# Patient Record
Sex: FEMALE | Race: BLACK OR AFRICAN AMERICAN | NOT HISPANIC OR LATINO
[De-identification: names, ages, dates, MRNs, and addresses within clinical notes are randomized per-mention and may not be internally consistent; named-entity substitution may affect disease eponyms.]

---

## 2017-06-27 ENCOUNTER — APPOINTMENT (OUTPATIENT)
Dept: PULMONOLOGY | Facility: CLINIC | Age: 67
End: 2017-06-27

## 2017-07-25 ENCOUNTER — APPOINTMENT (OUTPATIENT)
Dept: PULMONOLOGY | Facility: CLINIC | Age: 67
End: 2017-07-25

## 2017-07-25 VITALS
SYSTOLIC BLOOD PRESSURE: 99 MMHG | TEMPERATURE: 97 F | WEIGHT: 199 LBS | BODY MASS INDEX: 37.57 KG/M2 | DIASTOLIC BLOOD PRESSURE: 60 MMHG | HEART RATE: 79 BPM | RESPIRATION RATE: 16 BRPM | OXYGEN SATURATION: 97 % | HEIGHT: 61 IN

## 2017-07-25 DIAGNOSIS — M15.9 POLYOSTEOARTHRITIS, UNSPECIFIED: ICD-10-CM

## 2017-08-10 ENCOUNTER — APPOINTMENT (OUTPATIENT)
Dept: PULMONOLOGY | Facility: CLINIC | Age: 67
End: 2017-08-10
Payer: COMMERCIAL

## 2017-08-10 PROCEDURE — 94729 DIFFUSING CAPACITY: CPT

## 2017-08-10 PROCEDURE — 94726 PLETHYSMOGRAPHY LUNG VOLUMES: CPT

## 2017-08-10 PROCEDURE — 94060 EVALUATION OF WHEEZING: CPT

## 2019-02-26 ENCOUNTER — APPOINTMENT (OUTPATIENT)
Dept: PULMONOLOGY | Facility: CLINIC | Age: 69
End: 2019-02-26
Payer: COMMERCIAL

## 2019-02-26 VITALS
HEIGHT: 61 IN | OXYGEN SATURATION: 94 % | RESPIRATION RATE: 15 BRPM | WEIGHT: 200 LBS | DIASTOLIC BLOOD PRESSURE: 78 MMHG | TEMPERATURE: 97.5 F | SYSTOLIC BLOOD PRESSURE: 123 MMHG | HEART RATE: 84 BPM | BODY MASS INDEX: 37.76 KG/M2

## 2019-02-26 PROCEDURE — 99213 OFFICE O/P EST LOW 20 MIN: CPT

## 2019-02-26 NOTE — HISTORY OF PRESENT ILLNESS
[FreeTextEntry1] : 68-year-old woman here for followup. Last seen August 2017.\par \par Continues to work at a snf, continued to have environmental exposures to various chemicals, all of which cause her to be short of breath and feel choking. Same is true for perfumes, purell things like that.\par \par She still using Symbicort twice a day, 2 puffs. She thinks that a deep micrograms.\par She uses her pro air for 5 times a day when she is at work. Does not require it often at all and she is in environments that do not bother her.\par \par Patient works the night shift, 12 AM to 8 AM. She sleeps from around 10 AM to 1 PM, when she gets up to use the bathroom and sometimes eats. Often goes back to sleep and 2 PM to 6 or 7 PM. Wakes up feeling refreshed. Does not have symptoms of excessive daytime somnolence.\par \par She is also using a nasal spray b.i.d., not sure which one. Reports she has had allergy testing and was told she has a seasonal allergies.\par \par Chronic edema of the left lower extremity. Status post bilateral knee replacements.

## 2019-02-26 NOTE — DISCUSSION/SUMMARY
[FreeTextEntry1] : Previous pulmonary function testing has never shown evidence of asthma. Rather restrictive disease, consistent with her body habitus. Also noted was a decreased diffusion capacity. Prior CT PA 7 done which were negative for PE or ILD. She is close it did not show pulmonary hypertension. 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 Won't stop using the pro air, for 5 times daily at work, though pulmonary and allergy testing have been negative.\par \par Repeat pulmonary function testing ordered one hours the first step\par \par already received flu shot

## 2019-02-26 NOTE — PHYSICAL EXAM
[General Appearance - Well Developed] : well developed [Normal Appearance] : normal appearance [Well Groomed] : well groomed [General Appearance - Well Nourished] : well nourished [No Deformities] : no deformities [General Appearance - In No Acute Distress] : no acute distress [Normal Conjunctiva] : the conjunctiva exhibited no abnormalities [Eyelids - No Xanthelasma] : the eyelids demonstrated no xanthelasmas [Normal Oropharynx] : normal oropharynx [III] : III [Neck Appearance] : the appearance of the neck was normal [Neck Cervical Mass (___cm)] : no neck mass was observed [Jugular Venous Distention Increased] : there was no jugular-venous distention [Thyroid Diffuse Enlargement] : the thyroid was not enlarged [Thyroid Nodule] : there were no palpable thyroid nodules [Heart Rate And Rhythm] : heart rate and rhythm were normal [Heart Sounds] : normal S1 and S2 [Murmurs] : no murmurs present [Respiration, Rhythm And Depth] : normal respiratory rhythm and effort [Exaggerated Use Of Accessory Muscles For Inspiration] : no accessory muscle use [Auscultation Breath Sounds / Voice Sounds] : lungs were clear to auscultation bilaterally [Abdomen Soft] : soft [Abdomen Tenderness] : non-tender [Abdomen Mass (___ Cm)] : no abdominal mass palpated [FreeTextEntry1] : walks with a cane [Nail Clubbing] : no clubbing of the fingernails [Cyanosis, Localized] : no localized cyanosis [Petechial Hemorrhages (___cm)] : no petechial hemorrhages [Skin Color & Pigmentation] : normal skin color and pigmentation [] : no rash [No Venous Stasis] : no venous stasis [Skin Lesions] : no skin lesions [No Skin Ulcers] : no skin ulcer [No Xanthoma] : no  xanthoma was observed [Deep Tendon Reflexes (DTR)] : deep tendon reflexes were 2+ and symmetric [Sensation] : the sensory exam was normal to light touch and pinprick [No Focal Deficits] : no focal deficits [Oriented To Time, Place, And Person] : oriented to person, place, and time [Impaired Insight] : insight and judgment were intact [Affect] : the affect was normal

## 2019-03-18 ENCOUNTER — APPOINTMENT (OUTPATIENT)
Age: 69
End: 2019-03-18
Payer: COMMERCIAL

## 2019-03-18 PROCEDURE — 94729 DIFFUSING CAPACITY: CPT

## 2019-03-18 PROCEDURE — 94060 EVALUATION OF WHEEZING: CPT

## 2019-03-18 PROCEDURE — 94726 PLETHYSMOGRAPHY LUNG VOLUMES: CPT

## 2020-11-25 ENCOUNTER — APPOINTMENT (OUTPATIENT)
Dept: PULMONOLOGY | Facility: CLINIC | Age: 70
End: 2020-11-25
Payer: COMMERCIAL

## 2020-11-25 VITALS
HEART RATE: 75 BPM | TEMPERATURE: 97.9 F | DIASTOLIC BLOOD PRESSURE: 76 MMHG | RESPIRATION RATE: 15 BRPM | BODY MASS INDEX: 37.76 KG/M2 | WEIGHT: 200 LBS | OXYGEN SATURATION: 97 % | HEIGHT: 61 IN | SYSTOLIC BLOOD PRESSURE: 125 MMHG

## 2020-11-25 DIAGNOSIS — R06.02 SHORTNESS OF BREATH: ICD-10-CM

## 2020-11-25 PROCEDURE — 99213 OFFICE O/P EST LOW 20 MIN: CPT

## 2020-11-27 NOTE — ASSESSMENT
[FreeTextEntry1] : The patient has a pmh chronic cough who presents for a follow up. \par \par # SOB and chronic cough\par - work/exposure related, CTA 2016 did not showing ILD, Echo from the 2 years with rv dysfunction, only diastolic dysfunction. PFT showing restrictive lung disease with low ERV and decreased DLCO. History consistent with possible occupations RAD \par - C/W Symbicort and albuterol\par - Will repeat PFT's\par - Chec CT chest non contrast.. \par \par # Need for flu vaccine\par - Patient had one done with PMH in sept. \par \par \par \par

## 2020-11-27 NOTE — HISTORY OF PRESENT ILLNESS
[TextBox_4] : The patient has a pmh chronic cough who presents for a follow up. \par \par Patient for the last 6 years has been complaining of cough and sob exclusively during exposure to hand , cigarette smoking, amd exposure to chemical. During times she is not working she does not have any SOB. ECHO has some diastolic dysfunction and PFT is showing restrictive lung disease but with low DLCO. CTA of 2016 did not show PE and small basilar atelectasias.\par \par She states her symptoms has not changed. Also states she may have gotten COVID in march of this year. Has been having chronic LE edema L > R since knee surgery.

## 2020-11-27 NOTE — PHYSICAL EXAM
[No Acute Distress] : no acute distress [Normal Oropharynx] : normal oropharynx [Normal Appearance] : normal appearance [No Neck Mass] : no neck mass [Normal Rate/Rhythm] : normal rate/rhythm [Normal S1, S2] : normal s1, s2 [No Murmurs] : no murmurs [No Resp Distress] : no resp distress [Clear to Auscultation Bilaterally] : clear to auscultation bilaterally [No Abnormalities] : no abnormalities [Benign] : benign [No Clubbing] : no clubbing [No Cyanosis] : no cyanosis [No Edema] : no edema [Normal Color/ Pigmentation] : normal color/ pigmentation [No Focal Deficits] : no focal deficits [Oriented x3] : oriented x3 [Normal Affect] : normal affect [TextBox_54] : +2 LE edema  [TextBox_99] : Uses Walker

## 2021-01-22 ENCOUNTER — APPOINTMENT (OUTPATIENT)
Dept: ORTHOPEDIC SURGERY | Facility: CLINIC | Age: 71
End: 2021-01-22
Payer: COMMERCIAL

## 2021-01-22 VITALS
HEART RATE: 113 BPM | DIASTOLIC BLOOD PRESSURE: 69 MMHG | BODY MASS INDEX: 35.44 KG/M2 | HEIGHT: 63 IN | SYSTOLIC BLOOD PRESSURE: 130 MMHG | OXYGEN SATURATION: 96 % | WEIGHT: 200 LBS

## 2021-01-22 DIAGNOSIS — M25.561 PAIN IN RIGHT KNEE: ICD-10-CM

## 2021-01-22 PROCEDURE — 73562 X-RAY EXAM OF KNEE 3: CPT | Mod: RT

## 2021-01-22 PROCEDURE — 99072 ADDL SUPL MATRL&STAF TM PHE: CPT

## 2021-01-22 PROCEDURE — 99204 OFFICE O/P NEW MOD 45 MIN: CPT

## 2021-01-22 NOTE — HISTORY OF PRESENT ILLNESS
[Worsening] : worsening [___ mths] : [unfilled] month(s) ago [8] : a minimum pain level of 8/10 [Intermit.] : ~He/She~ states the symptoms seem to be intermittent [Walking] : worsened by walking [Knee Flexion] : worsened with knee flexion [Recumbency] : relieved by recumbency [Rest] : relieved by rest [de-identified] : Pt presents with pain in  her right knee, pt states her right knee is buckling and she is ambulating with a cane. Pt has taken Aleve Tylenol for pain prn.  Hx right TKR 3/2016, left TKR 12/2016, pt is not receiving  any physical therapy. Pt weight is 200 lbs, 5 ' 3" Pt was last knee x  rays 2017.  Patient also states she has chronic swelling to the left lower extremity and has seen a vascular surgeon, Dr. Baptiste, however no specific treatment intervention was performed apparently [de-identified] : certain movements

## 2021-01-22 NOTE — DISCUSSION/SUMMARY
[de-identified] : Patient was informed of her findings.  She was advised that her right knee is stable and in good form.  No apparent symptomatology related to her knee replacement.  However the patient does have considerable deconditioning and muscle weakness to the right lower extremity.  She does states she has had a back problem chronically although this was never worked up.  She was given the name of Dr. Vidal for further definitive treatment of her presumed lumbosacral radiculopathy.  She was also given the name of 2 vascular surgeons for other opinions with respect to her contralateral left lower extremity chronic edema.  As far as her knees are concerned follow-up in 1 year for her annual visit

## 2021-01-22 NOTE — PHYSICAL EXAM
[de-identified] : Physical examination of the patient's right lower extremity discloses significant quadriceps and dorsiflexion weakness of the right lower extremity.  Deep tendon reflexes are diminished as well.  Sensation intact right knee incision well-healed no acute effusions active and passive range of motion nontender and stable. [de-identified] : X-rays taken of the right knee and AP lateral and skyline projections disclose maintained integrity of the total knee implants.  No signs of loosening or component wear.

## 2021-01-27 ENCOUNTER — APPOINTMENT (OUTPATIENT)
Dept: PULMONOLOGY | Facility: CLINIC | Age: 71
End: 2021-01-27

## 2021-09-17 ENCOUNTER — APPOINTMENT (OUTPATIENT)
Dept: ORTHOPEDIC SURGERY | Facility: CLINIC | Age: 71
End: 2021-09-17
Payer: COMMERCIAL

## 2021-09-17 VITALS
HEART RATE: 75 BPM | SYSTOLIC BLOOD PRESSURE: 116 MMHG | WEIGHT: 197 LBS | HEIGHT: 63 IN | DIASTOLIC BLOOD PRESSURE: 69 MMHG | BODY MASS INDEX: 34.91 KG/M2 | OXYGEN SATURATION: 97 %

## 2021-09-17 DIAGNOSIS — Z96.653 PRESENCE OF ARTIFICIAL KNEE JOINT, BILATERAL: ICD-10-CM

## 2021-09-17 PROCEDURE — 99214 OFFICE O/P EST MOD 30 MIN: CPT

## 2021-09-17 PROCEDURE — 73562 X-RAY EXAM OF KNEE 3: CPT | Mod: 50

## 2021-09-17 NOTE — HISTORY OF PRESENT ILLNESS
[Worsening] : worsening [8] : a maximum pain level of 8/10 [Intermit.] : ~He/She~ states the symptoms seem to be intermittent [Walking] : worsened by walking [Heat] : relieved by heat [Rest] : relieved by rest [de-identified] : Pt returns for follow up for her bilateral TKR  Right TKR 3/2016  pt left TKR 12/2016 pt did not follow up for her left lower leg edema with a vascular physician as was recommended by Dr Jean 1/2021, Pt did not follow up with Dr Nina for her back. [de-identified] : certain movements [de-identified] : Tylenol

## 2021-09-17 NOTE — DISCUSSION/SUMMARY
[de-identified] : Patient advised of her findings. She return in 1 year for her annual total knee arthroplasty evaluation.

## 2021-09-17 NOTE — PHYSICAL EXAM
[de-identified] : Physical examination of the patient's knees disclose well-healed bilateral knee incisions. No acute functional deficits. Stable range of motion no effusions defects or deformities. [de-identified] : X-rays taken of both knees and AP lateral and skyline projections disclose maintained position of all components. No signs of acute wear or loosening.

## 2021-09-17 NOTE — REVIEW OF SYSTEMS
[Arthralgia] : arthralgia [Joint Stiffness] : joint stiffness [Negative] : Heme/Lymph [Joint Swelling] : no joint swelling

## 2022-03-30 ENCOUNTER — APPOINTMENT (OUTPATIENT)
Dept: PULMONOLOGY | Facility: CLINIC | Age: 72
End: 2022-03-30
Payer: COMMERCIAL

## 2022-03-30 VITALS
SYSTOLIC BLOOD PRESSURE: 122 MMHG | RESPIRATION RATE: 17 BRPM | OXYGEN SATURATION: 100 % | WEIGHT: 194 LBS | BODY MASS INDEX: 34.38 KG/M2 | DIASTOLIC BLOOD PRESSURE: 77 MMHG | HEIGHT: 63 IN | TEMPERATURE: 98.2 F | HEART RATE: 90 BPM

## 2022-03-30 PROCEDURE — 99213 OFFICE O/P EST LOW 20 MIN: CPT

## 2022-03-30 NOTE — DISCUSSION/SUMMARY
[FreeTextEntry1] : 71-year-old woman non-smoker, carries a diagnosis of asthma, though 3 PFTs have only showed restrictive lung disease.  More consistent with a very hypersensitive reaction to various environmental irritants such as alcohol some perfumes.\par \par She is on her baseline Symbicort.  But she is using albuterol much too frequently, and definitely not appropriately.  She uses it up to 8 times a day when she goes out, in response to the feeling of mucus stuck in her throat.  Even 4 times a day at home.\par \par Patient was advised to cut down albuterol use.  Instead, we will start nasal Astelin to try to help with the symptoms.

## 2022-03-30 NOTE — HISTORY OF PRESENT ILLNESS
[TextBox_4] : 71-year-old woman here for a follow-up visit, environmental sensitivities\par \par Last seen in November 2020.\par \par Patient had a mild case of Covid.  Recovered well at home.  She is vaccinated subsequently.\par \par She mostly tries to stay in her own home.  Outside of the home many environmental irritants bother her.  She is using Symbicort twice a day.  She is using her albuterol up to 8 times a day when she goes out, and even 4 times a day when she stays home.  She reports she uses albuterol when she feels some mucus caught in her throat.  She is not short of breath.  She is not using any nasal sprays at the moment.

## 2023-01-06 ENCOUNTER — APPOINTMENT (OUTPATIENT)
Dept: ORTHOPEDIC SURGERY | Facility: CLINIC | Age: 73
End: 2023-01-06

## 2023-02-01 ENCOUNTER — APPOINTMENT (OUTPATIENT)
Dept: PULMONOLOGY | Facility: CLINIC | Age: 73
End: 2023-02-01

## 2023-02-01 ENCOUNTER — APPOINTMENT (OUTPATIENT)
Dept: PULMONOLOGY | Facility: CLINIC | Age: 73
End: 2023-02-01
Payer: MEDICARE

## 2023-02-01 VITALS
DIASTOLIC BLOOD PRESSURE: 76 MMHG | HEIGHT: 63 IN | SYSTOLIC BLOOD PRESSURE: 111 MMHG | TEMPERATURE: 97.6 F | OXYGEN SATURATION: 99 % | RESPIRATION RATE: 17 BRPM | HEART RATE: 91 BPM | WEIGHT: 183 LBS | BODY MASS INDEX: 32.43 KG/M2

## 2023-02-01 DIAGNOSIS — R05.3 CHRONIC COUGH: ICD-10-CM

## 2023-02-01 PROCEDURE — 99213 OFFICE O/P EST LOW 20 MIN: CPT

## 2023-02-01 RX ORDER — LEVOCETIRIZINE DIHYDROCHLORIDE 5 MG/1
5 TABLET ORAL DAILY
Qty: 30 | Refills: 0 | Status: ACTIVE | COMMUNITY
Start: 2023-02-01 | End: 1900-01-01

## 2023-02-01 RX ORDER — AZELASTINE HYDROCHLORIDE 137 UG/1
137 SPRAY, METERED NASAL TWICE DAILY
Qty: 3 | Refills: 3 | Status: ACTIVE | COMMUNITY
Start: 2022-03-30 | End: 1900-01-01

## 2023-02-02 ENCOUNTER — APPOINTMENT (OUTPATIENT)
Dept: ORTHOPEDIC SURGERY | Facility: CLINIC | Age: 73
End: 2023-02-02
Payer: MEDICARE

## 2023-02-02 VITALS — BODY MASS INDEX: 32.43 KG/M2 | WEIGHT: 183 LBS | HEIGHT: 63 IN

## 2023-02-02 PROCEDURE — 99204 OFFICE O/P NEW MOD 45 MIN: CPT

## 2023-02-02 RX ORDER — MELOXICAM 15 MG/1
15 TABLET ORAL
Qty: 30 | Refills: 1 | Status: ACTIVE | COMMUNITY
Start: 2023-02-02 | End: 1900-01-01

## 2023-02-02 NOTE — HISTORY OF PRESENT ILLNESS
[TextBox_4] : Ms. Snider is a 73 yo F who carries the diagnosis of asthma who presents with chronic cough and hypersensitive reactions to environmental irritans is here for her regular follow up. \par \par She has no new symptoms. She continues to have significant cough in response to known irritants including hand , pollen, perfume, marijuana, smoke etc. She feels at her baseline currently. No significant shortness of breath, chest pain otherwise. When she is at home she does not typically require inhalers, has minimal symptoms. When she goes outside and is exposed to irritants she uses albuterol inhaler 2-4 times a day, but not every day. She uses her symbicort 2 times a day usually. No recent fevers, chills or any other complaints. \par \par \par She states she is up to date with vaccines including covid, influenza, pneumonia.

## 2023-02-28 ENCOUNTER — OUTPATIENT (OUTPATIENT)
Dept: OUTPATIENT SERVICES | Facility: HOSPITAL | Age: 73
LOS: 1 days | End: 2023-02-28
Payer: MEDICARE

## 2023-02-28 ENCOUNTER — APPOINTMENT (OUTPATIENT)
Dept: MRI IMAGING | Facility: CLINIC | Age: 73
End: 2023-02-28
Payer: MEDICARE

## 2023-02-28 DIAGNOSIS — Z90.49 ACQUIRED ABSENCE OF OTHER SPECIFIED PARTS OF DIGESTIVE TRACT: Chronic | ICD-10-CM

## 2023-02-28 DIAGNOSIS — Z90.710 ACQUIRED ABSENCE OF BOTH CERVIX AND UTERUS: Chronic | ICD-10-CM

## 2023-02-28 DIAGNOSIS — Z00.8 ENCOUNTER FOR OTHER GENERAL EXAMINATION: ICD-10-CM

## 2023-02-28 DIAGNOSIS — Z96.651 PRESENCE OF RIGHT ARTIFICIAL KNEE JOINT: Chronic | ICD-10-CM

## 2023-02-28 DIAGNOSIS — Z98.89 OTHER SPECIFIED POSTPROCEDURAL STATES: Chronic | ICD-10-CM

## 2023-02-28 PROCEDURE — 72148 MRI LUMBAR SPINE W/O DYE: CPT | Mod: 26

## 2023-02-28 PROCEDURE — 72148 MRI LUMBAR SPINE W/O DYE: CPT

## 2023-03-02 ENCOUNTER — APPOINTMENT (OUTPATIENT)
Dept: ORTHOPEDIC SURGERY | Facility: CLINIC | Age: 73
End: 2023-03-02
Payer: MEDICARE

## 2023-03-02 DIAGNOSIS — M48.07 SPINAL STENOSIS, LUMBOSACRAL REGION: ICD-10-CM

## 2023-03-02 DIAGNOSIS — M51.36 OTHER INTERVERTEBRAL DISC DEGENERATION, LUMBAR REGION: ICD-10-CM

## 2023-03-02 PROCEDURE — 99214 OFFICE O/P EST MOD 30 MIN: CPT | Mod: 95

## 2023-11-02 ENCOUNTER — APPOINTMENT (OUTPATIENT)
Dept: SURGICAL ONCOLOGY | Facility: CLINIC | Age: 73
End: 2023-11-02
Payer: MEDICARE

## 2023-11-02 VITALS
HEIGHT: 61 IN | DIASTOLIC BLOOD PRESSURE: 75 MMHG | HEART RATE: 101 BPM | OXYGEN SATURATION: 99 % | WEIGHT: 267 LBS | SYSTOLIC BLOOD PRESSURE: 115 MMHG | RESPIRATION RATE: 17 BRPM | BODY MASS INDEX: 50.41 KG/M2

## 2023-11-02 DIAGNOSIS — Z86.39 PERSONAL HISTORY OF OTHER ENDOCRINE, NUTRITIONAL AND METABOLIC DISEASE: ICD-10-CM

## 2023-11-02 PROCEDURE — 99205 OFFICE O/P NEW HI 60 MIN: CPT

## 2023-11-06 PROBLEM — Z86.39 HISTORY OF DIABETES MELLITUS: Status: RESOLVED | Noted: 2023-11-06 | Resolved: 2023-11-06

## 2023-11-06 RX ORDER — METRONIDAZOLE 250 MG/1
250 TABLET ORAL
Qty: 3 | Refills: 0 | Status: ACTIVE | COMMUNITY
Start: 2023-11-06 | End: 1900-01-01

## 2023-11-06 RX ORDER — NEOMYCIN SULFATE 500 MG/1
500 TABLET ORAL
Qty: 6 | Refills: 0 | Status: ACTIVE | COMMUNITY
Start: 2023-11-06 | End: 1900-01-01

## 2023-11-11 ENCOUNTER — RX RENEWAL (OUTPATIENT)
Age: 73
End: 2023-11-11

## 2023-11-11 RX ORDER — POTASSIUM CHLORIDE 1500 MG/1
20 TABLET, FILM COATED, EXTENDED RELEASE ORAL
Qty: 4 | Refills: 0 | Status: ACTIVE | COMMUNITY
Start: 2023-11-06 | End: 1900-01-01

## 2023-11-13 ENCOUNTER — APPOINTMENT (OUTPATIENT)
Dept: PULMONOLOGY | Facility: CLINIC | Age: 73
End: 2023-11-13
Payer: MEDICARE

## 2023-11-13 VITALS
DIASTOLIC BLOOD PRESSURE: 76 MMHG | WEIGHT: 137 LBS | HEART RATE: 87 BPM | BODY MASS INDEX: 25.86 KG/M2 | TEMPERATURE: 98.4 F | HEIGHT: 61 IN | SYSTOLIC BLOOD PRESSURE: 138 MMHG | OXYGEN SATURATION: 97 %

## 2023-11-13 DIAGNOSIS — J45.909 UNSPECIFIED ASTHMA, UNCOMPLICATED: ICD-10-CM

## 2023-11-13 PROCEDURE — 99213 OFFICE O/P EST LOW 20 MIN: CPT

## 2023-11-13 RX ORDER — BUDESONIDE AND FORMOTEROL FUMARATE DIHYDRATE 160; 4.5 UG/1; UG/1
160-4.5 AEROSOL RESPIRATORY (INHALATION) TWICE DAILY
Qty: 1 | Refills: 5 | Status: ACTIVE | COMMUNITY
Start: 2020-11-25 | End: 1900-01-01

## 2023-11-14 ENCOUNTER — RESULT REVIEW (OUTPATIENT)
Age: 73
End: 2023-11-14

## 2023-11-14 ENCOUNTER — OUTPATIENT (OUTPATIENT)
Dept: OUTPATIENT SERVICES | Facility: HOSPITAL | Age: 73
LOS: 1 days | End: 2023-11-14
Payer: MEDICARE

## 2023-11-14 DIAGNOSIS — Z90.49 ACQUIRED ABSENCE OF OTHER SPECIFIED PARTS OF DIGESTIVE TRACT: Chronic | ICD-10-CM

## 2023-11-14 DIAGNOSIS — Z98.89 OTHER SPECIFIED POSTPROCEDURAL STATES: Chronic | ICD-10-CM

## 2023-11-14 DIAGNOSIS — Z96.651 PRESENCE OF RIGHT ARTIFICIAL KNEE JOINT: Chronic | ICD-10-CM

## 2023-11-14 DIAGNOSIS — C18.0 MALIGNANT NEOPLASM OF CECUM: ICD-10-CM

## 2023-11-14 DIAGNOSIS — Z90.710 ACQUIRED ABSENCE OF BOTH CERVIX AND UTERUS: Chronic | ICD-10-CM

## 2023-11-14 PROCEDURE — 88321 CONSLTJ&REPRT SLD PREP ELSWR: CPT

## 2023-11-16 ENCOUNTER — OUTPATIENT (OUTPATIENT)
Dept: OUTPATIENT SERVICES | Facility: HOSPITAL | Age: 73
LOS: 1 days | End: 2023-11-16
Payer: MEDICARE

## 2023-11-16 VITALS
HEIGHT: 60 IN | SYSTOLIC BLOOD PRESSURE: 101 MMHG | WEIGHT: 166.89 LBS | OXYGEN SATURATION: 98 % | DIASTOLIC BLOOD PRESSURE: 69 MMHG | HEART RATE: 94 BPM | TEMPERATURE: 98 F | RESPIRATION RATE: 16 BRPM

## 2023-11-16 DIAGNOSIS — J45.909 UNSPECIFIED ASTHMA, UNCOMPLICATED: ICD-10-CM

## 2023-11-16 DIAGNOSIS — C18.0 MALIGNANT NEOPLASM OF CECUM: ICD-10-CM

## 2023-11-16 DIAGNOSIS — Z98.890 OTHER SPECIFIED POSTPROCEDURAL STATES: Chronic | ICD-10-CM

## 2023-11-16 DIAGNOSIS — Z90.49 ACQUIRED ABSENCE OF OTHER SPECIFIED PARTS OF DIGESTIVE TRACT: Chronic | ICD-10-CM

## 2023-11-16 DIAGNOSIS — E11.9 TYPE 2 DIABETES MELLITUS WITHOUT COMPLICATIONS: ICD-10-CM

## 2023-11-16 DIAGNOSIS — Z90.710 ACQUIRED ABSENCE OF BOTH CERVIX AND UTERUS: Chronic | ICD-10-CM

## 2023-11-16 DIAGNOSIS — Z98.89 OTHER SPECIFIED POSTPROCEDURAL STATES: Chronic | ICD-10-CM

## 2023-11-16 DIAGNOSIS — I10 ESSENTIAL (PRIMARY) HYPERTENSION: ICD-10-CM

## 2023-11-16 DIAGNOSIS — Z96.652 PRESENCE OF LEFT ARTIFICIAL KNEE JOINT: Chronic | ICD-10-CM

## 2023-11-16 DIAGNOSIS — Z96.651 PRESENCE OF RIGHT ARTIFICIAL KNEE JOINT: Chronic | ICD-10-CM

## 2023-11-16 LAB
A1C WITH ESTIMATED AVERAGE GLUCOSE RESULT: 6.8 % — HIGH (ref 4–5.6)
A1C WITH ESTIMATED AVERAGE GLUCOSE RESULT: 6.8 % — HIGH (ref 4–5.6)
ALBUMIN SERPL ELPH-MCNC: 3.5 G/DL — SIGNIFICANT CHANGE UP (ref 3.3–5)
ALBUMIN SERPL ELPH-MCNC: 3.5 G/DL — SIGNIFICANT CHANGE UP (ref 3.3–5)
ALP SERPL-CCNC: 91 U/L — SIGNIFICANT CHANGE UP (ref 40–120)
ALP SERPL-CCNC: 91 U/L — SIGNIFICANT CHANGE UP (ref 40–120)
ALT FLD-CCNC: 19 U/L — SIGNIFICANT CHANGE UP (ref 4–33)
ALT FLD-CCNC: 19 U/L — SIGNIFICANT CHANGE UP (ref 4–33)
ANION GAP SERPL CALC-SCNC: 7 MMOL/L — SIGNIFICANT CHANGE UP (ref 7–14)
ANION GAP SERPL CALC-SCNC: 7 MMOL/L — SIGNIFICANT CHANGE UP (ref 7–14)
AST SERPL-CCNC: 22 U/L — SIGNIFICANT CHANGE UP (ref 4–32)
AST SERPL-CCNC: 22 U/L — SIGNIFICANT CHANGE UP (ref 4–32)
BILIRUB SERPL-MCNC: 0.2 MG/DL — SIGNIFICANT CHANGE UP (ref 0.2–1.2)
BILIRUB SERPL-MCNC: 0.2 MG/DL — SIGNIFICANT CHANGE UP (ref 0.2–1.2)
BLD GP AB SCN SERPL QL: NEGATIVE — SIGNIFICANT CHANGE UP
BLD GP AB SCN SERPL QL: NEGATIVE — SIGNIFICANT CHANGE UP
BUN SERPL-MCNC: 8 MG/DL — SIGNIFICANT CHANGE UP (ref 7–23)
BUN SERPL-MCNC: 8 MG/DL — SIGNIFICANT CHANGE UP (ref 7–23)
CALCIUM SERPL-MCNC: 9.5 MG/DL — SIGNIFICANT CHANGE UP (ref 8.4–10.5)
CALCIUM SERPL-MCNC: 9.5 MG/DL — SIGNIFICANT CHANGE UP (ref 8.4–10.5)
CHLORIDE SERPL-SCNC: 102 MMOL/L — SIGNIFICANT CHANGE UP (ref 98–107)
CHLORIDE SERPL-SCNC: 102 MMOL/L — SIGNIFICANT CHANGE UP (ref 98–107)
CO2 SERPL-SCNC: 32 MMOL/L — HIGH (ref 22–31)
CO2 SERPL-SCNC: 32 MMOL/L — HIGH (ref 22–31)
CREAT SERPL-MCNC: 0.61 MG/DL — SIGNIFICANT CHANGE UP (ref 0.5–1.3)
CREAT SERPL-MCNC: 0.61 MG/DL — SIGNIFICANT CHANGE UP (ref 0.5–1.3)
EGFR: 95 ML/MIN/1.73M2 — SIGNIFICANT CHANGE UP
EGFR: 95 ML/MIN/1.73M2 — SIGNIFICANT CHANGE UP
ESTIMATED AVERAGE GLUCOSE: 148 — SIGNIFICANT CHANGE UP
ESTIMATED AVERAGE GLUCOSE: 148 — SIGNIFICANT CHANGE UP
GLUCOSE SERPL-MCNC: 158 MG/DL — HIGH (ref 70–99)
GLUCOSE SERPL-MCNC: 158 MG/DL — HIGH (ref 70–99)
HCT VFR BLD CALC: 35.2 % — SIGNIFICANT CHANGE UP (ref 34.5–45)
HCT VFR BLD CALC: 35.2 % — SIGNIFICANT CHANGE UP (ref 34.5–45)
HGB BLD-MCNC: 10.9 G/DL — LOW (ref 11.5–15.5)
HGB BLD-MCNC: 10.9 G/DL — LOW (ref 11.5–15.5)
MCHC RBC-ENTMCNC: 24.3 PG — LOW (ref 27–34)
MCHC RBC-ENTMCNC: 24.3 PG — LOW (ref 27–34)
MCHC RBC-ENTMCNC: 31 GM/DL — LOW (ref 32–36)
MCHC RBC-ENTMCNC: 31 GM/DL — LOW (ref 32–36)
MCV RBC AUTO: 78.4 FL — LOW (ref 80–100)
MCV RBC AUTO: 78.4 FL — LOW (ref 80–100)
NRBC # BLD: 0 /100 WBCS — SIGNIFICANT CHANGE UP (ref 0–0)
NRBC # BLD: 0 /100 WBCS — SIGNIFICANT CHANGE UP (ref 0–0)
NRBC # FLD: 0 K/UL — SIGNIFICANT CHANGE UP (ref 0–0)
NRBC # FLD: 0 K/UL — SIGNIFICANT CHANGE UP (ref 0–0)
PLATELET # BLD AUTO: 289 K/UL — SIGNIFICANT CHANGE UP (ref 150–400)
PLATELET # BLD AUTO: 289 K/UL — SIGNIFICANT CHANGE UP (ref 150–400)
POTASSIUM SERPL-MCNC: 3.1 MMOL/L — LOW (ref 3.5–5.3)
POTASSIUM SERPL-MCNC: 3.1 MMOL/L — LOW (ref 3.5–5.3)
POTASSIUM SERPL-SCNC: 3.1 MMOL/L — LOW (ref 3.5–5.3)
POTASSIUM SERPL-SCNC: 3.1 MMOL/L — LOW (ref 3.5–5.3)
PROT SERPL-MCNC: 7.5 G/DL — SIGNIFICANT CHANGE UP (ref 6–8.3)
PROT SERPL-MCNC: 7.5 G/DL — SIGNIFICANT CHANGE UP (ref 6–8.3)
RBC # BLD: 4.49 M/UL — SIGNIFICANT CHANGE UP (ref 3.8–5.2)
RBC # BLD: 4.49 M/UL — SIGNIFICANT CHANGE UP (ref 3.8–5.2)
RBC # FLD: 16.7 % — HIGH (ref 10.3–14.5)
RBC # FLD: 16.7 % — HIGH (ref 10.3–14.5)
RH IG SCN BLD-IMP: POSITIVE — SIGNIFICANT CHANGE UP
SODIUM SERPL-SCNC: 141 MMOL/L — SIGNIFICANT CHANGE UP (ref 135–145)
SODIUM SERPL-SCNC: 141 MMOL/L — SIGNIFICANT CHANGE UP (ref 135–145)
SURGICAL PATHOLOGY STUDY: SIGNIFICANT CHANGE UP
SURGICAL PATHOLOGY STUDY: SIGNIFICANT CHANGE UP
WBC # BLD: 6.49 K/UL — SIGNIFICANT CHANGE UP (ref 3.8–10.5)
WBC # BLD: 6.49 K/UL — SIGNIFICANT CHANGE UP (ref 3.8–10.5)
WBC # FLD AUTO: 6.49 K/UL — SIGNIFICANT CHANGE UP (ref 3.8–10.5)
WBC # FLD AUTO: 6.49 K/UL — SIGNIFICANT CHANGE UP (ref 3.8–10.5)

## 2023-11-16 PROCEDURE — 93010 ELECTROCARDIOGRAM REPORT: CPT

## 2023-11-16 RX ORDER — GLUCAGON INJECTION, SOLUTION 0.5 MG/.1ML
1 INJECTION, SOLUTION SUBCUTANEOUS ONCE
Refills: 0 | Status: DISCONTINUED | OUTPATIENT
Start: 2023-11-22 | End: 2023-11-24

## 2023-11-16 RX ORDER — DEXTROSE 50 % IN WATER 50 %
15 SYRINGE (ML) INTRAVENOUS ONCE
Refills: 0 | Status: DISCONTINUED | OUTPATIENT
Start: 2023-11-22 | End: 2023-11-22

## 2023-11-16 RX ORDER — SODIUM CHLORIDE 9 MG/ML
3 INJECTION INTRAMUSCULAR; INTRAVENOUS; SUBCUTANEOUS EVERY 8 HOURS
Refills: 0 | Status: DISCONTINUED | OUTPATIENT
Start: 2023-11-22 | End: 2023-12-15

## 2023-11-16 RX ORDER — DEXTROSE 50 % IN WATER 50 %
25 SYRINGE (ML) INTRAVENOUS ONCE
Refills: 0 | Status: DISCONTINUED | OUTPATIENT
Start: 2023-11-22 | End: 2023-11-22

## 2023-11-16 RX ORDER — DEXTROSE 50 % IN WATER 50 %
12.5 SYRINGE (ML) INTRAVENOUS ONCE
Refills: 0 | Status: DISCONTINUED | OUTPATIENT
Start: 2023-11-22 | End: 2023-11-22

## 2023-11-16 RX ORDER — CHLORHEXIDINE GLUCONATE 213 G/1000ML
1 SOLUTION TOPICAL DAILY
Refills: 0 | Status: DISCONTINUED | OUTPATIENT
Start: 2023-11-22 | End: 2023-12-15

## 2023-11-16 RX ORDER — SODIUM CHLORIDE 9 MG/ML
1000 INJECTION, SOLUTION INTRAVENOUS
Refills: 0 | Status: DISCONTINUED | OUTPATIENT
Start: 2023-11-22 | End: 2023-11-23

## 2023-11-16 NOTE — H&P PST ADULT - NSICDXPASTMEDICALHX_GEN_ALL_CORE_FT
PAST MEDICAL HISTORY:  Asthma     Diabetes wears insulin pump    GERD (gastroesophageal reflux disease)     H/O insertion of insulin pump     History of hypertension     Hyperlipidemia     Lymphadenopathy left lower extremitiy ; 1966 - current    Malignant neoplasm of cecum     OA (osteoarthritis)     Obesity     Uterine leiomyoma

## 2023-11-16 NOTE — H&P PST ADULT - RESPIRATORY AND THORAX COMMENTS
Hx asthma- controlled on medications, denies intubations Hx asthma- denies intubations, reports she uses albuterol pump daily, states she stopped using Symbicort daily, pulmonologist informed and educated patient on using symbicort daily  and albuterol as needed, preop evaluation in chart

## 2023-11-16 NOTE — H&P PST ADULT - CARDIOVASCULAR
regular rate and rhythm/S1 S2 present/peripheral edema details… regular rate and rhythm/S1 S2 present/no murmur/peripheral edema/pedal edema

## 2023-11-16 NOTE — H&P PST ADULT - HISTORY OF PRESENT ILLNESS
73 yr old female underwent screening colonoscopy 10/24/2023 demonstrated a 3 cm non-obstructing, partially circumferential mass in the cecum and a 5 mm polyp in the ascending colon, Pathology: cecal mass showed moderately differentiated adenocarcinoma, with extensive high grade dysplasia. Scheduled for robotic partial colectomy, possible open  73 yr old female underwent screening colonoscopy 10/24/2023 demonstrated a 3 cm non-obstructing, partially circumferential mass in the cecum and a 5 mm polyp in the ascending colon, Pathology: cecal mass showed moderately differentiated adenocarcinoma, with extensive high grade dysplasia. Scheduled for robotic partial colectomy, possible open, denies bowels changes, abdominal pain or weight loss

## 2023-11-16 NOTE — H&P PST ADULT - PROBLEM SELECTOR PLAN 2
Patient instructed to take *** on day of procedure with small sips of water, verbalized understanding.     Cardiac evaluation requested by PST for further evaluation of low mets, high risk patient and surgery   Echo, holter report and Stress test requested Patient instructed to take holter on day of procedure with small sips of water, hold HCTZ on DOS, verbalized understanding.     Cardiac evaluation requested by PST for further evaluation of low mets, high risk patient and surgery   Echo, holter report and Stress test requested

## 2023-11-16 NOTE — H&P PST ADULT - RESPIRATORY
clear to auscultation bilaterally/no wheezes/no respiratory distress clear to auscultation bilaterally/no wheezes/no rales/no respiratory distress/no use of accessory muscles

## 2023-11-16 NOTE — H&P PST ADULT - NSICDXPASTSURGICALHX_GEN_ALL_CORE_FT
PAST SURGICAL HISTORY:  H/O bilateral breast reduction surgery     H/O total knee replacement, left     H/O: hysterectomy     History of appendectomy     History of cholecystectomy     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 - ANGELA    S/P bunionectomy

## 2023-11-16 NOTE — H&P PST ADULT - PROBLEM SELECTOR PLAN 4
Patient instructed on the following medications adjustment per endocrinologist   POCT glucose testing ordered STAT upon admission and  Hypoglycemia protocol for history of insulin use    Endocrinology evaluation requested by PST for further evaluation of insulin pump adjustment and history of low blood glucose readings

## 2023-11-16 NOTE — H&P PST ADULT - NS MD HP INPLANTS MED DEV
glucose sensor/Artificial joint/Insulin pump glucose sensor, bilateral knee replacement/Artificial joint/Insulin pump

## 2023-11-16 NOTE — H&P PST ADULT - PROBLEM SELECTOR PLAN 1
Patient scheduled for surgery on: 11/22/23  Provided with verbal and written presurgical instructions  Verbalized understanding  with teach back on the following: Famotidine for GI prophylaxis and chlorhexidine wash    Lab specimen drawn at PST today: cbc, cmp, hga1c, type, abo

## 2023-11-16 NOTE — H&P PST ADULT - PROBLEM SELECTOR PLAN 3
Pulmonologist evaluation requested by PST for further evaluation of frequent use of albuterol pump- in chart    Instructed to use inhalers as prescribed and bring rescue inhaler on DOS

## 2023-11-16 NOTE — H&P PST ADULT - ENDOCRINE COMMENTS
Diabetes- insulin dependent, insulin pump form provided to patient and endocrinology emailed with patient information

## 2023-11-16 NOTE — H&P PST ADULT - END GEN HX ROS MEA POS PC
Uses Glucosensor- "always less than 200, sometimes 40 , because I don't eat" Endocrinologist made insulin pump adjustments Uses Glucose sensor- "always less than 200, sometimes 40 , because I don't eat" Endocrinologist made insulin pump adjustments

## 2023-11-16 NOTE — H&P PST ADULT - NSICDXFAMILYHX_GEN_ALL_CORE_FT
FAMILY HISTORY:  Father  Still living? No  Diabetes mellitus, Age at diagnosis: Age Unknown    Aunt  Still living? No  FH: breast cancer, Age at diagnosis: Age Unknown

## 2023-11-16 NOTE — H&P PST ADULT - MUSCULOSKELETAL
details… back exam/abnormal gait no joint erythema/no joint warmth/no calf tenderness/decreased ROM/decreased ROM due to pain/back exam/abnormal gait

## 2023-11-21 ENCOUNTER — TRANSCRIPTION ENCOUNTER (OUTPATIENT)
Age: 73
End: 2023-11-21

## 2023-11-21 NOTE — ASU PATIENT PROFILE, ADULT - FALL HARM RISK - HARM RISK INTERVENTIONS
Communicate Risk of Fall with Harm to all staff/Reinforce activity limits and safety measures with patient and family/Tailored Fall Risk Interventions/Visual Cue: Yellow wristband and red socks/Bed in lowest position, wheels locked, appropriate side rails in place/Call bell, personal items and telephone in reach/Instruct patient to call for assistance before getting out of bed or chair/Non-slip footwear when patient is out of bed/[place] to call system/Physically safe environment - no spills, clutter or unnecessary equipment/Purposeful Proactive Rounding/Room/bathroom lighting operational, light cord in reach Assistance with ambulation/Assistance OOB with selected safe patient handling equipment/Communicate Risk of Fall with Harm to all staff/Discuss with provider need for PT consult/Monitor gait and stability/Provide patient with walking aids - walker, cane, crutches/Reinforce activity limits and safety measures with patient and family/Tailored Fall Risk Interventions/Visual Cue: Yellow wristband and red socks/Bed in lowest position, wheels locked, appropriate side rails in place/Call bell, personal items and telephone in reach/Instruct patient to call for assistance before getting out of bed or chair/Non-slip footwear when patient is out of bed/[place] to call system/Physically safe environment - no spills, clutter or unnecessary equipment/Purposeful Proactive Rounding/Room/bathroom lighting operational, light cord in reach

## 2023-11-21 NOTE — ASU PATIENT PROFILE, ADULT - AS SC BRADEN MOBILITY
Ht: 67inches  pre-pregnancy wt: 215pounds  IBW: 135pounds (+/-10%)  D/W MFM team, Lantus to be d/c'd and NPH 22 units qHS to be prescribed (4) no limitation

## 2023-11-22 ENCOUNTER — RESULT REVIEW (OUTPATIENT)
Age: 73
End: 2023-11-22

## 2023-11-22 ENCOUNTER — INPATIENT (INPATIENT)
Facility: HOSPITAL | Age: 73
LOS: 22 days | Discharge: HOME HEALTH | End: 2023-12-15
Attending: SURGERY | Admitting: SURGERY
Payer: MEDICARE

## 2023-11-22 ENCOUNTER — TRANSCRIPTION ENCOUNTER (OUTPATIENT)
Age: 73
End: 2023-11-22

## 2023-11-22 ENCOUNTER — APPOINTMENT (OUTPATIENT)
Dept: SURGICAL ONCOLOGY | Facility: HOSPITAL | Age: 73
End: 2023-11-22

## 2023-11-22 VITALS
DIASTOLIC BLOOD PRESSURE: 64 MMHG | HEART RATE: 111 BPM | HEIGHT: 60 IN | TEMPERATURE: 99 F | WEIGHT: 166.89 LBS | SYSTOLIC BLOOD PRESSURE: 126 MMHG | RESPIRATION RATE: 16 BRPM | OXYGEN SATURATION: 99 %

## 2023-11-22 DIAGNOSIS — Z90.710 ACQUIRED ABSENCE OF BOTH CERVIX AND UTERUS: Chronic | ICD-10-CM

## 2023-11-22 DIAGNOSIS — Z90.49 ACQUIRED ABSENCE OF OTHER SPECIFIED PARTS OF DIGESTIVE TRACT: Chronic | ICD-10-CM

## 2023-11-22 DIAGNOSIS — Z96.651 PRESENCE OF RIGHT ARTIFICIAL KNEE JOINT: Chronic | ICD-10-CM

## 2023-11-22 DIAGNOSIS — Z96.652 PRESENCE OF LEFT ARTIFICIAL KNEE JOINT: Chronic | ICD-10-CM

## 2023-11-22 DIAGNOSIS — C18.0 MALIGNANT NEOPLASM OF CECUM: ICD-10-CM

## 2023-11-22 DIAGNOSIS — E78.5 HYPERLIPIDEMIA, UNSPECIFIED: ICD-10-CM

## 2023-11-22 DIAGNOSIS — Z98.890 OTHER SPECIFIED POSTPROCEDURAL STATES: Chronic | ICD-10-CM

## 2023-11-22 DIAGNOSIS — Z98.89 OTHER SPECIFIED POSTPROCEDURAL STATES: Chronic | ICD-10-CM

## 2023-11-22 LAB
ALBUMIN SERPL ELPH-MCNC: 3.2 G/DL — LOW (ref 3.3–5)
ALBUMIN SERPL ELPH-MCNC: 3.2 G/DL — LOW (ref 3.3–5)
ALP SERPL-CCNC: 90 U/L — SIGNIFICANT CHANGE UP (ref 40–120)
ALP SERPL-CCNC: 90 U/L — SIGNIFICANT CHANGE UP (ref 40–120)
ALT FLD-CCNC: 20 U/L — SIGNIFICANT CHANGE UP (ref 4–33)
ALT FLD-CCNC: 20 U/L — SIGNIFICANT CHANGE UP (ref 4–33)
ANION GAP SERPL CALC-SCNC: 12 MMOL/L — SIGNIFICANT CHANGE UP (ref 7–14)
ANION GAP SERPL CALC-SCNC: 12 MMOL/L — SIGNIFICANT CHANGE UP (ref 7–14)
ANION GAP SERPL CALC-SCNC: 8 MMOL/L — SIGNIFICANT CHANGE UP (ref 7–14)
ANION GAP SERPL CALC-SCNC: 8 MMOL/L — SIGNIFICANT CHANGE UP (ref 7–14)
AST SERPL-CCNC: 24 U/L — SIGNIFICANT CHANGE UP (ref 4–32)
AST SERPL-CCNC: 24 U/L — SIGNIFICANT CHANGE UP (ref 4–32)
BASE EXCESS BLDV CALC-SCNC: 4.1 MMOL/L — HIGH (ref -2–3)
BASE EXCESS BLDV CALC-SCNC: 4.1 MMOL/L — HIGH (ref -2–3)
BILIRUB SERPL-MCNC: 0.5 MG/DL — SIGNIFICANT CHANGE UP (ref 0.2–1.2)
BILIRUB SERPL-MCNC: 0.5 MG/DL — SIGNIFICANT CHANGE UP (ref 0.2–1.2)
BUN SERPL-MCNC: 6 MG/DL — LOW (ref 7–23)
BUN SERPL-MCNC: 6 MG/DL — LOW (ref 7–23)
BUN SERPL-MCNC: 7 MG/DL — SIGNIFICANT CHANGE UP (ref 7–23)
BUN SERPL-MCNC: 7 MG/DL — SIGNIFICANT CHANGE UP (ref 7–23)
CA-I SERPL-SCNC: 1.25 MMOL/L — SIGNIFICANT CHANGE UP (ref 1.15–1.33)
CA-I SERPL-SCNC: 1.25 MMOL/L — SIGNIFICANT CHANGE UP (ref 1.15–1.33)
CALCIUM SERPL-MCNC: 8.6 MG/DL — SIGNIFICANT CHANGE UP (ref 8.4–10.5)
CALCIUM SERPL-MCNC: 8.6 MG/DL — SIGNIFICANT CHANGE UP (ref 8.4–10.5)
CALCIUM SERPL-MCNC: 8.8 MG/DL — SIGNIFICANT CHANGE UP (ref 8.4–10.5)
CALCIUM SERPL-MCNC: 8.8 MG/DL — SIGNIFICANT CHANGE UP (ref 8.4–10.5)
CHLORIDE BLDV-SCNC: 105 MMOL/L — SIGNIFICANT CHANGE UP (ref 96–108)
CHLORIDE BLDV-SCNC: 105 MMOL/L — SIGNIFICANT CHANGE UP (ref 96–108)
CHLORIDE SERPL-SCNC: 104 MMOL/L — SIGNIFICANT CHANGE UP (ref 98–107)
CHLORIDE SERPL-SCNC: 104 MMOL/L — SIGNIFICANT CHANGE UP (ref 98–107)
CHLORIDE SERPL-SCNC: 106 MMOL/L — SIGNIFICANT CHANGE UP (ref 98–107)
CHLORIDE SERPL-SCNC: 106 MMOL/L — SIGNIFICANT CHANGE UP (ref 98–107)
CO2 BLDV-SCNC: 31.2 MMOL/L — HIGH (ref 22–26)
CO2 BLDV-SCNC: 31.2 MMOL/L — HIGH (ref 22–26)
CO2 SERPL-SCNC: 26 MMOL/L — SIGNIFICANT CHANGE UP (ref 22–31)
CREAT SERPL-MCNC: 0.58 MG/DL — SIGNIFICANT CHANGE UP (ref 0.5–1.3)
CREAT SERPL-MCNC: 0.58 MG/DL — SIGNIFICANT CHANGE UP (ref 0.5–1.3)
CREAT SERPL-MCNC: 0.61 MG/DL — SIGNIFICANT CHANGE UP (ref 0.5–1.3)
CREAT SERPL-MCNC: 0.61 MG/DL — SIGNIFICANT CHANGE UP (ref 0.5–1.3)
EGFR: 95 ML/MIN/1.73M2 — SIGNIFICANT CHANGE UP
EGFR: 95 ML/MIN/1.73M2 — SIGNIFICANT CHANGE UP
EGFR: 96 ML/MIN/1.73M2 — SIGNIFICANT CHANGE UP
EGFR: 96 ML/MIN/1.73M2 — SIGNIFICANT CHANGE UP
GAS PNL BLDA: SIGNIFICANT CHANGE UP
GAS PNL BLDV: 140 MMOL/L — SIGNIFICANT CHANGE UP (ref 136–145)
GAS PNL BLDV: 140 MMOL/L — SIGNIFICANT CHANGE UP (ref 136–145)
GAS PNL BLDV: SIGNIFICANT CHANGE UP
GAS PNL BLDV: SIGNIFICANT CHANGE UP
GLUCOSE BLDC GLUCOMTR-MCNC: 173 MG/DL — HIGH (ref 70–99)
GLUCOSE BLDC GLUCOMTR-MCNC: 173 MG/DL — HIGH (ref 70–99)
GLUCOSE BLDC GLUCOMTR-MCNC: 181 MG/DL — HIGH (ref 70–99)
GLUCOSE BLDC GLUCOMTR-MCNC: 181 MG/DL — HIGH (ref 70–99)
GLUCOSE BLDC GLUCOMTR-MCNC: 194 MG/DL — HIGH (ref 70–99)
GLUCOSE BLDC GLUCOMTR-MCNC: 194 MG/DL — HIGH (ref 70–99)
GLUCOSE BLDC GLUCOMTR-MCNC: 208 MG/DL — HIGH (ref 70–99)
GLUCOSE BLDC GLUCOMTR-MCNC: 208 MG/DL — HIGH (ref 70–99)
GLUCOSE BLDV-MCNC: 131 MG/DL — HIGH (ref 70–99)
GLUCOSE BLDV-MCNC: 131 MG/DL — HIGH (ref 70–99)
GLUCOSE SERPL-MCNC: 184 MG/DL — HIGH (ref 70–99)
GLUCOSE SERPL-MCNC: 184 MG/DL — HIGH (ref 70–99)
GLUCOSE SERPL-MCNC: 185 MG/DL — HIGH (ref 70–99)
GLUCOSE SERPL-MCNC: 185 MG/DL — HIGH (ref 70–99)
HCO3 BLDV-SCNC: 30 MMOL/L — HIGH (ref 22–29)
HCO3 BLDV-SCNC: 30 MMOL/L — HIGH (ref 22–29)
HCT VFR BLD CALC: 30.3 % — LOW (ref 34.5–45)
HCT VFR BLD CALC: 30.3 % — LOW (ref 34.5–45)
HCT VFR BLDA CALC: 34 % — LOW (ref 34.5–46.5)
HCT VFR BLDA CALC: 34 % — LOW (ref 34.5–46.5)
HGB BLD CALC-MCNC: 11.2 G/DL — LOW (ref 11.7–16.1)
HGB BLD CALC-MCNC: 11.2 G/DL — LOW (ref 11.7–16.1)
HGB BLD-MCNC: 9.6 G/DL — LOW (ref 11.5–15.5)
HGB BLD-MCNC: 9.6 G/DL — LOW (ref 11.5–15.5)
LACTATE BLDV-MCNC: 1.4 MMOL/L — SIGNIFICANT CHANGE UP (ref 0.5–2)
LACTATE BLDV-MCNC: 1.4 MMOL/L — SIGNIFICANT CHANGE UP (ref 0.5–2)
MAGNESIUM SERPL-MCNC: 1.7 MG/DL — SIGNIFICANT CHANGE UP (ref 1.6–2.6)
MAGNESIUM SERPL-MCNC: 1.7 MG/DL — SIGNIFICANT CHANGE UP (ref 1.6–2.6)
MAGNESIUM SERPL-MCNC: 1.8 MG/DL — SIGNIFICANT CHANGE UP (ref 1.6–2.6)
MAGNESIUM SERPL-MCNC: 1.8 MG/DL — SIGNIFICANT CHANGE UP (ref 1.6–2.6)
MCHC RBC-ENTMCNC: 24.4 PG — LOW (ref 27–34)
MCHC RBC-ENTMCNC: 24.4 PG — LOW (ref 27–34)
MCHC RBC-ENTMCNC: 31.7 GM/DL — LOW (ref 32–36)
MCHC RBC-ENTMCNC: 31.7 GM/DL — LOW (ref 32–36)
MCV RBC AUTO: 77.1 FL — LOW (ref 80–100)
MCV RBC AUTO: 77.1 FL — LOW (ref 80–100)
NRBC # BLD: 0 /100 WBCS — SIGNIFICANT CHANGE UP (ref 0–0)
NRBC # BLD: 0 /100 WBCS — SIGNIFICANT CHANGE UP (ref 0–0)
NRBC # FLD: 0 K/UL — SIGNIFICANT CHANGE UP (ref 0–0)
NRBC # FLD: 0 K/UL — SIGNIFICANT CHANGE UP (ref 0–0)
PCO2 BLDV: 48 MMHG — SIGNIFICANT CHANGE UP (ref 39–52)
PCO2 BLDV: 48 MMHG — SIGNIFICANT CHANGE UP (ref 39–52)
PH BLDV: 7.4 — SIGNIFICANT CHANGE UP (ref 7.32–7.43)
PH BLDV: 7.4 — SIGNIFICANT CHANGE UP (ref 7.32–7.43)
PHOSPHATE SERPL-MCNC: 2.7 MG/DL — SIGNIFICANT CHANGE UP (ref 2.5–4.5)
PHOSPHATE SERPL-MCNC: 2.7 MG/DL — SIGNIFICANT CHANGE UP (ref 2.5–4.5)
PLATELET # BLD AUTO: 244 K/UL — SIGNIFICANT CHANGE UP (ref 150–400)
PLATELET # BLD AUTO: 244 K/UL — SIGNIFICANT CHANGE UP (ref 150–400)
PO2 BLDV: 44 MMHG — SIGNIFICANT CHANGE UP (ref 25–45)
PO2 BLDV: 44 MMHG — SIGNIFICANT CHANGE UP (ref 25–45)
POTASSIUM BLDV-SCNC: 4.8 MMOL/L — SIGNIFICANT CHANGE UP (ref 3.5–5.1)
POTASSIUM BLDV-SCNC: 4.8 MMOL/L — SIGNIFICANT CHANGE UP (ref 3.5–5.1)
POTASSIUM SERPL-MCNC: 4.1 MMOL/L — SIGNIFICANT CHANGE UP (ref 3.5–5.3)
POTASSIUM SERPL-MCNC: 4.1 MMOL/L — SIGNIFICANT CHANGE UP (ref 3.5–5.3)
POTASSIUM SERPL-MCNC: 5.3 MMOL/L — SIGNIFICANT CHANGE UP (ref 3.5–5.3)
POTASSIUM SERPL-MCNC: 5.3 MMOL/L — SIGNIFICANT CHANGE UP (ref 3.5–5.3)
POTASSIUM SERPL-SCNC: 4.1 MMOL/L — SIGNIFICANT CHANGE UP (ref 3.5–5.3)
POTASSIUM SERPL-SCNC: 4.1 MMOL/L — SIGNIFICANT CHANGE UP (ref 3.5–5.3)
POTASSIUM SERPL-SCNC: 5.3 MMOL/L — SIGNIFICANT CHANGE UP (ref 3.5–5.3)
POTASSIUM SERPL-SCNC: 5.3 MMOL/L — SIGNIFICANT CHANGE UP (ref 3.5–5.3)
PROT SERPL-MCNC: 6.5 G/DL — SIGNIFICANT CHANGE UP (ref 6–8.3)
PROT SERPL-MCNC: 6.5 G/DL — SIGNIFICANT CHANGE UP (ref 6–8.3)
RBC # BLD: 3.93 M/UL — SIGNIFICANT CHANGE UP (ref 3.8–5.2)
RBC # BLD: 3.93 M/UL — SIGNIFICANT CHANGE UP (ref 3.8–5.2)
RBC # FLD: 16.3 % — HIGH (ref 10.3–14.5)
RBC # FLD: 16.3 % — HIGH (ref 10.3–14.5)
SAO2 % BLDV: 68.6 % — SIGNIFICANT CHANGE UP (ref 67–88)
SAO2 % BLDV: 68.6 % — SIGNIFICANT CHANGE UP (ref 67–88)
SODIUM SERPL-SCNC: 140 MMOL/L — SIGNIFICANT CHANGE UP (ref 135–145)
SODIUM SERPL-SCNC: 140 MMOL/L — SIGNIFICANT CHANGE UP (ref 135–145)
SODIUM SERPL-SCNC: 142 MMOL/L — SIGNIFICANT CHANGE UP (ref 135–145)
SODIUM SERPL-SCNC: 142 MMOL/L — SIGNIFICANT CHANGE UP (ref 135–145)
WBC # BLD: 13.99 K/UL — HIGH (ref 3.8–10.5)
WBC # BLD: 13.99 K/UL — HIGH (ref 3.8–10.5)
WBC # FLD AUTO: 13.99 K/UL — HIGH (ref 3.8–10.5)
WBC # FLD AUTO: 13.99 K/UL — HIGH (ref 3.8–10.5)

## 2023-11-22 PROCEDURE — 44140 PARTIAL REMOVAL OF COLON: CPT | Mod: AS

## 2023-11-22 PROCEDURE — 44204 LAPARO PARTIAL COLECTOMY: CPT

## 2023-11-22 PROCEDURE — 99223 1ST HOSP IP/OBS HIGH 75: CPT

## 2023-11-22 PROCEDURE — 49905 OMENTAL FLAP INTRA-ABDOM: CPT | Mod: AS

## 2023-11-22 PROCEDURE — 44204 LAPARO PARTIAL COLECTOMY: CPT | Mod: AS

## 2023-11-22 PROCEDURE — 44050 REDUCE BOWEL OBSTRUCTION: CPT | Mod: AS

## 2023-11-22 PROCEDURE — 88342 IMHCHEM/IMCYTCHM 1ST ANTB: CPT | Mod: 26

## 2023-11-22 PROCEDURE — 88341 IMHCHEM/IMCYTCHM EA ADD ANTB: CPT | Mod: 26

## 2023-11-22 PROCEDURE — S2900 ROBOTIC SURGICAL SYSTEM: CPT | Mod: NC

## 2023-11-22 PROCEDURE — 49905 OMENTAL FLAP INTRA-ABDOM: CPT

## 2023-11-22 PROCEDURE — 44050 REDUCE BOWEL OBSTRUCTION: CPT

## 2023-11-22 DEVICE — STAPLER COVIDIEN TRI-STAPLE 60MM PURPLE RELOAD: Type: IMPLANTABLE DEVICE | Status: FUNCTIONAL

## 2023-11-22 DEVICE — LIGATING CLIPS WECK HEMOLOK POLYMER LARGE (PURPLE) 6: Type: IMPLANTABLE DEVICE | Status: FUNCTIONAL

## 2023-11-22 DEVICE — STAPLER COVIDIEN TRI-STAPLE 60MM PURPLE INTELLIGENT RELOAD: Type: IMPLANTABLE DEVICE | Status: FUNCTIONAL

## 2023-11-22 RX ORDER — OMEPRAZOLE 10 MG/1
1 CAPSULE, DELAYED RELEASE ORAL
Refills: 0 | DISCHARGE

## 2023-11-22 RX ORDER — OXYCODONE HYDROCHLORIDE 5 MG/1
5 TABLET ORAL EVERY 4 HOURS
Refills: 0 | Status: DISCONTINUED | OUTPATIENT
Start: 2023-11-22 | End: 2023-11-23

## 2023-11-22 RX ORDER — INSULIN GLARGINE 100 [IU]/ML
20 INJECTION, SOLUTION SUBCUTANEOUS AT BEDTIME
Refills: 0 | Status: DISCONTINUED | OUTPATIENT
Start: 2023-11-23 | End: 2023-11-23

## 2023-11-22 RX ORDER — INSULIN GLARGINE 100 [IU]/ML
20 INJECTION, SOLUTION SUBCUTANEOUS AT BEDTIME
Refills: 0 | Status: DISCONTINUED | OUTPATIENT
Start: 2023-11-22 | End: 2023-11-22

## 2023-11-22 RX ORDER — BUDESONIDE AND FORMOTEROL FUMARATE DIHYDRATE 160; 4.5 UG/1; UG/1
2 AEROSOL RESPIRATORY (INHALATION)
Refills: 0 | DISCHARGE

## 2023-11-22 RX ORDER — ACETAMINOPHEN 500 MG
2 TABLET ORAL
Refills: 0 | DISCHARGE

## 2023-11-22 RX ORDER — BUDESONIDE AND FORMOTEROL FUMARATE DIHYDRATE 160; 4.5 UG/1; UG/1
2 AEROSOL RESPIRATORY (INHALATION)
Refills: 0 | Status: DISCONTINUED | OUTPATIENT
Start: 2023-11-22 | End: 2023-12-15

## 2023-11-22 RX ORDER — HYDROMORPHONE HYDROCHLORIDE 2 MG/ML
0.5 INJECTION INTRAMUSCULAR; INTRAVENOUS; SUBCUTANEOUS
Refills: 0 | Status: DISCONTINUED | OUTPATIENT
Start: 2023-11-22 | End: 2023-11-22

## 2023-11-22 RX ORDER — ASPIRIN/CALCIUM CARB/MAGNESIUM 324 MG
1 TABLET ORAL
Refills: 0 | DISCHARGE

## 2023-11-22 RX ORDER — INSULIN LISPRO 100/ML
VIAL (ML) SUBCUTANEOUS AT BEDTIME
Refills: 0 | Status: DISCONTINUED | OUTPATIENT
Start: 2023-11-22 | End: 2023-11-23

## 2023-11-22 RX ORDER — ACETAMINOPHEN 500 MG
1000 TABLET ORAL EVERY 6 HOURS
Refills: 0 | Status: COMPLETED | OUTPATIENT
Start: 2023-11-22 | End: 2023-11-23

## 2023-11-22 RX ORDER — MAGNESIUM SULFATE 500 MG/ML
2 VIAL (ML) INJECTION ONCE
Refills: 0 | Status: COMPLETED | OUTPATIENT
Start: 2023-11-22 | End: 2023-11-22

## 2023-11-22 RX ORDER — ONDANSETRON 8 MG/1
4 TABLET, FILM COATED ORAL ONCE
Refills: 0 | Status: DISCONTINUED | OUTPATIENT
Start: 2023-11-22 | End: 2023-11-22

## 2023-11-22 RX ORDER — DEXTROSE 50 % IN WATER 50 %
25 SYRINGE (ML) INTRAVENOUS ONCE
Refills: 0 | Status: DISCONTINUED | OUTPATIENT
Start: 2023-11-22 | End: 2023-11-23

## 2023-11-22 RX ORDER — DEXTROSE 50 % IN WATER 50 %
15 SYRINGE (ML) INTRAVENOUS ONCE
Refills: 0 | Status: DISCONTINUED | OUTPATIENT
Start: 2023-11-22 | End: 2023-11-23

## 2023-11-22 RX ORDER — ENOXAPARIN SODIUM 100 MG/ML
40 INJECTION SUBCUTANEOUS EVERY 24 HOURS
Refills: 0 | Status: DISCONTINUED | OUTPATIENT
Start: 2023-11-22 | End: 2023-11-24

## 2023-11-22 RX ORDER — DEXTROSE 50 % IN WATER 50 %
12.5 SYRINGE (ML) INTRAVENOUS ONCE
Refills: 0 | Status: DISCONTINUED | OUTPATIENT
Start: 2023-11-22 | End: 2023-11-23

## 2023-11-22 RX ORDER — SODIUM CHLORIDE 9 MG/ML
1000 INJECTION, SOLUTION INTRAVENOUS
Refills: 0 | Status: DISCONTINUED | OUTPATIENT
Start: 2023-11-22 | End: 2023-11-22

## 2023-11-22 RX ORDER — INSULIN LISPRO 100/ML
VIAL (ML) SUBCUTANEOUS
Refills: 0 | Status: DISCONTINUED | OUTPATIENT
Start: 2023-11-22 | End: 2023-11-22

## 2023-11-22 RX ORDER — INSULIN LISPRO 100/ML
5 VIAL (ML) SUBCUTANEOUS
Refills: 0 | Status: DISCONTINUED | OUTPATIENT
Start: 2023-11-22 | End: 2023-11-22

## 2023-11-22 RX ORDER — PANTOPRAZOLE SODIUM 20 MG/1
40 TABLET, DELAYED RELEASE ORAL
Refills: 0 | Status: DISCONTINUED | OUTPATIENT
Start: 2023-11-22 | End: 2023-11-29

## 2023-11-22 RX ORDER — ALBUTEROL 90 UG/1
2 AEROSOL, METERED ORAL
Refills: 0 | Status: DISCONTINUED | OUTPATIENT
Start: 2023-11-22 | End: 2023-12-15

## 2023-11-22 RX ORDER — HYDROMORPHONE HYDROCHLORIDE 2 MG/ML
0.25 INJECTION INTRAMUSCULAR; INTRAVENOUS; SUBCUTANEOUS
Refills: 0 | Status: DISCONTINUED | OUTPATIENT
Start: 2023-11-22 | End: 2023-11-22

## 2023-11-22 RX ORDER — INSULIN LISPRO 100/ML
VIAL (ML) SUBCUTANEOUS
Refills: 0 | Status: DISCONTINUED | OUTPATIENT
Start: 2023-11-22 | End: 2023-11-23

## 2023-11-22 RX ORDER — INSULIN GLARGINE 100 [IU]/ML
42 INJECTION, SOLUTION SUBCUTANEOUS AT BEDTIME
Refills: 0 | Status: DISCONTINUED | OUTPATIENT
Start: 2023-11-22 | End: 2023-11-22

## 2023-11-22 RX ORDER — OXYCODONE HYDROCHLORIDE 5 MG/1
2.5 TABLET ORAL EVERY 4 HOURS
Refills: 0 | Status: DISCONTINUED | OUTPATIENT
Start: 2023-11-22 | End: 2023-11-23

## 2023-11-22 RX ORDER — ALBUTEROL 90 UG/1
2 AEROSOL, METERED ORAL
Refills: 0 | DISCHARGE

## 2023-11-22 RX ADMIN — Medication 2: at 15:53

## 2023-11-22 RX ADMIN — Medication 25 GRAM(S): at 15:52

## 2023-11-22 RX ADMIN — Medication 5 UNIT(S): at 15:52

## 2023-11-22 RX ADMIN — SODIUM CHLORIDE 3 MILLILITER(S): 9 INJECTION INTRAMUSCULAR; INTRAVENOUS; SUBCUTANEOUS at 22:13

## 2023-11-22 RX ADMIN — BUDESONIDE AND FORMOTEROL FUMARATE DIHYDRATE 2 PUFF(S): 160; 4.5 AEROSOL RESPIRATORY (INHALATION) at 22:17

## 2023-11-22 RX ADMIN — CHLORHEXIDINE GLUCONATE 1 APPLICATION(S): 213 SOLUTION TOPICAL at 07:17

## 2023-11-22 RX ADMIN — SODIUM CHLORIDE 30 MILLILITER(S): 9 INJECTION, SOLUTION INTRAVENOUS at 21:32

## 2023-11-22 RX ADMIN — OXYCODONE HYDROCHLORIDE 5 MILLIGRAM(S): 5 TABLET ORAL at 20:36

## 2023-11-22 RX ADMIN — ALBUTEROL 2 PUFF(S): 90 AEROSOL, METERED ORAL at 22:17

## 2023-11-22 RX ADMIN — Medication 400 MILLIGRAM(S): at 18:42

## 2023-11-22 RX ADMIN — HYDROMORPHONE HYDROCHLORIDE 0.5 MILLIGRAM(S): 2 INJECTION INTRAMUSCULAR; INTRAVENOUS; SUBCUTANEOUS at 17:00

## 2023-11-22 RX ADMIN — Medication 1000 MILLIGRAM(S): at 19:04

## 2023-11-22 RX ADMIN — HYDROMORPHONE HYDROCHLORIDE 0.5 MILLIGRAM(S): 2 INJECTION INTRAMUSCULAR; INTRAVENOUS; SUBCUTANEOUS at 16:40

## 2023-11-22 NOTE — BRIEF OPERATIVE NOTE - OPERATION/FINDINGS
Robotic assisted right hemicolectomy.     Extensive lysis of adhesions. Right colon mesentery dissected from RP, duodenum identified and protected. Ileocolic pedicle clipped with hem o manuel clips. TI and transverse colon divided with 60mm purple loads. Low midline incision for specimen extraction and stapled extracorporeal anastomosis with 60mm purple loads. Fascia closed with figure of eight #1 PDS.

## 2023-11-22 NOTE — CONSULT NOTE ADULT - TIME BILLING
71 minutes spent on total encounter; more than 50% of the visit was spent counseling and / or coordinating care by the attending physician.  The necessity of the time spent during the encounter on this date of service was due to:   reviewing prior medical records, labs and care coordination.

## 2023-11-22 NOTE — DISCHARGE NOTE PROVIDER - HOSPITAL COURSE
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 11/22 Endocrinology was consulted for management of insulin pump; recommended lantus 42 units bedtime with admelog 5 units TID with meals. Insulin pump to be resumed at time of hospital discharge  11/23 Denise was removed and patient passed/failed TOV ******    ** INCOMPLETE    Patient tolerated operation well and there were no post-operative complications identified. Patient remained hemodynamically stable in the PACU and transferred to the surgical floor. Diet was restarted and advanced as tolerated. Pain control was transitioned from IV to PO pain meds. At this time, patient is currently ambulating, voiding, tolerating a regular diet. Pain well controlled on PO pain meds. Patient has been deemed stable for discharge home with follow up as an outpatient. 73 year old female with PMH asthma, T2DM on insulin pump, GERD, HTN, HLD, OA, obesity who presented to [facility] on 11/22/23 with moderately differentiated adenocarcinoma of cecal mass, scheduled for surgery. Patient taken to the OR by Dr. Nichols and underwent robotic partial colectomy. Patient tolerated operation well and there were no post-operative complications identified. Patient remained hemodynamically stable in the PACU and transferred to the surgical floor.   Endocrinology was consulted for management of insulin pump; recommended lantus 42 units bedtime with admelog 5 units TID with meals. Insulin pump to be resumed at time of hospital discharge.   11/23 POD1 Patient tachycardic to low 100s. Allowed sips of clears. H/H downtrended from 9.6/30.3 day prior to 7.8/25.31. Repeat CBC in PM 7.3/23.4.  11/24 POD2 Denise was removed and patient passed TOV. H/H noted to downtrend to 6.3/19.6. Transfused 2uPRBC. Patient remained on sips of clears.  11/25 POD 3  11/26 POD4  ** INCOMPLETE  Patient seen by physical therapy throughout hospital stay who recommended patient be discharged to ****  Pain control was transitioned from IV to PO pain meds. At this time, patient is currently ambulating, voiding, tolerating a regular diet. Pain well controlled on PO pain meds. Patient has been deemed stable for discharge home with follow up as an outpatient. 73 year old female with PMH asthma, T2DM on insulin pump, GERD, HTN, HLD, OA, obesity who presented to [facility] on 11/22/23 with moderately differentiated adenocarcinoma of cecal mass, scheduled for surgery. Patient taken to the OR by Dr. Nichols and underwent robotic partial colectomy. Patient tolerated operation well and there were no post-operative complications identified. Patient remained hemodynamically stable in the PACU and transferred to the surgical floor.   Endocrinology was consulted for management of insulin pump; recommended lantus 42 units bedtime with admelog 5 units TID with meals. Insulin pump to be resumed at time of hospital discharge.   11/23 POD1 Patient tachycardic to low 100s. Allowed sips of clears. H/H downtrended from 9.6/30.3 day prior to 7.8/25.31. Repeat CBC in PM 7.3/23.4.  11/24 POD2 Denise was removed and patient passed TOV. H/H noted to downtrend to 6.3/19.6. Transfused 2uPRBC. Patient remained on sips of clears.  11/25 POD 3  11/26 POD4  ** INCOMPLETE  Patient seen by physical therapy throughout hospital stay who recommended patient be discharged to ****  Pain control was transitioned from IV to PO pain meds. At this time, patient is currently ambulating, voiding, tolerating a regular diet. Pain well controlled on PO pain meds. Patient has been deemed stable for discharge home with follow up as an outpatient. 73 year old female with PMH asthma, T2DM on insulin pump, GERD, HTN, HLD, OA, obesity who presented to [facility] on 11/22/23 with moderately differentiated adenocarcinoma of cecal mass, scheduled for surgery. Patient taken to the OR by Dr. Nichols and underwent robotic partial colectomy. Patient tolerated operation well and there were no post-operative complications identified. Patient remained hemodynamically stable in the PACU and transferred to the surgical floor.   Endocrinology was consulted for management of insulin pump; recommended lantus 42 units bedtime with admelog 5 units TID with meals. Insulin pump to be resumed at time of hospital discharge.   11/23 POD1 Patient tachycardic to low 100s. Allowed sips of clears. H/H downtrended from 9.6/30.3 day prior to 7.8/25.31. Repeat CBC in PM 7.3/23.4.  11/24 POD2 Denise was removed and patient passed TOV. H/H noted to downtrend to 6.3/19.6. Transfused 2uPRBC. Patient remained on sips of clears.  11/25 POD 3  11/26 POD4  ** INCOMPLETE  Patient seen by physical therapy throughout hospital stay who recommended patient be discharged to ****  Pain control was transitioned from IV to PO pain meds. At this time, patient is currently ambulating, voiding, tolerating a regular diet. Pain well controlled on PO pain meds. Patient has been deemed stable for discharge home with follow up as an outpatient. 73 year old female with PMH asthma, T2DM on insulin pump, GERD, HTN, HLD, OA, obesity who presented to [facility] on 11/22/23 with moderately differentiated adenocarcinoma of cecal mass, scheduled for surgery. Patient taken to the OR by Dr. Nichols and underwent robotic partial colectomy. Patient tolerated operation well and there were no post-operative complications identified. Patient remained hemodynamically stable in the PACU and transferred to the surgical floor.   Endocrinology was consulted for management of insulin pump; recommended lantus 42 units bedtime with admelog 5 units TID with meals. Insulin pump to be resumed at time of hospital discharge.   11/23 POD1 Patient tachycardic to low 100s. Allowed sips of clears. H/H downtrended from 9.6/30.3 day prior to 7.8/25.31. Repeat CBC in PM 7.3/23.4.  11/24 POD2 Denise was removed and patient passed TOV. H/H noted to downtrend to 6.3/19.6. Transfused 2uPRBC. Patient remained on sips of clears.  11/25 POD 3 H/H stable post-transfusion at 10.5/31.3. Diet advanced to clear liquids.   11/26 POD4 Diet advanced to full liquids. PO intake limited 2/2 nausea.  11/27 POD5 Diet backed down to sips 2/2 episode of emesis. XR without evidence of gastric bubble or ileus. Patient had small bowel movement.  11/28 POD6 Remains on sips.  ** INCOMPLETE  Patient seen by physical therapy throughout hospital stay who recommended patient be discharged to ****  Pain control was transitioned from IV to PO pain meds. At this time, patient is currently ambulating, voiding, tolerating a regular diet. Pain well controlled on PO pain meds. Patient has been deemed stable for discharge home with follow up as an outpatient. 73 year old female with PMH asthma, T2DM on insulin pump, GERD, HTN, HLD, OA, obesity who presented to [facility] on 11/22/23 with moderately differentiated adenocarcinoma of cecal mass, scheduled for surgery. Patient taken to the OR by Dr. Nichols and underwent robotic partial colectomy. Patient tolerated operation well and there were no post-operative complications identified. Patient remained hemodynamically stable in the PACU and transferred to the surgical floor.   Endocrinology was consulted for management of insulin pump; recommended lantus 42 units bedtime with admelog 5 units TID with meals. Insulin pump to be resumed at time of hospital discharge.   11/23 POD1 Patient tachycardic to low 100s. Allowed sips of clears. H/H downtrended from 9.6/30.3 day prior to 7.8/25.31. Repeat CBC in PM 7.3/23.4.  11/24 POD2 Ednise was removed and patient passed TOV. H/H noted to downtrend to 6.3/19.6. Transfused 2uPRBC. Patient remained on sips of clears.  11/25 POD 3 H/H stable post-transfusion at 10.5/31.3. Diet advanced to clear liquids.   11/26 POD4 Diet advanced to full liquids. PO intake limited 2/2 nausea.  11/27 POD5 Diet backed down to sips 2/2 episode of emesis. XR without evidence of gastric bubble or ileus. Patient had small bowel movement.  11/28 POD6 Remains on sips.  ** INCOMPLETE  Patient seen by physical therapy throughout hospital stay who recommended patient be discharged to ****  Pain control was transitioned from IV to PO pain meds. At this time, patient is currently ambulating, voiding, tolerating a regular diet. Pain well controlled on PO pain meds. Patient has been deemed stable for discharge home with follow up as an outpatient. 73 year old female with PMH asthma, T2DM on insulin pump, GERD, HTN, HLD, OA, obesity who presented to [facility] on 11/22/23 with moderately differentiated adenocarcinoma of cecal mass, scheduled for surgery. Patient taken to the OR by Dr. Nichols and underwent robotic partial colectomy. Patient tolerated operation well and there were no post-operative complications identified. Patient remained hemodynamically stable in the PACU and transferred to the surgical floor.   Endocrinology was consulted for management of insulin pump; recommended lantus 42 units bedtime with admelog 5 units TID with meals. Insulin pump to be resumed at time of hospital discharge.   11/23 POD1 Patient tachycardic to low 100s. Allowed sips of clears. H/H downtrended from 9.6/30.3 day prior to 7.8/25.31. Repeat CBC in PM 7.3/23.4.  11/24 POD2 Denise was removed and patient passed TOV. H/H noted to downtrend to 6.3/19.6. Transfused 2uPRBC. Patient remained on sips of clears.  11/25 POD 3 H/H stable post-transfusion at 10.5/31.3. Diet advanced to clear liquids.   11/26 POD4 Diet advanced to full liquids. PO intake limited 2/2 nausea.  11/27 POD5 Diet backed down to sips 2/2 episode of emesis. XR without evidence of gastric bubble or ileus. Patient had small bowel movement.  11/28 POD6 Remains on sips.  ** INCOMPLETE  Patient seen by physical therapy throughout hospital stay who recommended patient be discharged to ****  Pain control was transitioned from IV to PO pain meds. At this time, patient is currently ambulating, voiding, tolerating a regular diet. Pain well controlled on PO pain meds. Patient has been deemed stable for discharge home with follow up as an outpatient. 73 year old female with PMH asthma, T2DM on insulin pump, GERD, HTN, HLD, OA, obesity who presented to [facility] on 11/22/23 with moderately differentiated adenocarcinoma of cecal mass, scheduled for surgery. Patient taken to the OR by Dr. Nichols and underwent robotic partial colectomy. Patient tolerated operation well and there were no post-operative complications identified. Patient remained hemodynamically stable in the PACU and transferred to the surgical floor.   Endocrinology was consulted for management of insulin pump; recommended lantus 42 units bedtime with admelog 5 units TID with meals. Insulin pump to be resumed at time of hospital discharge.   11/23 POD1 Patient tachycardic to low 100s. Allowed sips of clears. H/H downtrended from 9.6/30.3 day prior to 7.8/25.31. Repeat CBC in PM 7.3/23.4.  11/24 POD2 Denise was removed and patient passed TOV. H/H noted to downtrend to 6.3/19.6. Transfused 2uPRBC. Patient remained on sips of clears.  11/25 POD 3 H/H stable post-transfusion at 10.5/31.3. Diet advanced to clear liquids.   11/26 POD4 Diet advanced to full liquids. PO intake limited 2/2 nausea.  11/27 POD5 Diet backed down to sips 2/2 episode of emesis. XR without evidence of gastric bubble or ileus. Patient had small bowel movement.  11/28 POD6 Remains on sips.  11/29 POD7 Patient unable to tolerate CLD, with episodes of emesis. CT abd/pelvis showed: Dilated loops of small bowel the level of the terminal ileum, likely ileus in the postoperative setting. Postoperative pneumoperitoneum and diffuse subcutaneous emphysema. NGT placed with immediate return of copious amounts of bilious fluid.   11/30 POD8 IR was consulted for PICC placement. TPN was initiated.     ** INCOMPLETE    Patient seen by physical therapy throughout hospital stay who recommended patient be discharged to Rehab.   Pain control was transitioned from IV to PO pain meds. At this time, patient is currently ambulating, voiding, tolerating a regular diet. Pain well controlled on PO pain meds. Patient has been deemed stable for discharge, per Dr. Nichols, to Rehab with follow up as an outpatient. 73 year old female with PMH asthma, T2DM on insulin pump, GERD, HTN, HLD, OA, obesity who presented to [facility] on 11/22/23 with moderately differentiated adenocarcinoma of cecal mass, scheduled for surgery. Patient taken to the OR by Dr. Nichols and underwent robotic partial colectomy. Patient tolerated operation well and there were no post-operative complications identified. Patient remained hemodynamically stable in the PACU and transferred to the surgical floor.   Endocrinology was consulted for management of insulin pump; recommended lantus 42 units bedtime with admelog 5 units TID with meals. Insulin pump to be resumed at time of hospital discharge.   11/23 POD1 Patient tachycardic to low 100s. Allowed sips of clears. H/H downtrended from 9.6/30.3 day prior to 7.8/25.31. Repeat CBC in PM 7.3/23.4.  11/24 POD2 Denise was removed and patient passed TOV. H/H noted to downtrend to 6.3/19.6. Transfused 2uPRBC. Patient remained on sips of clears.  11/25 POD 3 H/H stable post-transfusion at 10.5/31.3. Diet advanced to clear liquids.   11/26 POD4 Diet advanced to full liquids. PO intake limited 2/2 nausea.  11/27 POD5 Diet backed down to sips 2/2 episode of emesis. XR without evidence of gastric bubble or ileus. Patient had small bowel movement.  11/28 POD6 Remains on sips.  11/29 POD7 Patient unable to tolerate CLD, with episodes of emesis. CT abd/pelvis showed: Dilated loops of small bowel the level of the terminal ileum, likely ileus in the postoperative setting. Postoperative pneumoperitoneum and diffuse subcutaneous emphysema. NGT placed with immediate return of copious amounts of bilious fluid.   11/30 POD8 IR was consulted for PICC placement. TPN was initiated.     ** INCOMPLETE    Patient seen by physical therapy throughout hospital stay who recommended patient be discharged to Rehab.   Pain control was transitioned from IV to PO pain meds. At this time, patient is currently ambulating, voiding, tolerating a regular diet. Pain well controlled on PO pain meds. Patient has been deemed stable for discharge, per Dr. Nichols, to Rehab with follow up as an outpatient. 73 year old female with PMH asthma, T2DM on insulin pump, GERD, HTN, HLD, OA, obesity who presented to [facility] on 11/22/23 with moderately differentiated adenocarcinoma of cecal mass, scheduled for surgery. Patient taken to the OR by Dr. Nichols and underwent robotic partial colectomy. Patient tolerated operation well and there were no post-operative complications identified. Patient remained hemodynamically stable in the PACU and transferred to the surgical floor.   Endocrinology was consulted for management of insulin pump; recommended lantus 42 units bedtime with admelog 5 units TID with meals. Insulin pump to be resumed at time of hospital discharge.   11/23 POD1 Patient tachycardic to low 100s. Allowed sips of clears. H/H downtrended from 9.6/30.3 day prior to 7.8/25.31. Repeat CBC in PM 7.3/23.4.  11/24 POD2 Denise was removed and patient passed TOV. H/H noted to downtrend to 6.3/19.6. Transfused 2uPRBC. Patient remained on sips of clears.  11/25 POD 3 H/H stable post-transfusion at 10.5/31.3. Diet advanced to clear liquids.   11/26 POD4 Diet advanced to full liquids. PO intake limited 2/2 nausea.  11/27 POD5 Diet backed down to sips 2/2 episode of emesis. XR without evidence of gastric bubble or ileus. Patient had small bowel movement.  11/28 POD6 Remains on sips.  11/29 POD7 Patient unable to tolerate CLD, with episodes of emesis. CT abd/pelvis showed: Dilated loops of small bowel the level of the terminal ileum, likely ileus in the postoperative setting. Postoperative pneumoperitoneum and diffuse subcutaneous emphysema. NGT placed with immediate return of copious amounts of bilious fluid.   11/30 POD8 IR was consulted for PICC placement. TPN was initiated.     ** INCOMPLETE    Patient seen by physical therapy throughout hospital stay who recommended patient be discharged to Rehab.   Pain control was transitioned from IV to PO pain meds. At this time, patient is currently ambulating, voiding, tolerating a regular diet. Pain well controlled on PO pain meds. Patient has been deemed stable for discharge, per Dr. Nichols, to Rehab with follow up as an outpatient. 73 year old female with PMH asthma, T2DM on insulin pump, GERD, HTN, HLD, OA, obesity who presented to [facility] on 11/22/23 with moderately differentiated adenocarcinoma of cecal mass, scheduled for surgery. Patient taken to the OR by Dr. Nichols and underwent robotic partial colectomy. Patient tolerated operation well and there were no post-operative complications identified. Patient remained hemodynamically stable in the PACU and transferred to the surgical floor.   Endocrinology was consulted for management of insulin pump; recommended lantus 42 units bedtime with admelog 5 units TID with meals. Insulin pump to be resumed at time of hospital discharge.   11/23 POD1 Patient tachycardic to low 100s. Allowed sips of clears. H/H downtrended from 9.6/30.3 day prior to 7.8/25.31. Repeat CBC in PM 7.3/23.4.  11/24 POD2 Denise was removed and patient passed TOV. H/H noted to downtrend to 6.3/19.6. Transfused 2uPRBC. Patient remained on sips of clears.  11/25 POD 3 H/H stable post-transfusion at 10.5/31.3. Diet advanced to clear liquids.   11/26 POD4 Diet advanced to full liquids. PO intake limited 2/2 nausea.  11/27 POD5 Diet backed down to sips 2/2 episode of emesis. XR without evidence of gastric bubble or ileus. Patient had small bowel movement.  11/28 POD6 Remains on sips.  11/29 POD7 Patient unable to tolerate CLD, with episodes of emesis. CT abd/pelvis showed: Dilated loops of small bowel the level of the terminal ileum, likely ileus in the postoperative setting. Postoperative pneumoperitoneum and diffuse subcutaneous emphysema. NGT placed with immediate return of copious amounts of bilious fluid.   11/30 POD8 IR was consulted for PICC placement. TPN was initiated.   12/1-12/4 Patient remained without bowel function. CT abdomen/pelvis showed: ***************************    ** INCOMPLETE    Patient seen by physical therapy throughout hospital stay who recommended patient be discharged to Rehab.   Pain control was transitioned from IV to PO pain meds. At this time, patient is currently ambulating, voiding, tolerating a regular diet. Pain well controlled on PO pain meds. Patient has been deemed stable for discharge, per Dr. Nichols, to Rehab with follow up as an outpatient. 73 year old female with PMH asthma, T2DM on insulin pump, GERD, HTN, HLD, OA, obesity who presented to [facility] on 11/22/23 with moderately differentiated adenocarcinoma of cecal mass, scheduled for surgery. Patient taken to the OR by Dr. Nichols and underwent robotic partial colectomy. Patient tolerated operation well and there were no post-operative complications identified. Patient remained hemodynamically stable in the PACU and transferred to the surgical floor.   Endocrinology was consulted for management of insulin pump; recommended lantus 42 units bedtime with admelog 5 units TID with meals. Insulin pump to be resumed at time of hospital discharge.   11/23 POD1 Patient tachycardic to low 100s. Allowed sips of clears. H/H downtrended from 9.6/30.3 day prior to 7.8/25.31. Repeat CBC in PM 7.3/23.4.  11/24 POD2 Denise was removed and patient passed TOV. H/H noted to downtrend to 6.3/19.6. Transfused 2uPRBC. Patient remained on sips of clears.  11/25 POD 3 H/H stable post-transfusion at 10.5/31.3. Diet advanced to clear liquids.   11/26 POD4 Diet advanced to full liquids. PO intake limited 2/2 nausea.  11/27 POD5 Diet backed down to sips 2/2 episode of emesis. XR without evidence of gastric bubble or ileus. Patient had small bowel movement.  11/28 POD6 Remains on sips.  11/29 POD7 Patient unable to tolerate CLD, with episodes of emesis. CT abd/pelvis showed: Dilated loops of small bowel the level of the terminal ileum, likely ileus in the postoperative setting. Postoperative pneumoperitoneum and diffuse subcutaneous emphysema. NGT placed with immediate return of copious amounts of bilious fluid.   11/30 POD8 IR was consulted for PICC placement. TPN was initiated.   12/1-12/4 Patient remained without bowel function. CT abdomen/pelvis showed: ***************************    ** INCOMPLETE    Patient seen by physical therapy throughout hospital stay who recommended patient be discharged to Rehab.   Pain control was transitioned from IV to PO pain meds. At this time, patient is currently ambulating, voiding, tolerating a regular diet. Pain well controlled on PO pain meds. Patient has been deemed stable for discharge, per Dr. Nichols, to Rehab with follow up as an outpatient. 73 year old female with PMH asthma, T2DM on insulin pump, GERD, HTN, HLD, OA, obesity who presented to [facility] on 11/22/23 with moderately differentiated adenocarcinoma of cecal mass, scheduled for surgery. Patient taken to the OR by Dr. Nichols and underwent robotic partial colectomy. Patient tolerated operation well and there were no post-operative complications identified. Patient remained hemodynamically stable in the PACU and transferred to the surgical floor.   Endocrinology was consulted for management of insulin pump; recommended lantus 42 units bedtime with admelog 5 units TID with meals. Insulin pump to be resumed at time of hospital discharge.   11/23 POD1 Patient tachycardic to low 100s. Allowed sips of clears. H/H downtrended from 9.6/30.3 day prior to 7.8/25.31. Repeat CBC in PM 7.3/23.4.  11/24 POD2 Denise was removed and patient passed TOV. H/H noted to downtrend to 6.3/19.6. Transfused 2uPRBC. Patient remained on sips of clears.  11/25 POD 3 H/H stable post-transfusion at 10.5/31.3. Diet advanced to clear liquids.   11/26 POD4 Diet advanced to full liquids. PO intake limited 2/2 nausea.  11/27 POD5 Diet backed down to sips 2/2 episode of emesis. XR without evidence of gastric bubble or ileus. Patient had small bowel movement.  11/28 POD6 Remains on sips.  11/29 POD7 Patient unable to tolerate CLD, with episodes of emesis. CT abd/pelvis showed: Dilated loops of small bowel the level of the terminal ileum, likely ileus in the postoperative setting. Postoperative pneumoperitoneum and diffuse subcutaneous emphysema. NGT placed with immediate return of copious amounts of bilious fluid.   11/30 POD8 IR was consulted for PICC placement. TPN was initiated.   12/1-12/4 Patient remained without bowel function. CT abdomen/pelvis showed: ***************************    ** INCOMPLETE    Patient seen by physical therapy throughout hospital stay who recommended patient be discharged to Rehab.   Pain control was transitioned from IV to PO pain meds. At this time, patient is currently ambulating, voiding, tolerating a regular diet. Pain well controlled on PO pain meds. Patient has been deemed stable for discharge, per Dr. Nichols, to Rehab with follow up as an outpatient. 73 year old female with PMH asthma, T2DM on insulin pump, GERD, HTN, HLD, OA, obesity who presented to [facility] on 11/22/23 with moderately differentiated adenocarcinoma of cecal mass, scheduled for surgery. Patient taken to the OR by Dr. Nichols and underwent robotic partial colectomy. Patient tolerated operation well and there were no post-operative complications identified. Patient remained hemodynamically stable in the PACU and transferred to the surgical floor.   Endocrinology was consulted for management of insulin pump; recommended lantus 42 units bedtime with admelog 5 units TID with meals. Insulin pump to be resumed at time of hospital discharge.   11/23 POD1 Patient tachycardic to low 100s. Allowed sips of clears. H/H downtrended from 9.6/30.3 day prior to 7.8/25.31. Repeat CBC in PM 7.3/23.4.  11/24 POD2 Denise was removed and patient passed TOV. H/H noted to downtrend to 6.3/19.6. Transfused 2uPRBC. Patient remained on sips of clears.  11/25 POD 3 H/H stable post-transfusion at 10.5/31.3. Diet advanced to clear liquids.   11/26 POD4 Diet advanced to full liquids. PO intake limited 2/2 nausea.  11/27 POD5 Diet backed down to sips 2/2 episode of emesis. XR without evidence of gastric bubble or ileus. Patient had small bowel movement.  11/28 POD6 Remains on sips.  11/29 POD7 Patient unable to tolerate CLD, with episodes of emesis. CT abd/pelvis showed: Dilated loops of small bowel the level of the terminal ileum, likely ileus in the postoperative setting. Postoperative pneumoperitoneum and diffuse subcutaneous emphysema. NGT placed with immediate return of copious amounts of bilious fluid.   11/30 POD8 IR was consulted for PICC placement. TPN was initiated.   12/1-12/4 Patient remained without bowel function. CT abdomen/pelvis showed: Interval increase in size of fluid anterior to the ileocolic anastomosis with new peripheral enhancement, probably early abscess formation. Interval decrease in small bowel dilatation, probably ileus.  12/5 GG challenge was done that showed: ***************. IR was consulted for possible drainage of abscess seen on CT.   12/6 Patient went to IR and is s/p  *************      ** INCOMPLETE    Patient seen by physical therapy throughout hospital stay who recommended patient be discharged to Rehab.   Pain control was transitioned from IV to PO pain meds. At this time, patient is currently ambulating, voiding, tolerating a regular diet. Pain well controlled on PO pain meds. Patient has been deemed stable for discharge, per Dr. Nichols, to Rehab with follow up as an outpatient. 73 year old female with PMH asthma, T2DM on insulin pump, GERD, HTN, HLD, OA, obesity who presented to [facility] on 11/22/23 with moderately differentiated adenocarcinoma of cecal mass, scheduled for surgery. Patient taken to the OR by Dr. Nichols and underwent robotic partial colectomy. Patient tolerated operation well and there were no post-operative complications identified. Patient remained hemodynamically stable in the PACU and transferred to the surgical floor.   Endocrinology was consulted for management of insulin pump; recommended lantus 42 units bedtime with admelog 5 units TID with meals. Insulin pump to be resumed at time of hospital discharge.   11/23 POD1 Patient tachycardic to low 100s. Allowed sips of clears. H/H downtrended from 9.6/30.3 day prior to 7.8/25.31. Repeat CBC in PM 7.3/23.4.  11/24 POD2 Denise was removed and patient passed TOV. H/H noted to downtrend to 6.3/19.6. Transfused 2uPRBC. Patient remained on sips of clears.  11/25 POD 3 H/H stable post-transfusion at 10.5/31.3. Diet advanced to clear liquids.   11/26 POD4 Diet advanced to full liquids. PO intake limited 2/2 nausea.  11/27 POD5 Diet backed down to sips 2/2 episode of emesis. XR without evidence of gastric bubble or ileus. Patient had small bowel movement.  11/28 POD6 Remains on sips.  11/29 POD7 Patient unable to tolerate CLD, with episodes of emesis. CT abd/pelvis showed: Dilated loops of small bowel the level of the terminal ileum, likely ileus in the postoperative setting. Postoperative pneumoperitoneum and diffuse subcutaneous emphysema. NGT placed with immediate return of copious amounts of bilious fluid.   11/30 POD8 IR was consulted for PICC placement. TPN was initiated.   12/1-12/4 Patient remained without bowel function. CT abdomen/pelvis showed: Interval increase in size of fluid anterior to the ileocolic anastomosis with new peripheral enhancement, probably early abscess formation. Interval decrease in small bowel dilatation, probably ileus.  12/5 GG challenge was done that showed: ***************. IR was consulted for possible drainage of abscess seen on CT.   12/6 Patient went to IR and is s/p  *************      ** INCOMPLETE    Patient seen by physical therapy throughout hospital stay who recommended patient be discharged to Rehab.   Pain control was transitioned from IV to PO pain meds. At this time, patient is currently ambulating, voiding, tolerating a regular diet. Pain well controlled on PO pain meds. Patient has been deemed stable for discharge, per Dr. Nichols, to Rehab with follow up as an outpatient. 73 year old female with PMH asthma, T2DM on insulin pump, GERD, HTN, HLD, OA, obesity who presented to [facility] on 11/22/23 with moderately differentiated adenocarcinoma of cecal mass, scheduled for surgery. Patient taken to the OR by Dr. Nichols and underwent robotic partial colectomy. Patient tolerated operation well and there were no post-operative complications identified. Patient remained hemodynamically stable in the PACU and transferred to the surgical floor.   Endocrinology was consulted for management of insulin pump; recommended lantus 42 units bedtime with admelog 5 units TID with meals. Insulin pump to be resumed at time of hospital discharge.   11/23 POD1 Patient tachycardic to low 100s. Allowed sips of clears. H/H downtrended from 9.6/30.3 day prior to 7.8/25.31. Repeat CBC in PM 7.3/23.4.  11/24 POD2 Denise was removed and patient passed TOV. H/H noted to downtrend to 6.3/19.6. Transfused 2uPRBC. Patient remained on sips of clears.  11/25 POD 3 H/H stable post-transfusion at 10.5/31.3. Diet advanced to clear liquids.   11/26 POD4 Diet advanced to full liquids. PO intake limited 2/2 nausea.  11/27 POD5 Diet backed down to sips 2/2 episode of emesis. XR without evidence of gastric bubble or ileus. Patient had small bowel movement.  11/28 POD6 Remains on sips.  11/29 POD7 Patient unable to tolerate CLD, with episodes of emesis. CT abd/pelvis showed: Dilated loops of small bowel the level of the terminal ileum, likely ileus in the postoperative setting. Postoperative pneumoperitoneum and diffuse subcutaneous emphysema. NGT placed with immediate return of copious amounts of bilious fluid.   11/30 POD8 IR was consulted for PICC placement. TPN was initiated.   12/1-12/4 Patient remained without bowel function. CT abdomen/pelvis showed: Interval increase in size of fluid anterior to the ileocolic anastomosis with new peripheral enhancement, probably early abscess formation. Interval decrease in small bowel dilatation, probably ileus.  12/5 GG challenge was done that showed: ***************. IR was consulted for possible drainage of abscess seen on CT.   12/6 Patient went to IR and is s/p  *************      ** INCOMPLETE    Patient seen by physical therapy throughout hospital stay who recommended patient be discharged to Rehab.   Pain control was transitioned from IV to PO pain meds. At this time, patient is currently ambulating, voiding, tolerating a regular diet. Pain well controlled on PO pain meds. Patient has been deemed stable for discharge, per Dr. Nichols, to Rehab with follow up as an outpatient. 73 year old female with PMH asthma, T2DM on insulin pump, GERD, HTN, HLD, OA, obesity who presented to [facility] on 11/22/23 with moderately differentiated adenocarcinoma of cecal mass, scheduled for surgery. Patient taken to the OR by Dr. Nichols and underwent robotic partial colectomy. Patient tolerated operation well and there were no post-operative complications identified. Patient remained hemodynamically stable in the PACU and transferred to the surgical floor.   Endocrinology was consulted for management of insulin pump; recommended lantus 42 units bedtime with admelog 5 units TID with meals. Insulin pump to be resumed at time of hospital discharge.   11/23 POD1 Patient tachycardic to low 100s. Allowed sips of clears. H/H downtrended from 9.6/30.3 day prior to 7.8/25.31. Repeat CBC in PM 7.3/23.4.  11/24 POD2 Denise was removed and patient passed TOV. H/H noted to downtrend to 6.3/19.6. Transfused 2uPRBC. Patient remained on sips of clears.  11/25 POD 3 H/H stable post-transfusion at 10.5/31.3. Diet advanced to clear liquids.   11/26 POD4 Diet advanced to full liquids. PO intake limited 2/2 nausea.  11/27 POD5 Diet backed down to sips 2/2 episode of emesis. XR without evidence of gastric bubble or ileus. Patient had small bowel movement.  11/28 POD6 Remains on sips.  11/29 POD7 Patient unable to tolerate CLD, with episodes of emesis. CT abd/pelvis showed: Dilated loops of small bowel the level of the terminal ileum, likely ileus in the postoperative setting. Postoperative pneumoperitoneum and diffuse subcutaneous emphysema. NGT placed with immediate return of copious amounts of bilious fluid.   11/30 POD8 IR was consulted for PICC placement. TPN was initiated.   12/1-12/4 Patient remained without bowel function. CT abdomen/pelvis showed: Interval increase in size of fluid anterior to the ileocolic anastomosis with new peripheral enhancement, probably early abscess formation. Interval decrease in small bowel dilatation, probably ileus.  12/5 GG challenge was done that showed: Gastrografin challenge with contrast remaining in the stomach more consistent with ileus than obstruction. IR was consulted for possible drainage of abscess seen on CT.   12/6 Hypaque X-ray was done that showed: No anastomotic leak. Right hemicolectomy. The colon is filled from the rectum to ileocolic anastomosis in the left middle abdomen. No anastomotic leak. There is good contrast opacification of multiple loops of small bowel. Few scattered sigmoid diverticula. Following drainage of contrast from rectal tube, a post abdominal  radiograph demonstrates a moderate residual amount of contrast within the large and small bowel. Patient went to IR and is s/p  *************      ** INCOMPLETE    Patient seen by physical therapy throughout hospital stay who recommended patient be discharged to home with home PT and walker.   Pain control was transitioned from IV to PO pain meds. At this time, patient is currently ambulating, voiding, tolerating a regular diet. Pain well controlled on PO pain meds. Patient has been deemed stable for discharge, per Dr. Nichols, to home with follow up as an outpatient. 73 year old female with PMH asthma, T2DM on insulin pump, GERD, HTN, HLD, OA, obesity who presented to [facility] on 11/22/23 with moderately differentiated adenocarcinoma of cecal mass, scheduled for surgery. Patient taken to the OR by Dr. Nichols and underwent robotic partial colectomy. Patient tolerated operation well and there were no post-operative complications identified. Patient remained hemodynamically stable in the PACU and transferred to the surgical floor.   Endocrinology was consulted for management of insulin pump; recommended lantus 42 units bedtime with admelog 5 units TID with meals. Insulin pump to be resumed at time of hospital discharge.   11/23 POD1 Patient tachycardic to low 100s. Allowed sips of clears. H/H downtrended from 9.6/30.3 day prior to 7.8/25.31. Repeat CBC in PM 7.3/23.4.  11/24 POD2 Denise was removed and patient passed TOV. H/H noted to downtrend to 6.3/19.6. Transfused 2uPRBC. Patient remained on sips of clears.  11/25 POD 3 H/H stable post-transfusion at 10.5/31.3. Diet advanced to clear liquids.   11/26 POD4 Diet advanced to full liquids. PO intake limited 2/2 nausea.  11/27 POD5 Diet backed down to sips 2/2 episode of emesis. XR without evidence of gastric bubble or ileus. Patient had small bowel movement.  11/28 POD6 Remains on sips.  11/29 POD7 Patient unable to tolerate CLD, with episodes of emesis. CT abd/pelvis showed: Dilated loops of small bowel the level of the terminal ileum, likely ileus in the postoperative setting. Postoperative pneumoperitoneum and diffuse subcutaneous emphysema. NGT placed with immediate return of copious amounts of bilious fluid.   11/30 POD8 IR was consulted for PICC placement. TPN was initiated.   12/1-12/4 Patient remained without bowel function. CT abdomen/pelvis showed: Interval increase in size of fluid anterior to the ileocolic anastomosis with new peripheral enhancement, probably early abscess formation. Interval decrease in small bowel dilatation, probably ileus.  12/5 GG challenge was done that showed: Gastrografin challenge with contrast remaining in the stomach more consistent with ileus than obstruction. IR was consulted for possible drainage of abscess seen on CT.   12/6 Hypaque X-ray was done that showed: No anastomotic leak. Right hemicolectomy. The colon is filled from the rectum to ileocolic anastomosis in the left middle abdomen. No anastomotic leak. There is good contrast opacification of multiple loops of small bowel. Few scattered sigmoid diverticula. Following drainage of contrast from rectal tube, a post abdominal  radiograph demonstrates a moderate residual amount of contrast within the large and small bowel. Patient went to IR and is s/p  *************      ** INCOMPLETE    Patient seen by physical therapy throughout hospital stay who recommended patient be discharged to home with home PT and walker.   Pain control was transitioned from IV to PO pain meds. At this time, patient is currently ambulating, voiding, tolerating a regular diet. Pain well controlled on PO pain meds. Patient has been deemed stable for discharge, per Dr. Nichols, to home with follow up as an outpatient. 73 year old female with PMH asthma, T2DM on insulin pump, GERD, HTN, HLD, OA, obesity who presented to [facility] on 11/22/23 with moderately differentiated adenocarcinoma of cecal mass, scheduled for surgery. Patient taken to the OR by Dr. Nichols and underwent robotic partial colectomy. Patient tolerated operation well and there were no post-operative complications identified. Patient remained hemodynamically stable in the PACU and transferred to the surgical floor.   Endocrinology was consulted for management of insulin pump; recommended lantus 42 units bedtime with admelog 5 units TID with meals. Insulin pump to be resumed at time of hospital discharge.   11/23 POD1 Patient tachycardic to low 100s. Allowed sips of clears. H/H downtrended from 9.6/30.3 day prior to 7.8/25.31. Repeat CBC in PM 7.3/23.4.  11/24 POD2 Denise was removed and patient passed TOV. H/H noted to downtrend to 6.3/19.6. Transfused 2uPRBC. Patient remained on sips of clears.  11/25 POD 3 H/H stable post-transfusion at 10.5/31.3. Diet advanced to clear liquids.   11/26 POD4 Diet advanced to full liquids. PO intake limited 2/2 nausea.  11/27 POD5 Diet backed down to sips 2/2 episode of emesis. XR without evidence of gastric bubble or ileus. Patient had small bowel movement.  11/28 POD6 Remains on sips.  11/29 POD7 Patient unable to tolerate CLD, with episodes of emesis. CT abd/pelvis showed: Dilated loops of small bowel the level of the terminal ileum, likely ileus in the postoperative setting. Postoperative pneumoperitoneum and diffuse subcutaneous emphysema. NGT placed with immediate return of copious amounts of bilious fluid.   11/30 POD8 IR was consulted for PICC placement. TPN was initiated.   12/1-12/4 Patient remained without bowel function. CT abdomen/pelvis showed: Interval increase in size of fluid anterior to the ileocolic anastomosis with new peripheral enhancement, probably early abscess formation. Interval decrease in small bowel dilatation, probably ileus.  12/5 GG challenge was done that showed: Gastrografin challenge with contrast remaining in the stomach more consistent with ileus than obstruction. IR was consulted for possible drainage of abscess seen on CT.   12/6 Hypaque X-ray was done that showed: No anastomotic leak. Right hemicolectomy. The colon is filled from the rectum to ileocolic anastomosis in the left middle abdomen. No anastomotic leak. There is good contrast opacification of multiple loops of small bowel. Few scattered sigmoid diverticula. Following drainage of contrast from rectal tube, a post abdominal  radiograph demonstrates a moderate residual amount of contrast within the large and small bowel. Patient went to IR and is s/p  *************      ** INCOMPLETE    Patient seen by physical therapy throughout hospital stay who recommended patient be discharged to home with home PT and walker.   Pain control was transitioned from IV to PO pain meds. At this time, patient is currently ambulating, voiding, tolerating a regular diet. Pain well controlled on PO pain meds. Patient has been deemed stable for discharge, per Dr. Nichols, to home with follow up as an outpatient. 73 year old female with PMH asthma, T2DM on insulin pump, GERD, HTN, HLD, OA, obesity who presented to [facility] on 11/22/23 with moderately differentiated adenocarcinoma of cecal mass, scheduled for surgery. Patient taken to the OR by Dr. Nichols and underwent robotic partial colectomy. Patient tolerated operation well and there were no post-operative complications identified. Patient remained hemodynamically stable in the PACU and transferred to the surgical floor.   Endocrinology was consulted for management of insulin pump; recommended lantus 42 units bedtime with admelog 5 units TID with meals. Insulin pump to be resumed at time of hospital discharge.   11/23 POD1 Patient tachycardic to low 100s. Allowed sips of clears. H/H downtrended from 9.6/30.3 day prior to 7.8/25.31. Repeat CBC in PM 7.3/23.4.  11/24 POD2 Denise was removed and patient passed TOV. H/H noted to downtrend to 6.3/19.6. Transfused 2uPRBC. Patient remained on sips of clears.  11/25 POD 3 H/H stable post-transfusion at 10.5/31.3. Diet advanced to clear liquids.   11/26 POD4 Diet advanced to full liquids. PO intake limited 2/2 nausea.  11/27 POD5 Diet backed down to sips 2/2 episode of emesis. XR without evidence of gastric bubble or ileus. Patient had small bowel movement.  11/28 POD6 Remains on sips.  11/29 POD7 Patient unable to tolerate CLD, with episodes of emesis. CT abd/pelvis showed: Dilated loops of small bowel the level of the terminal ileum, likely ileus in the postoperative setting. Postoperative pneumoperitoneum and diffuse subcutaneous emphysema. NGT placed with immediate return of copious amounts of bilious fluid.   11/30 POD8 IR was consulted for PICC placement. TPN was initiated.   12/1-12/4 Patient remained without bowel function. CT abdomen/pelvis showed: Interval increase in size of fluid anterior to the ileocolic anastomosis with new peripheral enhancement, probably early abscess formation. Interval decrease in small bowel dilatation, probably ileus.  12/5 GG challenge was done that showed: Gastrografin challenge with contrast remaining in the stomach more consistent with ileus than obstruction. IR was consulted for possible drainage of abscess seen on CT.   12/6 Hypaque X-ray was done that showed: No anastomotic leak. Right hemicolectomy. The colon is filled from the rectum to ileocolic anastomosis in the left middle abdomen. No anastomotic leak. There is good contrast opacification of multiple loops of small bowel. Few scattered sigmoid diverticula. Following drainage of contrast from rectal tube, a post abdominal  radiograph demonstrates a moderate residual amount of contrast within the large and small bowel.  12/7  Patient on Reglan and erythromycin for motility. Patient went to IR and is s/p  *************      ** INCOMPLETE    Patient seen by physical therapy throughout hospital stay who recommended patient be discharged to home with home PT and walker.   Pain control was transitioned from IV to PO pain meds. At this time, patient is currently ambulating, voiding, tolerating a regular diet. Pain well controlled on PO pain meds. Patient has been deemed stable for discharge, per Dr. Nichols, to home with follow up as an outpatient. 73 year old female with PMH asthma, T2DM on insulin pump, GERD, HTN, HLD, OA, obesity who presented to [facility] on 11/22/23 with moderately differentiated adenocarcinoma of cecal mass, scheduled for surgery. Patient taken to the OR by Dr. Nichols and underwent robotic partial colectomy. Patient tolerated operation well and there were no post-operative complications identified. Patient remained hemodynamically stable in the PACU and transferred to the surgical floor.   Endocrinology was consulted for management of insulin pump; recommended lantus 42 units bedtime with admelog 5 units TID with meals. Insulin pump to be resumed at time of hospital discharge.   11/23 POD1 Patient tachycardic to low 100s. Allowed sips of clears. H/H downtrended from 9.6/30.3 day prior to 7.8/25.31. Repeat CBC in PM 7.3/23.4.  11/24 POD2 Denise was removed and patient passed TOV. H/H noted to downtrend to 6.3/19.6. Transfused 2uPRBC. Patient remained on sips of clears.  11/25 POD 3 H/H stable post-transfusion at 10.5/31.3. Diet advanced to clear liquids.   11/26 POD4 Diet advanced to full liquids. PO intake limited 2/2 nausea.  11/27 POD5 Diet backed down to sips 2/2 episode of emesis. XR without evidence of gastric bubble or ileus. Patient had small bowel movement.  11/28 POD6 Remains on sips.  11/29 POD7 Patient unable to tolerate CLD, with episodes of emesis. CT abd/pelvis showed: Dilated loops of small bowel the level of the terminal ileum, likely ileus in the postoperative setting. Postoperative pneumoperitoneum and diffuse subcutaneous emphysema. NGT placed with immediate return of copious amounts of bilious fluid.   11/30 POD8 IR was consulted for PICC placement. TPN was initiated.   12/1-12/4 Patient remained without bowel function. CT abdomen/pelvis showed: Interval increase in size of fluid anterior to the ileocolic anastomosis with new peripheral enhancement, probably early abscess formation. Interval decrease in small bowel dilatation, probably ileus.  12/5 GG challenge was done that showed: Gastrografin challenge with contrast remaining in the stomach more consistent with ileus than obstruction. IR was consulted for possible drainage of abscess seen on CT.   12/6 Hypaque X-ray was done that showed: No anastomotic leak. Right hemicolectomy. The colon is filled from the rectum to ileocolic anastomosis in the left middle abdomen. No anastomotic leak. There is good contrast opacification of multiple loops of small bowel. Few scattered sigmoid diverticula. Following drainage of contrast from rectal tube, a post abdominal  radiograph demonstrates a moderate residual amount of contrast within the large and small bowel.  12/7  Patient on Reglan and erythromycin for motility. Patient went to IR and is s/p  *************      ** INCOMPLETE    Patient seen by physical therapy throughout hospital stay who recommended patient be discharged to home with home PT and walker.   Pain control was transitioned from IV to PO pain meds. At this time, patient is currently ambulating, voiding, tolerating a regular diet. Pain well controlled on PO pain meds. Patient has been deemed stable for discharge, per Dr. Nichols, to home with follow up as an outpatient. 73 year old female with PMH asthma, T2DM on insulin pump, GERD, HTN, HLD, OA, obesity who presented to [facility] on 11/22/23 with moderately differentiated adenocarcinoma of cecal mass, scheduled for surgery. Patient taken to the OR by Dr. Nichols and underwent robotic partial colectomy. Patient tolerated operation well and there were no post-operative complications identified. Patient remained hemodynamically stable in the PACU and transferred to the surgical floor.   Endocrinology was consulted for management of insulin pump; recommended lantus 42 units bedtime with admelog 5 units TID with meals. Insulin pump to be resumed at time of hospital discharge.   11/23 POD1 Patient tachycardic to low 100s. Allowed sips of clears. H/H downtrended from 9.6/30.3 day prior to 7.8/25.31. Repeat CBC in PM 7.3/23.4.  11/24 POD2 Denise was removed and patient passed TOV. H/H noted to downtrend to 6.3/19.6. Transfused 2uPRBC. Patient remained on sips of clears.  11/25 POD 3 H/H stable post-transfusion at 10.5/31.3. Diet advanced to clear liquids.   11/26 POD4 Diet advanced to full liquids. PO intake limited 2/2 nausea.  11/27 POD5 Diet backed down to sips 2/2 episode of emesis. XR without evidence of gastric bubble or ileus. Patient had small bowel movement.  11/28 POD6 Remains on sips.  11/29 POD7 Patient unable to tolerate CLD, with episodes of emesis. CT abd/pelvis showed: Dilated loops of small bowel the level of the terminal ileum, likely ileus in the postoperative setting. Postoperative pneumoperitoneum and diffuse subcutaneous emphysema. NGT placed with immediate return of copious amounts of bilious fluid.   11/30 POD8 IR was consulted for PICC placement. TPN was initiated.   12/1-12/4 Patient remained without bowel function. CT abdomen/pelvis showed: Interval increase in size of fluid anterior to the ileocolic anastomosis with new peripheral enhancement, probably early abscess formation. Interval decrease in small bowel dilatation, probably ileus.  12/5 GG challenge was done that showed: Gastrografin challenge with contrast remaining in the stomach more consistent with ileus than obstruction. IR was consulted for possible drainage of abscess seen on CT.   12/6 Hypaque X-ray was done that showed: No anastomotic leak. Right hemicolectomy. The colon is filled from the rectum to ileocolic anastomosis in the left middle abdomen. No anastomotic leak. There is good contrast opacification of multiple loops of small bowel. Few scattered sigmoid diverticula. Following drainage of contrast from rectal tube, a post abdominal  radiograph demonstrates a moderate residual amount of contrast within the large and small bowel.  12/7  Patient on Reglan and erythromycin for motility. Patient went to IR and is s/p  *************      ** INCOMPLETE    Patient seen by physical therapy throughout hospital stay who recommended patient be discharged to home with home PT and walker.   Pain control was transitioned from IV to PO pain meds. At this time, patient is currently ambulating, voiding, tolerating a regular diet. Pain well controlled on PO pain meds. Patient has been deemed stable for discharge, per Dr. Nichols, to home with follow up as an outpatient. 73 year old female with PMH asthma, T2DM on insulin pump, GERD, HTN, HLD, OA, obesity who presented to [facility] on 11/22/23 with moderately differentiated adenocarcinoma of cecal mass, scheduled for surgery. Patient taken to the OR by Dr. Nichols and underwent robotic partial colectomy. Patient tolerated operation well and there were no post-operative complications identified. Patient remained hemodynamically stable in the PACU and transferred to the surgical floor.   Endocrinology was consulted for management of insulin pump; recommended lantus 42 units bedtime with admelog 5 units TID with meals. Insulin pump to be resumed at time of hospital discharge.   11/23 POD1 Patient tachycardic to low 100s. Allowed sips of clears. H/H downtrended from 9.6/30.3 day prior to 7.8/25.31. Repeat CBC in PM 7.3/23.4.  11/24 POD2 Denise was removed and patient passed TOV. H/H noted to downtrend to 6.3/19.6. Transfused 2uPRBC. Patient remained on sips of clears.  11/25 POD 3 H/H stable post-transfusion at 10.5/31.3. Diet advanced to clear liquids.   11/26 POD4 Diet advanced to full liquids. PO intake limited 2/2 nausea.  11/27 POD5 Diet backed down to sips 2/2 episode of emesis. XR without evidence of gastric bubble or ileus. Patient had small bowel movement.  11/28 POD6 Remains on sips.  11/29 POD7 Patient unable to tolerate CLD, with episodes of emesis. CT abd/pelvis showed: Dilated loops of small bowel the level of the terminal ileum, likely ileus in the postoperative setting. Postoperative pneumoperitoneum and diffuse subcutaneous emphysema. NGT placed with immediate return of copious amounts of bilious fluid.   11/30 POD8 IR was consulted for PICC placement. TPN was initiated.   12/1-12/4 Patient remained without bowel function. CT abdomen/pelvis showed: Interval increase in size of fluid anterior to the ileocolic anastomosis with new peripheral enhancement, probably early abscess formation. Interval decrease in small bowel dilatation, probably ileus.  12/5 GG challenge was done that showed: Gastrografin challenge with contrast remaining in the stomach more consistent with ileus than obstruction. IR was consulted for possible drainage of abscess seen on CT.   12/6 Hypaque X-ray was done that showed: No anastomotic leak. Right hemicolectomy. The colon is filled from the rectum to ileocolic anastomosis in the left middle abdomen. No anastomotic leak. There is good contrast opacification of multiple loops of small bowel. Few scattered sigmoid diverticula. Following drainage of contrast from rectal tube, a post abdominal  radiograph demonstrates a moderate residual amount of contrast within the large and small bowel.  12/7  Patient on Reglan and erythromycin for motility. Patient went to IR and is s/p  anterior abdominal drainage of collection with dark red fluid drained. Patient tolerated procedure well and transferred back to floor.           ** INCOMPLETE    Patient seen by physical therapy throughout hospital stay who recommended patient be discharged to home with home PT and walker.   Pain control was transitioned from IV to PO pain meds. At this time, patient is currently ambulating, voiding, tolerating a regular diet. Pain well controlled on PO pain meds. Patient has been deemed stable for discharge, per Dr. Nichols, to home with follow up as an outpatient. 73 year old female with PMH asthma, T2DM on insulin pump, GERD, HTN, HLD, OA, obesity who presented to [facility] on 11/22/23 with moderately differentiated adenocarcinoma of cecal mass, scheduled for surgery. Patient taken to the OR by Dr. Nichols and underwent robotic partial colectomy. Patient tolerated operation well and there were no post-operative complications identified. Patient remained hemodynamically stable in the PACU and transferred to the surgical floor.   Endocrinology was consulted for management of insulin pump; recommended lantus 42 units bedtime with admelog 5 units TID with meals. Insulin pump to be resumed at time of hospital discharge.   11/23 POD1 Patient tachycardic to low 100s. Allowed sips of clears. H/H downtrended from 9.6/30.3 day prior to 7.8/25.31. Repeat CBC in PM 7.3/23.4.  11/24 POD2 Denise was removed and patient passed TOV. H/H noted to downtrend to 6.3/19.6. Transfused 2uPRBC. Patient remained on sips of clears.  11/25 POD 3 H/H stable post-transfusion at 10.5/31.3. Diet advanced to clear liquids.   11/26 POD4 Diet advanced to full liquids. PO intake limited 2/2 nausea.  11/27 POD5 Diet backed down to sips 2/2 episode of emesis. XR without evidence of gastric bubble or ileus. Patient had small bowel movement.  11/28 POD6 Remains on sips.  11/29 POD7 Patient unable to tolerate CLD, with episodes of emesis. CT abd/pelvis showed: Dilated loops of small bowel the level of the terminal ileum, likely ileus in the postoperative setting. Postoperative pneumoperitoneum and diffuse subcutaneous emphysema. NGT placed with immediate return of copious amounts of bilious fluid.   11/30 POD8 IR was consulted for PICC placement. TPN was initiated.   12/1-12/4 Patient remained without bowel function. CT abdomen/pelvis showed: Interval increase in size of fluid anterior to the ileocolic anastomosis with new peripheral enhancement, probably early abscess formation. Interval decrease in small bowel dilatation, probably ileus.  12/5 GG challenge was done that showed: Gastrografin challenge with contrast remaining in the stomach more consistent with ileus than obstruction. IR was consulted for possible drainage of abscess seen on CT.   12/6 Hypaque X-ray was done that showed: No anastomotic leak. Right hemicolectomy. The colon is filled from the rectum to ileocolic anastomosis in the left middle abdomen. No anastomotic leak. There is good contrast opacification of multiple loops of small bowel. Few scattered sigmoid diverticula. Following drainage of contrast from rectal tube, a post abdominal  radiograph demonstrates a moderate residual amount of contrast within the large and small bowel.  12/7  Patient on Reglan and erythromycin for motility. Patient went to IR and is s/p  anterior abdominal drainage of collection with dark red fluid drained. Patient tolerated procedure well and transferred back to floor.           ** INCOMPLETE    Patient seen by physical therapy throughout hospital stay who recommended patient be discharged to home with home PT and walker.   Pain control was transitioned from IV to PO pain meds. At this time, patient is currently ambulating, voiding, tolerating a regular diet. Pain well controlled on PO pain meds. Patient has been deemed stable for discharge, per Dr. Nichols, to home with follow up as an outpatient. 73 year old female with PMH asthma, T2DM on insulin pump, GERD, HTN, HLD, OA, obesity who presented to [facility] on 11/22/23 with moderately differentiated adenocarcinoma of cecal mass, scheduled for surgery. Patient taken to the OR by Dr. Nichols and underwent robotic partial colectomy. Patient tolerated operation well and there were no post-operative complications identified. Patient remained hemodynamically stable in the PACU and transferred to the surgical floor.   Endocrinology was consulted for management of insulin pump; recommended lantus 42 units bedtime with admelog 5 units TID with meals. Insulin pump to be resumed at time of hospital discharge.   11/23 POD1 Patient tachycardic to low 100s. Allowed sips of clears. H/H downtrended from 9.6/30.3 day prior to 7.8/25.31. Repeat CBC in PM 7.3/23.4.  11/24 POD2 Denise was removed and patient passed TOV. H/H noted to downtrend to 6.3/19.6. Transfused 2uPRBC. Patient remained on sips of clears.  11/25 POD 3 H/H stable post-transfusion at 10.5/31.3. Diet advanced to clear liquids.   11/26 POD4 Diet advanced to full liquids. PO intake limited 2/2 nausea.  11/27 POD5 Diet backed down to sips 2/2 episode of emesis. XR without evidence of gastric bubble or ileus. Patient had small bowel movement.  11/28 POD6 Remains on sips.  11/29 POD7 Patient unable to tolerate CLD, with episodes of emesis. CT abd/pelvis showed: Dilated loops of small bowel the level of the terminal ileum, likely ileus in the postoperative setting. Postoperative pneumoperitoneum and diffuse subcutaneous emphysema. NGT placed with immediate return of copious amounts of bilious fluid.   11/30 POD8 IR was consulted for PICC placement. TPN was initiated.   12/1-12/4 Patient remained without bowel function. CT abdomen/pelvis showed: Interval increase in size of fluid anterior to the ileocolic anastomosis with new peripheral enhancement, probably early abscess formation. Interval decrease in small bowel dilatation, probably ileus.  12/5 GG challenge was done that showed: Gastrografin challenge with contrast remaining in the stomach more consistent with ileus than obstruction. IR was consulted for possible drainage of abscess seen on CT.   12/6 Hypaque X-ray was done that showed: No anastomotic leak. Right hemicolectomy. The colon is filled from the rectum to ileocolic anastomosis in the left middle abdomen. No anastomotic leak. There is good contrast opacification of multiple loops of small bowel. Few scattered sigmoid diverticula. Following drainage of contrast from rectal tube, a post abdominal  radiograph demonstrates a moderate residual amount of contrast within the large and small bowel.  12/7  Patient on Reglan and erythromycin for motility. Patient went to IR and is s/p  anterior abdominal drainage of collection with dark red fluid drained. Patient tolerated procedure well and transferred back to floor.           ** INCOMPLETE    Patient seen by physical therapy throughout hospital stay who recommended patient be discharged to home with home PT and walker.   Pain control was transitioned from IV to PO pain meds. At this time, patient is currently ambulating, voiding, tolerating a regular diet. Pain well controlled on PO pain meds. Patient has been deemed stable for discharge, per Dr. Nichols, to home with follow up as an outpatient. 73 year old female with PMH asthma, T2DM on insulin pump, GERD, HTN, HLD, OA, obesity who presented to [facility] on 11/22/23 with moderately differentiated adenocarcinoma of cecal mass, scheduled for surgery. Patient taken to the OR by Dr. Nichols and underwent robotic partial colectomy. Patient tolerated operation well and there were no post-operative complications identified. Patient remained hemodynamically stable in the PACU and transferred to the surgical floor.   Endocrinology was consulted for management of insulin pump; recommended lantus 42 units bedtime with admelog 5 units TID with meals. Insulin pump to be resumed at time of hospital discharge.   11/23 POD1 Patient tachycardic to low 100s. Allowed sips of clears. H/H downtrended from 9.6/30.3 day prior to 7.8/25.31. Repeat CBC in PM 7.3/23.4.  11/24 POD2 Denise was removed and patient passed TOV. H/H noted to downtrend to 6.3/19.6. Transfused 2uPRBC. Patient remained on sips of clears.  11/25 POD 3 H/H stable post-transfusion at 10.5/31.3. Diet advanced to clear liquids.   11/26 POD4 Diet advanced to full liquids. PO intake limited 2/2 nausea.  11/27 POD5 Diet backed down to sips 2/2 episode of emesis. XR without evidence of gastric bubble or ileus. Patient had small bowel movement.  11/28 POD6 Remains on sips.  11/29 POD7 Patient unable to tolerate CLD, with episodes of emesis. CT abd/pelvis showed: Dilated loops of small bowel the level of the terminal ileum, likely ileus in the postoperative setting. Postoperative pneumoperitoneum and diffuse subcutaneous emphysema. NGT placed with immediate return of copious amounts of bilious fluid.   11/30 POD8 IR was consulted for PICC placement. TPN was initiated.   12/1-12/4 Patient remained without bowel function. CT abdomen/pelvis showed: Interval increase in size of fluid anterior to the ileocolic anastomosis with new peripheral enhancement, probably early abscess formation. Interval decrease in small bowel dilatation, probably ileus.  12/5 GG challenge was done that showed: Gastrografin challenge with contrast remaining in the stomach more consistent with ileus than obstruction. IR was consulted for possible drainage of abscess seen on CT.   12/6 Hypaque X-ray was done that showed: No anastomotic leak. Right hemicolectomy. The colon is filled from the rectum to ileocolic anastomosis in the left middle abdomen. No anastomotic leak. There is good contrast opacification of multiple loops of small bowel. Few scattered sigmoid diverticula. Following drainage of contrast from rectal tube, a post abdominal  radiograph demonstrates a moderate residual amount of contrast within the large and small bowel.  12/7  Patient on Reglan and erythromycin for motility. Patient went to IR and is s/p  anterior abdominal drainage of collection with dark red fluid drained. Patient tolerated procedure well and transferred back to floor.   12/11 Patient with flatus and multiple BM; NGT removed.         ** INCOMPLETE    Patient seen by physical therapy throughout hospital stay who recommended patient be discharged to home with home PT and walker.   Pain control was transitioned from IV to PO pain meds. At this time, patient is currently ambulating, voiding, tolerating a regular diet. Pain well controlled on PO pain meds. Patient has been deemed stable for discharge, per Dr. Nichols, to home with follow up as an outpatient. 73 year old female with PMH asthma, T2DM on insulin pump, GERD, HTN, HLD, OA, obesity who presented to [facility] on 11/22/23 with moderately differentiated adenocarcinoma of cecal mass, scheduled for surgery. Patient taken to the OR by Dr. Nichols and underwent robotic partial colectomy. Patient tolerated operation well and there were no post-operative complications identified. Patient remained hemodynamically stable in the PACU and transferred to the surgical floor.   Endocrinology was consulted for management of insulin pump; recommended lantus 42 units bedtime with admelog 5 units TID with meals. Insulin pump to be resumed at time of hospital discharge.   11/23 POD1 Patient tachycardic to low 100s. Allowed sips of clears. H/H downtrended from 9.6/30.3 day prior to 7.8/25.31. Repeat CBC in PM 7.3/23.4.  11/24 POD2 Denise was removed and patient passed TOV. H/H noted to downtrend to 6.3/19.6. Transfused 2uPRBC. Patient remained on sips of clears.  11/25 POD 3 H/H stable post-transfusion at 10.5/31.3. Diet advanced to clear liquids.   11/26 POD4 Diet advanced to full liquids. PO intake limited 2/2 nausea.  11/27 POD5 Diet backed down to sips 2/2 episode of emesis. XR without evidence of gastric bubble or ileus. Patient had small bowel movement.  11/28 POD6 Remains on sips.  11/29 POD7 Patient unable to tolerate CLD, with episodes of emesis. CT abd/pelvis showed: Dilated loops of small bowel the level of the terminal ileum, likely ileus in the postoperative setting. Postoperative pneumoperitoneum and diffuse subcutaneous emphysema. NGT placed with immediate return of copious amounts of bilious fluid.   11/30 POD8 IR was consulted for PICC placement. TPN was initiated.   12/1-12/4 Patient remained without bowel function. CT abdomen/pelvis showed: Interval increase in size of fluid anterior to the ileocolic anastomosis with new peripheral enhancement, probably early abscess formation. Interval decrease in small bowel dilatation, probably ileus.  12/5 GG challenge was done that showed: Gastrografin challenge with contrast remaining in the stomach more consistent with ileus than obstruction. IR was consulted for possible drainage of abscess seen on CT.   12/6 Hypaque X-ray was done that showed: No anastomotic leak. Right hemicolectomy. The colon is filled from the rectum to ileocolic anastomosis in the left middle abdomen. No anastomotic leak. There is good contrast opacification of multiple loops of small bowel. Few scattered sigmoid diverticula. Following drainage of contrast from rectal tube, a post abdominal  radiograph demonstrates a moderate residual amount of contrast within the large and small bowel.  12/7  Patient on Reglan and erythromycin for motility. Patient went to IR and is s/p  anterior abdominal drainage of collection with dark red fluid drained. Patient tolerated procedure well and transferred back to floor.   12/11 Patient with flatus and multiple BM; NGT removed.         ** INCOMPLETE    Patient seen by physical therapy throughout hospital stay who recommended patient be discharged to home with home PT and walker.   Pain control was transitioned from IV to PO pain meds. At this time, patient is currently ambulating, voiding, tolerating a regular diet. Pain well controlled on PO pain meds. Patient has been deemed stable for discharge, per Dr. Nichols, to home with follow up as an outpatient. 73 year old female with PMH asthma, T2DM on insulin pump, GERD, HTN, HLD, OA, obesity who presented to [facility] on 11/22/23 with moderately differentiated adenocarcinoma of cecal mass, scheduled for surgery. Patient taken to the OR by Dr. Nichols and underwent robotic partial colectomy. Patient tolerated operation well and there were no post-operative complications identified. Patient remained hemodynamically stable in the PACU and transferred to the surgical floor.   Endocrinology was consulted for management of insulin pump; recommended lantus 42 units bedtime with admelog 5 units TID with meals. Insulin pump to be resumed at time of hospital discharge.   11/23 POD1 Patient tachycardic to low 100s. Allowed sips of clears. H/H downtrended from 9.6/30.3 day prior to 7.8/25.31. Repeat CBC in PM 7.3/23.4.  11/24 POD2 Denise was removed and patient passed TOV. H/H noted to downtrend to 6.3/19.6. Transfused 2uPRBC. Patient remained on sips of clears.  11/25 POD 3 H/H stable post-transfusion at 10.5/31.3. Diet advanced to clear liquids.   11/26 POD4 Diet advanced to full liquids. PO intake limited 2/2 nausea.  11/27 POD5 Diet backed down to sips 2/2 episode of emesis. XR without evidence of gastric bubble or ileus. Patient had small bowel movement.  11/28 POD6 Remains on sips.  11/29 POD7 Patient unable to tolerate CLD, with episodes of emesis. CT abd/pelvis showed: Dilated loops of small bowel the level of the terminal ileum, likely ileus in the postoperative setting. Postoperative pneumoperitoneum and diffuse subcutaneous emphysema. NGT placed with immediate return of copious amounts of bilious fluid.   11/30 POD8 IR was consulted for PICC placement. TPN was initiated.   12/1-12/4 Patient remained without bowel function. CT abdomen/pelvis showed: Interval increase in size of fluid anterior to the ileocolic anastomosis with new peripheral enhancement, probably early abscess formation. Interval decrease in small bowel dilatation, probably ileus.  12/5 GG challenge was done that showed: Gastrografin challenge with contrast remaining in the stomach more consistent with ileus than obstruction. IR was consulted for possible drainage of abscess seen on CT.   12/6 Hypaque X-ray was done that showed: No anastomotic leak. Right hemicolectomy. The colon is filled from the rectum to ileocolic anastomosis in the left middle abdomen. No anastomotic leak. There is good contrast opacification of multiple loops of small bowel. Few scattered sigmoid diverticula. Following drainage of contrast from rectal tube, a post abdominal  radiograph demonstrates a moderate residual amount of contrast within the large and small bowel.  12/7  Patient on Reglan and erythromycin for motility. Patient went to IR and is s/p  anterior abdominal drainage of collection with dark red fluid drained. Patient tolerated procedure well and transferred back to floor.   12/11 Patient with flatus and multiple BM; NGT removed.         ** INCOMPLETE    Patient seen by physical therapy throughout hospital stay who recommended patient be discharged to home with home PT and walker.   Pain control was transitioned from IV to PO pain meds. At this time, patient is currently ambulating, voiding, tolerating a regular diet. Pain well controlled on PO pain meds. Patient has been deemed stable for discharge, per Dr. Nichols, to home with follow up as an outpatient. 73 year old female with PMH asthma, T2DM on insulin pump, GERD, HTN, HLD, OA, obesity who presented to [facility] on 11/22/23 with moderately differentiated adenocarcinoma of cecal mass, scheduled for surgery. Patient taken to the OR by Dr. Nichols and underwent robotic partial colectomy. Patient tolerated operation well and there were no post-operative complications identified. Patient remained hemodynamically stable in the PACU and transferred to the surgical floor.   Endocrinology was consulted for management of insulin pump; recommended lantus 42 units bedtime with admelog 5 units TID with meals. Insulin pump to be resumed at time of hospital discharge.   11/23 POD1 Patient tachycardic to low 100s. Allowed sips of clears. H/H downtrended from 9.6/30.3 day prior to 7.8/25.31. Repeat CBC in PM 7.3/23.4.  11/24 POD2 Denise was removed and patient passed TOV. H/H noted to downtrend to 6.3/19.6. Transfused 2uPRBC. Patient remained on sips of clears.  11/25 POD 3 H/H stable post-transfusion at 10.5/31.3. Diet advanced to clear liquids.   11/26 POD4 Diet advanced to full liquids. PO intake limited 2/2 nausea.  11/27 POD5 Diet backed down to sips 2/2 episode of emesis. XR without evidence of gastric bubble or ileus. Patient had small bowel movement.  11/28 POD6 Remains on sips.  11/29 POD7 Patient unable to tolerate CLD, with episodes of emesis. CT abd/pelvis showed: Dilated loops of small bowel the level of the terminal ileum, likely ileus in the postoperative setting. Postoperative pneumoperitoneum and diffuse subcutaneous emphysema. NGT placed with immediate return of copious amounts of bilious fluid.   11/30 POD8 IR was consulted for PICC placement. TPN was initiated.   12/1-12/4 Patient remained without bowel function. CT abdomen/pelvis showed: Interval increase in size of fluid anterior to the ileocolic anastomosis with new peripheral enhancement, probably early abscess formation. Interval decrease in small bowel dilatation, probably ileus.  12/5 GG challenge was done that showed: Gastrografin challenge with contrast remaining in the stomach more consistent with ileus than obstruction. IR was consulted for possible drainage of abscess seen on CT.   12/6 Hypaque X-ray was done that showed: No anastomotic leak. Right hemicolectomy. The colon is filled from the rectum to ileocolic anastomosis in the left middle abdomen. No anastomotic leak. There is good contrast opacification of multiple loops of small bowel. Few scattered sigmoid diverticula. Following drainage of contrast from rectal tube, a post abdominal  radiograph demonstrates a moderate residual amount of contrast within the large and small bowel.  12/7  Patient on Reglan and erythromycin for motility. Patient went to IR and is s/p  anterior abdominal drainage of collection with dark red fluid drained. Patient tolerated procedure well and transferred back to floor.   12/11 Patient with flatus and multiple BM; NGT removed. Patient advanced to CLD which she tolerated.  12/12 Patient advanced to LRD. IR consulted for tube check.         ** INCOMPLETE    Patient seen by physical therapy throughout hospital stay who recommended patient be discharged to home with home PT and walker.   Pain control was transitioned from IV to PO pain meds. At this time, patient is currently ambulating, voiding, tolerating a regular diet. Pain well controlled on PO pain meds. Patient has been deemed stable for discharge, per Dr. Nichols, to home with follow up as an outpatient. 73 year old female with PMH asthma, T2DM on insulin pump, GERD, HTN, HLD, OA, obesity who presented to [facility] on 11/22/23 with moderately differentiated adenocarcinoma of cecal mass, scheduled for surgery. Patient taken to the OR by Dr. Nichols and underwent robotic partial colectomy. Patient tolerated operation well and there were no post-operative complications identified. Patient remained hemodynamically stable in the PACU and transferred to the surgical floor.   Endocrinology was consulted for management of insulin pump; recommended lantus 42 units bedtime with admelog 5 units TID with meals. Insulin pump to be resumed at time of hospital discharge.   11/23 POD1 Patient tachycardic to low 100s. Allowed sips of clears. H/H downtrended from 9.6/30.3 day prior to 7.8/25.31. Repeat CBC in PM 7.3/23.4.  11/24 POD2 Denise was removed and patient passed TOV. H/H noted to downtrend to 6.3/19.6. Transfused 2uPRBC. Patient remained on sips of clears.  11/25 POD 3 H/H stable post-transfusion at 10.5/31.3. Diet advanced to clear liquids.   11/26 POD4 Diet advanced to full liquids. PO intake limited 2/2 nausea.  11/27 POD5 Diet backed down to sips 2/2 episode of emesis. XR without evidence of gastric bubble or ileus. Patient had small bowel movement.  11/28 POD6 Remains on sips.  11/29 POD7 Patient unable to tolerate CLD, with episodes of emesis. CT abd/pelvis showed: Dilated loops of small bowel the level of the terminal ileum, likely ileus in the postoperative setting. Postoperative pneumoperitoneum and diffuse subcutaneous emphysema. NGT placed with immediate return of copious amounts of bilious fluid.   11/30 POD8 IR was consulted for PICC placement. TPN was initiated.   12/1-12/4 Patient remained without bowel function. CT abdomen/pelvis showed: Interval increase in size of fluid anterior to the ileocolic anastomosis with new peripheral enhancement, probably early abscess formation. Interval decrease in small bowel dilatation, probably ileus.  12/5 GG challenge was done that showed: Gastrografin challenge with contrast remaining in the stomach more consistent with ileus than obstruction. IR was consulted for possible drainage of abscess seen on CT.   12/6 Hypaque X-ray was done that showed: No anastomotic leak. Right hemicolectomy. The colon is filled from the rectum to ileocolic anastomosis in the left middle abdomen. No anastomotic leak. There is good contrast opacification of multiple loops of small bowel. Few scattered sigmoid diverticula. Following drainage of contrast from rectal tube, a post abdominal  radiograph demonstrates a moderate residual amount of contrast within the large and small bowel.  12/7  Patient on Reglan and erythromycin for motility. Patient went to IR and is s/p  anterior abdominal drainage of collection with dark red fluid drained. Patient tolerated procedure well and transferred back to floor.   12/11 Patient with flatus and multiple BM; NGT removed. Patient advanced to CLD which she tolerated.  12/12 Patient advanced to LRD. IR consulted for tube check.         ** INCOMPLETE    Patient seen by physical therapy throughout hospital stay who recommended patient be discharged to home with home PT and walker.   Pain control was transitioned from IV to PO pain meds. At this time, patient is currently ambulating, voiding, tolerating a regular diet. Pain well controlled on PO pain meds. Patient has been deemed stable for discharge, per Dr. Nichols, to home with follow up as an outpatient. 73 year old female with PMH asthma, T2DM on insulin pump, GERD, HTN, HLD, OA, obesity who presented to [facility] on 11/22/23 with moderately differentiated adenocarcinoma of cecal mass, scheduled for surgery. Patient taken to the OR by Dr. Nichols and underwent robotic partial colectomy. Patient tolerated operation well and there were no post-operative complications identified. Patient remained hemodynamically stable in the PACU and transferred to the surgical floor.   Endocrinology was consulted for management of insulin pump; recommended lantus 42 units bedtime with admelog 5 units TID with meals. Insulin pump to be resumed at time of hospital discharge.   11/23 POD1 Patient tachycardic to low 100s. Allowed sips of clears. H/H downtrended from 9.6/30.3 day prior to 7.8/25.31. Repeat CBC in PM 7.3/23.4.  11/24 POD2 Denise was removed and patient passed TOV. H/H noted to downtrend to 6.3/19.6. Transfused 2uPRBC. Patient remained on sips of clears.  11/25 POD 3 H/H stable post-transfusion at 10.5/31.3. Diet advanced to clear liquids.   11/26 POD4 Diet advanced to full liquids. PO intake limited 2/2 nausea.  11/27 POD5 Diet backed down to sips 2/2 episode of emesis. XR without evidence of gastric bubble or ileus. Patient had small bowel movement.  11/28 POD6 Remains on sips.  11/29 POD7 Patient unable to tolerate CLD, with episodes of emesis. CT abd/pelvis showed: Dilated loops of small bowel the level of the terminal ileum, likely ileus in the postoperative setting. Postoperative pneumoperitoneum and diffuse subcutaneous emphysema. NGT placed with immediate return of copious amounts of bilious fluid.   11/30 POD8 IR was consulted for PICC placement. TPN was initiated.   12/1-12/4 Patient remained without bowel function. CT abdomen/pelvis showed: Interval increase in size of fluid anterior to the ileocolic anastomosis with new peripheral enhancement, probably early abscess formation. Interval decrease in small bowel dilatation, probably ileus.  12/5 GG challenge was done that showed: Gastrografin challenge with contrast remaining in the stomach more consistent with ileus than obstruction. IR was consulted for possible drainage of abscess seen on CT.   12/6 Hypaque X-ray was done that showed: No anastomotic leak. Right hemicolectomy. The colon is filled from the rectum to ileocolic anastomosis in the left middle abdomen. No anastomotic leak. There is good contrast opacification of multiple loops of small bowel. Few scattered sigmoid diverticula. Following drainage of contrast from rectal tube, a post abdominal  radiograph demonstrates a moderate residual amount of contrast within the large and small bowel.  12/7  Patient on Reglan and erythromycin for motility. Patient went to IR and is s/p  anterior abdominal drainage of collection with dark red fluid drained. Patient tolerated procedure well and transferred back to floor.   12/11 Patient with flatus and multiple BM; NGT removed. Patient advanced to CLD which she tolerated.  12/12 Patient advanced to LRD. IR consulted for tube check.         ** INCOMPLETE    Patient seen by physical therapy throughout hospital stay who recommended patient be discharged to home with home PT and walker.   Pain control was transitioned from IV to PO pain meds. At this time, patient is currently ambulating, voiding, tolerating a regular diet. Pain well controlled on PO pain meds. Patient has been deemed stable for discharge, per Dr. Nichols, to home with follow up as an outpatient. 73 year old female with PMH asthma, T2DM on insulin pump, GERD, HTN, HLD, OA, obesity who presented to [facility] on 11/22/23 with moderately differentiated adenocarcinoma of cecal mass, scheduled for surgery. Patient taken to the OR by Dr. Nichols and underwent robotic partial colectomy. Patient tolerated operation well and there were no post-operative complications identified. Patient remained hemodynamically stable in the PACU and transferred to the surgical floor.   Endocrinology was consulted for management of insulin pump; recommended lantus 42 units bedtime with admelog 5 units TID with meals. Insulin pump to be resumed at time of hospital discharge.   11/23 POD1 Patient tachycardic to low 100s. Allowed sips of clears. H/H downtrended from 9.6/30.3 day prior to 7.8/25.31. Repeat CBC in PM 7.3/23.4.  11/24 POD2 Denise was removed and patient passed TOV. H/H noted to downtrend to 6.3/19.6. Transfused 2uPRBC. Patient remained on sips of clears.  11/25 POD 3 H/H stable post-transfusion at 10.5/31.3. Diet advanced to clear liquids.   11/26 POD4 Diet advanced to full liquids. PO intake limited 2/2 nausea.  11/27 POD5 Diet backed down to sips 2/2 episode of emesis. XR without evidence of gastric bubble or ileus. Patient had small bowel movement.  11/28 POD6 Remains on sips.  11/29 POD7 Patient unable to tolerate CLD, with episodes of emesis. CT abd/pelvis showed: Dilated loops of small bowel the level of the terminal ileum, likely ileus in the postoperative setting. Postoperative pneumoperitoneum and diffuse subcutaneous emphysema. NGT placed with immediate return of copious amounts of bilious fluid.   11/30 POD8 IR was consulted for PICC placement. TPN was initiated.   12/1-12/4 Patient remained without bowel function. CT abdomen/pelvis showed: Interval increase in size of fluid anterior to the ileocolic anastomosis with new peripheral enhancement, probably early abscess formation. Interval decrease in small bowel dilatation, probably ileus.  12/5 GG challenge was done that showed: Gastrografin challenge with contrast remaining in the stomach more consistent with ileus than obstruction. IR was consulted for possible drainage of abscess seen on CT.   12/6 Hypaque X-ray was done that showed: No anastomotic leak. Right hemicolectomy. The colon is filled from the rectum to ileocolic anastomosis in the left middle abdomen. No anastomotic leak. There is good contrast opacification of multiple loops of small bowel. Few scattered sigmoid diverticula. Following drainage of contrast from rectal tube, a post abdominal  radiograph demonstrates a moderate residual amount of contrast within the large and small bowel.  12/7  Patient on Reglan and erythromycin for motility. Patient went to IR and is s/p  anterior abdominal drainage of collection with dark red fluid drained. Patient tolerated procedure well and transferred back to floor.   12/11 Patient with flatus and multiple BM; NGT removed. Patient advanced to CLD which she tolerated.  12/12 Patient advanced to LRD. IR consulted for tube check. TPN was halved.   12/13 Patient tolerating LRD. TPN discontinued.         ** INCOMPLETE    Patient seen by physical therapy throughout hospital stay who recommended patient be discharged to home with home PT and walker.   Pain control was transitioned from IV to PO pain meds. At this time, patient is currently ambulating, voiding, tolerating a regular diet. Pain well controlled on PO pain meds. Patient has been deemed stable for discharge, per Dr. Nichols, to home with follow up as an outpatient. 73 year old female with PMH asthma, T2DM on insulin pump, GERD, HTN, HLD, OA, obesity who presented to [facility] on 11/22/23 with moderately differentiated adenocarcinoma of cecal mass, scheduled for surgery. Patient taken to the OR by Dr. Nichols and underwent robotic partial colectomy. Patient tolerated operation well and there were no post-operative complications identified. Patient remained hemodynamically stable in the PACU and transferred to the surgical floor.   Endocrinology was consulted for management of insulin pump; recommended lantus 42 units bedtime with admelog 5 units TID with meals. Insulin pump to be resumed at time of hospital discharge.   11/23 POD1 Patient tachycardic to low 100s. Allowed sips of clears. H/H downtrended from 9.6/30.3 day prior to 7.8/25.31. Repeat CBC in PM 7.3/23.4.  11/24 POD2 Denise was removed and patient passed TOV. H/H noted to downtrend to 6.3/19.6. Transfused 2uPRBC. Patient remained on sips of clears.  11/25 POD 3 H/H stable post-transfusion at 10.5/31.3. Diet advanced to clear liquids.   11/26 POD4 Diet advanced to full liquids. PO intake limited 2/2 nausea.  11/27 POD5 Diet backed down to sips 2/2 episode of emesis. XR without evidence of gastric bubble or ileus. Patient had small bowel movement.  11/28 POD6 Remains on sips.  11/29 POD7 Patient unable to tolerate CLD, with episodes of emesis. CT abd/pelvis showed: Dilated loops of small bowel the level of the terminal ileum, likely ileus in the postoperative setting. Postoperative pneumoperitoneum and diffuse subcutaneous emphysema. NGT placed with immediate return of copious amounts of bilious fluid.   11/30 POD8 IR was consulted for PICC placement. TPN was initiated.   12/1-12/4 Patient remained without bowel function. CT abdomen/pelvis showed: Interval increase in size of fluid anterior to the ileocolic anastomosis with new peripheral enhancement, probably early abscess formation. Interval decrease in small bowel dilatation, probably ileus.  12/5 GG challenge was done that showed: Gastrografin challenge with contrast remaining in the stomach more consistent with ileus than obstruction. IR was consulted for possible drainage of abscess seen on CT.   12/6 Hypaque X-ray was done that showed: No anastomotic leak. Right hemicolectomy. The colon is filled from the rectum to ileocolic anastomosis in the left middle abdomen. No anastomotic leak. There is good contrast opacification of multiple loops of small bowel. Few scattered sigmoid diverticula. Following drainage of contrast from rectal tube, a post abdominal  radiograph demonstrates a moderate residual amount of contrast within the large and small bowel.  12/7  Patient on Reglan and erythromycin for motility. Patient went to IR and is s/p  anterior abdominal drainage of collection with dark red fluid drained. Patient tolerated procedure well and transferred back to floor.   12/11 Patient with flatus and multiple BM; NGT removed. Patient advanced to CLD which she tolerated.  12/12 Patient advanced to LRD. IR consulted for tube check. TPN was halved.   12/13 Patient tolerating LRD. TPN discontinued.         ** INCOMPLETE    Patient seen by physical therapy throughout hospital stay who recommended patient be discharged to home with home PT and walker.   Pain control was transitioned from IV to PO pain meds. At this time, patient is currently ambulating, voiding, tolerating a regular diet. Pain well controlled on PO pain meds. Patient has been deemed stable for discharge, per Dr. Nichols, to home with follow up as an outpatient. 73 year old female with PMH asthma, T2DM on insulin pump, GERD, HTN, HLD, OA, obesity who presented to [facility] on 11/22/23 with moderately differentiated adenocarcinoma of cecal mass, scheduled for surgery. Patient taken to the OR by Dr. Nichols and underwent robotic partial colectomy. Patient tolerated operation well and there were no post-operative complications identified. Patient remained hemodynamically stable in the PACU and transferred to the surgical floor.   Endocrinology was consulted for management of insulin pump; recommended lantus 42 units bedtime with admelog 5 units TID with meals. Insulin pump to be resumed at time of hospital discharge.   11/23 POD1 Patient tachycardic to low 100s. Allowed sips of clears. H/H downtrended from 9.6/30.3 day prior to 7.8/25.31. Repeat CBC in PM 7.3/23.4.  11/24 POD2 Denise was removed and patient passed TOV. H/H noted to downtrend to 6.3/19.6. Transfused 2uPRBC. Patient remained on sips of clears.  11/25 POD 3 H/H stable post-transfusion at 10.5/31.3. Diet advanced to clear liquids.   11/26 POD4 Diet advanced to full liquids. PO intake limited 2/2 nausea.  11/27 POD5 Diet backed down to sips 2/2 episode of emesis. XR without evidence of gastric bubble or ileus. Patient had small bowel movement.  11/28 POD6 Remains on sips.  11/29 POD7 Patient unable to tolerate CLD, with episodes of emesis. CT abd/pelvis showed: Dilated loops of small bowel the level of the terminal ileum, likely ileus in the postoperative setting. Postoperative pneumoperitoneum and diffuse subcutaneous emphysema. NGT placed with immediate return of copious amounts of bilious fluid.   11/30 POD8 IR was consulted for PICC placement. TPN was initiated.   12/1-12/4 Patient remained without bowel function. CT abdomen/pelvis showed: Interval increase in size of fluid anterior to the ileocolic anastomosis with new peripheral enhancement, probably early abscess formation. Interval decrease in small bowel dilatation, probably ileus.  12/5 GG challenge was done that showed: Gastrografin challenge with contrast remaining in the stomach more consistent with ileus than obstruction. IR was consulted for possible drainage of abscess seen on CT.   12/6 Hypaque X-ray was done that showed: No anastomotic leak. Right hemicolectomy. The colon is filled from the rectum to ileocolic anastomosis in the left middle abdomen. No anastomotic leak. There is good contrast opacification of multiple loops of small bowel. Few scattered sigmoid diverticula. Following drainage of contrast from rectal tube, a post abdominal  radiograph demonstrates a moderate residual amount of contrast within the large and small bowel.  12/7  Patient on Reglan and erythromycin for motility. Patient went to IR and is s/p  anterior abdominal drainage of collection with dark red fluid drained. Patient tolerated procedure well and transferred back to floor.   12/11 Patient with flatus and multiple BM; NGT removed. Patient advanced to CLD which she tolerated.  12/12 Patient advanced to LRD. IR consulted for tube check. TPN was halved.   12/13 Patient tolerating LRD. TPN discontinued.         ** INCOMPLETE    Patient seen by physical therapy throughout hospital stay who recommended patient be discharged to home with home PT and walker.   Pain control was transitioned from IV to PO pain meds. At this time, patient is currently ambulating, voiding, tolerating a regular diet. Pain well controlled on PO pain meds. Patient has been deemed stable for discharge, per Dr. Nichols, to home with follow up as an outpatient. 73 year old female with PMH asthma, T2DM on insulin pump, GERD, HTN, HLD, OA, obesity who presented to [facility] on 11/22/23 with moderately differentiated adenocarcinoma of cecal mass, scheduled for surgery. Patient taken to the OR by Dr. Nichols and underwent robotic partial colectomy. Patient tolerated operation well and there were no post-operative complications identified. Patient remained hemodynamically stable in the PACU and transferred to the surgical floor.   Endocrinology was consulted for management of insulin pump; recommended lantus 42 units bedtime with admelog 5 units TID with meals. Insulin pump to be resumed at time of hospital discharge.   11/23 POD1 Patient tachycardic to low 100s. Allowed sips of clears. H/H downtrended from 9.6/30.3 day prior to 7.8/25.31. Repeat CBC in PM 7.3/23.4.  11/24 POD2 Denise was removed and patient passed TOV. H/H noted to downtrend to 6.3/19.6. Transfused 2uPRBC. Patient remained on sips of clears.  11/25 POD 3 H/H stable post-transfusion at 10.5/31.3. Diet advanced to clear liquids.   11/26 POD4 Diet advanced to full liquids. PO intake limited 2/2 nausea.  11/27 POD5 Diet backed down to sips 2/2 episode of emesis. XR without evidence of gastric bubble or ileus. Patient had small bowel movement.  11/28 POD6 Remains on sips.  11/29 POD7 Patient unable to tolerate CLD, with episodes of emesis. CT abd/pelvis showed: Dilated loops of small bowel the level of the terminal ileum, likely ileus in the postoperative setting. Postoperative pneumoperitoneum and diffuse subcutaneous emphysema. NGT placed with immediate return of copious amounts of bilious fluid.   11/30 POD8 IR was consulted for PICC placement. TPN was initiated.   12/1-12/4 Patient remained without bowel function. CT abdomen/pelvis showed: Interval increase in size of fluid anterior to the ileocolic anastomosis with new peripheral enhancement, probably early abscess formation. Interval decrease in small bowel dilatation, probably ileus.  12/5 GG challenge was done that showed: Gastrografin challenge with contrast remaining in the stomach more consistent with ileus than obstruction. IR was consulted for possible drainage of abscess seen on CT.   12/6 Hypaque X-ray was done that showed: No anastomotic leak. Right hemicolectomy. The colon is filled from the rectum to ileocolic anastomosis in the left middle abdomen. No anastomotic leak. There is good contrast opacification of multiple loops of small bowel. Few scattered sigmoid diverticula. Following drainage of contrast from rectal tube, a post abdominal  radiograph demonstrates a moderate residual amount of contrast within the large and small bowel.  12/7  Patient on Reglan and erythromycin for motility. Patient went to IR and is s/p  anterior abdominal drainage of collection with dark red fluid drained. Patient tolerated procedure well and transferred back to floor.   12/11 Patient with flatus and multiple BM; NGT removed. Patient advanced to CLD which she tolerated.  12/12 Patient advanced to LRD. IR consulted for tube check. TPN was halved.   12/13 Patient tolerating LRD. TPN discontinued.   12/14 Patient went to IR and is s/p ****************        ** INCOMPLETE    Patient seen by physical therapy throughout hospital stay who recommended patient be discharged to home with home PT and walker.   Pain control was transitioned from IV to PO pain meds. At this time, patient is currently ambulating, voiding, tolerating a regular diet. Pain well controlled on PO pain meds. Patient has been deemed stable for discharge, per Dr. Nichols, to home with follow up as an outpatient. 73 year old female with PMH asthma, T2DM on insulin pump, GERD, HTN, HLD, OA, obesity who presented to [facility] on 11/22/23 with moderately differentiated adenocarcinoma of cecal mass, scheduled for surgery. Patient taken to the OR by Dr. Nichols and underwent robotic partial colectomy. Patient tolerated operation well and there were no post-operative complications identified. Patient remained hemodynamically stable in the PACU and transferred to the surgical floor.   Endocrinology was consulted for management of insulin pump; recommended lantus 42 units bedtime with admelog 5 units TID with meals. Insulin pump to be resumed at time of hospital discharge.   11/23 POD1 Patient tachycardic to low 100s. Allowed sips of clears. H/H downtrended from 9.6/30.3 day prior to 7.8/25.31. Repeat CBC in PM 7.3/23.4.  11/24 POD2 Denise was removed and patient passed TOV. H/H noted to downtrend to 6.3/19.6. Transfused 2uPRBC. Patient remained on sips of clears.  11/25 POD 3 H/H stable post-transfusion at 10.5/31.3. Diet advanced to clear liquids.   11/26 POD4 Diet advanced to full liquids. PO intake limited 2/2 nausea.  11/27 POD5 Diet backed down to sips 2/2 episode of emesis. XR without evidence of gastric bubble or ileus. Patient had small bowel movement.  11/28 POD6 Remains on sips.  11/29 POD7 Patient unable to tolerate CLD, with episodes of emesis. CT abd/pelvis showed: Dilated loops of small bowel the level of the terminal ileum, likely ileus in the postoperative setting. Postoperative pneumoperitoneum and diffuse subcutaneous emphysema. NGT placed with immediate return of copious amounts of bilious fluid.   11/30 POD8 IR was consulted for PICC placement. TPN was initiated.   12/1-12/4 Patient remained without bowel function. CT abdomen/pelvis showed: Interval increase in size of fluid anterior to the ileocolic anastomosis with new peripheral enhancement, probably early abscess formation. Interval decrease in small bowel dilatation, probably ileus.  12/5 GG challenge was done that showed: Gastrografin challenge with contrast remaining in the stomach more consistent with ileus than obstruction. IR was consulted for possible drainage of abscess seen on CT.   12/6 Hypaque X-ray was done that showed: No anastomotic leak. Right hemicolectomy. The colon is filled from the rectum to ileocolic anastomosis in the left middle abdomen. No anastomotic leak. There is good contrast opacification of multiple loops of small bowel. Few scattered sigmoid diverticula. Following drainage of contrast from rectal tube, a post abdominal  radiograph demonstrates a moderate residual amount of contrast within the large and small bowel.  12/7  Patient on Reglan and erythromycin for motility. Patient went to IR and is s/p  anterior abdominal drainage of collection with dark red fluid drained. Patient tolerated procedure well and transferred back to floor.   12/11 Patient with flatus and multiple BM; NGT removed. Patient advanced to CLD which she tolerated.  12/12 Patient advanced to LRD. IR consulted for tube check. TPN was halved.   12/13 Patient tolerating LRD. TPN discontinued.   12/14 Patient went to IR and is s/p ****************        ** INCOMPLETE    Patient seen by physical therapy throughout hospital stay who recommended patient be discharged to home with home PT and walker.   Pain control was transitioned from IV to PO pain meds. At this time, patient is currently ambulating, voiding, tolerating a regular diet. Pain well controlled on PO pain meds. Patient has been deemed stable for discharge, per Dr. Nichols, to home with follow up as an outpatient. 73 year old female with PMH asthma, T2DM on insulin pump, GERD, HTN, HLD, OA, obesity who presented to [facility] on 11/22/23 with moderately differentiated adenocarcinoma of cecal mass, scheduled for surgery. Patient taken to the OR by Dr. Nichols and underwent robotic partial colectomy. Patient tolerated operation well and there were no post-operative complications identified. Patient remained hemodynamically stable in the PACU and transferred to the surgical floor.   Endocrinology was consulted for management of insulin pump; recommended lantus 42 units bedtime with admelog 5 units TID with meals. Insulin pump to be resumed at time of hospital discharge.   11/23 POD1 Patient tachycardic to low 100s. Allowed sips of clears. H/H downtrended from 9.6/30.3 day prior to 7.8/25.31. Repeat CBC in PM 7.3/23.4.  11/24 POD2 Denise was removed and patient passed TOV. H/H noted to downtrend to 6.3/19.6. Transfused 2uPRBC. Patient remained on sips of clears.  11/25 POD 3 H/H stable post-transfusion at 10.5/31.3. Diet advanced to clear liquids.   11/26 POD4 Diet advanced to full liquids. PO intake limited 2/2 nausea.  11/27 POD5 Diet backed down to sips 2/2 episode of emesis. XR without evidence of gastric bubble or ileus. Patient had small bowel movement.  11/28 POD6 Remains on sips.  11/29 POD7 Patient unable to tolerate CLD, with episodes of emesis. CT abd/pelvis showed: Dilated loops of small bowel the level of the terminal ileum, likely ileus in the postoperative setting. Postoperative pneumoperitoneum and diffuse subcutaneous emphysema. NGT placed with immediate return of copious amounts of bilious fluid.   11/30 POD8 IR was consulted for PICC placement. TPN was initiated.   12/1-12/4 Patient remained without bowel function. CT abdomen/pelvis showed: Interval increase in size of fluid anterior to the ileocolic anastomosis with new peripheral enhancement, probably early abscess formation. Interval decrease in small bowel dilatation, probably ileus.  12/5 GG challenge was done that showed: Gastrografin challenge with contrast remaining in the stomach more consistent with ileus than obstruction. IR was consulted for possible drainage of abscess seen on CT.   12/6 Hypaque X-ray was done that showed: No anastomotic leak. Right hemicolectomy. The colon is filled from the rectum to ileocolic anastomosis in the left middle abdomen. No anastomotic leak. There is good contrast opacification of multiple loops of small bowel. Few scattered sigmoid diverticula. Following drainage of contrast from rectal tube, a post abdominal  radiograph demonstrates a moderate residual amount of contrast within the large and small bowel.  12/7  Patient on Reglan and erythromycin for motility. Patient went to IR and is s/p  anterior abdominal drainage of collection with dark red fluid drained. Patient tolerated procedure well and transferred back to floor.   12/11 Patient with flatus and multiple BM; NGT removed. Patient advanced to CLD which she tolerated.  12/12 Patient advanced to LRD. IR consulted for tube check. TPN was halved.   12/13 Patient tolerating LRD. TPN discontinued.   12/14 Patient went to IR and is s/p ****************        ** INCOMPLETE    Patient seen by physical therapy throughout hospital stay who recommended patient be discharged to home with home PT and walker.   Pain control was transitioned from IV to PO pain meds. At this time, patient is currently ambulating, voiding, tolerating a regular diet. Pain well controlled on PO pain meds. Patient has been deemed stable for discharge, per Dr. Nichols, to home with follow up as an outpatient. 73 year old female with PMH asthma, T2DM on insulin pump, GERD, HTN, HLD, OA, obesity who presented to [facility] on 23 with moderately differentiated adenocarcinoma of cecal mass, scheduled for surgery. Patient taken to the OR by Dr. Nichols and underwent robotic partial colectomy. Patient tolerated operation well and there were no post-operative complications identified. Patient remained hemodynamically stable in the PACU and transferred to the surgical floor.   Endocrinology was consulted for management of insulin pump; recommended lantus 42 units bedtime with admelog 5 units TID with meals. Insulin pump to be resumed at time of hospital discharge.    POD1 Patient tachycardic to low 100s. Allowed sips of clears. H/H downtrended from 9.6/30.3 day prior to 7.8/.31. Repeat CBC in PM 7.3/23.4.   POD2 Denise was removed and patient passed TOV. H/H noted to downtrend to 6.3/19.6. Transfused 2uPRBC. Patient remained on sips of clears.   POD 3 H/H stable post-transfusion at 10.5/31.3. Diet advanced to clear liquids.    POD4 Diet advanced to full liquids. PO intake limited 2/2 nausea.   POD5 Diet backed down to sips 2/2 episode of emesis. XR without evidence of gastric bubble or ileus. Patient had small bowel movement.   POD6 Remains on sips.   POD7 Patient unable to tolerate CLD, with episodes of emesis. CT abd/pelvis showed: Dilated loops of small bowel the level of the terminal ileum, likely ileus in the postoperative setting. Postoperative pneumoperitoneum and diffuse subcutaneous emphysema. NGT placed with immediate return of copious amounts of bilious fluid.    POD8 IR was consulted for PICC placement. TPN was initiated.   - Patient remained without bowel function. CT abdomen/pelvis showed: Interval increase in size of fluid anterior to the ileocolic anastomosis with new peripheral enhancement, probably early abscess formation. Interval decrease in small bowel dilatation, probably ileus.   GG challenge was done that showed: Gastrografin challenge with contrast remaining in the stomach more consistent with ileus than obstruction. IR was consulted for possible drainage of abscess seen on CT.    Hypaque X-ray was done that showed: No anastomotic leak. Right hemicolectomy. The colon is filled from the rectum to ileocolic anastomosis in the left middle abdomen. No anastomotic leak. There is good contrast opacification of multiple loops of small bowel. Few scattered sigmoid diverticula. Following drainage of contrast from rectal tube, a post abdominal  radiograph demonstrates a moderate residual amount of contrast within the large and small bowel.    Patient on Reglan and erythromycin for motility. Patient went to IR and is s/p  anterior abdominal drainage of collection with dark red fluid drained. Patient tolerated procedure well and transferred back to floor.    Patient with flatus and multiple BM; NGT removed. Patient advanced to CLD which she tolerated.   Patient advanced to LRD. IR consulted for tube check. TPN was halved. CT abdomen repeated for tube check which showed the followin.  Interval placement of drainage catheter in the perianastomotic collection with marked decrease in size. No extravasation of contrast at the anastomosis.  2.  Likely ileus with enteric contrast noted down to the rectum.   Patient tolerating LRD. TPN discontinued.    IR drain removed. PICC removed.   Patient seen by physical therapy throughout hospital stay who recommended patient be discharged to home with home PT and walker.   Pain control was transitioned from IV to PO pain meds. At this time, patient is currently ambulating, voiding, tolerating a regular diet. Pain well controlled on PO pain meds. Patient has been deemed stable for discharge, per Dr. Nichols, to home with follow up as an outpatient. 73 year old female with PMH asthma, T2DM on insulin pump, GERD, HTN, HLD, OA, obesity who presented to [facility] on 23 with moderately differentiated adenocarcinoma of cecal mass, scheduled for surgery. Patient taken to the OR by Dr. Nichols and underwent robotic partial colectomy. Patient tolerated operation well and there were no post-operative complications identified. Patient remained hemodynamically stable in the PACU and transferred to the surgical floor.   Endocrinology was consulted for management of insulin pump; recommended lantus 42 units bedtime with admelog 5 units TID with meals. Insulin pump to be resumed at time of hospital discharge.    POD1 Patient tachycardic to low 100s. Allowed sips of clears. H/H downtrended from 9.6/30.3 day prior to 7.8/.31. Repeat CBC in PM 7.3/23.4.   POD2 Denise was removed and patient passed TOV. H/H noted to downtrend to 6.3/19.6. Transfused 2uPRBC. Patient remained on sips of clears.   POD 3 H/H stable post-transfusion at 10.5/31.3. Diet advanced to clear liquids.    POD4 Diet advanced to full liquids. PO intake limited 2/2 nausea.   POD5 Diet backed down to sips 2/2 episode of emesis. XR without evidence of gastric bubble or ileus. Patient had small bowel movement.   POD6 Remains on sips.   POD7 Patient unable to tolerate CLD, with episodes of emesis. CT abd/pelvis showed: Dilated loops of small bowel the level of the terminal ileum, likely ileus in the postoperative setting. Postoperative pneumoperitoneum and diffuse subcutaneous emphysema. NGT placed with immediate return of copious amounts of bilious fluid.    POD8 IR was consulted for PICC placement. TPN was initiated.   - Patient remained without bowel function. CT abdomen/pelvis showed: Interval increase in size of fluid anterior to the ileocolic anastomosis with new peripheral enhancement, probably early abscess formation. Interval decrease in small bowel dilatation, probably ileus.   GG challenge was done that showed: Gastrografin challenge with contrast remaining in the stomach more consistent with ileus than obstruction. IR was consulted for possible drainage of abscess seen on CT.    Hypaque X-ray was done that showed: No anastomotic leak. Right hemicolectomy. The colon is filled from the rectum to ileocolic anastomosis in the left middle abdomen. No anastomotic leak. There is good contrast opacification of multiple loops of small bowel. Few scattered sigmoid diverticula. Following drainage of contrast from rectal tube, a post abdominal  radiograph demonstrates a moderate residual amount of contrast within the large and small bowel.    Patient on Reglan and erythromycin for motility. Patient went to IR and is s/p  anterior abdominal drainage of collection with dark red fluid drained. Patient tolerated procedure well and transferred back to floor.    Patient with flatus and multiple BM; NGT removed. Patient advanced to CLD which she tolerated.   Patient advanced to LRD. IR consulted for tube check. TPN was halved. CT abdomen repeated for tube check which showed the followin.  Interval placement of drainage catheter in the perianastomotic collection with marked decrease in size. No extravasation of contrast at the anastomosis.  2.  Likely ileus with enteric contrast noted down to the rectum.   Patient tolerating LRD. TPN discontinued.    IR drain removed. PICC removed.   Patient seen by physical therapy throughout hospital stay who recommended patient be discharged to home with home PT and walker.   Pain control was transitioned from IV to PO pain meds. At this time, patient is currently ambulating, voiding, tolerating a regular diet. Pain well controlled on PO pain meds. Patient has been deemed stable for discharge, per Dr. Nichols, to home with follow up as an outpatient. 73 year old female with PMH asthma, T2DM on insulin pump, GERD, HTN, HLD, OA, obesity who presented to [facility] on 23 with moderately differentiated adenocarcinoma of cecal mass, scheduled for surgery. Patient taken to the OR by Dr. Nichols and underwent robotic partial colectomy. Patient tolerated operation well and there were no post-operative complications identified. Patient remained hemodynamically stable in the PACU and transferred to the surgical floor.   Endocrinology was consulted for management of insulin pump; recommended lantus 42 units bedtime with admelog 5 units TID with meals. Insulin pump to be resumed at time of hospital discharge.    POD1 Patient tachycardic to low 100s. Allowed sips of clears. H/H downtrended from 9.6/30.3 day prior to 7.8/.31. Repeat CBC in PM 7.3/23.4.   POD2 Denise was removed and patient passed TOV. H/H noted to downtrend to 6.3/19.6. Transfused 2uPRBC. Patient remained on sips of clears.   POD 3 H/H stable post-transfusion at 10.5/31.3. Diet advanced to clear liquids.    POD4 Diet advanced to full liquids. PO intake limited 2/2 nausea.   POD5 Diet backed down to sips 2/2 episode of emesis. XR without evidence of gastric bubble or ileus. Patient had small bowel movement.   POD6 Remains on sips.   POD7 Patient unable to tolerate CLD, with episodes of emesis. CT abd/pelvis showed: Dilated loops of small bowel the level of the terminal ileum, likely ileus in the postoperative setting. Postoperative pneumoperitoneum and diffuse subcutaneous emphysema. NGT placed with immediate return of copious amounts of bilious fluid.    POD8 IR was consulted for PICC placement. TPN was initiated.   - Patient remained without bowel function. CT abdomen/pelvis showed: Interval increase in size of fluid anterior to the ileocolic anastomosis with new peripheral enhancement, probably early abscess formation. Interval decrease in small bowel dilatation, probably ileus.   GG challenge was done that showed: Gastrografin challenge with contrast remaining in the stomach more consistent with ileus than obstruction. IR was consulted for possible drainage of abscess seen on CT.    Hypaque X-ray was done that showed: No anastomotic leak. Right hemicolectomy. The colon is filled from the rectum to ileocolic anastomosis in the left middle abdomen. No anastomotic leak. There is good contrast opacification of multiple loops of small bowel. Few scattered sigmoid diverticula. Following drainage of contrast from rectal tube, a post abdominal  radiograph demonstrates a moderate residual amount of contrast within the large and small bowel.    Patient on Reglan and erythromycin for motility. Patient went to IR and is s/p  anterior abdominal drainage of collection with dark red fluid drained. Patient tolerated procedure well and transferred back to floor.    Patient with flatus and multiple BM; NGT removed. Patient advanced to CLD which she tolerated.   Patient advanced to LRD. IR consulted for tube check. TPN was halved. CT abdomen repeated for tube check which showed the followin.  Interval placement of drainage catheter in the perianastomotic collection with marked decrease in size. No extravasation of contrast at the anastomosis.  2.  Likely ileus with enteric contrast noted down to the rectum.   Patient tolerating LRD. TPN discontinued.    IR drain removed. PICC removed.   Patient seen by physical therapy throughout hospital stay who recommended patient be discharged to home with home PT and walker.   Pain control was transitioned from IV to PO pain meds. At this time, patient is currently ambulating, voiding, tolerating a regular diet. Pain well controlled on PO pain meds. Patient has been deemed stable for discharge, per Dr. Nichols, to home with follow up as an outpatient. 73 year old female with PMH asthma, T2DM on insulin pump, GERD, HTN, HLD, OA, obesity who presented to [facility] on 23 with moderately differentiated adenocarcinoma of cecal mass, scheduled for surgery. Patient taken to the OR by Dr. Nichols and underwent robotic partial colectomy. Patient tolerated operation well and there were no post-operative complications identified. Patient remained hemodynamically stable in the PACU and transferred to the surgical floor.   Endocrinology was consulted for management of insulin pump; recommended lantus 42 units bedtime with admelog 5 units TID with meals. Insulin pump to be resumed at time of hospital discharge.    POD1 Patient tachycardic to low 100s. Allowed sips of clears. H/H downtrended from 9.6/30.3 day prior to 7.8/.31. Repeat CBC in PM 7.3/23.4.   POD2 Denise was removed and patient passed TOV. H/H noted to downtrend to 6.3/19.6. Transfused 2uPRBC. Patient remained on sips of clears.   POD 3 H/H stable post-transfusion at 10.5/31.3. Diet advanced to clear liquids.    POD4 Diet advanced to full liquids. PO intake limited 2/2 nausea.   POD5 Diet backed down to sips 2/2 episode of emesis. XR without evidence of gastric bubble or ileus. Patient had small bowel movement.   POD6 Remains on sips.   POD7 Patient unable to tolerate CLD, with episodes of emesis. CT abd/pelvis showed: Dilated loops of small bowel the level of the terminal ileum, likely ileus in the postoperative setting. Postoperative pneumoperitoneum and diffuse subcutaneous emphysema. NGT placed with immediate return of copious amounts of bilious fluid.    POD8 IR was consulted for PICC placement. TPN was initiated.   - Patient remained without bowel function. CT abdomen/pelvis showed: Interval increase in size of fluid anterior to the ileocolic anastomosis with new peripheral enhancement, probably early abscess formation. Interval decrease in small bowel dilatation, probably ileus.   GG challenge was done that showed: Gastrografin challenge with contrast remaining in the stomach more consistent with ileus than obstruction. IR was consulted for possible drainage of abscess seen on CT.    Hypaque X-ray was done that showed: No anastomotic leak. Right hemicolectomy. The colon is filled from the rectum to ileocolic anastomosis in the left middle abdomen. No anastomotic leak. There is good contrast opacification of multiple loops of small bowel. Few scattered sigmoid diverticula. Following drainage of contrast from rectal tube, a post abdominal  radiograph demonstrates a moderate residual amount of contrast within the large and small bowel.    Patient on Reglan and erythromycin for motility. Patient went to IR and is s/p  anterior abdominal drainage of collection with dark red fluid drained. Patient tolerated procedure well and transferred back to floor.      Patient with flatus and multiple BM; NGT removed. Patient advanced to CLD which she tolerated.   Patient advanced to LRD. IR consulted for tube check. TPN was halved. CT abdomen repeated for tube check which showed the followin.  Interval placement of drainage catheter in the perianastomotic collection with marked decrease in size. No extravasation of contrast at the anastomosis.  2.  Likely ileus with enteric contrast noted down to the rectum.   Patient tolerating LRD. TPN discontinued.    IR drain removed. PICC removed.   Patient seen by physical therapy throughout hospital stay who recommended patient be discharged to home with home PT and walker.   Pain control was transitioned from IV to PO pain meds. At this time, patient is currently ambulating, voiding, tolerating a regular diet. Pain well controlled on PO pain meds. Patient has been deemed stable for discharge, per Dr. Nichols, to home with follow up as an outpatient. 73 year old female with PMH asthma, T2DM on insulin pump, GERD, HTN, HLD, OA, obesity who presented to [facility] on 23 with moderately differentiated adenocarcinoma of cecal mass, scheduled for surgery. Patient taken to the OR by Dr. Nichols and underwent robotic partial colectomy. Patient tolerated operation well and there were no post-operative complications identified. Patient remained hemodynamically stable in the PACU and transferred to the surgical floor.   Endocrinology was consulted for management of insulin pump; recommended lantus 42 units bedtime with admelog 5 units TID with meals. Insulin pump to be resumed at time of hospital discharge.    POD1 Patient tachycardic to low 100s. Allowed sips of clears. H/H downtrended from 9.6/30.3 day prior to 7.8/.31. Repeat CBC in PM 7.3/23.4.   POD2 Denise was removed and patient passed TOV. H/H noted to downtrend to 6.3/19.6. Transfused 2uPRBC. Patient remained on sips of clears.   POD 3 H/H stable post-transfusion at 10.5/31.3. Diet advanced to clear liquids.    POD4 Diet advanced to full liquids. PO intake limited 2/2 nausea.   POD5 Diet backed down to sips 2/2 episode of emesis. XR without evidence of gastric bubble or ileus. Patient had small bowel movement.   POD6 Remains on sips.   POD7 Patient unable to tolerate CLD, with episodes of emesis. CT abd/pelvis showed: Dilated loops of small bowel the level of the terminal ileum, likely ileus in the postoperative setting. Postoperative pneumoperitoneum and diffuse subcutaneous emphysema. NGT placed with immediate return of copious amounts of bilious fluid.    POD8 IR was consulted for PICC placement. TPN was initiated.   - Patient remained without bowel function. CT abdomen/pelvis showed: Interval increase in size of fluid anterior to the ileocolic anastomosis with new peripheral enhancement, probably early abscess formation. Interval decrease in small bowel dilatation, probably ileus.   GG challenge was done that showed: Gastrografin challenge with contrast remaining in the stomach more consistent with ileus than obstruction. IR was consulted for possible drainage of abscess seen on CT.    Hypaque X-ray was done that showed: No anastomotic leak. Right hemicolectomy. The colon is filled from the rectum to ileocolic anastomosis in the left middle abdomen. No anastomotic leak. There is good contrast opacification of multiple loops of small bowel. Few scattered sigmoid diverticula. Following drainage of contrast from rectal tube, a post abdominal  radiograph demonstrates a moderate residual amount of contrast within the large and small bowel.    Patient on Reglan and erythromycin for motility. Patient went to IR and is s/p  anterior abdominal drainage of collection with dark red fluid drained. Patient tolerated procedure well and transferred back to floor.      Patient with flatus and multiple BM; NGT removed. Patient advanced to CLD which she tolerated.   Patient advanced to LRD. IR consulted for tube check. TPN was halved. CT abdomen repeated for tube check which showed the followin.  Interval placement of drainage catheter in the perianastomotic collection with marked decrease in size. No extravasation of contrast at the anastomosis.  2.  Likely ileus with enteric contrast noted down to the rectum.   Patient tolerating LRD. TPN discontinued.    IR drain removed. PICC removed.   Patient seen by physical therapy throughout hospital stay who recommended patient be discharged to home with home PT and walker.   Pain control was transitioned from IV to PO pain meds. At this time, patient is currently ambulating, voiding, tolerating a regular diet. Pain well controlled on PO pain meds. Patient has been deemed stable for discharge, per Dr. Nichols, to home with follow up as an outpatient. 73 year old female with PMH asthma, T2DM on insulin pump, GERD, HTN, HLD, OA, obesity who presented to [facility] on 23 with moderately differentiated adenocarcinoma of cecal mass, scheduled for surgery. Patient taken to the OR by Dr. Nichols and underwent robotic partial colectomy. Patient tolerated operation well and there were no post-operative complications identified. Patient remained hemodynamically stable in the PACU and transferred to the surgical floor.   Endocrinology was consulted for management of insulin pump; recommended lantus 42 units bedtime with admelog 5 units TID with meals. Insulin pump to be resumed at time of hospital discharge.    POD1 Patient tachycardic to low 100s. Allowed sips of clears. H/H downtrended from 9.6/30.3 day prior to 7.8/.31. Repeat CBC in PM 7.3/23.4.   POD2 Denise was removed and patient passed TOV. H/H noted to downtrend to 6.3/19.6. Transfused 2uPRBC. Patient remained on sips of clears.   POD 3 H/H stable post-transfusion at 10.5/31.3. Diet advanced to clear liquids.    POD4 Diet advanced to full liquids. PO intake limited 2/2 nausea.   POD5 Diet backed down to sips 2/2 episode of emesis. XR without evidence of gastric bubble or ileus. Patient had small bowel movement.   POD6 Remains on sips.   POD7 Patient unable to tolerate CLD, with episodes of emesis. CT abd/pelvis showed: Dilated loops of small bowel the level of the terminal ileum, likely ileus in the postoperative setting. Postoperative pneumoperitoneum and diffuse subcutaneous emphysema. NGT placed with immediate return of copious amounts of bilious fluid.    POD8 IR was consulted for PICC placement. TPN was initiated.   - Patient remained without bowel function. CT abdomen/pelvis showed: Interval increase in size of fluid anterior to the ileocolic anastomosis with new peripheral enhancement, probably early abscess formation. Interval decrease in small bowel dilatation, probably ileus.   GG challenge was done that showed: Gastrografin challenge with contrast remaining in the stomach more consistent with ileus than obstruction. IR was consulted for possible drainage of abscess seen on CT.    Hypaque X-ray was done that showed: No anastomotic leak. Right hemicolectomy. The colon is filled from the rectum to ileocolic anastomosis in the left middle abdomen. No anastomotic leak. There is good contrast opacification of multiple loops of small bowel. Few scattered sigmoid diverticula. Following drainage of contrast from rectal tube, a post abdominal  radiograph demonstrates a moderate residual amount of contrast within the large and small bowel.    Patient on Reglan and erythromycin for motility. Patient went to IR and is s/p  anterior abdominal drainage of collection with dark red fluid drained. Patient tolerated procedure well and transferred back to floor.      Patient with flatus and multiple BM; NGT removed. Patient advanced to CLD which she tolerated.   Patient advanced to LRD. IR consulted for tube check. TPN was halved. CT abdomen repeated for tube check which showed the followin.  Interval placement of drainage catheter in the perianastomotic collection with marked decrease in size. No extravasation of contrast at the anastomosis.  2.  Likely ileus with enteric contrast noted down to the rectum.   Patient tolerating LRD. TPN discontinued.    IR drain removed. PICC removed.   Patient seen by physical therapy throughout hospital stay who recommended patient be discharged to home with home PT and walker.   Pain control was transitioned from IV to PO pain meds. At this time, patient is currently ambulating, voiding, tolerating a regular diet. Pain well controlled on PO pain meds. Patient has been deemed stable for discharge, per Dr. Nichols, to home with follow up as an outpatient.

## 2023-11-22 NOTE — CONSULT NOTE ADULT - ASSESSMENT
This is a 72 yo F /w a PMH of DM2 on an insulin pump and colorectal cancer s/p right hemicolectomy today. Endocrinology consulted for diabetes management and DM2. Based on current insulin pump settings, suspect patient is being over-basalized given high basal rates compared to the insulin:carb ratio    #DM2 A1c 6.8%  #insulin pump - would hold of on using the insulin pump at this time given history of possibly frequent overnight hypoglycemia  -can start moderate admelog correction scale every 6 hours while NPO  -once eating can start lantus 42 units nightly tonight  -can start admelog 5 units with meals  -low admelog correction scales before meals and before bedtime  -FSG before meals and before bedtime  -Carb consistent diet once able to tolerate  -hypoglycemia protocol in place if needed    #Discharge  -follow up with Dr. Anand  -can resume insulin pump on discharge, likely decrease some basal settings overnight    #HTN  -BP target <130/80  -once stable post-op, consider resuming losartan 25mg daily and hctz 12.5mg daily    #HLD  -can resume pravastatin 40mg daily on discharge  -can repeat lipid profile as outpatient    Case discussed with Dr. Alea Hooks MD  Endocrine Fellow  Can be reached via teams. For follow up questions, discharge recommendations, or new consults, please call answering service at [phone] (weekdays); [phone] (nights/weekends) This is a 74 yo F /w a PMH of DM2 on an insulin pump and colorectal cancer s/p right hemicolectomy today. Endocrinology consulted for diabetes management and DM2. Based on current insulin pump settings, suspect patient is being over-basalized given high basal rates compared to the insulin:carb ratio    #DM2 A1c 6.8%  #insulin pump - would hold of on using the insulin pump at this time given history of possibly frequent overnight hypoglycemia  -can start moderate admelog correction scale every 6 hours while NPO  -once eating can start lantus 20 units nightly tonight  --moderate admelog correction scales before meals   -low admelog scales before bedtime  -FSG before meals and before bedtime  -Carb consistent diet once able to tolerate  -hypoglycemia protocol in place if needed    #Discharge  -follow up with Dr. Anand  -can resume insulin pump on discharge, likely decrease some basal settings overnight    #HTN  -BP target <130/80  -once stable post-op, consider resuming losartan 25mg daily and hctz 12.5mg daily    #HLD  -can resume pravastatin 40mg daily on discharge  -can repeat lipid profile as outpatient    Case discussed with Dr. Alea Hooks MD  Endocrine Fellow  Can be reached via teams. For follow up questions, discharge recommendations, or new consults, please call answering service at [phone] (weekdays); [phone] (nights/weekends) This is a 74 yo F /w a PMH of DM2 on an insulin pump and colorectal cancer s/p right hemicolectomy today. Endocrinology consulted for diabetes management and DM2. Based on current insulin pump settings, suspect patient is being over-basalized given high basal rates compared to the insulin:carb ratio    #DM2 A1c 6.8%  #insulin pump - would hold of on using the insulin pump at this time given history of possibly frequent overnight hypoglycemia  -can start low dose admelog correction scale every 6 hours while NPO  -once eating can start lantus 20 units nightly tonight  --moderate admelog correction scales before meals if on clears (diabetic clears)  -low admelog scales before bedtime  -FSG before meals and before bedtime  -Carb consistent diet once able to tolerate  -hypoglycemia protocol in place if needed    #Discharge  -follow up with Dr. Anand  -basal bolus vs resume insulin pump on discharge, likely decrease some basal settings overnight    #HTN  -BP target <130/80  -once stable post-op, consider resuming losartan 25mg daily and hctz 12.5mg daily- management as per primary team  outpt mc/cr ratio     #HLD  -can resume pravastatin 40mg daily on discharge if no contraindication  -can repeat lipid profile as outpatient    Case discussed with Dr. Alea Hooks MD  Endocrine Fellow  Can be reached via teams. For follow up questions, discharge recommendations, or new consults, please call answering service at [phone] (weekdays); [phone] (nights/weekends)

## 2023-11-22 NOTE — BRIEF OPERATIVE NOTE - COMMENTS
I, Mercedez Young PA-C, served as the first assistant in this operation. I assisted in placing ports, docking, and targeting the da Eriberto robot, first assisted at the surgical field while the surgeon was performing the operation at the robotic console by providing instrument exchanges, tissue retraction, suction and irrigation, specimen retrieval, passing and removing sutures and sponges, undocking the robotic platform, and closed surgical wounds.

## 2023-11-22 NOTE — CONSULT NOTE ADULT - SUBJECTIVE AND OBJECTIVE BOX
HPI:  73 yr old female underwent screening colonoscopy 10/24/2023 demonstrated a 3 cm non-obstructing, partially circumferential mass in the cecum and a 5 mm polyp in the ascending colon, Pathology: cecal mass showed moderately differentiated adenocarcinoma, with extensive high grade dysplasia. Scheduled for robotic partial colectomy, possible open, denies bowels changes, abdominal pain or weight loss  (16 Nov 2023 13:15)      Consulted for: DM2 and insulin pump  This is a 74 yo F /w a PMH of DM2 on an insulin pump and colorectal cancer s/p right hemicolectomy today. Endocrinology consulted for diabetes management and DM2.        Patient is very sleep and not able to provide additional history  She was seen preop by Emilia Benavidez, our excellent RN and diabetes educator who noted that patient has a lot of hypoglycemia at home on current basal insulin pump settings  Also noted that pump was removed last night at 10:30PM and sugars remained stable fasting at 131 this morning  A1c 6.8%  Follows with Dr. Santacruz  Patient uses the medtronic insulin pump and Libre2  Pump settings are as follows:  TDD 52.425  12AM 1.8 units per hour  6:30AM 2 units per hour  12:30PM 2.45 units per hour  6:30PM 2.55 units per hour  8PM 2.55 units per hour    ICR:  12AM 1:15  7AM 1:12  12:30PM 1:15    ISF 1:30    Of note patient received a total of 10 units IVP regular insulin during surgery, last dose at 1:30PM    PAST MEDICAL & SURGICAL HISTORY:  History of hypertension      Diabetes  wears insulin pump      GERD (gastroesophageal reflux disease)      Uterine leiomyoma      Hyperlipidemia      Asthma      Obesity      Lymphadenopathy  left lower extremitiy ; 1966 - current      H/O insertion of insulin pump      OA (osteoarthritis)      Malignant neoplasm of cecum      H/O bilateral breast reduction surgery      History of appendectomy      History of cholecystectomy      H/O: hysterectomy      History of total right knee replacement  3/2016 - [facility]      H/O total knee replacement, left      S/P bunionectomy          FAMILY HISTORY:  Diabetes mellitus (Father)    FH: breast cancer (Aunt)        Social History: unable to obtain    Home Medications:  Albuterol (Eqv-ProAir HFA) 90 mcg/inh inhalation aerosol: 2 puff(s) inhaled 2 times a day (22 Nov 2023 06:44)  aspirin 81 mg oral tablet: 1 tab(s) orally once a day (22 Nov 2023 06:44)  hydroCHLOROthiazide 12.5 mg oral tablet: 1 tab(s) orally once a day (22 Nov 2023 06:44)  Insulin pump novolog:  (22 Nov 2023 06:44)  losartan 25 mg oral tablet: 1 tab(s) orally once a day (22 Nov 2023 06:44)  omeprazole 20 mg oral delayed release capsule: 1 cap(s) orally once a day (22 Nov 2023 06:44)  pravastatin 40 mg oral tablet: 1 tab(s) orally once a day (22 Nov 2023 06:44)  Symbicort 160 mcg-4.5 mcg/inh inhalation aerosol: 2 puff(s) inhaled 2 times a day (22 Nov 2023 06:44)  Tylenol 500 mg oral tablet: 2 tab(s) orally 2 times a day (22 Nov 2023 06:44)      MEDICATIONS  (STANDING):  acetaminophen   IVPB .. 1000 milliGRAM(s) IV Intermittent every 6 hours  chlorhexidine 2% Cloths 1 Application(s) Topical daily  dextrose 50% Injectable 12.5 Gram(s) IV Push once  dextrose 50% Injectable 25 Gram(s) IV Push once  dextrose 50% Injectable 25 Gram(s) IV Push once  dextrose Oral Gel 15 Gram(s) Oral once  enoxaparin Injectable 40 milliGRAM(s) SubCutaneous every 24 hours  glucagon  Injectable 1 milliGRAM(s) IntraMuscular once  lactated ringers. 1000 milliLiter(s) (30 mL/Hr) IV Continuous <Continuous>    MEDICATIONS  (PRN):  oxyCODONE    IR 5 milliGRAM(s) Oral every 4 hours PRN Severe Pain (7 - 10)  oxyCODONE    IR 2.5 milliGRAM(s) Oral every 4 hours PRN Moderate Pain (4 - 6)      Allergies    Darvocet A500 (Other; Urticaria)  Percocet 10/325 (Other; Urticaria)  codeine (Other)  PURELL.  pt said, &quot;I felt my airway closing&quot; after she smelled the Purell. The incident occured at the pulmonologist office. (Other; Short breath)    Intolerances      Review of Systems:  Constitutional: No fever  Eyes: No blurry vision  Neuro: No tremors  HEENT: No pain  Cardiovascular: No chest pain, palpitations  Respiratory: No SOB, no cough  GI: No nausea, vomiting, abdominal pain  : No dysuria  Skin: no rash  Psych: no depression  Endocrine: no polyuria, polydipsia  Hem/lymph: no swelling  Osteoporosis: no fractures    PHYSICAL EXAM:  VITALS: T(C): 36.4 (11-22-23 @ 13:30)  T(F): 97.5 (11-22-23 @ 13:30), Max: 98.8 (11-22-23 @ 06:23)  HR: 84 (11-22-23 @ 14:15) (84 - 111)  BP: 126/75 (11-22-23 @ 14:15) (118/64 - 129/66)  RR:  (12 - 16)  SpO2:  (99% - 100%)  Wt(kg): --  GENERAL: NAD  EYES: No proptosis, no lid lag, anicteric  THYROID: Normal size, no palpable nodules  RESPIRATORY: Clear to auscultation bilaterally  CARDIOVASCULAR: Regular rate and rhythm  GI: Soft, nontender, non distended  EXT: b/l feet without wounds; 2+ pulses  PSYCH: Alert and oriented x 3, reactive mood    POCT Blood Glucose.: 173 mg/dL (11-22-23 @ 13:37)          11-22    140  |  106  |  6<L>  ----------------------------<  185<H>  5.3   |  26  |  0.58    eGFR: 96    Ca    8.6      11-22  Mg     1.80     11-22    TPro  6.5  /  Alb  3.2<L>  /  TBili  0.5  /  DBili  x   /  AST  24  /  ALT  20  /  AlkPhos  90  11-22      Thyroid Function Tests:      A1C with Estimated Average Glucose Result: 6.8 % (11-16-23 @ 13:15)          Radiology:

## 2023-11-22 NOTE — ASU PREOP CHECKLIST - PATIENT'S PERSONAL PROPERTY REMOVED
normal... Well appearing, awake, alert, oriented to person, place, time/situation and in no apparent distress. dentures asu 13/dentures pacu 13/dentures

## 2023-11-22 NOTE — ASU PREOP CHECKLIST - 4.
deactivated insulin pump at 10pm 11/21 deactivated insulin pump at 10pm 11/21/23 and took off insulin pump at 1am 11/22/23

## 2023-11-22 NOTE — DISCHARGE NOTE PROVIDER - NSDCCPCAREPLAN_GEN_ALL_CORE_FT
PRINCIPAL DISCHARGE DIAGNOSIS  Diagnosis: Malignant neoplasm of cecum  Assessment and Plan of Treatment:      PRINCIPAL DISCHARGE DIAGNOSIS  Diagnosis: Malignant neoplasm of cecum  Assessment and Plan of Treatment: WOUND CARE:  Please keep incisions clean and dry. Please do not Scrub or rub incisions. Do not use lotion or powder on incisions.   BATHING: You may shower and/or sponge bathe. You may use warm soapy water in the shower and rinse, pat dry.  ACTIVITY: No heavy lifting or straining. Otherwise, you may return to your usual level of physical activity. If you are taking narcotic pain medication DO NOT drive a car, operate machinery or make important decisions.  PAIN: You may take over-the-counter medications for pain. You make take Tylenol orally every 6 hours as needed. Do not exceed 4,000mg a day. You may take ibuprofen every 6 hours. You may alternate between the two for better pain control (every 3 hours).   DIET: LOW FIBER DIET. See additional instructions for examples of this diet.  NOTIFY YOUR SURGEON IF YOU HAVE: any bleeding that does not stop, any pus draining from your wound(s), any fever (over 100.4 F) persistent nausea/vomiting, or if your pain is not controlled on your discharge pain medications, unable to urinate.  FOLLOW UP:  1. Please follow up with your primary care physician in one week regarding your hospitalization, bring copies of your discharge paperwork.  2. Please follow up with your surgeon, Dr. Nichols. Call [phone] to make an appointment.     PRINCIPAL DISCHARGE DIAGNOSIS  Diagnosis: Malignant neoplasm of cecum  Assessment and Plan of Treatment: WOUND CARE:  Please keep incisions clean and dry. Please do not Scrub or rub incisions. Do not use lotion or powder on incisions.   BATHING: You may shower and/or sponge bathe. You may use warm soapy water in the shower and rinse, pat dry.  ACTIVITY: No heavy lifting or straining. Otherwise, you may return to your usual level of physical activity. If you are taking narcotic pain medication DO NOT drive a car, operate machinery or make important decisions.  PAIN: You may take over-the-counter medications for pain. You make take Tylenol orally every 6 hours as needed. Do not exceed 4,000mg a day. You may take ibuprofen every 6 hours. You may alternate between the two for better pain control (every 3 hours).   DIET: LOW FIBER DIET. See additional instructions for examples of this diet.  NOTIFY YOUR SURGEON IF YOU HAVE: any bleeding that does not stop, any pus draining from your wound(s), any fever (over 100.4 F) persistent nausea/vomiting, or if your pain is not controlled on your discharge pain medications, unable to urinate.  FOLLOW UP:  1. Please follow up with your primary care physician in one week regarding your hospitalization, bring copies of your discharge paperwork.  2. Please follow up with your surgeon, Dr. Nichols. Call [phone] to make an appointment.     PRINCIPAL DISCHARGE DIAGNOSIS  Diagnosis: Malignant neoplasm of cecum  Assessment and Plan of Treatment: WOUND CARE:  Please keep incisions clean and dry. Please do not Scrub or rub incisions. Do not use lotion or powder on incisions.   BATHING: You may shower and/or sponge bathe. You may use warm soapy water in the shower and rinse, pat dry.  ACTIVITY: No heavy lifting or straining. Otherwise, you may return to your usual level of physical activity. If you are taking narcotic pain medication DO NOT drive a car, operate machinery or make important decisions.  PAIN: You may take over-the-counter medications for pain. You make take Tylenol orally every 6 hours as needed. Do not exceed 4,000mg a day. You may take ibuprofen every 6 hours. You may alternate between the two for better pain control (every 3 hours).   DIET: LOW FIBER DIET. See additional instructions for examples of this diet.  NOTIFY YOUR SURGEON IF YOU HAVE: any bleeding that does not stop, any pus draining from your wound(s), any fever (over 100.4 F) persistent nausea/vomiting, or if your pain is not controlled on your discharge pain medications, unable to urinate.  FOLLOW UP:  1. Please follow up with your primary care physician in one week regarding your hospitalization, bring copies of your discharge paperwork.  2. Please follow up with your surgeon, Dr. Nichols. Call [phone] to make an appointment.     PRINCIPAL DISCHARGE DIAGNOSIS  Diagnosis: Malignant neoplasm of cecum  Assessment and Plan of Treatment: WOUND CARE:  Dress incision and previous drain site with dry, folded 4x4 gauze and secure with paper tape. Change dressing daily or when soiled. When dressing remains dry, you may leave wound open to air without dressing. Please keep incisions clean and dry. Please do not Scrub or rub incisions. Do not use lotion or powder on incisions.   BATHING: You may shower and/or sponge bathe. You may use warm soapy water in the shower and rinse, pat dry.  ACTIVITY: No heavy lifting or straining. Otherwise, you may return to your usual level of physical activity. If you are taking narcotic pain medication DO NOT drive a car, operate machinery or make important decisions.  PAIN: You may take over-the-counter medications for pain. You make take Tylenol orally every 6 hours as needed. Do not exceed 4,000mg a day. You may take ibuprofen every 6 hours. You may alternate between the two for better pain control (every 3 hours).   DIET: LOW FIBER DIET. See additional instructions for examples of this diet.  NOTIFY YOUR SURGEON IF YOU HAVE: any bleeding that does not stop, any pus draining from your wound(s), any fever (over 100.4 F) persistent nausea/vomiting, or if your pain is not controlled on your discharge pain medications, unable to urinate.  FOLLOW UP:  1. Please follow up with your primary care physician in one week regarding your hospitalization, bring copies of your discharge paperwork.  2. Please follow up with your surgeon, Dr. Nichols. Call [phone] to make an appointment.     PRINCIPAL DISCHARGE DIAGNOSIS  Diagnosis: Malignant neoplasm of cecum  Assessment and Plan of Treatment: WOUND CARE:  Dress incision and previous drain site with dry, folded 4x4 gauze and secure with paper tape. Change dressing daily or when soiled. When dressing remains dry, you may leave wound open to air without dressing. Please keep incisions clean and dry. Please do not Scrub or rub incisions. Do not use lotion or powder on incisions.   BATHING: You may shower and/or sponge bathe. You may use warm soapy water in the shower and rinse, pat dry.  ACTIVITY: No heavy lifting or straining. Otherwise, you may return to your usual level of physical activity. If you are taking narcotic pain medication DO NOT drive a car, operate machinery or make important decisions.  PAIN: You may take over-the-counter medications for pain. You make take Tylenol orally every 6 hours as needed. Do not exceed 4,000mg a day. You may take ibuprofen every 6 hours. You may alternate between the two for better pain control (every 3 hours).   DIET: LOW FIBER DIET. See additional instructions for examples of this diet.  NOTIFY YOUR SURGEON IF YOU HAVE: any bleeding that does not stop, any pus draining from your wound(s), any fever (over 100.4 F) persistent nausea/vomiting, or if your pain is not controlled on your discharge pain medications, unable to urinate.  FOLLOW UP:  1. Please follow up with your primary care physician in one week regarding your hospitalization, bring copies of your discharge paperwork.  2. Please follow up with your surgeon, Dr. Nichols. Call [phone] to make an appointment.     PRINCIPAL DISCHARGE DIAGNOSIS  Diagnosis: Malignant neoplasm of cecum  Assessment and Plan of Treatment: WOUND CARE:  Dress incision and previous drain site with dry, folded 4x4 gauze and secure with paper tape. Change dressing daily or when soiled. When dressing remains dry, you may leave wound open to air without dressing. Please keep incisions clean and dry. Please do not Scrub or rub incisions. Do not use lotion or powder on incisions.   BATHING: You may shower and/or sponge bathe. You may use warm soapy water in the shower and rinse, pat dry.  ACTIVITY: No heavy lifting or straining. Otherwise, you may return to your usual level of physical activity. If you are taking narcotic pain medication DO NOT drive a car, operate machinery or make important decisions.  PAIN: You may take over-the-counter medications for pain. You make take Tylenol orally every 6 hours as needed. Do not exceed 4,000mg a day. You may take ibuprofen every 6 hours. You may alternate between the two for better pain control (every 3 hours).   DIET: LOW FIBER DIET. See additional instructions for examples of this diet.  NOTIFY YOUR SURGEON IF YOU HAVE: any bleeding that does not stop, any pus draining from your wound(s), any fever (over 100.4 F) persistent nausea/vomiting, or if your pain is not controlled on your discharge pain medications, unable to urinate.  FOLLOW UP:  1. Please follow up with your primary care physician in one week regarding your hospitalization, bring copies of your discharge paperwork.  2. Please follow up with your surgeon, Dr. Nichols. Call [phone] to make an appointment.     PRINCIPAL DISCHARGE DIAGNOSIS  Diagnosis: Malignant neoplasm of cecum  Assessment and Plan of Treatment: WOUND CARE:  Dress incision and previous drain site with dry, folded 4x4 gauze and secure with paper tape. Change dressing daily or when soiled. When dressing remains dry, you may leave wound open to air without dressing. Please keep incisions clean and dry. Please do not Scrub or rub incisions. Do not use lotion or powder on incisions.   BATHING: You may shower and/or sponge bathe. You may use warm soapy water in the shower and rinse, pat dry.  ACTIVITY: No heavy lifting or straining. Otherwise, you may return to your usual level of physical activity. If you are taking narcotic pain medication DO NOT drive a car, operate machinery or make important decisions.  PAIN: You may take over-the-counter medications for pain. You make take Tylenol orally every 6 hours as needed. Do not exceed 4,000mg a day. You may take ibuprofen every 6 hours. You may alternate between the two for better pain control (every 3 hours).   DIET: LOW FIBER DIET. See additional instructions for examples of this diet.  NOTIFY YOUR SURGEON IF YOU HAVE: any bleeding that does not stop, any pus draining from your wound(s), any fever (over 100.4 F) persistent nausea/vomiting, or if your pain is not controlled on your discharge pain medications, unable to urinate.  FOLLOW UP:  1. Please follow up with your primary care physician in one week regarding your hospitalization, bring copies of your discharge paperwork.  2. Please follow up with your surgeon, Dr. Nichols. Call [phone] to make an appointment.  Your losartan and HCTZ were not continued in the hospital because your bood pressure was in range, please check daily and follow up with your primary next week to resume. Take your blood pressure daily if greater than 130 restart your blood pressure medications      SECONDARY DISCHARGE DIAGNOSES  Diagnosis: Diabetes  Assessment and Plan of Treatment: As per endocrine at [facility]   -[facility] medtronic insulin pump (Novolog) at 8pm  on day of discharge (which would be 22 hours from last basal insulin dose given in the hospital); adjustments made   Pump settings are as follows:  TDD 9.6   12AM -12AM 0.4  ICR:12AM-12AM 1:15  ISF 1:30  Glucose Target: 12a- 5a 140-140  5a-10p 110  10p-12am 140  IOB: 4 hours   -Continue glucose monitoring with Free style bonnie   -Pt seen with Advanced Practice Nurse Consult Diabetes Specialist   -Please call your doctor if your sugar is 70 or below and 250 and above   -Please follow up with your [facility] care doctor, podiatry, opthalmology, and endocrinology   -Please follow up with Dr. Anand: advised to make an appointment prior to discharge        PRINCIPAL DISCHARGE DIAGNOSIS  Diagnosis: Malignant neoplasm of cecum  Assessment and Plan of Treatment: WOUND CARE:  Dress incision and previous drain site with dry, folded 4x4 gauze and secure with paper tape. Change dressing daily or when soiled. When dressing remains dry, you may leave wound open to air without dressing. Please keep incisions clean and dry. Please do not Scrub or rub incisions. Do not use lotion or powder on incisions.   BATHING: You may shower and/or sponge bathe. You may use warm soapy water in the shower and rinse, pat dry.  ACTIVITY: No heavy lifting or straining. Otherwise, you may return to your usual level of physical activity. If you are taking narcotic pain medication DO NOT drive a car, operate machinery or make important decisions.  PAIN: You may take over-the-counter medications for pain. You make take Tylenol orally every 6 hours as needed. Do not exceed 4,000mg a day. You may take ibuprofen every 6 hours. You may alternate between the two for better pain control (every 3 hours).   DIET: LOW FIBER DIET. See additional instructions for examples of this diet.  NOTIFY YOUR SURGEON IF YOU HAVE: any bleeding that does not stop, any pus draining from your wound(s), any fever (over 100.4 F) persistent nausea/vomiting, or if your pain is not controlled on your discharge pain medications, unable to urinate.  FOLLOW UP:  1. Please follow up with your primary care physician in one week regarding your hospitalization, bring copies of your discharge paperwork.  2. Please follow up with your surgeon, Dr. Nichols. Call [phone] to make an appointment.  Your losartan and HCTZ were not continued in the hospital because your bood pressure was in range, please check daily and follow up with your primary next week to resume. Take your blood pressure daily if greater than 130 restart your blood pressure medications      SECONDARY DISCHARGE DIAGNOSES  Diagnosis: Diabetes  Assessment and Plan of Treatment: As per endocrine at [facility]   -[facility] medtronic insulin pump (Novolog) at 8pm  on day of discharge (which would be 22 hours from last basal insulin dose given in the hospital); adjustments made   Pump settings are as follows:  TDD 9.6   12AM -12AM 0.4  ICR:12AM-12AM 1:15  ISF 1:30  Glucose Target: 12a- 5a 140-140  5a-10p 110  10p-12am 140  IOB: 4 hours   -Continue glucose monitoring with Free style bonnie   -Pt seen with Advanced Practice Nurse Consult Diabetes Specialist   -Please call your doctor if your sugar is 70 or below and 250 and above   -Please follow up with your [facility] care doctor, podiatry, opthalmology, and endocrinology   -Please follow up with Dr. Anand: advised to make an appointment prior to discharge        PRINCIPAL DISCHARGE DIAGNOSIS  Diagnosis: Malignant neoplasm of cecum  Assessment and Plan of Treatment: WOUND CARE:  Dress incision and previous drain site with dry, folded 4x4 gauze and secure with paper tape. Change dressing daily or when soiled. When dressing remains dry, you may leave wound open to air without dressing. Please keep incisions clean and dry. Please do not Scrub or rub incisions. Do not use lotion or powder on incisions.   BATHING: You may shower and/or sponge bathe. You may use warm soapy water in the shower and rinse, pat dry.  ACTIVITY: No heavy lifting or straining. Otherwise, you may return to your usual level of physical activity. If you are taking narcotic pain medication DO NOT drive a car, operate machinery or make important decisions.  PAIN: You may take over-the-counter medications for pain. You make take Tylenol orally every 6 hours as needed. Do not exceed 4,000mg a day. You may take ibuprofen every 6 hours. You may alternate between the two for better pain control (every 3 hours).   DIET: LOW FIBER DIET. See additional instructions for examples of this diet.  NOTIFY YOUR SURGEON IF YOU HAVE: any bleeding that does not stop, any pus draining from your wound(s), any fever (over 100.4 F) persistent nausea/vomiting, or if your pain is not controlled on your discharge pain medications, unable to urinate.  FOLLOW UP:  1. Please follow up with your primary care physician in one week regarding your hospitalization, bring copies of your discharge paperwork.  2. Please follow up with your surgeon, Dr. Nichols. Call [phone] to make an appointment.  Your losartan and HCTZ were not continued in the hospital because your bood pressure was in range, please check daily and follow up with your primary next week to resume. Take your blood pressure daily if greater than 130 restart your blood pressure medications      SECONDARY DISCHARGE DIAGNOSES  Diagnosis: Diabetes  Assessment and Plan of Treatment: As per endocrine at [facility]   -[facility] medtronic insulin pump (Novolog) at 8pm  on day of discharge (which would be 22 hours from last basal insulin dose given in the hospital); adjustments made   Pump settings are as follows:  TDD 9.6   12AM -12AM 0.4  ICR:12AM-12AM 1:15  ISF 1:30  Glucose Target: 12a- 5a 140-140  5a-10p 110  10p-12am 140  IOB: 4 hours   -Continue glucose monitoring with Free style bonnie   -Pt seen with Advanced Practice Nurse Consult Diabetes Specialist   -Please call your doctor if your sugar is 70 or below and 250 and above   -Please follow up with your [facility] care doctor, podiatry, opthalmology, and endocrinology   -Please follow up with Dr. Anand: advised to make an appointment prior to discharge        PRINCIPAL DISCHARGE DIAGNOSIS  Diagnosis: Malignant neoplasm of cecum  Assessment and Plan of Treatment: WOUND CARE:  Dress incision and previous drain site with dry, folded 4x4 gauze and secure with paper tape. Change dressing daily or when soiled. When dressing remains dry, you may leave wound open to air without dressing. Please keep incisions clean and dry. Please do not Scrub or rub incisions. Do not use lotion or powder on incisions.   BATHING: You may shower and/or sponge bathe. You may use warm soapy water in the shower and rinse, pat dry.  ACTIVITY: No heavy lifting or straining. Otherwise, you may return to your usual level of physical activity. If you are taking narcotic pain medication DO NOT drive a car, operate machinery or make important decisions.  PAIN: You may take over-the-counter medications for pain. You make take Tylenol orally every 6 hours as needed. Do not exceed 4,000mg a day. You may take ibuprofen every 6 hours. You may alternate between the two for better pain control (every 3 hours).   DIET: LOW FIBER DIET. See additional instructions for examples of this diet.  NOTIFY YOUR SURGEON IF YOU HAVE: any bleeding that does not stop, any pus draining from your wound(s), any fever (over 100.4 F) persistent nausea/vomiting, or if your pain is not controlled on your discharge pain medications, unable to urinate.  FOLLOW UP:  1. Please follow up with your primary care physician in one week regarding your hospitalization, bring copies of your discharge paperwork.  2. Please follow up with your surgeon, Dr. Nichols. Call [phone] to make an appointment.  Your losartan and HCTZ were not continued in the hospital because your bood pressure was in range, please check daily and follow up with your primary next week to resume. Take your blood pressure daily if greater than 130 restart your blood pressure medications      SECONDARY DISCHARGE DIAGNOSES  Diagnosis: Diabetes  Assessment and Plan of Treatment: As per endocrine at [facility]   -[facility] medtronic insulin pump (Novolog) at 8pm  on day of discharge (which would be 22 hours from last basal insulin dose given in the hospital); adjustments made   Pump settings are as follows:  TDD 9.6   12AM -12AM 0.4  ICR:12AM-12AM 1:15  ISF 1:30  Glucose Target: 12a- 5a 140-140  5a-10p 110  10p-12am 140  IOB: 4 hours   -Continue glucose monitoring with Free style bonnie   -Pt seen with Advanced Practice Nurse Consult Diabetes Specialist   -Please call your doctor if your sugar is 70 or below and 250 and above   -Please follow up with your [facility] care doctor, podiatry, opthalmology, and endocrinology   -Please follow up with Dr. Anand: advised to make an appointment prior to discharge   If your insulin pump fails call your primary care doctor or endocrinologist, remove your pump and start levemir nightly and admelog pen for sliding scale       PRINCIPAL DISCHARGE DIAGNOSIS  Diagnosis: Malignant neoplasm of cecum  Assessment and Plan of Treatment: WOUND CARE:  Dress incision and previous drain site with dry, folded 4x4 gauze and secure with paper tape. Change dressing daily or when soiled. When dressing remains dry, you may leave wound open to air without dressing. Please keep incisions clean and dry. Please do not Scrub or rub incisions. Do not use lotion or powder on incisions.   BATHING: You may shower and/or sponge bathe. You may use warm soapy water in the shower and rinse, pat dry.  ACTIVITY: No heavy lifting or straining. Otherwise, you may return to your usual level of physical activity. If you are taking narcotic pain medication DO NOT drive a car, operate machinery or make important decisions.  PAIN: You may take over-the-counter medications for pain. You make take Tylenol orally every 6 hours as needed. Do not exceed 4,000mg a day. You may take ibuprofen every 6 hours. You may alternate between the two for better pain control (every 3 hours).   DIET: LOW FIBER DIET. See additional instructions for examples of this diet.  NOTIFY YOUR SURGEON IF YOU HAVE: any bleeding that does not stop, any pus draining from your wound(s), any fever (over 100.4 F) persistent nausea/vomiting, or if your pain is not controlled on your discharge pain medications, unable to urinate.  FOLLOW UP:  1. Please follow up with your primary care physician in one week regarding your hospitalization, bring copies of your discharge paperwork.  2. Please follow up with your surgeon, Dr. Nichols. Call [phone] to make an appointment.  Your losartan and HCTZ were not continued in the hospital because your bood pressure was in range, please check daily and follow up with your primary next week to resume. Take your blood pressure daily if greater than 130 restart your blood pressure medications      SECONDARY DISCHARGE DIAGNOSES  Diagnosis: Diabetes  Assessment and Plan of Treatment: As per endocrine at [facility]   -[facility] medtronic insulin pump (Novolog) at 8pm  on day of discharge (which would be 22 hours from last basal insulin dose given in the hospital); adjustments made   Pump settings are as follows:  TDD 9.6   12AM -12AM 0.4  ICR:12AM-12AM 1:15  ISF 1:30  Glucose Target: 12a- 5a 140-140  5a-10p 110  10p-12am 140  IOB: 4 hours   -Continue glucose monitoring with Free style bonnie   -Pt seen with Advanced Practice Nurse Consult Diabetes Specialist   -Please call your doctor if your sugar is 70 or below and 250 and above   -Please follow up with your [facility] care doctor, podiatry, opthalmology, and endocrinology   -Please follow up with Dr. Anand: advised to make an appointment prior to discharge   If your insulin pump fails call your primary care doctor or endocrinologist, remove your pump and start levemir nightly and admelog pen for sliding scale       PRINCIPAL DISCHARGE DIAGNOSIS  Diagnosis: Malignant neoplasm of cecum  Assessment and Plan of Treatment: WOUND CARE:  Dress incision and previous drain site with dry, folded 4x4 gauze and secure with paper tape. Change dressing daily or when soiled. When dressing remains dry, you may leave wound open to air without dressing. Please keep incisions clean and dry. Please do not Scrub or rub incisions. Do not use lotion or powder on incisions.   BATHING: You may shower and/or sponge bathe. You may use warm soapy water in the shower and rinse, pat dry.  ACTIVITY: No heavy lifting or straining. Otherwise, you may return to your usual level of physical activity. If you are taking narcotic pain medication DO NOT drive a car, operate machinery or make important decisions.  PAIN: You may take over-the-counter medications for pain. You make take Tylenol orally every 6 hours as needed. Do not exceed 4,000mg a day. You may take ibuprofen every 6 hours. You may alternate between the two for better pain control (every 3 hours).   DIET: LOW FIBER DIET. See additional instructions for examples of this diet.  NOTIFY YOUR SURGEON IF YOU HAVE: any bleeding that does not stop, any pus draining from your wound(s), any fever (over 100.4 F) persistent nausea/vomiting, or if your pain is not controlled on your discharge pain medications, unable to urinate.  FOLLOW UP:  1. Please follow up with your primary care physician in one week regarding your hospitalization, bring copies of your discharge paperwork.  2. Please follow up with your surgeon, Dr. Nichols. Call [phone] to make an appointment.  Your losartan and HCTZ were not continued in the hospital because your bood pressure was in range, please check daily and follow up with your primary next week to resume. Take your blood pressure daily if greater than 130 restart your blood pressure medications      SECONDARY DISCHARGE DIAGNOSES  Diagnosis: Diabetes  Assessment and Plan of Treatment: As per endocrine at [facility]   -[facility] medtronic insulin pump (Novolog) at 8pm  on day of discharge (which would be 22 hours from last basal insulin dose given in the hospital); adjustments made   Pump settings are as follows:  TDD 9.6   12AM -12AM 0.4  ICR:12AM-12AM 1:15  ISF 1:30  Glucose Target: 12a- 5a 140-140  5a-10p 110  10p-12am 140  IOB: 4 hours   -Continue glucose monitoring with Free style bonnie   -Pt seen with Advanced Practice Nurse Consult Diabetes Specialist   -Please call your doctor if your sugar is 70 or below and 250 and above   -Please follow up with your [facility] care doctor, podiatry, opthalmology, and endocrinology   -Please follow up with Dr. Anand: advised to make an appointment prior to discharge   If your insulin pump fails call your primary care doctor or endocrinologist, remove your pump and start levemir nightly and admelog pen for sliding scale

## 2023-11-22 NOTE — DISCHARGE NOTE PROVIDER - CARE PROVIDER_API CALL
Adam Nichols-Yeou  Surgery  [address] [place] [id number]  Phone: [phone]  Fax: [phone]  Follow Up Time: 1 week   Adam Nichols-Yeou  Surgery  [address] [place] [id number]  Phone: [phone]  Fax: [phone]  Follow Up Time: 1 week   Adam Nichols-Yeou  Surgery  [address] [place] [id number]  Phone: [phone]  Fax: [phone]  Follow Up Time: 1 week

## 2023-11-22 NOTE — DISCHARGE NOTE PROVIDER - NSDCFUADDINST_GEN_ALL_CORE_FT
Low fiber diet: Fiber is part of fruits, vegetables, and grains not digested by your body. A low-fiber diet restricts these foods. The goal of this diet is to have fewer, smaller bowel movements.   Avoid these foods:    Nuts, seeds, dried fruit and coconut  Whole grains, popcorn, wheat germ and bran  Brown rice, wild rice, oatmeal, granola, shredded wheat, quinoa, bulgur and barley  Dried beans, baked beans, lima beans, peas and lentils  [place] peanut butter  Fruits and vegetables except those noted below    Choose these foods:    Tender meat, fish and poultry, ham, sterling, shellfish, and lunch meat  Eggs, tofu and creamy peanut butter  Dairy products if tolerated  White rice and pasta  Baked goods made with refined wheat or rye flour, such as bread, biscuits, pancakes, waffles, bagels, saltines and rj crackers  Hot and cold cereals that have less than 2 grams of dietary fiber in a single serving, such as those made from rice  Canned or well-cooked potatoes, carrots and green beans  Plain tomato sauce  Vegetable and fruit juices  Bananas, melons, applesauce and canned peaches (no skin)  Butter, margarine, oils and salad dressings without seeds  Low fiber diet: Fiber is part of fruits, vegetables, and grains not digested by your body. A low-fiber diet restricts these foods. The goal of this diet is to have fewer, smaller bowel movements.   Avoid these foods:    Nuts, seeds, dried fruit and coconut  Whole grains, popcorn, wheat germ and bran  Brown rice, wild rice, oatmeal, granola, shredded wheat, quinoa, bulgur and barley  Dried beans, baked beans, lima beans, peas and lentils  [place] peanut butter  Fruits and vegetables except those noted below    Choose these foods:    Tender meat, fish and poultry, ham, sterling, shellfish, and lunch meat  Eggs, tofu and creamy peanut butter  Dairy products if tolerated  White rice and pasta  Baked goods made with refined wheat or rye flour, such as bread, biscuits, pancakes, waffles, bagels, saltines and rj crackers  Hot and cold cereals that have less than 2 grams of dietary fiber in a single serving, such as those made from rice  Canned or well-cooked potatoes, carrots and green beans  Plain tomato sauce  Vegetable and fruit juices  Bananas, melons, applesauce and canned peaches (no skin)  Butter, margarine, oils and salad dressings without seeds  Low fiber diet: Fiber is part of fruits, vegetables, and grains not digested by your body. A low-fiber diet restricts these foods. The goal of this diet is to have fewer, smaller bowel movements.   Avoid these foods:    Nuts, seeds, dried fruit and coconut  Whole grains, popcorn, wheat germ and bran  Brown rice, wild rice, oatmeal, granola, shredded wheat, quinoa, bulgur and barley  Dried beans, baked beans, lima beans, peas and lentils  [place] peanut butter  Fruits and vegetables except those noted below    Choose these foods:    Tender meat, fish and poultry, ham, sterling, shellfish, and lunch meat  Eggs, tofu and creamy peanut butter  Dairy products if tolerated  White rice and pasta  Baked goods made with refined wheat or rye flour, such as bread, biscuits, pancakes, waffles, bagels, saltines and rj crackers  Hot and cold cereals that have less than 2 grams of dietary fiber in a single serving, such as those made from rice  Canned or well-cooked potatoes, carrots and green beans  Plain tomato sauce  Vegetable and fruit juices  Bananas, melons, applesauce and canned peaches (no skin)  Butter, margarine, oils and salad dressings without seeds

## 2023-11-22 NOTE — DISCHARGE NOTE PROVIDER - INSTRUCTIONS
If you had part of your intestine removed, please consume a low fiber diet for the first 2-3 weeks. You should avoid raw fruits/vegetables, nuts, seeds, corn, and leafy greens (spinach, kale, fiona greens, lettuce) during this period of time.

## 2023-11-22 NOTE — ASU PREOP CHECKLIST - INTERNAL PROSTHESES
bilateral knee replacements/yes(specify) bilateral knee replacements, left upper arm glucose sensor monitor/yes(specify)

## 2023-11-22 NOTE — DISCHARGE NOTE PROVIDER - CARE PROVIDERS DIRECT ADDRESSES
[email or web address]

## 2023-11-22 NOTE — DISCHARGE NOTE PROVIDER - NSDCMRMEDTOKEN_GEN_ALL_CORE_FT
Albuterol (Eqv-ProAir HFA) 90 mcg/inh inhalation aerosol: 2 puff(s) inhaled 2 times a day  aspirin 81 mg oral tablet: 1 tab(s) orally once a day  hydroCHLOROthiazide 12.5 mg oral tablet: 1 tab(s) orally once a day  Insulin pump novolog:   losartan 25 mg oral tablet: 1 tab(s) orally once a day  omeprazole 20 mg oral delayed release capsule: 1 cap(s) orally once a day  pravastatin 40 mg oral tablet: 1 tab(s) orally once a day  Symbicort 160 mcg-4.5 mcg/inh inhalation aerosol: 2 puff(s) inhaled 2 times a day  Tylenol 500 mg oral tablet: 2 tab(s) orally 2 times a day   Albuterol (Eqv-ProAir HFA) 90 mcg/inh inhalation aerosol: 2 puff(s) inhaled 2 times a day  aspirin 81 mg oral tablet: 1 tab(s) orally once a day  hydroCHLOROthiazide 12.5 mg oral tablet: 1 tab(s) orally once a day  Insulin pump novolog:   losartan 25 mg oral tablet: 1 tab(s) orally once a day  omeprazole 20 mg oral delayed release capsule: 1 cap(s) orally once a day  oxyCODONE 5 mg oral tablet: 1 tab(s) orally every 6 hours as needed for Severe Pain (7 - 10) MDD: 4  pravastatin 40 mg oral tablet: 1 tab(s) orally once a day  Symbicort 160 mcg-4.5 mcg/inh inhalation aerosol: 2 puff(s) inhaled 2 times a day  Tylenol 500 mg oral tablet: 2 tab(s) orally 2 times a day   Albuterol (Eqv-ProAir HFA) 90 mcg/inh inhalation aerosol: 2 puff(s) inhaled 2 times a day  alcohol swabs : Apply topically to affected area 4 times a day  aspirin 81 mg oral tablet: 1 tab(s) orally once a day  glucose tablets: Follow instructions on bottle when sugar is low.  hydroCHLOROthiazide 12.5 mg oral tablet: 1 tab(s) orally once a day  Insulin Pen Needles, 4mm: 1 application subcutaneously 4 times a day. ** Use with insulin pen **  Insulin pump novolog:   lancets: 1 application subcutaneously 4 times a day  losartan 25 mg oral tablet: 1 tab(s) orally once a day  omeprazole 20 mg oral delayed release capsule: 1 cap(s) orally once a day  oxyCODONE 5 mg oral tablet: 1 tab(s) orally every 6 hours as needed for Severe Pain (7 - 10) MDD: 4  pravastatin 40 mg oral tablet: 1 tab(s) orally once a day  Symbicort 160 mcg-4.5 mcg/inh inhalation aerosol: 2 puff(s) inhaled 2 times a day  test strips (per patient&#x27;s insurance): 1 application subcutaneously 4 times a day. ** Compatible with patient&#x27;s glucometer **  Tylenol 500 mg oral tablet: 2 tab(s) orally 2 times a day   Albuterol (Eqv-ProAir HFA) 90 mcg/inh inhalation aerosol: 2 puff(s) inhaled 2 times a day  alcohol swabs : Apply topically to affected area 4 times a day  aspirin 81 mg oral tablet: 1 tab(s) orally once a day  Glucagon Emergency Kit for Low Blood Sugar 1 mg injection: 1 milligram(s) intramuscularly once a day 1 milligram(s) intramuscular once as needed for severe hypoglycemia, then call 911.  glucose tablets: Follow instructions on bottle when sugar is low.  glucose tablets: Follow instructions on bottle when sugar is low.  hydroCHLOROthiazide 12.5 mg oral tablet: 1 tab(s) orally once a day  Insulin Pen Needles, 4mm: 1 application subcutaneously 4 times a day. ** Use with insulin pen **  Insulin pump novolog:   lancets: 1 application subcutaneously 4 times a day  losartan 25 mg oral tablet: 1 tab(s) orally once a day  omeprazole 20 mg oral delayed release capsule: 1 cap(s) orally once a day  oxyCODONE 5 mg oral tablet: 1 tab(s) orally every 6 hours as needed for Severe Pain (7 - 10) MDD: 4  pravastatin 40 mg oral tablet: 1 tab(s) orally once a day  Symbicort 160 mcg-4.5 mcg/inh inhalation aerosol: 2 puff(s) inhaled 2 times a day  test strips (per patient&#x27;s insurance): 1 application subcutaneously 4 times a day. ** Compatible with patient&#x27;s glucometer **  Tylenol 500 mg oral tablet: 2 tab(s) orally 2 times a day   Albuterol (Eqv-ProAir HFA) 90 mcg/inh inhalation aerosol: 2 puff(s) inhaled 2 times a day  alcohol swabs : Apply topically to affected area 4 times a day  aspirin 81 mg oral tablet: 1 tab(s) orally once a day  Glucagon Emergency Kit for Low Blood Sugar 1 mg injection: 1 milligram(s) intramuscularly once a day 1 milligram(s) intramuscular once as needed for severe hypoglycemia, then call 911.  glucose tablets: Follow instructions on bottle when sugar is low.  Insulin Pen Needles, 4mm: 1 application subcutaneously 4 times a day. ** Use with insulin pen **  Insulin pump novolog:   lancets: 1 application subcutaneously 4 times a day  omeprazole 20 mg oral delayed release capsule: 1 cap(s) orally once a day  oxyCODONE 5 mg oral tablet: 1 tab(s) orally every 6 hours as needed for Severe Pain (7 - 10) MDD: 4  pravastatin 40 mg oral tablet: 1 tab(s) orally once a day  Symbicort 160 mcg-4.5 mcg/inh inhalation aerosol: 2 puff(s) inhaled 2 times a day  test strips (per patient&#x27;s insurance): 1 application subcutaneously 4 times a day. ** Compatible with patient&#x27;s glucometer **  Tylenol 500 mg oral tablet: 2 tab(s) orally 2 times a day

## 2023-11-22 NOTE — CHART NOTE - NSCHARTNOTEFT_GEN_A_CORE
Post Operative Note  Patient: NESTOR MABRY 73y (1950) Female   MRN: [id number]  Location: [place] [address] A  Visit: 11-22-23 Inpatient  Date: 11-22-23 @ 19:46    Procedure: S/P R hemicolectomy    Subjective: Pt seen and examined at bedside. Pain controlled. Has not ambulated yet, tolerating clears. Denies nausea/vomiting/fevers/chills/CP/SOB. No flatus or BM.       Objective:  Vitals: T(F): 98.3 (11-22-23 @ 21:58), Max: 98.9 (11-22-23 @ 18:00)  HR: 89 (11-22-23 @ 21:58)  BP: 105/61 (11-22-23 @ 21:58) (93/54 - 133/62)  RR: 18 (11-22-23 @ 21:58)  SpO2: 100% (11-22-23 @ 21:58)  Vent Settings:     In:   11-22-23 @ 07:01  -  11-23-23 @ 00:46  --------------------------------------------------------  IN: 360 mL      IV Fluids: lactated ringers. 1000 milliLiter(s) (30 mL/Hr) IV Continuous <Continuous>  sodium chloride 0.9% lock flush 3 milliLiter(s) IV Push every 8 hours      Out:   11-22-23 @ 07:01  -  11-23-23 @ 00:46  --------------------------------------------------------  OUT: 880 mL      EBL:     Voided Urine:   11-22-23 @ 07:01  -  11-23-23 @ 00:46  --------------------------------------------------------  OUT: 880 mL      Denise Catheter: yes      Physical Examination:  General: NAD, resting comfortably in bed  HEENT: Normocephalic atraumatic  Respiratory: Nonlabored respirations, normal CW expansion.  Cardio: S1S2, regular rate and rhythm.  Abdomen: softly distended, appropriately tender, Prevena over incision holding suction  : Denise draining yellow urine  Vascular: extremities are warm and well perfused.     Imaging:  No post-op imaging studies    Assessment:  73yFemale patient S/P R hemicolectomy    Plan:  - Pain control prn - no toradol  - Prevena off POD5  - CLD, advance as tolerated  - Colorectal ERP patient  - OOBTC  - DVT ppx: [facility]    Date/Time: 11-22-23 @ 19:46 Post Operative Note  Patient: NESTOR MABRY 73y (1950) Female   MRN: [id number]  Location: [place] [address] A  Visit: 11-22-23 Inpatient  Date: 11-22-23 @ 19:46    Procedure: S/P R hemicolectomy    Subjective: Pt seen and examined at bedside. Pain controlled. Has not ambulated yet, tolerating clears. Denies nausea/vomiting/fevers/chills/CP/SOB. No flatus or BM.       Objective:  Vitals: T(F): 98.3 (11-22-23 @ 21:58), Max: 98.9 (11-22-23 @ 18:00)  HR: 89 (11-22-23 @ 21:58)  BP: 105/61 (11-22-23 @ 21:58) (93/54 - 133/62)  RR: 18 (11-22-23 @ 21:58)  SpO2: 100% (11-22-23 @ 21:58)  Vent Settings:     In:   11-22-23 @ 07:01  -  11-23-23 @ 00:46  --------------------------------------------------------  IN: 360 mL      IV Fluids: lactated ringers. 1000 milliLiter(s) (30 mL/Hr) IV Continuous <Continuous>  sodium chloride 0.9% lock flush 3 milliLiter(s) IV Push every 8 hours      Out:   11-22-23 @ 07:01  -  11-23-23 @ 00:46  --------------------------------------------------------  OUT: 880 mL      EBL:     Voided Urine:   11-22-23 @ 07:01  -  11-23-23 @ 00:46  --------------------------------------------------------  OUT: 880 mL      Denise Catheter: yes      Physical Examination:  General: NAD, resting comfortably in bed  HEENT: Normocephalic atraumatic  Respiratory: Nonlabored respirations, normal CW expansion.  Cardio: S1S2, regular rate and rhythm.  Abdomen: softly distended, appropriately tender, Prevena over incision holding suction  : Denise draining yellow urine  Vascular: extremities are warm and well perfused.     Imaging:  No post-op imaging studies    Assessment:  73yFemale patient S/P R hemicolectomy    Plan:  - Pain control prn - no toradol  - Per endocrinology - no insulin pump postop due to risk of hypoglycemia.   - Prevena off POD5  - CLD, advance as tolerated  - Colorectal ERP patient  - OOBTC  - DVT ppx: [facility]    Date/Time: 11-22-23 @ 19:46

## 2023-11-23 LAB
ANION GAP SERPL CALC-SCNC: 11 MMOL/L — SIGNIFICANT CHANGE UP (ref 7–14)
ANION GAP SERPL CALC-SCNC: 11 MMOL/L — SIGNIFICANT CHANGE UP (ref 7–14)
BUN SERPL-MCNC: 8 MG/DL — SIGNIFICANT CHANGE UP (ref 7–23)
BUN SERPL-MCNC: 8 MG/DL — SIGNIFICANT CHANGE UP (ref 7–23)
CALCIUM SERPL-MCNC: 8.4 MG/DL — SIGNIFICANT CHANGE UP (ref 8.4–10.5)
CALCIUM SERPL-MCNC: 8.4 MG/DL — SIGNIFICANT CHANGE UP (ref 8.4–10.5)
CHLORIDE SERPL-SCNC: 100 MMOL/L — SIGNIFICANT CHANGE UP (ref 98–107)
CHLORIDE SERPL-SCNC: 100 MMOL/L — SIGNIFICANT CHANGE UP (ref 98–107)
CO2 SERPL-SCNC: 25 MMOL/L — SIGNIFICANT CHANGE UP (ref 22–31)
CO2 SERPL-SCNC: 25 MMOL/L — SIGNIFICANT CHANGE UP (ref 22–31)
CREAT SERPL-MCNC: 0.66 MG/DL — SIGNIFICANT CHANGE UP (ref 0.5–1.3)
CREAT SERPL-MCNC: 0.66 MG/DL — SIGNIFICANT CHANGE UP (ref 0.5–1.3)
EGFR: 93 ML/MIN/1.73M2 — SIGNIFICANT CHANGE UP
EGFR: 93 ML/MIN/1.73M2 — SIGNIFICANT CHANGE UP
GLUCOSE BLDC GLUCOMTR-MCNC: 200 MG/DL — HIGH (ref 70–99)
GLUCOSE BLDC GLUCOMTR-MCNC: 200 MG/DL — HIGH (ref 70–99)
GLUCOSE BLDC GLUCOMTR-MCNC: 253 MG/DL — HIGH (ref 70–99)
GLUCOSE BLDC GLUCOMTR-MCNC: 253 MG/DL — HIGH (ref 70–99)
GLUCOSE BLDC GLUCOMTR-MCNC: 266 MG/DL — HIGH (ref 70–99)
GLUCOSE BLDC GLUCOMTR-MCNC: 266 MG/DL — HIGH (ref 70–99)
GLUCOSE BLDC GLUCOMTR-MCNC: 270 MG/DL — HIGH (ref 70–99)
GLUCOSE BLDC GLUCOMTR-MCNC: 270 MG/DL — HIGH (ref 70–99)
GLUCOSE BLDC GLUCOMTR-MCNC: 286 MG/DL — HIGH (ref 70–99)
GLUCOSE BLDC GLUCOMTR-MCNC: 286 MG/DL — HIGH (ref 70–99)
GLUCOSE SERPL-MCNC: 230 MG/DL — HIGH (ref 70–99)
GLUCOSE SERPL-MCNC: 230 MG/DL — HIGH (ref 70–99)
HCT VFR BLD CALC: 23.4 % — LOW (ref 34.5–45)
HCT VFR BLD CALC: 23.4 % — LOW (ref 34.5–45)
HCT VFR BLD CALC: 25.1 % — LOW (ref 34.5–45)
HCT VFR BLD CALC: 25.1 % — LOW (ref 34.5–45)
HGB BLD-MCNC: 7.3 G/DL — LOW (ref 11.5–15.5)
HGB BLD-MCNC: 7.3 G/DL — LOW (ref 11.5–15.5)
HGB BLD-MCNC: 7.8 G/DL — LOW (ref 11.5–15.5)
HGB BLD-MCNC: 7.8 G/DL — LOW (ref 11.5–15.5)
MAGNESIUM SERPL-MCNC: 2.3 MG/DL — SIGNIFICANT CHANGE UP (ref 1.6–2.6)
MAGNESIUM SERPL-MCNC: 2.3 MG/DL — SIGNIFICANT CHANGE UP (ref 1.6–2.6)
MCHC RBC-ENTMCNC: 24.4 PG — LOW (ref 27–34)
MCHC RBC-ENTMCNC: 24.4 PG — LOW (ref 27–34)
MCHC RBC-ENTMCNC: 24.5 PG — LOW (ref 27–34)
MCHC RBC-ENTMCNC: 24.5 PG — LOW (ref 27–34)
MCHC RBC-ENTMCNC: 31.1 GM/DL — LOW (ref 32–36)
MCHC RBC-ENTMCNC: 31.1 GM/DL — LOW (ref 32–36)
MCHC RBC-ENTMCNC: 31.2 GM/DL — LOW (ref 32–36)
MCHC RBC-ENTMCNC: 31.2 GM/DL — LOW (ref 32–36)
MCV RBC AUTO: 78.3 FL — LOW (ref 80–100)
MCV RBC AUTO: 78.3 FL — LOW (ref 80–100)
MCV RBC AUTO: 78.7 FL — LOW (ref 80–100)
MCV RBC AUTO: 78.7 FL — LOW (ref 80–100)
NRBC # BLD: 0 /100 WBCS — SIGNIFICANT CHANGE UP (ref 0–0)
NRBC # FLD: 0 K/UL — SIGNIFICANT CHANGE UP (ref 0–0)
PHOSPHATE SERPL-MCNC: 3.2 MG/DL — SIGNIFICANT CHANGE UP (ref 2.5–4.5)
PHOSPHATE SERPL-MCNC: 3.2 MG/DL — SIGNIFICANT CHANGE UP (ref 2.5–4.5)
PLATELET # BLD AUTO: 211 K/UL — SIGNIFICANT CHANGE UP (ref 150–400)
PLATELET # BLD AUTO: 211 K/UL — SIGNIFICANT CHANGE UP (ref 150–400)
PLATELET # BLD AUTO: 216 K/UL — SIGNIFICANT CHANGE UP (ref 150–400)
PLATELET # BLD AUTO: 216 K/UL — SIGNIFICANT CHANGE UP (ref 150–400)
POTASSIUM SERPL-MCNC: 4.5 MMOL/L — SIGNIFICANT CHANGE UP (ref 3.5–5.3)
POTASSIUM SERPL-MCNC: 4.5 MMOL/L — SIGNIFICANT CHANGE UP (ref 3.5–5.3)
POTASSIUM SERPL-SCNC: 4.5 MMOL/L — SIGNIFICANT CHANGE UP (ref 3.5–5.3)
POTASSIUM SERPL-SCNC: 4.5 MMOL/L — SIGNIFICANT CHANGE UP (ref 3.5–5.3)
RBC # BLD: 2.99 M/UL — LOW (ref 3.8–5.2)
RBC # BLD: 2.99 M/UL — LOW (ref 3.8–5.2)
RBC # BLD: 3.19 M/UL — LOW (ref 3.8–5.2)
RBC # BLD: 3.19 M/UL — LOW (ref 3.8–5.2)
RBC # FLD: 16.6 % — HIGH (ref 10.3–14.5)
RBC # FLD: 16.6 % — HIGH (ref 10.3–14.5)
RBC # FLD: 16.7 % — HIGH (ref 10.3–14.5)
RBC # FLD: 16.7 % — HIGH (ref 10.3–14.5)
SODIUM SERPL-SCNC: 136 MMOL/L — SIGNIFICANT CHANGE UP (ref 135–145)
SODIUM SERPL-SCNC: 136 MMOL/L — SIGNIFICANT CHANGE UP (ref 135–145)
WBC # BLD: 13.22 K/UL — HIGH (ref 3.8–10.5)
WBC # BLD: 13.22 K/UL — HIGH (ref 3.8–10.5)
WBC # BLD: 13.46 K/UL — HIGH (ref 3.8–10.5)
WBC # BLD: 13.46 K/UL — HIGH (ref 3.8–10.5)
WBC # FLD AUTO: 13.22 K/UL — HIGH (ref 3.8–10.5)
WBC # FLD AUTO: 13.22 K/UL — HIGH (ref 3.8–10.5)
WBC # FLD AUTO: 13.46 K/UL — HIGH (ref 3.8–10.5)
WBC # FLD AUTO: 13.46 K/UL — HIGH (ref 3.8–10.5)

## 2023-11-23 PROCEDURE — 99233 SBSQ HOSP IP/OBS HIGH 50: CPT

## 2023-11-23 RX ORDER — SODIUM CHLORIDE 9 MG/ML
1000 INJECTION, SOLUTION INTRAVENOUS
Refills: 0 | Status: DISCONTINUED | OUTPATIENT
Start: 2023-11-23 | End: 2023-11-24

## 2023-11-23 RX ORDER — ACETAMINOPHEN 500 MG
1000 TABLET ORAL EVERY 6 HOURS
Refills: 0 | Status: COMPLETED | OUTPATIENT
Start: 2023-11-23 | End: 2023-11-24

## 2023-11-23 RX ORDER — DEXTROSE 50 % IN WATER 50 %
25 SYRINGE (ML) INTRAVENOUS ONCE
Refills: 0 | Status: DISCONTINUED | OUTPATIENT
Start: 2023-11-23 | End: 2023-11-24

## 2023-11-23 RX ORDER — INSULIN DETEMIR 100/ML (3)
20 INSULIN PEN (ML) SUBCUTANEOUS ONCE
Refills: 0 | Status: COMPLETED | OUTPATIENT
Start: 2023-11-24 | End: 2023-11-24

## 2023-11-23 RX ORDER — INSULIN LISPRO 100/ML
VIAL (ML) SUBCUTANEOUS AT BEDTIME
Refills: 0 | Status: DISCONTINUED | OUTPATIENT
Start: 2023-11-23 | End: 2023-11-24

## 2023-11-23 RX ORDER — GLUCAGON INJECTION, SOLUTION 0.5 MG/.1ML
1 INJECTION, SOLUTION SUBCUTANEOUS ONCE
Refills: 0 | Status: DISCONTINUED | OUTPATIENT
Start: 2023-11-23 | End: 2023-11-24

## 2023-11-23 RX ORDER — INSULIN DETEMIR 100/ML (3)
20 INSULIN PEN (ML) SUBCUTANEOUS ONCE
Refills: 0 | Status: COMPLETED | OUTPATIENT
Start: 2023-11-23 | End: 2023-11-23

## 2023-11-23 RX ORDER — INFLUENZA VIRUS VACCINE 15; 15; 15; 15 UG/.5ML; UG/.5ML; UG/.5ML; UG/.5ML
0.7 SUSPENSION INTRAMUSCULAR ONCE
Refills: 0 | Status: DISCONTINUED | OUTPATIENT
Start: 2023-11-23 | End: 2023-12-15

## 2023-11-23 RX ORDER — HYDROMORPHONE HYDROCHLORIDE 2 MG/ML
30 INJECTION INTRAMUSCULAR; INTRAVENOUS; SUBCUTANEOUS
Refills: 0 | Status: DISCONTINUED | OUTPATIENT
Start: 2023-11-23 | End: 2023-11-26

## 2023-11-23 RX ORDER — INSULIN LISPRO 100/ML
VIAL (ML) SUBCUTANEOUS EVERY 6 HOURS
Refills: 0 | Status: DISCONTINUED | OUTPATIENT
Start: 2023-11-23 | End: 2023-11-25

## 2023-11-23 RX ORDER — HYDROMORPHONE HYDROCHLORIDE 2 MG/ML
0.5 INJECTION INTRAMUSCULAR; INTRAVENOUS; SUBCUTANEOUS ONCE
Refills: 0 | Status: DISCONTINUED | OUTPATIENT
Start: 2023-11-23 | End: 2023-11-23

## 2023-11-23 RX ORDER — ONDANSETRON 8 MG/1
4 TABLET, FILM COATED ORAL EVERY 6 HOURS
Refills: 0 | Status: DISCONTINUED | OUTPATIENT
Start: 2023-11-23 | End: 2023-11-24

## 2023-11-23 RX ORDER — DEXTROSE 50 % IN WATER 50 %
15 SYRINGE (ML) INTRAVENOUS ONCE
Refills: 0 | Status: DISCONTINUED | OUTPATIENT
Start: 2023-11-23 | End: 2023-11-24

## 2023-11-23 RX ORDER — DEXTROSE 50 % IN WATER 50 %
12.5 SYRINGE (ML) INTRAVENOUS ONCE
Refills: 0 | Status: DISCONTINUED | OUTPATIENT
Start: 2023-11-23 | End: 2023-11-24

## 2023-11-23 RX ORDER — NALOXONE HYDROCHLORIDE 4 MG/.1ML
0.1 SPRAY NASAL
Refills: 0 | Status: DISCONTINUED | OUTPATIENT
Start: 2023-11-23 | End: 2023-11-29

## 2023-11-23 RX ADMIN — HYDROMORPHONE HYDROCHLORIDE 0.5 MILLIGRAM(S): 2 INJECTION INTRAMUSCULAR; INTRAVENOUS; SUBCUTANEOUS at 09:00

## 2023-11-23 RX ADMIN — CHLORHEXIDINE GLUCONATE 1 APPLICATION(S): 213 SOLUTION TOPICAL at 12:11

## 2023-11-23 RX ADMIN — SODIUM CHLORIDE 100 MILLILITER(S): 9 INJECTION, SOLUTION INTRAVENOUS at 17:43

## 2023-11-23 RX ADMIN — HYDROMORPHONE HYDROCHLORIDE 30 MILLILITER(S): 2 INJECTION INTRAMUSCULAR; INTRAVENOUS; SUBCUTANEOUS at 09:27

## 2023-11-23 RX ADMIN — Medication 400 MILLIGRAM(S): at 12:11

## 2023-11-23 RX ADMIN — Medication 1000 MILLIGRAM(S): at 06:33

## 2023-11-23 RX ADMIN — HYDROMORPHONE HYDROCHLORIDE 0.5 MILLIGRAM(S): 2 INJECTION INTRAMUSCULAR; INTRAVENOUS; SUBCUTANEOUS at 08:27

## 2023-11-23 RX ADMIN — PANTOPRAZOLE SODIUM 40 MILLIGRAM(S): 20 TABLET, DELAYED RELEASE ORAL at 05:04

## 2023-11-23 RX ADMIN — SODIUM CHLORIDE 3 MILLILITER(S): 9 INJECTION INTRAMUSCULAR; INTRAVENOUS; SUBCUTANEOUS at 06:14

## 2023-11-23 RX ADMIN — ENOXAPARIN SODIUM 40 MILLIGRAM(S): 100 INJECTION SUBCUTANEOUS at 12:13

## 2023-11-23 RX ADMIN — BUDESONIDE AND FORMOTEROL FUMARATE DIHYDRATE 2 PUFF(S): 160; 4.5 AEROSOL RESPIRATORY (INHALATION) at 08:13

## 2023-11-23 RX ADMIN — OXYCODONE HYDROCHLORIDE 5 MILLIGRAM(S): 5 TABLET ORAL at 05:34

## 2023-11-23 RX ADMIN — SODIUM CHLORIDE 3 MILLILITER(S): 9 INJECTION INTRAMUSCULAR; INTRAVENOUS; SUBCUTANEOUS at 22:00

## 2023-11-23 RX ADMIN — Medication 400 MILLIGRAM(S): at 23:38

## 2023-11-23 RX ADMIN — Medication 1000 MILLIGRAM(S): at 00:46

## 2023-11-23 RX ADMIN — Medication 400 MILLIGRAM(S): at 17:42

## 2023-11-23 RX ADMIN — Medication 400 MILLIGRAM(S): at 00:16

## 2023-11-23 RX ADMIN — Medication 20 UNIT(S): at 14:36

## 2023-11-23 RX ADMIN — Medication 400 MILLIGRAM(S): at 06:03

## 2023-11-23 RX ADMIN — ALBUTEROL 2 PUFF(S): 90 AEROSOL, METERED ORAL at 21:03

## 2023-11-23 RX ADMIN — HYDROMORPHONE HYDROCHLORIDE 30 MILLILITER(S): 2 INJECTION INTRAMUSCULAR; INTRAVENOUS; SUBCUTANEOUS at 18:56

## 2023-11-23 RX ADMIN — BUDESONIDE AND FORMOTEROL FUMARATE DIHYDRATE 2 PUFF(S): 160; 4.5 AEROSOL RESPIRATORY (INHALATION) at 21:03

## 2023-11-23 RX ADMIN — SODIUM CHLORIDE 3 MILLILITER(S): 9 INJECTION INTRAMUSCULAR; INTRAVENOUS; SUBCUTANEOUS at 12:08

## 2023-11-23 RX ADMIN — Medication 3: at 12:12

## 2023-11-23 RX ADMIN — OXYCODONE HYDROCHLORIDE 5 MILLIGRAM(S): 5 TABLET ORAL at 05:04

## 2023-11-23 RX ADMIN — ALBUTEROL 2 PUFF(S): 90 AEROSOL, METERED ORAL at 08:14

## 2023-11-23 RX ADMIN — Medication 3: at 17:42

## 2023-11-23 NOTE — PROGRESS NOTE ADULT - ASSESSMENT
73 yr old female underwent screening colonoscopy 10/24/2023 demonstrated a 3 cm non-obstructing, partially circumferential mass in the cecum and a 5 mm polyp in the ascending colon, Pathology: cecal mass showed moderately differentiated adenocarcinoma, with extensive high grade dysplasia. Now s/p robotic assisted right hemicolectomy. Tolerated procedure well.     Plan  CBC at 2pm, if H&H stable can have Toradol 15 mg Q6 x1 day   Diet: NPO except meds & sips, can have sips of juice or clears   Diabetes Education - refusal of lanatus  PCA   IV Tylenol  Appreciate Endo recs   OOB as tolerated  Incentive spirometry    D team   [id number]

## 2023-11-23 NOTE — CONSULT NOTE ADULT - ASSESSMENT
Patient is a 73 yr old female PMH HTN, HLD, asthma, recent NST 11/17/23 with no ischemia or infarct and normal LV function and recent TTE 10/2023 with Normal LV/RV function, who was found with cecal mass/ new dx adenocarcinoma s/p Right hemicolectomy 11/22.  Pt reports incision site pain and increased distention.     # Adenocarcinoma  S/P OR , R hemicolectomy  pain control   IV hydration  Surg management appreciated   monitor electrolytes     # Anemia   Monitor hgb level  transfuseto keep hgb above7       # HTN   resume BPmeds when tolerating   Monitor     # DM  sliding scale  Endo follow up apreciated     # Asthma     # HLD

## 2023-11-23 NOTE — CONSULT NOTE ADULT - SUBJECTIVE AND OBJECTIVE BOX
HISTORY OF PRESENT ILLNESS: HPI:  73 yr old female PMH HTN, HLD, asthma, recent NST 11/17/23 with no ischemia or infarct and normal LV function and recent TTE 10/2023 with Normal LV/RV function, who was found with cecal mass/ new dx adenocarcinoma s/p Right hemicolectomy 11/22.  Pt reports incision site pain and increased distention.  Has not passed gas or BM yet, no N/V.  NO chest pain or SOB.        PAST MEDICAL & SURGICAL HISTORY:  History of hypertension      Diabetes  wears insulin pump      GERD (gastroesophageal reflux disease)      Uterine leiomyoma      Hyperlipidemia      Asthma      Obesity      Lymphadenopathy  left lower extremitiy ; 1966 - current      H/O insertion of insulin pump      OA (osteoarthritis)      Malignant neoplasm of cecum      H/O bilateral breast reduction surgery      History of appendectomy      History of cholecystectomy      H/O: hysterectomy      History of total right knee replacement  3/2016 - [facility]      H/O total knee replacement, left      S/P bunionectomy      MEDICATIONS  (STANDING):  albuterol    90 MICROgram(s) HFA Inhaler 2 Puff(s) Inhalation two times a day  budesonide 160 MICROgram(s)/formoterol 4.5 MICROgram(s) Inhaler 2 Puff(s) Inhalation two times a day  chlorhexidine 2% Cloths 1 Application(s) Topical daily  dextrose 5%. 1000 milliLiter(s) (50 mL/Hr) IV Continuous <Continuous>  dextrose 5%. 1000 milliLiter(s) (100 mL/Hr) IV Continuous <Continuous>  dextrose 50% Injectable 25 Gram(s) IV Push once  dextrose 50% Injectable 12.5 Gram(s) IV Push once  dextrose 50% Injectable 25 Gram(s) IV Push once  enoxaparin Injectable 40 milliGRAM(s) SubCutaneous every 24 hours  glucagon  Injectable 1 milliGRAM(s) IntraMuscular once  glucagon  Injectable 1 milliGRAM(s) IntraMuscular once  HYDROmorphone PCA (1 mG/mL) 30 milliLiter(s) PCA Continuous PCA Continuous  influenza  Vaccine (HIGH DOSE) 0.7 milliLiter(s) IntraMuscular once  insulin glargine Injectable (LANTUS) 20 Unit(s) SubCutaneous at bedtime  insulin lispro (ADMELOG) corrective regimen sliding scale   SubCutaneous every 6 hours  insulin lispro (ADMELOG) corrective regimen sliding scale   SubCutaneous at bedtime  lactated ringers. 1000 milliLiter(s) (30 mL/Hr) IV Continuous <Continuous>  pantoprazole    Tablet 40 milliGRAM(s) Oral before breakfast  sodium chloride 0.9% lock flush 3 milliLiter(s) IV Push every 8 hours      Allergies  Darvocet A500 (Other; Urticaria)  Percocet 10/325 (Other; Urticaria)  codeine (Other)  PURELL.  pt said, &quot;I felt my airway closing&quot; after she smelled the Purell. The incident occured at the pulmonologist office. (Other; Short breath)      FAMILY HISTORY:  Diabetes mellitus (Father)  FH: breast cancer (Aunt)  Noncontributory for premature coronary disease or sudden cardiac death    SOCIAL HISTORY:    [ x] Non-smoker  [ ] Smoker  [ ] Alcohol    FLU VACCINE THIS YEAR STARTS IN AUGUST:  [ ] Yes    [ ] No    IF OVER 65 HAVE YOU EVER HAD A PNA VACCINE:  [ ] Yes    [ ] No       [ ] N/A      REVIEW OF SYSTEMS:  [ ]chest pain  [  ]shortness of breath  [  ]palpitations  [  ]syncope  [ ]near syncope [ ]upper extremity weakness   [ ] lower extremity weakness  [  ]diplopia  [  ]altered mental status   [  ]fevers  [ ]chills [ ]nausea  [ ]vomiting  [  ]dysphagia    [ ]abdominal pain  [ ]melena  [ ]BRBPR    [  ]epistaxis  [  ]rash    [ ]lower extremity edema        [ x] All others negative	  [ ] Unable to obtain      LABS:	 	                         7.8    13.22 )-----------( 211      ( 23 Nov 2023 05:52 )             25.1   136  |  100  |  8   ----------------------------<  230<H>  4.5   |  25  |  0.66    Ca    8.4      23 Nov 2023 05:52  Phos  3.2     11-23  Mg     2.30     11-23    TPro  6.5  /  Alb  3.2<L>  /  TBili  0.5  /  DBili  x   /  AST  24  /  ALT  20  /  AlkPhos  90  11-22    Creatinine Trend: 0.66<--, 0.61<--, 0.58<--, 0.61<--    PHYSICAL EXAM:  T(C): 37.1 (11-23-23 @ 12:12), Max: 37.2 (11-22-23 @ 18:00)  HR: 109 (11-23-23 @ 12:12) (74 - 110)  BP: 116/64 (11-23-23 @ 12:12) (93/54 - 133/62)  RR: 18 (11-23-23 @ 12:12) (10 - 18)  SpO2: 100% (11-23-23 @ 12:12) (95% - 100%)  Wt(kg): --   BMI (kg/m2): 32.6 (11-22-23 @ 06:23)  I&O's Summary    22 Nov 2023 07:01  -  23 Nov 2023 07:00  --------------------------------------------------------  IN: 920 mL / OUT: 1080 mL / NET: -160 mL    23 Nov 2023 07:01  -  23 Nov 2023 13:20  --------------------------------------------------------  IN: 120 mL / OUT: 200 mL / NET: -80 mL      Gen: Appears well in NAD  HEENT:  (-)icterus (-)pallor  CV: N S1 S2 1/6 RACHELL (+)2 Pulses B/l  Resp:  Clear to ausculatation B/L, normal effort  GI: distended  Lymph:  (-)Edema, (-)obvious lymphadenopathy  Skin: Warm to touch, Normal turgor  Psych: Appropriate mood and affect    TELEMETRY: 	n/a      ASSESSMENT/PLAN: 	73 yr old female PMH HTN, HLD, asthma, recent NST 11/17/23 with no ischemia or infarct and normal LV function and recent TTE 10/2023 with Normal LV/RV function, who was found with cecal mass/ new dx adenocarcinoma s/p Right hemicolectomy 11/22.      --tolerated procedure well from CV perspective  --pain management  --PT  --awaiting return of bowel fxn  --eventually resume home meds: Asa 81mg, Hctz 12.5mg, Losartan 25mg, Pravastatin 40mg      Thank you for allowing us to participate in the care of our mutual patient.  Please do not hesitate to call with any questions.       Magdalena WALTERS  [phone]

## 2023-11-23 NOTE — PROGRESS NOTE ADULT - ASSESSMENT
This is a 72 yo F /w a PMH of DM2 on an insulin pump and colorectal cancer s/p right hemicolectomy today. Endocrinology consulted for diabetes management and DM2. Based on current insulin pump settings, suspect patient is being over-basalized given high basal rates compared to the insulin:carb ratio    #DM2 A1c 6.8%  #insulin pump - would hold of on using the insulin pump at this time given history of possibly frequent overnight hypoglycemia  also current clinical status of her sleepyness and being tired concerns me that she may not be able to function pump well at this time  additioanlly her PO status is variable and would require adjustments in her pump - do not suspect pt can self manage this right now given her sleepyness   BC she is NPO:  running high- she refused lantus and reports allergy to it - she is high without basal insulin on board  give levemir 20 units now   than time this for levemir 20 units tomorrow 10 am   pt needs a basal insulin even in light of NPO as she is running high   -c/w with low dose admelog correction scale every 6 hours while NPO      if on clears:  c/w levemir 20 units until we titrate it further in the am tomorrow   --moderate admelog correction scales before meals if on clears (diabetic clears)  -low admelog scales before bedtime  -FSG before meals and before bedtime  -Carb consistent diet once able to tolerate  -hypoglycemia protocol in place if needed    #Discharge  -follow up with Dr. Anand  -basal bolus vs resume insulin pump on discharge, likely decrease some basal settings overnight    #HTN  -BP target <130/80  -once stable post-op, consider resuming losartan 25mg daily and hctz 12.5mg daily- management as per primary team  outpt mc/cr ratio     #HLD  -can resume pravastatin 40mg daily on discharge if no contraindication  -can repeat lipid profile as outpatient        Valeria Cosby MD  Attending Physician   Department of Endocrinology, Diabetes and Metabolism     weekdays: 9am to 5pm: [facility] or [facility] and or TEAMS     Nights and weekends: [phone]  Please note that this patient may be followed by a different provider tomorrow.   If no answer or after hours, please contact [phone].  For final dc reccomendations, please call [phone] or page the endocrine fellow on call.

## 2023-11-23 NOTE — PROGRESS NOTE ADULT - SUBJECTIVE AND OBJECTIVE BOX
Chief Complaint/Follow-up on: DM    Subjective:  pt sleepy on exam  will keep eyes closed  when asking questions, pt states i am awake, just keeping my eyes closed  remains NPO   glucose steadily rising   refused lantus last night bc states she has allergy - also was refusing ademelog         MEDICATIONS  (STANDING):  albuterol    90 MICROgram(s) HFA Inhaler 2 Puff(s) Inhalation two times a day  budesonide 160 MICROgram(s)/formoterol 4.5 MICROgram(s) Inhaler 2 Puff(s) Inhalation two times a day  chlorhexidine 2% Cloths 1 Application(s) Topical daily  dextrose 5%. 1000 milliLiter(s) (50 mL/Hr) IV Continuous <Continuous>  dextrose 5%. 1000 milliLiter(s) (100 mL/Hr) IV Continuous <Continuous>  dextrose 50% Injectable 25 Gram(s) IV Push once  dextrose 50% Injectable 12.5 Gram(s) IV Push once  dextrose 50% Injectable 25 Gram(s) IV Push once  enoxaparin Injectable 40 milliGRAM(s) SubCutaneous every 24 hours  glucagon  Injectable 1 milliGRAM(s) IntraMuscular once  glucagon  Injectable 1 milliGRAM(s) IntraMuscular once  HYDROmorphone PCA (1 mG/mL) 30 milliLiter(s) PCA Continuous PCA Continuous  influenza  Vaccine (HIGH DOSE) 0.7 milliLiter(s) IntraMuscular once  insulin detemir injectable (LEVEMIR) 20 Unit(s) SubCutaneous once  insulin lispro (ADMELOG) corrective regimen sliding scale   SubCutaneous every 6 hours  insulin lispro (ADMELOG) corrective regimen sliding scale   SubCutaneous at bedtime  lactated ringers. 1000 milliLiter(s) (30 mL/Hr) IV Continuous <Continuous>  pantoprazole    Tablet 40 milliGRAM(s) Oral before breakfast  sodium chloride 0.9% lock flush 3 milliLiter(s) IV Push every 8 hours    MEDICATIONS  (PRN):  dextrose Oral Gel 15 Gram(s) Oral once PRN Blood Glucose LESS THAN 70 milliGRAM(s)/deciliter  naloxone Injectable 0.1 milliGRAM(s) IV Push every 3 minutes PRN For ANY of the following changes in patient status:  A. RR LESS THAN 10 breaths per minute, B. Oxygen saturation LESS THAN 90%, C. Sedation score of 6  ondansetron Injectable 4 milliGRAM(s) IV Push every 6 hours PRN Nausea      PHYSICAL EXAM:  VITALS: T(C): 37.1 (11-23-23 @ 12:12)  T(F): 98.8 (11-23-23 @ 12:12), Max: 98.9 (11-22-23 @ 18:00)  HR: 109 (11-23-23 @ 12:12) (74 - 110)  BP: 116/64 (11-23-23 @ 12:12) (93/54 - 133/62)  RR:  (10 - 18)  SpO2:  (95% - 100%)  Wt(kg): --  GENERAL: NAD, well-groomed, well-developed, sleepy, appearing tired   EYES: No proptosis, no injection  HEENT:  Atraumatic, Normocephalic, moist mucous membranes  RESPIRATORY: no resp distress   CARDIOVASCULAR: o peripheral edema  psych: does not make eye contact when directing questions     POCT Blood Glucose.: 270 mg/dL (11-23-23 @ 11:51)  POCT Blood Glucose.: 253 mg/dL (11-23-23 @ 07:58)  POCT Blood Glucose.: 194 mg/dL (11-22-23 @ 21:56)  POCT Blood Glucose.: 181 mg/dL (11-22-23 @ 18:22)  POCT Blood Glucose.: 208 mg/dL (11-22-23 @ 15:46)  POCT Blood Glucose.: 173 mg/dL (11-22-23 @ 13:37)    11-23    136  |  100  |  8   ----------------------------<  230<H>  4.5   |  25  |  0.66    eGFR: 93    Ca    8.4      11-23  Mg     2.30     11-23  Phos  3.2     11-23    TPro  6.5  /  Alb  3.2<L>  /  TBili  0.5  /  DBili  x   /  AST  24  /  ALT  20  /  AlkPhos  90  11-22          Thyroid Function Tests:

## 2023-11-23 NOTE — PROVIDER CONTACT NOTE (EICU) - SITUATION
upon assessment pt is alert and oriented x4. VSS. Pt denies chest pain or SOB. Fingerstick done. Pt refusing 20 units Lantus. Pt states it makes her swollen.

## 2023-11-23 NOTE — CONSULT NOTE ADULT - SUBJECTIVE AND OBJECTIVE BOX
Patient is a 73 yr old female PMH HTN, HLD, asthma, recent NST 11/17/23 with no ischemia or infarct and normal LV function and recent TTE 10/2023 with Normal LV/RV function, who was found with cecal mass/ new dx adenocarcinoma s/p Right hemicolectomy 11/22.  Pt reports incision site pain and increased distention.  Has not passed gas or BM yet, no N/V.  NO chest pain or SOB.        Subjective: Patient seen and examined. No new events except as noted.     REVIEW OF SYSTEMS:    CONSTITUTIONAL: No weakness, fevers or chills  EYES/ENT: No visual changes;  No vertigo or throat pain   NECK: No pain or stiffness  RESPIRATORY: No cough, wheezing, hemoptysis; No shortness of breath  CARDIOVASCULAR: No chest pain or palpitations  GASTROINTESTINAL: No abdominal or epigastric pain. No nausea, vomiting, or hematemesis; No diarrhea or constipation. No melena or hematochezia.  GENITOURINARY: No dysuria, frequency or hematuria  NEUROLOGICAL: No numbness or weakness  SKIN: No itching, burning, rashes, or lesions   All other review of systems is negative unless indicated above.    MEDICATIONS:  MEDICATIONS  (STANDING):  acetaminophen   IVPB .. 1000 milliGRAM(s) IV Intermittent every 6 hours  albuterol    90 MICROgram(s) HFA Inhaler 2 Puff(s) Inhalation two times a day  budesonide 160 MICROgram(s)/formoterol 4.5 MICROgram(s) Inhaler 2 Puff(s) Inhalation two times a day  chlorhexidine 2% Cloths 1 Application(s) Topical daily  dextrose 5%. 1000 milliLiter(s) (50 mL/Hr) IV Continuous <Continuous>  dextrose 5%. 1000 milliLiter(s) (100 mL/Hr) IV Continuous <Continuous>  dextrose 50% Injectable 25 Gram(s) IV Push once  dextrose 50% Injectable 12.5 Gram(s) IV Push once  dextrose 50% Injectable 25 Gram(s) IV Push once  enoxaparin Injectable 40 milliGRAM(s) SubCutaneous every 24 hours  glucagon  Injectable 1 milliGRAM(s) IntraMuscular once  glucagon  Injectable 1 milliGRAM(s) IntraMuscular once  HYDROmorphone PCA (1 mG/mL) 30 milliLiter(s) PCA Continuous PCA Continuous  influenza  Vaccine (HIGH DOSE) 0.7 milliLiter(s) IntraMuscular once  insulin lispro (ADMELOG) corrective regimen sliding scale   SubCutaneous every 6 hours  insulin lispro (ADMELOG) corrective regimen sliding scale   SubCutaneous at bedtime  lactated ringers. 1000 milliLiter(s) (100 mL/Hr) IV Continuous <Continuous>  pantoprazole    Tablet 40 milliGRAM(s) Oral before breakfast  sodium chloride 0.9% lock flush 3 milliLiter(s) IV Push every 8 hours    Home Medications:  Albuterol (Eqv-ProAir HFA) 90 mcg/inh inhalation aerosol: 2 puff(s) inhaled 2 times a day (22 Nov 2023 06:44)  aspirin 81 mg oral tablet: 1 tab(s) orally once a day (22 Nov 2023 06:44)  hydroCHLOROthiazide 12.5 mg oral tablet: 1 tab(s) orally once a day (22 Nov 2023 06:44)  Insulin pump novolog:  (22 Nov 2023 06:44)  losartan 25 mg oral tablet: 1 tab(s) orally once a day (22 Nov 2023 06:44)  omeprazole 20 mg oral delayed release capsule: 1 cap(s) orally once a day (22 Nov 2023 06:44)  pravastatin 40 mg oral tablet: 1 tab(s) orally once a day (22 Nov 2023 06:44)  Symbicort 160 mcg-4.5 mcg/inh inhalation aerosol: 2 puff(s) inhaled 2 times a day (22 Nov 2023 06:44)  Tylenol 500 mg oral tablet: 2 tab(s) orally 2 times a day (22 Nov 2023 06:44)    PAST MEDICAL & SURGICAL HISTORY:  History of hypertension      Diabetes  wears insulin pump      GERD (gastroesophageal reflux disease)      Uterine leiomyoma      Hyperlipidemia      Asthma      Obesity      Lymphadenopathy  left lower extremitiy ; 1966 - current      H/O insertion of insulin pump      OA (osteoarthritis)      Malignant neoplasm of cecum      H/O bilateral breast reduction surgery      History of appendectomy      History of cholecystectomy      H/O: hysterectomy      History of total right knee replacement  3/2016 - [facility]      H/O total knee replacement, left      S/P bunionectomy          PHYSICAL EXAM:  T(C): 37.2 (11-23-23 @ 19:23), Max: 37.3 (11-23-23 @ 16:46)  HR: 113 (11-23-23 @ 19:23) (109 - 116)  BP: 123/59 (11-23-23 @ 19:23) (99/52 - 123/59)  RR: 18 (11-23-23 @ 19:23) (18 - 18)  SpO2: 100% (11-23-23 @ 19:23) (98% - 100%)  Wt(kg): --  I&O's Summary    22 Nov 2023 07:01  -  23 Nov 2023 07:00  --------------------------------------------------------  IN: 920 mL / OUT: 1080 mL / NET: -160 mL    23 Nov 2023 07:01  -  23 Nov 2023 23:25  --------------------------------------------------------  IN: 560 mL / OUT: 700 mL / NET: -140 mL          Appearance: Normal	  HEENT:  PERRLA   Lymphatic: No lymphadenopathy   Cardiovascular: Normal S1 S2, no JVD  Respiratory: normal effort , clear  Gastrointestinal:  Soft, Non-tender  Skin: No rashes,  warm to touch  Psychiatry:  Mood & affect appropriate  Musculuskeletal: No edema    recent labs, Imaging and EKGs personally reviewed     11-22-23 @ 07:01  -  11-23-23 @ 07:00  --------------------------------------------------------  IN: 920 mL / OUT: 1080 mL / NET: -160 mL    11-23-23 @ 07:01  -  11-23-23 @ 23:25  --------------------------------------------------------  IN: 560 mL / OUT: 700 mL / NET: -140 mL                            7.3    13.46 )-----------( 216      ( 23 Nov 2023 15:43 )             23.4               11-23    136  |  100  |  8   ----------------------------<  230<H>  4.5   |  25  |  0.66    Ca    8.4      23 Nov 2023 05:52  Phos  3.2     11-23  Mg     2.30     11-23    TPro  6.5  /  Alb  3.2<L>  /  TBili  0.5  /  DBili  x   /  AST  24  /  ALT  20  /  AlkPhos  90  11-22                     ABG - ( 22 Nov 2023 11:35 )  pH, Arterial: 7.45  pH, Blood: x     /  pCO2: 40    /  pO2: 254   / HCO3: 28    / Base Excess: 3.5   /  SaO2: 100.0             Urinalysis Basic - ( 23 Nov 2023 05:52 )    Color: x / Appearance: x / SG: x / pH: x  Gluc: 230 mg/dL / Ketone: x  / Bili: x / Urobili: x   Blood: x / Protein: x / Nitrite: x   Leuk Esterase: x / RBC: x / WBC x   Sq Epi: x / Non Sq Epi: x / Bacteria: x

## 2023-11-24 LAB
ANION GAP SERPL CALC-SCNC: 9 MMOL/L — SIGNIFICANT CHANGE UP (ref 7–14)
ANION GAP SERPL CALC-SCNC: 9 MMOL/L — SIGNIFICANT CHANGE UP (ref 7–14)
BLD GP AB SCN SERPL QL: NEGATIVE — SIGNIFICANT CHANGE UP
BLD GP AB SCN SERPL QL: NEGATIVE — SIGNIFICANT CHANGE UP
BUN SERPL-MCNC: 6 MG/DL — LOW (ref 7–23)
BUN SERPL-MCNC: 6 MG/DL — LOW (ref 7–23)
CALCIUM SERPL-MCNC: 7.9 MG/DL — LOW (ref 8.4–10.5)
CALCIUM SERPL-MCNC: 7.9 MG/DL — LOW (ref 8.4–10.5)
CHLORIDE SERPL-SCNC: 99 MMOL/L — SIGNIFICANT CHANGE UP (ref 98–107)
CHLORIDE SERPL-SCNC: 99 MMOL/L — SIGNIFICANT CHANGE UP (ref 98–107)
CO2 SERPL-SCNC: 27 MMOL/L — SIGNIFICANT CHANGE UP (ref 22–31)
CO2 SERPL-SCNC: 27 MMOL/L — SIGNIFICANT CHANGE UP (ref 22–31)
CREAT SERPL-MCNC: 0.55 MG/DL — SIGNIFICANT CHANGE UP (ref 0.5–1.3)
CREAT SERPL-MCNC: 0.55 MG/DL — SIGNIFICANT CHANGE UP (ref 0.5–1.3)
EGFR: 97 ML/MIN/1.73M2 — SIGNIFICANT CHANGE UP
EGFR: 97 ML/MIN/1.73M2 — SIGNIFICANT CHANGE UP
GLUCOSE BLDC GLUCOMTR-MCNC: 133 MG/DL — HIGH (ref 70–99)
GLUCOSE BLDC GLUCOMTR-MCNC: 133 MG/DL — HIGH (ref 70–99)
GLUCOSE BLDC GLUCOMTR-MCNC: 147 MG/DL — HIGH (ref 70–99)
GLUCOSE BLDC GLUCOMTR-MCNC: 147 MG/DL — HIGH (ref 70–99)
GLUCOSE BLDC GLUCOMTR-MCNC: 161 MG/DL — HIGH (ref 70–99)
GLUCOSE BLDC GLUCOMTR-MCNC: 161 MG/DL — HIGH (ref 70–99)
GLUCOSE BLDC GLUCOMTR-MCNC: 163 MG/DL — HIGH (ref 70–99)
GLUCOSE BLDC GLUCOMTR-MCNC: 163 MG/DL — HIGH (ref 70–99)
GLUCOSE BLDC GLUCOMTR-MCNC: 171 MG/DL — HIGH (ref 70–99)
GLUCOSE BLDC GLUCOMTR-MCNC: 171 MG/DL — HIGH (ref 70–99)
GLUCOSE BLDC GLUCOMTR-MCNC: 200 MG/DL — HIGH (ref 70–99)
GLUCOSE BLDC GLUCOMTR-MCNC: 200 MG/DL — HIGH (ref 70–99)
GLUCOSE SERPL-MCNC: 151 MG/DL — HIGH (ref 70–99)
GLUCOSE SERPL-MCNC: 151 MG/DL — HIGH (ref 70–99)
HCT VFR BLD CALC: 19.6 % — CRITICAL LOW (ref 34.5–45)
HCT VFR BLD CALC: 19.6 % — CRITICAL LOW (ref 34.5–45)
HCT VFR BLD CALC: 20.3 % — CRITICAL LOW (ref 34.5–45)
HCT VFR BLD CALC: 20.3 % — CRITICAL LOW (ref 34.5–45)
HGB BLD-MCNC: 6.3 G/DL — CRITICAL LOW (ref 11.5–15.5)
HGB BLD-MCNC: 6.3 G/DL — CRITICAL LOW (ref 11.5–15.5)
HGB BLD-MCNC: 6.4 G/DL — CRITICAL LOW (ref 11.5–15.5)
HGB BLD-MCNC: 6.4 G/DL — CRITICAL LOW (ref 11.5–15.5)
MAGNESIUM SERPL-MCNC: 2 MG/DL — SIGNIFICANT CHANGE UP (ref 1.6–2.6)
MAGNESIUM SERPL-MCNC: 2 MG/DL — SIGNIFICANT CHANGE UP (ref 1.6–2.6)
MCHC RBC-ENTMCNC: 24.2 PG — LOW (ref 27–34)
MCHC RBC-ENTMCNC: 24.2 PG — LOW (ref 27–34)
MCHC RBC-ENTMCNC: 24.8 PG — LOW (ref 27–34)
MCHC RBC-ENTMCNC: 24.8 PG — LOW (ref 27–34)
MCHC RBC-ENTMCNC: 31.5 GM/DL — LOW (ref 32–36)
MCHC RBC-ENTMCNC: 31.5 GM/DL — LOW (ref 32–36)
MCHC RBC-ENTMCNC: 32.1 GM/DL — SIGNIFICANT CHANGE UP (ref 32–36)
MCHC RBC-ENTMCNC: 32.1 GM/DL — SIGNIFICANT CHANGE UP (ref 32–36)
MCV RBC AUTO: 76.9 FL — LOW (ref 80–100)
MCV RBC AUTO: 76.9 FL — LOW (ref 80–100)
MCV RBC AUTO: 77.2 FL — LOW (ref 80–100)
MCV RBC AUTO: 77.2 FL — LOW (ref 80–100)
NRBC # BLD: 0 /100 WBCS — SIGNIFICANT CHANGE UP (ref 0–0)
NRBC # FLD: 0 K/UL — SIGNIFICANT CHANGE UP (ref 0–0)
PHOSPHATE SERPL-MCNC: 1.5 MG/DL — LOW (ref 2.5–4.5)
PHOSPHATE SERPL-MCNC: 1.5 MG/DL — LOW (ref 2.5–4.5)
PLATELET # BLD AUTO: 190 K/UL — SIGNIFICANT CHANGE UP (ref 150–400)
PLATELET # BLD AUTO: 190 K/UL — SIGNIFICANT CHANGE UP (ref 150–400)
PLATELET # BLD AUTO: 191 K/UL — SIGNIFICANT CHANGE UP (ref 150–400)
PLATELET # BLD AUTO: 191 K/UL — SIGNIFICANT CHANGE UP (ref 150–400)
POTASSIUM SERPL-MCNC: 3.8 MMOL/L — SIGNIFICANT CHANGE UP (ref 3.5–5.3)
POTASSIUM SERPL-MCNC: 3.8 MMOL/L — SIGNIFICANT CHANGE UP (ref 3.5–5.3)
POTASSIUM SERPL-SCNC: 3.8 MMOL/L — SIGNIFICANT CHANGE UP (ref 3.5–5.3)
POTASSIUM SERPL-SCNC: 3.8 MMOL/L — SIGNIFICANT CHANGE UP (ref 3.5–5.3)
RBC # BLD: 2.54 M/UL — LOW (ref 3.8–5.2)
RBC # BLD: 2.54 M/UL — LOW (ref 3.8–5.2)
RBC # BLD: 2.64 M/UL — LOW (ref 3.8–5.2)
RBC # BLD: 2.64 M/UL — LOW (ref 3.8–5.2)
RBC # FLD: 16.5 % — HIGH (ref 10.3–14.5)
RBC # FLD: 16.5 % — HIGH (ref 10.3–14.5)
RBC # FLD: 16.6 % — HIGH (ref 10.3–14.5)
RBC # FLD: 16.6 % — HIGH (ref 10.3–14.5)
RH IG SCN BLD-IMP: POSITIVE — SIGNIFICANT CHANGE UP
RH IG SCN BLD-IMP: POSITIVE — SIGNIFICANT CHANGE UP
SODIUM SERPL-SCNC: 135 MMOL/L — SIGNIFICANT CHANGE UP (ref 135–145)
SODIUM SERPL-SCNC: 135 MMOL/L — SIGNIFICANT CHANGE UP (ref 135–145)
WBC # BLD: 14.28 K/UL — HIGH (ref 3.8–10.5)
WBC # BLD: 14.28 K/UL — HIGH (ref 3.8–10.5)
WBC # BLD: 15.24 K/UL — HIGH (ref 3.8–10.5)
WBC # BLD: 15.24 K/UL — HIGH (ref 3.8–10.5)
WBC # FLD AUTO: 14.28 K/UL — HIGH (ref 3.8–10.5)
WBC # FLD AUTO: 14.28 K/UL — HIGH (ref 3.8–10.5)
WBC # FLD AUTO: 15.24 K/UL — HIGH (ref 3.8–10.5)
WBC # FLD AUTO: 15.24 K/UL — HIGH (ref 3.8–10.5)

## 2023-11-24 PROCEDURE — 93010 ELECTROCARDIOGRAM REPORT: CPT

## 2023-11-24 PROCEDURE — 99232 SBSQ HOSP IP/OBS MODERATE 35: CPT

## 2023-11-24 RX ORDER — DEXTROSE 50 % IN WATER 50 %
25 SYRINGE (ML) INTRAVENOUS ONCE
Refills: 0 | Status: DISCONTINUED | OUTPATIENT
Start: 2023-11-24 | End: 2023-11-27

## 2023-11-24 RX ORDER — INSULIN DETEMIR 100/ML (3)
20 INSULIN PEN (ML) SUBCUTANEOUS AT BEDTIME
Refills: 0 | Status: DISCONTINUED | OUTPATIENT
Start: 2023-11-24 | End: 2023-11-25

## 2023-11-24 RX ORDER — SODIUM CHLORIDE 9 MG/ML
1000 INJECTION, SOLUTION INTRAVENOUS
Refills: 0 | Status: DISCONTINUED | OUTPATIENT
Start: 2023-11-24 | End: 2023-11-27

## 2023-11-24 RX ORDER — DEXTROSE 50 % IN WATER 50 %
15 SYRINGE (ML) INTRAVENOUS ONCE
Refills: 0 | Status: DISCONTINUED | OUTPATIENT
Start: 2023-11-24 | End: 2023-11-27

## 2023-11-24 RX ORDER — ATORVASTATIN CALCIUM 80 MG/1
10 TABLET, FILM COATED ORAL AT BEDTIME
Refills: 0 | Status: DISCONTINUED | OUTPATIENT
Start: 2023-11-24 | End: 2023-11-29

## 2023-11-24 RX ORDER — ACETAMINOPHEN 500 MG
1000 TABLET ORAL EVERY 8 HOURS
Refills: 0 | Status: COMPLETED | OUTPATIENT
Start: 2023-11-24 | End: 2023-11-25

## 2023-11-24 RX ORDER — GLUCAGON INJECTION, SOLUTION 0.5 MG/.1ML
1 INJECTION, SOLUTION SUBCUTANEOUS ONCE
Refills: 0 | Status: DISCONTINUED | OUTPATIENT
Start: 2023-11-24 | End: 2023-11-27

## 2023-11-24 RX ORDER — DEXTROSE 50 % IN WATER 50 %
12.5 SYRINGE (ML) INTRAVENOUS ONCE
Refills: 0 | Status: DISCONTINUED | OUTPATIENT
Start: 2023-11-24 | End: 2023-11-27

## 2023-11-24 RX ORDER — PANTOPRAZOLE SODIUM 20 MG/1
40 TABLET, DELAYED RELEASE ORAL ONCE
Refills: 0 | Status: COMPLETED | OUTPATIENT
Start: 2023-11-24 | End: 2023-11-24

## 2023-11-24 RX ORDER — DEXTROSE MONOHYDRATE, SODIUM CHLORIDE, AND POTASSIUM CHLORIDE 50; .745; 4.5 G/1000ML; G/1000ML; G/1000ML
1000 INJECTION, SOLUTION INTRAVENOUS
Refills: 0 | Status: DISCONTINUED | OUTPATIENT
Start: 2023-11-24 | End: 2023-11-30

## 2023-11-24 RX ORDER — POTASSIUM PHOSPHATE, MONOBASIC POTASSIUM PHOSPHATE, DIBASIC 236; 224 MG/ML; MG/ML
30 INJECTION, SOLUTION INTRAVENOUS ONCE
Refills: 0 | Status: COMPLETED | OUTPATIENT
Start: 2023-11-24 | End: 2023-11-24

## 2023-11-24 RX ADMIN — Medication 1000 MILLIGRAM(S): at 00:08

## 2023-11-24 RX ADMIN — Medication 1000 MILLIGRAM(S): at 19:00

## 2023-11-24 RX ADMIN — DEXTROSE MONOHYDRATE, SODIUM CHLORIDE, AND POTASSIUM CHLORIDE 50 MILLILITER(S): 50; .745; 4.5 INJECTION, SOLUTION INTRAVENOUS at 08:07

## 2023-11-24 RX ADMIN — ALBUTEROL 2 PUFF(S): 90 AEROSOL, METERED ORAL at 20:53

## 2023-11-24 RX ADMIN — SODIUM CHLORIDE 3 MILLILITER(S): 9 INJECTION INTRAMUSCULAR; INTRAVENOUS; SUBCUTANEOUS at 05:03

## 2023-11-24 RX ADMIN — SODIUM CHLORIDE 3 MILLILITER(S): 9 INJECTION INTRAMUSCULAR; INTRAVENOUS; SUBCUTANEOUS at 21:30

## 2023-11-24 RX ADMIN — Medication 20 UNIT(S): at 10:06

## 2023-11-24 RX ADMIN — HYDROMORPHONE HYDROCHLORIDE 30 MILLILITER(S): 2 INJECTION INTRAMUSCULAR; INTRAVENOUS; SUBCUTANEOUS at 07:34

## 2023-11-24 RX ADMIN — SODIUM CHLORIDE 3 MILLILITER(S): 9 INJECTION INTRAMUSCULAR; INTRAVENOUS; SUBCUTANEOUS at 13:02

## 2023-11-24 RX ADMIN — Medication 1000 MILLIGRAM(S): at 05:44

## 2023-11-24 RX ADMIN — Medication 400 MILLIGRAM(S): at 18:10

## 2023-11-24 RX ADMIN — POTASSIUM PHOSPHATE, MONOBASIC POTASSIUM PHOSPHATE, DIBASIC 83.33 MILLIMOLE(S): 236; 224 INJECTION, SOLUTION INTRAVENOUS at 09:50

## 2023-11-24 RX ADMIN — Medication 1: at 05:48

## 2023-11-24 RX ADMIN — PANTOPRAZOLE SODIUM 40 MILLIGRAM(S): 20 TABLET, DELAYED RELEASE ORAL at 05:17

## 2023-11-24 RX ADMIN — PANTOPRAZOLE SODIUM 40 MILLIGRAM(S): 20 TABLET, DELAYED RELEASE ORAL at 18:31

## 2023-11-24 RX ADMIN — HYDROMORPHONE HYDROCHLORIDE 30 MILLILITER(S): 2 INJECTION INTRAMUSCULAR; INTRAVENOUS; SUBCUTANEOUS at 19:27

## 2023-11-24 RX ADMIN — Medication 400 MILLIGRAM(S): at 05:13

## 2023-11-24 RX ADMIN — Medication 20 UNIT(S): at 23:01

## 2023-11-24 RX ADMIN — Medication 1000 MILLIGRAM(S): at 23:32

## 2023-11-24 RX ADMIN — CHLORHEXIDINE GLUCONATE 1 APPLICATION(S): 213 SOLUTION TOPICAL at 12:51

## 2023-11-24 RX ADMIN — BUDESONIDE AND FORMOTEROL FUMARATE DIHYDRATE 2 PUFF(S): 160; 4.5 AEROSOL RESPIRATORY (INHALATION) at 08:41

## 2023-11-24 RX ADMIN — BUDESONIDE AND FORMOTEROL FUMARATE DIHYDRATE 2 PUFF(S): 160; 4.5 AEROSOL RESPIRATORY (INHALATION) at 20:53

## 2023-11-24 RX ADMIN — ALBUTEROL 2 PUFF(S): 90 AEROSOL, METERED ORAL at 08:41

## 2023-11-24 RX ADMIN — Medication 1: at 00:01

## 2023-11-24 RX ADMIN — Medication 400 MILLIGRAM(S): at 23:02

## 2023-11-24 RX ADMIN — ATORVASTATIN CALCIUM 10 MILLIGRAM(S): 80 TABLET, FILM COATED ORAL at 23:02

## 2023-11-24 NOTE — PHYSICAL THERAPY INITIAL EVALUATION ADULT - MANUAL MUSCLE TESTING RESULTS, REHAB EVAL
surgical precautions; bilateral upper extremities at least 3+/5, Left lower extremity 3-/5, Right lower extremity 2-/5

## 2023-11-24 NOTE — PHYSICAL THERAPY INITIAL EVALUATION ADULT - ADDITIONAL COMMENTS
Pt reports that she lives in an apartment with her son with no steps to enter; elevator inside. Prior to hospital admission, pt was completely independent and used a rollator with ambulation. Pt denies any recent falls.    Pt left comfortable in bed, NAD, all lines intact, all precautions maintained, with call bell in reach, daughter (RN) @ bedside, and RN aware of PT evaluation and pt's pain.

## 2023-11-24 NOTE — PROGRESS NOTE ADULT - ASSESSMENT
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    Plan  - Diet: NPO  - IVF  - Pain control with PCA and tylenol  - Levemir 20 units; patient previously refusing  - Home meds restarted  - Holding HTN medications  - DVT prophylaxis    D team surgery [id number]  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    Plan  - Diet: NPO  - IVF  - Pain control with PCA and tylenol  - Levemir 20 units; patient previously refusing  - Home meds restarted  - Holding HTN medications  - DVT prophylaxis  - Appreciate endocrinology recs; plan to restart insulin pump on discharge    D team surgery [id number]  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    Plan  - Dc NGUYEN perez  - Diet: NPO  - IVF  - Pain control with PCA and tylenol  - Levemir 20 units; patient previously refusing  - Home meds restarted  - Holding HTN medications  - DVT prophylaxis  - Appreciate endocrinology recs; plan to restart insulin pump on discharge    D team surgery [id number]

## 2023-11-24 NOTE — PHYSICAL THERAPY INITIAL EVALUATION ADULT - ORIENTATION, REHAB EVAL
Detail Level: Detailed
oriented to person, place, time and situation
Detail Level: Zone
Detail Level: Generalized

## 2023-11-24 NOTE — PROVIDER CONTACT NOTE (CRITICAL VALUE NOTIFICATION) - ASSESSMENT
Patient lying in bed, Awake, alert and oriented x4. Able to make needs known. VSS. C/o abdominal discomfort. Dilaudid PCA in progress.

## 2023-11-24 NOTE — PHYSICAL THERAPY INITIAL EVALUATION ADULT - GENERAL OBSERVATIONS, REHAB EVAL
Pt encountered in semisupine position, no distress, AxOx4, with +IV, getting a blood transfusion, +cardiac monitor, +wound vac, and daughter @bedside. Pt agreeable to participate in PT evaluation.

## 2023-11-24 NOTE — PROGRESS NOTE ADULT - SUBJECTIVE AND OBJECTIVE BOX
Anesthesia Pain Management Service    SUBJECTIVE: Patient is doing well with IV PCA and no significant problems reported.    Pain Scale Score	At rest: ___ 	With Activity: ___ 	[X ] Refer to charted pain scores    THERAPY:    [ ] IV PCA Morphine		[ ] 5 mg/mL	[ ] 1 mg/mL  [X ] IV PCA Hydromorphone	[ ] 5 mg/mL	[X ] 1 mg/mL  [ ] IV PCA Fentanyl		[ ] 50 micrograms/mL    Demand dose __0.2_ lockout __6_ (minutes) Continuous Rate _0__ Total: _1.4__  mg used (in past 24 hours)      MEDICATIONS  (STANDING):  acetaminophen   IVPB .. 1000 milliGRAM(s) IV Intermittent every 6 hours  albuterol    90 MICROgram(s) HFA Inhaler 2 Puff(s) Inhalation two times a day  budesonide 160 MICROgram(s)/formoterol 4.5 MICROgram(s) Inhaler 2 Puff(s) Inhalation two times a day  chlorhexidine 2% Cloths 1 Application(s) Topical daily  HYDROmorphone PCA (1 mG/mL) 30 milliLiter(s) PCA Continuous PCA Continuous  influenza  Vaccine (HIGH DOSE) 0.7 milliLiter(s) IntraMuscular once  insulin lispro (ADMELOG) corrective regimen sliding scale   SubCutaneous every 6 hours  pantoprazole    Tablet 40 milliGRAM(s) Oral before breakfast  sodium chloride 0.9% lock flush 3 milliLiter(s) IV Push every 8 hours  sodium chloride 0.9% with potassium chloride 20 mEq/L 1000 milliLiter(s) (50 mL/Hr) IV Continuous <Continuous>    MEDICATIONS  (PRN):  naloxone Injectable 0.1 milliGRAM(s) IV Push every 3 minutes PRN For ANY of the following changes in patient status:  A. RR LESS THAN 10 breaths per minute, B. Oxygen saturation LESS THAN 90%, C. Sedation score of 6      OBJECTIVE: Patient lying in bed.    Sedation Score:	[ X] Alert	[ ] Drowsy 	[ ] Arousable	[ ] Asleep	[ ] Unresponsive    Side Effects:	[X ] None	[ ] Nausea	[ ] Vomiting	[ ] Pruritus  		[ ] Other:    Vital Signs Last 24 Hrs  T(C): 36.6 (24 Nov 2023 08:57), Max: 37.7 (24 Nov 2023 04:34)  T(F): 97.8 (24 Nov 2023 08:57), Max: 99.8 (24 Nov 2023 04:34)  HR: 111 (24 Nov 2023 08:57) (69 - 116)  BP: 101/56 (24 Nov 2023 08:57) (95/43 - 123/59)  BP(mean): --  RR: 18 (24 Nov 2023 08:57) (18 - 18)  SpO2: 100% (24 Nov 2023 08:57) (97% - 100%)    Parameters below as of 24 Nov 2023 08:57  Patient On (Oxygen Delivery Method): room air        ASSESSMENT/ PLAN    Therapy to  be:	[ X] Continue   [ ] Discontinued   [ ] Change to prn Analgesics    Documentation and Verification of current medications:   [X] Done	[ ] Not done, not elligible    Comments: Continue IV PCA. Recommend non-opioid adjuvant analgesics to be used when possible and when allowed by primary surgical team.    Progress Note written now but Patient was seen earlier.

## 2023-11-24 NOTE — PROGRESS NOTE ADULT - SUBJECTIVE AND OBJECTIVE BOX
TEAM [ D ] Surgery Daily Progress Note  =====================================================    SUBJECTIVE: Patient seen and examined at bedside on AM rounds. Patient reports that they're feeling well. Denies fever, chills, N/V, chest pain, SOB    ALLERGIES:  Darvocet A500 (Other; Urticaria)  Percocet 10/325 (Other; Urticaria)  codeine (Other)  Lantus (Swelling)  PURELL.  pt said, &quot;I felt my airway closing&quot; after she smelled the Purell. The incident occured at the pulmonologist office. (Other; Short breath)      --------------------------------------------------------------------------------------    MEDICATIONS:    Neurologic Medications  acetaminophen   IVPB .. 1000 milliGRAM(s) IV Intermittent every 6 hours  HYDROmorphone PCA (1 mG/mL) 30 milliLiter(s) PCA Continuous PCA Continuous  ondansetron Injectable 4 milliGRAM(s) IV Push every 6 hours PRN Nausea    Respiratory Medications  albuterol    90 MICROgram(s) HFA Inhaler 2 Puff(s) Inhalation two times a day  budesonide 160 MICROgram(s)/formoterol 4.5 MICROgram(s) Inhaler 2 Puff(s) Inhalation two times a day    Cardiovascular Medications    Gastrointestinal Medications  dextrose 5%. 1000 milliLiter(s) IV Continuous <Continuous>  dextrose 5%. 1000 milliLiter(s) IV Continuous <Continuous>  lactated ringers. 1000 milliLiter(s) IV Continuous <Continuous>  pantoprazole    Tablet 40 milliGRAM(s) Oral before breakfast  sodium chloride 0.9% lock flush 3 milliLiter(s) IV Push every 8 hours    Genitourinary Medications    Hematologic/Oncologic Medications  enoxaparin Injectable 40 milliGRAM(s) SubCutaneous every 24 hours  influenza  Vaccine (HIGH DOSE) 0.7 milliLiter(s) IntraMuscular once    Antimicrobial/Immunologic Medications    Endocrine/Metabolic Medications  dextrose 50% Injectable 25 Gram(s) IV Push once  dextrose 50% Injectable 12.5 Gram(s) IV Push once  dextrose 50% Injectable 25 Gram(s) IV Push once  dextrose Oral Gel 15 Gram(s) Oral once PRN Blood Glucose LESS THAN 70 milliGRAM(s)/deciliter  glucagon  Injectable 1 milliGRAM(s) IntraMuscular once  glucagon  Injectable 1 milliGRAM(s) IntraMuscular once  insulin detemir injectable (LEVEMIR) 20 Unit(s) SubCutaneous once  insulin lispro (ADMELOG) corrective regimen sliding scale   SubCutaneous every 6 hours  insulin lispro (ADMELOG) corrective regimen sliding scale   SubCutaneous at bedtime    Topical/Other Medications  chlorhexidine 2% Cloths 1 Application(s) Topical daily  naloxone Injectable 0.1 milliGRAM(s) IV Push every 3 minutes PRN For ANY of the following changes in patient status:  A. RR LESS THAN 10 breaths per minute, B. Oxygen saturation LESS THAN 90%, C. Sedation score of 6    --------------------------------------------------------------------------------------    VITAL SIGNS:  T(C): 37.7 (11-24-23 @ 04:34), Max: 37.7 (11-24-23 @ 04:34)  HR: 108 (11-24-23 @ 04:34) (69 - 116)  BP: 96/54 (11-24-23 @ 04:34) (95/43 - 123/59)  RR: 18 (11-24-23 @ 04:34) (18 - 18)  SpO2: 99% (11-24-23 @ 04:34) (97% - 100%)  --------------------------------------------------------------------------------------    INS AND OUTS:    11-22-23 @ 07:01  -  11-23-23 @ 07:00  --------------------------------------------------------  IN: 920 mL / OUT: 1080 mL / NET: -160 mL    11-23-23 @ 07:01  -  11-24-23 @ 05:56  --------------------------------------------------------  IN: 1460 mL / OUT: 1200 mL / NET: 260 mL      --------------------------------------------------------------------------------------      EXAM    General: NAD, resting in bed comfortably.  Cardiac: regular rate, warm and well perfused  Respiratory: Nonlabored respirations, normal cw expansion.  Abdomen: soft, nontender, nondistended. ___ incision is c/d/i, ostomy, NGT, chris.   Extremities: normal strength, FROM, no deformities    --------------------------------------------------------------------------------------    LABS       TEAM [ D ] Surgery Daily Progress Note  =====================================================    SUBJECTIVE: Patient seen and examined at bedside on AM rounds. Patient reports that they're feeling well. She is not passing gas nor having bowel movements. She is tolerating sips of water and apple juice. Reports improved surgical incisional pain with PCA. Denies fever, chills, N/V, chest pain, SOB    ALLERGIES:  Darvocet A500 (Other; Urticaria)  Percocet 10/325 (Other; Urticaria)  codeine (Other)  Lantus (Swelling)  PURELL.  pt said, &quot;I felt my airway closing&quot; after she smelled the Purell. The incident occured at the pulmonologist office. (Other; Short breath)      --------------------------------------------------------------------------------------    MEDICATIONS:    Neurologic Medications  acetaminophen   IVPB .. 1000 milliGRAM(s) IV Intermittent every 6 hours  HYDROmorphone PCA (1 mG/mL) 30 milliLiter(s) PCA Continuous PCA Continuous  ondansetron Injectable 4 milliGRAM(s) IV Push every 6 hours PRN Nausea    Respiratory Medications  albuterol    90 MICROgram(s) HFA Inhaler 2 Puff(s) Inhalation two times a day  budesonide 160 MICROgram(s)/formoterol 4.5 MICROgram(s) Inhaler 2 Puff(s) Inhalation two times a day    Cardiovascular Medications    Gastrointestinal Medications  dextrose 5%. 1000 milliLiter(s) IV Continuous <Continuous>  dextrose 5%. 1000 milliLiter(s) IV Continuous <Continuous>  lactated ringers. 1000 milliLiter(s) IV Continuous <Continuous>  pantoprazole    Tablet 40 milliGRAM(s) Oral before breakfast  sodium chloride 0.9% lock flush 3 milliLiter(s) IV Push every 8 hours    Genitourinary Medications    Hematologic/Oncologic Medications  enoxaparin Injectable 40 milliGRAM(s) SubCutaneous every 24 hours  influenza  Vaccine (HIGH DOSE) 0.7 milliLiter(s) IntraMuscular once    Antimicrobial/Immunologic Medications    Endocrine/Metabolic Medications  dextrose 50% Injectable 25 Gram(s) IV Push once  dextrose 50% Injectable 12.5 Gram(s) IV Push once  dextrose 50% Injectable 25 Gram(s) IV Push once  dextrose Oral Gel 15 Gram(s) Oral once PRN Blood Glucose LESS THAN 70 milliGRAM(s)/deciliter  glucagon  Injectable 1 milliGRAM(s) IntraMuscular once  glucagon  Injectable 1 milliGRAM(s) IntraMuscular once  insulin detemir injectable (LEVEMIR) 20 Unit(s) SubCutaneous once  insulin lispro (ADMELOG) corrective regimen sliding scale   SubCutaneous every 6 hours  insulin lispro (ADMELOG) corrective regimen sliding scale   SubCutaneous at bedtime    Topical/Other Medications  chlorhexidine 2% Cloths 1 Application(s) Topical daily  naloxone Injectable 0.1 milliGRAM(s) IV Push every 3 minutes PRN For ANY of the following changes in patient status:  A. RR LESS THAN 10 breaths per minute, B. Oxygen saturation LESS THAN 90%, C. Sedation score of 6    --------------------------------------------------------------------------------------    VITAL SIGNS:  T(C): 37.7 (11-24-23 @ 04:34), Max: 37.7 (11-24-23 @ 04:34)  HR: 108 (11-24-23 @ 04:34) (69 - 116)  BP: 96/54 (11-24-23 @ 04:34) (95/43 - 123/59)  RR: 18 (11-24-23 @ 04:34) (18 - 18)  SpO2: 99% (11-24-23 @ 04:34) (97% - 100%)  --------------------------------------------------------------------------------------    INS AND OUTS:    11-22-23 @ 07:01  -  11-23-23 @ 07:00  --------------------------------------------------------  IN: 920 mL / OUT: 1080 mL / NET: -160 mL    11-23-23 @ 07:01  -  11-24-23 @ 05:56  --------------------------------------------------------  IN: 1460 mL / OUT: 1200 mL / NET: 260 mL      --------------------------------------------------------------------------------------      EXAM    General: NAD, resting in bed comfortably.  Cardiac: regular rate, warm and well perfused  Respiratory: Nonlabored respirations, normal cw expansion.  Abdomen: soft, nontender, nondistended, midline prevena  Extremities: normal strength, FROM, no deformities    --------------------------------------------------------------------------------------    LABS                        7.3    13.46 )-----------( 216      ( 23 Nov 2023 15:43 )             23.4   11-24    135  |  99  |  6<L>  ----------------------------<  151<H>  3.8   |  27  |  0.55    Ca    7.9<L>      24 Nov 2023 05:47  Phos  1.5     11-24  Mg     2.00     11-24    TPro  6.5  /  Alb  3.2<L>  /  TBili  0.5  /  DBili  x   /  AST  24  /  ALT  20  /  AlkPhos  90  11-22

## 2023-11-24 NOTE — PHYSICAL THERAPY INITIAL EVALUATION ADULT - DIAGNOSIS, PT EVAL
Pt s/p Right hemicolectomy on 11/22/2023; pt presents with decreased strength and decreased functional mobility.

## 2023-11-24 NOTE — PROGRESS NOTE ADULT - SUBJECTIVE AND OBJECTIVE BOX
Name of Patient : NESTOR MABRY  MRN: [id number]  Date of visit: 11-24-23       Subjective: Patient seen and examined. No new events except as noted.   Drop in hgb lvel   1 unit PRBC     REVIEW OF SYSTEMS:    CONSTITUTIONAL: No weakness, fevers or chills  EYES/ENT: No visual changes;  No vertigo or throat pain   NECK: No pain or stiffness  RESPIRATORY: No cough, wheezing, hemoptysis; No shortness of breath  CARDIOVASCULAR: No chest pain or palpitations  GASTROINTESTINAL: No abdominal or epigastric pain. No nausea, vomiting, or hematemesis; No diarrhea or constipation. No melena or hematochezia.  GENITOURINARY: No dysuria, frequency or hematuria  NEUROLOGICAL: No numbness or weakness  SKIN: No itching, burning, rashes, or lesions   All other review of systems is negative unless indicated above.    MEDICATIONS:  MEDICATIONS  (STANDING):  acetaminophen   IVPB .. 1000 milliGRAM(s) IV Intermittent every 8 hours  albuterol    90 MICROgram(s) HFA Inhaler 2 Puff(s) Inhalation two times a day  atorvastatin 10 milliGRAM(s) Oral at bedtime  budesonide 160 MICROgram(s)/formoterol 4.5 MICROgram(s) Inhaler 2 Puff(s) Inhalation two times a day  chlorhexidine 2% Cloths 1 Application(s) Topical daily  dextrose 5%. 1000 milliLiter(s) (50 mL/Hr) IV Continuous <Continuous>  dextrose 5%. 1000 milliLiter(s) (100 mL/Hr) IV Continuous <Continuous>  dextrose 50% Injectable 25 Gram(s) IV Push once  dextrose 50% Injectable 25 Gram(s) IV Push once  dextrose 50% Injectable 12.5 Gram(s) IV Push once  glucagon  Injectable 1 milliGRAM(s) IntraMuscular once  HYDROmorphone PCA (1 mG/mL) 30 milliLiter(s) PCA Continuous PCA Continuous  influenza  Vaccine (HIGH DOSE) 0.7 milliLiter(s) IntraMuscular once  insulin detemir injectable (LEVEMIR) 20 Unit(s) SubCutaneous at bedtime  insulin lispro (ADMELOG) corrective regimen sliding scale   SubCutaneous every 6 hours  pantoprazole    Tablet 40 milliGRAM(s) Oral before breakfast  sodium chloride 0.9% lock flush 3 milliLiter(s) IV Push every 8 hours  sodium chloride 0.9% with potassium chloride 20 mEq/L 1000 milliLiter(s) (50 mL/Hr) IV Continuous <Continuous>      PHYSICAL EXAM:  T(C): 37 (11-24-23 @ 16:22), Max: 37.7 (11-24-23 @ 04:34)  HR: 108 (11-24-23 @ 16:22) (69 - 113)  BP: 127/55 (11-24-23 @ 16:22) (95/43 - 127/55)  RR: 18 (11-24-23 @ 16:22) (18 - 18)  SpO2: 100% (11-24-23 @ 16:22) (97% - 100%)  Wt(kg): --  I&O's Summary    23 Nov 2023 07:01  -  24 Nov 2023 07:00  --------------------------------------------------------  IN: 1460 mL / OUT: 1200 mL / NET: 260 mL    24 Nov 2023 07:01  -  24 Nov 2023 17:17  --------------------------------------------------------  IN: 1250 mL / OUT: 895 mL / NET: 355 mL          Appearance: Normal	  HEENT:  PERRLA   Lymphatic: No lymphadenopathy   Cardiovascular: Normal S1 S2, no JVD  Respiratory: normal effort , clear  Gastrointestinal:  Soft, Non-tender  Skin: No rashes,  warm to touch  Psychiatry:  Mood & affect appropriate  Musculuskeletal: No edema    recent labs, Imaging and EKGs personally reviewed     11-23-23 @ 07:01  -  11-24-23 @ 07:00  --------------------------------------------------------  IN: 1460 mL / OUT: 1200 mL / NET: 260 mL    11-24-23 @ 07:01  -  11-24-23 @ 17:17  --------------------------------------------------------  IN: 1250 mL / OUT: 895 mL / NET: 355 mL                          6.4    15.24 )-----------( 191      ( 24 Nov 2023 09:39 )             20.3               11-24    135  |  99  |  6<L>  ----------------------------<  151<H>  3.8   |  27  |  0.55    Ca    7.9<L>      24 Nov 2023 05:47  Phos  1.5     11-24  Mg     2.00     11-24                         Urinalysis Basic - ( 24 Nov 2023 05:47 )    Color: x / Appearance: x / SG: x / pH: x  Gluc: 151 mg/dL / Ketone: x  / Bili: x / Urobili: x   Blood: x / Protein: x / Nitrite: x   Leuk Esterase: x / RBC: x / WBC x   Sq Epi: x / Non Sq Epi: x / Bacteria: x

## 2023-11-24 NOTE — CHART NOTE - NSCHARTNOTEFT_GEN_A_CORE
Patient s/p robotic assisted right hemicolectomy 11/22 with downtrending H/H x 4 CBCs now requiring blood transfusion. This anemia/blood loss is a consequence of patient's surgery 2 days ago. Patient signed consent for her operation as well as for blood transfusion as a result of blood loss from said operation. Will transfuse patient 2uPRBC today with her permission as signed on operative consent.     D Team Surgery  [id number]

## 2023-11-24 NOTE — PROGRESS NOTE ADULT - SUBJECTIVE AND OBJECTIVE BOX
Chief Complaint/Follow-up on:   DM    Subjective:    continues to be NPO  better glucose control now with basal insulin  POC blood glucose and insulin use reviewed.  pt feels well   no n/v  tolerating PO   we spoke about inpt DM management goals and monitoring and also the need to fu outpt for DM         MEDICATIONS  (STANDING):  acetaminophen   IVPB .. 1000 milliGRAM(s) IV Intermittent every 8 hours  albuterol    90 MICROgram(s) HFA Inhaler 2 Puff(s) Inhalation two times a day  atorvastatin 10 milliGRAM(s) Oral at bedtime  budesonide 160 MICROgram(s)/formoterol 4.5 MICROgram(s) Inhaler 2 Puff(s) Inhalation two times a day  chlorhexidine 2% Cloths 1 Application(s) Topical daily  dextrose 5%. 1000 milliLiter(s) (50 mL/Hr) IV Continuous <Continuous>  dextrose 5%. 1000 milliLiter(s) (100 mL/Hr) IV Continuous <Continuous>  dextrose 50% Injectable 25 Gram(s) IV Push once  dextrose 50% Injectable 25 Gram(s) IV Push once  dextrose 50% Injectable 12.5 Gram(s) IV Push once  glucagon  Injectable 1 milliGRAM(s) IntraMuscular once  HYDROmorphone PCA (1 mG/mL) 30 milliLiter(s) PCA Continuous PCA Continuous  influenza  Vaccine (HIGH DOSE) 0.7 milliLiter(s) IntraMuscular once  insulin detemir injectable (LEVEMIR) 20 Unit(s) SubCutaneous at bedtime  insulin lispro (ADMELOG) corrective regimen sliding scale   SubCutaneous every 6 hours  pantoprazole    Tablet 40 milliGRAM(s) Oral before breakfast  sodium chloride 0.9% lock flush 3 milliLiter(s) IV Push every 8 hours  sodium chloride 0.9% with potassium chloride 20 mEq/L 1000 milliLiter(s) (50 mL/Hr) IV Continuous <Continuous>    MEDICATIONS  (PRN):  dextrose Oral Gel 15 Gram(s) Oral once PRN Blood Glucose LESS THAN 70 milliGRAM(s)/deciliter  naloxone Injectable 0.1 milliGRAM(s) IV Push every 3 minutes PRN For ANY of the following changes in patient status:  A. RR LESS THAN 10 breaths per minute, B. Oxygen saturation LESS THAN 90%, C. Sedation score of 6      PHYSICAL EXAM:  VITALS: T(C): 37 (11-24-23 @ 16:22)  T(F): 98.6 (11-24-23 @ 16:22), Max: 99.8 (11-24-23 @ 04:34)  HR: 108 (11-24-23 @ 16:22) (69 - 113)  BP: 127/55 (11-24-23 @ 16:22) (95/43 - 127/55)  RR:  (18 - 18)  SpO2:  (97% - 100%)  Wt(kg): --  GENERAL: NAD, well-groomed, well-developed  EYES: No proptosis, no injection  HEENT:  Atraumatic, Normocephalic, moist mucous membranes  RESPIRATORY: no resp distress  no peripheral edema  psych: alert and oriented X3    POCT Blood Glucose.: 147 mg/dL (11-24-23 @ 17:18)  POCT Blood Glucose.: 161 mg/dL (11-24-23 @ 10:01)  POCT Blood Glucose.: 163 mg/dL (11-24-23 @ 07:59)  POCT Blood Glucose.: 171 mg/dL (11-24-23 @ 05:36)  POCT Blood Glucose.: 200 mg/dL (11-24-23 @ 02:32)  POCT Blood Glucose.: 200 mg/dL (11-23-23 @ 23:51)  POCT Blood Glucose.: 266 mg/dL (11-23-23 @ 17:19)  POCT Blood Glucose.: 286 mg/dL (11-23-23 @ 14:33)  POCT Blood Glucose.: 270 mg/dL (11-23-23 @ 11:51)  POCT Blood Glucose.: 253 mg/dL (11-23-23 @ 07:58)  POCT Blood Glucose.: 194 mg/dL (11-22-23 @ 21:56)  POCT Blood Glucose.: 181 mg/dL (11-22-23 @ 18:22)  POCT Blood Glucose.: 208 mg/dL (11-22-23 @ 15:46)  POCT Blood Glucose.: 173 mg/dL (11-22-23 @ 13:37)    11-24    135  |  99  |  6<L>  ----------------------------<  151<H>  3.8   |  27  |  0.55    eGFR: 97    Ca    7.9<L>      11-24  Mg     2.00     11-24  Phos  1.5     11-24    TPro  6.5  /  Alb  3.2<L>  /  TBili  0.5  /  DBili  x   /  AST  24  /  ALT  20  /  AlkPhos  90  11-22          Thyroid Function Tests:

## 2023-11-24 NOTE — PHYSICAL THERAPY INITIAL EVALUATION ADULT - PERTINENT HX OF CURRENT PROBLEM, REHAB EVAL
73 yr old female underwent screening colonoscopy 10/24/2023 demonstrated a 3 cm non-obstructing, partially circumferential mass in the cecum and a 5 mm polyp in the ascending colon, Pathology: cecal mass showed moderately differentiated adenocarcinoma, with extensive high grade dysplasia. Scheduled for robotic partial colectomy, possible open, denies bowels changes, abdominal pain or weight loss

## 2023-11-24 NOTE — PHYSICAL THERAPY INITIAL EVALUATION ADULT - PATIENT PROFILE REVIEW, REHAB EVAL
No formal Activity Order in the computer; spoke with MARLA Marley prior to PT evaluation--> Pt OK for PT consult; vitals taken; /58mmHg, heart rate 109bpm/yes

## 2023-11-24 NOTE — PROGRESS NOTE ADULT - ASSESSMENT
Patient is a 73 yr old female PMH HTN, HLD, asthma, recent NST 11/17/23 with no ischemia or infarct and normal LV function and recent TTE 10/2023 with Normal LV/RV function, who was found with cecal mass/ new dx adenocarcinoma s/p Right hemicolectomy 11/22.  Pt reports incision site pain and increased distention.     # Adenocarcinoma  S/P OR , R hemicolectomy  pain control   IV hydration  Surg management appreciated   monitor electrolytes     # Anemia   Monitor hgb level  transfuse 1 unit PRBC  defer to surg        # HTN   resume BPmeds when tolerating   Monitor     # DM  sliding scale  Endo follow up apreciated     # Asthma     # HLD

## 2023-11-24 NOTE — PROGRESS NOTE ADULT - ASSESSMENT
This is a 72 yo F /w a PMH of DM2 on an insulin pump and colorectal cancer s/p right hemicolectomy today. Endocrinology consulted for diabetes management and DM2. Based on current insulin pump settings, suspect patient is being over-basalized given high basal rates compared to the insulin:carb ratio    #DM2 A1c 6.8%  #insulin pump - would hold of on using the insulin pump at this time given history of possibly frequent overnight hypoglycemia  also current clinical status of her sleepyness and being tired concerns me that she may not be able to function pump well at this time  additioanlly her PO status is variable and would require adjustments in her pump - do not suspect pt can self manage this right now given her sleepyness   BC she is NPO:  FSBG q6hrs   lower to  levemir 15 units for tomorrow 10am - weight based    pt needs a basal insulin even in light of NPO as she was  running high   -c/w with low dose admelog correction scale every 6 hours while NPO      monitor for hypoglycemia while NPO  FOR HYPOGLYCEMIA  *BG <100  1 amp D50 IV push, followed by  Dextrose IV infusion titrated to maintain target - 180 mg/dL- monitor fluid status   (please place on the pump for IVF with dextrose so it will run at a set rate   NOTE: Avoid dextrose containing solutions except for treatment of hypoglycemia.      if needing dextrose, will need to lower insulin regimen  inform endocrine if this occurs     if on clears:  c/w levemir 15- 20 units until we titrate it further in the am tomorrow   -low admelog correction scales before meals if on clears (diabetic clears)  -low admelog scales before bedtime  -FSG before meals and before bedtime  -Carb consistent diet once able to tolerate  -hypoglycemia protocol in place if needed              #Discharge  -follow up with Dr. Anand  -basal bolus vs resume insulin pump on discharge, likely decrease some basal settings overnight    #HTN  -BP target <130/80  -once stable post-op, consider resuming losartan 25mg daily and hctz 12.5mg daily- management as per primary team  outpt mc/cr ratio     #HLD  -can resume pravastatin 40mg daily on discharge if no contraindication  -can repeat lipid profile as outpatient    primary [facility]  Attending Physician   Department of Endocrinology, Diabetes and Metabolism     weekdays: 9am to 5pm: email [facility] or [facility] and or TEAMS     Nights and weekends: [phone]  Please note that this patient may be followed by a different provider tomorrow.   If no answer or after hours, please contact [phone].  For final dc reccomendations, please call [phone] or page the endocrine fellow on call.

## 2023-11-25 LAB
ANION GAP SERPL CALC-SCNC: 8 MMOL/L — SIGNIFICANT CHANGE UP (ref 7–14)
ANION GAP SERPL CALC-SCNC: 8 MMOL/L — SIGNIFICANT CHANGE UP (ref 7–14)
BUN SERPL-MCNC: 6 MG/DL — LOW (ref 7–23)
BUN SERPL-MCNC: 6 MG/DL — LOW (ref 7–23)
CALCIUM SERPL-MCNC: 8.3 MG/DL — LOW (ref 8.4–10.5)
CALCIUM SERPL-MCNC: 8.3 MG/DL — LOW (ref 8.4–10.5)
CHLORIDE SERPL-SCNC: 104 MMOL/L — SIGNIFICANT CHANGE UP (ref 98–107)
CHLORIDE SERPL-SCNC: 104 MMOL/L — SIGNIFICANT CHANGE UP (ref 98–107)
CO2 SERPL-SCNC: 27 MMOL/L — SIGNIFICANT CHANGE UP (ref 22–31)
CO2 SERPL-SCNC: 27 MMOL/L — SIGNIFICANT CHANGE UP (ref 22–31)
CREAT SERPL-MCNC: 0.48 MG/DL — LOW (ref 0.5–1.3)
CREAT SERPL-MCNC: 0.48 MG/DL — LOW (ref 0.5–1.3)
EGFR: 101 ML/MIN/1.73M2 — SIGNIFICANT CHANGE UP
EGFR: 101 ML/MIN/1.73M2 — SIGNIFICANT CHANGE UP
GLUCOSE BLDC GLUCOMTR-MCNC: 113 MG/DL — HIGH (ref 70–99)
GLUCOSE BLDC GLUCOMTR-MCNC: 113 MG/DL — HIGH (ref 70–99)
GLUCOSE BLDC GLUCOMTR-MCNC: 146 MG/DL — HIGH (ref 70–99)
GLUCOSE BLDC GLUCOMTR-MCNC: 146 MG/DL — HIGH (ref 70–99)
GLUCOSE BLDC GLUCOMTR-MCNC: 85 MG/DL — SIGNIFICANT CHANGE UP (ref 70–99)
GLUCOSE BLDC GLUCOMTR-MCNC: 85 MG/DL — SIGNIFICANT CHANGE UP (ref 70–99)
GLUCOSE BLDC GLUCOMTR-MCNC: 88 MG/DL — SIGNIFICANT CHANGE UP (ref 70–99)
GLUCOSE BLDC GLUCOMTR-MCNC: 88 MG/DL — SIGNIFICANT CHANGE UP (ref 70–99)
GLUCOSE BLDC GLUCOMTR-MCNC: 94 MG/DL — SIGNIFICANT CHANGE UP (ref 70–99)
GLUCOSE BLDC GLUCOMTR-MCNC: 94 MG/DL — SIGNIFICANT CHANGE UP (ref 70–99)
GLUCOSE SERPL-MCNC: 79 MG/DL — SIGNIFICANT CHANGE UP (ref 70–99)
GLUCOSE SERPL-MCNC: 79 MG/DL — SIGNIFICANT CHANGE UP (ref 70–99)
HCT VFR BLD CALC: 30.5 % — LOW (ref 34.5–45)
HCT VFR BLD CALC: 30.5 % — LOW (ref 34.5–45)
HCT VFR BLD CALC: 31.3 % — LOW (ref 34.5–45)
HCT VFR BLD CALC: 31.3 % — LOW (ref 34.5–45)
HGB BLD-MCNC: 10.5 G/DL — LOW (ref 11.5–15.5)
HGB BLD-MCNC: 10.5 G/DL — LOW (ref 11.5–15.5)
HGB BLD-MCNC: 9.8 G/DL — LOW (ref 11.5–15.5)
HGB BLD-MCNC: 9.8 G/DL — LOW (ref 11.5–15.5)
MAGNESIUM SERPL-MCNC: 2.2 MG/DL — SIGNIFICANT CHANGE UP (ref 1.6–2.6)
MAGNESIUM SERPL-MCNC: 2.2 MG/DL — SIGNIFICANT CHANGE UP (ref 1.6–2.6)
MCHC RBC-ENTMCNC: 24.8 PG — LOW (ref 27–34)
MCHC RBC-ENTMCNC: 24.8 PG — LOW (ref 27–34)
MCHC RBC-ENTMCNC: 25.4 PG — LOW (ref 27–34)
MCHC RBC-ENTMCNC: 25.4 PG — LOW (ref 27–34)
MCHC RBC-ENTMCNC: 32.1 GM/DL — SIGNIFICANT CHANGE UP (ref 32–36)
MCHC RBC-ENTMCNC: 32.1 GM/DL — SIGNIFICANT CHANGE UP (ref 32–36)
MCHC RBC-ENTMCNC: 33.5 GM/DL — SIGNIFICANT CHANGE UP (ref 32–36)
MCHC RBC-ENTMCNC: 33.5 GM/DL — SIGNIFICANT CHANGE UP (ref 32–36)
MCV RBC AUTO: 75.8 FL — LOW (ref 80–100)
MCV RBC AUTO: 75.8 FL — LOW (ref 80–100)
MCV RBC AUTO: 77.2 FL — LOW (ref 80–100)
MCV RBC AUTO: 77.2 FL — LOW (ref 80–100)
NRBC # BLD: 0 /100 WBCS — SIGNIFICANT CHANGE UP (ref 0–0)
NRBC # FLD: 0.02 K/UL — HIGH (ref 0–0)
PHOSPHATE SERPL-MCNC: 2.2 MG/DL — LOW (ref 2.5–4.5)
PHOSPHATE SERPL-MCNC: 2.2 MG/DL — LOW (ref 2.5–4.5)
PLATELET # BLD AUTO: 191 K/UL — SIGNIFICANT CHANGE UP (ref 150–400)
PLATELET # BLD AUTO: 191 K/UL — SIGNIFICANT CHANGE UP (ref 150–400)
PLATELET # BLD AUTO: 207 K/UL — SIGNIFICANT CHANGE UP (ref 150–400)
PLATELET # BLD AUTO: 207 K/UL — SIGNIFICANT CHANGE UP (ref 150–400)
POTASSIUM SERPL-MCNC: 3.8 MMOL/L — SIGNIFICANT CHANGE UP (ref 3.5–5.3)
POTASSIUM SERPL-MCNC: 3.8 MMOL/L — SIGNIFICANT CHANGE UP (ref 3.5–5.3)
POTASSIUM SERPL-SCNC: 3.8 MMOL/L — SIGNIFICANT CHANGE UP (ref 3.5–5.3)
POTASSIUM SERPL-SCNC: 3.8 MMOL/L — SIGNIFICANT CHANGE UP (ref 3.5–5.3)
RBC # BLD: 3.95 M/UL — SIGNIFICANT CHANGE UP (ref 3.8–5.2)
RBC # BLD: 3.95 M/UL — SIGNIFICANT CHANGE UP (ref 3.8–5.2)
RBC # BLD: 4.13 M/UL — SIGNIFICANT CHANGE UP (ref 3.8–5.2)
RBC # BLD: 4.13 M/UL — SIGNIFICANT CHANGE UP (ref 3.8–5.2)
RBC # FLD: 15.9 % — HIGH (ref 10.3–14.5)
RBC # FLD: 15.9 % — HIGH (ref 10.3–14.5)
RBC # FLD: 16.4 % — HIGH (ref 10.3–14.5)
RBC # FLD: 16.4 % — HIGH (ref 10.3–14.5)
SODIUM SERPL-SCNC: 139 MMOL/L — SIGNIFICANT CHANGE UP (ref 135–145)
SODIUM SERPL-SCNC: 139 MMOL/L — SIGNIFICANT CHANGE UP (ref 135–145)
WBC # BLD: 16.26 K/UL — HIGH (ref 3.8–10.5)
WBC # BLD: 16.26 K/UL — HIGH (ref 3.8–10.5)
WBC # BLD: 18.37 K/UL — HIGH (ref 3.8–10.5)
WBC # BLD: 18.37 K/UL — HIGH (ref 3.8–10.5)
WBC # FLD AUTO: 16.26 K/UL — HIGH (ref 3.8–10.5)
WBC # FLD AUTO: 16.26 K/UL — HIGH (ref 3.8–10.5)
WBC # FLD AUTO: 18.37 K/UL — HIGH (ref 3.8–10.5)
WBC # FLD AUTO: 18.37 K/UL — HIGH (ref 3.8–10.5)

## 2023-11-25 PROCEDURE — 99233 SBSQ HOSP IP/OBS HIGH 50: CPT

## 2023-11-25 RX ORDER — DEXTROSE 50 % IN WATER 50 %
12.5 SYRINGE (ML) INTRAVENOUS ONCE
Refills: 0 | Status: DISCONTINUED | OUTPATIENT
Start: 2023-11-25 | End: 2023-11-27

## 2023-11-25 RX ORDER — ENOXAPARIN SODIUM 100 MG/ML
40 INJECTION SUBCUTANEOUS EVERY 24 HOURS
Refills: 0 | Status: DISCONTINUED | OUTPATIENT
Start: 2023-11-25 | End: 2023-12-05

## 2023-11-25 RX ORDER — INSULIN DETEMIR 100/ML (3)
15 INSULIN PEN (ML) SUBCUTANEOUS ONCE
Refills: 0 | Status: DISCONTINUED | OUTPATIENT
Start: 2023-11-25 | End: 2023-11-25

## 2023-11-25 RX ORDER — INSULIN LISPRO 100/ML
VIAL (ML) SUBCUTANEOUS
Refills: 0 | Status: DISCONTINUED | OUTPATIENT
Start: 2023-11-25 | End: 2023-11-27

## 2023-11-25 RX ORDER — INSULIN LISPRO 100/ML
VIAL (ML) SUBCUTANEOUS AT BEDTIME
Refills: 0 | Status: DISCONTINUED | OUTPATIENT
Start: 2023-11-25 | End: 2023-11-27

## 2023-11-25 RX ORDER — INSULIN DETEMIR 100/ML (3)
10 INSULIN PEN (ML) SUBCUTANEOUS ONCE
Refills: 0 | Status: DISCONTINUED | OUTPATIENT
Start: 2023-11-25 | End: 2023-11-25

## 2023-11-25 RX ORDER — DEXTROSE 50 % IN WATER 50 %
25 SYRINGE (ML) INTRAVENOUS ONCE
Refills: 0 | Status: COMPLETED | OUTPATIENT
Start: 2023-11-25 | End: 2023-11-25

## 2023-11-25 RX ORDER — SODIUM,POTASSIUM PHOSPHATES 278-250MG
1 POWDER IN PACKET (EA) ORAL ONCE
Refills: 0 | Status: COMPLETED | OUTPATIENT
Start: 2023-11-25 | End: 2023-11-25

## 2023-11-25 RX ADMIN — Medication 25 GRAM(S): at 11:25

## 2023-11-25 RX ADMIN — ALBUTEROL 2 PUFF(S): 90 AEROSOL, METERED ORAL at 08:59

## 2023-11-25 RX ADMIN — Medication 1000 MILLIGRAM(S): at 09:30

## 2023-11-25 RX ADMIN — ALBUTEROL 2 PUFF(S): 90 AEROSOL, METERED ORAL at 21:00

## 2023-11-25 RX ADMIN — Medication 400 MILLIGRAM(S): at 08:55

## 2023-11-25 RX ADMIN — BUDESONIDE AND FORMOTEROL FUMARATE DIHYDRATE 2 PUFF(S): 160; 4.5 AEROSOL RESPIRATORY (INHALATION) at 08:58

## 2023-11-25 RX ADMIN — Medication 1 PACKET(S): at 12:24

## 2023-11-25 RX ADMIN — SODIUM CHLORIDE 3 MILLILITER(S): 9 INJECTION INTRAMUSCULAR; INTRAVENOUS; SUBCUTANEOUS at 21:18

## 2023-11-25 RX ADMIN — SODIUM CHLORIDE 3 MILLILITER(S): 9 INJECTION INTRAMUSCULAR; INTRAVENOUS; SUBCUTANEOUS at 12:21

## 2023-11-25 RX ADMIN — Medication 400 MILLIGRAM(S): at 17:56

## 2023-11-25 RX ADMIN — ENOXAPARIN SODIUM 40 MILLIGRAM(S): 100 INJECTION SUBCUTANEOUS at 17:56

## 2023-11-25 RX ADMIN — BUDESONIDE AND FORMOTEROL FUMARATE DIHYDRATE 2 PUFF(S): 160; 4.5 AEROSOL RESPIRATORY (INHALATION) at 21:20

## 2023-11-25 RX ADMIN — HYDROMORPHONE HYDROCHLORIDE 30 MILLILITER(S): 2 INJECTION INTRAMUSCULAR; INTRAVENOUS; SUBCUTANEOUS at 07:17

## 2023-11-25 RX ADMIN — ATORVASTATIN CALCIUM 10 MILLIGRAM(S): 80 TABLET, FILM COATED ORAL at 21:18

## 2023-11-25 RX ADMIN — SODIUM CHLORIDE 3 MILLILITER(S): 9 INJECTION INTRAMUSCULAR; INTRAVENOUS; SUBCUTANEOUS at 05:31

## 2023-11-25 RX ADMIN — Medication 1000 MILLIGRAM(S): at 18:50

## 2023-11-25 NOTE — PROGRESS NOTE ADULT - SUBJECTIVE AND OBJECTIVE BOX
Name of Patient : NESTOR MABRY  MRN: [id number]  Date of visit: 11-25-23       Subjective: Patient seen and examined. No new events except as noted.     REVIEW OF SYSTEMS:    CONSTITUTIONAL: No weakness, fevers or chills  EYES/ENT: No visual changes;  No vertigo or throat pain   NECK: No pain or stiffness  RESPIRATORY: No cough, wheezing, hemoptysis; No shortness of breath  CARDIOVASCULAR: No chest pain or palpitations  GASTROINTESTINAL: No abdominal or epigastric pain. No nausea, vomiting, or hematemesis; No diarrhea or constipation. No melena or hematochezia.  GENITOURINARY: No dysuria, frequency or hematuria  NEUROLOGICAL: No numbness or weakness  SKIN: No itching, burning, rashes, or lesions   All other review of systems is negative unless indicated above.    MEDICATIONS:  MEDICATIONS  (STANDING):  albuterol    90 MICROgram(s) HFA Inhaler 2 Puff(s) Inhalation two times a day  atorvastatin 10 milliGRAM(s) Oral at bedtime  budesonide 160 MICROgram(s)/formoterol 4.5 MICROgram(s) Inhaler 2 Puff(s) Inhalation two times a day  chlorhexidine 2% Cloths 1 Application(s) Topical daily  dextrose 5%. 1000 milliLiter(s) (100 mL/Hr) IV Continuous <Continuous>  dextrose 5%. 1000 milliLiter(s) (50 mL/Hr) IV Continuous <Continuous>  dextrose 50% Injectable 25 Gram(s) IV Push once  dextrose 50% Injectable 12.5 Gram(s) IV Push once  dextrose 50% Injectable 25 Gram(s) IV Push once  dextrose 50% Injectable 12.5 Gram(s) IV Push once  enoxaparin Injectable 40 milliGRAM(s) SubCutaneous every 24 hours  glucagon  Injectable 1 milliGRAM(s) IntraMuscular once  HYDROmorphone PCA (1 mG/mL) 30 milliLiter(s) PCA Continuous PCA Continuous  influenza  Vaccine (HIGH DOSE) 0.7 milliLiter(s) IntraMuscular once  insulin lispro (ADMELOG) corrective regimen sliding scale   SubCutaneous three times a day before meals  insulin lispro (ADMELOG) corrective regimen sliding scale   SubCutaneous at bedtime  pantoprazole    Tablet 40 milliGRAM(s) Oral before breakfast  sodium chloride 0.9% lock flush 3 milliLiter(s) IV Push every 8 hours  sodium chloride 0.9% with potassium chloride 20 mEq/L 1000 milliLiter(s) (50 mL/Hr) IV Continuous <Continuous>      PHYSICAL EXAM:  T(C): 37.1 (11-25-23 @ 19:30), Max: 37.2 (11-25-23 @ 05:00)  HR: 97 (11-25-23 @ 19:30) (95 - 103)  BP: 104/62 (11-25-23 @ 19:30) (104/62 - 131/67)  RR: 18 (11-25-23 @ 19:30) (18 - 18)  SpO2: 100% (11-25-23 @ 19:30) (96% - 100%)  Wt(kg): --  I&O's Summary    24 Nov 2023 07:01  -  25 Nov 2023 07:00  --------------------------------------------------------  IN: 1900 mL / OUT: 2495 mL / NET: -595 mL    25 Nov 2023 07:01  -  25 Nov 2023 23:20  --------------------------------------------------------  IN: 1250 mL / OUT: 450 mL / NET: 800 mL          Appearance: Normal	  HEENT:  PERRLA   Lymphatic: No lymphadenopathy   Cardiovascular: Normal S1 S2, no JVD  Respiratory: normal effort , clear  Gastrointestinal:  Soft, Non-tender  Skin: No rashes,  warm to touch  Psychiatry:  Mood & affect appropriate  Musculuskeletal: No edema    recent labs, Imaging and EKGs personally reviewed     11-24-23 @ 07:01  -  11-25-23 @ 07:00  --------------------------------------------------------  IN: 1900 mL / OUT: 2495 mL / NET: -595 mL    11-25-23 @ 07:01  -  11-25-23 @ 23:20  --------------------------------------------------------  IN: 1250 mL / OUT: 450 mL / NET: 800 mL

## 2023-11-25 NOTE — PROGRESS NOTE ADULT - SUBJECTIVE AND OBJECTIVE BOX
Chief Complaint/Follow-up on:   DM    Subjective:    POC blood glucose and insulin use reviewed.  pt feels well   no n/v  tolerating PO - started on clears  she was low on her bonnie this am but not FSBG to document   per team it was checked and was low on FSBG as well    we spoke about inpt DM management goals and monitoring and also the need to fu outpt for DM       MEDICATIONS  (STANDING):  acetaminophen   IVPB .. 1000 milliGRAM(s) IV Intermittent every 8 hours  albuterol    90 MICROgram(s) HFA Inhaler 2 Puff(s) Inhalation two times a day  atorvastatin 10 milliGRAM(s) Oral at bedtime  budesonide 160 MICROgram(s)/formoterol 4.5 MICROgram(s) Inhaler 2 Puff(s) Inhalation two times a day  chlorhexidine 2% Cloths 1 Application(s) Topical daily  dextrose 5%. 1000 milliLiter(s) (50 mL/Hr) IV Continuous <Continuous>  dextrose 5%. 1000 milliLiter(s) (100 mL/Hr) IV Continuous <Continuous>  dextrose 50% Injectable 25 Gram(s) IV Push once  dextrose 50% Injectable 12.5 Gram(s) IV Push once  dextrose 50% Injectable 25 Gram(s) IV Push once  dextrose 50% Injectable 12.5 Gram(s) IV Push once  glucagon  Injectable 1 milliGRAM(s) IntraMuscular once  HYDROmorphone PCA (1 mG/mL) 30 milliLiter(s) PCA Continuous PCA Continuous  influenza  Vaccine (HIGH DOSE) 0.7 milliLiter(s) IntraMuscular once  insulin lispro (ADMELOG) corrective regimen sliding scale   SubCutaneous three times a day before meals  insulin lispro (ADMELOG) corrective regimen sliding scale   SubCutaneous at bedtime  pantoprazole    Tablet 40 milliGRAM(s) Oral before breakfast  sodium chloride 0.9% lock flush 3 milliLiter(s) IV Push every 8 hours  sodium chloride 0.9% with potassium chloride 20 mEq/L 1000 milliLiter(s) (50 mL/Hr) IV Continuous <Continuous>    MEDICATIONS  (PRN):  dextrose Oral Gel 15 Gram(s) Oral once PRN Blood Glucose LESS THAN 70 milliGRAM(s)/deciliter  naloxone Injectable 0.1 milliGRAM(s) IV Push every 3 minutes PRN For ANY of the following changes in patient status:  A. RR LESS THAN 10 breaths per minute, B. Oxygen saturation LESS THAN 90%, C. Sedation score of 6      PHYSICAL EXAM:  VITALS: T(C): 36.7 (11-25-23 @ 12:32)  T(F): 98.1 (11-25-23 @ 12:32), Max: 100.6 (11-24-23 @ 17:35)  HR: 98 (11-25-23 @ 12:32) (97 - 108)  BP: 108/58 (11-25-23 @ 12:32) (108/58 - 142/70)  RR:  (18 - 18)  SpO2:  (96% - 100%)  Wt(kg): --  GENERAL: NAD, well-groomed, well-developed  EYES: No proptosis, no injection  HEENT:  Atraumatic, Normocephalic, moist mucous membranes  RESPIRATORY: no resp distress  CARDIOVASCULAR:  no peripheral edema  psych: alert and oriented X3,   MSK: sitting in chair         POCT Blood Glucose.: 146 mg/dL (11-25-23 @ 12:00)  POCT Blood Glucose.: 85 mg/dL (11-25-23 @ 10:34)  POCT Blood Glucose.: 88 mg/dL (11-25-23 @ 06:29)  POCT Blood Glucose.: 133 mg/dL (11-24-23 @ 22:25)  POCT Blood Glucose.: 147 mg/dL (11-24-23 @ 17:18)  POCT Blood Glucose.: 161 mg/dL (11-24-23 @ 10:01)  POCT Blood Glucose.: 163 mg/dL (11-24-23 @ 07:59)  POCT Blood Glucose.: 171 mg/dL (11-24-23 @ 05:36)  POCT Blood Glucose.: 200 mg/dL (11-24-23 @ 02:32)  POCT Blood Glucose.: 200 mg/dL (11-23-23 @ 23:51)  POCT Blood Glucose.: 266 mg/dL (11-23-23 @ 17:19)  POCT Blood Glucose.: 286 mg/dL (11-23-23 @ 14:33)  POCT Blood Glucose.: 270 mg/dL (11-23-23 @ 11:51)  POCT Blood Glucose.: 253 mg/dL (11-23-23 @ 07:58)  POCT Blood Glucose.: 194 mg/dL (11-22-23 @ 21:56)  POCT Blood Glucose.: 181 mg/dL (11-22-23 @ 18:22)  POCT Blood Glucose.: 208 mg/dL (11-22-23 @ 15:46)    11-25    139  |  104  |  6<L>  ----------------------------<  79  3.8   |  27  |  0.48<L>    eGFR: 101    Ca    8.3<L>      11-25  Mg     2.20     11-25  Phos  2.2     11-25            Thyroid Function Tests:

## 2023-11-25 NOTE — PROGRESS NOTE ADULT - ASSESSMENT
This is a 74 yo F /w a PMH of DM2 on an insulin pump and colorectal cancer s/p right hemicolectomy today. Endocrinology consulted for diabetes management and DM2. Based on current insulin pump settings, suspect patient is being over-basalized given high basal rates compared to the insulin:carb ratio    #DM2 A1c 6.8%  #insulin pump - would hold of on using the insulin pump at this time given history of possibly frequent overnight hypoglycemia  also current clinical status of her intermittent sleepiness and being tired concerns me that she may not be able to function pump well at this time  additioanlly her PO status is variable and would require adjustments in her pump - do not suspect pt can self manage this right now given her sleepiness and also daily adjustments that would be needed given variations in po intake      NPO:  FSBG q6hrs   -c/w with low dose admelog correction scale every 6 hours while NPO      monitor for hypoglycemia while NPO  FOR HYPOGLYCEMIA  *BG <100  1 amp D50 IV push, followed by  Dextrose IV infusion titrated to maintain target - 180 mg/dL- monitor fluid status   (please place on the pump for IVF with dextrose so it will run at a set rate   NOTE: Avoid dextrose containing solutions except for treatment of hypoglycemia.      if needing dextrose, will need to lower insulin regimen  inform endocrine if this occurs     if on clears:  -low admelog correction scales before meals if on clears (diabetic clears)  -low admelog scales before bedtime  -hold levemir until we see her trend   if glucose >180 X2 - than start levemir 15 units and make it q24hrs   -FSG before meals and before bedtime  -Carb consistent diet once able to tolerate  -hypoglycemia protocol in place if needed    when ADAT to carb consistent diet   than will need basal bolus +correction scale   anticpate Levemir 15 units and ademelog 5 units premeals and low dose correction scale       pt has her on CGM on- also monitoring her own BG while inpt on bonnie       #Discharge  -follow up with Dr. Anand  -basal bolus vs resume insulin pump on discharge, likely decrease some basal settings overnight    #HTN  -BP target <130/80  -once stable post-op, consider resuming losartan 25mg daily and hctz 12.5mg daily- management as per primary team  outpt mc/cr ratio     #HLD  on liptior inpt at this time         Valeria Cosby MD  Attending Physician   Department of Endocrinology, Diabetes and Metabolism     weekdays: 9am to 5pm: email [facility] or [facility] and or TEAMS     Nights and weekends: [phone]  Please note that this patient may be followed by a different provider tomorrow.   If no answer or after hours, please contact [phone].  For final dc reccomendations, please call [phone] or page the endocrine fellow on call.

## 2023-11-25 NOTE — PROGRESS NOTE ADULT - SUBJECTIVE AND OBJECTIVE BOX
SURGERY DAILY PROGRESS NOTE    SUBJECTIVE: Patient seen and evaluated on AM rounds. Pt is resting comfortably in bed, no flatus. Denies nausea, vomiting, chest pain or SOB.     Overnight Events:  No acute events overnight  -----------------------------------------------------------------------------------------------------------------------------------------------------------------------------------------------------------  OBJECTIVE:  Vital Signs Last 24 Hrs  T(C): 37 (25 Nov 2023 08:20), Max: 38.1 (24 Nov 2023 17:35)  T(F): 98.6 (25 Nov 2023 08:20), Max: 100.6 (24 Nov 2023 17:35)  HR: 97 (25 Nov 2023 08:20) (97 - 108)  BP: 127/67 (25 Nov 2023 08:20) (104/59 - 142/70)  BP(mean): --  RR: 18 (25 Nov 2023 08:20) (18 - 18)  SpO2: 97% (25 Nov 2023 08:20) (97% - 100%)    Parameters below as of 25 Nov 2023 08:20  Patient On (Oxygen Delivery Method): room air      I&O's Detail    24 Nov 2023 07:01  -  25 Nov 2023 07:00  --------------------------------------------------------  IN:    IV PiggyBack: 500 mL    PRBCs (Packed Red Blood Cells): 300 mL    sodium chloride 0.9% w/ Additives: 1100 mL  Total IN: 1900 mL    OUT:    Indwelling Catheter - Urethral (mL): 500 mL    Oral Fluid: 0 mL    Voided (mL): 1995 mL  Total OUT: 2495 mL    Total NET: -595 mL      25 Nov 2023 07:01  -  25 Nov 2023 09:59  --------------------------------------------------------  IN:    Oral Fluid: 100 mL  Total IN: 100 mL    OUT:    Voided (mL): 150 mL  Total OUT: 150 mL    Total NET: -50 mL        Daily     Daily   MEDICATIONS  (STANDING):  acetaminophen   IVPB .. 1000 milliGRAM(s) IV Intermittent every 8 hours  albuterol    90 MICROgram(s) HFA Inhaler 2 Puff(s) Inhalation two times a day  atorvastatin 10 milliGRAM(s) Oral at bedtime  budesonide 160 MICROgram(s)/formoterol 4.5 MICROgram(s) Inhaler 2 Puff(s) Inhalation two times a day  chlorhexidine 2% Cloths 1 Application(s) Topical daily  dextrose 5%. 1000 milliLiter(s) (50 mL/Hr) IV Continuous <Continuous>  dextrose 5%. 1000 milliLiter(s) (100 mL/Hr) IV Continuous <Continuous>  dextrose 50% Injectable 25 Gram(s) IV Push once  dextrose 50% Injectable 25 Gram(s) IV Push once  dextrose 50% Injectable 12.5 Gram(s) IV Push once  glucagon  Injectable 1 milliGRAM(s) IntraMuscular once  HYDROmorphone PCA (1 mG/mL) 30 milliLiter(s) PCA Continuous PCA Continuous  influenza  Vaccine (HIGH DOSE) 0.7 milliLiter(s) IntraMuscular once  insulin detemir injectable (LEVEMIR) 10 Unit(s) SubCutaneous once  insulin lispro (ADMELOG) corrective regimen sliding scale   SubCutaneous every 6 hours  pantoprazole    Tablet 40 milliGRAM(s) Oral before breakfast  sodium chloride 0.9% lock flush 3 milliLiter(s) IV Push every 8 hours  sodium chloride 0.9% with potassium chloride 20 mEq/L 1000 milliLiter(s) (50 mL/Hr) IV Continuous <Continuous>    MEDICATIONS  (PRN):  dextrose Oral Gel 15 Gram(s) Oral once PRN Blood Glucose LESS THAN 70 milliGRAM(s)/deciliter  naloxone Injectable 0.1 milliGRAM(s) IV Push every 3 minutes PRN For ANY of the following changes in patient status:  A. RR LESS THAN 10 breaths per minute, B. Oxygen saturation LESS THAN 90%, C. Sedation score of 6      LABS:                        9.8    16.26 )-----------( 207      ( 25 Nov 2023 06:25 )             30.5     11-25    139  |  104  |  6<L>  ----------------------------<  79  3.8   |  27  |  0.48<L>    Ca    8.3<L>      25 Nov 2023 06:25  Phos  2.2     11-25  Mg     2.20     11-25        Urinalysis Basic - ( 25 Nov 2023 06:25 )    Color: x / Appearance: x / SG: x / pH: x  Gluc: 79 mg/dL / Ketone: x  / Bili: x / Urobili: x   Blood: x / Protein: x / Nitrite: x   Leuk Esterase: x / RBC: x / WBC x   Sq Epi: x / Non Sq Epi: x / Bacteria: x        EXAM    General: NAD, resting in bed comfortably.  Cardiac: regular rate, warm and well perfused  Respiratory: Nonlabored respirations, normal cw expansion.  Abdomen: soft, nontender, nondistended, midline prevena  Extremities: normal strength, FROM, no deformities

## 2023-11-25 NOTE — PROGRESS NOTE ADULT - SUBJECTIVE AND OBJECTIVE BOX
SURGERY DAILY PROGRESS NOTE    SUBJECTIVE: Patient seen and evaluated on AM rounds. Tolerating sips & apple juice. Hypoglycemic this AM. Denies fevers/chills, chest pain, dyspnea, abdominal pain, nausea, vomiting    Overnight Events:  No acute events overnight  -----------------------------------------------------------------------------------------------------------------------------------------------------------------------------------------------------------  OBJECTIVE:  Vital Signs Last 24 Hrs  T(C): 37.2 (25 Nov 2023 05:00), Max: 38.1 (24 Nov 2023 17:35)  T(F): 98.9 (25 Nov 2023 05:00), Max: 100.6 (24 Nov 2023 17:35)  HR: 100 (25 Nov 2023 05:00) (100 - 111)  BP: 131/67 (25 Nov 2023 05:00) (101/56 - 142/70)  BP(mean): --  RR: 18 (25 Nov 2023 05:00) (18 - 18)  SpO2: 100% (25 Nov 2023 05:00) (98% - 100%)    Parameters below as of 25 Nov 2023 05:00  Patient On (Oxygen Delivery Method): room air      I&O's Detail    24 Nov 2023 07:01  -  25 Nov 2023 07:00  --------------------------------------------------------  IN:    IV PiggyBack: 500 mL    PRBCs (Packed Red Blood Cells): 300 mL    sodium chloride 0.9% w/ Additives: 1100 mL  Total IN: 1900 mL    OUT:    Indwelling Catheter - Urethral (mL): 500 mL    Oral Fluid: 0 mL    Voided (mL): 1995 mL  Total OUT: 2495 mL    Total NET: -595 mL        Daily     Daily   MEDICATIONS  (STANDING):  acetaminophen   IVPB .. 1000 milliGRAM(s) IV Intermittent every 8 hours  albuterol    90 MICROgram(s) HFA Inhaler 2 Puff(s) Inhalation two times a day  atorvastatin 10 milliGRAM(s) Oral at bedtime  budesonide 160 MICROgram(s)/formoterol 4.5 MICROgram(s) Inhaler 2 Puff(s) Inhalation two times a day  chlorhexidine 2% Cloths 1 Application(s) Topical daily  dextrose 5%. 1000 milliLiter(s) (50 mL/Hr) IV Continuous <Continuous>  dextrose 5%. 1000 milliLiter(s) (100 mL/Hr) IV Continuous <Continuous>  dextrose 50% Injectable 25 Gram(s) IV Push once  dextrose 50% Injectable 25 Gram(s) IV Push once  dextrose 50% Injectable 12.5 Gram(s) IV Push once  glucagon  Injectable 1 milliGRAM(s) IntraMuscular once  HYDROmorphone PCA (1 mG/mL) 30 milliLiter(s) PCA Continuous PCA Continuous  influenza  Vaccine (HIGH DOSE) 0.7 milliLiter(s) IntraMuscular once  insulin detemir injectable (LEVEMIR) 15 Unit(s) SubCutaneous once  insulin lispro (ADMELOG) corrective regimen sliding scale   SubCutaneous every 6 hours  pantoprazole    Tablet 40 milliGRAM(s) Oral before breakfast  sodium chloride 0.9% lock flush 3 milliLiter(s) IV Push every 8 hours  sodium chloride 0.9% with potassium chloride 20 mEq/L 1000 milliLiter(s) (50 mL/Hr) IV Continuous <Continuous>    MEDICATIONS  (PRN):  dextrose Oral Gel 15 Gram(s) Oral once PRN Blood Glucose LESS THAN 70 milliGRAM(s)/deciliter  naloxone Injectable 0.1 milliGRAM(s) IV Push every 3 minutes PRN For ANY of the following changes in patient status:  A. RR LESS THAN 10 breaths per minute, B. Oxygen saturation LESS THAN 90%, C. Sedation score of 6      LABS:                        10.5   18.37 )-----------( 191      ( 25 Nov 2023 00:15 )             31.3     11-24    135  |  99  |  6<L>  ----------------------------<  151<H>  3.8   |  27  |  0.55    Ca    7.9<L>      24 Nov 2023 05:47  Phos  1.5     11-24  Mg     2.00     11-24        Urinalysis Basic - ( 24 Nov 2023 05:47 )    Color: x / Appearance: x / SG: x / pH: x  Gluc: 151 mg/dL / Ketone: x  / Bili: x / Urobili: x   Blood: x / Protein: x / Nitrite: x   Leuk Esterase: x / RBC: x / WBC x   Sq Epi: x / Non Sq Epi: x / Bacteria: x        EXAM    General: NAD, resting in bed comfortably.  Cardiac: regular rate, warm and well perfused  Respiratory: Nonlabored respirations, normal cw expansion.  Abdomen: soft, nontender, nondistended, midline prevena  Extremities: normal strength, FROM, no deformities

## 2023-11-25 NOTE — PROGRESS NOTE ADULT - ASSESSMENT
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    Plan  - Dc NGUYEN perez  - Diet: NPO  - IVF  - Pain control with PCA and tylenol  - Levemir 15 units at 10 AM today, pt hypoglycemic this AM  - Home meds restarted  - Holding HTN medications  - DVT prophylaxis  - Appreciate endocrinology recs; plan to restart insulin pump on discharge    D team surgery [id number]      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    Plan  - Diet: CLD  - IVF  - Pain control with PCA and tylenol  - Levemir 15 units at 10 AM today, pt hypoglycemic this AM  - Home meds restarted  - Holding HTN medications  - DVT prophylaxis  - Appreciate endocrinology recs; plan to restart insulin pump on discharge    D team surgery [id number]      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 Cosby if glucose > 180 x2 start Levemir 15 units. Once on LRD patient will need basal/bolus.   - Home meds restarted  - Holding HTN medications  - DVT prophylaxis  - Appreciate endocrinology recs; plan to restart insulin pump on discharge    D team surgery [id number]

## 2023-11-25 NOTE — PROGRESS NOTE ADULT - TIME BILLING
complex pt, high level decision making  Spent over 35  minutes providing face to face education which was more than 50% of encounter that also included assessing pt/labs/meds and discussing plan of care with pt/team
Spent over 35  minutes providing face to face education which was more than 50% of encounter that also included assessing pt/labs/meds and discussing plan of care with pt/team    Patient is high risk with high level decision making due to uncontrolled diabetes which places patient at high risk for cardiovascular and cerebrovascular events. Patient with lability of glucose requiring close monitoring and insulin adjustments given flucutations in PO status and glycemic monitoring

## 2023-11-25 NOTE — PROGRESS NOTE ADULT - ASSESSMENT
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

## 2023-11-25 NOTE — PROGRESS NOTE ADULT - SUBJECTIVE AND OBJECTIVE BOX
DATE OF SERVICE: 11-25-23      no events overnight       acetaminophen   IVPB .. 1000 milliGRAM(s) IV Intermittent every 8 hours  albuterol    90 MICROgram(s) HFA Inhaler 2 Puff(s) Inhalation two times a day  atorvastatin 10 milliGRAM(s) Oral at bedtime  budesonide 160 MICROgram(s)/formoterol 4.5 MICROgram(s) Inhaler 2 Puff(s) Inhalation two times a day  chlorhexidine 2% Cloths 1 Application(s) Topical daily  dextrose 5%. 1000 milliLiter(s) IV Continuous <Continuous>  dextrose 5%. 1000 milliLiter(s) IV Continuous <Continuous>  dextrose 50% Injectable 25 Gram(s) IV Push once  dextrose 50% Injectable 12.5 Gram(s) IV Push once  dextrose 50% Injectable 25 Gram(s) IV Push once  dextrose Oral Gel 15 Gram(s) Oral once PRN  glucagon  Injectable 1 milliGRAM(s) IntraMuscular once  HYDROmorphone PCA (1 mG/mL) 30 milliLiter(s) PCA Continuous PCA Continuous  influenza  Vaccine (HIGH DOSE) 0.7 milliLiter(s) IntraMuscular once  insulin detemir injectable (LEVEMIR) 10 Unit(s) SubCutaneous once  insulin lispro (ADMELOG) corrective regimen sliding scale   SubCutaneous every 6 hours  naloxone Injectable 0.1 milliGRAM(s) IV Push every 3 minutes PRN  pantoprazole    Tablet 40 milliGRAM(s) Oral before breakfast  sodium chloride 0.9% lock flush 3 milliLiter(s) IV Push every 8 hours  sodium chloride 0.9% with potassium chloride 20 mEq/L 1000 milliLiter(s) IV Continuous <Continuous>                            9.8    16.26 )-----------( 207      ( 25 Nov 2023 06:25 )             30.5       Hemoglobin: 9.8 g/dL (11-25 @ 06:25)  Hemoglobin: 10.5 g/dL (11-25 @ 00:15)  Hemoglobin: 6.4 g/dL (11-24 @ 09:39)  Hemoglobin: 6.3 g/dL (11-24 @ 05:47)  Hemoglobin: 7.3 g/dL (11-23 @ 15:43)      11-25    139  |  104  |  6<L>  ----------------------------<  79  3.8   |  27  |  0.48<L>    Ca    8.3<L>      25 Nov 2023 06:25  Phos  2.2     11-25  Mg     2.20     11-25      Creatinine Trend: 0.48<--, 0.55<--, 0.66<--, 0.61<--, 0.58<--, 0.61<--    COAGS:           T(C): 37 (11-25-23 @ 08:20), Max: 38.1 (11-24-23 @ 17:35)  HR: 97 (11-25-23 @ 08:20) (97 - 108)  BP: 127/67 (11-25-23 @ 08:20) (104/59 - 142/70)  RR: 18 (11-25-23 @ 08:20) (18 - 18)  SpO2: 97% (11-25-23 @ 08:20) (97% - 100%)  Wt(kg): --    I&O's Summary    24 Nov 2023 07:01  -  25 Nov 2023 07:00  --------------------------------------------------------  IN: 1900 mL / OUT: 2495 mL / NET: -595 mL      General:  Alert and Oriented * 3.   Head: Normocephalic and atraumatic.   Neck: No JVD. No bruits. Supple. Does not appear to be enlarged.   Cardiovascular: + S1,S2 ; RRR Soft systolic murmur at the left lower sternal border. No rubs noted.    Lungs: CTA b/l. No rhonchi, rales or wheezes.   Abdomen: + BS, soft. Non tender. Non distended. No rebound. No guarding.   Extremities: No clubbing/cyanosis/edema.   Neurologic: Moves all four extremities. Full range of motion.   Skin: Warm and moist. The patient's skin has normal elasticity and good skin turgor.   Psychiatric: Appropriate mood and affect.  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      --tolerated procedure well from CV perspective  --pain management  --PT  --awaiting return of bowel fxn  --eventually resume home meds: Asa 81mg, Hctz 12.5mg, Losartan 25mg, Pravastatin 40mg

## 2023-11-25 NOTE — PROGRESS NOTE ADULT - SUBJECTIVE AND OBJECTIVE BOX
Anesthesia Pain Management Service    SUBJECTIVE: Patient is doing well with IV PCA and no significant problems reported.    Pain Scale Score	At rest: _5/10__ 	With Activity: ___ 	[X ] Refer to charted pain scores    THERAPY:    [ ] IV PCA Morphine		[ ] 5 mg/mL	[ ] 1 mg/mL  [X ] IV PCA Hydromorphone	[ ] 5 mg/mL	[X ] 1 mg/mL  [ ] IV PCA Fentanyl		[ ] 50 micrograms/mL    Demand dose __0.2_ lockout __6_ (minutes) Continuous Rate _0__ Total: 2.4___  mg used (in past 24 hours)      MEDICATIONS  (STANDING):  acetaminophen   IVPB .. 1000 milliGRAM(s) IV Intermittent every 8 hours  albuterol    90 MICROgram(s) HFA Inhaler 2 Puff(s) Inhalation two times a day  atorvastatin 10 milliGRAM(s) Oral at bedtime  budesonide 160 MICROgram(s)/formoterol 4.5 MICROgram(s) Inhaler 2 Puff(s) Inhalation two times a day  chlorhexidine 2% Cloths 1 Application(s) Topical daily  dextrose 5%. 1000 milliLiter(s) (50 mL/Hr) IV Continuous <Continuous>  dextrose 5%. 1000 milliLiter(s) (100 mL/Hr) IV Continuous <Continuous>  dextrose 50% Injectable 25 Gram(s) IV Push once  dextrose 50% Injectable 25 Gram(s) IV Push once  dextrose 50% Injectable 12.5 Gram(s) IV Push once  glucagon  Injectable 1 milliGRAM(s) IntraMuscular once  HYDROmorphone PCA (1 mG/mL) 30 milliLiter(s) PCA Continuous PCA Continuous  influenza  Vaccine (HIGH DOSE) 0.7 milliLiter(s) IntraMuscular once  insulin detemir injectable (LEVEMIR) 10 Unit(s) SubCutaneous once  insulin lispro (ADMELOG) corrective regimen sliding scale   SubCutaneous every 6 hours  pantoprazole    Tablet 40 milliGRAM(s) Oral before breakfast  sodium chloride 0.9% lock flush 3 milliLiter(s) IV Push every 8 hours  sodium chloride 0.9% with potassium chloride 20 mEq/L 1000 milliLiter(s) (50 mL/Hr) IV Continuous <Continuous>    MEDICATIONS  (PRN):  dextrose Oral Gel 15 Gram(s) Oral once PRN Blood Glucose LESS THAN 70 milliGRAM(s)/deciliter  naloxone Injectable 0.1 milliGRAM(s) IV Push every 3 minutes PRN For ANY of the following changes in patient status:  A. RR LESS THAN 10 breaths per minute, B. Oxygen saturation LESS THAN 90%, C. Sedation score of 6      OBJECTIVE: Patient sitting up in chair.    Sedation Score:	[ X] Alert	[ ] Drowsy 	[ ] Arousable	[ ] Asleep	[ ] Unresponsive    Side Effects:	[X ] None	[ ] Nausea	[ ] Vomiting	[ ] Pruritus  		[ ] Other:    Vital Signs Last 24 Hrs  T(C): 37 (25 Nov 2023 08:20), Max: 38.1 (24 Nov 2023 17:35)  T(F): 98.6 (25 Nov 2023 08:20), Max: 100.6 (24 Nov 2023 17:35)  HR: 97 (25 Nov 2023 08:20) (97 - 108)  BP: 127/67 (25 Nov 2023 08:20) (104/59 - 142/70)  BP(mean): --  RR: 18 (25 Nov 2023 08:20) (18 - 18)  SpO2: 97% (25 Nov 2023 08:20) (97% - 100%)    Parameters below as of 25 Nov 2023 08:20  Patient On (Oxygen Delivery Method): room air        ASSESSMENT/ PLAN    Therapy to  be:	[ X] Continue   [ ] Discontinued   [ ] Change to prn Analgesics    Documentation and Verification of current medications:   [X] Done	[ ] Not done, not elligible    Comments: Continue IV PCA, Recommend non-opioid adjuvant analgesics to be used when possible and when allowed by primary surgical team.    Progress Note written now but Patient was seen earlier.

## 2023-11-26 LAB
ANION GAP SERPL CALC-SCNC: 16 MMOL/L — HIGH (ref 7–14)
ANION GAP SERPL CALC-SCNC: 16 MMOL/L — HIGH (ref 7–14)
BUN SERPL-MCNC: 9 MG/DL — SIGNIFICANT CHANGE UP (ref 7–23)
BUN SERPL-MCNC: 9 MG/DL — SIGNIFICANT CHANGE UP (ref 7–23)
CALCIUM SERPL-MCNC: 8.6 MG/DL — SIGNIFICANT CHANGE UP (ref 8.4–10.5)
CALCIUM SERPL-MCNC: 8.6 MG/DL — SIGNIFICANT CHANGE UP (ref 8.4–10.5)
CHLORIDE SERPL-SCNC: 100 MMOL/L — SIGNIFICANT CHANGE UP (ref 98–107)
CHLORIDE SERPL-SCNC: 100 MMOL/L — SIGNIFICANT CHANGE UP (ref 98–107)
CO2 SERPL-SCNC: 22 MMOL/L — SIGNIFICANT CHANGE UP (ref 22–31)
CO2 SERPL-SCNC: 22 MMOL/L — SIGNIFICANT CHANGE UP (ref 22–31)
CREAT SERPL-MCNC: 0.43 MG/DL — LOW (ref 0.5–1.3)
CREAT SERPL-MCNC: 0.43 MG/DL — LOW (ref 0.5–1.3)
EGFR: 103 ML/MIN/1.73M2 — SIGNIFICANT CHANGE UP
EGFR: 103 ML/MIN/1.73M2 — SIGNIFICANT CHANGE UP
GLUCOSE BLDC GLUCOMTR-MCNC: 119 MG/DL — HIGH (ref 70–99)
GLUCOSE BLDC GLUCOMTR-MCNC: 119 MG/DL — HIGH (ref 70–99)
GLUCOSE BLDC GLUCOMTR-MCNC: 123 MG/DL — HIGH (ref 70–99)
GLUCOSE BLDC GLUCOMTR-MCNC: 123 MG/DL — HIGH (ref 70–99)
GLUCOSE BLDC GLUCOMTR-MCNC: 138 MG/DL — HIGH (ref 70–99)
GLUCOSE BLDC GLUCOMTR-MCNC: 138 MG/DL — HIGH (ref 70–99)
GLUCOSE BLDC GLUCOMTR-MCNC: 164 MG/DL — HIGH (ref 70–99)
GLUCOSE BLDC GLUCOMTR-MCNC: 164 MG/DL — HIGH (ref 70–99)
GLUCOSE BLDC GLUCOMTR-MCNC: 165 MG/DL — HIGH (ref 70–99)
GLUCOSE BLDC GLUCOMTR-MCNC: 165 MG/DL — HIGH (ref 70–99)
GLUCOSE SERPL-MCNC: 126 MG/DL — HIGH (ref 70–99)
GLUCOSE SERPL-MCNC: 126 MG/DL — HIGH (ref 70–99)
HCT VFR BLD CALC: 34.9 % — SIGNIFICANT CHANGE UP (ref 34.5–45)
HCT VFR BLD CALC: 34.9 % — SIGNIFICANT CHANGE UP (ref 34.5–45)
HGB BLD-MCNC: 11.3 G/DL — LOW (ref 11.5–15.5)
HGB BLD-MCNC: 11.3 G/DL — LOW (ref 11.5–15.5)
MAGNESIUM SERPL-MCNC: 2.1 MG/DL — SIGNIFICANT CHANGE UP (ref 1.6–2.6)
MAGNESIUM SERPL-MCNC: 2.1 MG/DL — SIGNIFICANT CHANGE UP (ref 1.6–2.6)
MCHC RBC-ENTMCNC: 25.3 PG — LOW (ref 27–34)
MCHC RBC-ENTMCNC: 25.3 PG — LOW (ref 27–34)
MCHC RBC-ENTMCNC: 32.4 GM/DL — SIGNIFICANT CHANGE UP (ref 32–36)
MCHC RBC-ENTMCNC: 32.4 GM/DL — SIGNIFICANT CHANGE UP (ref 32–36)
MCV RBC AUTO: 78.3 FL — LOW (ref 80–100)
MCV RBC AUTO: 78.3 FL — LOW (ref 80–100)
NRBC # BLD: 0 /100 WBCS — SIGNIFICANT CHANGE UP (ref 0–0)
NRBC # BLD: 0 /100 WBCS — SIGNIFICANT CHANGE UP (ref 0–0)
NRBC # FLD: 0.02 K/UL — HIGH (ref 0–0)
NRBC # FLD: 0.02 K/UL — HIGH (ref 0–0)
PHOSPHATE SERPL-MCNC: 2.2 MG/DL — LOW (ref 2.5–4.5)
PHOSPHATE SERPL-MCNC: 2.2 MG/DL — LOW (ref 2.5–4.5)
PLATELET # BLD AUTO: 256 K/UL — SIGNIFICANT CHANGE UP (ref 150–400)
PLATELET # BLD AUTO: 256 K/UL — SIGNIFICANT CHANGE UP (ref 150–400)
POTASSIUM SERPL-MCNC: 4.4 MMOL/L — SIGNIFICANT CHANGE UP (ref 3.5–5.3)
POTASSIUM SERPL-MCNC: 4.4 MMOL/L — SIGNIFICANT CHANGE UP (ref 3.5–5.3)
POTASSIUM SERPL-SCNC: 4.4 MMOL/L — SIGNIFICANT CHANGE UP (ref 3.5–5.3)
POTASSIUM SERPL-SCNC: 4.4 MMOL/L — SIGNIFICANT CHANGE UP (ref 3.5–5.3)
RBC # BLD: 4.46 M/UL — SIGNIFICANT CHANGE UP (ref 3.8–5.2)
RBC # BLD: 4.46 M/UL — SIGNIFICANT CHANGE UP (ref 3.8–5.2)
RBC # FLD: 17 % — HIGH (ref 10.3–14.5)
RBC # FLD: 17 % — HIGH (ref 10.3–14.5)
SODIUM SERPL-SCNC: 138 MMOL/L — SIGNIFICANT CHANGE UP (ref 135–145)
SODIUM SERPL-SCNC: 138 MMOL/L — SIGNIFICANT CHANGE UP (ref 135–145)
WBC # BLD: 14.96 K/UL — HIGH (ref 3.8–10.5)
WBC # BLD: 14.96 K/UL — HIGH (ref 3.8–10.5)
WBC # FLD AUTO: 14.96 K/UL — HIGH (ref 3.8–10.5)
WBC # FLD AUTO: 14.96 K/UL — HIGH (ref 3.8–10.5)

## 2023-11-26 PROCEDURE — 74018 RADEX ABDOMEN 1 VIEW: CPT | Mod: 26

## 2023-11-26 RX ORDER — HYDROMORPHONE HYDROCHLORIDE 2 MG/ML
30 INJECTION INTRAMUSCULAR; INTRAVENOUS; SUBCUTANEOUS
Refills: 0 | Status: DISCONTINUED | OUTPATIENT
Start: 2023-11-26 | End: 2023-11-29

## 2023-11-26 RX ORDER — KETOROLAC TROMETHAMINE 30 MG/ML
15 SYRINGE (ML) INJECTION EVERY 6 HOURS
Refills: 0 | Status: DISCONTINUED | OUTPATIENT
Start: 2023-11-26 | End: 2023-11-26

## 2023-11-26 RX ORDER — ONDANSETRON 8 MG/1
4 TABLET, FILM COATED ORAL EVERY 6 HOURS
Refills: 0 | Status: DISCONTINUED | OUTPATIENT
Start: 2023-11-26 | End: 2023-12-07

## 2023-11-26 RX ORDER — ACETAMINOPHEN 500 MG
750 TABLET ORAL EVERY 8 HOURS
Refills: 0 | Status: DISCONTINUED | OUTPATIENT
Start: 2023-11-26 | End: 2023-11-26

## 2023-11-26 RX ORDER — SODIUM,POTASSIUM PHOSPHATES 278-250MG
1 POWDER IN PACKET (EA) ORAL ONCE
Refills: 0 | Status: COMPLETED | OUTPATIENT
Start: 2023-11-26 | End: 2023-11-26

## 2023-11-26 RX ORDER — ACETAMINOPHEN 500 MG
1000 TABLET ORAL EVERY 6 HOURS
Refills: 0 | Status: DISCONTINUED | OUTPATIENT
Start: 2023-11-26 | End: 2023-11-27

## 2023-11-26 RX ADMIN — Medication 1 PACKET(S): at 12:13

## 2023-11-26 RX ADMIN — HYDROMORPHONE HYDROCHLORIDE 30 MILLILITER(S): 2 INJECTION INTRAMUSCULAR; INTRAVENOUS; SUBCUTANEOUS at 10:14

## 2023-11-26 RX ADMIN — BUDESONIDE AND FORMOTEROL FUMARATE DIHYDRATE 2 PUFF(S): 160; 4.5 AEROSOL RESPIRATORY (INHALATION) at 22:49

## 2023-11-26 RX ADMIN — SODIUM CHLORIDE 3 MILLILITER(S): 9 INJECTION INTRAMUSCULAR; INTRAVENOUS; SUBCUTANEOUS at 11:55

## 2023-11-26 RX ADMIN — Medication 300 MILLIGRAM(S): at 12:09

## 2023-11-26 RX ADMIN — BUDESONIDE AND FORMOTEROL FUMARATE DIHYDRATE 2 PUFF(S): 160; 4.5 AEROSOL RESPIRATORY (INHALATION) at 09:42

## 2023-11-26 RX ADMIN — Medication 1: at 12:10

## 2023-11-26 RX ADMIN — Medication 750 MILLIGRAM(S): at 12:30

## 2023-11-26 RX ADMIN — SODIUM CHLORIDE 3 MILLILITER(S): 9 INJECTION INTRAMUSCULAR; INTRAVENOUS; SUBCUTANEOUS at 22:50

## 2023-11-26 RX ADMIN — ALBUTEROL 2 PUFF(S): 90 AEROSOL, METERED ORAL at 09:43

## 2023-11-26 RX ADMIN — HYDROMORPHONE HYDROCHLORIDE 30 MILLILITER(S): 2 INJECTION INTRAMUSCULAR; INTRAVENOUS; SUBCUTANEOUS at 19:22

## 2023-11-26 RX ADMIN — Medication 1000 MILLIGRAM(S): at 18:47

## 2023-11-26 RX ADMIN — ATORVASTATIN CALCIUM 10 MILLIGRAM(S): 80 TABLET, FILM COATED ORAL at 22:50

## 2023-11-26 RX ADMIN — SODIUM CHLORIDE 3 MILLILITER(S): 9 INJECTION INTRAMUSCULAR; INTRAVENOUS; SUBCUTANEOUS at 05:00

## 2023-11-26 RX ADMIN — HYDROMORPHONE HYDROCHLORIDE 30 MILLILITER(S): 2 INJECTION INTRAMUSCULAR; INTRAVENOUS; SUBCUTANEOUS at 07:01

## 2023-11-26 RX ADMIN — ONDANSETRON 4 MILLIGRAM(S): 8 TABLET, FILM COATED ORAL at 10:13

## 2023-11-26 RX ADMIN — ENOXAPARIN SODIUM 40 MILLIGRAM(S): 100 INJECTION SUBCUTANEOUS at 17:14

## 2023-11-26 RX ADMIN — Medication 400 MILLIGRAM(S): at 17:14

## 2023-11-26 RX ADMIN — PANTOPRAZOLE SODIUM 40 MILLIGRAM(S): 20 TABLET, DELAYED RELEASE ORAL at 06:20

## 2023-11-26 RX ADMIN — ALBUTEROL 2 PUFF(S): 90 AEROSOL, METERED ORAL at 22:50

## 2023-11-26 NOTE — PROGRESS NOTE ADULT - SUBJECTIVE AND OBJECTIVE BOX
Anesthesia Pain Management Service    SUBJECTIVE: Patient is reporting some generalized abdominal pain and nausea.     Pain Scale Score	At rest: ___ 	With Activity: ___ 	[X ] Refer to charted pain scores    THERAPY:    [ ] IV PCA Morphine		[ ] 5 mg/mL	[ ] 1 mg/mL  [X ] IV PCA Hydromorphone	[ ] 5 mg/mL	[X ] 1 mg/mL  [ ] IV PCA Fentanyl		[ ] 50 micrograms/mL    Demand dose __0.2_ lockout __6_ (minutes) Continuous Rate _0__ Total: __2.6_  mg used (in past 24 hours)      MEDICATIONS  (STANDING):  acetaminophen   IVPB .. 750 milliGRAM(s) IV Intermittent every 8 hours  albuterol    90 MICROgram(s) HFA Inhaler 2 Puff(s) Inhalation two times a day  atorvastatin 10 milliGRAM(s) Oral at bedtime  budesonide 160 MICROgram(s)/formoterol 4.5 MICROgram(s) Inhaler 2 Puff(s) Inhalation two times a day  chlorhexidine 2% Cloths 1 Application(s) Topical daily  dextrose 5%. 1000 milliLiter(s) (100 mL/Hr) IV Continuous <Continuous>  dextrose 5%. 1000 milliLiter(s) (50 mL/Hr) IV Continuous <Continuous>  dextrose 50% Injectable 25 Gram(s) IV Push once  dextrose 50% Injectable 12.5 Gram(s) IV Push once  dextrose 50% Injectable 25 Gram(s) IV Push once  dextrose 50% Injectable 12.5 Gram(s) IV Push once  enoxaparin Injectable 40 milliGRAM(s) SubCutaneous every 24 hours  glucagon  Injectable 1 milliGRAM(s) IntraMuscular once  HYDROmorphone PCA (1 mG/mL) 30 milliLiter(s) PCA Continuous PCA Continuous  influenza  Vaccine (HIGH DOSE) 0.7 milliLiter(s) IntraMuscular once  insulin lispro (ADMELOG) corrective regimen sliding scale   SubCutaneous three times a day before meals  insulin lispro (ADMELOG) corrective regimen sliding scale   SubCutaneous at bedtime  pantoprazole    Tablet 40 milliGRAM(s) Oral before breakfast  sodium chloride 0.9% lock flush 3 milliLiter(s) IV Push every 8 hours  sodium chloride 0.9% with potassium chloride 20 mEq/L 1000 milliLiter(s) (50 mL/Hr) IV Continuous <Continuous>    MEDICATIONS  (PRN):  dextrose Oral Gel 15 Gram(s) Oral once PRN Blood Glucose LESS THAN 70 milliGRAM(s)/deciliter  naloxone Injectable 0.1 milliGRAM(s) IV Push every 3 minutes PRN For ANY of the following changes in patient status:  A. RR LESS THAN 10 breaths per minute, B. Oxygen saturation LESS THAN 90%, C. Sedation score of 6  ondansetron Injectable 4 milliGRAM(s) IV Push every 6 hours PRN Nausea      OBJECTIVE:  Patient is sitting up in bed and looks uncomfortable.     Sedation Score:	[ X] Alert	 [ ] Drowsy 	[ ] Arousable	[ ] Asleep	[ ] Unresponsive    Side Effects:	[ ] None	[x ] Nausea	[ ] Vomiting	[ ] Pruritus  		[ ] Other:    Vital Signs Last 24 Hrs  T(C): 37.2 (26 Nov 2023 08:10), Max: 37.2 (25 Nov 2023 16:25)  T(F): 98.9 (26 Nov 2023 08:10), Max: 98.9 (25 Nov 2023 16:25)  HR: 98 (26 Nov 2023 08:10) (93 - 98)  BP: 122/65 (26 Nov 2023 08:10) (104/62 - 123/68)  BP(mean): --  RR: 18 (26 Nov 2023 08:10) (18 - 18)  SpO2: 97% (26 Nov 2023 08:10) (96% - 100%)    Parameters below as of 26 Nov 2023 08:10  Patient On (Oxygen Delivery Method): room air        ASSESSMENT/ PLAN    Therapy to  be:	[ X] Continue   [ ] Discontinued   [ ] Change to prn Analgesics    Documentation and Verification of current medications:   [X] Done	[ ] Not done, not elligible    Comments: Will continue with IV Dilaudid PCA.  Zofran ordered for nausea and decreased demand dose to 0.15mg.   Recommend non-opioid adjuvant analgesics to be used when possible and when allowed by primary surgical team.    Progress Note written now but Patient was seen earlier. Anesthesia Pain Management Service    SUBJECTIVE: Patient is reporting some post surgical abdominal pain and nausea.  Patient states,  "I have some pain."    Pain Scale Score	At rest: ___ 	With Activity: ___ 	[X ] Refer to charted pain scores    THERAPY:    [ ] IV PCA Morphine		[ ] 5 mg/mL	[ ] 1 mg/mL  [X ] IV PCA Hydromorphone	[ ] 5 mg/mL	[X ] 1 mg/mL  [ ] IV PCA Fentanyl		[ ] 50 micrograms/mL    Demand dose __0.2_ lockout __6_ (minutes) Continuous Rate _0__ Total: __2.6_  mg used (in past 24 hours)      MEDICATIONS  (STANDING):  acetaminophen   IVPB .. 750 milliGRAM(s) IV Intermittent every 8 hours  albuterol    90 MICROgram(s) HFA Inhaler 2 Puff(s) Inhalation two times a day  atorvastatin 10 milliGRAM(s) Oral at bedtime  budesonide 160 MICROgram(s)/formoterol 4.5 MICROgram(s) Inhaler 2 Puff(s) Inhalation two times a day  chlorhexidine 2% Cloths 1 Application(s) Topical daily  dextrose 5%. 1000 milliLiter(s) (100 mL/Hr) IV Continuous <Continuous>  dextrose 5%. 1000 milliLiter(s) (50 mL/Hr) IV Continuous <Continuous>  dextrose 50% Injectable 25 Gram(s) IV Push once  dextrose 50% Injectable 12.5 Gram(s) IV Push once  dextrose 50% Injectable 25 Gram(s) IV Push once  dextrose 50% Injectable 12.5 Gram(s) IV Push once  enoxaparin Injectable 40 milliGRAM(s) SubCutaneous every 24 hours  glucagon  Injectable 1 milliGRAM(s) IntraMuscular once  HYDROmorphone PCA (1 mG/mL) 30 milliLiter(s) PCA Continuous PCA Continuous  influenza  Vaccine (HIGH DOSE) 0.7 milliLiter(s) IntraMuscular once  insulin lispro (ADMELOG) corrective regimen sliding scale   SubCutaneous three times a day before meals  insulin lispro (ADMELOG) corrective regimen sliding scale   SubCutaneous at bedtime  pantoprazole    Tablet 40 milliGRAM(s) Oral before breakfast  sodium chloride 0.9% lock flush 3 milliLiter(s) IV Push every 8 hours  sodium chloride 0.9% with potassium chloride 20 mEq/L 1000 milliLiter(s) (50 mL/Hr) IV Continuous <Continuous>    MEDICATIONS  (PRN):  dextrose Oral Gel 15 Gram(s) Oral once PRN Blood Glucose LESS THAN 70 milliGRAM(s)/deciliter  naloxone Injectable 0.1 milliGRAM(s) IV Push every 3 minutes PRN For ANY of the following changes in patient status:  A. RR LESS THAN 10 breaths per minute, B. Oxygen saturation LESS THAN 90%, C. Sedation score of 6  ondansetron Injectable 4 milliGRAM(s) IV Push every 6 hours PRN Nausea      OBJECTIVE:  Patient is sitting up in bed and looks uncomfortable.     Sedation Score:	[ X] Alert	 [ ] Drowsy 	[ ] Arousable	[ ] Asleep	[ ] Unresponsive    Side Effects:	[ ] None	[x ] Nausea	[ ] Vomiting	[ ] Pruritus  		[ ] Other:    Vital Signs Last 24 Hrs  T(C): 37.2 (26 Nov 2023 08:10), Max: 37.2 (25 Nov 2023 16:25)  T(F): 98.9 (26 Nov 2023 08:10), Max: 98.9 (25 Nov 2023 16:25)  HR: 98 (26 Nov 2023 08:10) (93 - 98)  BP: 122/65 (26 Nov 2023 08:10) (104/62 - 123/68)  BP(mean): --  RR: 18 (26 Nov 2023 08:10) (18 - 18)  SpO2: 97% (26 Nov 2023 08:10) (96% - 100%)    Parameters below as of 26 Nov 2023 08:10  Patient On (Oxygen Delivery Method): room air        ASSESSMENT/ PLAN    Therapy to  be:	[ X] Continue   [ ] Discontinued   [ ] Change to prn Analgesics    Documentation and Verification of current medications:   [X] Done	[ ] Not done, not elligible    Comments: Will continue with IV Dilaudid PCA.  Zofran ordered for nausea and decreased demand dose to 0.15mg.   Recommend non-opioid adjuvant analgesics to be used when possible and when allowed by primary surgical team.    Progress Note written now but Patient was seen earlier.

## 2023-11-26 NOTE — PROGRESS NOTE ADULT - SUBJECTIVE AND OBJECTIVE BOX
DATE OF SERVICE: 11-26-23       no chest pain , or sob        albuterol    90 MICROgram(s) HFA Inhaler 2 Puff(s) Inhalation two times a day  atorvastatin 10 milliGRAM(s) Oral at bedtime  budesonide 160 MICROgram(s)/formoterol 4.5 MICROgram(s) Inhaler 2 Puff(s) Inhalation two times a day  chlorhexidine 2% Cloths 1 Application(s) Topical daily  dextrose 5%. 1000 milliLiter(s) IV Continuous <Continuous>  dextrose 5%. 1000 milliLiter(s) IV Continuous <Continuous>  dextrose 50% Injectable 25 Gram(s) IV Push once  dextrose 50% Injectable 12.5 Gram(s) IV Push once  dextrose 50% Injectable 25 Gram(s) IV Push once  dextrose 50% Injectable 12.5 Gram(s) IV Push once  dextrose Oral Gel 15 Gram(s) Oral once PRN  enoxaparin Injectable 40 milliGRAM(s) SubCutaneous every 24 hours  glucagon  Injectable 1 milliGRAM(s) IntraMuscular once  HYDROmorphone PCA (1 mG/mL) 30 milliLiter(s) PCA Continuous PCA Continuous  influenza  Vaccine (HIGH DOSE) 0.7 milliLiter(s) IntraMuscular once  insulin lispro (ADMELOG) corrective regimen sliding scale   SubCutaneous three times a day before meals  insulin lispro (ADMELOG) corrective regimen sliding scale   SubCutaneous at bedtime  naloxone Injectable 0.1 milliGRAM(s) IV Push every 3 minutes PRN  pantoprazole    Tablet 40 milliGRAM(s) Oral before breakfast  sodium chloride 0.9% lock flush 3 milliLiter(s) IV Push every 8 hours  sodium chloride 0.9% with potassium chloride 20 mEq/L 1000 milliLiter(s) IV Continuous <Continuous>                            11.3   14.96 )-----------( 256      ( 26 Nov 2023 05:49 )             34.9       Hemoglobin: 11.3 g/dL (11-26 @ 05:49)  Hemoglobin: 9.8 g/dL (11-25 @ 06:25)  Hemoglobin: 10.5 g/dL (11-25 @ 00:15)  Hemoglobin: 6.4 g/dL (11-24 @ 09:39)  Hemoglobin: 6.3 g/dL (11-24 @ 05:47)      11-26    138  |  100  |  9   ----------------------------<  126<H>  4.4   |  22  |  0.43<L>    Ca    8.6      26 Nov 2023 05:49  Phos  2.2     11-26  Mg     2.10     11-26      Creatinine Trend: 0.43<--, 0.48<--, 0.55<--, 0.66<--, 0.61<--, 0.58<--    COAGS:           T(C): 36.8 (11-26-23 @ 00:25), Max: 37.2 (11-25-23 @ 16:25)  HR: 93 (11-26-23 @ 00:25) (93 - 98)  BP: 123/68 (11-26-23 @ 00:25) (104/62 - 123/68)  RR: 18 (11-26-23 @ 00:25) (18 - 18)  SpO2: 100% (11-26-23 @ 00:25) (96% - 100%)  Wt(kg): --    I&O's Summary    25 Nov 2023 07:01  -  26 Nov 2023 07:00  --------------------------------------------------------  IN: 1575 mL / OUT: 550 mL / NET: 1025 mL      General:  Alert and Oriented * 3.   Head: Normocephalic and atraumatic.   Neck: No JVD. No bruits. Supple. Does not appear to be enlarged.   Cardiovascular: + S1,S2 ; RRR Soft systolic murmur at the left lower sternal border. No rubs noted.    Lungs: CTA b/l. No rhonchi, rales or wheezes.   Abdomen: + BS, soft. Non tender. Non distended. No rebound. No guarding.   Extremities: No clubbing/cyanosis/edema.   Neurologic: Moves all four extremities. Full range of motion.   Skin: Warm and moist. The patient's skin has normal elasticity and good skin turgor.   Psychiatric: Appropriate mood and affect.  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      --tolerated procedure well from CV perspective  --pain management  --PT  -- pain management   --eventually resume home meds: Asa 81mg, Hctz 12.5mg, Losartan 25mg, Pravastatin 40mg

## 2023-11-26 NOTE — PROGRESS NOTE ADULT - SUBJECTIVE AND OBJECTIVE BOX
GENERAL SURGERY PROGRESS NOTE    Patient: NESTOR MABRY , 73y ([birth year])Female   MRN: [id number]  Location: [place] [address] A  Visit: 11-22-23 Inpatient  Date: 11-26-23 @ 10:22    Post-Op Day #: 4 hemicolect     Subjective: No events overnight. Patient resting comfortably in bed, no complaints. Not passing gas or having bowel function yet.     PAST MEDICAL & SURGICAL HISTORY:  History of hypertension  Diabetes  wears insulin pump  GERD (gastroesophageal reflux disease)  Uterine leiomyoma  Hyperlipidemia  Asthma  Obesity  Lymphadenopathy  left lower extremitiy ; 1966 - current  H/O insertion of insulin pump  OA osteoarthritis)  Malignant neoplasm of cecum  H/O bilateral breast reduction surgery  History of appendectomy  History of cholecystectomy  H/O: hysterectomy  History of total right knee replacement  3/2016 - [facility]  H/O total knee replacement, left  S/P bunionectomy    Vitals: T(F): 98.9 (11-26-23 @ 08:10), Max: 98.9 (11-25-23 @ 16:25)  HR: 98 (11-26-23 @ 08:10)  BP: 122/65 (11-26-23 @ 08:10)  RR: 18 (11-26-23 @ 08:10)  SpO2: 97% (11-26-23 @ 08:10)      Diet, Consistent Carbohydrate Clear Liquid      11-25-23 @ 07:01  -  11-26-23 @ 07:00  --------------------------------------------------------  IN:    Oral Fluid: 1325 mL    sodium chloride 0.9% w/ Additives: 250 mL  Total IN: 1575 mL    OUT:    Voided (mL): 550 mL  Total OUT: 550 mL    Total NET: 1025 mL    PHYSICAL EXAM  GEN: No acute distress   CV: RRR, no JVD, no peripheral edeam  LUNGS: Respirations unlabored, no use of accessory muscles  ABD: Abdomen soft, NT, ND  EXT: No peripheral edema. Regular range of motion.   INCISION: clean and dry. Wound vac in place.     MEDICATIONS  (STANDING):  acetaminophen   IVPB .. 750 milliGRAM(s) IV Intermittent every 8 hours  albuterol    90 MICROgram(s) HFA Inhaler 2 Puff(s) Inhalation two times a day  atorvastatin 10 milliGRAM(s) Oral at bedtime  budesonide 160 MICROgram(s)/formoterol 4.5 MICROgram(s) Inhaler 2 Puff(s) Inhalation two times a day  chlorhexidine 2% Cloths 1 Application(s) Topical daily  dextrose 5%. 1000 milliLiter(s) (100 mL/Hr) IV Continuous <Continuous>  dextrose 5%. 1000 milliLiter(s) (50 mL/Hr) IV Continuous <Continuous>  dextrose 50% Injectable 25 Gram(s) IV Push once  dextrose 50% Injectable 12.5 Gram(s) IV Push once  dextrose 50% Injectable 25 Gram(s) IV Push once  dextrose 50% Injectable 12.5 Gram(s) IV Push once  enoxaparin Injectable 40 milliGRAM(s) SubCutaneous every 24 hours  glucagon  Injectable 1 milliGRAM(s) IntraMuscular once  HYDROmorphone PCA (1 mG/mL) 30 milliLiter(s) PCA Continuous PCA Continuous  influenza  Vaccine (HIGH DOSE) 0.7 milliLiter(s) IntraMuscular once  insulin lispro (ADMELOG) corrective regimen sliding scale   SubCutaneous three times a day before meals  insulin lispro (ADMELOG) corrective regimen sliding scale   SubCutaneous at bedtime  pantoprazole    Tablet 40 milliGRAM(s) Oral before breakfast  potassium phosphate / sodium phosphate Powder (PHOS-NaK) 1 Packet(s) Oral once  sodium chloride 0.9% lock flush 3 milliLiter(s) IV Push every 8 hours  sodium chloride 0.9% with potassium chloride 20 mEq/L 1000 milliLiter(s) (50 mL/Hr) IV Continuous <Continuous>    MEDICATIONS  (PRN):  dextrose Oral Gel 15 Gram(s) Oral once PRN Blood Glucose LESS THAN 70 milliGRAM(s)/deciliter  naloxone Injectable 0.1 milliGRAM(s) IV Push every 3 minutes PRN For ANY of the following changes in patient status:  A. RR LESS THAN 10 breaths per minute, B. Oxygen saturation LESS THAN 90%, C. Sedation score of 6  ondansetron Injectable 4 milliGRAM(s) IV Push every 6 hours PRN Nausea      DVT PROPHYLAXIS: SCDs, enoxaparin Injectable 40 milliGRAM(s) SubCutaneous every 24 hours    GI PROPHYLAXIS: pantoprazole    Tablet 40 milliGRAM(s) Oral before breakfast    LAB/STUDIES:  CAPILLARY BLOOD GLUCOSE      POCT Blood Glucose.: 138 mg/dL (26 Nov 2023 08:00)  POCT Blood Glucose.: 123 mg/dL (26 Nov 2023 04:55)  POCT Blood Glucose.: 94 mg/dL (25 Nov 2023 21:06)  POCT Blood Glucose.: 113 mg/dL (25 Nov 2023 17:07)  POCT Blood Glucose.: 146 mg/dL (25 Nov 2023 12:00)  POCT Blood Glucose.: 85 mg/dL (25 Nov 2023 10:34)                          11.3   14.96 )-----------( 256      ( 26 Nov 2023 05:49 )             34.9     11-26    138  |  100  |  9   ----------------------------<  126<H>  4.4   |  22  |  0.43<L>    Ca    8.6      26 Nov 2023 05:49  Phos  2.2     11-26  Mg     2.10     11-26      Urinalysis Basic - ( 26 Nov 2023 05:49 )    Color: x / Appearance: x / SG: x / pH: x  Gluc: 126 mg/dL / Ketone: x  / Bili: x / Urobili: x   Blood: x / Protein: x / Nitrite: x   Leuk Esterase: x / RBC: x / WBC x   Sq Epi: x / Non Sq Epi: x / Bacteria: x      IMAGING:    ASSESSMENT:  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 Cosby if glucose > 180 x2 start Levemir 15 units. Once on LRD patient will need basal/bolus.   - Home meds restarted  - Holding HTN medications  - DVT prophylaxis  - Appreciate endocrinology recs; plan to restart insulin pump on discharge    D team surgery [id number]

## 2023-11-26 NOTE — PROGRESS NOTE ADULT - ASSESSMENT
CARDIOLOGY ATTENDING    Agree with above. No further inpatient cardiac testing expected. F/U surgery

## 2023-11-26 NOTE — PROGRESS NOTE ADULT - SUBJECTIVE AND OBJECTIVE BOX
Name of Patient : NESTOR MABRY  MRN: [id number]  Date of visit: 11-26-23       Subjective: Patient seen and examined. No new events except as noted.     REVIEW OF SYSTEMS:    CONSTITUTIONAL: No weakness, fevers or chills  EYES/ENT: No visual changes;  No vertigo or throat pain   NECK: No pain or stiffness  RESPIRATORY: No cough, wheezing, hemoptysis; No shortness of breath  CARDIOVASCULAR: No chest pain or palpitations  GASTROINTESTINAL: No abdominal or epigastric pain. No nausea, vomiting, or hematemesis; No diarrhea or constipation. No melena or hematochezia.  GENITOURINARY: No dysuria, frequency or hematuria  NEUROLOGICAL: No numbness or weakness  SKIN: No itching, burning, rashes, or lesions   All other review of systems is negative unless indicated above.    MEDICATIONS:  MEDICATIONS  (STANDING):  acetaminophen   IVPB .. 1000 milliGRAM(s) IV Intermittent every 6 hours  albuterol    90 MICROgram(s) HFA Inhaler 2 Puff(s) Inhalation two times a day  atorvastatin 10 milliGRAM(s) Oral at bedtime  budesonide 160 MICROgram(s)/formoterol 4.5 MICROgram(s) Inhaler 2 Puff(s) Inhalation two times a day  chlorhexidine 2% Cloths 1 Application(s) Topical daily  dextrose 5%. 1000 milliLiter(s) (50 mL/Hr) IV Continuous <Continuous>  dextrose 5%. 1000 milliLiter(s) (100 mL/Hr) IV Continuous <Continuous>  dextrose 50% Injectable 25 Gram(s) IV Push once  dextrose 50% Injectable 25 Gram(s) IV Push once  dextrose 50% Injectable 12.5 Gram(s) IV Push once  dextrose 50% Injectable 12.5 Gram(s) IV Push once  enoxaparin Injectable 40 milliGRAM(s) SubCutaneous every 24 hours  glucagon  Injectable 1 milliGRAM(s) IntraMuscular once  HYDROmorphone PCA (1 mG/mL) 30 milliLiter(s) PCA Continuous PCA Continuous  influenza  Vaccine (HIGH DOSE) 0.7 milliLiter(s) IntraMuscular once  insulin lispro (ADMELOG) corrective regimen sliding scale   SubCutaneous three times a day before meals  insulin lispro (ADMELOG) corrective regimen sliding scale   SubCutaneous at bedtime  pantoprazole    Tablet 40 milliGRAM(s) Oral before breakfast  sodium chloride 0.9% lock flush 3 milliLiter(s) IV Push every 8 hours  sodium chloride 0.9% with potassium chloride 20 mEq/L 1000 milliLiter(s) (50 mL/Hr) IV Continuous <Continuous>      PHYSICAL EXAM:  T(C): 37.2 (11-26-23 @ 19:33), Max: 37.2 (11-26-23 @ 08:10)  HR: 89 (11-26-23 @ 19:33) (89 - 100)  BP: 118/55 (11-26-23 @ 19:33) (118/55 - 127/56)  RR: 18 (11-26-23 @ 19:33) (18 - 18)  SpO2: 96% (11-26-23 @ 19:33) (96% - 100%)  Wt(kg): --  I&O's Summary    25 Nov 2023 07:01  -  26 Nov 2023 07:00  --------------------------------------------------------  IN: 1575 mL / OUT: 550 mL / NET: 1025 mL    26 Nov 2023 07:01  -  26 Nov 2023 20:41  --------------------------------------------------------  IN: 1050 mL / OUT: 350 mL / NET: 700 mL    Appearance: Normal	  HEENT:  PERRLA   Lymphatic: No lymphadenopathy   Cardiovascular: Normal S1 S2, no JVD  Respiratory: normal effort , clear  Gastrointestinal:  Soft, Non-tender  Skin: No rashes,  warm to touch  Psychiatry:  Mood & affect appropriate  Musculuskeletal: No edema    recent labs, Imaging and EKGs personally reviewed     11-25-23 @ 07:01  -  11-26-23 @ 07:00  --------------------------------------------------------  IN: 1575 mL / OUT: 550 mL / NET: 1025 mL    11-26-23 @ 07:01  -  11-26-23 @ 20:41  --------------------------------------------------------  IN: 1050 mL / OUT: 350 mL / NET: 700 mL                            11.3   14.96 )-----------( 256      ( 26 Nov 2023 05:49 )             34.9               11-26    138  |  100  |  9   ----------------------------<  126<H>  4.4   |  22  |  0.43<L>    Ca    8.6      26 Nov 2023 05:49  Phos  2.2     11-26  Mg     2.10     11-26                         Urinalysis Basic - ( 26 Nov 2023 05:49 )    Color: x / Appearance: x / SG: x / pH: x  Gluc: 126 mg/dL / Ketone: x  / Bili: x / Urobili: x   Blood: x / Protein: x / Nitrite: x   Leuk Esterase: x / RBC: x / WBC x   Sq Epi: x / Non Sq Epi: x / Bacteria: x

## 2023-11-27 LAB
ANION GAP SERPL CALC-SCNC: 14 MMOL/L — SIGNIFICANT CHANGE UP (ref 7–14)
ANION GAP SERPL CALC-SCNC: 14 MMOL/L — SIGNIFICANT CHANGE UP (ref 7–14)
BUN SERPL-MCNC: 10 MG/DL — SIGNIFICANT CHANGE UP (ref 7–23)
BUN SERPL-MCNC: 10 MG/DL — SIGNIFICANT CHANGE UP (ref 7–23)
CALCIUM SERPL-MCNC: 8.5 MG/DL — SIGNIFICANT CHANGE UP (ref 8.4–10.5)
CALCIUM SERPL-MCNC: 8.5 MG/DL — SIGNIFICANT CHANGE UP (ref 8.4–10.5)
CHLORIDE SERPL-SCNC: 100 MMOL/L — SIGNIFICANT CHANGE UP (ref 98–107)
CHLORIDE SERPL-SCNC: 100 MMOL/L — SIGNIFICANT CHANGE UP (ref 98–107)
CO2 SERPL-SCNC: 20 MMOL/L — LOW (ref 22–31)
CO2 SERPL-SCNC: 20 MMOL/L — LOW (ref 22–31)
CREAT SERPL-MCNC: 0.44 MG/DL — LOW (ref 0.5–1.3)
CREAT SERPL-MCNC: 0.44 MG/DL — LOW (ref 0.5–1.3)
EGFR: 103 ML/MIN/1.73M2 — SIGNIFICANT CHANGE UP
EGFR: 103 ML/MIN/1.73M2 — SIGNIFICANT CHANGE UP
GLUCOSE BLDC GLUCOMTR-MCNC: 124 MG/DL — HIGH (ref 70–99)
GLUCOSE BLDC GLUCOMTR-MCNC: 124 MG/DL — HIGH (ref 70–99)
GLUCOSE BLDC GLUCOMTR-MCNC: 173 MG/DL — HIGH (ref 70–99)
GLUCOSE BLDC GLUCOMTR-MCNC: 183 MG/DL — HIGH (ref 70–99)
GLUCOSE BLDC GLUCOMTR-MCNC: 183 MG/DL — HIGH (ref 70–99)
GLUCOSE SERPL-MCNC: 174 MG/DL — HIGH (ref 70–99)
GLUCOSE SERPL-MCNC: 174 MG/DL — HIGH (ref 70–99)
HCT VFR BLD CALC: 33.3 % — LOW (ref 34.5–45)
HCT VFR BLD CALC: 33.3 % — LOW (ref 34.5–45)
HGB BLD-MCNC: 10.7 G/DL — LOW (ref 11.5–15.5)
HGB BLD-MCNC: 10.7 G/DL — LOW (ref 11.5–15.5)
MAGNESIUM SERPL-MCNC: 1.8 MG/DL — SIGNIFICANT CHANGE UP (ref 1.6–2.6)
MAGNESIUM SERPL-MCNC: 1.8 MG/DL — SIGNIFICANT CHANGE UP (ref 1.6–2.6)
MCHC RBC-ENTMCNC: 25.3 PG — LOW (ref 27–34)
MCHC RBC-ENTMCNC: 25.3 PG — LOW (ref 27–34)
MCHC RBC-ENTMCNC: 32.1 GM/DL — SIGNIFICANT CHANGE UP (ref 32–36)
MCHC RBC-ENTMCNC: 32.1 GM/DL — SIGNIFICANT CHANGE UP (ref 32–36)
MCV RBC AUTO: 78.7 FL — LOW (ref 80–100)
MCV RBC AUTO: 78.7 FL — LOW (ref 80–100)
NRBC # BLD: 0 /100 WBCS — SIGNIFICANT CHANGE UP (ref 0–0)
NRBC # BLD: 0 /100 WBCS — SIGNIFICANT CHANGE UP (ref 0–0)
NRBC # FLD: 0.02 K/UL — HIGH (ref 0–0)
NRBC # FLD: 0.02 K/UL — HIGH (ref 0–0)
PHOSPHATE SERPL-MCNC: 2.2 MG/DL — LOW (ref 2.5–4.5)
PHOSPHATE SERPL-MCNC: 2.2 MG/DL — LOW (ref 2.5–4.5)
PLATELET # BLD AUTO: 275 K/UL — SIGNIFICANT CHANGE UP (ref 150–400)
PLATELET # BLD AUTO: 275 K/UL — SIGNIFICANT CHANGE UP (ref 150–400)
POTASSIUM SERPL-MCNC: 4.1 MMOL/L — SIGNIFICANT CHANGE UP (ref 3.5–5.3)
POTASSIUM SERPL-MCNC: 4.1 MMOL/L — SIGNIFICANT CHANGE UP (ref 3.5–5.3)
POTASSIUM SERPL-SCNC: 4.1 MMOL/L — SIGNIFICANT CHANGE UP (ref 3.5–5.3)
POTASSIUM SERPL-SCNC: 4.1 MMOL/L — SIGNIFICANT CHANGE UP (ref 3.5–5.3)
RBC # BLD: 4.23 M/UL — SIGNIFICANT CHANGE UP (ref 3.8–5.2)
RBC # BLD: 4.23 M/UL — SIGNIFICANT CHANGE UP (ref 3.8–5.2)
RBC # FLD: 17.1 % — HIGH (ref 10.3–14.5)
RBC # FLD: 17.1 % — HIGH (ref 10.3–14.5)
SODIUM SERPL-SCNC: 134 MMOL/L — LOW (ref 135–145)
SODIUM SERPL-SCNC: 134 MMOL/L — LOW (ref 135–145)
WBC # BLD: 11.29 K/UL — HIGH (ref 3.8–10.5)
WBC # BLD: 11.29 K/UL — HIGH (ref 3.8–10.5)
WBC # FLD AUTO: 11.29 K/UL — HIGH (ref 3.8–10.5)
WBC # FLD AUTO: 11.29 K/UL — HIGH (ref 3.8–10.5)

## 2023-11-27 PROCEDURE — 99232 SBSQ HOSP IP/OBS MODERATE 35: CPT

## 2023-11-27 PROCEDURE — 74018 RADEX ABDOMEN 1 VIEW: CPT | Mod: 26

## 2023-11-27 RX ORDER — ACETAMINOPHEN 500 MG
1000 TABLET ORAL EVERY 6 HOURS
Refills: 0 | Status: COMPLETED | OUTPATIENT
Start: 2023-11-27 | End: 2023-11-28

## 2023-11-27 RX ORDER — INSULIN LISPRO 100/ML
VIAL (ML) SUBCUTANEOUS EVERY 6 HOURS
Refills: 0 | Status: DISCONTINUED | OUTPATIENT
Start: 2023-11-27 | End: 2023-11-28

## 2023-11-27 RX ADMIN — ATORVASTATIN CALCIUM 10 MILLIGRAM(S): 80 TABLET, FILM COATED ORAL at 20:52

## 2023-11-27 RX ADMIN — Medication 400 MILLIGRAM(S): at 12:43

## 2023-11-27 RX ADMIN — Medication 1000 MILLIGRAM(S): at 13:17

## 2023-11-27 RX ADMIN — ONDANSETRON 4 MILLIGRAM(S): 8 TABLET, FILM COATED ORAL at 10:06

## 2023-11-27 RX ADMIN — HYDROMORPHONE HYDROCHLORIDE 30 MILLILITER(S): 2 INJECTION INTRAMUSCULAR; INTRAVENOUS; SUBCUTANEOUS at 19:18

## 2023-11-27 RX ADMIN — HYDROMORPHONE HYDROCHLORIDE 30 MILLILITER(S): 2 INJECTION INTRAMUSCULAR; INTRAVENOUS; SUBCUTANEOUS at 07:33

## 2023-11-27 RX ADMIN — Medication 63.75 MILLIMOLE(S): at 10:06

## 2023-11-27 RX ADMIN — Medication 1000 MILLIGRAM(S): at 00:02

## 2023-11-27 RX ADMIN — Medication 1: at 18:48

## 2023-11-27 RX ADMIN — ALBUTEROL 2 PUFF(S): 90 AEROSOL, METERED ORAL at 09:13

## 2023-11-27 RX ADMIN — Medication 400 MILLIGRAM(S): at 17:21

## 2023-11-27 RX ADMIN — BUDESONIDE AND FORMOTEROL FUMARATE DIHYDRATE 2 PUFF(S): 160; 4.5 AEROSOL RESPIRATORY (INHALATION) at 08:12

## 2023-11-27 RX ADMIN — CHLORHEXIDINE GLUCONATE 1 APPLICATION(S): 213 SOLUTION TOPICAL at 13:13

## 2023-11-27 RX ADMIN — Medication 400 MILLIGRAM(S): at 06:18

## 2023-11-27 RX ADMIN — ONDANSETRON 4 MILLIGRAM(S): 8 TABLET, FILM COATED ORAL at 20:52

## 2023-11-27 RX ADMIN — Medication 400 MILLIGRAM(S): at 00:02

## 2023-11-27 RX ADMIN — SODIUM CHLORIDE 3 MILLILITER(S): 9 INJECTION INTRAMUSCULAR; INTRAVENOUS; SUBCUTANEOUS at 23:17

## 2023-11-27 RX ADMIN — ENOXAPARIN SODIUM 40 MILLIGRAM(S): 100 INJECTION SUBCUTANEOUS at 17:22

## 2023-11-27 RX ADMIN — BUDESONIDE AND FORMOTEROL FUMARATE DIHYDRATE 2 PUFF(S): 160; 4.5 AEROSOL RESPIRATORY (INHALATION) at 20:54

## 2023-11-27 RX ADMIN — SODIUM CHLORIDE 3 MILLILITER(S): 9 INJECTION INTRAMUSCULAR; INTRAVENOUS; SUBCUTANEOUS at 13:24

## 2023-11-27 RX ADMIN — Medication 1: at 12:43

## 2023-11-27 RX ADMIN — Medication 1000 MILLIGRAM(S): at 06:18

## 2023-11-27 RX ADMIN — DEXTROSE MONOHYDRATE, SODIUM CHLORIDE, AND POTASSIUM CHLORIDE 100 MILLILITER(S): 50; .745; 4.5 INJECTION, SOLUTION INTRAVENOUS at 13:13

## 2023-11-27 RX ADMIN — SODIUM CHLORIDE 3 MILLILITER(S): 9 INJECTION INTRAMUSCULAR; INTRAVENOUS; SUBCUTANEOUS at 06:18

## 2023-11-27 RX ADMIN — DEXTROSE MONOHYDRATE, SODIUM CHLORIDE, AND POTASSIUM CHLORIDE 100 MILLILITER(S): 50; .745; 4.5 INJECTION, SOLUTION INTRAVENOUS at 07:34

## 2023-11-27 RX ADMIN — ALBUTEROL 2 PUFF(S): 90 AEROSOL, METERED ORAL at 20:54

## 2023-11-27 RX ADMIN — PANTOPRAZOLE SODIUM 40 MILLIGRAM(S): 20 TABLET, DELAYED RELEASE ORAL at 06:18

## 2023-11-27 RX ADMIN — Medication 1000 MILLIGRAM(S): at 18:00

## 2023-11-27 NOTE — PROGRESS NOTE ADULT - ASSESSMENT
Patient is a 73 yr old female PMH HTN, HLD, asthma, recent NST 11/17/23 with no ischemia or infarct and normal LV function and recent TTE 10/2023 with Normal LV/RV function, who was found with cecal mass/ new dx adenocarcinoma s/p Right hemicolectomy 11/22.  Pt reports incision site pain and increased distention.     # Adenocarcinoma  S/P OR , R hemicolectomy  pain control   IV hydration  Surg management appreciated   monitor electrolytes     # Anemia   Monitor hgb level  transfuse 1 unit PRBC  defer to surg        # HTN   resume BPmeds  Monitor     # DM  sliding scale  Endo follow up appreciated     # Asthma     # HLD

## 2023-11-27 NOTE — PROGRESS NOTE ADULT - SUBJECTIVE AND OBJECTIVE BOX
Anesthesia Pain Management Service    SUBJECTIVE: Patient is doing well with IV PCA and no significant problems reported.    Pain Scale Score	At rest: _6/10__ 	With Activity: ___ 	[X ] Refer to charted pain scores    THERAPY:    [ ] IV PCA Morphine		[ ] 5 mg/mL	[ ] 1 mg/mL  [X ] IV PCA Hydromorphone	[ ] 5 mg/mL	[X ] 1 mg/mL  [ ] IV PCA Fentanyl		[ ] 50 micrograms/mL    Demand dose __0.2_ lockout __6_ (minutes) Continuous Rate _0__ Total: __1.05_  mg used (in past 24 hours)      MEDICATIONS  (STANDING):  acetaminophen   IVPB .. 1000 milliGRAM(s) IV Intermittent every 6 hours  albuterol    90 MICROgram(s) HFA Inhaler 2 Puff(s) Inhalation two times a day  atorvastatin 10 milliGRAM(s) Oral at bedtime  budesonide 160 MICROgram(s)/formoterol 4.5 MICROgram(s) Inhaler 2 Puff(s) Inhalation two times a day  chlorhexidine 2% Cloths 1 Application(s) Topical daily  dextrose 5%. 1000 milliLiter(s) (50 mL/Hr) IV Continuous <Continuous>  dextrose 5%. 1000 milliLiter(s) (100 mL/Hr) IV Continuous <Continuous>  dextrose 50% Injectable 25 Gram(s) IV Push once  dextrose 50% Injectable 12.5 Gram(s) IV Push once  dextrose 50% Injectable 25 Gram(s) IV Push once  dextrose 50% Injectable 12.5 Gram(s) IV Push once  enoxaparin Injectable 40 milliGRAM(s) SubCutaneous every 24 hours  glucagon  Injectable 1 milliGRAM(s) IntraMuscular once  HYDROmorphone PCA (1 mG/mL) 30 milliLiter(s) PCA Continuous PCA Continuous  influenza  Vaccine (HIGH DOSE) 0.7 milliLiter(s) IntraMuscular once  insulin lispro (ADMELOG) corrective regimen sliding scale   SubCutaneous every 6 hours  pantoprazole    Tablet 40 milliGRAM(s) Oral before breakfast  sodium chloride 0.9% lock flush 3 milliLiter(s) IV Push every 8 hours  sodium chloride 0.9% with potassium chloride 20 mEq/L 1000 milliLiter(s) (100 mL/Hr) IV Continuous <Continuous>    MEDICATIONS  (PRN):  dextrose Oral Gel 15 Gram(s) Oral once PRN Blood Glucose LESS THAN 70 milliGRAM(s)/deciliter  naloxone Injectable 0.1 milliGRAM(s) IV Push every 3 minutes PRN For ANY of the following changes in patient status:  A. RR LESS THAN 10 breaths per minute, B. Oxygen saturation LESS THAN 90%, C. Sedation score of 6  ondansetron Injectable 4 milliGRAM(s) IV Push every 6 hours PRN Nausea      OBJECTIVE: Patient laying in bed.    Sedation Score:	[ X] Alert	[ ] Drowsy 	[ ] Arousable	[ ] Asleep	[ ] Unresponsive    Side Effects:	[X ] None	[ ] Nausea	[ ] Vomiting	[ ] Pruritus  		[ ] Other:    Vital Signs Last 24 Hrs  T(C): 37.2 (27 Nov 2023 05:02), Max: 37.2 (26 Nov 2023 19:33)  T(F): 98.9 (27 Nov 2023 05:02), Max: 99 (27 Nov 2023 00:02)  HR: 96 (27 Nov 2023 05:02) (89 - 100)  BP: 117/60 (27 Nov 2023 05:02) (117/60 - 127/56)  BP(mean): --  RR: 18 (27 Nov 2023 05:02) (18 - 18)  SpO2: 100% (27 Nov 2023 05:02) (96% - 100%)    Parameters below as of 27 Nov 2023 05:02  Patient On (Oxygen Delivery Method): room air        ASSESSMENT/ PLAN    Therapy to  be:	[ X] Continue   [ ] Discontinued   [ ] Change to prn Analgesics    Documentation and Verification of current medications:   [X] Done	[ ] Not done, not elligible    Comments: Continue IV PCA. Recommend non-opioid adjuvant analgesics to be used when possible and when allowed by primary surgical team.    Progress Note written now but Patient was seen earlier.

## 2023-11-27 NOTE — PROGRESS NOTE ADULT - ASSESSMENT
This is a 72 yo F /w a PMH of DM2 on an insulin pump and colorectal cancer s/p right hemicolectomy today. Endocrinology consulted for diabetes management and DM2. Based on current insulin pump settings, suspect patient is being over-basalized given high basal rates compared to the insulin:carb ratio    Home Regimen:  Patient uses the medtronic insulin pump and Libre2  Pump settings are as follows:  TDD 52.425  12AM 1.8 units per hour  6:30AM 2 units per hour  12:30PM 2.45 units per hour  6:30PM 2.55 units per hour  8PM 2.55 units per hour    ICR:  12AM 1:15  7AM 1:12  12:30PM 1:15    ISF 1:30    #DM2 A1c 6.8%  #insulin pump - would hold of on using the insulin pump at this time given history of possibly frequent overnight hypoglycemia  also current clinical status of her intermittent sleepiness and being tired concerns me that she may not be able to function pump well at this time  additionally her PO status is variable and would require adjustments in her pump - do not suspect pt can self manage this right now given her sleepiness and also daily adjustments that would be needed given variations in po intake      NPO:  -c/w with low dose admelog correction scale every 6 hours while NPO  -Check FS q 6 hours   -Hypoglycemia Protocol     if on clears:  -low admelog correction scales before meals if on clears (diabetic clears)  -low admelog scales before bedtime  -hold levemir until we see her trend   if glucose >180 X2 - than start levemir 15 units and make it q24hrs   -FSG before meals and before bedtime  -Carb consistent diet once able to tolerate  -hypoglycemia protocol in place if needed    when ADAT to carb consistent diet   than will need basal bolus +correction scale   anticipate Levemir 15 units and ademelog 5 units premeals and low dose correction scale     pt has her on CGM on- also monitoring her own BG while inpt on bonnie     #Discharge  -follow up with Dr. Anand  -basal bolus vs resume insulin pump on discharge, likely decrease some basal settings    #HTN  -BP target <130/80  -once stable post-op, consider resuming losartan 25mg daily and hctz 12.5mg daily- management as per primary team  outpt mc/cr ratio     #HLD  -On Pravastatin 40mg qhs at home   - Resume once able to tolerate orals if no contraindications   -Please obtain lipid profile if not done recently       D/w Team pager [phone]   D/w He Thrasher [phone]    Adrienne Taveras  Nurse Practitioner  Division of Endocrinology & Diabetes  In house pager #[phone]    If before 9AM or after 6PM, or on weekends/holidays, please call endocrine answering service for assistance ([phone]).For nonurgent matters email [email or web address] for assistance.

## 2023-11-27 NOTE — DIETITIAN INITIAL EVALUATION ADULT - PERTINENT MEDS FT
MEDICATIONS  (STANDING):  acetaminophen   IVPB .. 1000 milliGRAM(s) IV Intermittent every 6 hours  albuterol    90 MICROgram(s) HFA Inhaler 2 Puff(s) Inhalation two times a day  atorvastatin 10 milliGRAM(s) Oral at bedtime  budesonide 160 MICROgram(s)/formoterol 4.5 MICROgram(s) Inhaler 2 Puff(s) Inhalation two times a day  chlorhexidine 2% Cloths 1 Application(s) Topical daily  enoxaparin Injectable 40 milliGRAM(s) SubCutaneous every 24 hours  HYDROmorphone PCA (1 mG/mL) 30 milliLiter(s) PCA Continuous PCA Continuous  influenza  Vaccine (HIGH DOSE) 0.7 milliLiter(s) IntraMuscular once  insulin lispro (ADMELOG) corrective regimen sliding scale   SubCutaneous every 6 hours  pantoprazole    Tablet 40 milliGRAM(s) Oral before breakfast  sodium chloride 0.9% lock flush 3 milliLiter(s) IV Push every 8 hours  sodium chloride 0.9% with potassium chloride 20 mEq/L 1000 milliLiter(s) (100 mL/Hr) IV Continuous <Continuous>    MEDICATIONS  (PRN):  naloxone Injectable 0.1 milliGRAM(s) IV Push every 3 minutes PRN For ANY of the following changes in patient status:  A. RR LESS THAN 10 breaths per minute, B. Oxygen saturation LESS THAN 90%, C. Sedation score of 6  ondansetron Injectable 4 milliGRAM(s) IV Push every 6 hours PRN Nausea

## 2023-11-27 NOTE — PROGRESS NOTE ADULT - SUBJECTIVE AND OBJECTIVE BOX
DATE OF SERVICE: 11-27-23    Patient denies chest pain or shortness of breath.  feels nauseas and has vomited multiple times. Review of symptoms otherwise negative.    MEDICATIONS:  acetaminophen   IVPB .. 1000 milliGRAM(s) IV Intermittent every 6 hours  albuterol    90 MICROgram(s) HFA Inhaler 2 Puff(s) Inhalation two times a day  atorvastatin 10 milliGRAM(s) Oral at bedtime  budesonide 160 MICROgram(s)/formoterol 4.5 MICROgram(s) Inhaler 2 Puff(s) Inhalation two times a day  chlorhexidine 2% Cloths 1 Application(s) Topical daily  enoxaparin Injectable 40 milliGRAM(s) SubCutaneous every 24 hours  HYDROmorphone PCA (1 mG/mL) 30 milliLiter(s) PCA Continuous PCA Continuous  influenza  Vaccine (HIGH DOSE) 0.7 milliLiter(s) IntraMuscular once  insulin lispro (ADMELOG) corrective regimen sliding scale   SubCutaneous every 6 hours  naloxone Injectable 0.1 milliGRAM(s) IV Push every 3 minutes PRN  ondansetron Injectable 4 milliGRAM(s) IV Push every 6 hours PRN  pantoprazole    Tablet 40 milliGRAM(s) Oral before breakfast  sodium chloride 0.9% lock flush 3 milliLiter(s) IV Push every 8 hours  sodium chloride 0.9% with potassium chloride 20 mEq/L 1000 milliLiter(s) IV Continuous <Continuous>      LABS:                        10.7   11.29 )-----------( 275      ( 27 Nov 2023 06:43 )             33.3       Hemoglobin: 10.7 g/dL (11-27 @ 06:43)  Hemoglobin: 11.3 g/dL (11-26 @ 05:49)  Hemoglobin: 9.8 g/dL (11-25 @ 06:25)  Hemoglobin: 10.5 g/dL (11-25 @ 00:15)  Hemoglobin: 6.4 g/dL (11-24 @ 09:39)      11-27    134<L>  |  100  |  10  ----------------------------<  174<H>  4.1   |  20<L>  |  0.44<L>    Ca    8.5      27 Nov 2023 06:43  Phos  2.2     11-27  Mg     1.80     11-27      Creatinine Trend: 0.44<--, 0.43<--, 0.48<--, 0.55<--, 0.66<--, 0.61<--      PHYSICAL EXAM:  T(C): 36.7 (11-27-23 @ 09:40), Max: 37.2 (11-26-23 @ 19:33)  HR: 101 (11-27-23 @ 09:40) (89 - 101)  BP: 150/87 (11-27-23 @ 09:40) (117/60 - 150/87)  RR: 18 (11-27-23 @ 09:40) (18 - 18)  SpO2: 100% (11-27-23 @ 09:40) (96% - 100%)  Wt(kg): --    I&O's Summary    26 Nov 2023 07:01  -  27 Nov 2023 07:00  --------------------------------------------------------  IN: 1650 mL / OUT: 650 mL / NET: 1000 mL    27 Nov 2023 07:01  -  27 Nov 2023 11:31  --------------------------------------------------------  IN: 400 mL / OUT: 0 mL / NET: 400 mL          General:  Alert and Oriented * 3.   Head: Normocephalic and atraumatic.   Neck: No JVD. No bruits. Supple. Does not appear to be enlarged.   Cardiovascular: + S1,S2 ; RRR Soft systolic murmur at the left lower sternal border. No rubs noted.    Lungs: CTA b/l. No rhonchi, rales or wheezes.   Abdomen: + BS, soft. Non tender. Non distended. No rebound. No guarding.   Extremities: No clubbing/cyanosis/edema.   Neurologic: Moves all four extremities. Full range of motion.   Skin: Warm and moist. The patient's skin has normal elasticity and good skin turgor.   Psychiatric: Appropriate mood and affect.  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      --tolerated procedure well from CV perspective  --pain management  --PT  --eventually resume home meds: Asa 81mg, Hctz 12.5mg, Losartan 25mg, Pravastatin 40mg when ok from a surgical perspective    Bella Aguilera MD  Pager: [phone]

## 2023-11-27 NOTE — PROGRESS NOTE ADULT - ASSESSMENT
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    Plan  - Diet: NPO  - IVF 100cc/hour  - Pain control with PCA and tylenol  - F/u endocrine levemir dosing  - Home meds restarted  - Holding HTN medications  - DVT prophylaxis  - Appreciate endocrinology recs; plan to restart insulin pump on discharge    D team surgery [id number]

## 2023-11-27 NOTE — DIETITIAN INITIAL EVALUATION ADULT - OTHER INFO
Medical course: Per chart 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.    Nutrition interview: Pt A&Ox4. S/p hemicolectomy 11/22. Since surgery has been intermittently NPO/inadequate diet. Was advanced to FLD on 11/26 with 2x  bilious emesis overnight per surgery note 11/27. -BM/- flatus, awaiting return of bowel function. Receiving zofran prn for nausea. Per surgery team plan to advance to low fiber diet as medically feasible.    Denies any chewing/swallowing difficulties. No known food allergies. Stated UBW: 166lb, same as current weight. Per HIE: 10/13: 174lb, 7/17: 171lb.     Pt being followed by endocrine to manage DM, Pt with frequent hypoglycemic episodes at home.  Pt not amenable to nutrition education at this time 2/2 NPO status and nausea. Fiber restricted Nutrition Therapy Handout left at bedside, please reconsult RD for education prior to discharge.

## 2023-11-27 NOTE — PROGRESS NOTE ADULT - SUBJECTIVE AND OBJECTIVE BOX
Chief Complaint: DM2 A1c 6.8%    History: Pt seen at bedside. Pt NPO.  As per RN pt has been having nausea. As per primary team will continue NPO for now. Family at bedside. May need NGT placement    MEDICATIONS  (STANDING):  acetaminophen   IVPB .. 1000 milliGRAM(s) IV Intermittent every 6 hours  albuterol    90 MICROgram(s) HFA Inhaler 2 Puff(s) Inhalation two times a day  atorvastatin 10 milliGRAM(s) Oral at bedtime  budesonide 160 MICROgram(s)/formoterol 4.5 MICROgram(s) Inhaler 2 Puff(s) Inhalation two times a day  chlorhexidine 2% Cloths 1 Application(s) Topical daily  enoxaparin Injectable 40 milliGRAM(s) SubCutaneous every 24 hours  HYDROmorphone PCA (1 mG/mL) 30 milliLiter(s) PCA Continuous PCA Continuous  influenza  Vaccine (HIGH DOSE) 0.7 milliLiter(s) IntraMuscular once  insulin lispro (ADMELOG) corrective regimen sliding scale   SubCutaneous every 6 hours  pantoprazole    Tablet 40 milliGRAM(s) Oral before breakfast  sodium chloride 0.9% lock flush 3 milliLiter(s) IV Push every 8 hours  sodium chloride 0.9% with potassium chloride 20 mEq/L 1000 milliLiter(s) (100 mL/Hr) IV Continuous <Continuous>    MEDICATIONS  (PRN):  naloxone Injectable 0.1 milliGRAM(s) IV Push every 3 minutes PRN For ANY of the following changes in patient status:  A. RR LESS THAN 10 breaths per minute, B. Oxygen saturation LESS THAN 90%, C. Sedation score of 6  ondansetron Injectable 4 milliGRAM(s) IV Push every 6 hours PRN Nausea      Allergies: Darvocet A500 (Other; Urticaria)  Percocet 10/325 (Other; Urticaria)  codeine (Other)  Lantus (Swelling)  PURELL.  pt said, &quot;I felt my airway closing&quot; after she smelled the Purell. The incident occured at the pulmonologist office. (Other; Short breath)      Review of Systems:  Respiratory: No SOB, no cough  GI: No nausea, vomiting, abdominal pain  Endocrine: no polyuria, polydipsia    PHYSICAL EXAM:  VITALS: T(C): 36.9 (11-27-23 @ 16:28)  T(F): 98.4 (11-27-23 @ 16:28), Max: 99 (11-27-23 @ 00:02)  HR: 103 (11-27-23 @ 16:28) (89 - 103)  BP: 109/60 (11-27-23 @ 16:28) (109/60 - 150/87)  RR:  (18 - 18)  SpO2:  (96% - 100%)  Wt(kg): --  GENERAL: NAD, well-groomed, well-developed  RESPIRATORY: No labored breathing   GI: Soft, nontender, non distended  PSYCH: Alert and oriented x 3, normal affect, normal mood      CAPILLARY BLOOD GLUCOSE  POCT Blood Glucose.: 173 mg/dL (27 Nov 2023 12:00)  POCT Blood Glucose.: 183 mg/dL (27 Nov 2023 07:59)  POCT Blood Glucose.: 165 mg/dL (26 Nov 2023 22:25)  POCT Blood Glucose.: 119 mg/dL (26 Nov 2023 16:58)    A1C with Estimated Average Glucose (11.16.23 @ 13:15)    A1C with Estimated Average Glucose Result: 6.8   Estimated Average Glucose: 148      11-27    134<L>  |  100  |  10  ----------------------------<  174<H>  4.1   |  20<L>  |  0.44<L>    eGFR: 103    Ca    8.5      11-27  Mg     1.80     11-27  Phos  2.2     11-27    Diet, NPO:   Except Medications (11-27-23 @ 06:34) [Active]

## 2023-11-27 NOTE — DIETITIAN INITIAL EVALUATION ADULT - ADD RECOMMEND
1) Diet advancement deferred to surgical team at this time. As medically feasible goal to advance to CSTCHO, low fiber diet  2) If prolonged inability to tolerate PO may consider alternate means of nutrition if within goals of care   3) Monitor weights, labs, BM's, skin integrity, tolerance to PO   4) Replete electrolytes prn   5) Please reconsult RD when diet advanced for diet education.

## 2023-11-27 NOTE — PROGRESS NOTE ADULT - SUBJECTIVE AND OBJECTIVE BOX
Name of Patient : NESTOR MABRY  MRN: [id number]  Date of visit: 11-27-23       Subjective: Patient seen and examined. No new events except as noted.     REVIEW OF SYSTEMS:    CONSTITUTIONAL: No weakness, fevers or chills  EYES/ENT: No visual changes;  No vertigo or throat pain   NECK: No pain or stiffness  RESPIRATORY: No cough, wheezing, hemoptysis; No shortness of breath  CARDIOVASCULAR: No chest pain or palpitations  GASTROINTESTINAL: No abdominal or epigastric pain. No nausea, vomiting, or hematemesis; No diarrhea or constipation. No melena or hematochezia.  GENITOURINARY: No dysuria, frequency or hematuria  NEUROLOGICAL: No numbness or weakness  SKIN: No itching, burning, rashes, or lesions   All other review of systems is negative unless indicated above.    MEDICATIONS:  MEDICATIONS  (STANDING):  acetaminophen   IVPB .. 1000 milliGRAM(s) IV Intermittent every 6 hours  albuterol    90 MICROgram(s) HFA Inhaler 2 Puff(s) Inhalation two times a day  atorvastatin 10 milliGRAM(s) Oral at bedtime  budesonide 160 MICROgram(s)/formoterol 4.5 MICROgram(s) Inhaler 2 Puff(s) Inhalation two times a day  chlorhexidine 2% Cloths 1 Application(s) Topical daily  enoxaparin Injectable 40 milliGRAM(s) SubCutaneous every 24 hours  HYDROmorphone PCA (1 mG/mL) 30 milliLiter(s) PCA Continuous PCA Continuous  influenza  Vaccine (HIGH DOSE) 0.7 milliLiter(s) IntraMuscular once  insulin lispro (ADMELOG) corrective regimen sliding scale   SubCutaneous every 6 hours  pantoprazole    Tablet 40 milliGRAM(s) Oral before breakfast  sodium chloride 0.9% lock flush 3 milliLiter(s) IV Push every 8 hours  sodium chloride 0.9% with potassium chloride 20 mEq/L 1000 milliLiter(s) (100 mL/Hr) IV Continuous <Continuous>      PHYSICAL EXAM:  T(C): 37.2 (11-27-23 @ 20:07), Max: 37.2 (11-27-23 @ 00:02)  HR: 104 (11-27-23 @ 20:07) (96 - 104)  BP: 150/81 (11-27-23 @ 20:07) (109/60 - 150/87)  RR: 18 (11-27-23 @ 20:07) (18 - 18)  SpO2: 100% (11-27-23 @ 20:07) (100% - 100%)  Wt(kg): --  I&O's Summary    26 Nov 2023 07:01  -  27 Nov 2023 07:00  --------------------------------------------------------  IN: 1650 mL / OUT: 650 mL / NET: 1000 mL    27 Nov 2023 07:01  -  27 Nov 2023 22:03  --------------------------------------------------------  IN: 1200 mL / OUT: 400 mL / NET: 800 mL          Appearance: Normal	  HEENT:  PERRLA   Lymphatic: No lymphadenopathy   Cardiovascular: Normal S1 S2, no JVD  Respiratory: normal effort , clear  Gastrointestinal:  Soft, Non-tender  Skin: No rashes,  warm to touch  Psychiatry:  Mood & affect appropriate  Musculuskeletal: No edema    recent labs, Imaging and EKGs personally reviewed     11-26-23 @ 07:01  -  11-27-23 @ 07:00  --------------------------------------------------------  IN: 1650 mL / OUT: 650 mL / NET: 1000 mL    11-27-23 @ 07:01  -  11-27-23 @ 22:03  --------------------------------------------------------  IN: 1200 mL / OUT: 400 mL / NET: 800 mL                          10.7   11.29 )-----------( 275      ( 27 Nov 2023 06:43 )             33.3               11-27    134<L>  |  100  |  10  ----------------------------<  174<H>  4.1   |  20<L>  |  0.44<L>    Ca    8.5      27 Nov 2023 06:43  Phos  2.2     11-27  Mg     1.80     11-27                         Urinalysis Basic - ( 27 Nov 2023 06:43 )    Color: x / Appearance: x / SG: x / pH: x  Gluc: 174 mg/dL / Ketone: x  / Bili: x / Urobili: x   Blood: x / Protein: x / Nitrite: x   Leuk Esterase: x / RBC: x / WBC x   Sq Epi: x / Non Sq Epi: x / Bacteria: x

## 2023-11-27 NOTE — DIETITIAN INITIAL EVALUATION ADULT - PERTINENT LABORATORY DATA
11-27    134<L>  |  100  |  10  ----------------------------<  174<H>  4.1   |  20<L>  |  0.44<L>    Ca    8.5      27 Nov 2023 06:43  Phos  2.2     11-27  Mg     1.80     11-27    POCT Blood Glucose.: 183 mg/dL (11-27-23 @ 07:59)  A1C with Estimated Average Glucose Result: 6.8 % (11-16-23 @ 13:15)

## 2023-11-27 NOTE — PROGRESS NOTE ADULT - SUBJECTIVE AND OBJECTIVE BOX
TEAM [ D ] Surgery Daily Progress Note  =====================================================    SUBJECTIVE: Patient seen and examined at bedside on AM rounds. Patient reports 2 episodes of bilious emesis overnight with continuous nausea. She is without gas nor bowel movements. Denies fever, chills, chest pain, SOB    ALLERGIES:  Darvocet A500 (Other; Urticaria)  Percocet 10/325 (Other; Urticaria)  codeine (Other)  Lantus (Swelling)  PURELL.  pt said, &quot;I felt my airway closing&quot; after she smelled the Purell. The incident occured at the pulmonologist office. (Other; Short breath)      --------------------------------------------------------------------------------------    MEDICATIONS:    Neurologic Medications  acetaminophen   IVPB .. 1000 milliGRAM(s) IV Intermittent every 6 hours  HYDROmorphone PCA (1 mG/mL) 30 milliLiter(s) PCA Continuous PCA Continuous  ondansetron Injectable 4 milliGRAM(s) IV Push every 6 hours PRN Nausea    Respiratory Medications  albuterol    90 MICROgram(s) HFA Inhaler 2 Puff(s) Inhalation two times a day  budesonide 160 MICROgram(s)/formoterol 4.5 MICROgram(s) Inhaler 2 Puff(s) Inhalation two times a day    Cardiovascular Medications    Gastrointestinal Medications  dextrose 5%. 1000 milliLiter(s) IV Continuous <Continuous>  dextrose 5%. 1000 milliLiter(s) IV Continuous <Continuous>  pantoprazole    Tablet 40 milliGRAM(s) Oral before breakfast  sodium chloride 0.9% lock flush 3 milliLiter(s) IV Push every 8 hours  sodium chloride 0.9% with potassium chloride 20 mEq/L 1000 milliLiter(s) IV Continuous <Continuous>    Genitourinary Medications    Hematologic/Oncologic Medications  enoxaparin Injectable 40 milliGRAM(s) SubCutaneous every 24 hours  influenza  Vaccine (HIGH DOSE) 0.7 milliLiter(s) IntraMuscular once    Antimicrobial/Immunologic Medications    Endocrine/Metabolic Medications  atorvastatin 10 milliGRAM(s) Oral at bedtime  dextrose 50% Injectable 25 Gram(s) IV Push once  dextrose 50% Injectable 12.5 Gram(s) IV Push once  dextrose 50% Injectable 12.5 Gram(s) IV Push once  dextrose 50% Injectable 25 Gram(s) IV Push once  dextrose Oral Gel 15 Gram(s) Oral once PRN Blood Glucose LESS THAN 70 milliGRAM(s)/deciliter  glucagon  Injectable 1 milliGRAM(s) IntraMuscular once  insulin lispro (ADMELOG) corrective regimen sliding scale   SubCutaneous every 6 hours    Topical/Other Medications  chlorhexidine 2% Cloths 1 Application(s) Topical daily  naloxone Injectable 0.1 milliGRAM(s) IV Push every 3 minutes PRN For ANY of the following changes in patient status:  A. RR LESS THAN 10 breaths per minute, B. Oxygen saturation LESS THAN 90%, C. Sedation score of 6    --------------------------------------------------------------------------------------    VITAL SIGNS:  T(C): 37.2 (11-27-23 @ 05:02), Max: 37.2 (11-26-23 @ 08:10)  HR: 96 (11-27-23 @ 05:02) (89 - 100)  BP: 117/60 (11-27-23 @ 05:02) (117/60 - 127/56)  RR: 18 (11-27-23 @ 05:02) (18 - 18)  SpO2: 100% (11-27-23 @ 05:02) (96% - 100%)  --------------------------------------------------------------------------------------    INS AND OUTS:    11-26-23 @ 07:01  -  11-27-23 @ 07:00  --------------------------------------------------------  IN: 1650 mL / OUT: 650 mL / NET: 1000 mL      --------------------------------------------------------------------------------------      EXAM    General: NAD, resting in bed comfortably.  Cardiac: regular rate, warm and well perfused  Respiratory: Nonlabored respirations, normal cw expansion.  Abdomen: soft, nontender, nondistended, port sites c/d/i  Extremities: normal strength, FROM, no deformities    --------------------------------------------------------------------------------------    LABS                        11.3   14.96 )-----------( 256      ( 26 Nov 2023 05:49 )             34.9   11-26    138  |  100  |  9   ----------------------------<  126<H>  4.4   |  22  |  0.43<L>    Ca    8.6      26 Nov 2023 05:49  Phos  2.2     11-26  Mg     2.10     11-26

## 2023-11-28 LAB
ANION GAP SERPL CALC-SCNC: 13 MMOL/L — SIGNIFICANT CHANGE UP (ref 7–14)
ANION GAP SERPL CALC-SCNC: 13 MMOL/L — SIGNIFICANT CHANGE UP (ref 7–14)
BUN SERPL-MCNC: 8 MG/DL — SIGNIFICANT CHANGE UP (ref 7–23)
BUN SERPL-MCNC: 8 MG/DL — SIGNIFICANT CHANGE UP (ref 7–23)
CALCIUM SERPL-MCNC: 8.6 MG/DL — SIGNIFICANT CHANGE UP (ref 8.4–10.5)
CALCIUM SERPL-MCNC: 8.6 MG/DL — SIGNIFICANT CHANGE UP (ref 8.4–10.5)
CHLORIDE SERPL-SCNC: 101 MMOL/L — SIGNIFICANT CHANGE UP (ref 98–107)
CHLORIDE SERPL-SCNC: 101 MMOL/L — SIGNIFICANT CHANGE UP (ref 98–107)
CO2 SERPL-SCNC: 22 MMOL/L — SIGNIFICANT CHANGE UP (ref 22–31)
CO2 SERPL-SCNC: 22 MMOL/L — SIGNIFICANT CHANGE UP (ref 22–31)
CREAT SERPL-MCNC: 0.38 MG/DL — LOW (ref 0.5–1.3)
CREAT SERPL-MCNC: 0.38 MG/DL — LOW (ref 0.5–1.3)
EGFR: 106 ML/MIN/1.73M2 — SIGNIFICANT CHANGE UP
EGFR: 106 ML/MIN/1.73M2 — SIGNIFICANT CHANGE UP
GLUCOSE BLDC GLUCOMTR-MCNC: 172 MG/DL — HIGH (ref 70–99)
GLUCOSE BLDC GLUCOMTR-MCNC: 172 MG/DL — HIGH (ref 70–99)
GLUCOSE BLDC GLUCOMTR-MCNC: 181 MG/DL — HIGH (ref 70–99)
GLUCOSE BLDC GLUCOMTR-MCNC: 181 MG/DL — HIGH (ref 70–99)
GLUCOSE BLDC GLUCOMTR-MCNC: 189 MG/DL — HIGH (ref 70–99)
GLUCOSE BLDC GLUCOMTR-MCNC: 189 MG/DL — HIGH (ref 70–99)
GLUCOSE BLDC GLUCOMTR-MCNC: 279 MG/DL — HIGH (ref 70–99)
GLUCOSE BLDC GLUCOMTR-MCNC: 279 MG/DL — HIGH (ref 70–99)
GLUCOSE SERPL-MCNC: 192 MG/DL — HIGH (ref 70–99)
GLUCOSE SERPL-MCNC: 192 MG/DL — HIGH (ref 70–99)
HCT VFR BLD CALC: 34.4 % — LOW (ref 34.5–45)
HCT VFR BLD CALC: 34.4 % — LOW (ref 34.5–45)
HGB BLD-MCNC: 11.4 G/DL — LOW (ref 11.5–15.5)
HGB BLD-MCNC: 11.4 G/DL — LOW (ref 11.5–15.5)
MAGNESIUM SERPL-MCNC: 1.7 MG/DL — SIGNIFICANT CHANGE UP (ref 1.6–2.6)
MAGNESIUM SERPL-MCNC: 1.7 MG/DL — SIGNIFICANT CHANGE UP (ref 1.6–2.6)
MCHC RBC-ENTMCNC: 25.9 PG — LOW (ref 27–34)
MCHC RBC-ENTMCNC: 25.9 PG — LOW (ref 27–34)
MCHC RBC-ENTMCNC: 33.1 GM/DL — SIGNIFICANT CHANGE UP (ref 32–36)
MCHC RBC-ENTMCNC: 33.1 GM/DL — SIGNIFICANT CHANGE UP (ref 32–36)
MCV RBC AUTO: 78.2 FL — LOW (ref 80–100)
MCV RBC AUTO: 78.2 FL — LOW (ref 80–100)
NRBC # BLD: 0 /100 WBCS — SIGNIFICANT CHANGE UP (ref 0–0)
NRBC # BLD: 0 /100 WBCS — SIGNIFICANT CHANGE UP (ref 0–0)
NRBC # FLD: 0.02 K/UL — HIGH (ref 0–0)
NRBC # FLD: 0.02 K/UL — HIGH (ref 0–0)
PHOSPHATE SERPL-MCNC: 2.1 MG/DL — LOW (ref 2.5–4.5)
PHOSPHATE SERPL-MCNC: 2.1 MG/DL — LOW (ref 2.5–4.5)
PLATELET # BLD AUTO: 319 K/UL — SIGNIFICANT CHANGE UP (ref 150–400)
PLATELET # BLD AUTO: 319 K/UL — SIGNIFICANT CHANGE UP (ref 150–400)
POTASSIUM SERPL-MCNC: 4.3 MMOL/L — SIGNIFICANT CHANGE UP (ref 3.5–5.3)
POTASSIUM SERPL-MCNC: 4.3 MMOL/L — SIGNIFICANT CHANGE UP (ref 3.5–5.3)
POTASSIUM SERPL-SCNC: 4.3 MMOL/L — SIGNIFICANT CHANGE UP (ref 3.5–5.3)
POTASSIUM SERPL-SCNC: 4.3 MMOL/L — SIGNIFICANT CHANGE UP (ref 3.5–5.3)
RBC # BLD: 4.4 M/UL — SIGNIFICANT CHANGE UP (ref 3.8–5.2)
RBC # BLD: 4.4 M/UL — SIGNIFICANT CHANGE UP (ref 3.8–5.2)
RBC # FLD: 17.3 % — HIGH (ref 10.3–14.5)
RBC # FLD: 17.3 % — HIGH (ref 10.3–14.5)
SODIUM SERPL-SCNC: 136 MMOL/L — SIGNIFICANT CHANGE UP (ref 135–145)
SODIUM SERPL-SCNC: 136 MMOL/L — SIGNIFICANT CHANGE UP (ref 135–145)
WBC # BLD: 10.46 K/UL — SIGNIFICANT CHANGE UP (ref 3.8–10.5)
WBC # BLD: 10.46 K/UL — SIGNIFICANT CHANGE UP (ref 3.8–10.5)
WBC # FLD AUTO: 10.46 K/UL — SIGNIFICANT CHANGE UP (ref 3.8–10.5)
WBC # FLD AUTO: 10.46 K/UL — SIGNIFICANT CHANGE UP (ref 3.8–10.5)

## 2023-11-28 PROCEDURE — 99232 SBSQ HOSP IP/OBS MODERATE 35: CPT

## 2023-11-28 RX ORDER — MAGNESIUM SULFATE 500 MG/ML
2 VIAL (ML) INJECTION ONCE
Refills: 0 | Status: COMPLETED | OUTPATIENT
Start: 2023-11-28 | End: 2023-11-28

## 2023-11-28 RX ORDER — POTASSIUM PHOSPHATE, MONOBASIC POTASSIUM PHOSPHATE, DIBASIC 236; 224 MG/ML; MG/ML
15 INJECTION, SOLUTION INTRAVENOUS ONCE
Refills: 0 | Status: DISCONTINUED | OUTPATIENT
Start: 2023-11-28 | End: 2023-11-28

## 2023-11-28 RX ORDER — MAGNESIUM SULFATE 500 MG/ML
2 VIAL (ML) INJECTION ONCE
Refills: 0 | Status: DISCONTINUED | OUTPATIENT
Start: 2023-11-28 | End: 2023-11-28

## 2023-11-28 RX ORDER — ACETAMINOPHEN 500 MG
1000 TABLET ORAL EVERY 6 HOURS
Refills: 0 | Status: COMPLETED | OUTPATIENT
Start: 2023-11-28 | End: 2023-11-29

## 2023-11-28 RX ORDER — INSULIN LISPRO 100/ML
VIAL (ML) SUBCUTANEOUS EVERY 6 HOURS
Refills: 0 | Status: DISCONTINUED | OUTPATIENT
Start: 2023-11-28 | End: 2023-12-03

## 2023-11-28 RX ORDER — SODIUM CHLORIDE 9 MG/ML
1000 INJECTION, SOLUTION INTRAVENOUS ONCE
Refills: 0 | Status: COMPLETED | OUTPATIENT
Start: 2023-11-28 | End: 2023-11-28

## 2023-11-28 RX ADMIN — ALBUTEROL 2 PUFF(S): 90 AEROSOL, METERED ORAL at 21:11

## 2023-11-28 RX ADMIN — Medication 400 MILLIGRAM(S): at 23:10

## 2023-11-28 RX ADMIN — Medication 85 MILLIMOLE(S): at 10:08

## 2023-11-28 RX ADMIN — Medication 1: at 06:03

## 2023-11-28 RX ADMIN — Medication 6: at 17:46

## 2023-11-28 RX ADMIN — PANTOPRAZOLE SODIUM 40 MILLIGRAM(S): 20 TABLET, DELAYED RELEASE ORAL at 06:04

## 2023-11-28 RX ADMIN — Medication 25 GRAM(S): at 09:43

## 2023-11-28 RX ADMIN — DEXTROSE MONOHYDRATE, SODIUM CHLORIDE, AND POTASSIUM CHLORIDE 100 MILLILITER(S): 50; .745; 4.5 INJECTION, SOLUTION INTRAVENOUS at 00:30

## 2023-11-28 RX ADMIN — SODIUM CHLORIDE 3 MILLILITER(S): 9 INJECTION INTRAMUSCULAR; INTRAVENOUS; SUBCUTANEOUS at 05:37

## 2023-11-28 RX ADMIN — Medication 1: at 12:32

## 2023-11-28 RX ADMIN — BUDESONIDE AND FORMOTEROL FUMARATE DIHYDRATE 2 PUFF(S): 160; 4.5 AEROSOL RESPIRATORY (INHALATION) at 21:11

## 2023-11-28 RX ADMIN — SODIUM CHLORIDE 3 MILLILITER(S): 9 INJECTION INTRAMUSCULAR; INTRAVENOUS; SUBCUTANEOUS at 23:10

## 2023-11-28 RX ADMIN — SODIUM CHLORIDE 3 MILLILITER(S): 9 INJECTION INTRAMUSCULAR; INTRAVENOUS; SUBCUTANEOUS at 13:47

## 2023-11-28 RX ADMIN — ATORVASTATIN CALCIUM 10 MILLIGRAM(S): 80 TABLET, FILM COATED ORAL at 21:11

## 2023-11-28 RX ADMIN — SODIUM CHLORIDE 1000 MILLILITER(S): 9 INJECTION, SOLUTION INTRAVENOUS at 01:04

## 2023-11-28 RX ADMIN — Medication 1000 MILLIGRAM(S): at 00:35

## 2023-11-28 RX ADMIN — Medication 1: at 00:35

## 2023-11-28 RX ADMIN — ENOXAPARIN SODIUM 40 MILLIGRAM(S): 100 INJECTION SUBCUTANEOUS at 17:47

## 2023-11-28 RX ADMIN — HYDROMORPHONE HYDROCHLORIDE 30 MILLILITER(S): 2 INJECTION INTRAMUSCULAR; INTRAVENOUS; SUBCUTANEOUS at 07:44

## 2023-11-28 RX ADMIN — HYDROMORPHONE HYDROCHLORIDE 30 MILLILITER(S): 2 INJECTION INTRAMUSCULAR; INTRAVENOUS; SUBCUTANEOUS at 19:00

## 2023-11-28 RX ADMIN — ONDANSETRON 4 MILLIGRAM(S): 8 TABLET, FILM COATED ORAL at 21:10

## 2023-11-28 RX ADMIN — Medication 400 MILLIGRAM(S): at 00:30

## 2023-11-28 RX ADMIN — Medication 1000 MILLIGRAM(S): at 06:32

## 2023-11-28 RX ADMIN — Medication 1000 MILLIGRAM(S): at 23:25

## 2023-11-28 RX ADMIN — Medication 400 MILLIGRAM(S): at 06:02

## 2023-11-28 RX ADMIN — CHLORHEXIDINE GLUCONATE 1 APPLICATION(S): 213 SOLUTION TOPICAL at 13:04

## 2023-11-28 RX ADMIN — ONDANSETRON 4 MILLIGRAM(S): 8 TABLET, FILM COATED ORAL at 10:12

## 2023-11-28 NOTE — PROGRESS NOTE ADULT - SUBJECTIVE AND OBJECTIVE BOX
Chief Complaint: Type 2 DM     History: Pt seen at bedside. Pt NPO today. Pt denies nausea and vomiting/any signs of hypoglycemia. Pt reports an adequate appetite. More awake today     MEDICATIONS  (STANDING):  acetaminophen   IVPB .. 1000 milliGRAM(s) IV Intermittent every 6 hours  albuterol    90 MICROgram(s) HFA Inhaler 2 Puff(s) Inhalation two times a day  atorvastatin 10 milliGRAM(s) Oral at bedtime  budesonide 160 MICROgram(s)/formoterol 4.5 MICROgram(s) Inhaler 2 Puff(s) Inhalation two times a day  chlorhexidine 2% Cloths 1 Application(s) Topical daily  enoxaparin Injectable 40 milliGRAM(s) SubCutaneous every 24 hours  HYDROmorphone PCA (1 mG/mL) 30 milliLiter(s) PCA Continuous PCA Continuous  influenza  Vaccine (HIGH DOSE) 0.7 milliLiter(s) IntraMuscular once  insulin lispro (ADMELOG) corrective regimen sliding scale   SubCutaneous every 6 hours  pantoprazole    Tablet 40 milliGRAM(s) Oral before breakfast  sodium chloride 0.9% lock flush 3 milliLiter(s) IV Push every 8 hours  sodium chloride 0.9% with potassium chloride 20 mEq/L 1000 milliLiter(s) (100 mL/Hr) IV Continuous <Continuous>    MEDICATIONS  (PRN):  naloxone Injectable 0.1 milliGRAM(s) IV Push every 3 minutes PRN For ANY of the following changes in patient status:  A. RR LESS THAN 10 breaths per minute, B. Oxygen saturation LESS THAN 90%, C. Sedation score of 6  ondansetron Injectable 4 milliGRAM(s) IV Push every 6 hours PRN Nausea      Allergies: Darvocet A500 (Other; Urticaria)  Percocet 10/325 (Other; Urticaria)  codeine (Other)  Lantus (Swelling)  PURELL.  pt said, &quot;I felt my airway closing&quot; after she smelled the Purell. The incident occured at the pulmonologist office. (Other; Short breath)        Review of Systems:  Respiratory: No SOB, no cough  GI: No nausea, vomiting, abdominal pain  Endocrine: no polyuria, polydipsia    PHYSICAL EXAM:  VITALS: T(C): 37.3 (11-28-23 @ 12:59)  T(F): 99.1 (11-28-23 @ 12:59), Max: 99.1 (11-28-23 @ 02:09)  HR: 112 (11-28-23 @ 12:59) (103 - 112)  BP: 124/59 (11-28-23 @ 12:59) (109/60 - 150/81)  RR:  (18 - 19)  SpO2:  (96% - 100%)  Wt(kg): --  GENERAL: NAD, well-groomed, well-developed  RESPIRATORY: No labored breathing   GI: Soft, nontender, non distended  PSYCH: Alert and oriented x 3, normal affect, normal mood      CAPILLARY BLOOD GLUCOSE  POCT Blood Glucose.: 189 mg/dL (28 Nov 2023 12:01)  POCT Blood Glucose.: 181 mg/dL (28 Nov 2023 05:45)  POCT Blood Glucose.: 172 mg/dL (27 Nov 2023 23:59)  POCT Blood Glucose.: 173 mg/dL (27 Nov 2023 18:44)    A1C with Estimated Average Glucose (11.16.23 @ 13:15)    A1C with Estimated Average Glucose Result: 6.8   Estimated Average Glucose: 148      11-28    136  |  101  |  8   ----------------------------<  192<H>  4.3   |  22  |  0.38<L>    eGFR: 106    Ca    8.6      11-28  Mg     1.70     11-28  Phos  2.1     11-28      Diet, NPO:   Except Medications  With Ice Chips/Sips of Water (11-28-23 @ 08:43) [Active]

## 2023-11-28 NOTE — PROGRESS NOTE ADULT - ASSESSMENT
Patient is a 73 yr old female PMH HTN, HLD, asthma, recent NST 11/17/23 with no ischemia or infarct and normal LV function and recent TTE 10/2023 with Normal LV/RV function, who was found with cecal mass/ new dx adenocarcinoma s/p Right hemicolectomy 11/22.  Pt reports incision site pain and increased distention.     # Adenocarcinoma  S/P OR , R hemicolectomy  pain control   IV hydration  Surg management appreciated   monitor electrolytes     # Anemia   Monitor hgb level  S/P  1 unit PRBC  defer to surg        # HTN   resume BPmeds  Monitor     # DM  sliding scale  Endo follow up appreciated     # Asthma   O2 supplement PRN    # HLD

## 2023-11-28 NOTE — PROGRESS NOTE ADULT - SUBJECTIVE AND OBJECTIVE BOX
DATE OF SERVICE: 11-28-23    Patient denies chest pain or shortness of breath.   Review of symptoms otherwise negative.    MEDICATIONS:  acetaminophen   IVPB .. 1000 milliGRAM(s) IV Intermittent every 6 hours  albuterol    90 MICROgram(s) HFA Inhaler 2 Puff(s) Inhalation two times a day  atorvastatin 10 milliGRAM(s) Oral at bedtime  budesonide 160 MICROgram(s)/formoterol 4.5 MICROgram(s) Inhaler 2 Puff(s) Inhalation two times a day  chlorhexidine 2% Cloths 1 Application(s) Topical daily  enoxaparin Injectable 40 milliGRAM(s) SubCutaneous every 24 hours  HYDROmorphone PCA (1 mG/mL) 30 milliLiter(s) PCA Continuous PCA Continuous  influenza  Vaccine (HIGH DOSE) 0.7 milliLiter(s) IntraMuscular once  insulin lispro (ADMELOG) corrective regimen sliding scale   SubCutaneous every 6 hours  naloxone Injectable 0.1 milliGRAM(s) IV Push every 3 minutes PRN  ondansetron Injectable 4 milliGRAM(s) IV Push every 6 hours PRN  pantoprazole    Tablet 40 milliGRAM(s) Oral before breakfast  sodium chloride 0.9% lock flush 3 milliLiter(s) IV Push every 8 hours  sodium chloride 0.9% with potassium chloride 20 mEq/L 1000 milliLiter(s) IV Continuous <Continuous>      LABS:                        11.4   10.46 )-----------( 319      ( 28 Nov 2023 05:58 )             34.4       Hemoglobin: 11.4 g/dL (11-28 @ 05:58)  Hemoglobin: 10.7 g/dL (11-27 @ 06:43)  Hemoglobin: 11.3 g/dL (11-26 @ 05:49)  Hemoglobin: 9.8 g/dL (11-25 @ 06:25)  Hemoglobin: 10.5 g/dL (11-25 @ 00:15)      11-28    136  |  101  |  8   ----------------------------<  192<H>  4.3   |  22  |  0.38<L>    Ca    8.6      28 Nov 2023 05:58  Phos  2.1     11-28  Mg     1.70     11-28      Creatinine Trend: 0.38<--, 0.44<--, 0.43<--, 0.48<--, 0.55<--, 0.66<--    COAGS:           PHYSICAL EXAM:  T(C): 37.3 (11-28-23 @ 12:59), Max: 37.3 (11-28-23 @ 02:09)  HR: 112 (11-28-23 @ 12:59) (103 - 112)  BP: 124/59 (11-28-23 @ 12:59) (109/60 - 150/81)  RR: 18 (11-28-23 @ 12:59) (18 - 19)  SpO2: 100% (11-28-23 @ 12:59) (96% - 100%)  Wt(kg): --    I&O's Summary    27 Nov 2023 07:01  -  28 Nov 2023 07:00  --------------------------------------------------------  IN: 3400 mL / OUT: 1175 mL / NET: 2225 mL    28 Nov 2023 07:01  -  28 Nov 2023 14:40  --------------------------------------------------------  IN: 950 mL / OUT: 202 mL / NET: 748 mL      General:  Alert and Oriented * 3.   Head: Normocephalic and atraumatic.   Neck: No JVD. No bruits. Supple. Does not appear to be enlarged.   Cardiovascular: + S1,S2 ; RRR Soft systolic murmur at the left lower sternal border. No rubs noted.    Lungs: CTA b/l. No rhonchi, rales or wheezes.   Abdomen: + BS, soft. Non tender. Non distended. No rebound. No guarding.   Extremities: No clubbing/cyanosis/edema.   Neurologic: Moves all four extremities. Full range of motion.   Skin: Warm and moist. The patient's skin has normal elasticity and good skin turgor.   Psychiatric: Appropriate mood and affect.  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      --tolerated procedure well from CV perspective  --pain management  --PT  --eventually resume home meds: Asa 81mg, Hctz 12.5mg, Losartan 25mg, Pravastatin 40mg when ok from a surgical perspective    Bella Aguilera MD  Pager: [phone]

## 2023-11-28 NOTE — PROGRESS NOTE ADULT - SUBJECTIVE AND OBJECTIVE BOX
ANESTHESIA POSTOP CHECK    73y Female POSTOP DAY 1 S/P     [ X] NO APPARENT ANESTHESIA COMPLICATIONS      Comments:

## 2023-11-28 NOTE — PROGRESS NOTE ADULT - SUBJECTIVE AND OBJECTIVE BOX
Anesthesia Pain Management Service    SUBJECTIVE: Patient is doing well with IV PCA and no significant problems reported.  Patient states her pain control is better than yesterday.    Pain Scale Score	At rest: ___ 	With Activity: ___ 	[X ] Refer to charted pain scores    THERAPY:    [ ] IV PCA Morphine		[ ] 5 mg/mL	[ ] 1 mg/mL  [X ] IV PCA Hydromorphone	[ ] 5 mg/mL	[X ] 1 mg/mL  [ ] IV PCA Fentanyl		[ ] 50 micrograms/mL    Demand dose __0.15mg  lockout __6_ (minutes) Continuous Rate _0__ Total: _1.5__  mg used (in past 24 hours)      MEDICATIONS  (STANDING):  acetaminophen   IVPB .. 1000 milliGRAM(s) IV Intermittent every 6 hours  albuterol    90 MICROgram(s) HFA Inhaler 2 Puff(s) Inhalation two times a day  atorvastatin 10 milliGRAM(s) Oral at bedtime  budesonide 160 MICROgram(s)/formoterol 4.5 MICROgram(s) Inhaler 2 Puff(s) Inhalation two times a day  chlorhexidine 2% Cloths 1 Application(s) Topical daily  enoxaparin Injectable 40 milliGRAM(s) SubCutaneous every 24 hours  HYDROmorphone PCA (1 mG/mL) 30 milliLiter(s) PCA Continuous PCA Continuous  influenza  Vaccine (HIGH DOSE) 0.7 milliLiter(s) IntraMuscular once  insulin lispro (ADMELOG) corrective regimen sliding scale   SubCutaneous every 6 hours  pantoprazole    Tablet 40 milliGRAM(s) Oral before breakfast  sodium chloride 0.9% lock flush 3 milliLiter(s) IV Push every 8 hours  sodium chloride 0.9% with potassium chloride 20 mEq/L 1000 milliLiter(s) (100 mL/Hr) IV Continuous <Continuous>    MEDICATIONS  (PRN):  naloxone Injectable 0.1 milliGRAM(s) IV Push every 3 minutes PRN For ANY of the following changes in patient status:  A. RR LESS THAN 10 breaths per minute, B. Oxygen saturation LESS THAN 90%, C. Sedation score of 6  ondansetron Injectable 4 milliGRAM(s) IV Push every 6 hours PRN Nausea      OBJECTIVE:  Patient is NPO,  lying on her side in bed, getting washed up.     Sedation Score:	[ X] Alert	 [ ] Drowsy 	[ ] Arousable	[ ] Asleep	[ ] Unresponsive    Side Effects:	[X ] None	[ ] Nausea	[ ] Vomiting	[ ] Pruritus  		[ ] Other:    Vital Signs Last 24 Hrs  T(C): 36.9 (28 Nov 2023 08:46), Max: 37.3 (28 Nov 2023 02:09)  T(F): 98.4 (28 Nov 2023 08:46), Max: 99.1 (28 Nov 2023 02:09)  HR: 106 (28 Nov 2023 08:46) (103 - 112)  BP: 123/79 (28 Nov 2023 08:46) (109/60 - 150/81)  BP(mean): --  RR: 19 (28 Nov 2023 08:46) (18 - 19)  SpO2: 100% (28 Nov 2023 08:46) (96% - 100%)    Parameters below as of 28 Nov 2023 08:46  Patient On (Oxygen Delivery Method): room air        ASSESSMENT/ PLAN    Therapy to  be:	[ X] Continue   [ ] Discontinued   [ ] Change to prn Analgesics    Documentation and Verification of current medications:   [X] Done	[ ] Not done, not elligible    Comments: Will continue with IV Dilaudid PCA.  Recommend non-opioid adjuvant analgesics to be used when possible and when allowed by primary surgical team.    Progress Note written now but Patient was seen earlier.

## 2023-11-28 NOTE — PROGRESS NOTE ADULT - ASSESSMENT
This is a 74 yo F /w a PMH of DM2 on an insulin pump and colorectal cancer s/p right hemicolectomy today. Endocrinology consulted for diabetes management and DM2. Based on current insulin pump settings, suspect patient is being over-basalized given high basal rates compared to the insulin:carb ratio    Home Regimen:  Patient uses the medtronic insulin pump and Libre2  Pump settings are as follows:  TDD 52.425  12AM 1.8 units per hour  6:30AM 2 units per hour  12:30PM 2.45 units per hour  6:30PM 2.55 units per hour  8PM 2.55 units per hour    ICR:  12AM 1:15  7AM 1:12  12:30PM 1:15    ISF 1:30    #DM2 A1c 6.8%  #insulin pump - would hold of on using the insulin pump at this time given history of possibly frequent hypglycemia due to variable p.o. intake at home and now on Narcotics   GLucose Target 100-180: slightly above goal   NPO:  -Change from low to moderate Admelog correction scale every 6 hours while NPO  -Check FS q 6 hours   -Hypoglycemia Protocol     if on clears:  -low admelog correction scales before meals if on clears (diabetic clears)  -low admelog scales before bedtime  -hold levemir until we see her trend   if glucose >180 X2 - than start levemir 15 units and make it q24hrs   -FSG before meals and before bedtime  -Carb consistent diet once able to tolerate  -hypoglycemia protocol in place if needed    when ADAT to carb consistent diet   than will need basal bolus +correction scale   anticipate Levemir 15 units and ademelog 5 units premeals and low dose correction scale     pt has her on CGM on- also monitoring her own BG while inpt on bonnie last changed 11/16 due to be 0n 11/30 pt aware of this     #Discharge  -follow up with Dr. Anand  -basal bolus vs resume insulin pump on discharge, likely adjust settings will decide based on glucose trends and oral intake    #HTN  -BP target <130/80  -once stable post-op, consider resuming losartan 25mg daily and hctz 12.5mg daily- management as per primary team  outpt mc/cr ratio     #HLD  -On Pravastatin 40mg qhs at home   - Resume once able to tolerate orals if no contraindications   - Please obtain lipid profile if not done recently       D/w Team pager [phone] Jeni Taveras  Nurse Practitioner  Division of Endocrinology & Diabetes  In house pager #[phone]    If before 9AM or after 6PM, or on weekends/holidays, please call endocrine answering service for assistance ([phone]).For nonurgent matters email [email or web address] for assistance.

## 2023-11-28 NOTE — PROGRESS NOTE ADULT - SUBJECTIVE AND OBJECTIVE BOX
TEAM [ D ] Surgery Daily Progress Note  =====================================================    SUBJECTIVE: Patient seen and examined at bedside on AM rounds. Patient reports 1 episode of bilious emesis overnight ~250-300 cc. She is passing gas and had 1 bowel movement. Denies fever, chills, chest pain, SOB    ALLERGIES:  Darvocet A500 (Other; Urticaria)  Percocet 10/325 (Other; Urticaria)  codeine (Other)  Lantus (Swelling)  PURELL.  pt said, &quot;I felt my airway closing&quot; after she smelled the Purell. The incident occured at the pulmonologist office. (Other; Short breath)      --------------------------------------------------------------------------------------    MEDICATIONS:    Neurologic Medications  HYDROmorphone PCA (1 mG/mL) 30 milliLiter(s) PCA Continuous PCA Continuous  ondansetron Injectable 4 milliGRAM(s) IV Push every 6 hours PRN Nausea    Respiratory Medications  albuterol    90 MICROgram(s) HFA Inhaler 2 Puff(s) Inhalation two times a day  budesonide 160 MICROgram(s)/formoterol 4.5 MICROgram(s) Inhaler 2 Puff(s) Inhalation two times a day    Cardiovascular Medications    Gastrointestinal Medications  pantoprazole    Tablet 40 milliGRAM(s) Oral before breakfast  sodium chloride 0.9% lock flush 3 milliLiter(s) IV Push every 8 hours  sodium chloride 0.9% with potassium chloride 20 mEq/L 1000 milliLiter(s) IV Continuous <Continuous>    Genitourinary Medications    Hematologic/Oncologic Medications  enoxaparin Injectable 40 milliGRAM(s) SubCutaneous every 24 hours  influenza  Vaccine (HIGH DOSE) 0.7 milliLiter(s) IntraMuscular once    Antimicrobial/Immunologic Medications    Endocrine/Metabolic Medications  atorvastatin 10 milliGRAM(s) Oral at bedtime  insulin lispro (ADMELOG) corrective regimen sliding scale   SubCutaneous every 6 hours    Topical/Other Medications  chlorhexidine 2% Cloths 1 Application(s) Topical daily  naloxone Injectable 0.1 milliGRAM(s) IV Push every 3 minutes PRN For ANY of the following changes in patient status:  A. RR LESS THAN 10 breaths per minute, B. Oxygen saturation LESS THAN 90%, C. Sedation score of 6    --------------------------------------------------------------------------------------    VITAL SIGNS:  T(C): 36.9 (11-28-23 @ 05:46), Max: 37.3 (11-28-23 @ 02:09)  HR: 103 (11-28-23 @ 05:46) (101 - 112)  BP: 141/73 (11-28-23 @ 05:46) (109/60 - 150/87)  RR: 19 (11-28-23 @ 05:46) (18 - 19)  SpO2: 99% (11-28-23 @ 05:46) (96% - 100%)  --------------------------------------------------------------------------------------    INS AND OUTS:    11-27-23 @ 07:01  -  11-28-23 @ 07:00  --------------------------------------------------------  IN: 3400 mL / OUT: 1175 mL / NET: 2225 mL      --------------------------------------------------------------------------------------      EXAM    General: NAD, resting in bed comfortably.  Cardiac: regular rate, warm and well perfused  Respiratory: Nonlabored respirations, normal cw expansion.  Abdomen: soft, nontender, nondistended, port sites c/d/i  Extremities: normal strength, FROM, no deformities    --------------------------------------------------------------------------------------    LABS                        11.4   10.46 )-----------( 319      ( 28 Nov 2023 05:58 )             34.4   11-28    136  |  101  |  8   ----------------------------<  192<H>  4.3   |  22  |  0.38<L>    Ca    8.6      28 Nov 2023 05:58  Phos  2.1     11-28  Mg     1.70     11-28

## 2023-11-28 NOTE — PROGRESS NOTE ADULT - ASSESSMENT
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    Plan  - Diet: NPO  - IVF 100cc/hour  - Pain control with PCA and tylenol  - F/u endocrine levemir dosing  - Home meds restarted  - Holding HTN medications  - DVT prophylaxis  - Appreciate endocrinology recs; plan to restart insulin pump on discharge    - NPO: low ISS    - Clears: if glucose > 180 x 2, start levemir 15 units, low ISS    - Regular: levemir 15 units and admelog 5 units TID, low ISS    D team surgery [id number]

## 2023-11-28 NOTE — PROGRESS NOTE ADULT - SUBJECTIVE AND OBJECTIVE BOX
Name of Patient : NESTOR MABRY  MRN: [id number]  Date of visit: 11-28-23      Subjective: Patient seen and examined. No new events except as noted.     REVIEW OF SYSTEMS:    CONSTITUTIONAL: No weakness, fevers or chills  EYES/ENT: No visual changes;  No vertigo or throat pain   NECK: No pain or stiffness  RESPIRATORY: No cough, wheezing, hemoptysis; No shortness of breath  CARDIOVASCULAR: No chest pain or palpitations  GASTROINTESTINAL: No abdominal or epigastric pain. No nausea, vomiting, or hematemesis; No diarrhea or constipation. No melena or hematochezia.  GENITOURINARY: No dysuria, frequency or hematuria  NEUROLOGICAL: No numbness or weakness  SKIN: No itching, burning, rashes, or lesions   All other review of systems is negative unless indicated above.    MEDICATIONS:  MEDICATIONS  (STANDING):  acetaminophen   IVPB .. 1000 milliGRAM(s) IV Intermittent every 6 hours  albuterol    90 MICROgram(s) HFA Inhaler 2 Puff(s) Inhalation two times a day  atorvastatin 10 milliGRAM(s) Oral at bedtime  budesonide 160 MICROgram(s)/formoterol 4.5 MICROgram(s) Inhaler 2 Puff(s) Inhalation two times a day  chlorhexidine 2% Cloths 1 Application(s) Topical daily  enoxaparin Injectable 40 milliGRAM(s) SubCutaneous every 24 hours  HYDROmorphone PCA (1 mG/mL) 30 milliLiter(s) PCA Continuous PCA Continuous  influenza  Vaccine (HIGH DOSE) 0.7 milliLiter(s) IntraMuscular once  insulin lispro (ADMELOG) corrective regimen sliding scale   SubCutaneous every 6 hours  pantoprazole    Tablet 40 milliGRAM(s) Oral before breakfast  sodium chloride 0.9% lock flush 3 milliLiter(s) IV Push every 8 hours  sodium chloride 0.9% with potassium chloride 20 mEq/L 1000 milliLiter(s) (100 mL/Hr) IV Continuous <Continuous>      PHYSICAL EXAM:  T(C): 37.3 (11-28-23 @ 20:15), Max: 37.3 (11-28-23 @ 02:09)  HR: 106 (11-28-23 @ 20:15) (103 - 112)  BP: 129/62 (11-28-23 @ 20:15) (120/54 - 141/73)  RR: 18 (11-28-23 @ 20:15) (18 - 19)  SpO2: 100% (11-28-23 @ 20:15) (96% - 100%)  Wt(kg): --  I&O's Summary    27 Nov 2023 07:01  -  28 Nov 2023 07:00  --------------------------------------------------------  IN: 3400 mL / OUT: 1175 mL / NET: 2225 mL    28 Nov 2023 07:01  -  28 Nov 2023 20:22  --------------------------------------------------------  IN: 1650 mL / OUT: 451 mL / NET: 1199 mL          Appearance: Normal	  HEENT:  PERRLA   Lymphatic: No lymphadenopathy   Cardiovascular: Normal S1 S2, no JVD  Respiratory: normal effort , clear  Gastrointestinal:  Soft, Non-tender  Skin: No rashes,  warm to touch  Psychiatry:  Mood & affect appropriate  Musculuskeletal: No edema    recent labs, Imaging and EKGs personally reviewed     11-27-23 @ 07:01  -  11-28-23 @ 07:00  --------------------------------------------------------  IN: 3400 mL / OUT: 1175 mL / NET: 2225 mL    11-28-23 @ 07:01  -  11-28-23 @ 20:22  --------------------------------------------------------  IN: 1650 mL / OUT: 451 mL / NET: 1199 mL                          11.4   10.46 )-----------( 319      ( 28 Nov 2023 05:58 )             34.4               11-28    136  |  101  |  8   ----------------------------<  192<H>  4.3   |  22  |  0.38<L>    Ca    8.6      28 Nov 2023 05:58  Phos  2.1     11-28  Mg     1.70     11-28                         Urinalysis Basic - ( 28 Nov 2023 05:58 )    Color: x / Appearance: x / SG: x / pH: x  Gluc: 192 mg/dL / Ketone: x  / Bili: x / Urobili: x   Blood: x / Protein: x / Nitrite: x   Leuk Esterase: x / RBC: x / WBC x   Sq Epi: x / Non Sq Epi: x / Bacteria: x

## 2023-11-28 NOTE — PROGRESS NOTE ADULT - SUBJECTIVE AND OBJECTIVE BOX
Anesthesia Pain Management Service    SUBJECTIVE: Pt doing well with IV PCA without problems reported.    Therapy:	  [ X] IV PCA	   [ ] Epidural           [ ] s/p Spinal Opoid              [ ] Postpartum infusion	  [ ] Patient controlled regional anesthesia (PCRA)    [ ] prn Analgesics    Allergies    Darvocet A500 (Other; Urticaria)  Percocet 10/325 (Other; Urticaria)  codeine (Other)  Lantus (Swelling)  PURELL.  pt said, &quot;I felt my airway closing&quot; after she smelled the Purell. The incident occured at the pulmonologist office. (Other; Short breath)    Intolerances      MEDICATIONS  (STANDING):  acetaminophen   IVPB .. 1000 milliGRAM(s) IV Intermittent every 6 hours  albuterol    90 MICROgram(s) HFA Inhaler 2 Puff(s) Inhalation two times a day  atorvastatin 10 milliGRAM(s) Oral at bedtime  budesonide 160 MICROgram(s)/formoterol 4.5 MICROgram(s) Inhaler 2 Puff(s) Inhalation two times a day  chlorhexidine 2% Cloths 1 Application(s) Topical daily  enoxaparin Injectable 40 milliGRAM(s) SubCutaneous every 24 hours  HYDROmorphone PCA (1 mG/mL) 30 milliLiter(s) PCA Continuous PCA Continuous  influenza  Vaccine (HIGH DOSE) 0.7 milliLiter(s) IntraMuscular once  insulin lispro (ADMELOG) corrective regimen sliding scale   SubCutaneous every 6 hours  pantoprazole    Tablet 40 milliGRAM(s) Oral before breakfast  sodium chloride 0.9% lock flush 3 milliLiter(s) IV Push every 8 hours  sodium chloride 0.9% with potassium chloride 20 mEq/L 1000 milliLiter(s) (100 mL/Hr) IV Continuous <Continuous>    MEDICATIONS  (PRN):  naloxone Injectable 0.1 milliGRAM(s) IV Push every 3 minutes PRN For ANY of the following changes in patient status:  A. RR LESS THAN 10 breaths per minute, B. Oxygen saturation LESS THAN 90%, C. Sedation score of 6  ondansetron Injectable 4 milliGRAM(s) IV Push every 6 hours PRN Nausea      OBJECTIVE:   [X] No new signs     [ ] Other:    Side Effects:  [X ] None			[ ] Other:    Assessment of Catheter Site:		[ ] Intact		[ ] Other:    ASSESSMENT/PLAN  [ X] Continue current therapy    [ ] Therapy changed to:    [ ] IV PCA       [ ] Epidural     [ ] prn Analgesics     Comments:

## 2023-11-29 LAB
ANION GAP SERPL CALC-SCNC: 10 MMOL/L — SIGNIFICANT CHANGE UP (ref 7–14)
ANION GAP SERPL CALC-SCNC: 10 MMOL/L — SIGNIFICANT CHANGE UP (ref 7–14)
BUN SERPL-MCNC: 7 MG/DL — SIGNIFICANT CHANGE UP (ref 7–23)
BUN SERPL-MCNC: 7 MG/DL — SIGNIFICANT CHANGE UP (ref 7–23)
CALCIUM SERPL-MCNC: 8.1 MG/DL — LOW (ref 8.4–10.5)
CALCIUM SERPL-MCNC: 8.1 MG/DL — LOW (ref 8.4–10.5)
CHLORIDE SERPL-SCNC: 103 MMOL/L — SIGNIFICANT CHANGE UP (ref 98–107)
CHLORIDE SERPL-SCNC: 103 MMOL/L — SIGNIFICANT CHANGE UP (ref 98–107)
CHOLEST SERPL-MCNC: 65 MG/DL — SIGNIFICANT CHANGE UP
CHOLEST SERPL-MCNC: 65 MG/DL — SIGNIFICANT CHANGE UP
CO2 SERPL-SCNC: 25 MMOL/L — SIGNIFICANT CHANGE UP (ref 22–31)
CO2 SERPL-SCNC: 25 MMOL/L — SIGNIFICANT CHANGE UP (ref 22–31)
CREAT SERPL-MCNC: 0.46 MG/DL — LOW (ref 0.5–1.3)
CREAT SERPL-MCNC: 0.46 MG/DL — LOW (ref 0.5–1.3)
EGFR: 102 ML/MIN/1.73M2 — SIGNIFICANT CHANGE UP
EGFR: 102 ML/MIN/1.73M2 — SIGNIFICANT CHANGE UP
GLUCOSE BLDC GLUCOMTR-MCNC: 145 MG/DL — HIGH (ref 70–99)
GLUCOSE BLDC GLUCOMTR-MCNC: 145 MG/DL — HIGH (ref 70–99)
GLUCOSE BLDC GLUCOMTR-MCNC: 148 MG/DL — HIGH (ref 70–99)
GLUCOSE BLDC GLUCOMTR-MCNC: 148 MG/DL — HIGH (ref 70–99)
GLUCOSE BLDC GLUCOMTR-MCNC: 195 MG/DL — HIGH (ref 70–99)
GLUCOSE BLDC GLUCOMTR-MCNC: 195 MG/DL — HIGH (ref 70–99)
GLUCOSE BLDC GLUCOMTR-MCNC: 219 MG/DL — HIGH (ref 70–99)
GLUCOSE BLDC GLUCOMTR-MCNC: 219 MG/DL — HIGH (ref 70–99)
GLUCOSE SERPL-MCNC: 197 MG/DL — HIGH (ref 70–99)
GLUCOSE SERPL-MCNC: 197 MG/DL — HIGH (ref 70–99)
HCT VFR BLD CALC: 33.9 % — LOW (ref 34.5–45)
HCT VFR BLD CALC: 33.9 % — LOW (ref 34.5–45)
HDLC SERPL-MCNC: 29 MG/DL — LOW
HDLC SERPL-MCNC: 29 MG/DL — LOW
HGB BLD-MCNC: 10.6 G/DL — LOW (ref 11.5–15.5)
HGB BLD-MCNC: 10.6 G/DL — LOW (ref 11.5–15.5)
LIPID PNL WITH DIRECT LDL SERPL: 20 MG/DL — SIGNIFICANT CHANGE UP
LIPID PNL WITH DIRECT LDL SERPL: 20 MG/DL — SIGNIFICANT CHANGE UP
MAGNESIUM SERPL-MCNC: 1.8 MG/DL — SIGNIFICANT CHANGE UP (ref 1.6–2.6)
MAGNESIUM SERPL-MCNC: 1.8 MG/DL — SIGNIFICANT CHANGE UP (ref 1.6–2.6)
MCHC RBC-ENTMCNC: 24.8 PG — LOW (ref 27–34)
MCHC RBC-ENTMCNC: 24.8 PG — LOW (ref 27–34)
MCHC RBC-ENTMCNC: 31.3 GM/DL — LOW (ref 32–36)
MCHC RBC-ENTMCNC: 31.3 GM/DL — LOW (ref 32–36)
MCV RBC AUTO: 79.4 FL — LOW (ref 80–100)
MCV RBC AUTO: 79.4 FL — LOW (ref 80–100)
NON HDL CHOLESTEROL: 36 MG/DL — SIGNIFICANT CHANGE UP
NON HDL CHOLESTEROL: 36 MG/DL — SIGNIFICANT CHANGE UP
NRBC # BLD: 0 /100 WBCS — SIGNIFICANT CHANGE UP (ref 0–0)
NRBC # BLD: 0 /100 WBCS — SIGNIFICANT CHANGE UP (ref 0–0)
NRBC # FLD: 0.02 K/UL — HIGH (ref 0–0)
NRBC # FLD: 0.02 K/UL — HIGH (ref 0–0)
PHOSPHATE SERPL-MCNC: 1.9 MG/DL — LOW (ref 2.5–4.5)
PHOSPHATE SERPL-MCNC: 1.9 MG/DL — LOW (ref 2.5–4.5)
PLATELET # BLD AUTO: 321 K/UL — SIGNIFICANT CHANGE UP (ref 150–400)
PLATELET # BLD AUTO: 321 K/UL — SIGNIFICANT CHANGE UP (ref 150–400)
POTASSIUM SERPL-MCNC: 3.9 MMOL/L — SIGNIFICANT CHANGE UP (ref 3.5–5.3)
POTASSIUM SERPL-MCNC: 3.9 MMOL/L — SIGNIFICANT CHANGE UP (ref 3.5–5.3)
POTASSIUM SERPL-SCNC: 3.9 MMOL/L — SIGNIFICANT CHANGE UP (ref 3.5–5.3)
POTASSIUM SERPL-SCNC: 3.9 MMOL/L — SIGNIFICANT CHANGE UP (ref 3.5–5.3)
RBC # BLD: 4.27 M/UL — SIGNIFICANT CHANGE UP (ref 3.8–5.2)
RBC # BLD: 4.27 M/UL — SIGNIFICANT CHANGE UP (ref 3.8–5.2)
RBC # FLD: 17.8 % — HIGH (ref 10.3–14.5)
RBC # FLD: 17.8 % — HIGH (ref 10.3–14.5)
SODIUM SERPL-SCNC: 138 MMOL/L — SIGNIFICANT CHANGE UP (ref 135–145)
SODIUM SERPL-SCNC: 138 MMOL/L — SIGNIFICANT CHANGE UP (ref 135–145)
TRIGL SERPL-MCNC: 81 MG/DL — SIGNIFICANT CHANGE UP
TRIGL SERPL-MCNC: 81 MG/DL — SIGNIFICANT CHANGE UP
WBC # BLD: 7.79 K/UL — SIGNIFICANT CHANGE UP (ref 3.8–10.5)
WBC # BLD: 7.79 K/UL — SIGNIFICANT CHANGE UP (ref 3.8–10.5)
WBC # FLD AUTO: 7.79 K/UL — SIGNIFICANT CHANGE UP (ref 3.8–10.5)
WBC # FLD AUTO: 7.79 K/UL — SIGNIFICANT CHANGE UP (ref 3.8–10.5)

## 2023-11-29 PROCEDURE — 71045 X-RAY EXAM CHEST 1 VIEW: CPT | Mod: 26

## 2023-11-29 PROCEDURE — 99232 SBSQ HOSP IP/OBS MODERATE 35: CPT

## 2023-11-29 PROCEDURE — 74177 CT ABD & PELVIS W/CONTRAST: CPT | Mod: 26

## 2023-11-29 RX ORDER — ACETAMINOPHEN 500 MG
750 TABLET ORAL EVERY 6 HOURS
Refills: 0 | Status: DISCONTINUED | OUTPATIENT
Start: 2023-11-29 | End: 2023-11-29

## 2023-11-29 RX ORDER — ACETAMINOPHEN 500 MG
1000 TABLET ORAL EVERY 6 HOURS
Refills: 0 | Status: COMPLETED | OUTPATIENT
Start: 2023-11-29 | End: 2023-11-30

## 2023-11-29 RX ORDER — MAGNESIUM SULFATE 500 MG/ML
2 VIAL (ML) INJECTION ONCE
Refills: 0 | Status: COMPLETED | OUTPATIENT
Start: 2023-11-29 | End: 2023-11-29

## 2023-11-29 RX ORDER — DEXTROSE 50 % IN WATER 50 %
25 SYRINGE (ML) INTRAVENOUS ONCE
Refills: 0 | Status: DISCONTINUED | OUTPATIENT
Start: 2023-11-29 | End: 2023-12-06

## 2023-11-29 RX ORDER — PANTOPRAZOLE SODIUM 20 MG/1
40 TABLET, DELAYED RELEASE ORAL DAILY
Refills: 0 | Status: DISCONTINUED | OUTPATIENT
Start: 2023-11-29 | End: 2023-12-12

## 2023-11-29 RX ORDER — POTASSIUM PHOSPHATE, MONOBASIC POTASSIUM PHOSPHATE, DIBASIC 236; 224 MG/ML; MG/ML
30 INJECTION, SOLUTION INTRAVENOUS ONCE
Refills: 0 | Status: COMPLETED | OUTPATIENT
Start: 2023-11-29 | End: 2023-11-29

## 2023-11-29 RX ORDER — HYDROMORPHONE HYDROCHLORIDE 2 MG/ML
0.25 INJECTION INTRAMUSCULAR; INTRAVENOUS; SUBCUTANEOUS
Refills: 0 | Status: DISCONTINUED | OUTPATIENT
Start: 2023-11-29 | End: 2023-12-06

## 2023-11-29 RX ADMIN — BUDESONIDE AND FORMOTEROL FUMARATE DIHYDRATE 2 PUFF(S): 160; 4.5 AEROSOL RESPIRATORY (INHALATION) at 12:07

## 2023-11-29 RX ADMIN — Medication 400 MILLIGRAM(S): at 17:27

## 2023-11-29 RX ADMIN — SODIUM CHLORIDE 3 MILLILITER(S): 9 INJECTION INTRAMUSCULAR; INTRAVENOUS; SUBCUTANEOUS at 22:04

## 2023-11-29 RX ADMIN — ENOXAPARIN SODIUM 40 MILLIGRAM(S): 100 INJECTION SUBCUTANEOUS at 17:27

## 2023-11-29 RX ADMIN — Medication 1000 MILLIGRAM(S): at 06:00

## 2023-11-29 RX ADMIN — Medication 1000 MILLIGRAM(S): at 18:12

## 2023-11-29 RX ADMIN — POTASSIUM PHOSPHATE, MONOBASIC POTASSIUM PHOSPHATE, DIBASIC 83.33 MILLIMOLE(S): 236; 224 INJECTION, SOLUTION INTRAVENOUS at 12:06

## 2023-11-29 RX ADMIN — BUDESONIDE AND FORMOTEROL FUMARATE DIHYDRATE 2 PUFF(S): 160; 4.5 AEROSOL RESPIRATORY (INHALATION) at 23:07

## 2023-11-29 RX ADMIN — Medication 2: at 00:57

## 2023-11-29 RX ADMIN — HYDROMORPHONE HYDROCHLORIDE 0.25 MILLIGRAM(S): 2 INJECTION INTRAMUSCULAR; INTRAVENOUS; SUBCUTANEOUS at 19:45

## 2023-11-29 RX ADMIN — HYDROMORPHONE HYDROCHLORIDE 0.25 MILLIGRAM(S): 2 INJECTION INTRAMUSCULAR; INTRAVENOUS; SUBCUTANEOUS at 19:32

## 2023-11-29 RX ADMIN — SODIUM CHLORIDE 3 MILLILITER(S): 9 INJECTION INTRAMUSCULAR; INTRAVENOUS; SUBCUTANEOUS at 05:24

## 2023-11-29 RX ADMIN — DEXTROSE MONOHYDRATE, SODIUM CHLORIDE, AND POTASSIUM CHLORIDE 100 MILLILITER(S): 50; .745; 4.5 INJECTION, SOLUTION INTRAVENOUS at 05:42

## 2023-11-29 RX ADMIN — Medication 25 GRAM(S): at 12:06

## 2023-11-29 RX ADMIN — PANTOPRAZOLE SODIUM 40 MILLIGRAM(S): 20 TABLET, DELAYED RELEASE ORAL at 12:07

## 2023-11-29 RX ADMIN — ALBUTEROL 2 PUFF(S): 90 AEROSOL, METERED ORAL at 23:07

## 2023-11-29 RX ADMIN — ALBUTEROL 2 PUFF(S): 90 AEROSOL, METERED ORAL at 12:07

## 2023-11-29 RX ADMIN — Medication 4: at 05:42

## 2023-11-29 RX ADMIN — PANTOPRAZOLE SODIUM 40 MILLIGRAM(S): 20 TABLET, DELAYED RELEASE ORAL at 05:43

## 2023-11-29 RX ADMIN — HYDROMORPHONE HYDROCHLORIDE 30 MILLILITER(S): 2 INJECTION INTRAMUSCULAR; INTRAVENOUS; SUBCUTANEOUS at 07:28

## 2023-11-29 RX ADMIN — Medication 400 MILLIGRAM(S): at 05:42

## 2023-11-29 RX ADMIN — SODIUM CHLORIDE 3 MILLILITER(S): 9 INJECTION INTRAMUSCULAR; INTRAVENOUS; SUBCUTANEOUS at 12:42

## 2023-11-29 NOTE — PROGRESS NOTE ADULT - NS ATTEND AMEND GEN_ALL_CORE FT
Patient seen and examined. Agree with plan as detailed in PA/NP Note.     Resume HCTZ and Losartan when ok from a surgical stanpoint    Bella Aguilera MD  Pager: [phone]

## 2023-11-29 NOTE — PROGRESS NOTE ADULT - SUBJECTIVE AND OBJECTIVE BOX
Anesthesia Pain Management Service    SUBJECTIVE: Patient is doing well with IV PCA and no significant problems reported. Patient had an NG tube placed by primary team reports discomfort from the NG tube currently.    Pain Scale Score	At rest: _6/10__ 	With Activity: ___ 	[X ] Refer to charted pain scores    THERAPY:    [ ] IV PCA Morphine		[ ] 5 mg/mL	[ ] 1 mg/mL  [X ] IV PCA Hydromorphone	[ ] 5 mg/mL	[X ] 1 mg/mL  [ ] IV PCA Fentanyl		[ ] 50 micrograms/mL    Demand dose __0.2_ lockout __6_ (minutes) Continuous Rate _0__ Total: _0.6__   mg used (in past 24 hrs)      MEDICATIONS  (STANDING):  albuterol    90 MICROgram(s) HFA Inhaler 2 Puff(s) Inhalation two times a day  budesonide 160 MICROgram(s)/formoterol 4.5 MICROgram(s) Inhaler 2 Puff(s) Inhalation two times a day  chlorhexidine 2% Cloths 1 Application(s) Topical daily  dextrose 50% Injectable 25 Gram(s) IV Push once  enoxaparin Injectable 40 milliGRAM(s) SubCutaneous every 24 hours  influenza  Vaccine (HIGH DOSE) 0.7 milliLiter(s) IntraMuscular once  insulin lispro (ADMELOG) corrective regimen sliding scale   SubCutaneous every 6 hours  pantoprazole  Injectable 40 milliGRAM(s) IV Push daily  sodium chloride 0.9% lock flush 3 milliLiter(s) IV Push every 8 hours  sodium chloride 0.9% with potassium chloride 20 mEq/L 1000 milliLiter(s) (100 mL/Hr) IV Continuous <Continuous>    MEDICATIONS  (PRN):  HYDROmorphone  Injectable 0.25 milliGRAM(s) IV Push every 3 hours PRN Moderate to Severe Pain (4- 10)  ondansetron Injectable 4 milliGRAM(s) IV Push every 6 hours PRN Nausea      OBJECTIVE: Patient sitting up in bed. NG tube in place.    Sedation Score:	[ X] Alert	[ ] Drowsy 	[ ] Arousable	[ ] Asleep	[ ] Unresponsive    Side Effects:	[X ] None	[ ] Nausea	[ ] Vomiting	[ ] Pruritus  		[ ] Other:    Vital Signs Last 24 Hrs  T(C): 37 (29 Nov 2023 09:04), Max: 37.3 (28 Nov 2023 12:59)  T(F): 98.6 (29 Nov 2023 09:04), Max: 99.2 (28 Nov 2023 20:15)  HR: 99 (29 Nov 2023 09:04) (99 - 112)  BP: 122/57 (29 Nov 2023 09:04) (118/78 - 132/76)  BP(mean): --  RR: 16 (29 Nov 2023 09:04) (16 - 18)  SpO2: 100% (29 Nov 2023 09:04) (100% - 100%)    Parameters below as of 29 Nov 2023 09:04  Patient On (Oxygen Delivery Method): room air        ASSESSMENT/ PLAN    Therapy to  be:	[ ] Continue   [ X] Discontinued   [X ] Change to prn Analgesics    Documentation and Verification of current medications:   [X] Done	[ ] Not done, not elligible    Comments: IV PCA discontinued. Plan discussed with primary team. PRN Oral/IV opioids and/or Adjuvant non-opioid medication to be ordered at this point.    Progress Note written now but Patient was seen earlier.

## 2023-11-29 NOTE — PROGRESS NOTE ADULT - ASSESSMENT
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.     Plan  - Diet: NPO/ IVF  - CT abd/pelvis today with IV contrast   - Pain control with PCA and tylenol  - Home meds; holding HTN medications  - DVT prophylaxis: Lovenox   - Appreciate endocrinology recs; plan to restart insulin pump on discharge    - NPO: low ISS    - Clears: if glucose > 180 x 2, start levemir 15 units, low ISS    - Regular: levemir 15 units and admelog 5 units TID, low ISS  - OOB/ambulate as tolerated   - dispo: pending    D team surgery   [id number]

## 2023-11-29 NOTE — PROGRESS NOTE ADULT - SUBJECTIVE AND OBJECTIVE BOX
TEAM [ D ] Surgery Daily Progress Note  =====================================================    SUBJECTIVE: Patient seen and examined at bedside on AM rounds. Patient with episode of emesis overnight, and one BM overnight. Patient reports that they're currently feeling well without nausea/vomiting. Denies Flatus but reports BM. OOB/Amublating as tolerated. Denies fever, chills, SOB, chest pain.     PAST MEDICAL & SURGICAL HISTORY:  History of hypertension  Diabetes  wears insulin pump  GERD (gastroesophageal reflux disease)  Uterine leiomyoma  Hyperlipidemia  Asthma  Obesity  Lymphadenopathy  left lower extremitiy ; 1966 - current  H/O insertion of insulin pump  OA (osteoarthritis)  Malignant neoplasm of cecum  H/O bilateral breast reduction surgery  History of appendectomy  History of cholecystectomy  H/O: hysterectomy  History of total right knee replacement  3/2016 - [facility]  H/O total knee replacement, left  S/P bunionectomy      ALLERGIES:  Darvocet A500 (Other; Urticaria)  Percocet 10/325 (Other; Urticaria)  codeine (Other)  Lantus (Swelling)  PURELL.  pt said, &quot;I felt my airway closing&quot; after she smelled the Purell. The incident occured at the pulmonologist office. (Other; Short breath)      --------------------------------------------------------------------------------------    MEDICATIONS:    Neurologic Medications  HYDROmorphone PCA (1 mG/mL) 30 milliLiter(s) PCA Continuous PCA Continuous  ondansetron Injectable 4 milliGRAM(s) IV Push every 6 hours PRN Nausea    Respiratory Medications  albuterol    90 MICROgram(s) HFA Inhaler 2 Puff(s) Inhalation two times a day  budesonide 160 MICROgram(s)/formoterol 4.5 MICROgram(s) Inhaler 2 Puff(s) Inhalation two times a day    Cardiovascular Medications    Gastrointestinal Medications  pantoprazole    Tablet 40 milliGRAM(s) Oral before breakfast  sodium chloride 0.9% lock flush 3 milliLiter(s) IV Push every 8 hours  sodium chloride 0.9% with potassium chloride 20 mEq/L 1000 milliLiter(s) IV Continuous <Continuous>    Genitourinary Medications    Hematologic/Oncologic Medications  enoxaparin Injectable 40 milliGRAM(s) SubCutaneous every 24 hours  influenza  Vaccine (HIGH DOSE) 0.7 milliLiter(s) IntraMuscular once    Antimicrobial/Immunologic Medications    Endocrine/Metabolic Medications  atorvastatin 10 milliGRAM(s) Oral at bedtime  insulin lispro (ADMELOG) corrective regimen sliding scale   SubCutaneous every 6 hours    Topical/Other Medications  chlorhexidine 2% Cloths 1 Application(s) Topical daily  naloxone Injectable 0.1 milliGRAM(s) IV Push every 3 minutes PRN For ANY of the following changes in patient status:  A. RR LESS THAN 10 breaths per minute, B. Oxygen saturation LESS THAN 90%, C. Sedation score of 6    --------------------------------------------------------------------------------------    VITAL SIGNS:  T(C): 37 (11-29-23 @ 04:55), Max: 37.3 (11-28-23 @ 12:59)  HR: 104 (11-29-23 @ 04:55) (104 - 112)  BP: 118/78 (11-29-23 @ 04:55) (118/78 - 132/76)  RR: 18 (11-29-23 @ 04:55) (18 - 19)  SpO2: 100% (11-29-23 @ 04:55) (100% - 100%)  --------------------------------------------------------------------------------------    EXAM  General: NAD, resting in bed comfortably. A&Ox4.   Cardiac: S1, S2. pulse present   Respiratory: Nonlabored respirations, normal cw expansion. No signs of respiratory distress.   Abdomen: soft, nontender, slightly distended.   Extremities: no deformities, moving all extremities spontaneously.     --------------------------------------------------------------------------------------    LABS                          10.6   7.79  )-----------( 321      ( 29 Nov 2023 07:18 )             33.9     11-28    136  |  101  |  8   ----------------------------<  192<H>  4.3   |  22  |  0.38<L>    Ca    8.6      28 Nov 2023 05:58  Phos  2.1     11-28  Mg     1.70     11-28        --------------------------------------------------------------------------------------    INS AND OUTS:    11-28-23 @ 07:01  -  11-29-23 @ 07:00  --------------------------------------------------------  IN: 2950 mL / OUT: 1651 mL / NET: 1299 mL      --------------------------------------------------------------------------------------

## 2023-11-29 NOTE — PROGRESS NOTE ADULT - SUBJECTIVE AND OBJECTIVE BOX
Name of Patient : NESTOR MABRY  MRN: [id number]  Date of visit: 11-29-23    Subjective: Patient seen and examined. No new events except as noted.     REVIEW OF SYSTEMS:    CONSTITUTIONAL: No weakness, fevers or chills  EYES/ENT: No visual changes;  No vertigo or throat pain   NECK: No pain or stiffness  RESPIRATORY: No cough, wheezing, hemoptysis; No shortness of breath  CARDIOVASCULAR: No chest pain or palpitations  GASTROINTESTINAL: No abdominal or epigastric pain. No nausea, vomiting, or hematemesis; No diarrhea or constipation. No melena or hematochezia.  GENITOURINARY: No dysuria, frequency or hematuria  NEUROLOGICAL: No numbness or weakness  SKIN: No itching, burning, rashes, or lesions   All other review of systems is negative unless indicated above.    MEDICATIONS:  MEDICATIONS  (STANDING):  acetaminophen   IVPB .. 1000 milliGRAM(s) IV Intermittent every 6 hours  albuterol    90 MICROgram(s) HFA Inhaler 2 Puff(s) Inhalation two times a day  budesonide 160 MICROgram(s)/formoterol 4.5 MICROgram(s) Inhaler 2 Puff(s) Inhalation two times a day  chlorhexidine 2% Cloths 1 Application(s) Topical daily  dextrose 50% Injectable 25 Gram(s) IV Push once  enoxaparin Injectable 40 milliGRAM(s) SubCutaneous every 24 hours  influenza  Vaccine (HIGH DOSE) 0.7 milliLiter(s) IntraMuscular once  insulin lispro (ADMELOG) corrective regimen sliding scale   SubCutaneous every 6 hours  pantoprazole  Injectable 40 milliGRAM(s) IV Push daily  sodium chloride 0.9% lock flush 3 milliLiter(s) IV Push every 8 hours  sodium chloride 0.9% with potassium chloride 20 mEq/L 1000 milliLiter(s) (100 mL/Hr) IV Continuous <Continuous>      PHYSICAL EXAM:  T(C): 36.5 (11-29-23 @ 20:52), Max: 37.1 (11-29-23 @ 00:37)  HR: 97 (11-29-23 @ 20:52) (83 - 112)  BP: 104/54 (11-29-23 @ 20:52) (104/54 - 157/110)  RR: 18 (11-29-23 @ 20:52) (16 - 18)  SpO2: 100% (11-29-23 @ 20:52) (100% - 100%)  Wt(kg): --  I&O's Summary    28 Nov 2023 07:01  -  29 Nov 2023 07:00  --------------------------------------------------------  IN: 2950 mL / OUT: 1651 mL / NET: 1299 mL    29 Nov 2023 07:01  -  29 Nov 2023 20:58  --------------------------------------------------------  IN: 1200 mL / OUT: 2250 mL / NET: -1050 mL          Appearance: Normal	  HEENT:  PERRLA   Lymphatic: No lymphadenopathy   Cardiovascular: Normal S1 S2, no JVD  Respiratory: normal effort , clear  Gastrointestinal:  Soft, Non-tender  Skin: No rashes,  warm to touch  Psychiatry:  Mood & affect appropriate  Musculuskeletal: No edema    recent labs, Imaging and EKGs personally reviewed     11-28-23 @ 07:01  -  11-29-23 @ 07:00  --------------------------------------------------------  IN: 2950 mL / OUT: 1651 mL / NET: 1299 mL    11-29-23 @ 07:01  -  11-29-23 @ 20:58  --------------------------------------------------------  IN: 1200 mL / OUT: 2250 mL / NET: -1050 mL                          10.6   7.79  )-----------( 321      ( 29 Nov 2023 07:18 )             33.9               11-29    138  |  103  |  7   ----------------------------<  197<H>  3.9   |  25  |  0.46<L>    Ca    8.1<L>      29 Nov 2023 07:18  Phos  1.9     11-29  Mg     1.80     11-29                         Urinalysis Basic - ( 29 Nov 2023 07:18 )    Color: x / Appearance: x / SG: x / pH: x  Gluc: 197 mg/dL / Ketone: x  / Bili: x / Urobili: x   Blood: x / Protein: x / Nitrite: x   Leuk Esterase: x / RBC: x / WBC x   Sq Epi: x / Non Sq Epi: x / Bacteria: x

## 2023-11-29 NOTE — PROGRESS NOTE ADULT - ASSESSMENT
This is a 74 yo F /w a PMH of DM2 on an insulin pump and colorectal cancer s/p right hemicolectomy today. Endocrinology consulted for diabetes management and DM2. Based on current insulin pump settings, suspect patient is being over-basalized given high basal rates compared to the insulin:carb ratio    Home Regimen:  Patient uses the medtronic insulin pump and Libre2  Pump settings are as follows:  TDD 52.425  12AM 1.8 units per hour  6:30AM 2 units per hour  12:30PM 2.45 units per hour  6:30PM 2.55 units per hour  8PM 2.55 units per hour    ICR:  12AM 1:15  7AM 1:12  12:30PM 1:15    ISF 1:30    #DM2 A1c 6.8%  #insulin pump - would hold of on using the insulin pump at this time given history of possibly frequent hypglycemia due to variable p.o. intake at home and now on Narcotics   Glucose Target 100-180: slightly above goal as pt was eating [employer]. If able to have hard candy recommended sugar free   NPO: ow with NGT   -Continue moderate Admelog correction scale every 6 hours while NPO  -Check FS q 6 hours   -Hypoglycemia Protocol     if on clears:  -low admelog correction scales before meals if on clears (diabetic clears)  -low admelog scales before bedtime  -hold levemir until we see her trend   if glucose >180 X2 - than start levemir 15 units and make it q24hrs   -FSG before meals and before bedtime  -Carb consistent diet once able to tolerate  -hypoglycemia protocol in place if needed    when ADAT to carb consistent diet   than will need basal bolus +correction scale   anticipate Levemir 15 units and ademelog 5 units premeals and low dose correction scale     pt has her on CGM on- also monitoring her own BG while inpt on bonnie last changed 11/16 due to be 0n 11/30 pt aware of this     #Discharge  -follow up with Dr. Anand  -basal bolus vs resume insulin pump on discharge, likely adjust settings will decide based on glucose trends and oral intake    #HTN  -BP target <130/80  -once stable post-op, consider resuming losartan 25mg daily and hctz 12.5mg daily- management as per primary team  outpt mc/cr ratio     #HLD  -On Pravastatin 40mg qhs at home   - Resume once able to tolerate orals if no contraindications   - Please obtain lipid profile if not done recently         Adrienne Taveras  Nurse Practitioner  Division of Endocrinology & Diabetes  In house pager #[phone]    If before 9AM or after 6PM, or on weekends/holidays, please call endocrine answering service for assistance ([phone]).For nonurgent matters email [email or web address] for assistance.

## 2023-11-29 NOTE — PROGRESS NOTE ADULT - SUBJECTIVE AND OBJECTIVE BOX
Anesthesia Pain Management Service    SUBJECTIVE: Pt doing well with IV PCA without problems reported.    Therapy:	  [ X] IV PCA	   [ ] Epidural           [ ] s/p Spinal Opoid              [ ] Postpartum infusion	  [ ] Patient controlled regional anesthesia (PCRA)    [ ] prn Analgesics    Allergies    Darvocet A500 (Other; Urticaria)  Percocet 10/325 (Other; Urticaria)  codeine (Other)  Lantus (Swelling)  PURELL.  pt said, &quot;I felt my airway closing&quot; after she smelled the Purell. The incident occured at the pulmonologist office. (Other; Short breath)    Intolerances      MEDICATIONS  (STANDING):  albuterol    90 MICROgram(s) HFA Inhaler 2 Puff(s) Inhalation two times a day  atorvastatin 10 milliGRAM(s) Oral at bedtime  budesonide 160 MICROgram(s)/formoterol 4.5 MICROgram(s) Inhaler 2 Puff(s) Inhalation two times a day  chlorhexidine 2% Cloths 1 Application(s) Topical daily  enoxaparin Injectable 40 milliGRAM(s) SubCutaneous every 24 hours  HYDROmorphone PCA (1 mG/mL) 30 milliLiter(s) PCA Continuous PCA Continuous  influenza  Vaccine (HIGH DOSE) 0.7 milliLiter(s) IntraMuscular once  insulin lispro (ADMELOG) corrective regimen sliding scale   SubCutaneous every 6 hours  magnesium sulfate  IVPB 2 Gram(s) IV Intermittent once  pantoprazole    Tablet 40 milliGRAM(s) Oral before breakfast  potassium phosphate IVPB 30 milliMole(s) IV Intermittent once  sodium chloride 0.9% lock flush 3 milliLiter(s) IV Push every 8 hours  sodium chloride 0.9% with potassium chloride 20 mEq/L 1000 milliLiter(s) (100 mL/Hr) IV Continuous <Continuous>    MEDICATIONS  (PRN):  naloxone Injectable 0.1 milliGRAM(s) IV Push every 3 minutes PRN For ANY of the following changes in patient status:  A. RR LESS THAN 10 breaths per minute, B. Oxygen saturation LESS THAN 90%, C. Sedation score of 6  ondansetron Injectable 4 milliGRAM(s) IV Push every 6 hours PRN Nausea      OBJECTIVE:   [X] No new signs     [ ] Other:    Side Effects:  [X ] None			[ ] Other:    Assessment of Catheter Site:		[ ] Intact		[ ] Other:    ASSESSMENT/PLAN  [ X] Continue current therapy    [ ] Therapy changed to:    [ ] IV PCA       [ ] Epidural     [ ] prn Analgesics     Comments:

## 2023-11-29 NOTE — PROGRESS NOTE ADULT - SUBJECTIVE AND OBJECTIVE BOX
Chief Complaint: Type 2 DM     History: Pt seen at bedside. Pt NPO now with NGT. Pt denies hypoglycemia    MEDICATIONS  (STANDING):  acetaminophen   IVPB .. 1000 milliGRAM(s) IV Intermittent every 6 hours  albuterol    90 MICROgram(s) HFA Inhaler 2 Puff(s) Inhalation two times a day  budesonide 160 MICROgram(s)/formoterol 4.5 MICROgram(s) Inhaler 2 Puff(s) Inhalation two times a day  chlorhexidine 2% Cloths 1 Application(s) Topical daily  dextrose 50% Injectable 25 Gram(s) IV Push once  enoxaparin Injectable 40 milliGRAM(s) SubCutaneous every 24 hours  influenza  Vaccine (HIGH DOSE) 0.7 milliLiter(s) IntraMuscular once  insulin lispro (ADMELOG) corrective regimen sliding scale   SubCutaneous every 6 hours  pantoprazole  Injectable 40 milliGRAM(s) IV Push daily  sodium chloride 0.9% lock flush 3 milliLiter(s) IV Push every 8 hours  sodium chloride 0.9% with potassium chloride 20 mEq/L 1000 milliLiter(s) (100 mL/Hr) IV Continuous <Continuous>    MEDICATIONS  (PRN):  HYDROmorphone  Injectable 0.25 milliGRAM(s) IV Push every 3 hours PRN Moderate to Severe Pain (4- 10)  ondansetron Injectable 4 milliGRAM(s) IV Push every 6 hours PRN Nausea      Allergies: Darvocet A500 (Other; Urticaria)  Percocet 10/325 (Other; Urticaria)  codeine (Other)  Lantus (Swelling)  PURELL.  pt said, &quot;I felt my airway closing&quot; after she smelled the Purell. The incident occured at the pulmonologist office. (Other; Short breath)      Review of Systems:  Respiratory: No SOB, no cough  GI: No nausea, vomiting, abdominal pain  Endocrine: no polyuria, polydipsia      PHYSICAL EXAM:  VITALS: T(C): 37.1 (11-29-23 @ 16:17)  T(F): 98.8 (11-29-23 @ 16:17), Max: 99.2 (11-28-23 @ 20:15)  HR: 111 (11-29-23 @ 16:17) (83 - 112)  BP: 133/71 (11-29-23 @ 16:17) (118/78 - 157/110)  RR:  (16 - 18)  SpO2:  (100% - 100%)  Wt(kg): --  GENERAL: NAD, well-groomed, well-developed  RESPIRATORY: No labored breathing   GI: Soft, nontender, non distended: pos NGT   PSYCH: Alert and oriented x 3, normal affect, normal mood      CAPILLARY BLOOD GLUCOSE  POCT Blood Glucose.: 148 mg/dL (29 Nov 2023 17:25)  POCT Blood Glucose.: 145 mg/dL (29 Nov 2023 12:38)  POCT Blood Glucose.: 219 mg/dL (29 Nov 2023 05:28)  POCT Blood Glucose.: 195 mg/dL (29 Nov 2023 00:40)    A1C with Estimated Average Glucose (11.16.23 @ 13:15)    A1C with Estimated Average Glucose Result: 6.8   Estimated Average Glucose: 148      11-29    138  |  103  |  7   ----------------------------<  197<H>  3.9   |  25  |  0.46<L>    eGFR: 102    Ca    8.1<L>      11-29  Mg     1.80     11-29  Phos  1.9     11-29    Diet, NPO (11-29-23 @ 10:42) [Active]

## 2023-11-29 NOTE — PROGRESS NOTE ADULT - SUBJECTIVE AND OBJECTIVE BOX
DATE OF SERVICE: 11-29-23    Patient denies chest pain or shortness of breath. reports pain in throat from NGT.  Review of symptoms otherwise negative.      Review of Systems:   Constitutional: [ ] fevers, [ ] chills.   Skin: [ ] dry skin. [ ] rashes.  Psychiatric: [ ] depression, [ ] anxiety.   Gastrointestinal: [ ] BRBPR, [ ] melena.   Neurological: [ ] confusion. [ ] seizures. [ ] shuffling gait.   Ears,Nose,Mouth and Throat: [ ] ear pain [ ] sore throat.   Eyes: [ ] diplopia.   Respiratory: [ ] hemoptysis. [ ] shortness of breath  Cardiovascular: See HPI above  Hematologic/Lymphatic: [ ] anemia. [ ] painful nodes. [ ] prolonged bleeding.   Genitourinary: [ ] hematuria. [ ] flank pain.   Endocrine: [ ] significant change in weight. [ ] intolerance to heat and cold.     Review of systems [ x] otherwise negative, [ ] otherwise unable to obtain    FH: no family history of sudden cardiac death in first degree relatives    SH: [ ] tobacco, [ ] alcohol, [ ] drugs    acetaminophen   IVPB .. 750 milliGRAM(s) IV Intermittent every 6 hours  albuterol    90 MICROgram(s) HFA Inhaler 2 Puff(s) Inhalation two times a day  budesonide 160 MICROgram(s)/formoterol 4.5 MICROgram(s) Inhaler 2 Puff(s) Inhalation two times a day  chlorhexidine 2% Cloths 1 Application(s) Topical daily  dextrose 50% Injectable 25 Gram(s) IV Push once  enoxaparin Injectable 40 milliGRAM(s) SubCutaneous every 24 hours  HYDROmorphone  Injectable 0.25 milliGRAM(s) IV Push every 3 hours PRN  influenza  Vaccine (HIGH DOSE) 0.7 milliLiter(s) IntraMuscular once  insulin lispro (ADMELOG) corrective regimen sliding scale   SubCutaneous every 6 hours  ondansetron Injectable 4 milliGRAM(s) IV Push every 6 hours PRN  pantoprazole  Injectable 40 milliGRAM(s) IV Push daily  sodium chloride 0.9% lock flush 3 milliLiter(s) IV Push every 8 hours  sodium chloride 0.9% with potassium chloride 20 mEq/L 1000 milliLiter(s) IV Continuous <Continuous>                            10.6   7.79  )-----------( 321      ( 29 Nov 2023 07:18 )             33.9       11-29    138  |  103  |  7   ----------------------------<  197<H>  3.9   |  25  |  0.46<L>    Ca    8.1<L>      29 Nov 2023 07:18  Phos  1.9     11-29  Mg     1.80     11-29        T(C): 37.1 (11-29-23 @ 12:30), Max: 37.3 (11-28-23 @ 20:15)  HR: 83 (11-29-23 @ 12:30) (83 - 112)  BP: 157/110 (11-29-23 @ 12:30) (118/78 - 157/110)  RR: 18 (11-29-23 @ 12:30) (16 - 18)  SpO2: 100% (11-29-23 @ 12:30) (100% - 100%)  Wt(kg): --    I&O's Summary    28 Nov 2023 07:01  -  29 Nov 2023 07:00  --------------------------------------------------------  IN: 2950 mL / OUT: 1651 mL / NET: 1299 mL    29 Nov 2023 07:01  -  29 Nov 2023 13:52  --------------------------------------------------------  IN: 400 mL / OUT: 200 mL / NET: 200 mL      General:  Alert and Oriented * 3.   Head: Normocephalic and atraumatic.   Neck: No JVD. No bruits. Supple. Does not appear to be enlarged.   Cardiovascular: + S1,S2 ; RRR Soft systolic murmur at the left lower sternal border. No rubs noted.    Lungs: CTA b/l. No rhonchi, rales or wheezes.   Abdomen: distended NT  Extremities: No clubbing/cyanosis/edema.   Neurologic: Moves all four extremities. Full range of motion.   Skin: Warm and moist. The patient's skin has normal elasticity and good skin turgor.   Psychiatric: Appropriate mood and affect.  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      --tolerated procedure well from CV perspective  --pain management  --s/p NGT, f/u CT A/P  --eventually resume home meds: Asa 81mg, Hctz 12.5mg, Losartan 25mg, Pravastatin 40mg when ok from a surgical perspective

## 2023-11-30 LAB
ANION GAP SERPL CALC-SCNC: 9 MMOL/L — SIGNIFICANT CHANGE UP (ref 7–14)
ANION GAP SERPL CALC-SCNC: 9 MMOL/L — SIGNIFICANT CHANGE UP (ref 7–14)
BUN SERPL-MCNC: 6 MG/DL — LOW (ref 7–23)
BUN SERPL-MCNC: 6 MG/DL — LOW (ref 7–23)
CALCIUM SERPL-MCNC: 7.9 MG/DL — LOW (ref 8.4–10.5)
CALCIUM SERPL-MCNC: 7.9 MG/DL — LOW (ref 8.4–10.5)
CHLORIDE SERPL-SCNC: 104 MMOL/L — SIGNIFICANT CHANGE UP (ref 98–107)
CHLORIDE SERPL-SCNC: 104 MMOL/L — SIGNIFICANT CHANGE UP (ref 98–107)
CO2 SERPL-SCNC: 26 MMOL/L — SIGNIFICANT CHANGE UP (ref 22–31)
CO2 SERPL-SCNC: 26 MMOL/L — SIGNIFICANT CHANGE UP (ref 22–31)
CREAT SERPL-MCNC: 0.41 MG/DL — LOW (ref 0.5–1.3)
CREAT SERPL-MCNC: 0.41 MG/DL — LOW (ref 0.5–1.3)
EGFR: 104 ML/MIN/1.73M2 — SIGNIFICANT CHANGE UP
EGFR: 104 ML/MIN/1.73M2 — SIGNIFICANT CHANGE UP
GLUCOSE BLDC GLUCOMTR-MCNC: 112 MG/DL — HIGH (ref 70–99)
GLUCOSE BLDC GLUCOMTR-MCNC: 112 MG/DL — HIGH (ref 70–99)
GLUCOSE BLDC GLUCOMTR-MCNC: 115 MG/DL — HIGH (ref 70–99)
GLUCOSE BLDC GLUCOMTR-MCNC: 115 MG/DL — HIGH (ref 70–99)
GLUCOSE BLDC GLUCOMTR-MCNC: 118 MG/DL — HIGH (ref 70–99)
GLUCOSE BLDC GLUCOMTR-MCNC: 118 MG/DL — HIGH (ref 70–99)
GLUCOSE BLDC GLUCOMTR-MCNC: 126 MG/DL — HIGH (ref 70–99)
GLUCOSE BLDC GLUCOMTR-MCNC: 126 MG/DL — HIGH (ref 70–99)
GLUCOSE BLDC GLUCOMTR-MCNC: 90 MG/DL — SIGNIFICANT CHANGE UP (ref 70–99)
GLUCOSE BLDC GLUCOMTR-MCNC: 90 MG/DL — SIGNIFICANT CHANGE UP (ref 70–99)
GLUCOSE SERPL-MCNC: 107 MG/DL — HIGH (ref 70–99)
GLUCOSE SERPL-MCNC: 107 MG/DL — HIGH (ref 70–99)
HCT VFR BLD CALC: 31.8 % — LOW (ref 34.5–45)
HCT VFR BLD CALC: 31.8 % — LOW (ref 34.5–45)
HGB BLD-MCNC: 10 G/DL — LOW (ref 11.5–15.5)
HGB BLD-MCNC: 10 G/DL — LOW (ref 11.5–15.5)
MAGNESIUM SERPL-MCNC: 2.1 MG/DL — SIGNIFICANT CHANGE UP (ref 1.6–2.6)
MAGNESIUM SERPL-MCNC: 2.1 MG/DL — SIGNIFICANT CHANGE UP (ref 1.6–2.6)
MCHC RBC-ENTMCNC: 25.4 PG — LOW (ref 27–34)
MCHC RBC-ENTMCNC: 25.4 PG — LOW (ref 27–34)
MCHC RBC-ENTMCNC: 31.4 GM/DL — LOW (ref 32–36)
MCHC RBC-ENTMCNC: 31.4 GM/DL — LOW (ref 32–36)
MCV RBC AUTO: 80.9 FL — SIGNIFICANT CHANGE UP (ref 80–100)
MCV RBC AUTO: 80.9 FL — SIGNIFICANT CHANGE UP (ref 80–100)
NRBC # BLD: 0 /100 WBCS — SIGNIFICANT CHANGE UP (ref 0–0)
NRBC # BLD: 0 /100 WBCS — SIGNIFICANT CHANGE UP (ref 0–0)
NRBC # FLD: 0.02 K/UL — HIGH (ref 0–0)
NRBC # FLD: 0.02 K/UL — HIGH (ref 0–0)
PHOSPHATE SERPL-MCNC: 2.4 MG/DL — LOW (ref 2.5–4.5)
PHOSPHATE SERPL-MCNC: 2.4 MG/DL — LOW (ref 2.5–4.5)
PLATELET # BLD AUTO: 315 K/UL — SIGNIFICANT CHANGE UP (ref 150–400)
PLATELET # BLD AUTO: 315 K/UL — SIGNIFICANT CHANGE UP (ref 150–400)
POTASSIUM SERPL-MCNC: 4 MMOL/L — SIGNIFICANT CHANGE UP (ref 3.5–5.3)
POTASSIUM SERPL-MCNC: 4 MMOL/L — SIGNIFICANT CHANGE UP (ref 3.5–5.3)
POTASSIUM SERPL-SCNC: 4 MMOL/L — SIGNIFICANT CHANGE UP (ref 3.5–5.3)
POTASSIUM SERPL-SCNC: 4 MMOL/L — SIGNIFICANT CHANGE UP (ref 3.5–5.3)
RBC # BLD: 3.93 M/UL — SIGNIFICANT CHANGE UP (ref 3.8–5.2)
RBC # BLD: 3.93 M/UL — SIGNIFICANT CHANGE UP (ref 3.8–5.2)
RBC # FLD: 17.9 % — HIGH (ref 10.3–14.5)
RBC # FLD: 17.9 % — HIGH (ref 10.3–14.5)
SODIUM SERPL-SCNC: 139 MMOL/L — SIGNIFICANT CHANGE UP (ref 135–145)
SODIUM SERPL-SCNC: 139 MMOL/L — SIGNIFICANT CHANGE UP (ref 135–145)
WBC # BLD: 8.7 K/UL — SIGNIFICANT CHANGE UP (ref 3.8–10.5)
WBC # BLD: 8.7 K/UL — SIGNIFICANT CHANGE UP (ref 3.8–10.5)
WBC # FLD AUTO: 8.7 K/UL — SIGNIFICANT CHANGE UP (ref 3.8–10.5)
WBC # FLD AUTO: 8.7 K/UL — SIGNIFICANT CHANGE UP (ref 3.8–10.5)

## 2023-11-30 PROCEDURE — 99222 1ST HOSP IP/OBS MODERATE 55: CPT

## 2023-11-30 PROCEDURE — 36573 INSJ PICC RS&I 5 YR+: CPT

## 2023-11-30 PROCEDURE — 99232 SBSQ HOSP IP/OBS MODERATE 35: CPT

## 2023-11-30 PROCEDURE — 71045 X-RAY EXAM CHEST 1 VIEW: CPT | Mod: 26

## 2023-11-30 RX ORDER — I.V. FAT EMULSION 20 G/100ML
10.4 EMULSION INTRAVENOUS
Qty: 25 | Refills: 0 | Status: DISCONTINUED | OUTPATIENT
Start: 2023-11-30 | End: 2023-11-30

## 2023-11-30 RX ORDER — ACETAMINOPHEN 500 MG
1000 TABLET ORAL EVERY 6 HOURS
Refills: 0 | Status: COMPLETED | OUTPATIENT
Start: 2023-11-30 | End: 2023-12-01

## 2023-11-30 RX ORDER — ELECTROLYTE SOLUTION,INJ
1 VIAL (ML) INTRAVENOUS
Refills: 0 | Status: DISCONTINUED | OUTPATIENT
Start: 2023-11-30 | End: 2023-11-30

## 2023-11-30 RX ORDER — I.V. FAT EMULSION 20 G/100ML
10.4 EMULSION INTRAVENOUS
Qty: 25 | Refills: 0 | Status: DISCONTINUED | OUTPATIENT
Start: 2023-11-30 | End: 2023-12-01

## 2023-11-30 RX ADMIN — Medication 1000 MILLIGRAM(S): at 05:50

## 2023-11-30 RX ADMIN — Medication 400 MILLIGRAM(S): at 11:29

## 2023-11-30 RX ADMIN — HYDROMORPHONE HYDROCHLORIDE 0.25 MILLIGRAM(S): 2 INJECTION INTRAMUSCULAR; INTRAVENOUS; SUBCUTANEOUS at 22:25

## 2023-11-30 RX ADMIN — HYDROMORPHONE HYDROCHLORIDE 0.25 MILLIGRAM(S): 2 INJECTION INTRAMUSCULAR; INTRAVENOUS; SUBCUTANEOUS at 18:59

## 2023-11-30 RX ADMIN — I.V. FAT EMULSION 10.4 ML/HR: 20 EMULSION INTRAVENOUS at 18:44

## 2023-11-30 RX ADMIN — Medication 400 MILLIGRAM(S): at 00:17

## 2023-11-30 RX ADMIN — Medication 1 EACH: at 18:43

## 2023-11-30 RX ADMIN — BUDESONIDE AND FORMOTEROL FUMARATE DIHYDRATE 2 PUFF(S): 160; 4.5 AEROSOL RESPIRATORY (INHALATION) at 21:49

## 2023-11-30 RX ADMIN — SODIUM CHLORIDE 3 MILLILITER(S): 9 INJECTION INTRAMUSCULAR; INTRAVENOUS; SUBCUTANEOUS at 12:52

## 2023-11-30 RX ADMIN — ALBUTEROL 2 PUFF(S): 90 AEROSOL, METERED ORAL at 21:49

## 2023-11-30 RX ADMIN — PANTOPRAZOLE SODIUM 40 MILLIGRAM(S): 20 TABLET, DELAYED RELEASE ORAL at 11:33

## 2023-11-30 RX ADMIN — ENOXAPARIN SODIUM 40 MILLIGRAM(S): 100 INJECTION SUBCUTANEOUS at 17:35

## 2023-11-30 RX ADMIN — HYDROMORPHONE HYDROCHLORIDE 0.25 MILLIGRAM(S): 2 INJECTION INTRAMUSCULAR; INTRAVENOUS; SUBCUTANEOUS at 09:10

## 2023-11-30 RX ADMIN — HYDROMORPHONE HYDROCHLORIDE 0.25 MILLIGRAM(S): 2 INJECTION INTRAMUSCULAR; INTRAVENOUS; SUBCUTANEOUS at 08:37

## 2023-11-30 RX ADMIN — SODIUM CHLORIDE 3 MILLILITER(S): 9 INJECTION INTRAMUSCULAR; INTRAVENOUS; SUBCUTANEOUS at 22:17

## 2023-11-30 RX ADMIN — HYDROMORPHONE HYDROCHLORIDE 0.25 MILLIGRAM(S): 2 INJECTION INTRAMUSCULAR; INTRAVENOUS; SUBCUTANEOUS at 22:10

## 2023-11-30 RX ADMIN — CHLORHEXIDINE GLUCONATE 1 APPLICATION(S): 213 SOLUTION TOPICAL at 11:40

## 2023-11-30 RX ADMIN — HYDROMORPHONE HYDROCHLORIDE 0.25 MILLIGRAM(S): 2 INJECTION INTRAMUSCULAR; INTRAVENOUS; SUBCUTANEOUS at 19:15

## 2023-11-30 RX ADMIN — BUDESONIDE AND FORMOTEROL FUMARATE DIHYDRATE 2 PUFF(S): 160; 4.5 AEROSOL RESPIRATORY (INHALATION) at 11:35

## 2023-11-30 RX ADMIN — Medication 400 MILLIGRAM(S): at 05:27

## 2023-11-30 RX ADMIN — Medication 1000 MILLIGRAM(S): at 00:30

## 2023-11-30 RX ADMIN — ALBUTEROL 2 PUFF(S): 90 AEROSOL, METERED ORAL at 11:34

## 2023-11-30 RX ADMIN — Medication 1000 MILLIGRAM(S): at 12:52

## 2023-11-30 RX ADMIN — SODIUM CHLORIDE 3 MILLILITER(S): 9 INJECTION INTRAMUSCULAR; INTRAVENOUS; SUBCUTANEOUS at 05:01

## 2023-11-30 NOTE — PROGRESS NOTE ADULT - NS ATTEND AMEND GEN_ALL_CORE FT
Patient seen and examined. Agree with plan as detailed in PA/NP Note.     Bp controlled, ,  ok to hold Hctz 12.5mg, Losartan 25mg for now, f/u surgery team plans     Bella Aguilera MD  Pager: [phone]

## 2023-11-30 NOTE — PROGRESS NOTE ADULT - ASSESSMENT
Patient is a 73 yr old female PMH HTN, HLD, asthma, recent NST 11/17/23 with no ischemia or infarct and normal LV function and recent TTE 10/2023 with Normal LV/RV function, who was found with cecal mass/ new dx adenocarcinoma s/p Right hemicolectomy 11/22.  Pt reports incision site pain and increased distention.     # Adenocarcinoma  S/P OR , R hemicolectomy  pain control   IV hydration  Surg management appreciated   monitor electrolytes   NGT per team    # Anemia   Monitor hgb level  S/P  1 unit PRBC  defer to surg        # HTN   resume BPmeds  Monitor     # DM  sliding scale  Endo follow up appreciated     # Asthma   O2 supplement PRN    # HLD

## 2023-11-30 NOTE — PROGRESS NOTE ADULT - ASSESSMENT
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.     Plan  - Diet: NPO/ IVF  - CT abd/pelvis today with IV contrast   - Pain control with PCA and tylenol  - Home meds; holding HTN medications  - DVT prophylaxis: Lovenox   - Appreciate endocrinology recs; plan to restart insulin pump on discharge    - NPO: low ISS    - Clears: if glucose > 180 x 2, start levemir 15 units, low ISS    - Regular: levemir 15 units and admelog 5 units TID, low ISS  - OOB/ambulate as tolerated   - dispo: pending    D team surgery   [id number]    73 year old female with PMH asthma, T2DM on insulin pump, GERD, HTN, HLD, OA, obesity presents s/p robotic partial colectomy for 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 Course c/b ileus.    Plan  - Pain control with Tylenol, Dilaudid PRN  - OOB/ambulate/ PT/ IS while in bed  - NPO/ NGT  - PICC/TPN consult for prolonged NPO status/malnutrition  - Home meds; holding HTN medications  - DVT prophylaxis: Lovenox   - [facility] endocrinology recs; plan to restart insulin pump on discharge    - NPO: low ISS    - Clears: if glucose > 180 x 2, start levemir 15 units, low ISS    - Regular: levemir 15 units and admelog 5 units TID, low ISS  - Dispo: pending    D Team Surgery  [id number]

## 2023-11-30 NOTE — CONSULT NOTE ADULT - SUBJECTIVE AND OBJECTIVE BOX
Nutrition Support Consult Note    HPI:      Allergies  Darvocet A500 (Other; Urticaria)  Percocet 10/325 (Other; Urticaria)  codeine (Other)  Lantus (Swelling)  Purell    PAST MEDICAL & SURGICAL HISTORY:  History of hypertension  Diabetes wears insulin pump  GERD (gastroesophageal reflux disease)  Uterine leiomyoma  Hyperlipidemia  Asthma  Obesity  Lymphadenopathy left lower extremity ; 1966 - current  H/O insertion of insulin pump  OA (osteoarthritis)  Malignant neoplasm of cecum  H/O bilateral breast reduction surgery  History of appendectomy  History of cholecystectomy  H/O: hysterectomy  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 - [facility]  H/O total knee replacement, left  S/P bunionectomy    FAMILY HISTORY:  Diabetes mellitus (Father)  FH: breast cancer (Aunt)    Vital Signs Last 24 Hrs  T(C): 37.1 (30 Nov 2023 10:33), Max: 37.1 (29 Nov 2023 12:30)  T(F): 98.8 (30 Nov 2023 10:33), Max: 98.8 (29 Nov 2023 12:30)  HR: 99 (30 Nov 2023 10:33) (83 - 111)  BP: 117/54 (30 Nov 2023 10:33) (104/54 - 157/110)  BP(mean): 67 (30 Nov 2023 10:33) (67 - 82)  RR: 20 (30 Nov 2023 10:33) (18 - 20)  SpO2: 99% (30 Nov 2023 10:33) (99% - 100%)    MEDICATIONS  (STANDING):  acetaminophen   IVPB .. 1000 milliGRAM(s) IV Intermittent every 6 hours  albuterol    90 MICROgram(s) HFA Inhaler 2 Puff(s) Inhalation two times a day  budesonide 160 MICROgram(s)/formoterol 4.5 MICROgram(s) Inhaler 2 Puff(s) Inhalation two times a day  chlorhexidine 2% Cloths 1 Application(s) Topical daily  dextrose 50% Injectable 25 Gram(s) IV Push once  enoxaparin Injectable 40 milliGRAM(s) SubCutaneous every 24 hours  influenza  Vaccine (HIGH DOSE) 0.7 milliLiter(s) IntraMuscular once  insulin lispro (ADMELOG) corrective regimen sliding scale   SubCutaneous every 6 hours  lipid, fat emulsion (Fish Oil and Plant Based) 20% Infusion 10.4 mL/Hr (10.4 mL/Hr) IV Continuous <Continuous>  pantoprazole  Injectable 40 milliGRAM(s) IV Push daily  Parenteral Nutrition - Adult 1 Each (42 mL/Hr) TPN Continuous <Continuous>  sodium chloride 0.9% lock flush 3 milliLiter(s) IV Push every 8 hours  sodium chloride 0.9% with potassium chloride 20 mEq/L 1000 milliLiter(s) (100 mL/Hr) IV Continuous <Continuous>  sodium phosphate 15 milliMole(s)/250 mL IVPB 15 milliMole(s) IV Intermittent once    MEDICATIONS  (PRN):  HYDROmorphone  Injectable 0.25 milliGRAM(s) IV Push every 3 hours PRN Moderate to Severe Pain (4- 10)  ondansetron Injectable 4 milliGRAM(s) IV Push every 6 hours PRN Nausea    I&O's Detail    29 Nov 2023 07:01  -  30 Nov 2023 07:00  --------------------------------------------------------  IN:    sodium chloride 0.9% w/ Additives: 2300 mL  Total IN: 2300 mL    OUT:    Nasogastric/Oral tube (mL): 2800 mL    Oral Fluid: 0 mL    Voided (mL): 1050 mL  Total OUT: 3850 mL    Total NET: -1550 mL    Drug Dosing Weight  Height (cm): 152.4 (22 Nov 2023 06:23)  Weight (kg): 75.659 (22 Nov 2023 06:23)  BMI (kg/m2): 32.6 (22 Nov 2023 06:23)  BSA (m2): 1.73 (22 Nov 2023 06:23)    PHYSICAL EXAM:      Constitutional:    Eyes:    ENMT:    Neck:    Breasts:    Back:    Respiratory:    Cardiovascular:    Gastrointestinal:    Genitourinary:    Rectal:    Extremities:    Vascular:    Neurological:    Skin:    Lymph Nodes:    Musculoskeletal:    Psychiatric:        LABS:                        10.0   8.70  )-----------( 315      ( 30 Nov 2023 06:06 )             31.8     11-30    139  |  104  |  6<L>  ----------------------------<  107<H>  4.0   |  26  |  0.41<L>    Ca    7.9<L>      30 Nov 2023 06:06  Phos  2.4     11-30  Mg     2.10     11-30    11-29 Chol 65 LDL -- HDL 29<L> Trig 81    POCT Blood Glucose.: 112 mg/dL (30 Nov 2023 09:20)    Current Weight: 75.7 kG  Height: 152.4 cm  Ideal Body Weight: 45 kG  BMI: 32.6   Current Diet: [ x ]NPO    CLINICAL INDICATORS  Severe protein calorie malnutrition in acute illness/ injury; secondary to poor appetite, weight loss >5% in 1 month and/or >7.5% in 3 months, caloric Intake <50% of nutrition needs >= 5 days, temporal wasting, severe loss of muscle mass/atrophy and loss of body fat stores.    Metabolic Requirements:  The patient will require:  ______25_______ kilocalories / kg / day  Diagnosis:  [  ] Mild protein malnutrition  [  ] Moderate protein calorie malnutrition in acute illness/ injury  [ x ] Severe protein calorie malnutrition in acute illness/ injury  [  ] Moderate protein calorie malnutrition in chronic illness  [  ] Severe protein calorie malnutrition in chronic illness    Nutritional Requirements  Carbohydrates: 1 gram = 3.4 kcal   Lipids: 1 gram = 10 kcal  Proteins: 1 gram = 4 kcal     Plan:  [ x ] Initiate TPN formula via dedicated lumen of PICC, TPN volume and calories will be increased tomorrow   Carbohydrates:___200___grams, Amino Acid:___80___grams  SMOF Lipids:___40____ grams, Total volume:___2000____mL.  1. Dedicated Central line must be placed for TPN.  2. Strict Intake and Output.  3. Weights three times a week  4. Monitor BMP, Mg+, Ionized Ca++, Phosphorus daily  5. Monitor Triglycerides monthly  6. Pre-albumin weekly.  7. Fingersticks to monitor glucose every 6 hours until stable then may be decreased to twice a day.    Nutrition Support [id number]    Nutrition Support Consult Note    HPI:  74 y/o female with PMH asthma, T2DM on insulin pump, GERD, HTN, HLD, OA, obesity is s/p robotic partial colectomy for moderately differentiated adenocarcinoma of cecal mass on 11/22/23. CT abd/pelvis on 11/29/23 showed findings consistent with post op ileus. Pt remains NPO with NGT in place. Nutrition consult called for initiation of parenteral nutrition in view of severe protein calorie malnutrition and prolonged NPO status. PICC placed and plan to initiate TPN today. Parenteral nutrition was explained to patient and family at bedside and all questions were answered. Pt denies chest pain, shortness of breath, nausea or vomiting at this time.    ROS: Except as noted above, all other systems reviewed and are negative     Allergies  Darvocet A500 (Other; Urticaria)  Percocet 10/325 (Other; Urticaria)  codeine (Other)  Lantus (Swelling)  Purell    PAST MEDICAL & SURGICAL HISTORY:  History of hypertension  Diabetes wears insulin pump  GERD (gastroesophageal reflux disease)  Uterine leiomyoma  Hyperlipidemia  Asthma  Obesity  Lymphadenopathy left lower extremity ; 1966 - current  H/O insertion of insulin pump  OA (osteoarthritis)  Malignant neoplasm of cecum  H/O bilateral breast reduction surgery  History of appendectomy  History of cholecystectomy  H/O: hysterectomy  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 - [facility]  H/O total knee replacement, left  S/P bunionectomy    FAMILY HISTORY:  Diabetes mellitus (Father)  FH: breast cancer (Aunt)    Vital Signs Last 24 Hrs  T(C): 37.1 (30 Nov 2023 10:33), Max: 37.1 (29 Nov 2023 12:30)  T(F): 98.8 (30 Nov 2023 10:33), Max: 98.8 (29 Nov 2023 12:30)  HR: 99 (30 Nov 2023 10:33) (83 - 111)  BP: 117/54 (30 Nov 2023 10:33) (104/54 - 157/110)  BP(mean): 67 (30 Nov 2023 10:33) (67 - 82)  RR: 20 (30 Nov 2023 10:33) (18 - 20)  SpO2: 99% (30 Nov 2023 10:33) (99% - 100%)    MEDICATIONS  (STANDING):  acetaminophen   IVPB .. 1000 milliGRAM(s) IV Intermittent every 6 hours  albuterol    90 MICROgram(s) HFA Inhaler 2 Puff(s) Inhalation two times a day  budesonide 160 MICROgram(s)/formoterol 4.5 MICROgram(s) Inhaler 2 Puff(s) Inhalation two times a day  chlorhexidine 2% Cloths 1 Application(s) Topical daily  dextrose 50% Injectable 25 Gram(s) IV Push once  enoxaparin Injectable 40 milliGRAM(s) SubCutaneous every 24 hours  influenza  Vaccine (HIGH DOSE) 0.7 milliLiter(s) IntraMuscular once  insulin lispro (ADMELOG) corrective regimen sliding scale   SubCutaneous every 6 hours  lipid, fat emulsion (Fish Oil and Plant Based) 20% Infusion 10.4 mL/Hr (10.4 mL/Hr) IV Continuous <Continuous>  pantoprazole  Injectable 40 milliGRAM(s) IV Push daily  Parenteral Nutrition - Adult 1 Each (42 mL/Hr) TPN Continuous <Continuous>  sodium chloride 0.9% lock flush 3 milliLiter(s) IV Push every 8 hours  sodium chloride 0.9% with potassium chloride 20 mEq/L 1000 milliLiter(s) (100 mL/Hr) IV Continuous <Continuous>  sodium phosphate 15 milliMole(s)/250 mL IVPB 15 milliMole(s) IV Intermittent once    MEDICATIONS  (PRN):  HYDROmorphone  Injectable 0.25 milliGRAM(s) IV Push every 3 hours PRN Moderate to Severe Pain (4- 10)  ondansetron Injectable 4 milliGRAM(s) IV Push every 6 hours PRN Nausea    I&O's Detail    29 Nov 2023 07:01  -  30 Nov 2023 07:00  --------------------------------------------------------  IN:    sodium chloride 0.9% w/ Additives: 2300 mL  Total IN: 2300 mL    OUT:    Nasogastric/Oral tube (mL): 2800 mL    Oral Fluid: 0 mL    Voided (mL): 1050 mL  Total OUT: 3850 mL    Total NET: -1550 mL    Drug Dosing Weight  Height (cm): 152.4 (22 Nov 2023 06:23)  Weight (kg): 75.659 (22 Nov 2023 06:23)  BMI (kg/m2): 32.6 (22 Nov 2023 06:23)  BSA (m2): 1.73 (22 Nov 2023 06:23)    PHYSICAL EXAM:  Constitutional: A&Ox3, NAD, resting comfortably in bed  Respiratory: Unlabored breathing  Abdomen: Soft, nondistended, mildly tender to palpation in lower quadrants   Extremities: WWP, SEAAMN spontaneously  PICC Site: double lumen PICC placed on 11/30/23 in [facility] [place], site clean and dry     LABS:                        10.0   8.70  )-----------( 315      ( 30 Nov 2023 06:06 )             31.8     11-30    139  |  104  |  6<L>  ----------------------------<  107<H>  4.0   |  26  |  0.41<L>    Ca    7.9<L>      30 Nov 2023 06:06  Phos  2.4     11-30  Mg     2.10     11-30    11-29 Chol 65 LDL -- HDL 29<L> Trig 81    POCT Blood Glucose.: 112 mg/dL (30 Nov 2023 09:20)    CT Abdomen and Pelvis 11/29/23: IMPRESSION: Dilated loops of small bowel the level of the terminal ileum, likely ileus in the postoperative setting. Postoperative pneumoperitoneum and diffuse subcutaneous emphysema.    Current Weight: 75.7 kG  Height: 152.4 cm  Ideal Body Weight: 45 kG  BMI: 32.6   Current Diet: [ x ]NPO    CLINICAL INDICATORS  Severe protein calorie malnutrition in acute illness/ injury; secondary to poor appetite, weight loss >5% in 1 month and/or >7.5% in 3 months, caloric Intake <50% of nutrition needs >= 5 days, temporal wasting, severe loss of muscle mass/atrophy and loss of body fat stores.    Metabolic Requirements:  The patient will require:  ______25_______ kilocalories / kg / day  Diagnosis:  [  ] Mild protein malnutrition  [  ] Moderate protein calorie malnutrition in acute illness/ injury  [ x ] Severe protein calorie malnutrition in acute illness/ injury  [  ] Moderate protein calorie malnutrition in chronic illness  [  ] Severe protein calorie malnutrition in chronic illness    Nutritional Requirements  Carbohydrates: 1 gram = 3.4 kcal   Lipids: 1 gram = 10 kcal  Proteins: 1 gram = 4 kcal     Plan:  [ x ] Initiate TPN formula via dedicated lumen of PICC, TPN volume and calories will be increased tomorrow   Carbohydrates:___200___grams, Amino Acid:___80___grams  SMOF Lipids:___40____ grams, Total volume:___2000____mL.  1. Dedicated Central line must be placed for TPN.  2. Strict Intake and Output.  3. Weights three times a week  4. Monitor BMP, Mg+, Ionized Ca++, Phosphorus daily  5. Monitor Triglycerides monthly  6. Pre-albumin weekly.  7. Fingersticks to monitor glucose every 6 hours until stable then may be decreased to twice a day.    Nutrition Support [id number]

## 2023-11-30 NOTE — PROGRESS NOTE ADULT - SUBJECTIVE AND OBJECTIVE BOX
Chief Complaint: Type 2 DM     History: Pt seen at bedside. Pt NPO now being started on TPN. Pt denies nausea and vomiting/any signs of hypoglycemia. Pt reports an adequate appetite.    MEDICATIONS  (STANDING):  albuterol    90 MICROgram(s) HFA Inhaler 2 Puff(s) Inhalation two times a day  budesonide 160 MICROgram(s)/formoterol 4.5 MICROgram(s) Inhaler 2 Puff(s) Inhalation two times a day  chlorhexidine 2% Cloths 1 Application(s) Topical daily  dextrose 50% Injectable 25 Gram(s) IV Push once  enoxaparin Injectable 40 milliGRAM(s) SubCutaneous every 24 hours  influenza  Vaccine (HIGH DOSE) 0.7 milliLiter(s) IntraMuscular once  insulin lispro (ADMELOG) corrective regimen sliding scale   SubCutaneous every 6 hours  lipid, fat emulsion (Fish Oil and Plant Based) 20% Infusion 10.4 mL/Hr (10.4 mL/Hr) IV Continuous <Continuous>  pantoprazole  Injectable 40 milliGRAM(s) IV Push daily  Parenteral Nutrition - Adult 1 Each (42 mL/Hr) TPN Continuous <Continuous>  sodium chloride 0.9% lock flush 3 milliLiter(s) IV Push every 8 hours  sodium chloride 0.9% with potassium chloride 20 mEq/L 1000 milliLiter(s) (100 mL/Hr) IV Continuous <Continuous>    MEDICATIONS  (PRN):  HYDROmorphone  Injectable 0.25 milliGRAM(s) IV Push every 3 hours PRN Moderate to Severe Pain (4- 10)  ondansetron Injectable 4 milliGRAM(s) IV Push every 6 hours PRN Nausea      Allergies: Darvocet A500 (Other; Urticaria)  Percocet 10/325 (Other; Urticaria)  codeine (Other)  Lantus (Swelling)  PURELL.  pt said, &quot;I felt my airway closing&quot; after she smelled the Purell. The incident occured at the pulmonologist office. (Other; Short breath)      Review of Systems:  Respiratory: No SOB, no cough  GI: No nausea, vomiting, abdominal pain  Endocrine: no polyuria, polydipsia      PHYSICAL EXAM:  VITALS: T(C): 37.1 (11-30-23 @ 10:33)  T(F): 98.8 (11-30-23 @ 10:33), Max: 98.8 (11-29-23 @ 16:17)  HR: 99 (11-30-23 @ 10:33) (93 - 111)  BP: 117/54 (11-30-23 @ 10:33) (104/54 - 133/71)  RR:  (18 - 20)  SpO2:  (99% - 100%)  Wt(kg): --  GENERAL: NAD, well-groomed, well-developed  RESPIRATORY: No labored breathing   GI: Soft, nontender, non distended  PSYCH: Alert and oriented x 3, normal affect, normal mood      CAPILLARY BLOOD GLUCOSE  POCT Blood Glucose.: 118 mg/dL (30 Nov 2023 12:46)  POCT Blood Glucose.: 112 mg/dL (30 Nov 2023 09:20)  POCT Blood Glucose.: 115 mg/dL (30 Nov 2023 06:40)  POCT Blood Glucose.: 126 mg/dL (30 Nov 2023 00:17)  POCT Blood Glucose.: 148 mg/dL (29 Nov 2023 17:25)    A1C with Estimated Average Glucose (11.16.23 @ 13:15)    A1C with Estimated Average Glucose Result: 6.8   Estimated Average Glucose: 148      11-30    139  |  104  |  6<L>  ----------------------------<  107<H>  4.0   |  26  |  0.41<L>    eGFR: 104    Ca    7.9<L>      11-30  Mg     2.10     11-30  Phos  2.4     11-30    Diet, NPO:   With Ice Chips/Sips of Water (11-30-23 @ 08:18) [Active]      Parenteral Nutrition - Adult 1 Each (42 mL/Hr) TPN Continuous <Continuous>, 11-30-23 @ 17:00, 17:00, Stop order after: 1 Days

## 2023-11-30 NOTE — CHART NOTE - NSCHARTNOTEFT_GEN_A_CORE
Pre-Interventional Radiology Procedure Note    73y  Female    Procedure: PICC for TPN (approved by Sergey Florentino PA-C)  Diagnosis/Indication: Patient is a 73y old  Female who presents with a chief complaint of Malignant neoplasm of cecum s/p partial colectomy.     Interventional Radiology Attending Physician: FREDIS  Ordering Attending Physician: Dr. Nichols     PAST MEDICAL & SURGICAL HISTORY:  History of hypertension  Diabetes  wears insulin pump  GERD (gastroesophageal reflux disease)  Uterine leiomyoma  Hyperlipidemia  Asthma  Obesity  Lymphadenopathy  left lower extremitiy ; 1966 - current  H/O insertion of insulin pump  OA (osteoarthritis)  Malignant neoplasm of cecum  H/O bilateral breast reduction surgery  History of appendectomy  History of cholecystectomy  H/O: hysterectomy  History of total right knee replacement  3/2016 - [facility]  H/O total knee replacement, left  S/P bunionectomy        CBC Full  -  ( 29 Nov 2023 07:18 )  WBC Count : 7.79 K/uL  RBC Count : 4.27 M/uL  Hemoglobin : 10.6 g/dL  Hematocrit : 33.9 %  Platelet Count - Automated : 321 K/uL  Mean Cell Volume : 79.4 fL  Mean Cell Hemoglobin : 24.8 pg  Mean Cell Hemoglobin Concentration : 31.3 gm/dL  Auto Neutrophil # : x  Auto Lymphocyte # : x  Auto Monocyte # : x  Auto Eosinophil # : x  Auto Basophil # : x  Auto Neutrophil % : x  Auto Lymphocyte % : x  Auto Monocyte % : x  Auto Eosinophil % : x  Auto Basophil % : x    11-29    138  |  103  |  7   ----------------------------<  197<H>  3.9   |  25  |  0.46<L>    Ca    8.1<L>      29 Nov 2023 07:18  Phos  1.9     11-29  Mg     1.80     11-29

## 2023-11-30 NOTE — PROGRESS NOTE ADULT - SUBJECTIVE AND OBJECTIVE BOX
D TEAM Surgery Progress Note  Patient is a 73y old  Female who presents with a chief complaint of Malignant neoplasm of cecum(27 Nov 2023 11:31)      INTERVAL EVENTS: Patient is POD#8 s/p right hemicolectomy. CT performed yesterday and showed gastric distention and dilated loops of small bowel to terminal ileum c/w ileus. NGT placed for decompression. No acute events overnight.  SUBJECTIVE: Patient seen and examined at bedside with surgical team, patient without complaints. Denies fever, chills, CP, SOB nausea, vomiting, abdominal pain.    OBJECTIVE:    Vital Signs Last 24 Hrs  T(C): 36.9 (30 Nov 2023 05:00), Max: 37.1 (29 Nov 2023 12:30)  T(F): 98.4 (30 Nov 2023 05:00), Max: 98.8 (29 Nov 2023 12:30)  HR: 94 (30 Nov 2023 05:00) (83 - 111)  BP: 107/55 (30 Nov 2023 05:00) (104/54 - 157/110)  BP(mean): --  RR: 18 (30 Nov 2023 05:00) (16 - 18)  SpO2: 100% (30 Nov 2023 05:00) (99% - 100%)    Parameters below as of 30 Nov 2023 05:00  Patient On (Oxygen Delivery Method): room air    I&O's Detail    28 Nov 2023 07:01  -  29 Nov 2023 07:00  --------------------------------------------------------  IN:    IV PiggyBack: 550 mL    Oral Fluid: 100 mL    sodium chloride 0.9% w/ Additives: 2300 mL  Total IN: 2950 mL    OUT:    Emesis (mL): 651 mL    Voided (mL): 1000 mL  Total OUT: 1651 mL    Total NET: 1299 mL      29 Nov 2023 07:01  -  30 Nov 2023 05:55  --------------------------------------------------------  IN:    sodium chloride 0.9% w/ Additives: 2300 mL  Total IN: 2300 mL    OUT:    Nasogastric/Oral tube (mL): 2800 mL    Oral Fluid: 0 mL    Voided (mL): 1050 mL  Total OUT: 3850 mL    Total NET: -1550 mL      MEDICATIONS  (STANDING):  acetaminophen   IVPB .. 1000 milliGRAM(s) IV Intermittent every 6 hours  albuterol    90 MICROgram(s) HFA Inhaler 2 Puff(s) Inhalation two times a day  budesonide 160 MICROgram(s)/formoterol 4.5 MICROgram(s) Inhaler 2 Puff(s) Inhalation two times a day  chlorhexidine 2% Cloths 1 Application(s) Topical daily  dextrose 50% Injectable 25 Gram(s) IV Push once  enoxaparin Injectable 40 milliGRAM(s) SubCutaneous every 24 hours  influenza  Vaccine (HIGH DOSE) 0.7 milliLiter(s) IntraMuscular once  insulin lispro (ADMELOG) corrective regimen sliding scale   SubCutaneous every 6 hours  pantoprazole  Injectable 40 milliGRAM(s) IV Push daily  sodium chloride 0.9% lock flush 3 milliLiter(s) IV Push every 8 hours  sodium chloride 0.9% with potassium chloride 20 mEq/L 1000 milliLiter(s) (100 mL/Hr) IV Continuous <Continuous>    MEDICATIONS  (PRN):  HYDROmorphone  Injectable 0.25 milliGRAM(s) IV Push every 3 hours PRN Moderate to Severe Pain (4- 10)  ondansetron Injectable 4 milliGRAM(s) IV Push every 6 hours PRN Nausea      PHYSICAL EXAM:  Constitutional: A&Ox3, NAD  Respiratory: Unlabored breathing  Abdomen: Soft, nondistended, NTTP. No rebound or guarding.  Extremities: WWP, SEAMAN spontaneously    LABS:                        10.6   7.79  )-----------( 321      ( 29 Nov 2023 07:18 )             33.9     11-29    138  |  103  |  7   ----------------------------<  197<H>  3.9   |  25  |  0.46<L>    Ca    8.1<L>      29 Nov 2023 07:18  Phos  1.9     11-29  Mg     1.80     11-29          Urinalysis Basic - ( 29 Nov 2023 07:18 )    Color: x / Appearance: x / SG: x / pH: x  Gluc: 197 mg/dL / Ketone: x  / Bili: x / Urobili: x   Blood: x / Protein: x / Nitrite: x   Leuk Esterase: x / RBC: x / WBC x   Sq Epi: x / Non Sq Epi: x / Bacteria: x           D TEAM Surgery Progress Note  Patient is a 73y old  Female who presents with a chief complaint of Malignant neoplasm of cecum(27 Nov 2023 11:31)      INTERVAL EVENTS: Patient is POD#8 s/p right hemicolectomy. CT performed yesterday and showed gastric distention and dilated loops of small bowel to terminal ileum c/w ileus. NGT placed for decompression. No acute events overnight.    SUBJECTIVE: Patient seen and examined at bedside with surgical team, patient with discomfort from NGT. She reports abdominal pain along lower abdomen b/l. Passing "a little" flatus. Denies BM. Has been OOB. Denies fever, chills, CP, SOB, nausea, vomiting.    OBJECTIVE:    Vital Signs Last 24 Hrs  T(C): 36.9 (30 Nov 2023 05:00), Max: 37.1 (29 Nov 2023 12:30)  T(F): 98.4 (30 Nov 2023 05:00), Max: 98.8 (29 Nov 2023 12:30)  HR: 94 (30 Nov 2023 05:00) (83 - 111)  BP: 107/55 (30 Nov 2023 05:00) (104/54 - 157/110)  BP(mean): --  RR: 18 (30 Nov 2023 05:00) (16 - 18)  SpO2: 100% (30 Nov 2023 05:00) (99% - 100%)    Parameters below as of 30 Nov 2023 05:00  Patient On (Oxygen Delivery Method): room air    I&O's Detail    28 Nov 2023 07:01  -  29 Nov 2023 07:00  --------------------------------------------------------  IN:    IV PiggyBack: 550 mL    Oral Fluid: 100 mL    sodium chloride 0.9% w/ Additives: 2300 mL  Total IN: 2950 mL    OUT:    Emesis (mL): 651 mL    Voided (mL): 1000 mL  Total OUT: 1651 mL    Total NET: 1299 mL      29 Nov 2023 07:01  -  30 Nov 2023 05:55  --------------------------------------------------------  IN:    sodium chloride 0.9% w/ Additives: 2300 mL  Total IN: 2300 mL    OUT:    Nasogastric/Oral tube (mL): 2800 mL    Oral Fluid: 0 mL    Voided (mL): 1050 mL  Total OUT: 3850 mL    Total NET: -1550 mL      MEDICATIONS  (STANDING):  acetaminophen   IVPB .. 1000 milliGRAM(s) IV Intermittent every 6 hours  albuterol    90 MICROgram(s) HFA Inhaler 2 Puff(s) Inhalation two times a day  budesonide 160 MICROgram(s)/formoterol 4.5 MICROgram(s) Inhaler 2 Puff(s) Inhalation two times a day  chlorhexidine 2% Cloths 1 Application(s) Topical daily  dextrose 50% Injectable 25 Gram(s) IV Push once  enoxaparin Injectable 40 milliGRAM(s) SubCutaneous every 24 hours  influenza  Vaccine (HIGH DOSE) 0.7 milliLiter(s) IntraMuscular once  insulin lispro (ADMELOG) corrective regimen sliding scale   SubCutaneous every 6 hours  pantoprazole  Injectable 40 milliGRAM(s) IV Push daily  sodium chloride 0.9% lock flush 3 milliLiter(s) IV Push every 8 hours  sodium chloride 0.9% with potassium chloride 20 mEq/L 1000 milliLiter(s) (100 mL/Hr) IV Continuous <Continuous>    MEDICATIONS  (PRN):  HYDROmorphone  Injectable 0.25 milliGRAM(s) IV Push every 3 hours PRN Moderate to Severe Pain (4- 10)  ondansetron Injectable 4 milliGRAM(s) IV Push every 6 hours PRN Nausea      PHYSICAL EXAM:  Constitutional: A&Ox3, NAD  Respiratory: Unlabored breathing  Abdomen: Soft, nondistended, appropriately TTP b/l lower quadrants. No rebound or guarding. Incisions C/D/I.  Extremities: WWP, SEAMAN spontaneously    LABS:                        10.6   7.79  )-----------( 321      ( 29 Nov 2023 07:18 )             33.9     11-29    138  |  103  |  7   ----------------------------<  197<H>  3.9   |  25  |  0.46<L>    Ca    8.1<L>      29 Nov 2023 07:18  Phos  1.9     11-29  Mg     1.80     11-29          Urinalysis Basic - ( 29 Nov 2023 07:18 )    Color: x / Appearance: x / SG: x / pH: x  Gluc: 197 mg/dL / Ketone: x  / Bili: x / Urobili: x   Blood: x / Protein: x / Nitrite: x   Leuk Esterase: x / RBC: x / WBC x   Sq Epi: x / Non Sq Epi: x / Bacteria: x

## 2023-11-30 NOTE — PROGRESS NOTE ADULT - ASSESSMENT
This is a 74 yo F /w a PMH of DM2 on an insulin pump and colorectal cancer s/p right hemicolectomy today. Endocrinology consulted for diabetes management and DM2. Based on current insulin pump settings, suspect patient is being over-basalized given high basal rates compared to the insulin:carb ratio    Home Regimen:  Patient uses the medtronic insulin pump and Libre2  Pump settings are as follows:  TDD 52.425  12AM 1.8 units per hour  6:30AM 2 units per hour  12:30PM 2.45 units per hour  6:30PM 2.55 units per hour  8PM 2.55 units per hour    ICR:  12AM 1:15  7AM 1:12  12:30PM 1:15    ISF 1:30    #DM2 A1c 6.8%  #insulin pump at home. Off pump while in the hospital  -Glucose Target 100-180: Stable today. Will continue to monitor FS on current insulin regimen for now   -Continue moderate Admelog correction scale every 6 hours while NPO  -Check FS q 6 hours   -S/p NGT; Now Starting TPN today  -Hypoglycemia Protocol     For future use if started on clears:  -low admelog correction scales before meals if on clears (diabetic clears)  -low admelog scales before bedtime  -hold levemir until we see her trend   if glucose >180 X2 - than start levemir 15 units and make it q24hrs   -FSG before meals and before bedtime  -Carb consistent diet once able to tolerate  -hypoglycemia protocol in place if needed    when ADAT to carb consistent diet   than will need basal bolus +correction scale   anticipate Levemir 15 units and ademelog 5 units premeals and low dose correction scale     pt has her on CGM on- also monitoring her own BG while inpt on bonnie due to be changed on 12/5    #Discharge  -follow up with Dr. Anand  -basal bolus vs resume insulin pump on discharge, likely will need adjustment in settings will decide based on glucose trends and oral intake    #HTN  -BP target <130/80  -once stable post-op, consider resuming losartan 25mg daily and hctz 12.5mg daily- management as per primary team  outpt mc/cr ratio     #HLD  -On Pravastatin 40mg qhs at home   - Resume once able to tolerate orals if no contraindications   - Please obtain lipid profile if not done recently         Adrienne Taveras  Nurse Practitioner  Division of Endocrinology & Diabetes  In house pager #[phone]    If before 9AM or after 6PM, or on weekends/holidays, please call endocrine answering service for assistance ([phone]).For nonurgent matters email [email or web address] for assistance.  . Endocrinology consulted for diabetes management and DM2. Based on current insulin pump settings, suspect patient is being over-basalized given high basal rates compared to the insulin:carb ratio    Home Regimen:  Patient uses the medtronic insulin pump and Libre2  Pump settings are as follows:  TDD 52.425  12AM 1.8 units per hour  6:30AM 2 units per hour  12:30PM 2.45 units per hour  6:30PM 2.55 units per hour  8PM 2.55 units per hour    ICR:  12AM 1:15  7AM 1:12  12:30PM 1:15    ISF 1:30    #DM2 A1c 6.8%  #insulin pump at home. Off pump while in the hospital  -Glucose Target 100-180: Stable today. Will continue to monitor FS on current insulin regimen for now   -Continue moderate Admelog correction scale every 6 hours while NPO  -Check FS q 6 hours   -S/p NGT; Now Starting TPN today  -Hypoglycemia Protocol     For future use if started on clears:  -low admelog correction scales before meals if on clears (diabetic clears)  -low admelog scales before bedtime  -hold levemir until we see her trend   if glucose >180 X2 - than start levemir 15 units and make it q24hrs   -FSG before meals and before bedtime  -Carb consistent diet once able to tolerate  -hypoglycemia protocol in place if needed    when ADAT to carb consistent diet   than will need basal bolus +correction scale   anticipate Levemir 15 units and ademelog 5 units premeals and low dose correction scale     pt has her on CGM on- also monitoring her own BG while inpt on bonnie due to be changed on 12/5    #Discharge  -follow up with Dr. Aannd  -basal bolus vs resume insulin pump on discharge, likely will need adjustment in settings will decide based on glucose trends and oral intake    #HTN  -BP target <130/80  -once stable post-op, consider resuming losartan 25mg daily and hctz 12.5mg daily- management as per primary team  outpt mc/cr ratio     #HLD  -On Pravastatin 40mg qhs at home   - Resume once able to tolerate orals if no contraindications   - Please obtain lipid profile if not done recently         Adrienne Taveras  Nurse Practitioner  Division of Endocrinology & Diabetes  In house pager #[phone]    If before 9AM or after 6PM, or on weekends/holidays, please call endocrine answering service for assistance ([phone]).For nonurgent matters email [email or web address] for assistance.

## 2023-11-30 NOTE — PROGRESS NOTE ADULT - SUBJECTIVE AND OBJECTIVE BOX
DATE OF SERVICE: 11-30-23    Patient denies chest pain or shortness of breath. Reports ongoing throat pain from the NGT and general abdominal discomfort.  Review of symptoms otherwise negative.      Review of Systems:   Constitutional: [ ] fevers, [ ] chills.   Skin: [ ] dry skin. [ ] rashes.  Psychiatric: [ ] depression, [ ] anxiety.   Gastrointestinal: [ ] BRBPR, [ ] melena.   Neurological: [ ] confusion. [ ] seizures. [ ] shuffling gait.   Ears,Nose,Mouth and Throat: [ ] ear pain [ ] sore throat.   Eyes: [ ] diplopia.   Respiratory: [ ] hemoptysis. [ ] shortness of breath  Cardiovascular: See HPI above  Hematologic/Lymphatic: [ ] anemia. [ ] painful nodes. [ ] prolonged bleeding.   Genitourinary: [ ] hematuria. [ ] flank pain.   Endocrine: [ ] significant change in weight. [ ] intolerance to heat and cold.     Review of systems [x ] otherwise negative, [ ] otherwise unable to obtain    FH: no family history of sudden cardiac death in first degree relatives    SH: [ ] tobacco, [ ] alcohol, [ ] drugs    albuterol    90 MICROgram(s) HFA Inhaler 2 Puff(s) Inhalation two times a day  budesonide 160 MICROgram(s)/formoterol 4.5 MICROgram(s) Inhaler 2 Puff(s) Inhalation two times a day  chlorhexidine 2% Cloths 1 Application(s) Topical daily  dextrose 50% Injectable 25 Gram(s) IV Push once  enoxaparin Injectable 40 milliGRAM(s) SubCutaneous every 24 hours  HYDROmorphone  Injectable 0.25 milliGRAM(s) IV Push every 3 hours PRN  influenza  Vaccine (HIGH DOSE) 0.7 milliLiter(s) IntraMuscular once  insulin lispro (ADMELOG) corrective regimen sliding scale   SubCutaneous every 6 hours  lipid, fat emulsion (Fish Oil and Plant Based) 20% Infusion 10.4 mL/Hr IV Continuous <Continuous>  ondansetron Injectable 4 milliGRAM(s) IV Push every 6 hours PRN  pantoprazole  Injectable 40 milliGRAM(s) IV Push daily  Parenteral Nutrition - Adult 1 Each TPN Continuous <Continuous>  sodium chloride 0.9% lock flush 3 milliLiter(s) IV Push every 8 hours  sodium chloride 0.9% with potassium chloride 20 mEq/L 1000 milliLiter(s) IV Continuous <Continuous>  sodium phosphate 15 milliMole(s)/250 mL IVPB 15 milliMole(s) IV Intermittent once                            10.0   8.70  )-----------( 315      ( 30 Nov 2023 06:06 )             31.8       11-30    139  |  104  |  6<L>  ----------------------------<  107<H>  4.0   |  26  |  0.41<L>    Ca    7.9<L>      30 Nov 2023 06:06  Phos  2.4     11-30  Mg     2.10     11-30      T(C): 37.1 (11-30-23 @ 10:33), Max: 37.1 (11-29-23 @ 16:17)  HR: 99 (11-30-23 @ 10:33) (93 - 111)  BP: 117/54 (11-30-23 @ 10:33) (104/54 - 133/71)  RR: 20 (11-30-23 @ 10:33) (18 - 20)  SpO2: 99% (11-30-23 @ 10:33) (99% - 100%)  Wt(kg): --    I&O's Summary    29 Nov 2023 07:01  -  30 Nov 2023 07:00  --------------------------------------------------------  IN: 2300 mL / OUT: 3850 mL / NET: -1550 mL    General:  Alert and Oriented * 3.   Head: Normocephalic and atraumatic.   Neck: No JVD. No bruits. Supple. Does not appear to be enlarged.   Cardiovascular: + S1,S2 ; RRR Soft systolic murmur at the left lower sternal border. No rubs noted.    Lungs: CTA b/l. No rhonchi, rales or wheezes.   Abdomen: distended NT  Extremities: No clubbing/cyanosis/edema.   Neurologic: Moves all four extremities. Full range of motion.   Skin: Warm and moist. The patient's skin has normal elasticity and good skin turgor.   Psychiatric: Appropriate mood and affect.  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      --tolerated procedure well from CV perspective  --pain management, PT  --s/p NGT, CT noted with ileus  --for PICC for TPN  --eventually resume home meds: Asa 81mg, Hctz 12.5mg, Losartan 25mg, Pravastatin 40mg when ok from a surgical perspective        DATE OF SERVICE: 11-30-23    Patient denies chest pain or shortness of breath. Reports ongoing throat pain from the NGT and general abdominal discomfort.  Review of symptoms otherwise negative.      Review of Systems:   Constitutional: [ ] fevers, [ ] chills.   Skin: [ ] dry skin. [ ] rashes.  Psychiatric: [ ] depression, [ ] anxiety.   Gastrointestinal: [ ] BRBPR, [ ] melena.   Neurological: [ ] confusion. [ ] seizures. [ ] shuffling gait.   Ears,Nose,Mouth and Throat: [ ] ear pain [ ] sore throat.   Eyes: [ ] diplopia.   Respiratory: [ ] hemoptysis. [ ] shortness of breath  Cardiovascular: See HPI above  Hematologic/Lymphatic: [ ] anemia. [ ] painful nodes. [ ] prolonged bleeding.   Genitourinary: [ ] hematuria. [ ] flank pain.   Endocrine: [ ] significant change in weight. [ ] intolerance to heat and cold.     Review of systems [x ] otherwise negative, [ ] otherwise unable to obtain    FH: no family history of sudden cardiac death in first degree relatives    SH: [ ] tobacco, [ ] alcohol, [ ] drugs    albuterol    90 MICROgram(s) HFA Inhaler 2 Puff(s) Inhalation two times a day  budesonide 160 MICROgram(s)/formoterol 4.5 MICROgram(s) Inhaler 2 Puff(s) Inhalation two times a day  chlorhexidine 2% Cloths 1 Application(s) Topical daily  dextrose 50% Injectable 25 Gram(s) IV Push once  enoxaparin Injectable 40 milliGRAM(s) SubCutaneous every 24 hours  HYDROmorphone  Injectable 0.25 milliGRAM(s) IV Push every 3 hours PRN  influenza  Vaccine (HIGH DOSE) 0.7 milliLiter(s) IntraMuscular once  insulin lispro (ADMELOG) corrective regimen sliding scale   SubCutaneous every 6 hours  lipid, fat emulsion (Fish Oil and Plant Based) 20% Infusion 10.4 mL/Hr IV Continuous <Continuous>  ondansetron Injectable 4 milliGRAM(s) IV Push every 6 hours PRN  pantoprazole  Injectable 40 milliGRAM(s) IV Push daily  Parenteral Nutrition - Adult 1 Each TPN Continuous <Continuous>  sodium chloride 0.9% lock flush 3 milliLiter(s) IV Push every 8 hours  sodium chloride 0.9% with potassium chloride 20 mEq/L 1000 milliLiter(s) IV Continuous <Continuous>  sodium phosphate 15 milliMole(s)/250 mL IVPB 15 milliMole(s) IV Intermittent once                            10.0   8.70  )-----------( 315      ( 30 Nov 2023 06:06 )             31.8       11-30    139  |  104  |  6<L>  ----------------------------<  107<H>  4.0   |  26  |  0.41<L>    Ca    7.9<L>      30 Nov 2023 06:06  Phos  2.4     11-30  Mg     2.10     11-30      T(C): 37.1 (11-30-23 @ 10:33), Max: 37.1 (11-29-23 @ 16:17)  HR: 99 (11-30-23 @ 10:33) (93 - 111)  BP: 117/54 (11-30-23 @ 10:33) (104/54 - 133/71)  RR: 20 (11-30-23 @ 10:33) (18 - 20)  SpO2: 99% (11-30-23 @ 10:33) (99% - 100%)  Wt(kg): --    I&O's Summary    29 Nov 2023 07:01  -  30 Nov 2023 07:00  --------------------------------------------------------  IN: 2300 mL / OUT: 3850 mL / NET: -1550 mL    General:  Alert and Oriented * 3.   Head: Normocephalic and atraumatic.   Neck: No JVD. No bruits. Supple. Does not appear to be enlarged.   Cardiovascular: + S1,S2 ; RRR Soft systolic murmur at the left lower sternal border. No rubs noted.    Lungs: CTA b/l. No rhonchi, rales or wheezes.   Abdomen: distended NT  Extremities: No clubbing/cyanosis/edema.   Neurologic: Moves all four extremities. Full range of motion.   Skin: Warm and moist. The patient's skin has normal elasticity and good skin turgor.   Psychiatric: Appropriate mood and affect.  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      --tolerated procedure well from CV perspective  --pain management, PT  --s/p NGT, CT noted with ileus  --for PICC for TPN  --eventually resume home meds: Asa 81mg, Pravastatin 40mg when ok from a surgical perspective

## 2023-11-30 NOTE — PROGRESS NOTE ADULT - SUBJECTIVE AND OBJECTIVE BOX
Name of Patient : NESTOR MABRY  MRN: [id number]  Date of visit: 11-30-23       Subjective: Patient seen and examined. No new events except as noted.   Doing okay     REVIEW OF SYSTEMS:    CONSTITUTIONAL: No weakness, fevers or chills  EYES/ENT: No visual changes;  No vertigo or throat pain   NECK: No pain or stiffness  RESPIRATORY: No cough, wheezing, hemoptysis; No shortness of breath  CARDIOVASCULAR: No chest pain or palpitations  GASTROINTESTINAL: No abdominal or epigastric pain. No nausea, vomiting, or hematemesis; No diarrhea or constipation. No melena or hematochezia.  GENITOURINARY: No dysuria, frequency or hematuria  NEUROLOGICAL: No numbness or weakness  SKIN: No itching, burning, rashes, or lesions   All other review of systems is negative unless indicated above.    MEDICATIONS:  MEDICATIONS  (STANDING):  albuterol    90 MICROgram(s) HFA Inhaler 2 Puff(s) Inhalation two times a day  budesonide 160 MICROgram(s)/formoterol 4.5 MICROgram(s) Inhaler 2 Puff(s) Inhalation two times a day  chlorhexidine 2% Cloths 1 Application(s) Topical daily  dextrose 50% Injectable 25 Gram(s) IV Push once  enoxaparin Injectable 40 milliGRAM(s) SubCutaneous every 24 hours  influenza  Vaccine (HIGH DOSE) 0.7 milliLiter(s) IntraMuscular once  insulin lispro (ADMELOG) corrective regimen sliding scale   SubCutaneous every 6 hours  lipid, fat emulsion (Fish Oil and Plant Based) 20% Infusion 10.4 mL/Hr (10.4 mL/Hr) IV Continuous <Continuous>  pantoprazole  Injectable 40 milliGRAM(s) IV Push daily  Parenteral Nutrition - Adult 1 Each (42 mL/Hr) TPN Continuous <Continuous>  sodium chloride 0.9% lock flush 3 milliLiter(s) IV Push every 8 hours  sodium chloride 0.9% with potassium chloride 20 mEq/L 1000 milliLiter(s) (100 mL/Hr) IV Continuous <Continuous>      PHYSICAL EXAM:  T(C): 36.9 (11-30-23 @ 16:16), Max: 37.1 (11-30-23 @ 08:41)  HR: 96 (11-30-23 @ 16:16) (88 - 103)  BP: 117/60 (11-30-23 @ 16:16) (104/54 - 125/68)  RR: 18 (11-30-23 @ 16:16) (16 - 20)  SpO2: 100% (11-30-23 @ 16:16) (95% - 100%)  Wt(kg): --  I&O's Summary    29 Nov 2023 07:01  -  30 Nov 2023 07:00  --------------------------------------------------------  IN: 2300 mL / OUT: 3850 mL / NET: -1550 mL    30 Nov 2023 07:01  -  30 Nov 2023 20:20  --------------------------------------------------------  IN: 952.4 mL / OUT: 1000 mL / NET: -47.6 mL          Appearance: Normal	  HEENT:  PERRLA   Lymphatic: No lymphadenopathy   Cardiovascular: Normal S1 S2, no JVD  Respiratory: normal effort , clear  Gastrointestinal:  Soft, NGT  Skin: No rashes,  warm to touch  Psychiatry:  Mood & affect appropriate  Musculuskeletal: No edema    recent labs, Imaging and EKGs personally reviewed     11-29-23 @ 07:01  -  11-30-23 @ 07:00  --------------------------------------------------------  IN: 2300 mL / OUT: 3850 mL / NET: -1550 mL    11-30-23 @ 07:01  -  11-30-23 @ 20:20  --------------------------------------------------------  IN: 952.4 mL / OUT: 1000 mL / NET: -47.6 mL                          10.0   8.70  )-----------( 315      ( 30 Nov 2023 06:06 )             31.8               11-30    139  |  104  |  6<L>  ----------------------------<  107<H>  4.0   |  26  |  0.41<L>    Ca    7.9<L>      30 Nov 2023 06:06  Phos  2.4     11-30  Mg     2.10     11-30                         Urinalysis Basic - ( 30 Nov 2023 06:06 )    Color: x / Appearance: x / SG: x / pH: x  Gluc: 107 mg/dL / Ketone: x  / Bili: x / Urobili: x   Blood: x / Protein: x / Nitrite: x   Leuk Esterase: x / RBC: x / WBC x   Sq Epi: x / Non Sq Epi: x / Bacteria: x

## 2023-11-30 NOTE — CONSULT NOTE ADULT - NS ATTEND AMEND GEN_ALL_CORE FT
I agree with the above history, physical examination, chief complaint/diagnosis, and plan, which I have reviewed and edited where appropriate.  I agree with notes/assessment and detailed interval history of health care providers on my service.  I have seen and examined the patient.  I reviewed the laboratory and available data and agree with the history, physical assessment and plan.  I reviewed and discussed with all consultants, house staff and PA's.  The Nutrition Support Team (NST) discusses on an ongoing basis with the primary team and all consultants, House staff and PA's to have a permanent risk benefit analyses on all decisions and coordinating care.  I was physically present for the key portions of the evaluation and management (E/M) service provided.  72 y/o female s/p robotic partial colectomy for moderately differentiated adenocarcinoma of cecal mass. Nutrition consult called for initiation of parenteral nutrition in view of severe protein calorie malnutrition and prolonged NPO status.  PHYSICAL EXAM:  Constitutional: NAD  Respiratory: clear  Abdomen: Soft, nondistended  Extremities: warm  Severe protein calorie malnutrition in acute illness/ injury  Metabolic Requirements:  The patient will require:  25kilocalories / kg / day  Diagnosis:  Severe protein calorie malnutrition in acute illness/ injury  Plan:  Initiate TPN

## 2023-11-30 NOTE — CHART NOTE - NSCHARTNOTEFT_GEN_A_CORE
Patient being seen for critical care nutrition follow up. Spoke with pt and obtained subjective information from extensive chart review.     Current Diet : Diet, NPO:   With Ice Chips/Sips of Water (11-30-23 @ 08:18)    Parenteral Nutrition: TPN 2L infusing over 24 hrs (80 gm amino acids, 200 gm dextrose, 40 gm SMOF lipids) to provide 1400 kcal/day (19 kcal/kg and 1.0 gm protein/kg per dosing wt 75.2 kg)    Dextrose Infusion Rate: 1.8 gm/kg/min  Lipid Infusion Rate: 0.53 gm/kg/day; 0.044 gm/kg/hr     Current Weight Trend: No new wts available (11/30)  Height (cm): 152.4   Admit Weight (kg): 75.6  BMI (kg/m2): 32.6   IBW (kg): 45.5     Nutrition Interval Events: Pt now with concern for post-op ileus. Pt has been with prolonged NPO and TPN now initiated by Nutrition Support Team today. NGT to LWCS with 2800 mL output over the past 24 hrs. Please obtain daily wts to assess trend and fluids shifts now that pt is receiving PN as she has 1+ L/R ankle/feet edema. She said she has been having some diarrhea which was worse yesterday than today. No other GI distress reported. FS over the past 24 hrs 112 - 148 mg/dl with Admelog ISS coverage. Suggest increasing TPN to better meet pt's estimated nutrition needs as she has malignant ca with prolonged inadequate nutrition since admission. RDN services to remain available as needed.     __________________ Pertinent Medications__________________   MEDICATIONS  (STANDING):  albuterol    90 MICROgram(s) HFA Inhaler 2 Puff(s) Inhalation two times a day  budesonide 160 MICROgram(s)/formoterol 4.5 MICROgram(s) Inhaler 2 Puff(s) Inhalation two times a day  chlorhexidine 2% Cloths 1 Application(s) Topical daily  dextrose 50% Injectable 25 Gram(s) IV Push once  enoxaparin Injectable 40 milliGRAM(s) SubCutaneous every 24 hours  influenza  Vaccine (HIGH DOSE) 0.7 milliLiter(s) IntraMuscular once  insulin lispro (ADMELOG) corrective regimen sliding scale   SubCutaneous every 6 hours  lipid, fat emulsion (Fish Oil and Plant Based) 20% Infusion 10.4 mL/Hr (10.4 mL/Hr) IV Continuous <Continuous>  pantoprazole  Injectable 40 milliGRAM(s) IV Push daily  Parenteral Nutrition - Adult 1 Each (42 mL/Hr) TPN Continuous <Continuous>  sodium chloride 0.9% lock flush 3 milliLiter(s) IV Push every 8 hours  sodium chloride 0.9% with potassium chloride 20 mEq/L 1000 milliLiter(s) (100 mL/Hr) IV Continuous <Continuous>    __________________ Pertinent Labs__________________   11-30 Na139 mmol/L Glu 107 mg/dL<H> K+ 4.0 mmol/L Cr  0.41 mg/dL<L> BUN 6 mg/dL<L> 11-30 Phos 2.4 mg/dL<L> 11-29 Chol 65 mg/dL LDL --    HDL 29 mg/dL<L> Trig 81 mg/dL        Skin: Intact    Estimated Needs:     1504 - 1880 kcals/day (20-25 kcals/kg of admit wt - 75.2 kg)  82 - 91 gms protein/day (1.8-2.0 gms protein/kg of IBW - 45.5 kg)      Previous Nutrition Diagnosis:     Inadequate Energy Intake     Nutrition Diagnosis is: [x] ongoing       Goal(s):  1. Patient to meet > 75% estimated energy needs    Recommendations:   1. Suggest increasing TPN to better meet patient's estimated nutrition needs.    Monitoring and Evaluation:   1. Monitor weights, labs, BMs, skin integrity, PN tolerance and edema.  2. RD services to remain available. Request obtaining new weight to assess trend and fluid shifts.    Education:    [x] Not warranted at present    Alysha Wilson, MS, RDN, CDN, CNSC on [facility] TEAMS or Pager #[phone]

## 2023-12-01 LAB
ANION GAP SERPL CALC-SCNC: 10 MMOL/L — SIGNIFICANT CHANGE UP (ref 7–14)
ANION GAP SERPL CALC-SCNC: 10 MMOL/L — SIGNIFICANT CHANGE UP (ref 7–14)
BUN SERPL-MCNC: 5 MG/DL — LOW (ref 7–23)
BUN SERPL-MCNC: 5 MG/DL — LOW (ref 7–23)
CA-I BLD-SCNC: 1.08 MMOL/L — LOW (ref 1.15–1.29)
CA-I BLD-SCNC: 1.08 MMOL/L — LOW (ref 1.15–1.29)
CALCIUM SERPL-MCNC: 7.9 MG/DL — LOW (ref 8.4–10.5)
CALCIUM SERPL-MCNC: 7.9 MG/DL — LOW (ref 8.4–10.5)
CHLORIDE SERPL-SCNC: 101 MMOL/L — SIGNIFICANT CHANGE UP (ref 98–107)
CHLORIDE SERPL-SCNC: 101 MMOL/L — SIGNIFICANT CHANGE UP (ref 98–107)
CO2 SERPL-SCNC: 26 MMOL/L — SIGNIFICANT CHANGE UP (ref 22–31)
CO2 SERPL-SCNC: 26 MMOL/L — SIGNIFICANT CHANGE UP (ref 22–31)
CREAT SERPL-MCNC: 0.37 MG/DL — LOW (ref 0.5–1.3)
CREAT SERPL-MCNC: 0.37 MG/DL — LOW (ref 0.5–1.3)
EGFR: 107 ML/MIN/1.73M2 — SIGNIFICANT CHANGE UP
EGFR: 107 ML/MIN/1.73M2 — SIGNIFICANT CHANGE UP
GLUCOSE BLDC GLUCOMTR-MCNC: 171 MG/DL — HIGH (ref 70–99)
GLUCOSE BLDC GLUCOMTR-MCNC: 171 MG/DL — HIGH (ref 70–99)
GLUCOSE BLDC GLUCOMTR-MCNC: 177 MG/DL — HIGH (ref 70–99)
GLUCOSE BLDC GLUCOMTR-MCNC: 177 MG/DL — HIGH (ref 70–99)
GLUCOSE BLDC GLUCOMTR-MCNC: 200 MG/DL — HIGH (ref 70–99)
GLUCOSE BLDC GLUCOMTR-MCNC: 200 MG/DL — HIGH (ref 70–99)
GLUCOSE BLDC GLUCOMTR-MCNC: 207 MG/DL — HIGH (ref 70–99)
GLUCOSE BLDC GLUCOMTR-MCNC: 207 MG/DL — HIGH (ref 70–99)
GLUCOSE SERPL-MCNC: 188 MG/DL — HIGH (ref 70–99)
GLUCOSE SERPL-MCNC: 188 MG/DL — HIGH (ref 70–99)
HCT VFR BLD CALC: 29.1 % — LOW (ref 34.5–45)
HCT VFR BLD CALC: 29.1 % — LOW (ref 34.5–45)
HGB BLD-MCNC: 9.4 G/DL — LOW (ref 11.5–15.5)
HGB BLD-MCNC: 9.4 G/DL — LOW (ref 11.5–15.5)
MAGNESIUM SERPL-MCNC: 1.8 MG/DL — SIGNIFICANT CHANGE UP (ref 1.6–2.6)
MAGNESIUM SERPL-MCNC: 1.8 MG/DL — SIGNIFICANT CHANGE UP (ref 1.6–2.6)
MCHC RBC-ENTMCNC: 25.8 PG — LOW (ref 27–34)
MCHC RBC-ENTMCNC: 25.8 PG — LOW (ref 27–34)
MCHC RBC-ENTMCNC: 32.3 GM/DL — SIGNIFICANT CHANGE UP (ref 32–36)
MCHC RBC-ENTMCNC: 32.3 GM/DL — SIGNIFICANT CHANGE UP (ref 32–36)
MCV RBC AUTO: 79.7 FL — LOW (ref 80–100)
MCV RBC AUTO: 79.7 FL — LOW (ref 80–100)
NRBC # BLD: 0 /100 WBCS — SIGNIFICANT CHANGE UP (ref 0–0)
NRBC # BLD: 0 /100 WBCS — SIGNIFICANT CHANGE UP (ref 0–0)
NRBC # FLD: 0.02 K/UL — HIGH (ref 0–0)
NRBC # FLD: 0.02 K/UL — HIGH (ref 0–0)
PHOSPHATE SERPL-MCNC: 2.6 MG/DL — SIGNIFICANT CHANGE UP (ref 2.5–4.5)
PHOSPHATE SERPL-MCNC: 2.6 MG/DL — SIGNIFICANT CHANGE UP (ref 2.5–4.5)
PLATELET # BLD AUTO: 359 K/UL — SIGNIFICANT CHANGE UP (ref 150–400)
PLATELET # BLD AUTO: 359 K/UL — SIGNIFICANT CHANGE UP (ref 150–400)
POTASSIUM SERPL-MCNC: 3.3 MMOL/L — LOW (ref 3.5–5.3)
POTASSIUM SERPL-MCNC: 3.3 MMOL/L — LOW (ref 3.5–5.3)
POTASSIUM SERPL-SCNC: 3.3 MMOL/L — LOW (ref 3.5–5.3)
POTASSIUM SERPL-SCNC: 3.3 MMOL/L — LOW (ref 3.5–5.3)
RBC # BLD: 3.65 M/UL — LOW (ref 3.8–5.2)
RBC # BLD: 3.65 M/UL — LOW (ref 3.8–5.2)
RBC # FLD: 17.6 % — HIGH (ref 10.3–14.5)
RBC # FLD: 17.6 % — HIGH (ref 10.3–14.5)
SODIUM SERPL-SCNC: 137 MMOL/L — SIGNIFICANT CHANGE UP (ref 135–145)
SODIUM SERPL-SCNC: 137 MMOL/L — SIGNIFICANT CHANGE UP (ref 135–145)
WBC # BLD: 10.15 K/UL — SIGNIFICANT CHANGE UP (ref 3.8–10.5)
WBC # BLD: 10.15 K/UL — SIGNIFICANT CHANGE UP (ref 3.8–10.5)
WBC # FLD AUTO: 10.15 K/UL — SIGNIFICANT CHANGE UP (ref 3.8–10.5)
WBC # FLD AUTO: 10.15 K/UL — SIGNIFICANT CHANGE UP (ref 3.8–10.5)

## 2023-12-01 PROCEDURE — 99232 SBSQ HOSP IP/OBS MODERATE 35: CPT

## 2023-12-01 RX ORDER — MAGNESIUM SULFATE 500 MG/ML
2 VIAL (ML) INJECTION ONCE
Refills: 0 | Status: DISCONTINUED | OUTPATIENT
Start: 2023-12-01 | End: 2023-12-01

## 2023-12-01 RX ORDER — ELECTROLYTE SOLUTION,INJ
1 VIAL (ML) INTRAVENOUS
Refills: 0 | Status: DISCONTINUED | OUTPATIENT
Start: 2023-12-01 | End: 2023-12-01

## 2023-12-01 RX ORDER — POTASSIUM CHLORIDE 20 MEQ
10 PACKET (EA) ORAL
Refills: 0 | Status: DISCONTINUED | OUTPATIENT
Start: 2023-12-01 | End: 2023-12-01

## 2023-12-01 RX ORDER — POTASSIUM PHOSPHATE, MONOBASIC POTASSIUM PHOSPHATE, DIBASIC 236; 224 MG/ML; MG/ML
30 INJECTION, SOLUTION INTRAVENOUS ONCE
Refills: 0 | Status: DISCONTINUED | OUTPATIENT
Start: 2023-12-01 | End: 2023-12-01

## 2023-12-01 RX ORDER — I.V. FAT EMULSION 20 G/100ML
20.8 EMULSION INTRAVENOUS
Qty: 50 | Refills: 0 | Status: DISCONTINUED | OUTPATIENT
Start: 2023-12-01 | End: 2023-12-02

## 2023-12-01 RX ORDER — POTASSIUM PHOSPHATE, MONOBASIC POTASSIUM PHOSPHATE, DIBASIC 236; 224 MG/ML; MG/ML
15 INJECTION, SOLUTION INTRAVENOUS ONCE
Refills: 0 | Status: DISCONTINUED | OUTPATIENT
Start: 2023-12-01 | End: 2023-12-01

## 2023-12-01 RX ADMIN — BUDESONIDE AND FORMOTEROL FUMARATE DIHYDRATE 2 PUFF(S): 160; 4.5 AEROSOL RESPIRATORY (INHALATION) at 12:57

## 2023-12-01 RX ADMIN — CHLORHEXIDINE GLUCONATE 1 APPLICATION(S): 213 SOLUTION TOPICAL at 12:27

## 2023-12-01 RX ADMIN — ENOXAPARIN SODIUM 40 MILLIGRAM(S): 100 INJECTION SUBCUTANEOUS at 17:42

## 2023-12-01 RX ADMIN — ALBUTEROL 2 PUFF(S): 90 AEROSOL, METERED ORAL at 09:57

## 2023-12-01 RX ADMIN — Medication 2: at 06:13

## 2023-12-01 RX ADMIN — Medication 400 MILLIGRAM(S): at 17:41

## 2023-12-01 RX ADMIN — SODIUM CHLORIDE 3 MILLILITER(S): 9 INJECTION INTRAMUSCULAR; INTRAVENOUS; SUBCUTANEOUS at 13:28

## 2023-12-01 RX ADMIN — Medication 400 MILLIGRAM(S): at 00:40

## 2023-12-01 RX ADMIN — Medication 400 MILLIGRAM(S): at 06:14

## 2023-12-01 RX ADMIN — Medication 2: at 17:42

## 2023-12-01 RX ADMIN — Medication 1 EACH: at 17:44

## 2023-12-01 RX ADMIN — HYDROMORPHONE HYDROCHLORIDE 0.25 MILLIGRAM(S): 2 INJECTION INTRAMUSCULAR; INTRAVENOUS; SUBCUTANEOUS at 03:40

## 2023-12-01 RX ADMIN — Medication 1000 MILLIGRAM(S): at 13:27

## 2023-12-01 RX ADMIN — SODIUM CHLORIDE 3 MILLILITER(S): 9 INJECTION INTRAMUSCULAR; INTRAVENOUS; SUBCUTANEOUS at 22:00

## 2023-12-01 RX ADMIN — Medication 4: at 12:58

## 2023-12-01 RX ADMIN — HYDROMORPHONE HYDROCHLORIDE 0.25 MILLIGRAM(S): 2 INJECTION INTRAMUSCULAR; INTRAVENOUS; SUBCUTANEOUS at 03:55

## 2023-12-01 RX ADMIN — Medication 1000 MILLIGRAM(S): at 06:30

## 2023-12-01 RX ADMIN — Medication 1000 MILLIGRAM(S): at 00:55

## 2023-12-01 RX ADMIN — HYDROMORPHONE HYDROCHLORIDE 0.25 MILLIGRAM(S): 2 INJECTION INTRAMUSCULAR; INTRAVENOUS; SUBCUTANEOUS at 15:16

## 2023-12-01 RX ADMIN — PANTOPRAZOLE SODIUM 40 MILLIGRAM(S): 20 TABLET, DELAYED RELEASE ORAL at 12:57

## 2023-12-01 RX ADMIN — Medication 2: at 00:40

## 2023-12-01 RX ADMIN — I.V. FAT EMULSION 20.8 ML/HR: 20 EMULSION INTRAVENOUS at 17:45

## 2023-12-01 RX ADMIN — HYDROMORPHONE HYDROCHLORIDE 0.25 MILLIGRAM(S): 2 INJECTION INTRAMUSCULAR; INTRAVENOUS; SUBCUTANEOUS at 15:45

## 2023-12-01 RX ADMIN — SODIUM CHLORIDE 3 MILLILITER(S): 9 INJECTION INTRAMUSCULAR; INTRAVENOUS; SUBCUTANEOUS at 05:21

## 2023-12-01 RX ADMIN — Medication 400 MILLIGRAM(S): at 12:57

## 2023-12-01 RX ADMIN — Medication 1000 MILLIGRAM(S): at 18:32

## 2023-12-01 NOTE — PROGRESS NOTE ADULT - SUBJECTIVE AND OBJECTIVE BOX
NUTRITION NOTE  [id number] THOMASTUCKER  ===============================    Interval events - Patient was seen and examined at bedside, no acute events overnight. Patient denies chest pain, shortness of breath, nausea or vomiting at this time. PICC placed and TPN was started on 11/30/23 for nutritional support. Pt is tolerating TPN without any issues. TPN volume and calories increased today. Pt remains NPO with NGT in place with high output.     ROS: Except as noted above, all other systems reviewed and are negative     Allergies  Darvocet A500 (Other; Urticaria)  Percocet 10/325 (Other; Urticaria)  codeine (Other)  Lantus (Swelling)  Purell    PAST MEDICAL & SURGICAL HISTORY:  History of hypertension  Diabetes wears insulin pump  GERD (gastroesophageal reflux disease)  Uterine leiomyoma  Hyperlipidemia  Asthma  Obesity  Lymphadenopathy left lower extremity ; 1966 - current  H/O insertion of insulin pump  OA (osteoarthritis)  Malignant neoplasm of cecum  H/O bilateral breast reduction surgery  History of appendectomy  History of cholecystectomy  H/O: hysterectomy  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 - [facility]  H/O total knee replacement, left  S/P bunionectomy    FAMILY HISTORY:  Diabetes mellitus (Father)  FH: breast cancer (Aunt)    Vital Signs Last 24 Hrs  T(C): 37.1 (01 Dec 2023 09:21), Max: 37.1 (01 Dec 2023 00:18)  T(F): 98.8 (01 Dec 2023 09:21), Max: 98.8 (01 Dec 2023 09:21)  HR: 93 (01 Dec 2023 09:21) (88 - 111)  BP: 122/58 (01 Dec 2023 09:21) (111/66 - 143/67)  RR: 16 (01 Dec 2023 09:21) (16 - 18)  SpO2: 100% (01 Dec 2023 09:21) (95% - 100%)    MEDICATIONS  (STANDING):  acetaminophen   IVPB .. 1000 milliGRAM(s) IV Intermittent every 6 hours  albuterol    90 MICROgram(s) HFA Inhaler 2 Puff(s) Inhalation two times a day  budesonide 160 MICROgram(s)/formoterol 4.5 MICROgram(s) Inhaler 2 Puff(s) Inhalation two times a day  chlorhexidine 2% Cloths 1 Application(s) Topical daily  dextrose 50% Injectable 25 Gram(s) IV Push once  enoxaparin Injectable 40 milliGRAM(s) SubCutaneous every 24 hours  influenza  Vaccine (HIGH DOSE) 0.7 milliLiter(s) IntraMuscular once  insulin lispro (ADMELOG) corrective regimen sliding scale   SubCutaneous every 6 hours  lipid, fat emulsion (Fish Oil and Plant Based) 20% Infusion 20.8 mL/Hr (20.8 mL/Hr) IV Continuous <Continuous>  pantoprazole  Injectable 40 milliGRAM(s) IV Push daily  Parenteral Nutrition - Adult 1 Each (83 mL/Hr) TPN Continuous <Continuous>  Parenteral Nutrition - Adult 1 Each (42 mL/Hr) TPN Continuous <Continuous>  sodium chloride 0.9% lock flush 3 milliLiter(s) IV Push every 8 hours    MEDICATIONS  (PRN):  HYDROmorphone  Injectable 0.25 milliGRAM(s) IV Push every 3 hours PRN Moderate to Severe Pain (4- 10)  ondansetron Injectable 4 milliGRAM(s) IV Push every 6 hours PRN Nausea    I&O's Detail    30 Nov 2023 07:01  -  01 Dec 2023 07:00  --------------------------------------------------------  IN:    Fat Emulsion (Fish Oil &amp; Plant Based) 20% Infusion: 114.4 mL    sodium chloride 0.9% w/ Additives: 900 mL    TPN (Total Parenteral Nutrition): 546 mL  Total IN: 1560.4 mL    OUT:    Nasogastric/Oral tube (mL): 1900 mL    Voided (mL): 600 mL  Total OUT: 2500 mL    Total NET: -939.6 mL    POCT Blood Glucose.: 177 mg/dL (01 Dec 2023 05:40)  POCT Blood Glucose.: 171 mg/dL (01 Dec 2023 00:32)  POCT Blood Glucose.: 90 mg/dL (30 Nov 2023 17:21)  POCT Blood Glucose.: 118 mg/dL (30 Nov 2023 12:46)    Drug Dosing Weight  Height (cm): 152.4 (22 Nov 2023 06:23)  Weight (kg): 75.659 (22 Nov 2023 06:23)  BMI (kg/m2): 32.6 (22 Nov 2023 06:23)  BSA (m2): 1.73 (22 Nov 2023 06:23)    PHYSICAL EXAM:  Constitutional: A&Ox3, NAD, resting comfortably in bed  Respiratory: Unlabored breathing  Abdomen: Soft, nondistended, mildly tender to palpation in lower quadrants   Extremities: WWP, SEAMAN spontaneously  PICC Site: double lumen PICC placed on 11/30/23 in [facility] [place], [place] clean and dry     Diet: NPO with sips and TPN/lipids (started on 11/30/23)    LABORATORY                        9.4    10.15 )-----------( 359      ( 01 Dec 2023 06:51 )             29.1   12-01    137  |  101  |  5<L>  ----------------------------<  188<H>  3.3<L>   |  26  |  0.37<L>    Ca    7.9<L>      01 Dec 2023 06:51  Phos  2.6     12-01  Mg     1.80     12-01 11-29 Chol 65 LDL -- HDL 29<L> Trig 81    CT Abdomen and Pelvis 11/29/23: IMPRESSION: Dilated loops of small bowel the level of the terminal ileum, likely ileus in the postoperative setting. Postoperative pneumoperitoneum and diffuse subcutaneous emphysema.    ASSESSMENT/PLAN:  74 y/o female with PMH asthma, T2DM on insulin pump, GERD, HTN, HLD, OA, obesity is s/p robotic partial colectomy for moderately differentiated adenocarcinoma of cecal mass on 11/22/23. CT abd/pelvis on 11/29/23 showed findings consistent with post op ileus. Pt remains NPO with NGT in place. Nutrition consult called for initiation of parenteral nutrition in view of severe protein calorie malnutrition and prolonged NPO status. PICC placed and TPN was started on 11/30/23.    TPN infusion volume increased to 2L, TPN will provide 1506 kcal/day    labs reviewed - electrolytes adjusted in TPN bag    monitor fingersticks, obtain daily weights - FS , added 8 units of insulin in rocha's TPN bag, will continue to trend FS and make insulin adjustments as needed; pt has a known hx of DM2 with A1c 6.8% and is on an nsulin pump at home    continue parenteral nutrition at this time, will follow up with primary team on plan     1.  Severe protein calorie malnutrition being optimized with TPN: CHO [190] gm.  AA [90] gm. SMOF Lipids [50] gm.  2.  Hyperglycemia managed with: [8] units of regular insulin    3.  Check fluid balance daily.  Strict I/O  [ ] [ ]   4.  Daily BMP, Ionized Calcium, Magnesium and Phosphorous   5.  Triglycerides at initiation of TPN and monthly 11-29 Chol 65 LDL -- HDL 29<L> Trig 81    Nutrition Support [id number]  NUTRITION NOTE  [id number] THOMASTUCKER  ===============================    Interval events - Patient was seen and examined at bedside, no acute events overnight. Patient denies chest pain, shortness of breath, nausea or vomiting at this time. PICC placed and TPN was started on 11/30/23 for nutritional support. Pt is tolerating TPN without any issues. TPN volume and calories increased today. Pt remains NPO with NGT in place with high output.     ROS: Except as noted above, all other systems reviewed and are negative     Allergies  Darvocet A500 (Other; Urticaria)  Percocet 10/325 (Other; Urticaria)  codeine (Other)  Lantus (Swelling)  Purell    PAST MEDICAL & SURGICAL HISTORY:  History of hypertension  Diabetes wears insulin pump  GERD (gastroesophageal reflux disease)  Uterine leiomyoma  Hyperlipidemia  Asthma  Obesity  Lymphadenopathy left lower extremity ; 1966 - current  H/O insertion of insulin pump  OA (osteoarthritis)  Malignant neoplasm of cecum  H/O bilateral breast reduction surgery  History of appendectomy  History of cholecystectomy  H/O: hysterectomy  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 - [facility]  H/O total knee replacement, left  S/P bunionectomy    FAMILY HISTORY:  Diabetes mellitus (Father)  FH: breast cancer (Aunt)    Vital Signs Last 24 Hrs  T(C): 37.1 (01 Dec 2023 09:21), Max: 37.1 (01 Dec 2023 00:18)  T(F): 98.8 (01 Dec 2023 09:21), Max: 98.8 (01 Dec 2023 09:21)  HR: 93 (01 Dec 2023 09:21) (88 - 111)  BP: 122/58 (01 Dec 2023 09:21) (111/66 - 143/67)  RR: 16 (01 Dec 2023 09:21) (16 - 18)  SpO2: 100% (01 Dec 2023 09:21) (95% - 100%)    MEDICATIONS  (STANDING):  acetaminophen   IVPB .. 1000 milliGRAM(s) IV Intermittent every 6 hours  albuterol    90 MICROgram(s) HFA Inhaler 2 Puff(s) Inhalation two times a day  budesonide 160 MICROgram(s)/formoterol 4.5 MICROgram(s) Inhaler 2 Puff(s) Inhalation two times a day  chlorhexidine 2% Cloths 1 Application(s) Topical daily  dextrose 50% Injectable 25 Gram(s) IV Push once  enoxaparin Injectable 40 milliGRAM(s) SubCutaneous every 24 hours  influenza  Vaccine (HIGH DOSE) 0.7 milliLiter(s) IntraMuscular once  insulin lispro (ADMELOG) corrective regimen sliding scale   SubCutaneous every 6 hours  lipid, fat emulsion (Fish Oil and Plant Based) 20% Infusion 20.8 mL/Hr (20.8 mL/Hr) IV Continuous <Continuous>  pantoprazole  Injectable 40 milliGRAM(s) IV Push daily  Parenteral Nutrition - Adult 1 Each (83 mL/Hr) TPN Continuous <Continuous>  Parenteral Nutrition - Adult 1 Each (42 mL/Hr) TPN Continuous <Continuous>  sodium chloride 0.9% lock flush 3 milliLiter(s) IV Push every 8 hours    MEDICATIONS  (PRN):  HYDROmorphone  Injectable 0.25 milliGRAM(s) IV Push every 3 hours PRN Moderate to Severe Pain (4- 10)  ondansetron Injectable 4 milliGRAM(s) IV Push every 6 hours PRN Nausea    I&O's Detail    30 Nov 2023 07:01  -  01 Dec 2023 07:00  --------------------------------------------------------  IN:    Fat Emulsion (Fish Oil &amp; Plant Based) 20% Infusion: 114.4 mL    sodium chloride 0.9% w/ Additives: 900 mL    TPN (Total Parenteral Nutrition): 546 mL  Total IN: 1560.4 mL    OUT:    Nasogastric/Oral tube (mL): 1900 mL    Voided (mL): 600 mL  Total OUT: 2500 mL    Total NET: -939.6 mL    POCT Blood Glucose.: 177 mg/dL (01 Dec 2023 05:40)  POCT Blood Glucose.: 171 mg/dL (01 Dec 2023 00:32)  POCT Blood Glucose.: 90 mg/dL (30 Nov 2023 17:21)  POCT Blood Glucose.: 118 mg/dL (30 Nov 2023 12:46)    Drug Dosing Weight  Height (cm): 152.4 (22 Nov 2023 06:23)  Weight (kg): 75.659 (22 Nov 2023 06:23)  BMI (kg/m2): 32.6 (22 Nov 2023 06:23)  BSA (m2): 1.73 (22 Nov 2023 06:23)    PHYSICAL EXAM:  Constitutional: A&Ox3, NAD, resting comfortably in bed  Respiratory: Unlabored breathing  Abdomen: Soft, nondistended, mildly tender to palpation in lower quadrants   Extremities: WWP, SEAMAN spontaneously  PICC Site: double lumen PICC placed on 11/30/23 in [place], site clean and dry     Diet: NPO with sips and TPN/lipids (started on 11/30/23)    LABORATORY                        9.4    10.15 )-----------( 359      ( 01 Dec 2023 06:51 )             29.1   12-01    137  |  101  |  5<L>  ----------------------------<  188<H>  3.3<L>   |  26  |  0.37<L>    Ca    7.9<L>      01 Dec 2023 06:51  Phos  2.6     12-01  Mg     1.80     12-01 11-29 Chol 65 LDL -- HDL 29<L> Trig 81    CT Abdomen and Pelvis 11/29/23: IMPRESSION: Dilated loops of small bowel the level of the terminal ileum, likely ileus in the postoperative setting. Postoperative pneumoperitoneum and diffuse subcutaneous emphysema.    ASSESSMENT/PLAN:  74 y/o female with PMH asthma, T2DM on insulin pump, GERD, HTN, HLD, OA, obesity is s/p robotic partial colectomy for moderately differentiated adenocarcinoma of cecal mass on 11/22/23. CT abd/pelvis on 11/29/23 showed findings consistent with post op ileus. Pt remains NPO with NGT in place. Nutrition consult called for initiation of parenteral nutrition in view of severe protein calorie malnutrition and prolonged NPO status. PICC placed and TPN was started on 11/30/23.    TPN infusion volume increased to 2L, TPN will provide 1506 kcal/day    labs reviewed - electrolytes adjusted in TPN bag    monitor fingersticks, obtain daily weights - FS , added 8 units of insulin in tonight's TPN bag, will continue to trend FS and make insulin adjustments as needed; pt has a known hx of DM2 with A1c 6.8% and is on an insulin pump at home    continue parenteral nutrition at this time, will follow up with primary team on plan     1.  Severe protein calorie malnutrition being optimized with TPN: CHO [190] gm.  AA [90] gm. SMOF Lipids [50] gm.  2.  Hyperglycemia managed with: [8] units of regular insulin    3.  Check fluid balance daily.  Strict I/O  [ ] [ ]   4.  Daily BMP, Ionized Calcium, Magnesium and Phosphorous   5.  Triglycerides at initiation of TPN and monthly 11-29 Chol 65 LDL -- HDL 29<L> Trig 81    Nutrition Support [id number]

## 2023-12-01 NOTE — PROGRESS NOTE ADULT - SUBJECTIVE AND OBJECTIVE BOX
GENERAL SURGERY PROGRESS NOTE    Patient: NESTOR MABRY , 73y ([birth year])Female   MRN: [id number]  Location: [place] [address] A  Visit: 11-22-23 Inpatient  Date: 12-01-23 @ 08:21    Post-Op Day #: 9 hemicolectomy     Subjective: No events overnight. Patient resting comfortably in bed, no complaints. NGT with high outputs, no BM.     PAST MEDICAL & SURGICAL HISTORY:  History of hypertension  Diabetes  wears insulin pump  GERD (gastroesophageal reflux disease)  Uterine leiomyoma  Hyperlipidemia  Asthma  Obesity  Lymphadenopathy  left lower extremitiy ; 1966 - current  H/O insertion of insulin pump  OA (osteoarthritis)  Malignant neoplasm of cecum  H/O bilateral breast reduction surgery  History of appendectomy  History of cholecystectomy  H/O: hysterectomy  History of total right knee replacement  3/2016 - [facility]  H/O total knee replacement, left  S/P bunionectomy    Vitals: T(F): 98.3 (12-01-23 @ 05:29), Max: 98.8 (11-30-23 @ 10:33)  HR: 101 (12-01-23 @ 05:29)  BP: 120/58 (12-01-23 @ 05:29)  RR: 18 (12-01-23 @ 05:29)  SpO2: 100% (12-01-23 @ 05:29)      Diet, NPO:   With Ice Chips/Sips of Water      11-30-23 @ 07:01  -  12-01-23 @ 07:00  --------------------------------------------------------  IN:    Fat Emulsion (Fish Oil &amp; Plant Based) 20% Infusion: 114.4 mL    sodium chloride 0.9% w/ Additives: 900 mL    TPN (Total Parenteral Nutrition): 546 mL  Total IN: 1560.4 mL    OUT:    Nasogastric/Oral tube (mL): 1900 mL    Voided (mL): 600 mL  Total OUT: 2500 mL    Total NET: -939.6 mL    PHYSICAL EXAM  GEN: No acute distress   CV: RRR, no JVD, mild peripheral edema  LUNGS: Respirations unlabored, no use of accessory muscles  ABD: Abdomen soft, NT, ND  EXT: Regular range of motion.   INCISION: clean and dry     MEDICATIONS  (STANDING):  acetaminophen   IVPB .. 1000 milliGRAM(s) IV Intermittent every 6 hours  albuterol    90 MICROgram(s) HFA Inhaler 2 Puff(s) Inhalation two times a day  budesonide 160 MICROgram(s)/formoterol 4.5 MICROgram(s) Inhaler 2 Puff(s) Inhalation two times a day  chlorhexidine 2% Cloths 1 Application(s) Topical daily  dextrose 50% Injectable 25 Gram(s) IV Push once  enoxaparin Injectable 40 milliGRAM(s) SubCutaneous every 24 hours  influenza  Vaccine (HIGH DOSE) 0.7 milliLiter(s) IntraMuscular once  insulin lispro (ADMELOG) corrective regimen sliding scale   SubCutaneous every 6 hours  lipid, fat emulsion (Fish Oil and Plant Based) 20% Infusion 10.4 mL/Hr (10.4 mL/Hr) IV Continuous <Continuous>  magnesium sulfate  IVPB 2 Gram(s) IV Intermittent once  pantoprazole  Injectable 40 milliGRAM(s) IV Push daily  Parenteral Nutrition - Adult 1 Each (42 mL/Hr) TPN Continuous <Continuous>  potassium chloride  10 mEq/100 mL IVPB 10 milliEquivalent(s) IV Intermittent every 1 hour  potassium phosphate IVPB 30 milliMole(s) IV Intermittent once  sodium chloride 0.9% lock flush 3 milliLiter(s) IV Push every 8 hours    MEDICATIONS  (PRN):  HYDROmorphone  Injectable 0.25 milliGRAM(s) IV Push every 3 hours PRN Moderate to Severe Pain (4- 10)  ondansetron Injectable 4 milliGRAM(s) IV Push every 6 hours PRN Nausea  DVT PROPHYLAXIS: SCDs, enoxaparin Injectable 40 milliGRAM(s) SubCutaneous every 24 hours  GI PROPHYLAXIS: pantoprazole  Injectable 40 milliGRAM(s) IV Push daily    LAB/STUDIES:  CAPILLARY BLOOD GLUCOSE      POCT Blood Glucose.: 177 mg/dL (01 Dec 2023 05:40)  POCT Blood Glucose.: 171 mg/dL (01 Dec 2023 00:32)  POCT Blood Glucose.: 90 mg/dL (30 Nov 2023 17:21)  POCT Blood Glucose.: 118 mg/dL (30 Nov 2023 12:46)  POCT Blood Glucose.: 112 mg/dL (30 Nov 2023 09:20)                          9.4    10.15 )-----------( 359      ( 01 Dec 2023 06:51 )             29.1     12-01    137  |  101  |  5<L>  ----------------------------<  188<H>  3.3<L>   |  26  |  0.37<L>    Ca    7.9<L>      01 Dec 2023 06:51  Phos  2.6     12-01  Mg     1.80     12-01          Urinalysis Basic - ( 01 Dec 2023 06:51 )    Color: x / Appearance: x / SG: x / pH: x  Gluc: 188 mg/dL / Ketone: x  / Bili: x / Urobili: x   Blood: x / Protein: x / Nitrite: x   Leuk Esterase: x / RBC: x / WBC x   Sq Epi: x / Non Sq Epi: x / Bacteria: x    IMAGING:    ASSESSMENT:  73 year old female with PMH asthma, T2DM on insulin pump, GERD, HTN, HLD, OA, obesity presents s/p robotic partial colectomy for 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 Course c/b ileus.    Plan  - Pain control with Tylenol, Dilaudid PRN  - OOB/ambulate/ PT/ IS while in bed  - NPO/ NGT  - TPN consult for prolonged NPO status/malnutrition  - Home meds; holding HTN medications  - DVT prophylaxis: Lovenox   - Appreciate endocrinology recs; plan to restart insulin pump on discharge    - NPO: low ISS    - Clears: if glucose > 180 x 2, start levemir 15 units, low ISS    - Regular: levemir 15 units and admelog 5 units TID, low ISS  - Dispo: pending    D Team Surgery  [id number]

## 2023-12-01 NOTE — PROGRESS NOTE ADULT - SUBJECTIVE AND OBJECTIVE BOX
Chief Complaint: Type 2 DM     History: Pt seen at bedside. Pt NPO with NGT on TPN. Pt denies nausea and vomiting/any signs of hypoglycemia. Patient has her freestyle Angely on her left arm. Due to be changed on 12/5. Patient noted to have pump insertion site still on her left ABD. No pump attached. Advised patient to remove old insertion site at this time. She said she would and declined assistance with removal.     MEDICATIONS  (STANDING):  acetaminophen   IVPB .. 1000 milliGRAM(s) IV Intermittent every 6 hours  albuterol    90 MICROgram(s) HFA Inhaler 2 Puff(s) Inhalation two times a day  budesonide 160 MICROgram(s)/formoterol 4.5 MICROgram(s) Inhaler 2 Puff(s) Inhalation two times a day  chlorhexidine 2% Cloths 1 Application(s) Topical daily  dextrose 50% Injectable 25 Gram(s) IV Push once  enoxaparin Injectable 40 milliGRAM(s) SubCutaneous every 24 hours  influenza  Vaccine (HIGH DOSE) 0.7 milliLiter(s) IntraMuscular once  insulin lispro (ADMELOG) corrective regimen sliding scale   SubCutaneous every 6 hours  lipid, fat emulsion (Fish Oil and Plant Based) 20% Infusion 20.8 mL/Hr (20.8 mL/Hr) IV Continuous <Continuous>  pantoprazole  Injectable 40 milliGRAM(s) IV Push daily  Parenteral Nutrition - Adult 1 Each (42 mL/Hr) TPN Continuous <Continuous>  Parenteral Nutrition - Adult 1 Each (83 mL/Hr) TPN Continuous <Continuous>  sodium chloride 0.9% lock flush 3 milliLiter(s) IV Push every 8 hours        Allergies: Darvocet A500 (Other; Urticaria)  Percocet 10/325 (Other; Urticaria)  codeine (Other)  Lantus (Swelling)  PURELL.  (Other; Short breath)      Review of Systems:  Respiratory: No SOB, no cough  GI: No nausea, vomiting, abdominal pain  Endocrine: no polyuria, polydipsia      PHYSICAL EXAM:  VITALS: T(C): 37.1 (11-30-23 @ 10:33)  T(F): 98.8 (11-30-23 @ 10:33), Max: 98.8 (11-29-23 @ 16:17)  HR: 99 (11-30-23 @ 10:33) (93 - 111)  BP: 117/54 (11-30-23 @ 10:33) (104/54 - 133/71)  RR:  (18 - 20)  SpO2:  (99% - 100%)  Wt(kg): --  GENERAL: NAD, sitting up in chair   HEENOT: NGT noted  RESPIRATORY: No labored breathing   GI: Soft, nontender, non distended  PSYCH: Alert and oriented x 3, normal affect, normal mood  SKIN: See HPI     CAPILLARY BLOOD GLUCOSE      POCT Blood Glucose.: 207 mg/dL (01 Dec 2023 12:40)  POCT Blood Glucose.: 177 mg/dL (01 Dec 2023 05:40)  POCT Blood Glucose.: 171 mg/dL (01 Dec 2023 00:32)  POCT Blood Glucose.: 90 mg/dL (30 Nov 2023 17:21)      A1C with Estimated Average Glucose (11.16.23 @ 13:15)    A1C with Estimated Average Glucose Result: 6.8   Estimated Average Glucose: 148    12-01    137  |  101  |  5<L>  ----------------------------<  188<H>  3.3<L>   |  26  |  0.37<L>    Ca    7.9<L>      01 Dec 2023 06:51  Phos  2.6     12-01  Mg     1.80     12-01      Diet, NPO:   With Ice Chips/Sips of Water (11-30-23 @ 08:18)    TPN as per Nutrition Support Services

## 2023-12-01 NOTE — PROGRESS NOTE ADULT - SUBJECTIVE AND OBJECTIVE BOX
DATE OF SERVICE: 12-01-23    Patient denies chest pain or shortness of breath.   Review of symptoms otherwise negative.    MEDICATIONS:  acetaminophen   IVPB .. 1000 milliGRAM(s) IV Intermittent every 6 hours  albuterol    90 MICROgram(s) HFA Inhaler 2 Puff(s) Inhalation two times a day  budesonide 160 MICROgram(s)/formoterol 4.5 MICROgram(s) Inhaler 2 Puff(s) Inhalation two times a day  chlorhexidine 2% Cloths 1 Application(s) Topical daily  dextrose 50% Injectable 25 Gram(s) IV Push once  enoxaparin Injectable 40 milliGRAM(s) SubCutaneous every 24 hours  HYDROmorphone  Injectable 0.25 milliGRAM(s) IV Push every 3 hours PRN  influenza  Vaccine (HIGH DOSE) 0.7 milliLiter(s) IntraMuscular once  insulin lispro (ADMELOG) corrective regimen sliding scale   SubCutaneous every 6 hours  ondansetron Injectable 4 milliGRAM(s) IV Push every 6 hours PRN  pantoprazole  Injectable 40 milliGRAM(s) IV Push daily  Parenteral Nutrition - Adult 1 Each TPN Continuous <Continuous>  sodium chloride 0.9% lock flush 3 milliLiter(s) IV Push every 8 hours      LABS:                        9.4    10.15 )-----------( 359      ( 01 Dec 2023 06:51 )             29.1       Hemoglobin: 9.4 g/dL (12-01 @ 06:51)  Hemoglobin: 10.0 g/dL (11-30 @ 06:06)  Hemoglobin: 10.6 g/dL (11-29 @ 07:18)  Hemoglobin: 11.4 g/dL (11-28 @ 05:58)  Hemoglobin: 10.7 g/dL (11-27 @ 06:43)      12-01    137  |  101  |  5<L>  ----------------------------<  188<H>  3.3<L>   |  26  |  0.37<L>    Ca    7.9<L>      01 Dec 2023 06:51  Phos  2.6     12-01  Mg     1.80     12-01      Creatinine Trend: 0.37<--, 0.41<--, 0.46<--, 0.38<--, 0.44<--, 0.43<--    COAGS:           PHYSICAL EXAM:  T(C): 37.1 (12-01-23 @ 09:21), Max: 37.1 (12-01-23 @ 00:18)  HR: 93 (12-01-23 @ 09:21) (88 - 111)  BP: 122/58 (12-01-23 @ 09:21) (111/66 - 143/67)  RR: 16 (12-01-23 @ 09:21) (16 - 18)  SpO2: 100% (12-01-23 @ 09:21) (95% - 100%)  Wt(kg): --    I&O's Summary    30 Nov 2023 07:01  -  01 Dec 2023 07:00  --------------------------------------------------------  IN: 1560.4 mL / OUT: 2500 mL / NET: -939.6 mL          General:  Alert and Oriented * 3.   Head: Normocephalic and atraumatic.   Neck: No JVD. No bruits. Supple. Does not appear to be enlarged.   Cardiovascular: + S1,S2 ; RRR Soft systolic murmur at the left lower sternal border. No rubs noted.    Lungs: CTA b/l. No rhonchi, rales or wheezes.   Abdomen: distended NT  Extremities: No clubbing/cyanosis/edema.   Neurologic: Moves all four extremities. Full range of motion.   Skin: Warm and moist. The patient's skin has normal elasticity and good skin turgor.   Psychiatric: Appropriate mood and affect.  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      --tolerated procedure well from CV perspective  --pain management, PT  --s/p NGT, CT noted with ileus  --for PICC for TPN  --eventually resume home meds: Asa 81mg, Pravastatin 40mg when ok from a surgical perspective    Bella Aguilera MD  Pager: [phone]

## 2023-12-01 NOTE — PROGRESS NOTE ADULT - NS ATTEND AMEND GEN_ALL_CORE FT
I agree with the above history, physical examination, chief complaint/diagnosis, and plan, which I have reviewed and edited where appropriate.  I agree with notes/assessment and detailed interval history of health care providers on my service.  I have seen and examined the patient.  I reviewed the laboratory and available data and agree with the history, physical assessment and plan.  I reviewed and discussed with all consultants, house staff and PA's.  The Nutrition Support Team (NST) discusses on an ongoing basis with the primary team and all consultants, House staff and PA's to have a permanent risk benefit analyses on all decisions and coordinating care.  I was physically present for the key portions of the evaluation and management (E/M) service provided.  74 y/o female s/p robotic partial colectomy for moderately differentiated adenocarcinoma of cecal mass with post op ileus receiving parenteral nutrition in view of severe protein calorie malnutrition and prolonged NPO status.   Awake and alert  Lung clear  Heart RR  Abd soft  labs reviewed - electrolytes adjusted  TPN infusion; 2L/1506 kcal/day

## 2023-12-01 NOTE — PROGRESS NOTE ADULT - ASSESSMENT
This is a 74 yo F /w a PMH of DM2 on an insulin pump and colorectal cancer s/p right hemicolectomy today. Endocrinology consulted for diabetes management and DM2. Based on current insulin pump settings, suspect patient is being over-basalized given high basal rates compared to the insulin:carb ratio    Home Regimen:  Patient uses the medtronic insulin pump and Libre2  Pump settings are as follows:  TDD 52.425  12AM 1.8 units per hour  6:30AM 2 units per hour  12:30PM 2.45 units per hour  6:30PM 2.55 units per hour  8PM 2.55 units per hour    ICR:  12AM 1:15  7AM 1:12  12:30PM 1:15    ISF 1:30    #DM2 A1c 6.8%  #insulin pump at home. Off pump while in the hospital  -Patient instructed to remove old pump insertion site on ABD, see HPI  -Glucose Target 100-180: Stable today. Patient is on TPN managed Nutrition Support Team: 190 G dextrose, 8 units of Regular insulin   -Continue moderate Admelog correction scale every 6 hours while NPO  -Check FS q 6 hours   -Hypoglycemia Protocol     For future use if started on clears:  -consistent carbohydrate consideration   -low admelog correction scales before meals   -low admelog scales before bedtime  -hold basal insulin until noting glucose trend   if glucose >180 X 2 - than start levemir 15 units every 24 hours   -FSG before meals and before bedtime  -Carb consistent diet once able to tolerate  -hypoglycemia protocol in place if needed    when advanced to regular carb consistent diet   than will need basal bolus + correction scale   anticipate Levemir 15 units and ademelog 5 units premeals and low dose correction scale     pt has her on CGM on- also monitoring her own BG while inpt on bonnie due to be changed on 12/5    #Discharge  -follow up with Dr. Anand  -basal bolus vs resume insulin pump on discharge, likely will need adjustment in settings prior to resuming pump     #HTN  -BP target <130/80  -Consider resuming losartan 25mg daily and hctz 12.5mg daily- management as per primary team  -outpt mc/cr ratio     #HLD  -On Pravastatin 40 mg QHS at home   -Resume once able to tolerate orals if no contraindications   -LDL goal less than 70   -Management as per primary team     GERBER Alvares-BC  Nurse Practitioner   Division of Endocrinology  Pager: ANGELA pager [phone]    If out of hospital/unavailable when paged, please note: patient will be cared for by another provider on the endocrine service.  Please call the endocrine answering service for assistance to reach covering provider ([phone]). For non-urgent matters, please email [email or web address] for assistance.

## 2023-12-01 NOTE — PROGRESS NOTE ADULT - SUBJECTIVE AND OBJECTIVE BOX
Name of Patient : NESTOR MABRY  MRN: [id number]  Date of visit: 12-01-23       Subjective: Patient seen and examined. No new events except as noted.     REVIEW OF SYSTEMS:    CONSTITUTIONAL: No weakness, fevers or chills  EYES/ENT: No visual changes;  No vertigo or throat pain   NECK: No pain or stiffness  RESPIRATORY: No cough, wheezing, hemoptysis; No shortness of breath  CARDIOVASCULAR: No chest pain or palpitations  GASTROINTESTINAL: No abdominal or epigastric pain. No nausea, vomiting, or hematemesis; No diarrhea or constipation. No melena or hematochezia.  GENITOURINARY: No dysuria, frequency or hematuria  NEUROLOGICAL: No numbness or weakness  SKIN: No itching, burning, rashes, or lesions   All other review of systems is negative unless indicated above.    MEDICATIONS:  MEDICATIONS  (STANDING):  albuterol    90 MICROgram(s) HFA Inhaler 2 Puff(s) Inhalation two times a day  budesonide 160 MICROgram(s)/formoterol 4.5 MICROgram(s) Inhaler 2 Puff(s) Inhalation two times a day  chlorhexidine 2% Cloths 1 Application(s) Topical daily  dextrose 50% Injectable 25 Gram(s) IV Push once  enoxaparin Injectable 40 milliGRAM(s) SubCutaneous every 24 hours  influenza  Vaccine (HIGH DOSE) 0.7 milliLiter(s) IntraMuscular once  insulin lispro (ADMELOG) corrective regimen sliding scale   SubCutaneous every 6 hours  lipid, fat emulsion (Fish Oil and Plant Based) 20% Infusion 20.8 mL/Hr (20.8 mL/Hr) IV Continuous <Continuous>  pantoprazole  Injectable 40 milliGRAM(s) IV Push daily  Parenteral Nutrition - Adult 1 Each (83 mL/Hr) TPN Continuous <Continuous>  Parenteral Nutrition - Adult 1 Each (42 mL/Hr) TPN Continuous <Continuous>  sodium chloride 0.9% lock flush 3 milliLiter(s) IV Push every 8 hours      PHYSICAL EXAM:  T(C): 37.3 (12-01-23 @ 16:20), Max: 37.3 (12-01-23 @ 16:20)  HR: 87 (12-01-23 @ 16:20) (87 - 111)  BP: 118/53 (12-01-23 @ 16:20) (118/53 - 143/67)  RR: 16 (12-01-23 @ 16:20) (16 - 18)  SpO2: 99% (12-01-23 @ 16:20) (98% - 100%)  Wt(kg): --  I&O's Summary    30 Nov 2023 07:01  -  01 Dec 2023 07:00  --------------------------------------------------------  IN: 1560.4 mL / OUT: 2500 mL / NET: -939.6 mL    01 Dec 2023 07:01  -  01 Dec 2023 19:49  --------------------------------------------------------  IN: 627.6 mL / OUT: 1400 mL / NET: -772.4 mL          Appearance: Normal	  HEENT:  PERRLA   Lymphatic: No lymphadenopathy   Cardiovascular: Normal S1 S2, no JVD  Respiratory: normal effort , clear  Gastrointestinal:  Soft, Non-tender  Skin: No rashes,  warm to touch  Psychiatry:  Mood & affect appropriate  Musculuskeletal: No edema    recent labs, Imaging and EKGs personally reviewed     11-30-23 @ 07:01  -  12-01-23 @ 07:00  --------------------------------------------------------  IN: 1560.4 mL / OUT: 2500 mL / NET: -939.6 mL    12-01-23 @ 07:01  -  12-01-23 @ 19:49  --------------------------------------------------------  IN: 627.6 mL / OUT: 1400 mL / NET: -772.4 mL                          9.4    10.15 )-----------( 359      ( 01 Dec 2023 06:51 )             29.1               12-01    137  |  101  |  5<L>  ----------------------------<  188<H>  3.3<L>   |  26  |  0.37<L>    Ca    7.9<L>      01 Dec 2023 06:51  Phos  2.6     12-01  Mg     1.80     12-01                         Urinalysis Basic - ( 01 Dec 2023 06:51 )    Color: x / Appearance: x / SG: x / pH: x  Gluc: 188 mg/dL / Ketone: x  / Bili: x / Urobili: x   Blood: x / Protein: x / Nitrite: x   Leuk Esterase: x / RBC: x / WBC x   Sq Epi: x / Non Sq Epi: x / Bacteria: x

## 2023-12-02 LAB
ANION GAP SERPL CALC-SCNC: 9 MMOL/L — SIGNIFICANT CHANGE UP (ref 7–14)
ANION GAP SERPL CALC-SCNC: 9 MMOL/L — SIGNIFICANT CHANGE UP (ref 7–14)
BASOPHILS # BLD AUTO: 0.03 K/UL — SIGNIFICANT CHANGE UP (ref 0–0.2)
BASOPHILS # BLD AUTO: 0.03 K/UL — SIGNIFICANT CHANGE UP (ref 0–0.2)
BASOPHILS NFR BLD AUTO: 0.2 % — SIGNIFICANT CHANGE UP (ref 0–2)
BASOPHILS NFR BLD AUTO: 0.2 % — SIGNIFICANT CHANGE UP (ref 0–2)
BUN SERPL-MCNC: 4 MG/DL — LOW (ref 7–23)
BUN SERPL-MCNC: 4 MG/DL — LOW (ref 7–23)
CA-I BLD-SCNC: 1.04 MMOL/L — LOW (ref 1.15–1.29)
CA-I BLD-SCNC: 1.04 MMOL/L — LOW (ref 1.15–1.29)
CALCIUM SERPL-MCNC: 8.1 MG/DL — LOW (ref 8.4–10.5)
CALCIUM SERPL-MCNC: 8.1 MG/DL — LOW (ref 8.4–10.5)
CHLORIDE SERPL-SCNC: 101 MMOL/L — SIGNIFICANT CHANGE UP (ref 98–107)
CHLORIDE SERPL-SCNC: 101 MMOL/L — SIGNIFICANT CHANGE UP (ref 98–107)
CO2 SERPL-SCNC: 30 MMOL/L — SIGNIFICANT CHANGE UP (ref 22–31)
CO2 SERPL-SCNC: 30 MMOL/L — SIGNIFICANT CHANGE UP (ref 22–31)
CREAT SERPL-MCNC: 0.36 MG/DL — LOW (ref 0.5–1.3)
CREAT SERPL-MCNC: 0.36 MG/DL — LOW (ref 0.5–1.3)
EGFR: 108 ML/MIN/1.73M2 — SIGNIFICANT CHANGE UP
EGFR: 108 ML/MIN/1.73M2 — SIGNIFICANT CHANGE UP
EOSINOPHIL # BLD AUTO: 0.07 K/UL — SIGNIFICANT CHANGE UP (ref 0–0.5)
EOSINOPHIL # BLD AUTO: 0.07 K/UL — SIGNIFICANT CHANGE UP (ref 0–0.5)
EOSINOPHIL NFR BLD AUTO: 0.6 % — SIGNIFICANT CHANGE UP (ref 0–6)
EOSINOPHIL NFR BLD AUTO: 0.6 % — SIGNIFICANT CHANGE UP (ref 0–6)
GLUCOSE BLDC GLUCOMTR-MCNC: 251 MG/DL — HIGH (ref 70–99)
GLUCOSE BLDC GLUCOMTR-MCNC: 251 MG/DL — HIGH (ref 70–99)
GLUCOSE BLDC GLUCOMTR-MCNC: 297 MG/DL — HIGH (ref 70–99)
GLUCOSE BLDC GLUCOMTR-MCNC: 297 MG/DL — HIGH (ref 70–99)
GLUCOSE BLDC GLUCOMTR-MCNC: 303 MG/DL — HIGH (ref 70–99)
GLUCOSE BLDC GLUCOMTR-MCNC: 303 MG/DL — HIGH (ref 70–99)
GLUCOSE BLDC GLUCOMTR-MCNC: 309 MG/DL — HIGH (ref 70–99)
GLUCOSE BLDC GLUCOMTR-MCNC: 309 MG/DL — HIGH (ref 70–99)
GLUCOSE BLDC GLUCOMTR-MCNC: 314 MG/DL — HIGH (ref 70–99)
GLUCOSE BLDC GLUCOMTR-MCNC: 314 MG/DL — HIGH (ref 70–99)
GLUCOSE SERPL-MCNC: 305 MG/DL — HIGH (ref 70–99)
GLUCOSE SERPL-MCNC: 305 MG/DL — HIGH (ref 70–99)
HCT VFR BLD CALC: 29 % — LOW (ref 34.5–45)
HCT VFR BLD CALC: 29 % — LOW (ref 34.5–45)
HGB BLD-MCNC: 9.3 G/DL — LOW (ref 11.5–15.5)
HGB BLD-MCNC: 9.3 G/DL — LOW (ref 11.5–15.5)
IANC: 9.31 K/UL — HIGH (ref 1.8–7.4)
IANC: 9.31 K/UL — HIGH (ref 1.8–7.4)
IMM GRANULOCYTES NFR BLD AUTO: 1.8 % — HIGH (ref 0–0.9)
IMM GRANULOCYTES NFR BLD AUTO: 1.8 % — HIGH (ref 0–0.9)
LYMPHOCYTES # BLD AUTO: 1.52 K/UL — SIGNIFICANT CHANGE UP (ref 1–3.3)
LYMPHOCYTES # BLD AUTO: 1.52 K/UL — SIGNIFICANT CHANGE UP (ref 1–3.3)
LYMPHOCYTES # BLD AUTO: 12.4 % — LOW (ref 13–44)
LYMPHOCYTES # BLD AUTO: 12.4 % — LOW (ref 13–44)
MAGNESIUM SERPL-MCNC: 1.8 MG/DL — SIGNIFICANT CHANGE UP (ref 1.6–2.6)
MAGNESIUM SERPL-MCNC: 1.8 MG/DL — SIGNIFICANT CHANGE UP (ref 1.6–2.6)
MCHC RBC-ENTMCNC: 25.1 PG — LOW (ref 27–34)
MCHC RBC-ENTMCNC: 25.1 PG — LOW (ref 27–34)
MCHC RBC-ENTMCNC: 32.1 GM/DL — SIGNIFICANT CHANGE UP (ref 32–36)
MCHC RBC-ENTMCNC: 32.1 GM/DL — SIGNIFICANT CHANGE UP (ref 32–36)
MCV RBC AUTO: 78.4 FL — LOW (ref 80–100)
MCV RBC AUTO: 78.4 FL — LOW (ref 80–100)
MONOCYTES # BLD AUTO: 1.11 K/UL — HIGH (ref 0–0.9)
MONOCYTES # BLD AUTO: 1.11 K/UL — HIGH (ref 0–0.9)
MONOCYTES NFR BLD AUTO: 9.1 % — SIGNIFICANT CHANGE UP (ref 2–14)
MONOCYTES NFR BLD AUTO: 9.1 % — SIGNIFICANT CHANGE UP (ref 2–14)
NEUTROPHILS # BLD AUTO: 9.31 K/UL — HIGH (ref 1.8–7.4)
NEUTROPHILS # BLD AUTO: 9.31 K/UL — HIGH (ref 1.8–7.4)
NEUTROPHILS NFR BLD AUTO: 75.9 % — SIGNIFICANT CHANGE UP (ref 43–77)
NEUTROPHILS NFR BLD AUTO: 75.9 % — SIGNIFICANT CHANGE UP (ref 43–77)
NRBC # BLD: 0 /100 WBCS — SIGNIFICANT CHANGE UP (ref 0–0)
NRBC # BLD: 0 /100 WBCS — SIGNIFICANT CHANGE UP (ref 0–0)
NRBC # FLD: 0.03 K/UL — HIGH (ref 0–0)
NRBC # FLD: 0.03 K/UL — HIGH (ref 0–0)
PHOSPHATE SERPL-MCNC: 2.7 MG/DL — SIGNIFICANT CHANGE UP (ref 2.5–4.5)
PHOSPHATE SERPL-MCNC: 2.7 MG/DL — SIGNIFICANT CHANGE UP (ref 2.5–4.5)
PLATELET # BLD AUTO: 366 K/UL — SIGNIFICANT CHANGE UP (ref 150–400)
PLATELET # BLD AUTO: 366 K/UL — SIGNIFICANT CHANGE UP (ref 150–400)
POTASSIUM SERPL-MCNC: 2.7 MMOL/L — CRITICAL LOW (ref 3.5–5.3)
POTASSIUM SERPL-MCNC: 2.7 MMOL/L — CRITICAL LOW (ref 3.5–5.3)
POTASSIUM SERPL-SCNC: 2.7 MMOL/L — CRITICAL LOW (ref 3.5–5.3)
POTASSIUM SERPL-SCNC: 2.7 MMOL/L — CRITICAL LOW (ref 3.5–5.3)
RBC # BLD: 3.7 M/UL — LOW (ref 3.8–5.2)
RBC # BLD: 3.7 M/UL — LOW (ref 3.8–5.2)
RBC # FLD: 17.5 % — HIGH (ref 10.3–14.5)
RBC # FLD: 17.5 % — HIGH (ref 10.3–14.5)
SODIUM SERPL-SCNC: 140 MMOL/L — SIGNIFICANT CHANGE UP (ref 135–145)
SODIUM SERPL-SCNC: 140 MMOL/L — SIGNIFICANT CHANGE UP (ref 135–145)
WBC # BLD: 12.26 K/UL — HIGH (ref 3.8–10.5)
WBC # BLD: 12.26 K/UL — HIGH (ref 3.8–10.5)
WBC # FLD AUTO: 12.26 K/UL — HIGH (ref 3.8–10.5)
WBC # FLD AUTO: 12.26 K/UL — HIGH (ref 3.8–10.5)

## 2023-12-02 PROCEDURE — 99232 SBSQ HOSP IP/OBS MODERATE 35: CPT

## 2023-12-02 RX ORDER — I.V. FAT EMULSION 20 G/100ML
20.8 EMULSION INTRAVENOUS
Qty: 50 | Refills: 0 | Status: DISCONTINUED | OUTPATIENT
Start: 2023-12-02 | End: 2023-12-03

## 2023-12-02 RX ORDER — ELECTROLYTE SOLUTION,INJ
1 VIAL (ML) INTRAVENOUS
Refills: 0 | Status: DISCONTINUED | OUTPATIENT
Start: 2023-12-02 | End: 2023-12-02

## 2023-12-02 RX ORDER — I.V. FAT EMULSION 20 G/100ML
20.8 EMULSION INTRAVENOUS
Qty: 50 | Refills: 0 | Status: DISCONTINUED | OUTPATIENT
Start: 2023-12-02 | End: 2023-12-02

## 2023-12-02 RX ORDER — ACETAMINOPHEN 500 MG
1000 TABLET ORAL EVERY 6 HOURS
Refills: 0 | Status: COMPLETED | OUTPATIENT
Start: 2023-12-02 | End: 2023-12-02

## 2023-12-02 RX ORDER — ACETAMINOPHEN 500 MG
1000 TABLET ORAL EVERY 6 HOURS
Refills: 0 | Status: COMPLETED | OUTPATIENT
Start: 2023-12-03 | End: 2023-12-04

## 2023-12-02 RX ORDER — POTASSIUM CHLORIDE 20 MEQ
10 PACKET (EA) ORAL
Refills: 0 | Status: COMPLETED | OUTPATIENT
Start: 2023-12-02 | End: 2023-12-02

## 2023-12-02 RX ORDER — POTASSIUM CHLORIDE 20 MEQ
20 PACKET (EA) ORAL
Refills: 0 | Status: DISCONTINUED | OUTPATIENT
Start: 2023-12-02 | End: 2023-12-02

## 2023-12-02 RX ADMIN — Medication 100 MILLIEQUIVALENT(S): at 10:31

## 2023-12-02 RX ADMIN — Medication 8: at 00:27

## 2023-12-02 RX ADMIN — HYDROMORPHONE HYDROCHLORIDE 0.25 MILLIGRAM(S): 2 INJECTION INTRAMUSCULAR; INTRAVENOUS; SUBCUTANEOUS at 06:04

## 2023-12-02 RX ADMIN — Medication 100 MILLIEQUIVALENT(S): at 09:55

## 2023-12-02 RX ADMIN — ALBUTEROL 2 PUFF(S): 90 AEROSOL, METERED ORAL at 09:33

## 2023-12-02 RX ADMIN — HYDROMORPHONE HYDROCHLORIDE 0.25 MILLIGRAM(S): 2 INJECTION INTRAMUSCULAR; INTRAVENOUS; SUBCUTANEOUS at 01:06

## 2023-12-02 RX ADMIN — ENOXAPARIN SODIUM 40 MILLIGRAM(S): 100 INJECTION SUBCUTANEOUS at 17:32

## 2023-12-02 RX ADMIN — SODIUM CHLORIDE 3 MILLILITER(S): 9 INJECTION INTRAMUSCULAR; INTRAVENOUS; SUBCUTANEOUS at 13:31

## 2023-12-02 RX ADMIN — Medication 400 MILLIGRAM(S): at 17:34

## 2023-12-02 RX ADMIN — Medication 400 MILLIGRAM(S): at 23:04

## 2023-12-02 RX ADMIN — Medication 1000 MILLIGRAM(S): at 18:14

## 2023-12-02 RX ADMIN — PANTOPRAZOLE SODIUM 40 MILLIGRAM(S): 20 TABLET, DELAYED RELEASE ORAL at 12:54

## 2023-12-02 RX ADMIN — Medication 1 EACH: at 17:59

## 2023-12-02 RX ADMIN — CHLORHEXIDINE GLUCONATE 1 APPLICATION(S): 213 SOLUTION TOPICAL at 12:49

## 2023-12-02 RX ADMIN — HYDROMORPHONE HYDROCHLORIDE 0.25 MILLIGRAM(S): 2 INJECTION INTRAMUSCULAR; INTRAVENOUS; SUBCUTANEOUS at 01:36

## 2023-12-02 RX ADMIN — BUDESONIDE AND FORMOTEROL FUMARATE DIHYDRATE 2 PUFF(S): 160; 4.5 AEROSOL RESPIRATORY (INHALATION) at 09:32

## 2023-12-02 RX ADMIN — Medication 6: at 17:45

## 2023-12-02 RX ADMIN — I.V. FAT EMULSION 20.8 ML/HR: 20 EMULSION INTRAVENOUS at 17:39

## 2023-12-02 RX ADMIN — SODIUM CHLORIDE 3 MILLILITER(S): 9 INJECTION INTRAMUSCULAR; INTRAVENOUS; SUBCUTANEOUS at 22:00

## 2023-12-02 RX ADMIN — Medication 8: at 06:09

## 2023-12-02 RX ADMIN — Medication 400 MILLIGRAM(S): at 03:33

## 2023-12-02 RX ADMIN — Medication 6: at 12:45

## 2023-12-02 RX ADMIN — Medication 1000 MILLIGRAM(S): at 04:03

## 2023-12-02 RX ADMIN — HYDROMORPHONE HYDROCHLORIDE 0.25 MILLIGRAM(S): 2 INJECTION INTRAMUSCULAR; INTRAVENOUS; SUBCUTANEOUS at 05:34

## 2023-12-02 RX ADMIN — Medication 100 MILLIEQUIVALENT(S): at 09:26

## 2023-12-02 RX ADMIN — SODIUM CHLORIDE 3 MILLILITER(S): 9 INJECTION INTRAMUSCULAR; INTRAVENOUS; SUBCUTANEOUS at 06:00

## 2023-12-02 NOTE — PROGRESS NOTE ADULT - SUBJECTIVE AND OBJECTIVE BOX
SURGERY DAILY PROGRESS NOTE    SUBJECTIVE: Patient seen and evaluated on AM rounds. Patient reporting abdominal cramps, otherwise pain controlled. Denies fevers/chills, chest pain, dyspnea, nausea or vomiting.    Overnight Events:  No acute events overnight  -----------------------------------------------------------------------------------------------------------------------------------------------------------------------------------------------------------  OBJECTIVE:  Vital Signs Last 24 Hrs  T(C): 36.9 (02 Dec 2023 08:26), Max: 37.5 (02 Dec 2023 04:30)  T(F): 98.5 (02 Dec 2023 08:), Max: 99.5 (02 Dec 2023 04:30)  HR: 95 (02 Dec 2023 08:) (87 - 113)  BP: 119/52 (02 Dec 2023 08:) (101/41 - 128/56)  BP(mean): --  RR: 16 (02 Dec 2023 08:26) (16 - 16)  SpO2: 100% (02 Dec 2023 08:) (99% - 100%)    Parameters below as of 02 Dec 2023 08:  Patient On (Oxygen Delivery Method): room air      I&O's Detail    01 Dec 2023 07:01  -  02 Dec 2023 07:00  --------------------------------------------------------  IN:    Fat Emulsion (Fish Oil &amp; Plant Based) 20% Infusion: 270.4 mL    TPN (Total Parenteral Nutrition): 1499 mL  Total IN: 1769.4 mL    OUT:    Nasogastric/Oral tube (mL): 1550 mL    Oral Fluid: 0 mL    Voided (mL): 2150 mL  Total OUT: 3700 mL    Total NET: -1930.6 mL      02 Dec 2023 07:01  -  02 Dec 2023 12:22  --------------------------------------------------------  IN:    IV PiggyBack: 400 mL    TPN (Total Parenteral Nutrition): 415 mL  Total IN: 815 mL    OUT:    Emesis (mL): 0 mL    Nasogastric/Oral tube (mL): 300 mL    Voided (mL): 450 mL  Total OUT: 750 mL    Total NET: 65 mL        Daily     Daily Weight in k (02 Dec 2023 04:30)  MEDICATIONS  (STANDING):  acetaminophen   IVPB .. 1000 milliGRAM(s) IV Intermittent every 6 hours  albuterol    90 MICROgram(s) HFA Inhaler 2 Puff(s) Inhalation two times a day  budesonide 160 MICROgram(s)/formoterol 4.5 MICROgram(s) Inhaler 2 Puff(s) Inhalation two times a day  chlorhexidine 2% Cloths 1 Application(s) Topical daily  dextrose 50% Injectable 25 Gram(s) IV Push once  enoxaparin Injectable 40 milliGRAM(s) SubCutaneous every 24 hours  influenza  Vaccine (HIGH DOSE) 0.7 milliLiter(s) IntraMuscular once  insulin lispro (ADMELOG) corrective regimen sliding scale   SubCutaneous every 6 hours  lipid, fat emulsion (Fish Oil and Plant Based) 20% Infusion 20.8 mL/Hr (20.8 mL/Hr) IV Continuous <Continuous>  pantoprazole  Injectable 40 milliGRAM(s) IV Push daily  Parenteral Nutrition - Adult 1 Each (83 mL/Hr) TPN Continuous <Continuous>  Parenteral Nutrition - Adult 1 Each (63 mL/Hr) TPN Continuous <Continuous>  sodium chloride 0.9% lock flush 3 milliLiter(s) IV Push every 8 hours    MEDICATIONS  (PRN):  HYDROmorphone  Injectable 0.25 milliGRAM(s) IV Push every 3 hours PRN Moderate to Severe Pain (4- 10)  ondansetron Injectable 4 milliGRAM(s) IV Push every 6 hours PRN Nausea      LABS:                        9.3    12.26 )-----------( 366      ( 02 Dec 2023 06:30 )             29.0     12-02    140  |  101  |  4<L>  ----------------------------<  305<H>  2.7<LL>   |  30  |  0.36<L>    Ca    8.1<L>      02 Dec 2023 06:30  Phos  2.7     12  Mg     1.80     1202        Urinalysis Basic - ( 02 Dec 2023 06:30 )    Color: x / Appearance: x / SG: x / pH: x  Gluc: 305 mg/dL / Ketone: x  / Bili: x / Urobili: x   Blood: x / Protein: x / Nitrite: x   Leuk Esterase: x / RBC: x / WBC x   Sq Epi: x / Non Sq Epi: x / Bacteria: x        PHYSICAL EXAM  GEN: No acute distress   CV: RRR, no JVD, mild peripheral edema  LUNGS: Respirations unlabored, no use of accessory muscles  ABD: Abdomen soft,, appropriate incisional tenderness, non distended  EXT: Regular range of motion.   INCISION: clean and dry

## 2023-12-02 NOTE — PROGRESS NOTE ADULT - NS ATTEND AMEND GEN_ALL_CORE FT
I agree with the above history, physical examination, chief complaint/diagnosis, and plan, which I have reviewed and edited where appropriate.  I agree with notes/assessment and detailed interval history of health care providers on my service.  I have seen and examined the patient.  I reviewed the laboratory and available data and agree with the history, physical assessment and plan.  I reviewed and discussed with all consultants, house staff and PA's.  The Nutrition Support Team (NST) discusses on an ongoing basis with the primary team and all consultants, House staff and PA's to have a permanent risk benefit analyses on all decisions and coordinating care.  I was physically present for the key portions of the evaluation and management (E/M) service provided.  74 y/o female s/p robotic partial colectomy with post op ileus receiving parenteral nutrition in view of severe protein calorie malnutrition and prolonged NPO.  AO3  Lung clear  heart RR  Abd soft  labs reviewed - electrolytes adjusted   TPN infusion; 1.5L/1240 kcal/day

## 2023-12-02 NOTE — PROGRESS NOTE ADULT - ASSESSMENT
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 Course c/b ileus.    Plan  - Pain control with Tylenol, Dilaudid PRN  - OOB/ambulate/ PT/ IS while in bed  - NPO/ NGT  - TPN consult for prolonged NPO status/malnutrition  - Home meds; holding HTN medications  - DVT prophylaxis: Lovenox   - [facility] endocrinology recs; plan to restart insulin pump on discharge    - NPO: low ISS    - Clears: if glucose > 180 x 2, start levemir 15 units, low ISS    - Regular: levemir 15 units and admelog 5 units TID, low ISS  - Dispo: pending    D Team Surgery  [id number]      73 year old female with PMH asthma, T2DM on insulin pump, GERD, HTN, HLD, OA, obesity presents s/p robotic partial colectomy for 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 Course c/b ileus.    Plan  - Pain control with Tylenol, Dilaudid PRN  - OOB/ambulate/ PT/ IS while in bed  - NPO/ NGT  - TPN consult for prolonged NPO status/malnutrition  - Home meds; holding HTN medications  - DVT prophylaxis: Lovenox   - [facility] endocrinology recs; plan to restart insulin pump on discharge    - NPO: low ISS    - Clears: if glucose > 180 x 2, start levemir 15 units, low ISS    - Regular: levemir 15 units and admelog 5 units TID, low ISS  - Dispo: pending    D Team Surgery  [id number]      73 year old female with PMH asthma, T2DM on insulin pump, GERD, HTN, HLD, OA, obesity presents s/p robotic partial colectomy for 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 Course c/b ileus.    Plan  - Pain control with Tylenol, Dilaudid PRN  - OOB/ambulate/ PT/ IS while in bed  - NPO/ NGT  - TPN consult for prolonged NPO status/malnutrition  - Home meds; holding HTN medications  - DVT prophylaxis: Lovenox   - [facility] endocrinology recs; plan to restart insulin pump on discharge    - NPO: low ISS    - Clears: if glucose > 180 x 2, start levemir 15 units, low ISS    - Regular: levemir 15 units and admelog 5 units TID, low ISS  - Dispo: pending    D Team Surgery  [id number]

## 2023-12-02 NOTE — PROGRESS NOTE ADULT - SUBJECTIVE AND OBJECTIVE BOX
DATE OF SERVICE: 12-02-23      pt seen and examined, no complaints, ROS - .        acetaminophen   IVPB .. 1000 milliGRAM(s) IV Intermittent every 6 hours  albuterol    90 MICROgram(s) HFA Inhaler 2 Puff(s) Inhalation two times a day  budesonide 160 MICROgram(s)/formoterol 4.5 MICROgram(s) Inhaler 2 Puff(s) Inhalation two times a day  chlorhexidine 2% Cloths 1 Application(s) Topical daily  dextrose 50% Injectable 25 Gram(s) IV Push once  enoxaparin Injectable 40 milliGRAM(s) SubCutaneous every 24 hours  HYDROmorphone  Injectable 0.25 milliGRAM(s) IV Push every 3 hours PRN  influenza  Vaccine (HIGH DOSE) 0.7 milliLiter(s) IntraMuscular once  insulin lispro (ADMELOG) corrective regimen sliding scale   SubCutaneous every 6 hours  lipid, fat emulsion (Fish Oil and Plant Based) 20% Infusion 20.8 mL/Hr IV Continuous <Continuous>  ondansetron Injectable 4 milliGRAM(s) IV Push every 6 hours PRN  pantoprazole  Injectable 40 milliGRAM(s) IV Push daily  Parenteral Nutrition - Adult 1 Each TPN Continuous <Continuous>  Parenteral Nutrition - Adult 1 Each TPN Continuous <Continuous>  potassium chloride  10 mEq/100 mL IVPB 10 milliEquivalent(s) IV Intermittent every 1 hour  sodium chloride 0.9% lock flush 3 milliLiter(s) IV Push every 8 hours                            9.3    12.26 )-----------( 366      ( 02 Dec 2023 06:30 )             29.0       Hemoglobin: 9.3 g/dL (12-02 @ 06:30)  Hemoglobin: 9.4 g/dL (12-01 @ 06:51)  Hemoglobin: 10.0 g/dL (11-30 @ 06:06)  Hemoglobin: 10.6 g/dL (11-29 @ 07:18)  Hemoglobin: 11.4 g/dL (11-28 @ 05:58)      12-02    140  |  101  |  4<L>  ----------------------------<  305<H>  2.7<LL>   |  30  |  0.36<L>    Ca    8.1<L>      02 Dec 2023 06:30  Phos  2.7     12-02  Mg     1.80     12-02      Creatinine Trend: 0.36<--, 0.37<--, 0.41<--, 0.46<--, 0.38<--, 0.44<--    COAGS:           T(C): 36.9 (12-02-23 @ 08:26), Max: 37.5 (12-02-23 @ 04:30)  HR: 95 (12-02-23 @ 08:26) (87 - 113)  BP: 119/52 (12-02-23 @ 08:26) (101/41 - 128/56)  RR: 16 (12-02-23 @ 08:26) (16 - 16)  SpO2: 100% (12-02-23 @ 08:26) (99% - 100%)  Wt(kg): --    I&O's Summary    01 Dec 2023 07:01  -  02 Dec 2023 07:00  --------------------------------------------------------  IN: 1769.4 mL / OUT: 3700 mL / NET: -1930.6 mL    02 Dec 2023 07:01  -  02 Dec 2023 09:55  --------------------------------------------------------  IN: 349 mL / OUT: 200 mL / NET: 149 mL        General:  Alert and Oriented * 3.   Head: Normocephalic and atraumatic.   Neck: No JVD. No bruits. Supple. Does not appear to be enlarged.   Cardiovascular: + S1,S2 ; RRR Soft systolic murmur at the left lower sternal border. No rubs noted.    Lungs: CTA b/l. No rhonchi, rales or wheezes.   Abdomen: distended NT  Extremities: No clubbing/cyanosis/edema.   Neurologic: Moves all four extremities. Full range of motion.   Skin: Warm and moist. The patient's skin has normal elasticity and good skin turgor.   Psychiatric: Appropriate mood and affect.  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      --tolerated procedure well from CV perspective  --pain management, PT  -- supplement lytes per sx   --s/p NGT, CT noted with ileus  --for PICC for TPN  --eventually resume home meds: Asa 81mg, Pravastatin 40mg when ok from a surgical perspective       Pager: [phone]

## 2023-12-02 NOTE — PROGRESS NOTE ADULT - SUBJECTIVE AND OBJECTIVE BOX
NUTRITION NOTE  [id number] THOMASTUCKER  ===============================    Interval events - Patient was seen and examined at bedside, no acute events overnight. Patient denies chest pain, shortness of breath, nausea or vomiting at this time. PICC placed and TPN was started on 23 for nutritional support. Pt is tolerating TPN without any issues. Pt c/o frequent urination and requested to decrease TPN volume. FS noted to be 200-314 overnight. Pt remains NPO with NGT in place.    ROS: Except as noted above, all other systems reviewed and are negative     Allergies  Darvocet A500 (Other; Urticaria)  Percocet 10/325 (Other; Urticaria)  codeine (Other)  Lantus (Swelling)  Purell    PAST MEDICAL & SURGICAL HISTORY:  History of hypertension  Diabetes wears insulin pump  GERD (gastroesophageal reflux disease)  Uterine leiomyoma  Hyperlipidemia  Asthma  Obesity  Lymphadenopathy left lower extremity ;  - current  H/O insertion of insulin pump  OA (osteoarthritis)  Malignant neoplasm of cecum  H/O bilateral breast reduction surgery  History of appendectomy  History of cholecystectomy  H/O: hysterectomy  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 - [facility]  H/O total knee replacement, left  S/P bunionectomy    FAMILY HISTORY:  Diabetes mellitus (Father)  FH: breast cancer (Aunt)    Vital Signs Last 24 Hrs  T(C): 36.9 (02 Dec 2023 08:26), Max: 37.5 (02 Dec 2023 04:30)  T(F): 98.5 (02 Dec 2023 08:26), Max: 99.5 (02 Dec 2023 04:30)  HR: 95 (02 Dec 2023 08:26) (87 - 113)  BP: 119/52 (02 Dec 2023 08:26) (101/41 - 128/56)  RR: 16 (02 Dec 2023 08:26) (16 - 16)  SpO2: 100% (02 Dec 2023 08:26) (99% - 100%)    MEDICATIONS  (STANDING):  acetaminophen   IVPB .. 1000 milliGRAM(s) IV Intermittent every 6 hours  albuterol    90 MICROgram(s) HFA Inhaler 2 Puff(s) Inhalation two times a day  budesonide 160 MICROgram(s)/formoterol 4.5 MICROgram(s) Inhaler 2 Puff(s) Inhalation two times a day  chlorhexidine 2% Cloths 1 Application(s) Topical daily  dextrose 50% Injectable 25 Gram(s) IV Push once  enoxaparin Injectable 40 milliGRAM(s) SubCutaneous every 24 hours  influenza  Vaccine (HIGH DOSE) 0.7 milliLiter(s) IntraMuscular once  insulin lispro (ADMELOG) corrective regimen sliding scale   SubCutaneous every 6 hours  lipid, fat emulsion (Fish Oil and Plant Based) 20% Infusion 20.8 mL/Hr (20.8 mL/Hr) IV Continuous <Continuous>  pantoprazole  Injectable 40 milliGRAM(s) IV Push daily  Parenteral Nutrition - Adult 1 Each (83 mL/Hr) TPN Continuous <Continuous>  Parenteral Nutrition - Adult 1 Each (63 mL/Hr) TPN Continuous <Continuous>  potassium chloride  10 mEq/100 mL IVPB 10 milliEquivalent(s) IV Intermittent every 1 hour  sodium chloride 0.9% lock flush 3 milliLiter(s) IV Push every 8 hours    MEDICATIONS  (PRN):  HYDROmorphone  Injectable 0.25 milliGRAM(s) IV Push every 3 hours PRN Moderate to Severe Pain (4- 10)  ondansetron Injectable 4 milliGRAM(s) IV Push every 6 hours PRN Nausea    I&O's Detail    01 Dec 2023 07:  -  02 Dec 2023 07:00  --------------------------------------------------------  IN:    Fat Emulsion (Fish Oil &amp; Plant Based) 20% Infusion: 270.4 mL    TPN (Total Parenteral Nutrition): 1499 mL  Total IN: 1769.4 mL    OUT:    Nasogastric/Oral tube (mL): 1550 mL    Oral Fluid: 0 mL    Voided (mL): 2150 mL  Total OUT: 3700 mL    Total NET: -1930.6 mL      02 Dec 2023 07:01  -  02 Dec 2023 10:11  --------------------------------------------------------  IN:    IV PiggyBack: 100 mL    TPN (Total Parenteral Nutrition): 249 mL  Total IN: 349 mL    OUT:    Emesis (mL): 0 mL    Nasogastric/Oral tube (mL): 200 mL  Total OUT: 200 mL    Total NET: 149 mL    POCT Blood Glucose.: 314 mg/dL (02 Dec 2023 06:01)  POCT Blood Glucose.: 303 mg/dL (02 Dec 2023 00:22)  POCT Blood Glucose.: 309 mg/dL (02 Dec 2023 00:21)  POCT Blood Glucose.: 200 mg/dL (01 Dec 2023 17:19)  POCT Blood Glucose.: 207 mg/dL (01 Dec 2023 12:40)    Daily Weight in k (02 Dec 2023 04:30)    Drug Dosing Weight  Height (cm): 152.4 (2023 06:23)  Weight (kg): 75.659 (2023 06:23)  BMI (kg/m2): 32.6 (2023 06:23)  BSA (m2): 1.73 (2023 06:23)    PHYSICAL EXAM:  Constitutional: A&Ox3, NAD, resting comfortably in bed  Respiratory: Unlabored breathing  Abdomen: Soft, nondistended, mildly tender to palpation in lower quadrants   Extremities: WWP, SEAMAN spontaneously  PICC Site: double lumen PICC placed on 23 in [facility], site clean and dry     Diet: NPO with sips and TPN/lipids (started on 23)    LABORATORY                                 9.3    12.26 )-----------( 366      ( 02 Dec 2023 06:30 )             29.0   12-    140  |  101  |  4<L>  ----------------------------<  305<H>  2.7<LL>   |  30  |  0.36<L>    Ca    8.1<L>      02 Dec 2023 06:30  Phos  2.7     12-  Mg     1.80     12- Chol 65 LDL -- HDL 29<L> Trig 81    CT Abdomen and Pelvis 23: IMPRESSION: Dilated loops of small bowel the level of the terminal ileum, likely ileus in the postoperative setting. Postoperative pneumoperitoneum and diffuse subcutaneous emphysema.    ASSESSMENT/PLAN:  72 y/o female with PMH asthma, T2DM on insulin pump, GERD, HTN, HLD, OA, obesity is s/p robotic partial colectomy for moderately differentiated adenocarcinoma of cecal mass on 23. CT abd/pelvis on 23 showed findings consistent with post op ileus. Pt remains NPO with NGT in place. Nutrition consult called for initiation of parenteral nutrition in view of severe protein calorie malnutrition and prolonged NPO status. PICC placed and TPN was started on 23.    TPN infusion volume decreased to 1.5L, TPN will provide 1240 kcal/day    labs reviewed - electrolytes adjusted in TPN bag    monitor fingersticks, obtain daily weights - -314, increased to 15 units of insulin in tonight's TPN bag, will continue to trend FS and make insulin adjustments as needed; pt has a known hx of DM2 with A1c 6.8% and is on an insulin pump at home    continue parenteral nutrition at this time, will follow up with primary team on plan     1.  Severe protein calorie malnutrition being optimized with TPN: CHO [100] gm.  AA [100] gm. SMOF Lipids [50] gm.  2.  Hyperglycemia managed with: [15] units of regular insulin    3.  Check fluid balance daily.  Strict I/O  [ ] [ ]   4.  Daily BMP, Ionized Calcium, Magnesium and Phosphorous   5.  Triglycerides at initiation of TPN and monthly  Chol 65 LDL -- HDL 29<L> Trig 81    Nutrition Support [id number]

## 2023-12-02 NOTE — PROGRESS NOTE ADULT - SUBJECTIVE AND OBJECTIVE BOX
Name of Patient : NESTOR MABRY  MRN: [id number]  Date of visit: 12-02-23      Subjective: Patient seen and examined. No new events except as noted.     REVIEW OF SYSTEMS:    CONSTITUTIONAL: No weakness, fevers or chills  EYES/ENT: No visual changes;  No vertigo or throat pain   NECK: No pain or stiffness  RESPIRATORY: No cough, wheezing, hemoptysis; No shortness of breath  CARDIOVASCULAR: No chest pain or palpitations  GASTROINTESTINAL: No abdominal or epigastric pain. No nausea, vomiting, or hematemesis; No diarrhea or constipation. No melena or hematochezia.  GENITOURINARY: No dysuria, frequency or hematuria  NEUROLOGICAL: No numbness or weakness  SKIN: No itching, burning, rashes, or lesions   All other review of systems is negative unless indicated above.    MEDICATIONS:  MEDICATIONS  (STANDING):  albuterol    90 MICROgram(s) HFA Inhaler 2 Puff(s) Inhalation two times a day  budesonide 160 MICROgram(s)/formoterol 4.5 MICROgram(s) Inhaler 2 Puff(s) Inhalation two times a day  chlorhexidine 2% Cloths 1 Application(s) Topical daily  dextrose 50% Injectable 25 Gram(s) IV Push once  enoxaparin Injectable 40 milliGRAM(s) SubCutaneous every 24 hours  influenza  Vaccine (HIGH DOSE) 0.7 milliLiter(s) IntraMuscular once  insulin lispro (ADMELOG) corrective regimen sliding scale   SubCutaneous every 6 hours  lipid, fat emulsion (Fish Oil and Plant Based) 20% Infusion 20.8 mL/Hr (20.8 mL/Hr) IV Continuous <Continuous>  pantoprazole  Injectable 40 milliGRAM(s) IV Push daily  Parenteral Nutrition - Adult 1 Each (63 mL/Hr) TPN Continuous <Continuous>  sodium chloride 0.9% lock flush 3 milliLiter(s) IV Push every 8 hours      PHYSICAL EXAM:  T(C): 36.9 (12-02-23 @ 20:38), Max: 37.5 (12-02-23 @ 04:30)  HR: 102 (12-02-23 @ 20:38) (90 - 113)  BP: 106/56 (12-02-23 @ 20:38) (101/41 - 128/49)  RR: 18 (12-02-23 @ 20:38) (16 - 18)  SpO2: 100% (12-02-23 @ 20:38) (100% - 100%)  Wt(kg): --  I&O's Summary    01 Dec 2023 07:01  -  02 Dec 2023 07:00  --------------------------------------------------------  IN: 1769.4 mL / OUT: 3700 mL / NET: -1930.6 mL    02 Dec 2023 07:01  -  02 Dec 2023 23:25  --------------------------------------------------------  IN: 1437.6 mL / OUT: 2000 mL / NET: -562.4 mL          Appearance: Normal	  HEENT:  PERRLA   Lymphatic: No lymphadenopathy   Cardiovascular: Normal S1 S2, no JVD  Respiratory: normal effort , clear  Gastrointestinal:  Soft, Non-tender  Skin: No rashes,  warm to touch  Psychiatry:  Mood & affect appropriate  Musculuskeletal: No edema    recent labs, Imaging and EKGs personally reviewed     12-01-23 @ 07:01  -  12-02-23 @ 07:00  --------------------------------------------------------  IN: 1769.4 mL / OUT: 3700 mL / NET: -1930.6 mL    12-02-23 @ 07:01  -  12-02-23 @ 23:25  --------------------------------------------------------  IN: 1437.6 mL / OUT: 2000 mL / NET: -562.4 mL                            9.3    12.26 )-----------( 366      ( 02 Dec 2023 06:30 )             29.0               12-02    140  |  101  |  4<L>  ----------------------------<  305<H>  2.7<LL>   |  30  |  0.36<L>    Ca    8.1<L>      02 Dec 2023 06:30  Phos  2.7     12-02  Mg     1.80     12-02                         Urinalysis Basic - ( 02 Dec 2023 06:30 )    Color: x / Appearance: x / SG: x / pH: x  Gluc: 305 mg/dL / Ketone: x  / Bili: x / Urobili: x   Blood: x / Protein: x / Nitrite: x   Leuk Esterase: x / RBC: x / WBC x   Sq Epi: x / Non Sq Epi: x / Bacteria: x             Name of Patient : NESTOR MABRY  MRN: [id number]  Date of visit: 12-02-23      Subjective: Patient seen and examined. No new events except as noted.     REVIEW OF SYSTEMS:    CONSTITUTIONAL: No weakness, fevers or chills  EYES/ENT: No visual changes;  No vertigo or throat pain   NECK: No pain or stiffness  RESPIRATORY: No cough, wheezing, hemoptysis; No shortness of breath  CARDIOVASCULAR: No chest pain or palpitations  GASTROINTESTINAL: No abdominal or epigastric pain. No nausea, vomiting, or hematemesis; No diarrhea or constipation. No melena or hematochezia.  GENITOURINARY: No dysuria, frequency or hematuria  NEUROLOGICAL: No numbness or weakness  SKIN: No itching, burning, rashes, or lesions   All other review of systems is negative unless indicated above.    MEDICATIONS:  MEDICATIONS  (STANDING):  albuterol    90 MICROgram(s) HFA Inhaler 2 Puff(s) Inhalation two times a day  budesonide 160 MICROgram(s)/formoterol 4.5 MICROgram(s) Inhaler 2 Puff(s) Inhalation two times a day  chlorhexidine 2% Cloths 1 Application(s) Topical daily  dextrose 50% Injectable 25 Gram(s) IV Push once  enoxaparin Injectable 40 milliGRAM(s) SubCutaneous every 24 hours  influenza  Vaccine (HIGH DOSE) 0.7 milliLiter(s) IntraMuscular once  insulin lispro (ADMELOG) corrective regimen sliding scale   SubCutaneous every 6 hours  lipid, fat emulsion (Fish Oil and Plant Based) 20% Infusion 20.8 mL/Hr (20.8 mL/Hr) IV Continuous <Continuous>  pantoprazole  Injectable 40 milliGRAM(s) IV Push daily  Parenteral Nutrition - Adult 1 Each (63 mL/Hr) TPN Continuous <Continuous>  sodium chloride 0.9% lock flush 3 milliLiter(s) IV Push every 8 hours      PHYSICAL EXAM:  T(C): 36.9 (12-02-23 @ 20:38), Max: 37.5 (12-02-23 @ 04:30)  HR: 102 (12-02-23 @ 20:38) (90 - 113)  BP: 106/56 (12-02-23 @ 20:38) (101/41 - 128/49)  RR: 18 (12-02-23 @ 20:38) (16 - 18)  SpO2: 100% (12-02-23 @ 20:38) (100% - 100%)  Wt(kg): --  I&O's Summary    01 Dec 2023 07:01  -  02 Dec 2023 07:00  --------------------------------------------------------  IN: 1769.4 mL / OUT: 3700 mL / NET: -1930.6 mL    02 Dec 2023 07:01  -  02 Dec 2023 23:25  --------------------------------------------------------  IN: 1437.6 mL / OUT: 2000 mL / NET: -562.4 mL          Appearance: Normal	  HEENT:  PERRLA   Lymphatic: No lymphadenopathy   Cardiovascular: Normal S1 S2, no JVD  Respiratory: normal effort , clear  Gastrointestinal:  Soft, Non-tender  Skin: No rashes,  warm to touch  Psychiatry:  Mood & affect appropriate  Musculuskeletal: No edema    recent labs, Imaging and EKGs personally reviewed     12-01-23 @ 07:01  -  12-02-23 @ 07:00  --------------------------------------------------------  IN: 1769.4 mL / OUT: 3700 mL / NET: -1930.6 mL    12-02-23 @ 07:01  -  12-02-23 @ 23:25  --------------------------------------------------------  IN: 1437.6 mL / OUT: 2000 mL / NET: -562.4 mL                            9.3    12.26 )-----------( 366      ( 02 Dec 2023 06:30 )             29.0               12-02    140  |  101  |  4<L>  ----------------------------<  305<H>  2.7<LL>   |  30  |  0.36<L>    Ca    8.1<L>      02 Dec 2023 06:30  Phos  2.7     12-02  Mg     1.80     12-02                         Urinalysis Basic - ( 02 Dec 2023 06:30 )    Color: x / Appearance: x / SG: x / pH: x  Gluc: 305 mg/dL / Ketone: x  / Bili: x / Urobili: x   Blood: x / Protein: x / Nitrite: x   Leuk Esterase: x / RBC: x / WBC x   Sq Epi: x / Non Sq Epi: x / Bacteria: x             Name of Patient : NESTOR MABRY  MRN: [id number]  Date of visit: 12-02-23      Subjective: Patient seen and examined. No new events except as noted.     REVIEW OF SYSTEMS:    CONSTITUTIONAL: No weakness, fevers or chills  EYES/ENT: No visual changes;  No vertigo or throat pain   NECK: No pain or stiffness  RESPIRATORY: No cough, wheezing, hemoptysis; No shortness of breath  CARDIOVASCULAR: No chest pain or palpitations  GASTROINTESTINAL: No abdominal or epigastric pain. No nausea, vomiting, or hematemesis; No diarrhea or constipation. No melena or hematochezia.  GENITOURINARY: No dysuria, frequency or hematuria  NEUROLOGICAL: No numbness or weakness  SKIN: No itching, burning, rashes, or lesions   All other review of systems is negative unless indicated above.    MEDICATIONS:  MEDICATIONS  (STANDING):  albuterol    90 MICROgram(s) HFA Inhaler 2 Puff(s) Inhalation two times a day  budesonide 160 MICROgram(s)/formoterol 4.5 MICROgram(s) Inhaler 2 Puff(s) Inhalation two times a day  chlorhexidine 2% Cloths 1 Application(s) Topical daily  dextrose 50% Injectable 25 Gram(s) IV Push once  enoxaparin Injectable 40 milliGRAM(s) SubCutaneous every 24 hours  influenza  Vaccine (HIGH DOSE) 0.7 milliLiter(s) IntraMuscular once  insulin lispro (ADMELOG) corrective regimen sliding scale   SubCutaneous every 6 hours  lipid, fat emulsion (Fish Oil and Plant Based) 20% Infusion 20.8 mL/Hr (20.8 mL/Hr) IV Continuous <Continuous>  pantoprazole  Injectable 40 milliGRAM(s) IV Push daily  Parenteral Nutrition - Adult 1 Each (63 mL/Hr) TPN Continuous <Continuous>  sodium chloride 0.9% lock flush 3 milliLiter(s) IV Push every 8 hours      PHYSICAL EXAM:  T(C): 36.9 (12-02-23 @ 20:38), Max: 37.5 (12-02-23 @ 04:30)  HR: 102 (12-02-23 @ 20:38) (90 - 113)  BP: 106/56 (12-02-23 @ 20:38) (101/41 - 128/49)  RR: 18 (12-02-23 @ 20:38) (16 - 18)  SpO2: 100% (12-02-23 @ 20:38) (100% - 100%)  Wt(kg): --  I&O's Summary    01 Dec 2023 07:01  -  02 Dec 2023 07:00  --------------------------------------------------------  IN: 1769.4 mL / OUT: 3700 mL / NET: -1930.6 mL    02 Dec 2023 07:01  -  02 Dec 2023 23:25  --------------------------------------------------------  IN: 1437.6 mL / OUT: 2000 mL / NET: -562.4 mL          Appearance: Normal	  HEENT:  PERRLA   Lymphatic: No lymphadenopathy   Cardiovascular: Normal S1 S2, no JVD  Respiratory: normal effort , clear  Gastrointestinal:  Soft, Non-tender  Skin: No rashes,  warm to touch  Psychiatry:  Mood & affect appropriate  Musculuskeletal: No edema    recent labs, Imaging and EKGs personally reviewed     12-01-23 @ 07:01  -  12-02-23 @ 07:00  --------------------------------------------------------  IN: 1769.4 mL / OUT: 3700 mL / NET: -1930.6 mL    12-02-23 @ 07:01  -  12-02-23 @ 23:25  --------------------------------------------------------  IN: 1437.6 mL / OUT: 2000 mL / NET: -562.4 mL                            9.3    12.26 )-----------( 366      ( 02 Dec 2023 06:30 )             29.0               12-02    140  |  101  |  4<L>  ----------------------------<  305<H>  2.7<LL>   |  30  |  0.36<L>    Ca    8.1<L>      02 Dec 2023 06:30  Phos  2.7     12-02  Mg     1.80     12-02                         Urinalysis Basic - ( 02 Dec 2023 06:30 )    Color: x / Appearance: x / SG: x / pH: x  Gluc: 305 mg/dL / Ketone: x  / Bili: x / Urobili: x   Blood: x / Protein: x / Nitrite: x   Leuk Esterase: x / RBC: x / WBC x   Sq Epi: x / Non Sq Epi: x / Bacteria: x

## 2023-12-02 NOTE — PROGRESS NOTE ADULT - ASSESSMENT
Patient seen and examined, agree with above assessment and plan as transcribed above.    - TPN per primary team    Kian Gomez MD, [facility]  BEEPER [phone]

## 2023-12-03 LAB
ANION GAP SERPL CALC-SCNC: 10 MMOL/L — SIGNIFICANT CHANGE UP (ref 7–14)
ANION GAP SERPL CALC-SCNC: 10 MMOL/L — SIGNIFICANT CHANGE UP (ref 7–14)
BASOPHILS # BLD AUTO: 0.03 K/UL — SIGNIFICANT CHANGE UP (ref 0–0.2)
BASOPHILS # BLD AUTO: 0.03 K/UL — SIGNIFICANT CHANGE UP (ref 0–0.2)
BASOPHILS NFR BLD AUTO: 0.2 % — SIGNIFICANT CHANGE UP (ref 0–2)
BASOPHILS NFR BLD AUTO: 0.2 % — SIGNIFICANT CHANGE UP (ref 0–2)
BUN SERPL-MCNC: 8 MG/DL — SIGNIFICANT CHANGE UP (ref 7–23)
BUN SERPL-MCNC: 8 MG/DL — SIGNIFICANT CHANGE UP (ref 7–23)
CA-I BLD-SCNC: 1.1 MMOL/L — LOW (ref 1.15–1.29)
CA-I BLD-SCNC: 1.1 MMOL/L — LOW (ref 1.15–1.29)
CALCIUM SERPL-MCNC: 8.7 MG/DL — SIGNIFICANT CHANGE UP (ref 8.4–10.5)
CALCIUM SERPL-MCNC: 8.7 MG/DL — SIGNIFICANT CHANGE UP (ref 8.4–10.5)
CHLORIDE SERPL-SCNC: 101 MMOL/L — SIGNIFICANT CHANGE UP (ref 98–107)
CHLORIDE SERPL-SCNC: 101 MMOL/L — SIGNIFICANT CHANGE UP (ref 98–107)
CO2 SERPL-SCNC: 29 MMOL/L — SIGNIFICANT CHANGE UP (ref 22–31)
CO2 SERPL-SCNC: 29 MMOL/L — SIGNIFICANT CHANGE UP (ref 22–31)
CREAT SERPL-MCNC: 0.36 MG/DL — LOW (ref 0.5–1.3)
CREAT SERPL-MCNC: 0.36 MG/DL — LOW (ref 0.5–1.3)
EGFR: 108 ML/MIN/1.73M2 — SIGNIFICANT CHANGE UP
EGFR: 108 ML/MIN/1.73M2 — SIGNIFICANT CHANGE UP
EOSINOPHIL # BLD AUTO: 0.14 K/UL — SIGNIFICANT CHANGE UP (ref 0–0.5)
EOSINOPHIL # BLD AUTO: 0.14 K/UL — SIGNIFICANT CHANGE UP (ref 0–0.5)
EOSINOPHIL NFR BLD AUTO: 1 % — SIGNIFICANT CHANGE UP (ref 0–6)
EOSINOPHIL NFR BLD AUTO: 1 % — SIGNIFICANT CHANGE UP (ref 0–6)
GLUCOSE BLDC GLUCOMTR-MCNC: 202 MG/DL — HIGH (ref 70–99)
GLUCOSE BLDC GLUCOMTR-MCNC: 202 MG/DL — HIGH (ref 70–99)
GLUCOSE BLDC GLUCOMTR-MCNC: 240 MG/DL — HIGH (ref 70–99)
GLUCOSE BLDC GLUCOMTR-MCNC: 240 MG/DL — HIGH (ref 70–99)
GLUCOSE BLDC GLUCOMTR-MCNC: 255 MG/DL — HIGH (ref 70–99)
GLUCOSE BLDC GLUCOMTR-MCNC: 255 MG/DL — HIGH (ref 70–99)
GLUCOSE BLDC GLUCOMTR-MCNC: 263 MG/DL — HIGH (ref 70–99)
GLUCOSE BLDC GLUCOMTR-MCNC: 263 MG/DL — HIGH (ref 70–99)
GLUCOSE SERPL-MCNC: 252 MG/DL — HIGH (ref 70–99)
GLUCOSE SERPL-MCNC: 252 MG/DL — HIGH (ref 70–99)
HCT VFR BLD CALC: 31.1 % — LOW (ref 34.5–45)
HCT VFR BLD CALC: 31.1 % — LOW (ref 34.5–45)
HGB BLD-MCNC: 10 G/DL — LOW (ref 11.5–15.5)
HGB BLD-MCNC: 10 G/DL — LOW (ref 11.5–15.5)
IANC: 10.44 K/UL — HIGH (ref 1.8–7.4)
IANC: 10.44 K/UL — HIGH (ref 1.8–7.4)
IMM GRANULOCYTES NFR BLD AUTO: 1.6 % — HIGH (ref 0–0.9)
IMM GRANULOCYTES NFR BLD AUTO: 1.6 % — HIGH (ref 0–0.9)
LYMPHOCYTES # BLD AUTO: 1.73 K/UL — SIGNIFICANT CHANGE UP (ref 1–3.3)
LYMPHOCYTES # BLD AUTO: 1.73 K/UL — SIGNIFICANT CHANGE UP (ref 1–3.3)
LYMPHOCYTES # BLD AUTO: 12.6 % — LOW (ref 13–44)
LYMPHOCYTES # BLD AUTO: 12.6 % — LOW (ref 13–44)
MAGNESIUM SERPL-MCNC: 1.9 MG/DL — SIGNIFICANT CHANGE UP (ref 1.6–2.6)
MAGNESIUM SERPL-MCNC: 1.9 MG/DL — SIGNIFICANT CHANGE UP (ref 1.6–2.6)
MCHC RBC-ENTMCNC: 25.2 PG — LOW (ref 27–34)
MCHC RBC-ENTMCNC: 25.2 PG — LOW (ref 27–34)
MCHC RBC-ENTMCNC: 32.2 GM/DL — SIGNIFICANT CHANGE UP (ref 32–36)
MCHC RBC-ENTMCNC: 32.2 GM/DL — SIGNIFICANT CHANGE UP (ref 32–36)
MCV RBC AUTO: 78.3 FL — LOW (ref 80–100)
MCV RBC AUTO: 78.3 FL — LOW (ref 80–100)
MONOCYTES # BLD AUTO: 1.19 K/UL — HIGH (ref 0–0.9)
MONOCYTES # BLD AUTO: 1.19 K/UL — HIGH (ref 0–0.9)
MONOCYTES NFR BLD AUTO: 8.7 % — SIGNIFICANT CHANGE UP (ref 2–14)
MONOCYTES NFR BLD AUTO: 8.7 % — SIGNIFICANT CHANGE UP (ref 2–14)
NEUTROPHILS # BLD AUTO: 10.44 K/UL — HIGH (ref 1.8–7.4)
NEUTROPHILS # BLD AUTO: 10.44 K/UL — HIGH (ref 1.8–7.4)
NEUTROPHILS NFR BLD AUTO: 75.9 % — SIGNIFICANT CHANGE UP (ref 43–77)
NEUTROPHILS NFR BLD AUTO: 75.9 % — SIGNIFICANT CHANGE UP (ref 43–77)
NRBC # BLD: 0 /100 WBCS — SIGNIFICANT CHANGE UP (ref 0–0)
NRBC # BLD: 0 /100 WBCS — SIGNIFICANT CHANGE UP (ref 0–0)
NRBC # FLD: 0.02 K/UL — HIGH (ref 0–0)
NRBC # FLD: 0.02 K/UL — HIGH (ref 0–0)
PHOSPHATE SERPL-MCNC: 2.6 MG/DL — SIGNIFICANT CHANGE UP (ref 2.5–4.5)
PHOSPHATE SERPL-MCNC: 2.6 MG/DL — SIGNIFICANT CHANGE UP (ref 2.5–4.5)
PLATELET # BLD AUTO: 429 K/UL — HIGH (ref 150–400)
PLATELET # BLD AUTO: 429 K/UL — HIGH (ref 150–400)
POTASSIUM SERPL-MCNC: 3.4 MMOL/L — LOW (ref 3.5–5.3)
POTASSIUM SERPL-MCNC: 3.4 MMOL/L — LOW (ref 3.5–5.3)
POTASSIUM SERPL-SCNC: 3.4 MMOL/L — LOW (ref 3.5–5.3)
POTASSIUM SERPL-SCNC: 3.4 MMOL/L — LOW (ref 3.5–5.3)
RBC # BLD: 3.97 M/UL — SIGNIFICANT CHANGE UP (ref 3.8–5.2)
RBC # BLD: 3.97 M/UL — SIGNIFICANT CHANGE UP (ref 3.8–5.2)
RBC # FLD: 18.3 % — HIGH (ref 10.3–14.5)
RBC # FLD: 18.3 % — HIGH (ref 10.3–14.5)
SODIUM SERPL-SCNC: 140 MMOL/L — SIGNIFICANT CHANGE UP (ref 135–145)
SODIUM SERPL-SCNC: 140 MMOL/L — SIGNIFICANT CHANGE UP (ref 135–145)
WBC # BLD: 13.75 K/UL — HIGH (ref 3.8–10.5)
WBC # BLD: 13.75 K/UL — HIGH (ref 3.8–10.5)
WBC # FLD AUTO: 13.75 K/UL — HIGH (ref 3.8–10.5)
WBC # FLD AUTO: 13.75 K/UL — HIGH (ref 3.8–10.5)

## 2023-12-03 PROCEDURE — 99232 SBSQ HOSP IP/OBS MODERATE 35: CPT

## 2023-12-03 RX ORDER — SODIUM CHLORIDE 9 MG/ML
1000 INJECTION, SOLUTION INTRAVENOUS
Refills: 0 | Status: DISCONTINUED | OUTPATIENT
Start: 2023-12-03 | End: 2023-12-04

## 2023-12-03 RX ORDER — ELECTROLYTE SOLUTION,INJ
1 VIAL (ML) INTRAVENOUS
Refills: 0 | Status: DISCONTINUED | OUTPATIENT
Start: 2023-12-03 | End: 2023-12-04

## 2023-12-03 RX ORDER — INSULIN DETEMIR 100/ML (3)
15 INSULIN PEN (ML) SUBCUTANEOUS AT BEDTIME
Refills: 0 | Status: DISCONTINUED | OUTPATIENT
Start: 2023-12-03 | End: 2023-12-03

## 2023-12-03 RX ORDER — DEXTROSE 50 % IN WATER 50 %
15 SYRINGE (ML) INTRAVENOUS ONCE
Refills: 0 | Status: DISCONTINUED | OUTPATIENT
Start: 2023-12-03 | End: 2023-12-06

## 2023-12-03 RX ORDER — INSULIN LISPRO 100/ML
VIAL (ML) SUBCUTANEOUS EVERY 6 HOURS
Refills: 0 | Status: DISCONTINUED | OUTPATIENT
Start: 2023-12-03 | End: 2023-12-05

## 2023-12-03 RX ORDER — DEXTROSE MONOHYDRATE, SODIUM CHLORIDE, AND POTASSIUM CHLORIDE 50; .745; 4.5 G/1000ML; G/1000ML; G/1000ML
1000 INJECTION, SOLUTION INTRAVENOUS
Refills: 0 | Status: DISCONTINUED | OUTPATIENT
Start: 2023-12-03 | End: 2023-12-04

## 2023-12-03 RX ORDER — INSULIN LISPRO 100/ML
VIAL (ML) SUBCUTANEOUS
Refills: 0 | Status: DISCONTINUED | OUTPATIENT
Start: 2023-12-03 | End: 2023-12-03

## 2023-12-03 RX ORDER — I.V. FAT EMULSION 20 G/100ML
20.8 EMULSION INTRAVENOUS
Qty: 50 | Refills: 0 | Status: DISCONTINUED | OUTPATIENT
Start: 2023-12-03 | End: 2023-12-04

## 2023-12-03 RX ORDER — GLUCAGON INJECTION, SOLUTION 0.5 MG/.1ML
1 INJECTION, SOLUTION SUBCUTANEOUS ONCE
Refills: 0 | Status: DISCONTINUED | OUTPATIENT
Start: 2023-12-03 | End: 2023-12-05

## 2023-12-03 RX ADMIN — Medication 400 MILLIGRAM(S): at 12:25

## 2023-12-03 RX ADMIN — Medication 400 MILLIGRAM(S): at 17:42

## 2023-12-03 RX ADMIN — SODIUM CHLORIDE 3 MILLILITER(S): 9 INJECTION INTRAMUSCULAR; INTRAVENOUS; SUBCUTANEOUS at 21:58

## 2023-12-03 RX ADMIN — Medication 4: at 05:43

## 2023-12-03 RX ADMIN — CHLORHEXIDINE GLUCONATE 1 APPLICATION(S): 213 SOLUTION TOPICAL at 12:29

## 2023-12-03 RX ADMIN — Medication 400 MILLIGRAM(S): at 05:09

## 2023-12-03 RX ADMIN — Medication 6: at 01:53

## 2023-12-03 RX ADMIN — Medication 2: at 17:53

## 2023-12-03 RX ADMIN — Medication 6: at 12:25

## 2023-12-03 RX ADMIN — SODIUM CHLORIDE 3 MILLILITER(S): 9 INJECTION INTRAMUSCULAR; INTRAVENOUS; SUBCUTANEOUS at 14:24

## 2023-12-03 RX ADMIN — PANTOPRAZOLE SODIUM 40 MILLIGRAM(S): 20 TABLET, DELAYED RELEASE ORAL at 12:27

## 2023-12-03 RX ADMIN — SODIUM CHLORIDE 3 MILLILITER(S): 9 INJECTION INTRAMUSCULAR; INTRAVENOUS; SUBCUTANEOUS at 06:00

## 2023-12-03 RX ADMIN — Medication 1000 MILLIGRAM(S): at 18:12

## 2023-12-03 RX ADMIN — ENOXAPARIN SODIUM 40 MILLIGRAM(S): 100 INJECTION SUBCUTANEOUS at 17:54

## 2023-12-03 RX ADMIN — Medication 1000 MILLIGRAM(S): at 12:55

## 2023-12-03 RX ADMIN — DEXTROSE MONOHYDRATE, SODIUM CHLORIDE, AND POTASSIUM CHLORIDE 100 MILLILITER(S): 50; .745; 4.5 INJECTION, SOLUTION INTRAVENOUS at 17:53

## 2023-12-03 NOTE — PROGRESS NOTE ADULT - SUBJECTIVE AND OBJECTIVE BOX
DATE OF SERVICE: 12-03-23        no events overnight       acetaminophen   IVPB .. 1000 milliGRAM(s) IV Intermittent every 6 hours  albuterol    90 MICROgram(s) HFA Inhaler 2 Puff(s) Inhalation two times a day  budesonide 160 MICROgram(s)/formoterol 4.5 MICROgram(s) Inhaler 2 Puff(s) Inhalation two times a day  chlorhexidine 2% Cloths 1 Application(s) Topical daily  dextrose 5%. 1000 milliLiter(s) IV Continuous <Continuous>  dextrose 5%. 1000 milliLiter(s) IV Continuous <Continuous>  dextrose 50% Injectable 25 Gram(s) IV Push once  dextrose Oral Gel 15 Gram(s) Oral once PRN  enoxaparin Injectable 40 milliGRAM(s) SubCutaneous every 24 hours  glucagon  Injectable 1 milliGRAM(s) IntraMuscular once  HYDROmorphone  Injectable 0.25 milliGRAM(s) IV Push every 3 hours PRN  influenza  Vaccine (HIGH DOSE) 0.7 milliLiter(s) IntraMuscular once  insulin detemir injectable (LEVEMIR) 15 Unit(s) SubCutaneous at bedtime  insulin lispro (ADMELOG) corrective regimen sliding scale   SubCutaneous three times a day before meals  lipid, fat emulsion (Fish Oil and Plant Based) 20% Infusion 20.8 mL/Hr IV Continuous <Continuous>  ondansetron Injectable 4 milliGRAM(s) IV Push every 6 hours PRN  pantoprazole  Injectable 40 milliGRAM(s) IV Push daily  Parenteral Nutrition - Adult 1 Each TPN Continuous <Continuous>  Parenteral Nutrition - Adult 1 Each TPN Continuous <Continuous>  sodium chloride 0.9% lock flush 3 milliLiter(s) IV Push every 8 hours                            10.0   13.75 )-----------( 429      ( 03 Dec 2023 05:48 )             31.1       Hemoglobin: 10.0 g/dL (12-03 @ 05:48)  Hemoglobin: 9.3 g/dL (12-02 @ 06:30)  Hemoglobin: 9.4 g/dL (12-01 @ 06:51)  Hemoglobin: 10.0 g/dL (11-30 @ 06:06)  Hemoglobin: 10.6 g/dL (11-29 @ 07:18)      12-03    140  |  101  |  8   ----------------------------<  252<H>  3.4<L>   |  29  |  0.36<L>    Ca    8.7      03 Dec 2023 05:48  Phos  2.6     12-03  Mg     1.90     12-03      Creatinine Trend: 0.36<--, 0.36<--, 0.37<--, 0.41<--, 0.46<--, 0.38<--    COAGS:           T(C): 37 (12-03-23 @ 08:20), Max: 37.1 (12-03-23 @ 00:00)  HR: 100 (12-03-23 @ 08:20) (90 - 110)  BP: 118/76 (12-03-23 @ 08:20) (106/56 - 142/70)  RR: 18 (12-03-23 @ 08:20) (16 - 18)  SpO2: 100% (12-03-23 @ 08:20) (100% - 100%)  Wt(kg): --    I&O's Summary    02 Dec 2023 07:01  -  03 Dec 2023 07:00  --------------------------------------------------------  IN: 2110.2 mL / OUT: 2950 mL / NET: -839.8 mL    03 Dec 2023 07:01  -  03 Dec 2023 11:02  --------------------------------------------------------  IN: 126 mL / OUT: 400 mL / NET: -274 mL        General:  Alert and Oriented * 3.   Head: Normocephalic and atraumatic.   Neck: No JVD. No bruits. Supple. Does not appear to be enlarged.   Cardiovascular: + S1,S2 ; RRR Soft systolic murmur at the left lower sternal border. No rubs noted.    Lungs: CTA b/l. No rhonchi, rales or wheezes.   Abdomen: distended NT  Extremities: No clubbing/cyanosis/edema.   Neurologic: Moves all four extremities. Full range of motion.   Skin: Warm and moist. The patient's skin has normal elasticity and good skin turgor.   Psychiatric: Appropriate mood and affect.  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      --tolerated procedure well from CV perspective  --pain management, PT  -- supplement lytes per sx   --s/p NGT, CT noted with ileus  --for PICC for TPN  --eventually resume home meds: Asa 81mg, Pravastatin 40mg when ok from a surgical perspective       Pager: [phone]         DATE OF SERVICE: 12-03-23        no events overnight       acetaminophen   IVPB .. 1000 milliGRAM(s) IV Intermittent every 6 hours  albuterol    90 MICROgram(s) HFA Inhaler 2 Puff(s) Inhalation two times a day  budesonide 160 MICROgram(s)/formoterol 4.5 MICROgram(s) Inhaler 2 Puff(s) Inhalation two times a day  chlorhexidine 2% Cloths 1 Application(s) Topical daily  dextrose 5%. 1000 milliLiter(s) IV Continuous <Continuous>  dextrose 5%. 1000 milliLiter(s) IV Continuous <Continuous>  dextrose 50% Injectable 25 Gram(s) IV Push once  dextrose Oral Gel 15 Gram(s) Oral once PRN  enoxaparin Injectable 40 milliGRAM(s) SubCutaneous every 24 hours  glucagon  Injectable 1 milliGRAM(s) IntraMuscular once  HYDROmorphone  Injectable 0.25 milliGRAM(s) IV Push every 3 hours PRN  influenza  Vaccine (HIGH DOSE) 0.7 milliLiter(s) IntraMuscular once  insulin detemir injectable (LEVEMIR) 15 Unit(s) SubCutaneous at bedtime  insulin lispro (ADMELOG) corrective regimen sliding scale   SubCutaneous three times a day before meals  lipid, fat emulsion (Fish Oil and Plant Based) 20% Infusion 20.8 mL/Hr IV Continuous <Continuous>  ondansetron Injectable 4 milliGRAM(s) IV Push every 6 hours PRN  pantoprazole  Injectable 40 milliGRAM(s) IV Push daily  Parenteral Nutrition - Adult 1 Each TPN Continuous <Continuous>  Parenteral Nutrition - Adult 1 Each TPN Continuous <Continuous>  sodium chloride 0.9% lock flush 3 milliLiter(s) IV Push every 8 hours                            10.0   13.75 )-----------( 429      ( 03 Dec 2023 05:48 )             31.1       Hemoglobin: 10.0 g/dL (12-03 @ 05:48)  Hemoglobin: 9.3 g/dL (12-02 @ 06:30)  Hemoglobin: 9.4 g/dL (12-01 @ 06:51)  Hemoglobin: 10.0 g/dL (11-30 @ 06:06)  Hemoglobin: 10.6 g/dL (11-29 @ 07:18)      12-03    140  |  101  |  8   ----------------------------<  252<H>  3.4<L>   |  29  |  0.36<L>    Ca    8.7      03 Dec 2023 05:48  Phos  2.6     12-03  Mg     1.90     12-03      Creatinine Trend: 0.36<--, 0.36<--, 0.37<--, 0.41<--, 0.46<--, 0.38<--    COAGS:           T(C): 37 (12-03-23 @ 08:20), Max: 37.1 (12-03-23 @ 00:00)  HR: 100 (12-03-23 @ 08:20) (90 - 110)  BP: 118/76 (12-03-23 @ 08:20) (106/56 - 142/70)  RR: 18 (12-03-23 @ 08:20) (16 - 18)  SpO2: 100% (12-03-23 @ 08:20) (100% - 100%)  Wt(kg): --    I&O's Summary    02 Dec 2023 07:01  -  03 Dec 2023 07:00  --------------------------------------------------------  IN: 2110.2 mL / OUT: 2950 mL / NET: -839.8 mL    03 Dec 2023 07:01  -  03 Dec 2023 11:02  --------------------------------------------------------  IN: 126 mL / OUT: 400 mL / NET: -274 mL        General:  Alert and Oriented * 3.   Head: Normocephalic and atraumatic.   Neck: No JVD. No bruits. Supple. Does not appear to be enlarged.   Cardiovascular: + S1,S2 ; RRR Soft systolic murmur at the left lower sternal border. No rubs noted.    Lungs: CTA b/l. No rhonchi, rales or wheezes.   Abdomen: distended NT  Extremities: No clubbing/cyanosis/edema.   Neurologic: Moves all four extremities. Full range of motion.   Skin: Warm and moist. The patient's skin has normal elasticity and good skin turgor.   Psychiatric: Appropriate mood and affect.  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      --tolerated procedure well from CV perspective  --pain management, PT  -- supplement lytes per sx   --s/p NGT, CT noted with ileus  --for PICC for TPN  --eventually resume home meds: Asa 81mg, Pravastatin 40mg when ok from a surgical perspective       Pager: [phone]         DATE OF SERVICE: 12-03-23        no events overnight       acetaminophen   IVPB .. 1000 milliGRAM(s) IV Intermittent every 6 hours  albuterol    90 MICROgram(s) HFA Inhaler 2 Puff(s) Inhalation two times a day  budesonide 160 MICROgram(s)/formoterol 4.5 MICROgram(s) Inhaler 2 Puff(s) Inhalation two times a day  chlorhexidine 2% Cloths 1 Application(s) Topical daily  dextrose 5%. 1000 milliLiter(s) IV Continuous <Continuous>  dextrose 5%. 1000 milliLiter(s) IV Continuous <Continuous>  dextrose 50% Injectable 25 Gram(s) IV Push once  dextrose Oral Gel 15 Gram(s) Oral once PRN  enoxaparin Injectable 40 milliGRAM(s) SubCutaneous every 24 hours  glucagon  Injectable 1 milliGRAM(s) IntraMuscular once  HYDROmorphone  Injectable 0.25 milliGRAM(s) IV Push every 3 hours PRN  influenza  Vaccine (HIGH DOSE) 0.7 milliLiter(s) IntraMuscular once  insulin detemir injectable (LEVEMIR) 15 Unit(s) SubCutaneous at bedtime  insulin lispro (ADMELOG) corrective regimen sliding scale   SubCutaneous three times a day before meals  lipid, fat emulsion (Fish Oil and Plant Based) 20% Infusion 20.8 mL/Hr IV Continuous <Continuous>  ondansetron Injectable 4 milliGRAM(s) IV Push every 6 hours PRN  pantoprazole  Injectable 40 milliGRAM(s) IV Push daily  Parenteral Nutrition - Adult 1 Each TPN Continuous <Continuous>  Parenteral Nutrition - Adult 1 Each TPN Continuous <Continuous>  sodium chloride 0.9% lock flush 3 milliLiter(s) IV Push every 8 hours                            10.0   13.75 )-----------( 429      ( 03 Dec 2023 05:48 )             31.1       Hemoglobin: 10.0 g/dL (12-03 @ 05:48)  Hemoglobin: 9.3 g/dL (12-02 @ 06:30)  Hemoglobin: 9.4 g/dL (12-01 @ 06:51)  Hemoglobin: 10.0 g/dL (11-30 @ 06:06)  Hemoglobin: 10.6 g/dL (11-29 @ 07:18)      12-03    140  |  101  |  8   ----------------------------<  252<H>  3.4<L>   |  29  |  0.36<L>    Ca    8.7      03 Dec 2023 05:48  Phos  2.6     12-03  Mg     1.90     12-03      Creatinine Trend: 0.36<--, 0.36<--, 0.37<--, 0.41<--, 0.46<--, 0.38<--    COAGS:           T(C): 37 (12-03-23 @ 08:20), Max: 37.1 (12-03-23 @ 00:00)  HR: 100 (12-03-23 @ 08:20) (90 - 110)  BP: 118/76 (12-03-23 @ 08:20) (106/56 - 142/70)  RR: 18 (12-03-23 @ 08:20) (16 - 18)  SpO2: 100% (12-03-23 @ 08:20) (100% - 100%)  Wt(kg): --    I&O's Summary    02 Dec 2023 07:01  -  03 Dec 2023 07:00  --------------------------------------------------------  IN: 2110.2 mL / OUT: 2950 mL / NET: -839.8 mL    03 Dec 2023 07:01  -  03 Dec 2023 11:02  --------------------------------------------------------  IN: 126 mL / OUT: 400 mL / NET: -274 mL        General:  Alert and Oriented * 3.   Head: Normocephalic and atraumatic.   Neck: No JVD. No bruits. Supple. Does not appear to be enlarged.   Cardiovascular: + S1,S2 ; RRR Soft systolic murmur at the left lower sternal border. No rubs noted.    Lungs: CTA b/l. No rhonchi, rales or wheezes.   Abdomen: distended NT  Extremities: No clubbing/cyanosis/edema.   Neurologic: Moves all four extremities. Full range of motion.   Skin: Warm and moist. The patient's skin has normal elasticity and good skin turgor.   Psychiatric: Appropriate mood and affect.  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      --tolerated procedure well from CV perspective  --pain management, PT  -- supplement lytes per sx   --s/p NGT, CT noted with ileus  --for PICC for TPN  --eventually resume home meds: Asa 81mg, Pravastatin 40mg when ok from a surgical perspective       Pager: [phone]

## 2023-12-03 NOTE — PROGRESS NOTE ADULT - ATTENDING COMMENTS
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 Course c/b ileus.    Plan  - Pain control with Tylenol, Dilaudid PRN  - OOB/ambulate/ PT/ IS while in bed  - NPO/ NGT  - TPN consult for prolonged NPO status/malnutrition  - Home meds; holding HTN medications  - DVT prophylaxis: Lovenox   - [facility] endocrinology recs; plan to restart insulin pump on discharge    - NPO: low ISS    - Clears: if glucose > 180 x 2, start levemir 15 units, low ISS    - Regular: levemir 15 units and admelog 5 units TID, low ISS  - Dispo: pending      _____________________________    Seen and agree  Replete K and Mg
ASSESSMENT:  73 year old female with PMH asthma, T2DM on insulin pump, GERD, HTN, HLD, OA, obesity presents s/p robotic partial colectomy for 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 Course c/b ileus.    Plan  - Pain control with Tylenol, Dilaudid PRN  - OOB/ambulate/ PT/ IS while in bed  - NPO/ NGT  - TPN for prolonged NPO status/malnutrition  - Home meds; holding HTN medications  - DVT prophylaxis: Lovenox   - Appreciate endocrinology recs; plan to restart insulin pump on discharge    - NPO: low ISS    - Clears: if glucose > 180 x 2, start levemir 15 units, low ISS    - Regular: levemir 15 units and admelog 5 units TID, low ISS  - Dispo: pending      ________________    Seen and agree  Replete K+
Admission

## 2023-12-03 NOTE — PROGRESS NOTE ADULT - NS ATTEND AMEND GEN_ALL_CORE FT
I agree with the above history, physical examination, chief complaint/diagnosis, and plan, which I have reviewed and edited where appropriate.  I agree with notes/assessment and detailed interval history of health care providers on my service.  I have seen and examined the patient.  I reviewed the laboratory and available data and agree with the history, physical assessment and plan.  I reviewed and discussed with all consultants, house staff and PA's.  The Nutrition Support Team (NST) discusses on an ongoing basis with the primary team and all consultants, House staff and PA's to have a permanent risk benefit analyses on all decisions and coordinating care.  I was physically present for the key portions of the evaluation and management (E/M) service provided.  72 y/o female s/p robotic partial colectomy with post op ileus receiving parenteral nutrition in view of severe protein calorie malnutrition and prolonged NPO.  NAD  Lung clear  Heart RR  Abd soft  labs reviewed - electrolytes adjusted   continue TPN; 1.5L/1240 kcal/day

## 2023-12-03 NOTE — PROGRESS NOTE ADULT - SUBJECTIVE AND OBJECTIVE BOX
GENERAL SURGERY PROGRESS NOTE    Patient: NESTOR MABRY , 73y ([birth year])Female   MRN: [id number]  Location: [place] [address] A  Visit: 11-22-23 Inpatient  Date: 12-03-23 @ 10:54    Post-Op Day #: 11 hemicolectomy     Subjective: No acute events overnight. Patient resting comfortably in bed, no complaints. Passing gas, no BM. No N/V.     PAST MEDICAL & SURGICAL HISTORY:  History of hypertension      Diabetes  wears insulin pump      GERD (gastroesophageal reflux disease)      Uterine leiomyoma      Hyperlipidemia      Asthma      Obesity      Lymphadenopathy  left lower extremitiy ; 1966 - current      H/O insertion of insulin pump      OA (osteoarthritis)      Malignant neoplasm of cecum      H/O bilateral breast reduction surgery      History of appendectomy      History of cholecystectomy      H/O: hysterectomy      History of total right knee replacement  3/2016 - [facility]      H/O total knee replacement, left      S/P bunionectomy          Vitals: T(F): 98.6 (12-03-23 @ 08:20), Max: 98.7 (12-03-23 @ 00:00)  HR: 100 (12-03-23 @ 08:20)  BP: 118/76 (12-03-23 @ 08:20)  RR: 18 (12-03-23 @ 08:20)  SpO2: 100% (12-03-23 @ 08:20)      Diet, NPO:   With Ice Chips/Sips of Water      12-02-23 @ 07:01  -  12-03-23 @ 07:00  --------------------------------------------------------  IN:    Fat Emulsion (Fish Oil &amp; Plant Based) 20% Infusion: 187.2 mL    IV PiggyBack: 400 mL    TPN (Total Parenteral Nutrition): 1523 mL  Total IN: 2110.2 mL    OUT:    Emesis (mL): 0 mL    Nasogastric/Oral tube (mL): 1200 mL    Oral Fluid: 0 mL    Voided (mL): 1750 mL  Total OUT: 2950 mL    Total NET: -839.8 mL    PHYSICAL EXAM  GEN: No acute distress   CV: RRR, no JVD  LUNGS: Respirations unlabored, no use of accessory muscles  ABD: Abdomen soft, NT, ND  EXT: No peripheral edema. Regular range of motion.   INCISION: clean and dry. NGT in place.     MEDICATIONS  (STANDING):  acetaminophen   IVPB .. 1000 milliGRAM(s) IV Intermittent every 6 hours  albuterol    90 MICROgram(s) HFA Inhaler 2 Puff(s) Inhalation two times a day  budesonide 160 MICROgram(s)/formoterol 4.5 MICROgram(s) Inhaler 2 Puff(s) Inhalation two times a day  chlorhexidine 2% Cloths 1 Application(s) Topical daily  dextrose 50% Injectable 25 Gram(s) IV Push once  enoxaparin Injectable 40 milliGRAM(s) SubCutaneous every 24 hours  influenza  Vaccine (HIGH DOSE) 0.7 milliLiter(s) IntraMuscular once  insulin lispro (ADMELOG) corrective regimen sliding scale   SubCutaneous every 6 hours  lipid, fat emulsion (Fish Oil and Plant Based) 20% Infusion 20.8 mL/Hr (20.8 mL/Hr) IV Continuous <Continuous>  pantoprazole  Injectable 40 milliGRAM(s) IV Push daily  Parenteral Nutrition - Adult 1 Each (63 mL/Hr) TPN Continuous <Continuous>  Parenteral Nutrition - Adult 1 Each (63 mL/Hr) TPN Continuous <Continuous>  sodium chloride 0.9% lock flush 3 milliLiter(s) IV Push every 8 hours    MEDICATIONS  (PRN):  HYDROmorphone  Injectable 0.25 milliGRAM(s) IV Push every 3 hours PRN Moderate to Severe Pain (4- 10)  ondansetron Injectable 4 milliGRAM(s) IV Push every 6 hours PRN Nausea      DVT PROPHYLAXIS: SCDs, enoxaparin Injectable 40 milliGRAM(s) SubCutaneous every 24 hours    GI PROPHYLAXIS: pantoprazole  Injectable 40 milliGRAM(s) IV Push daily      LAB/STUDIES:  CAPILLARY BLOOD GLUCOSE      POCT Blood Glucose.: 240 mg/dL (03 Dec 2023 05:22)  POCT Blood Glucose.: 255 mg/dL (03 Dec 2023 01:08)  POCT Blood Glucose.: 297 mg/dL (02 Dec 2023 17:32)  POCT Blood Glucose.: 251 mg/dL (02 Dec 2023 12:06)                          10.0   13.75 )-----------( 429      ( 03 Dec 2023 05:48 )             31.1     12-03    140  |  101  |  8   ----------------------------<  252<H>  3.4<L>   |  29  |  0.36<L>    Ca    8.7      03 Dec 2023 05:48  Phos  2.6     12-03  Mg     1.90     12-03          Urinalysis Basic - ( 03 Dec 2023 05:48 )    Color: x / Appearance: x / SG: x / pH: x  Gluc: 252 mg/dL / Ketone: x  / Bili: x / Urobili: x   Blood: x / Protein: x / Nitrite: x   Leuk Esterase: x / RBC: x / WBC x   Sq Epi: x / Non Sq Epi: x / Bacteria: x    IMAGING:    ASSESSMENT:  73 year old female with PMH asthma, T2DM on insulin pump, GERD, HTN, HLD, OA, obesity presents s/p robotic partial colectomy for 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 Course c/b ileus.    Plan  - Pain control with Tylenol, Dilaudid PRN  - OOB/ambulate/ PT/ IS while in bed  - NPO/ NGT  - TPN for prolonged NPO status/malnutrition  - Home meds; holding HTN medications  - DVT prophylaxis: Lovenox   - [facility] endocrinology recs; plan to restart insulin pump on discharge    - NPO: low ISS    - Clears: if glucose > 180 x 2, start levemir 15 units, low ISS    - Regular: levemir 15 units and admelog 5 units TID, low ISS  - Dispo: pending    D Team Surgery  [id number]      GENERAL SURGERY PROGRESS NOTE    Patient: NESTOR MABRY , 73y ([birth year])Female   MRN: [id number]  Location: [place] [address] A  Visit: 11-22-23 Inpatient  Date: 12-03-23 @ 10:54    Post-Op Day #: 11 hemicolectomy     Subjective: No acute events overnight. Patient resting comfortably in bed, no complaints. Passing gas, no BM. No N/V.     PAST MEDICAL & SURGICAL HISTORY:  History of hypertension      Diabetes  wears insulin pump      GERD (gastroesophageal reflux disease)      Uterine leiomyoma      Hyperlipidemia      Asthma      Obesity      Lymphadenopathy  left lower extremitiy ; 1966 - current      H/O insertion of insulin pump      OA (osteoarthritis)      Malignant neoplasm of cecum      H/O bilateral breast reduction surgery      History of appendectomy      History of cholecystectomy      H/O: hysterectomy      History of total right knee replacement  3/2016 - [facility]      H/O total knee replacement, left      S/P bunionectomy          Vitals: T(F): 98.6 (12-03-23 @ 08:20), Max: 98.7 (12-03-23 @ 00:00)  HR: 100 (12-03-23 @ 08:20)  BP: 118/76 (12-03-23 @ 08:20)  RR: 18 (12-03-23 @ 08:20)  SpO2: 100% (12-03-23 @ 08:20)      Diet, NPO:   With Ice Chips/Sips of Water      12-02-23 @ 07:01  -  12-03-23 @ 07:00  --------------------------------------------------------  IN:    Fat Emulsion (Fish Oil &amp; Plant Based) 20% Infusion: 187.2 mL    IV PiggyBack: 400 mL    TPN (Total Parenteral Nutrition): 1523 mL  Total IN: 2110.2 mL    OUT:    Emesis (mL): 0 mL    Nasogastric/Oral tube (mL): 1200 mL    Oral Fluid: 0 mL    Voided (mL): 1750 mL  Total OUT: 2950 mL    Total NET: -839.8 mL    PHYSICAL EXAM  GEN: No acute distress   CV: RRR, no JVD  LUNGS: Respirations unlabored, no use of accessory muscles  ABD: Abdomen soft, NT, ND  EXT: No peripheral edema. Regular range of motion.   INCISION: clean and dry. NGT in place.     MEDICATIONS  (STANDING):  acetaminophen   IVPB .. 1000 milliGRAM(s) IV Intermittent every 6 hours  albuterol    90 MICROgram(s) HFA Inhaler 2 Puff(s) Inhalation two times a day  budesonide 160 MICROgram(s)/formoterol 4.5 MICROgram(s) Inhaler 2 Puff(s) Inhalation two times a day  chlorhexidine 2% Cloths 1 Application(s) Topical daily  dextrose 50% Injectable 25 Gram(s) IV Push once  enoxaparin Injectable 40 milliGRAM(s) SubCutaneous every 24 hours  influenza  Vaccine (HIGH DOSE) 0.7 milliLiter(s) IntraMuscular once  insulin lispro (ADMELOG) corrective regimen sliding scale   SubCutaneous every 6 hours  lipid, fat emulsion (Fish Oil and Plant Based) 20% Infusion 20.8 mL/Hr (20.8 mL/Hr) IV Continuous <Continuous>  pantoprazole  Injectable 40 milliGRAM(s) IV Push daily  Parenteral Nutrition - Adult 1 Each (63 mL/Hr) TPN Continuous <Continuous>  Parenteral Nutrition - Adult 1 Each (63 mL/Hr) TPN Continuous <Continuous>  sodium chloride 0.9% lock flush 3 milliLiter(s) IV Push every 8 hours    MEDICATIONS  (PRN):  HYDROmorphone  Injectable 0.25 milliGRAM(s) IV Push every 3 hours PRN Moderate to Severe Pain (4- 10)  ondansetron Injectable 4 milliGRAM(s) IV Push every 6 hours PRN Nausea      DVT PROPHYLAXIS: SCDs, enoxaparin Injectable 40 milliGRAM(s) SubCutaneous every 24 hours    GI PROPHYLAXIS: pantoprazole  Injectable 40 milliGRAM(s) IV Push daily      LAB/STUDIES:  CAPILLARY BLOOD GLUCOSE      POCT Blood Glucose.: 240 mg/dL (03 Dec 2023 05:22)  POCT Blood Glucose.: 255 mg/dL (03 Dec 2023 01:08)  POCT Blood Glucose.: 297 mg/dL (02 Dec 2023 17:32)  POCT Blood Glucose.: 251 mg/dL (02 Dec 2023 12:06)                          10.0   13.75 )-----------( 429      ( 03 Dec 2023 05:48 )             31.1     12-03    140  |  101  |  8   ----------------------------<  252<H>  3.4<L>   |  29  |  0.36<L>    Ca    8.7      03 Dec 2023 05:48  Phos  2.6     12-03  Mg     1.90     12-03          Urinalysis Basic - ( 03 Dec 2023 05:48 )    Color: x / Appearance: x / SG: x / pH: x  Gluc: 252 mg/dL / Ketone: x  / Bili: x / Urobili: x   Blood: x / Protein: x / Nitrite: x   Leuk Esterase: x / RBC: x / WBC x   Sq Epi: x / Non Sq Epi: x / Bacteria: x    IMAGING:    ASSESSMENT:  73 year old female with PMH asthma, T2DM on insulin pump, GERD, HTN, HLD, OA, obesity presents s/p robotic partial colectomy for 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 Course c/b ileus.    Plan  - Pain control with Tylenol, Dilaudid PRN  - OOB/ambulate/ PT/ IS while in bed  - NPO/ NGT  - TPN for prolonged NPO status/malnutrition  - Home meds; holding HTN medications  - DVT prophylaxis: Lovenox   - [facility] endocrinology recs; plan to restart insulin pump on discharge    - NPO: low ISS    - Clears: if glucose > 180 x 2, start levemir 15 units, low ISS    - Regular: levemir 15 units and admelog 5 units TID, low ISS  - Dispo: pending    D Team Surgery  [id number]      GENERAL SURGERY PROGRESS NOTE    Patient: NESTOR MABRY , 73y ([birth year])Female   MRN: [id number]  Location: [place] [address] A  Visit: 11-22-23 Inpatient  Date: 12-03-23 @ 10:54    Post-Op Day #: 11 hemicolectomy     Subjective: No acute events overnight. Patient resting comfortably in bed, no complaints. Passing gas, no BM. No N/V.     PAST MEDICAL & SURGICAL HISTORY:  History of hypertension      Diabetes  wears insulin pump      GERD (gastroesophageal reflux disease)      Uterine leiomyoma      Hyperlipidemia      Asthma      Obesity      Lymphadenopathy  left lower extremitiy ; 1966 - current      H/O insertion of insulin pump      OA (osteoarthritis)      Malignant neoplasm of cecum      H/O bilateral breast reduction surgery      History of appendectomy      History of cholecystectomy      H/O: hysterectomy      History of total right knee replacement  3/2016 - [facility]      H/O total knee replacement, left      S/P bunionectomy          Vitals: T(F): 98.6 (12-03-23 @ 08:20), Max: 98.7 (12-03-23 @ 00:00)  HR: 100 (12-03-23 @ 08:20)  BP: 118/76 (12-03-23 @ 08:20)  RR: 18 (12-03-23 @ 08:20)  SpO2: 100% (12-03-23 @ 08:20)      Diet, NPO:   With Ice Chips/Sips of Water      12-02-23 @ 07:01  -  12-03-23 @ 07:00  --------------------------------------------------------  IN:    Fat Emulsion (Fish Oil &amp; Plant Based) 20% Infusion: 187.2 mL    IV PiggyBack: 400 mL    TPN (Total Parenteral Nutrition): 1523 mL  Total IN: 2110.2 mL    OUT:    Emesis (mL): 0 mL    Nasogastric/Oral tube (mL): 1200 mL    Oral Fluid: 0 mL    Voided (mL): 1750 mL  Total OUT: 2950 mL    Total NET: -839.8 mL    PHYSICAL EXAM  GEN: No acute distress   CV: RRR, no JVD  LUNGS: Respirations unlabored, no use of accessory muscles  ABD: Abdomen soft, NT, ND  EXT: No peripheral edema. Regular range of motion.   INCISION: clean and dry. NGT in place.     MEDICATIONS  (STANDING):  acetaminophen   IVPB .. 1000 milliGRAM(s) IV Intermittent every 6 hours  albuterol    90 MICROgram(s) HFA Inhaler 2 Puff(s) Inhalation two times a day  budesonide 160 MICROgram(s)/formoterol 4.5 MICROgram(s) Inhaler 2 Puff(s) Inhalation two times a day  chlorhexidine 2% Cloths 1 Application(s) Topical daily  dextrose 50% Injectable 25 Gram(s) IV Push once  enoxaparin Injectable 40 milliGRAM(s) SubCutaneous every 24 hours  influenza  Vaccine (HIGH DOSE) 0.7 milliLiter(s) IntraMuscular once  insulin lispro (ADMELOG) corrective regimen sliding scale   SubCutaneous every 6 hours  lipid, fat emulsion (Fish Oil and Plant Based) 20% Infusion 20.8 mL/Hr (20.8 mL/Hr) IV Continuous <Continuous>  pantoprazole  Injectable 40 milliGRAM(s) IV Push daily  Parenteral Nutrition - Adult 1 Each (63 mL/Hr) TPN Continuous <Continuous>  Parenteral Nutrition - Adult 1 Each (63 mL/Hr) TPN Continuous <Continuous>  sodium chloride 0.9% lock flush 3 milliLiter(s) IV Push every 8 hours    MEDICATIONS  (PRN):  HYDROmorphone  Injectable 0.25 milliGRAM(s) IV Push every 3 hours PRN Moderate to Severe Pain (4- 10)  ondansetron Injectable 4 milliGRAM(s) IV Push every 6 hours PRN Nausea      DVT PROPHYLAXIS: SCDs, enoxaparin Injectable 40 milliGRAM(s) SubCutaneous every 24 hours    GI PROPHYLAXIS: pantoprazole  Injectable 40 milliGRAM(s) IV Push daily      LAB/STUDIES:  CAPILLARY BLOOD GLUCOSE      POCT Blood Glucose.: 240 mg/dL (03 Dec 2023 05:22)  POCT Blood Glucose.: 255 mg/dL (03 Dec 2023 01:08)  POCT Blood Glucose.: 297 mg/dL (02 Dec 2023 17:32)  POCT Blood Glucose.: 251 mg/dL (02 Dec 2023 12:06)                          10.0   13.75 )-----------( 429      ( 03 Dec 2023 05:48 )             31.1     12-03    140  |  101  |  8   ----------------------------<  252<H>  3.4<L>   |  29  |  0.36<L>    Ca    8.7      03 Dec 2023 05:48  Phos  2.6     12-03  Mg     1.90     12-03          Urinalysis Basic - ( 03 Dec 2023 05:48 )    Color: x / Appearance: x / SG: x / pH: x  Gluc: 252 mg/dL / Ketone: x  / Bili: x / Urobili: x   Blood: x / Protein: x / Nitrite: x   Leuk Esterase: x / RBC: x / WBC x   Sq Epi: x / Non Sq Epi: x / Bacteria: x    IMAGING:    ASSESSMENT:  73 year old female with PMH asthma, T2DM on insulin pump, GERD, HTN, HLD, OA, obesity presents s/p robotic partial colectomy for 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 Course c/b ileus.    Plan  - Pain control with Tylenol, Dilaudid PRN  - OOB/ambulate/ PT/ IS while in bed  - NPO/ NGT  - TPN for prolonged NPO status/malnutrition  - Home meds; holding HTN medications  - DVT prophylaxis: Lovenox   - [facility] endocrinology recs; plan to restart insulin pump on discharge    - NPO: low ISS    - Clears: if glucose > 180 x 2, start levemir 15 units, low ISS    - Regular: levemir 15 units and admelog 5 units TID, low ISS  - Dispo: pending    D Team Surgery  [id number]

## 2023-12-03 NOTE — CHART NOTE - NSCHARTNOTEFT_GEN_A_CORE
Endocrine - Follow up     Contacted by primary team for hyperglycemia     Nutritional support services is adjusting insulin in TPN solution - see documentation and TPN order   Hyperglycemia being managed by Nutritional support services while on TPN     Continue with Admelog MODERATELY dosed correctional scale every 6 hours while on TPN    Endocrine will help to convert to basal / bolus insulin once patient is transitioned off TPN    See prior progress note: please ensure old pump insertion site was removed by patient (on ABD)    D/W Dr Aguilar    Endocrine      CAPILLARY BLOOD GLUCOSE      POCT Blood Glucose.: 240 mg/dL (03 Dec 2023 05:22)  POCT Blood Glucose.: 255 mg/dL (03 Dec 2023 01:08)  POCT Blood Glucose.: 297 mg/dL (02 Dec 2023 17:32)  POCT Blood Glucose.: 251 mg/dL (02 Dec 2023 12:06)      12-03    140  |  101  |  8   ----------------------------<  252<H>  3.4<L>   |  29  |  0.36<L>    Ca    8.7      03 Dec 2023 05:48  Phos  2.6     12-03  Mg     1.90     12-03      Diet, NPO:   With Ice Chips/Sips of Water (11-30-23 @ 08:18)  TPN

## 2023-12-03 NOTE — PROGRESS NOTE ADULT - SUBJECTIVE AND OBJECTIVE BOX
Name of Patient : NESTOR MABRY  MRN: [id number]  Date of visit: 12-03-23       Subjective: Patient seen and examined. No new events except as noted.     REVIEW OF SYSTEMS:    CONSTITUTIONAL: No weakness, fevers or chills  EYES/ENT: No visual changes;  No vertigo or throat pain   NECK: No pain or stiffness  RESPIRATORY: No cough, wheezing, hemoptysis; No shortness of breath  CARDIOVASCULAR: No chest pain or palpitations  GASTROINTESTINAL: No abdominal or epigastric pain. No nausea, vomiting, or hematemesis; No diarrhea or constipation. No melena or hematochezia.  GENITOURINARY: No dysuria, frequency or hematuria  NEUROLOGICAL: No numbness or weakness  SKIN: No itching, burning, rashes, or lesions   All other review of systems is negative unless indicated above.    MEDICATIONS:  MEDICATIONS  (STANDING):  acetaminophen   IVPB .. 1000 milliGRAM(s) IV Intermittent every 6 hours  albuterol    90 MICROgram(s) HFA Inhaler 2 Puff(s) Inhalation two times a day  budesonide 160 MICROgram(s)/formoterol 4.5 MICROgram(s) Inhaler 2 Puff(s) Inhalation two times a day  chlorhexidine 2% Cloths 1 Application(s) Topical daily  dextrose 5% + sodium chloride 0.45% with potassium chloride 20 mEq/L 1000 milliLiter(s) (100 mL/Hr) IV Continuous <Continuous>  dextrose 5%. 1000 milliLiter(s) (50 mL/Hr) IV Continuous <Continuous>  dextrose 5%. 1000 milliLiter(s) (100 mL/Hr) IV Continuous <Continuous>  dextrose 50% Injectable 25 Gram(s) IV Push once  enoxaparin Injectable 40 milliGRAM(s) SubCutaneous every 24 hours  glucagon  Injectable 1 milliGRAM(s) IntraMuscular once  influenza  Vaccine (HIGH DOSE) 0.7 milliLiter(s) IntraMuscular once  insulin lispro (ADMELOG) corrective regimen sliding scale   SubCutaneous every 6 hours  lipid, fat emulsion (Fish Oil and Plant Based) 20% Infusion 20.8 mL/Hr (20.8 mL/Hr) IV Continuous <Continuous>  pantoprazole  Injectable 40 milliGRAM(s) IV Push daily  Parenteral Nutrition - Adult 1 Each (63 mL/Hr) TPN Continuous <Continuous>  Parenteral Nutrition - Adult 1 Each (63 mL/Hr) TPN Continuous <Continuous>  sodium chloride 0.9% lock flush 3 milliLiter(s) IV Push every 8 hours      PHYSICAL EXAM:  T(C): 36.9 (12-03-23 @ 16:09), Max: 37.1 (12-03-23 @ 00:00)  HR: 106 (12-03-23 @ 16:09) (100 - 111)  BP: 114/57 (12-03-23 @ 16:09) (104/59 - 142/70)  RR: 18 (12-03-23 @ 16:09) (18 - 18)  SpO2: 100% (12-03-23 @ 16:09) (99% - 100%)  Wt(kg): --  I&O's Summary    02 Dec 2023 07:01  -  03 Dec 2023 07:00  --------------------------------------------------------  IN: 2110.2 mL / OUT: 2950 mL / NET: -839.8 mL    03 Dec 2023 07:01  -  03 Dec 2023 17:59  --------------------------------------------------------  IN: 630 mL / OUT: 1250 mL / NET: -620 mL          Appearance: Normal	  HEENT:  PERRLA   Lymphatic: No lymphadenopathy   Cardiovascular: Normal S1 S2, no JVD  Respiratory: normal effort , clear  Gastrointestinal:  Soft, Non-tender  Skin: No rashes,  warm to touch  Psychiatry:  Mood & affect appropriate  Musculuskeletal: No edema    recent labs, Imaging and EKGs personally reviewed     12-02-23 @ 07:01  -  12-03-23 @ 07:00  --------------------------------------------------------  IN: 2110.2 mL / OUT: 2950 mL / NET: -839.8 mL    12-03-23 @ 07:01  -  12-03-23 @ 17:59  --------------------------------------------------------  IN: 630 mL / OUT: 1250 mL / NET: -620 mL                          10.0   13.75 )-----------( 429      ( 03 Dec 2023 05:48 )             31.1               12-03    140  |  101  |  8   ----------------------------<  252<H>  3.4<L>   |  29  |  0.36<L>    Ca    8.7      03 Dec 2023 05:48  Phos  2.6     12-03  Mg     1.90     12-03                         Urinalysis Basic - ( 03 Dec 2023 05:48 )    Color: x / Appearance: x / SG: x / pH: x  Gluc: 252 mg/dL / Ketone: x  / Bili: x / Urobili: x   Blood: x / Protein: x / Nitrite: x   Leuk Esterase: x / RBC: x / WBC x   Sq Epi: x / Non Sq Epi: x / Bacteria: x               Name of Patient : NESTOR MABRY  MRN: [id number]  Date of visit: 12-03-23       Subjective: Patient seen and examined. No new events except as noted.     REVIEW OF SYSTEMS:    CONSTITUTIONAL: No weakness, fevers or chills  EYES/ENT: No visual changes;  No vertigo or throat pain   NECK: No pain or stiffness  RESPIRATORY: No cough, wheezing, hemoptysis; No shortness of breath  CARDIOVASCULAR: No chest pain or palpitations  GASTROINTESTINAL: No abdominal or epigastric pain. No nausea, vomiting, or hematemesis; No diarrhea or constipation. No melena or hematochezia.  GENITOURINARY: No dysuria, frequency or hematuria  NEUROLOGICAL: No numbness or weakness  SKIN: No itching, burning, rashes, or lesions   All other review of systems is negative unless indicated above.    MEDICATIONS:  MEDICATIONS  (STANDING):  acetaminophen   IVPB .. 1000 milliGRAM(s) IV Intermittent every 6 hours  albuterol    90 MICROgram(s) HFA Inhaler 2 Puff(s) Inhalation two times a day  budesonide 160 MICROgram(s)/formoterol 4.5 MICROgram(s) Inhaler 2 Puff(s) Inhalation two times a day  chlorhexidine 2% Cloths 1 Application(s) Topical daily  dextrose 5% + sodium chloride 0.45% with potassium chloride 20 mEq/L 1000 milliLiter(s) (100 mL/Hr) IV Continuous <Continuous>  dextrose 5%. 1000 milliLiter(s) (50 mL/Hr) IV Continuous <Continuous>  dextrose 5%. 1000 milliLiter(s) (100 mL/Hr) IV Continuous <Continuous>  dextrose 50% Injectable 25 Gram(s) IV Push once  enoxaparin Injectable 40 milliGRAM(s) SubCutaneous every 24 hours  glucagon  Injectable 1 milliGRAM(s) IntraMuscular once  influenza  Vaccine (HIGH DOSE) 0.7 milliLiter(s) IntraMuscular once  insulin lispro (ADMELOG) corrective regimen sliding scale   SubCutaneous every 6 hours  lipid, fat emulsion (Fish Oil and Plant Based) 20% Infusion 20.8 mL/Hr (20.8 mL/Hr) IV Continuous <Continuous>  pantoprazole  Injectable 40 milliGRAM(s) IV Push daily  Parenteral Nutrition - Adult 1 Each (63 mL/Hr) TPN Continuous <Continuous>  Parenteral Nutrition - Adult 1 Each (63 mL/Hr) TPN Continuous <Continuous>  sodium chloride 0.9% lock flush 3 milliLiter(s) IV Push every 8 hours      PHYSICAL EXAM:  T(C): 36.9 (12-03-23 @ 16:09), Max: 37.1 (12-03-23 @ 00:00)  HR: 106 (12-03-23 @ 16:09) (100 - 111)  BP: 114/57 (12-03-23 @ 16:09) (104/59 - 142/70)  RR: 18 (12-03-23 @ 16:09) (18 - 18)  SpO2: 100% (12-03-23 @ 16:09) (99% - 100%)  Wt(kg): --  I&O's Summary    02 Dec 2023 07:01  -  03 Dec 2023 07:00  --------------------------------------------------------  IN: 2110.2 mL / OUT: 2950 mL / NET: -839.8 mL    03 Dec 2023 07:01  -  03 Dec 2023 17:59  --------------------------------------------------------  IN: 630 mL / OUT: 1250 mL / NET: -620 mL          Appearance: Normal	  HEENT:  PERRLA   Lymphatic: No lymphadenopathy   Cardiovascular: Normal S1 S2, no JVD  Respiratory: normal effort , clear  Gastrointestinal:  Soft, Non-tender  Skin: No rashes,  warm to touch  Psychiatry:  Mood & affect appropriate  Musculuskeletal: No edema    recent labs, Imaging and EKGs personally reviewed     12-02-23 @ 07:01  -  12-03-23 @ 07:00  --------------------------------------------------------  IN: 2110.2 mL / OUT: 2950 mL / NET: -839.8 mL    12-03-23 @ 07:01  -  12-03-23 @ 17:59  --------------------------------------------------------  IN: 630 mL / OUT: 1250 mL / NET: -620 mL                          10.0   13.75 )-----------( 429      ( 03 Dec 2023 05:48 )             31.1               12-03    140  |  101  |  8   ----------------------------<  252<H>  3.4<L>   |  29  |  0.36<L>    Ca    8.7      03 Dec 2023 05:48  Phos  2.6     12-03  Mg     1.90     12-03                         Urinalysis Basic - ( 03 Dec 2023 05:48 )    Color: x / Appearance: x / SG: x / pH: x  Gluc: 252 mg/dL / Ketone: x  / Bili: x / Urobili: x   Blood: x / Protein: x / Nitrite: x   Leuk Esterase: x / RBC: x / WBC x   Sq Epi: x / Non Sq Epi: x / Bacteria: x

## 2023-12-03 NOTE — PROGRESS NOTE ADULT - SUBJECTIVE AND OBJECTIVE BOX
NUTRITION NOTE  [id number] THOMASTUCKER  ===============================    Interval events - Patient was seen and examined at bedside, no acute events overnight. Patient denies chest pain, shortness of breath, nausea or vomiting at this time. PICC placed and TPN was started on 23 for nutritional support. Pt is tolerating TPN without any issues. Pt remains NPO with NGT in place.    ROS: Except as noted above, all other systems reviewed and are negative     Allergies  Darvocet A500 (Other; Urticaria)  Percocet 10/325 (Other; Urticaria)  codeine (Other)  Lantus (Swelling)  Purell    PAST MEDICAL & SURGICAL HISTORY:  History of hypertension  Diabetes wears insulin pump  GERD (gastroesophageal reflux disease)  Uterine leiomyoma  Hyperlipidemia  Asthma  Obesity  Lymphadenopathy left lower extremity ;  - current  H/O insertion of insulin pump  OA (osteoarthritis)  Malignant neoplasm of cecum  H/O bilateral breast reduction surgery  History of appendectomy  History of cholecystectomy  H/O: hysterectomy  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 - [facility]  H/O total knee replacement, left  S/P bunionectomy    FAMILY HISTORY:  Diabetes mellitus (Father)  FH: breast cancer (Aunt)    Vital Signs Last 24 Hrs  T(C): 36.6 (03 Dec 2023 04:30), Max: 37.1 (03 Dec 2023 00:00)  T(F): 97.8 (03 Dec 2023 04:30), Max: 98.7 (03 Dec 2023 00:00)  HR: 104 (03 Dec 2023 04:30) (90 - 110)  BP: 142/70 (03 Dec 2023 04:30) (106/56 - 142/70)  RR: 18 (03 Dec 2023 04:30) (16 - 18)  SpO2: 100% (03 Dec 2023 04:30) (100% - 100%)    MEDICATIONS  (STANDING):  acetaminophen   IVPB .. 1000 milliGRAM(s) IV Intermittent every 6 hours  albuterol    90 MICROgram(s) HFA Inhaler 2 Puff(s) Inhalation two times a day  budesonide 160 MICROgram(s)/formoterol 4.5 MICROgram(s) Inhaler 2 Puff(s) Inhalation two times a day  chlorhexidine 2% Cloths 1 Application(s) Topical daily  dextrose 50% Injectable 25 Gram(s) IV Push once  enoxaparin Injectable 40 milliGRAM(s) SubCutaneous every 24 hours  influenza  Vaccine (HIGH DOSE) 0.7 milliLiter(s) IntraMuscular once  insulin lispro (ADMELOG) corrective regimen sliding scale   SubCutaneous every 6 hours  lipid, fat emulsion (Fish Oil and Plant Based) 20% Infusion 20.8 mL/Hr (20.8 mL/Hr) IV Continuous <Continuous>  pantoprazole  Injectable 40 milliGRAM(s) IV Push daily  Parenteral Nutrition - Adult 1 Each (63 mL/Hr) TPN Continuous <Continuous>  Parenteral Nutrition - Adult 1 Each (63 mL/Hr) TPN Continuous <Continuous>  sodium chloride 0.9% lock flush 3 milliLiter(s) IV Push every 8 hours    MEDICATIONS  (PRN):  HYDROmorphone  Injectable 0.25 milliGRAM(s) IV Push every 3 hours PRN Moderate to Severe Pain (4- 10)  ondansetron Injectable 4 milliGRAM(s) IV Push every 6 hours PRN Nausea    I&O's Detail    02 Dec 2023 07:01  -  03 Dec 2023 07:00  --------------------------------------------------------  IN:    Fat Emulsion (Fish Oil &amp; Plant Based) 20% Infusion: 187.2 mL    IV PiggyBack: 400 mL    TPN (Total Parenteral Nutrition): 1523 mL  Total IN: 2110.2 mL    OUT:    Emesis (mL): 0 mL    Nasogastric/Oral tube (mL): 1200 mL    Oral Fluid: 0 mL    Voided (mL): 1750 mL  Total OUT: 2950 mL    Total NET: -839.8 mL    POCT Blood Glucose.: 240 mg/dL (03 Dec 2023 05:22)  POCT Blood Glucose.: 255 mg/dL (03 Dec 2023 01:08)  POCT Blood Glucose.: 297 mg/dL (02 Dec 2023 17:32)  POCT Blood Glucose.: 251 mg/dL (02 Dec 2023 12:06)    Daily Weight in k.6 (03 Dec 2023 00:00)    Drug Dosing Weight  Height (cm): 152.4 (2023 06:23)  Weight (kg): 75.659 (2023 06:23)  BMI (kg/m2): 32.6 (2023 06:23)  BSA (m2): 1.73 (2023 06:23)    PHYSICAL EXAM:  Constitutional: A&Ox3, NAD, resting comfortably in bed  Respiratory: Unlabored breathing  Abdomen: Soft, nondistended, mildly tender to palpation in lower quadrants   Extremities: WWP, SEAMAN spontaneously  PICC Site: double lumen PICC placed on 23 in [facility] clean and dry     Diet: NPO with sips and TPN/lipids (started on 23)    LABORATORY                                          10.0   13.75 )-----------( 429      ( 03 Dec 2023 05:48 )             31.1       140  |  101  |  8   ----------------------------<  252<H>  3.4<L>   |  29  |  0.36<L>    Ca    8.7      03 Dec 2023 05:48  Phos  2.6     12-  Mg     1.90      Chol 65 LDL -- HDL 29<L> Trig 81    CT Abdomen and Pelvis 23: IMPRESSION: Dilated loops of small bowel the level of the terminal ileum, likely ileus in the postoperative setting. Postoperative pneumoperitoneum and diffuse subcutaneous emphysema.    ASSESSMENT/PLAN:  74 y/o female with PMH asthma, T2DM on insulin pump, GERD, HTN, HLD, OA, obesity is s/p robotic partial colectomy for moderately differentiated adenocarcinoma of cecal mass on 23. CT abd/pelvis on 23 showed findings consistent with post op ileus. Pt remains NPO with NGT in place. Nutrition consult called for initiation of parenteral nutrition in view of severe protein calorie malnutrition and prolonged NPO status. PICC placed and TPN was started on 23.    continue TPN with infusion volume of 1.5L, TPN will provide 1240 kcal/day    labs reviewed - electrolytes adjusted in TPN bag    monitor fingersticks, obtain daily weights - -297, increased to 25 units of insulin in tonight's TPN bag, will continue to trend FS and make insulin adjustments as needed; pt has a known hx of DM2 with A1c 6.8% and is on an insulin pump at home    continue parenteral nutrition at this time, will follow up with primary team on plan     1.  Severe protein calorie malnutrition being optimized with TPN: CHO [100] gm.  AA [100] gm. SMOF Lipids [50] gm.  2.  Hyperglycemia managed with: [25] units of regular insulin    3.  Check fluid balance daily.  Strict I/O  [ ] [ ]   4.  Daily BMP, Ionized Calcium, Magnesium and Phosphorous   5.  Triglycerides at initiation of TPN and monthly - Chol 65 LDL -- HDL 29<L> Trig 81    Nutrition Support [id number]  NUTRITION NOTE  [id number] THOMASTUCKER  ===============================    Interval events - Patient was seen and examined at bedside, no acute events overnight. Patient denies chest pain, shortness of breath, nausea or vomiting at this time. PICC placed and TPN was started on 23 for nutritional support. Pt is tolerating TPN without any issues. Pt remains NPO with NGT in place.    ROS: Except as noted above, all other systems reviewed and are negative     Allergies  Darvocet A500 (Other; Urticaria)  Percocet 10/325 (Other; Urticaria)  codeine (Other)  Lantus (Swelling)  Purell    PAST MEDICAL & SURGICAL HISTORY:  History of hypertension  Diabetes wears insulin pump  GERD (gastroesophageal reflux disease)  Uterine leiomyoma  Hyperlipidemia  Asthma  Obesity  Lymphadenopathy left lower extremity ;  - current  H/O insertion of insulin pump  OA (osteoarthritis)  Malignant neoplasm of cecum  H/O bilateral breast reduction surgery  History of appendectomy  History of cholecystectomy  H/O: hysterectomy  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 - [facility]  H/O total knee replacement, left  S/P bunionectomy    FAMILY HISTORY:  Diabetes mellitus (Father)  FH: breast cancer (Aunt)    Vital Signs Last 24 Hrs  T(C): 36.6 (03 Dec 2023 04:30), Max: 37.1 (03 Dec 2023 00:00)  T(F): 97.8 (03 Dec 2023 04:30), Max: 98.7 (03 Dec 2023 00:00)  HR: 104 (03 Dec 2023 04:30) (90 - 110)  BP: 142/70 (03 Dec 2023 04:30) (106/56 - 142/70)  RR: 18 (03 Dec 2023 04:30) (16 - 18)  SpO2: 100% (03 Dec 2023 04:30) (100% - 100%)    MEDICATIONS  (STANDING):  acetaminophen   IVPB .. 1000 milliGRAM(s) IV Intermittent every 6 hours  albuterol    90 MICROgram(s) HFA Inhaler 2 Puff(s) Inhalation two times a day  budesonide 160 MICROgram(s)/formoterol 4.5 MICROgram(s) Inhaler 2 Puff(s) Inhalation two times a day  chlorhexidine 2% Cloths 1 Application(s) Topical daily  dextrose 50% Injectable 25 Gram(s) IV Push once  enoxaparin Injectable 40 milliGRAM(s) SubCutaneous every 24 hours  influenza  Vaccine (HIGH DOSE) 0.7 milliLiter(s) IntraMuscular once  insulin lispro (ADMELOG) corrective regimen sliding scale   SubCutaneous every 6 hours  lipid, fat emulsion (Fish Oil and Plant Based) 20% Infusion 20.8 mL/Hr (20.8 mL/Hr) IV Continuous <Continuous>  pantoprazole  Injectable 40 milliGRAM(s) IV Push daily  Parenteral Nutrition - Adult 1 Each (63 mL/Hr) TPN Continuous <Continuous>  Parenteral Nutrition - Adult 1 Each (63 mL/Hr) TPN Continuous <Continuous>  sodium chloride 0.9% lock flush 3 milliLiter(s) IV Push every 8 hours    MEDICATIONS  (PRN):  HYDROmorphone  Injectable 0.25 milliGRAM(s) IV Push every 3 hours PRN Moderate to Severe Pain (4- 10)  ondansetron Injectable 4 milliGRAM(s) IV Push every 6 hours PRN Nausea    I&O's Detail    02 Dec 2023 07:01  -  03 Dec 2023 07:00  --------------------------------------------------------  IN:    Fat Emulsion (Fish Oil &amp; Plant Based) 20% Infusion: 187.2 mL    IV PiggyBack: 400 mL    TPN (Total Parenteral Nutrition): 1523 mL  Total IN: 2110.2 mL    OUT:    Emesis (mL): 0 mL    Nasogastric/Oral tube (mL): 1200 mL    Oral Fluid: 0 mL    Voided (mL): 1750 mL  Total OUT: 2950 mL    Total NET: -839.8 mL    POCT Blood Glucose.: 240 mg/dL (03 Dec 2023 05:22)  POCT Blood Glucose.: 255 mg/dL (03 Dec 2023 01:08)  POCT Blood Glucose.: 297 mg/dL (02 Dec 2023 17:32)  POCT Blood Glucose.: 251 mg/dL (02 Dec 2023 12:06)    Daily Weight in k.6 (03 Dec 2023 00:00)    Drug Dosing Weight  Height (cm): 152.4 (2023 06:23)  Weight (kg): 75.659 (2023 06:23)  BMI (kg/m2): 32.6 (2023 06:23)  BSA (m2): 1.73 (2023 06:23)    PHYSICAL EXAM:  Constitutional: A&Ox3, NAD, resting comfortably in bed  Respiratory: Unlabored breathing  Abdomen: Soft, nondistended, mildly tender to palpation in lower quadrants   Extremities: WWP, SEAMAN spontaneously  PICC Site: double lumen PICC placed on 23 in [facility] clean and dry     Diet: NPO with sips and TPN/lipids (started on 23)    LABORATORY                                          10.0   13.75 )-----------( 429      ( 03 Dec 2023 05:48 )             31.1       140  |  101  |  8   ----------------------------<  252<H>  3.4<L>   |  29  |  0.36<L>    Ca    8.7      03 Dec 2023 05:48  Phos  2.6     12-  Mg     1.90      Chol 65 LDL -- HDL 29<L> Trig 81    CT Abdomen and Pelvis 23: IMPRESSION: Dilated loops of small bowel the level of the terminal ileum, likely ileus in the postoperative setting. Postoperative pneumoperitoneum and diffuse subcutaneous emphysema.    ASSESSMENT/PLAN:  74 y/o female with PMH asthma, T2DM on insulin pump, GERD, HTN, HLD, OA, obesity is s/p robotic partial colectomy for moderately differentiated adenocarcinoma of cecal mass on 23. CT abd/pelvis on 23 showed findings consistent with post op ileus. Pt remains NPO with NGT in place. Nutrition consult called for initiation of parenteral nutrition in view of severe protein calorie malnutrition and prolonged NPO status. PICC placed and TPN was started on 23.    continue TPN with infusion volume of 1.5L, TPN will provide 1240 kcal/day    labs reviewed - electrolytes adjusted in TPN bag    monitor fingersticks, obtain daily weights - -297, increased to 25 units of insulin in tonight's TPN bag, will continue to trend FS and make insulin adjustments as needed; pt has a known hx of DM2 with A1c 6.8% and is on an insulin pump at home    continue parenteral nutrition at this time, will follow up with primary team on plan     1.  Severe protein calorie malnutrition being optimized with TPN: CHO [100] gm.  AA [100] gm. SMOF Lipids [50] gm.  2.  Hyperglycemia managed with: [25] units of regular insulin    3.  Check fluid balance daily.  Strict I/O  [ ] [ ]   4.  Daily BMP, Ionized Calcium, Magnesium and Phosphorous   5.  Triglycerides at initiation of TPN and monthly - Chol 65 LDL -- HDL 29<L> Trig 81    Nutrition Support [id number]  NUTRITION NOTE  [id number] THOMASTUCKER  ===============================    Interval events - Patient was seen and examined at bedside, no acute events overnight. Patient denies chest pain, shortness of breath, nausea or vomiting at this time. PICC placed and TPN was started on 23 for nutritional support. Pt is tolerating TPN without any issues. Pt remains NPO with NGT in place.    ROS: Except as noted above, all other systems reviewed and are negative     Allergies  Darvocet A500 (Other; Urticaria)  Percocet 10/325 (Other; Urticaria)  codeine (Other)  Lantus (Swelling)  Purell    PAST MEDICAL & SURGICAL HISTORY:  History of hypertension  Diabetes wears insulin pump  GERD (gastroesophageal reflux disease)  Uterine leiomyoma  Hyperlipidemia  Asthma  Obesity  Lymphadenopathy left lower extremity ;  - current  H/O insertion of insulin pump  OA (osteoarthritis)  Malignant neoplasm of cecum  H/O bilateral breast reduction surgery  History of appendectomy  History of cholecystectomy  H/O: hysterectomy  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 - [facility]  H/O total knee replacement, left  S/P bunionectomy    FAMILY HISTORY:  Diabetes mellitus (Father)  FH: breast cancer (Aunt)    Vital Signs Last 24 Hrs  T(C): 36.6 (03 Dec 2023 04:30), Max: 37.1 (03 Dec 2023 00:00)  T(F): 97.8 (03 Dec 2023 04:30), Max: 98.7 (03 Dec 2023 00:00)  HR: 104 (03 Dec 2023 04:30) (90 - 110)  BP: 142/70 (03 Dec 2023 04:30) (106/56 - 142/70)  RR: 18 (03 Dec 2023 04:30) (16 - 18)  SpO2: 100% (03 Dec 2023 04:30) (100% - 100%)    MEDICATIONS  (STANDING):  acetaminophen   IVPB .. 1000 milliGRAM(s) IV Intermittent every 6 hours  albuterol    90 MICROgram(s) HFA Inhaler 2 Puff(s) Inhalation two times a day  budesonide 160 MICROgram(s)/formoterol 4.5 MICROgram(s) Inhaler 2 Puff(s) Inhalation two times a day  chlorhexidine 2% Cloths 1 Application(s) Topical daily  dextrose 50% Injectable 25 Gram(s) IV Push once  enoxaparin Injectable 40 milliGRAM(s) SubCutaneous every 24 hours  influenza  Vaccine (HIGH DOSE) 0.7 milliLiter(s) IntraMuscular once  insulin lispro (ADMELOG) corrective regimen sliding scale   SubCutaneous every 6 hours  lipid, fat emulsion (Fish Oil and Plant Based) 20% Infusion 20.8 mL/Hr (20.8 mL/Hr) IV Continuous <Continuous>  pantoprazole  Injectable 40 milliGRAM(s) IV Push daily  Parenteral Nutrition - Adult 1 Each (63 mL/Hr) TPN Continuous <Continuous>  Parenteral Nutrition - Adult 1 Each (63 mL/Hr) TPN Continuous <Continuous>  sodium chloride 0.9% lock flush 3 milliLiter(s) IV Push every 8 hours    MEDICATIONS  (PRN):  HYDROmorphone  Injectable 0.25 milliGRAM(s) IV Push every 3 hours PRN Moderate to Severe Pain (4- 10)  ondansetron Injectable 4 milliGRAM(s) IV Push every 6 hours PRN Nausea    I&O's Detail    02 Dec 2023 07:01  -  03 Dec 2023 07:00  --------------------------------------------------------  IN:    Fat Emulsion (Fish Oil &amp; Plant Based) 20% Infusion: 187.2 mL    IV PiggyBack: 400 mL    TPN (Total Parenteral Nutrition): 1523 mL  Total IN: 2110.2 mL    OUT:    Emesis (mL): 0 mL    Nasogastric/Oral tube (mL): 1200 mL    Oral Fluid: 0 mL    Voided (mL): 1750 mL  Total OUT: 2950 mL    Total NET: -839.8 mL    POCT Blood Glucose.: 240 mg/dL (03 Dec 2023 05:22)  POCT Blood Glucose.: 255 mg/dL (03 Dec 2023 01:08)  POCT Blood Glucose.: 297 mg/dL (02 Dec 2023 17:32)  POCT Blood Glucose.: 251 mg/dL (02 Dec 2023 12:06)    Daily Weight in k.6 (03 Dec 2023 00:00)    Drug Dosing Weight  Height (cm): 152.4 (2023 06:23)  Weight (kg): 75.659 (2023 06:23)  BMI (kg/m2): 32.6 (2023 06:23)  BSA (m2): 1.73 (2023 06:23)    PHYSICAL EXAM:  Constitutional: A&Ox3, NAD, resting comfortably in bed  Respiratory: Unlabored breathing  Abdomen: Soft, nondistended, mildly tender to palpation in lower quadrants   Extremities: WWP, SEAMAN spontaneously  PICC Site: double lumen PICC placed on 23 in [facility] clean and dry     Diet: NPO with sips and TPN/lipids (started on 23)    LABORATORY                                          10.0   13.75 )-----------( 429      ( 03 Dec 2023 05:48 )             31.1       140  |  101  |  8   ----------------------------<  252<H>  3.4<L>   |  29  |  0.36<L>    Ca    8.7      03 Dec 2023 05:48  Phos  2.6     12-  Mg     1.90      Chol 65 LDL -- HDL 29<L> Trig 81    CT Abdomen and Pelvis 23: IMPRESSION: Dilated loops of small bowel the level of the terminal ileum, likely ileus in the postoperative setting. Postoperative pneumoperitoneum and diffuse subcutaneous emphysema.    ASSESSMENT/PLAN:  72 y/o female with PMH asthma, T2DM on insulin pump, GERD, HTN, HLD, OA, obesity is s/p robotic partial colectomy for moderately differentiated adenocarcinoma of cecal mass on 23. CT abd/pelvis on 23 showed findings consistent with post op ileus. Pt remains NPO with NGT in place. Nutrition consult called for initiation of parenteral nutrition in view of severe protein calorie malnutrition and prolonged NPO status. PICC placed and TPN was started on 23.    continue TPN with infusion volume of 1.5L, TPN will provide 1240 kcal/day    labs reviewed - electrolytes adjusted in TPN bag    monitor fingersticks, obtain daily weights - -297, increased to 25 units of insulin in tonight's TPN bag, will continue to trend FS and make insulin adjustments as needed; pt has a known hx of DM2 with A1c 6.8% and is on an insulin pump at home    continue parenteral nutrition at this time, will follow up with primary team on plan     1.  Severe protein calorie malnutrition being optimized with TPN: CHO [100] gm.  AA [100] gm. SMOF Lipids [50] gm.  2.  Hyperglycemia managed with: [25] units of regular insulin    3.  Check fluid balance daily.  Strict I/O  [ ] [ ]   4.  Daily BMP, Ionized Calcium, Magnesium and Phosphorous   5.  Triglycerides at initiation of TPN and monthly - Chol 65 LDL -- HDL 29<L> Trig 81    Nutrition Support [id number]

## 2023-12-03 NOTE — PROVIDER CONTACT NOTE (OTHER) - ASSESSMENT
Patient AOx4. Reporting sudden episode of nausea and vomiting. 650ml of light green emesis. Denies further nausea, does not want to take zofran.
Patient denies pain and discomfort. Resting in bed. Denies chest pain, shortness pf breath, and palpitations. Patient denies nausea.
Patient is a&ox4. IV flushed and patent. Denies chest pain and shortness of breath. Vitals stable.

## 2023-12-04 LAB
ANION GAP SERPL CALC-SCNC: 9 MMOL/L — SIGNIFICANT CHANGE UP (ref 7–14)
ANION GAP SERPL CALC-SCNC: 9 MMOL/L — SIGNIFICANT CHANGE UP (ref 7–14)
BUN SERPL-MCNC: 6 MG/DL — LOW (ref 7–23)
BUN SERPL-MCNC: 6 MG/DL — LOW (ref 7–23)
CALCIUM SERPL-MCNC: 8.4 MG/DL — SIGNIFICANT CHANGE UP (ref 8.4–10.5)
CALCIUM SERPL-MCNC: 8.4 MG/DL — SIGNIFICANT CHANGE UP (ref 8.4–10.5)
CHLORIDE SERPL-SCNC: 101 MMOL/L — SIGNIFICANT CHANGE UP (ref 98–107)
CHLORIDE SERPL-SCNC: 101 MMOL/L — SIGNIFICANT CHANGE UP (ref 98–107)
CO2 SERPL-SCNC: 27 MMOL/L — SIGNIFICANT CHANGE UP (ref 22–31)
CO2 SERPL-SCNC: 27 MMOL/L — SIGNIFICANT CHANGE UP (ref 22–31)
CREAT SERPL-MCNC: 0.39 MG/DL — LOW (ref 0.5–1.3)
CREAT SERPL-MCNC: 0.39 MG/DL — LOW (ref 0.5–1.3)
EGFR: 106 ML/MIN/1.73M2 — SIGNIFICANT CHANGE UP
EGFR: 106 ML/MIN/1.73M2 — SIGNIFICANT CHANGE UP
GLUCOSE BLDC GLUCOMTR-MCNC: 194 MG/DL — HIGH (ref 70–99)
GLUCOSE BLDC GLUCOMTR-MCNC: 194 MG/DL — HIGH (ref 70–99)
GLUCOSE BLDC GLUCOMTR-MCNC: 220 MG/DL — HIGH (ref 70–99)
GLUCOSE BLDC GLUCOMTR-MCNC: 220 MG/DL — HIGH (ref 70–99)
GLUCOSE BLDC GLUCOMTR-MCNC: 223 MG/DL — HIGH (ref 70–99)
GLUCOSE BLDC GLUCOMTR-MCNC: 223 MG/DL — HIGH (ref 70–99)
GLUCOSE BLDC GLUCOMTR-MCNC: 224 MG/DL — HIGH (ref 70–99)
GLUCOSE BLDC GLUCOMTR-MCNC: 224 MG/DL — HIGH (ref 70–99)
GLUCOSE BLDC GLUCOMTR-MCNC: 243 MG/DL — HIGH (ref 70–99)
GLUCOSE BLDC GLUCOMTR-MCNC: 243 MG/DL — HIGH (ref 70–99)
GLUCOSE SERPL-MCNC: 230 MG/DL — HIGH (ref 70–99)
GLUCOSE SERPL-MCNC: 230 MG/DL — HIGH (ref 70–99)
HCT VFR BLD CALC: 30 % — LOW (ref 34.5–45)
HCT VFR BLD CALC: 30 % — LOW (ref 34.5–45)
HGB BLD-MCNC: 9.6 G/DL — LOW (ref 11.5–15.5)
HGB BLD-MCNC: 9.6 G/DL — LOW (ref 11.5–15.5)
MAGNESIUM SERPL-MCNC: 1.8 MG/DL — SIGNIFICANT CHANGE UP (ref 1.6–2.6)
MAGNESIUM SERPL-MCNC: 1.8 MG/DL — SIGNIFICANT CHANGE UP (ref 1.6–2.6)
MCHC RBC-ENTMCNC: 25.3 PG — LOW (ref 27–34)
MCHC RBC-ENTMCNC: 25.3 PG — LOW (ref 27–34)
MCHC RBC-ENTMCNC: 32 GM/DL — SIGNIFICANT CHANGE UP (ref 32–36)
MCHC RBC-ENTMCNC: 32 GM/DL — SIGNIFICANT CHANGE UP (ref 32–36)
MCV RBC AUTO: 78.9 FL — LOW (ref 80–100)
MCV RBC AUTO: 78.9 FL — LOW (ref 80–100)
NRBC # BLD: 0 /100 WBCS — SIGNIFICANT CHANGE UP (ref 0–0)
NRBC # BLD: 0 /100 WBCS — SIGNIFICANT CHANGE UP (ref 0–0)
NRBC # FLD: 0 K/UL — SIGNIFICANT CHANGE UP (ref 0–0)
NRBC # FLD: 0 K/UL — SIGNIFICANT CHANGE UP (ref 0–0)
PHOSPHATE SERPL-MCNC: 2.4 MG/DL — LOW (ref 2.5–4.5)
PHOSPHATE SERPL-MCNC: 2.4 MG/DL — LOW (ref 2.5–4.5)
PLATELET # BLD AUTO: 437 K/UL — HIGH (ref 150–400)
PLATELET # BLD AUTO: 437 K/UL — HIGH (ref 150–400)
POTASSIUM SERPL-MCNC: 3.9 MMOL/L — SIGNIFICANT CHANGE UP (ref 3.5–5.3)
POTASSIUM SERPL-MCNC: 3.9 MMOL/L — SIGNIFICANT CHANGE UP (ref 3.5–5.3)
POTASSIUM SERPL-SCNC: 3.9 MMOL/L — SIGNIFICANT CHANGE UP (ref 3.5–5.3)
POTASSIUM SERPL-SCNC: 3.9 MMOL/L — SIGNIFICANT CHANGE UP (ref 3.5–5.3)
RBC # BLD: 3.8 M/UL — SIGNIFICANT CHANGE UP (ref 3.8–5.2)
RBC # BLD: 3.8 M/UL — SIGNIFICANT CHANGE UP (ref 3.8–5.2)
RBC # FLD: 18.5 % — HIGH (ref 10.3–14.5)
RBC # FLD: 18.5 % — HIGH (ref 10.3–14.5)
SODIUM SERPL-SCNC: 137 MMOL/L — SIGNIFICANT CHANGE UP (ref 135–145)
SODIUM SERPL-SCNC: 137 MMOL/L — SIGNIFICANT CHANGE UP (ref 135–145)
WBC # BLD: 11.17 K/UL — HIGH (ref 3.8–10.5)
WBC # BLD: 11.17 K/UL — HIGH (ref 3.8–10.5)
WBC # FLD AUTO: 11.17 K/UL — HIGH (ref 3.8–10.5)
WBC # FLD AUTO: 11.17 K/UL — HIGH (ref 3.8–10.5)

## 2023-12-04 PROCEDURE — 99232 SBSQ HOSP IP/OBS MODERATE 35: CPT

## 2023-12-04 PROCEDURE — 74177 CT ABD & PELVIS W/CONTRAST: CPT | Mod: 26

## 2023-12-04 RX ORDER — ACETAMINOPHEN 500 MG
1000 TABLET ORAL EVERY 6 HOURS
Refills: 0 | Status: DISCONTINUED | OUTPATIENT
Start: 2023-12-04 | End: 2023-12-04

## 2023-12-04 RX ORDER — ACETAMINOPHEN 500 MG
1000 TABLET ORAL EVERY 6 HOURS
Refills: 0 | Status: COMPLETED | OUTPATIENT
Start: 2023-12-04 | End: 2023-12-04

## 2023-12-04 RX ORDER — I.V. FAT EMULSION 20 G/100ML
20.8 EMULSION INTRAVENOUS
Qty: 50 | Refills: 0 | Status: DISCONTINUED | OUTPATIENT
Start: 2023-12-04 | End: 2023-12-05

## 2023-12-04 RX ORDER — ELECTROLYTE SOLUTION,INJ
1 VIAL (ML) INTRAVENOUS
Refills: 0 | Status: DISCONTINUED | OUTPATIENT
Start: 2023-12-04 | End: 2023-12-04

## 2023-12-04 RX ADMIN — SODIUM CHLORIDE 3 MILLILITER(S): 9 INJECTION INTRAMUSCULAR; INTRAVENOUS; SUBCUTANEOUS at 12:51

## 2023-12-04 RX ADMIN — HYDROMORPHONE HYDROCHLORIDE 0.25 MILLIGRAM(S): 2 INJECTION INTRAMUSCULAR; INTRAVENOUS; SUBCUTANEOUS at 05:36

## 2023-12-04 RX ADMIN — Medication 400 MILLIGRAM(S): at 05:43

## 2023-12-04 RX ADMIN — SODIUM CHLORIDE 3 MILLILITER(S): 9 INJECTION INTRAMUSCULAR; INTRAVENOUS; SUBCUTANEOUS at 04:51

## 2023-12-04 RX ADMIN — Medication 2: at 12:27

## 2023-12-04 RX ADMIN — Medication 2: at 05:48

## 2023-12-04 RX ADMIN — HYDROMORPHONE HYDROCHLORIDE 0.25 MILLIGRAM(S): 2 INJECTION INTRAMUSCULAR; INTRAVENOUS; SUBCUTANEOUS at 22:13

## 2023-12-04 RX ADMIN — Medication 1000 MILLIGRAM(S): at 18:05

## 2023-12-04 RX ADMIN — Medication 400 MILLIGRAM(S): at 23:51

## 2023-12-04 RX ADMIN — Medication 400 MILLIGRAM(S): at 12:20

## 2023-12-04 RX ADMIN — HYDROMORPHONE HYDROCHLORIDE 0.25 MILLIGRAM(S): 2 INJECTION INTRAMUSCULAR; INTRAVENOUS; SUBCUTANEOUS at 23:00

## 2023-12-04 RX ADMIN — I.V. FAT EMULSION 20.8 ML/HR: 20 EMULSION INTRAVENOUS at 18:33

## 2023-12-04 RX ADMIN — DEXTROSE MONOHYDRATE, SODIUM CHLORIDE, AND POTASSIUM CHLORIDE 100 MILLILITER(S): 50; .745; 4.5 INJECTION, SOLUTION INTRAVENOUS at 04:10

## 2023-12-04 RX ADMIN — ENOXAPARIN SODIUM 40 MILLIGRAM(S): 100 INJECTION SUBCUTANEOUS at 17:35

## 2023-12-04 RX ADMIN — Medication 1000 MILLIGRAM(S): at 12:50

## 2023-12-04 RX ADMIN — PANTOPRAZOLE SODIUM 40 MILLIGRAM(S): 20 TABLET, DELAYED RELEASE ORAL at 12:28

## 2023-12-04 RX ADMIN — Medication 1: at 17:39

## 2023-12-04 RX ADMIN — Medication 2: at 00:47

## 2023-12-04 RX ADMIN — Medication 1000 MILLIGRAM(S): at 01:00

## 2023-12-04 RX ADMIN — CHLORHEXIDINE GLUCONATE 1 APPLICATION(S): 213 SOLUTION TOPICAL at 12:51

## 2023-12-04 RX ADMIN — Medication 1 EACH: at 18:31

## 2023-12-04 RX ADMIN — Medication 400 MILLIGRAM(S): at 00:47

## 2023-12-04 RX ADMIN — Medication 1000 MILLIGRAM(S): at 05:55

## 2023-12-04 RX ADMIN — Medication 400 MILLIGRAM(S): at 17:35

## 2023-12-04 RX ADMIN — SODIUM CHLORIDE 3 MILLILITER(S): 9 INJECTION INTRAMUSCULAR; INTRAVENOUS; SUBCUTANEOUS at 23:04

## 2023-12-04 NOTE — PROGRESS NOTE ADULT - SUBJECTIVE AND OBJECTIVE BOX
DATE OF SERVICE: 12-04-23    Patient denies chest pain or shortness of breath. For CT scan today.   Review of symptoms otherwise negative.    MEDICATIONS:  acetaminophen   IVPB .. 1000 milliGRAM(s) IV Intermittent every 6 hours  albuterol    90 MICROgram(s) HFA Inhaler 2 Puff(s) Inhalation two times a day  budesonide 160 MICROgram(s)/formoterol 4.5 MICROgram(s) Inhaler 2 Puff(s) Inhalation two times a day  chlorhexidine 2% Cloths 1 Application(s) Topical daily  dextrose 5% + sodium chloride 0.45% with potassium chloride 20 mEq/L 1000 milliLiter(s) IV Continuous <Continuous>  dextrose 50% Injectable 25 Gram(s) IV Push once  dextrose Oral Gel 15 Gram(s) Oral once PRN  enoxaparin Injectable 40 milliGRAM(s) SubCutaneous every 24 hours  glucagon  Injectable 1 milliGRAM(s) IntraMuscular once  HYDROmorphone  Injectable 0.25 milliGRAM(s) IV Push every 3 hours PRN  influenza  Vaccine (HIGH DOSE) 0.7 milliLiter(s) IntraMuscular once  insulin lispro (ADMELOG) corrective regimen sliding scale   SubCutaneous every 6 hours  lipid, fat emulsion (Fish Oil and Plant Based) 20% Infusion 20.8 mL/Hr IV Continuous <Continuous>  ondansetron Injectable 4 milliGRAM(s) IV Push every 6 hours PRN  pantoprazole  Injectable 40 milliGRAM(s) IV Push daily  Parenteral Nutrition - Adult 1 Each TPN Continuous <Continuous>  sodium chloride 0.9% lock flush 3 milliLiter(s) IV Push every 8 hours      LABS:                        9.6    11.17 )-----------( 437      ( 04 Dec 2023 07:33 )             30.0       Hemoglobin: 9.6 g/dL (12-04 @ 07:33)  Hemoglobin: 10.0 g/dL (12-03 @ 05:48)  Hemoglobin: 9.3 g/dL (12-02 @ 06:30)  Hemoglobin: 9.4 g/dL (12-01 @ 06:51)  Hemoglobin: 10.0 g/dL (11-30 @ 06:06)      12-04    137  |  101  |  6<L>  ----------------------------<  230<H>  3.9   |  27  |  0.39<L>    Ca    8.4      04 Dec 2023 07:33  Phos  2.4     12-04  Mg     1.80     12-04      Creatinine Trend: 0.39<--, 0.36<--, 0.36<--, 0.37<--, 0.41<--, 0.46<--    COAGS:           PHYSICAL EXAM:  T(C): 36.8 (12-04-23 @ 09:14), Max: 37 (12-03-23 @ 12:43)  HR: 91 (12-04-23 @ 09:14) (89 - 111)  BP: 102/57 (12-04-23 @ 09:14) (92/43 - 114/57)  RR: 18 (12-04-23 @ 09:14) (18 - 18)  SpO2: 100% (12-04-23 @ 09:14) (99% - 100%)  Wt(kg): --    I&O's Summary    03 Dec 2023 07:01  -  04 Dec 2023 07:00  --------------------------------------------------------  IN: 2030 mL / OUT: 2400 mL / NET: -370 mL    04 Dec 2023 07:01  -  04 Dec 2023 09:41  --------------------------------------------------------  IN: 300 mL / OUT: 500 mL / NET: -200 mL      General:  Alert and Oriented * 3.   Head: Normocephalic and atraumatic.   Neck: No JVD. No bruits. Supple. Does not appear to be enlarged.   Cardiovascular: + S1,S2 ; RRR Soft systolic murmur at the left lower sternal border. No rubs noted.    Lungs: CTA b/l. No rhonchi, rales or wheezes.   Abdomen: distended NT  Extremities: No clubbing/cyanosis/edema.   Neurologic: Moves all four extremities. Full range of motion.   Skin: Warm and moist. The patient's skin has normal elasticity and good skin turgor.   Psychiatric: Appropriate mood and affect.  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      -- tolerated procedure well from CV perspective  -- pain management, PT  -- supplement lytes per sx   -- s/p NGT, CT noted with ileus, repeat CT today, on TPN  -- eventually resume home meds: Asa 81mg, Pravastatin 40mg when ok from a surgical perspective  -- BP meds Hctz 12.5mg, Losartan 25mg on hold, BP controlled       Bella Aguilera MD  Pager: [phone]         DATE OF SERVICE: 12-04-23    Patient denies chest pain or shortness of breath. For CT scan today.   Review of symptoms otherwise negative.    MEDICATIONS:  acetaminophen   IVPB .. 1000 milliGRAM(s) IV Intermittent every 6 hours  albuterol    90 MICROgram(s) HFA Inhaler 2 Puff(s) Inhalation two times a day  budesonide 160 MICROgram(s)/formoterol 4.5 MICROgram(s) Inhaler 2 Puff(s) Inhalation two times a day  chlorhexidine 2% Cloths 1 Application(s) Topical daily  dextrose 5% + sodium chloride 0.45% with potassium chloride 20 mEq/L 1000 milliLiter(s) IV Continuous <Continuous>  dextrose 50% Injectable 25 Gram(s) IV Push once  dextrose Oral Gel 15 Gram(s) Oral once PRN  enoxaparin Injectable 40 milliGRAM(s) SubCutaneous every 24 hours  glucagon  Injectable 1 milliGRAM(s) IntraMuscular once  HYDROmorphone  Injectable 0.25 milliGRAM(s) IV Push every 3 hours PRN  influenza  Vaccine (HIGH DOSE) 0.7 milliLiter(s) IntraMuscular once  insulin lispro (ADMELOG) corrective regimen sliding scale   SubCutaneous every 6 hours  lipid, fat emulsion (Fish Oil and Plant Based) 20% Infusion 20.8 mL/Hr IV Continuous <Continuous>  ondansetron Injectable 4 milliGRAM(s) IV Push every 6 hours PRN  pantoprazole  Injectable 40 milliGRAM(s) IV Push daily  Parenteral Nutrition - Adult 1 Each TPN Continuous <Continuous>  sodium chloride 0.9% lock flush 3 milliLiter(s) IV Push every 8 hours      LABS:                        9.6    11.17 )-----------( 437      ( 04 Dec 2023 07:33 )             30.0       Hemoglobin: 9.6 g/dL (12-04 @ 07:33)  Hemoglobin: 10.0 g/dL (12-03 @ 05:48)  Hemoglobin: 9.3 g/dL (12-02 @ 06:30)  Hemoglobin: 9.4 g/dL (12-01 @ 06:51)  Hemoglobin: 10.0 g/dL (11-30 @ 06:06)      12-04    137  |  101  |  6<L>  ----------------------------<  230<H>  3.9   |  27  |  0.39<L>    Ca    8.4      04 Dec 2023 07:33  Phos  2.4     12-04  Mg     1.80     12-04      Creatinine Trend: 0.39<--, 0.36<--, 0.36<--, 0.37<--, 0.41<--, 0.46<--    COAGS:           PHYSICAL EXAM:  T(C): 36.8 (12-04-23 @ 09:14), Max: 37 (12-03-23 @ 12:43)  HR: 91 (12-04-23 @ 09:14) (89 - 111)  BP: 102/57 (12-04-23 @ 09:14) (92/43 - 114/57)  RR: 18 (12-04-23 @ 09:14) (18 - 18)  SpO2: 100% (12-04-23 @ 09:14) (99% - 100%)  Wt(kg): --    I&O's Summary    03 Dec 2023 07:01  -  04 Dec 2023 07:00  --------------------------------------------------------  IN: 2030 mL / OUT: 2400 mL / NET: -370 mL    04 Dec 2023 07:01  -  04 Dec 2023 09:41  --------------------------------------------------------  IN: 300 mL / OUT: 500 mL / NET: -200 mL      General:  Alert and Oriented * 3.   Head: Normocephalic and atraumatic.   Neck: No JVD. No bruits. Supple. Does not appear to be enlarged.   Cardiovascular: + S1,S2 ; RRR Soft systolic murmur at the left lower sternal border. No rubs noted.    Lungs: CTA b/l. No rhonchi, rales or wheezes.   Abdomen: distended NT  Extremities: No clubbing/cyanosis/edema.   Neurologic: Moves all four extremities. Full range of motion.   Skin: Warm and moist. The patient's skin has normal elasticity and good skin turgor.   Psychiatric: Appropriate mood and affect.  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      -- tolerated procedure well from CV perspective  -- pain management, PT  -- supplement lytes per sx   -- s/p NGT, CT noted with ileus, repeat CT today, on TPN  -- eventually resume home meds: Asa 81mg, Pravastatin 40mg when ok from a surgical perspective  -- BP meds Hctz 12.5mg, Losartan 25mg on hold, BP controlled       Bella Aguilera MD  Pager: [phone]

## 2023-12-04 NOTE — PROGRESS NOTE ADULT - SUBJECTIVE AND OBJECTIVE BOX
Name of Patient : NESTOR MABRY  MRN: [id number]  Date of visit: 12-04-23     Subjective: Patient seen and examined. No new events except as noted.     REVIEW OF SYSTEMS:    CONSTITUTIONAL: No weakness, fevers or chills  EYES/ENT: No visual changes;  No vertigo or throat pain   NECK: No pain or stiffness  RESPIRATORY: No cough, wheezing, hemoptysis; No shortness of breath  CARDIOVASCULAR: No chest pain or palpitations  GASTROINTESTINAL: No abdominal or epigastric pain. No nausea, vomiting, or hematemesis; No diarrhea or constipation. No melena or hematochezia.  GENITOURINARY: No dysuria, frequency or hematuria  NEUROLOGICAL: No numbness or weakness  SKIN: No itching, burning, rashes, or lesions   All other review of systems is negative unless indicated above.    MEDICATIONS:  MEDICATIONS  (STANDING):  acetaminophen   IVPB .. 1000 milliGRAM(s) IV Intermittent every 6 hours  albuterol    90 MICROgram(s) HFA Inhaler 2 Puff(s) Inhalation two times a day  budesonide 160 MICROgram(s)/formoterol 4.5 MICROgram(s) Inhaler 2 Puff(s) Inhalation two times a day  chlorhexidine 2% Cloths 1 Application(s) Topical daily  dextrose 50% Injectable 25 Gram(s) IV Push once  enoxaparin Injectable 40 milliGRAM(s) SubCutaneous every 24 hours  glucagon  Injectable 1 milliGRAM(s) IntraMuscular once  influenza  Vaccine (HIGH DOSE) 0.7 milliLiter(s) IntraMuscular once  insulin lispro (ADMELOG) corrective regimen sliding scale   SubCutaneous every 6 hours  lipid, fat emulsion (Fish Oil and Plant Based) 20% Infusion 20.8 mL/Hr (20.8 mL/Hr) IV Continuous <Continuous>  pantoprazole  Injectable 40 milliGRAM(s) IV Push daily  Parenteral Nutrition - Adult 1 Each (63 mL/Hr) TPN Continuous <Continuous>  sodium chloride 0.9% lock flush 3 milliLiter(s) IV Push every 8 hours      PHYSICAL EXAM:  T(C): 36.8 (12-04-23 @ 17:20), Max: 36.9 (12-04-23 @ 01:40)  HR: 88 (12-04-23 @ 17:20) (65 - 96)  BP: 101/51 (12-04-23 @ 17:20) (92/43 - 111/57)  RR: 18 (12-04-23 @ 17:20) (18 - 18)  SpO2: 100% (12-04-23 @ 17:20) (98% - 100%)  Wt(kg): --  I&O's Summary    03 Dec 2023 07:01  -  04 Dec 2023 07:00  --------------------------------------------------------  IN: 2030 mL / OUT: 2400 mL / NET: -370 mL    04 Dec 2023 07:01  -  04 Dec 2023 20:31  --------------------------------------------------------  IN: 1100 mL / OUT: 1750 mL / NET: -650 mL          Appearance: Normal	  HEENT:  NGT   Lymphatic: No lymphadenopathy   Cardiovascular: Normal S1 S2, no JVD  Respiratory: normal effort , clear  Gastrointestinal:  Soft, Non-tender  Skin: No rashes,  warm to touch  Psychiatry:  Mood & affect appropriate  Musculuskeletal: No edema    recent labs, Imaging and EKGs personally reviewed     12-03-23 @ 07:01  -  12-04-23 @ 07:00  --------------------------------------------------------  IN: 2030 mL / OUT: 2400 mL / NET: -370 mL    12-04-23 @ 07:01  -  12-04-23 @ 20:31  --------------------------------------------------------  IN: 1100 mL / OUT: 1750 mL / NET: -650 mL                            9.6    11.17 )-----------( 437      ( 04 Dec 2023 07:33 )             30.0               12-04    137  |  101  |  6<L>  ----------------------------<  230<H>  3.9   |  27  |  0.39<L>    Ca    8.4      04 Dec 2023 07:33  Phos  2.4     12-04  Mg     1.80     12-04                         Urinalysis Basic - ( 04 Dec 2023 07:33 )    Color: x / Appearance: x / SG: x / pH: x  Gluc: 230 mg/dL / Ketone: x  / Bili: x / Urobili: x   Blood: x / Protein: x / Nitrite: x   Leuk Esterase: x / RBC: x / WBC x   Sq Epi: x / Non Sq Epi: x / Bacteria: x             Name of Patient : NESTOR MABRY  MRN: [id number]  Date of visit: 12-04-23     Subjective: Patient seen and examined. No new events except as noted.     REVIEW OF SYSTEMS:    CONSTITUTIONAL: No weakness, fevers or chills  EYES/ENT: No visual changes;  No vertigo or throat pain   NECK: No pain or stiffness  RESPIRATORY: No cough, wheezing, hemoptysis; No shortness of breath  CARDIOVASCULAR: No chest pain or palpitations  GASTROINTESTINAL: No abdominal or epigastric pain. No nausea, vomiting, or hematemesis; No diarrhea or constipation. No melena or hematochezia.  GENITOURINARY: No dysuria, frequency or hematuria  NEUROLOGICAL: No numbness or weakness  SKIN: No itching, burning, rashes, or lesions   All other review of systems is negative unless indicated above.    MEDICATIONS:  MEDICATIONS  (STANDING):  acetaminophen   IVPB .. 1000 milliGRAM(s) IV Intermittent every 6 hours  albuterol    90 MICROgram(s) HFA Inhaler 2 Puff(s) Inhalation two times a day  budesonide 160 MICROgram(s)/formoterol 4.5 MICROgram(s) Inhaler 2 Puff(s) Inhalation two times a day  chlorhexidine 2% Cloths 1 Application(s) Topical daily  dextrose 50% Injectable 25 Gram(s) IV Push once  enoxaparin Injectable 40 milliGRAM(s) SubCutaneous every 24 hours  glucagon  Injectable 1 milliGRAM(s) IntraMuscular once  influenza  Vaccine (HIGH DOSE) 0.7 milliLiter(s) IntraMuscular once  insulin lispro (ADMELOG) corrective regimen sliding scale   SubCutaneous every 6 hours  lipid, fat emulsion (Fish Oil and Plant Based) 20% Infusion 20.8 mL/Hr (20.8 mL/Hr) IV Continuous <Continuous>  pantoprazole  Injectable 40 milliGRAM(s) IV Push daily  Parenteral Nutrition - Adult 1 Each (63 mL/Hr) TPN Continuous <Continuous>  sodium chloride 0.9% lock flush 3 milliLiter(s) IV Push every 8 hours      PHYSICAL EXAM:  T(C): 36.8 (12-04-23 @ 17:20), Max: 36.9 (12-04-23 @ 01:40)  HR: 88 (12-04-23 @ 17:20) (65 - 96)  BP: 101/51 (12-04-23 @ 17:20) (92/43 - 111/57)  RR: 18 (12-04-23 @ 17:20) (18 - 18)  SpO2: 100% (12-04-23 @ 17:20) (98% - 100%)  Wt(kg): --  I&O's Summary    03 Dec 2023 07:01  -  04 Dec 2023 07:00  --------------------------------------------------------  IN: 2030 mL / OUT: 2400 mL / NET: -370 mL    04 Dec 2023 07:01  -  04 Dec 2023 20:31  --------------------------------------------------------  IN: 1100 mL / OUT: 1750 mL / NET: -650 mL          Appearance: Normal	  HEENT:  NGT   Lymphatic: No lymphadenopathy   Cardiovascular: Normal S1 S2, no JVD  Respiratory: normal effort , clear  Gastrointestinal:  Soft, Non-tender  Skin: No rashes,  warm to touch  Psychiatry:  Mood & affect appropriate  Musculuskeletal: No edema    recent labs, Imaging and EKGs personally reviewed     12-03-23 @ 07:01  -  12-04-23 @ 07:00  --------------------------------------------------------  IN: 2030 mL / OUT: 2400 mL / NET: -370 mL    12-04-23 @ 07:01  -  12-04-23 @ 20:31  --------------------------------------------------------  IN: 1100 mL / OUT: 1750 mL / NET: -650 mL                            9.6    11.17 )-----------( 437      ( 04 Dec 2023 07:33 )             30.0               12-04    137  |  101  |  6<L>  ----------------------------<  230<H>  3.9   |  27  |  0.39<L>    Ca    8.4      04 Dec 2023 07:33  Phos  2.4     12-04  Mg     1.80     12-04                         Urinalysis Basic - ( 04 Dec 2023 07:33 )    Color: x / Appearance: x / SG: x / pH: x  Gluc: 230 mg/dL / Ketone: x  / Bili: x / Urobili: x   Blood: x / Protein: x / Nitrite: x   Leuk Esterase: x / RBC: x / WBC x   Sq Epi: x / Non Sq Epi: x / Bacteria: x

## 2023-12-04 NOTE — PROGRESS NOTE ADULT - ASSESSMENT
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 Course c/b ileus.    Plan  - Pain control with Tylenol, Dilaudid PRN  - OOB/ambulate/ PT/ IS while in bed  - NPO/ NGT  - TPN for prolonged NPO status/malnutrition  - Home meds; holding HTN medications  - DVT prophylaxis: Lovenox   - [facility] endocrinology recs; plan to restart insulin pump on discharge    - NPO: low ISS    - Clears: if glucose > 180 x 2, start levemir 15 units, low ISS    - Regular: levemir 15 units and admelog 5 units TID, low ISS  - Dispo: pending    D Team Surgery  [id number]    73 year old female with PMH asthma, T2DM on insulin pump, GERD, HTN, HLD, OA, obesity presents s/p robotic partial colectomy for 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 Course c/b ileus.    Plan  - Pain control with Tylenol, Dilaudid PRN  - OOB/ambulate/ PT/ IS while in bed  - NPO/ NGT  - TPN for prolonged NPO status/malnutrition  - Home meds; holding HTN medications  - DVT prophylaxis: Lovenox   - [facility] endocrinology recs; plan to restart insulin pump on discharge    - NPO: low ISS    - Clears: if glucose > 180 x 2, start levemir 15 units, low ISS    - Regular: levemir 15 units and admelog 5 units TID, low ISS  - Dispo: pending    D Team Surgery  [id number]    73 year old female with PMH asthma, T2DM on insulin pump, GERD, HTN, HLD, OA, obesity presents s/p robotic partial colectomy for 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 Course c/b ileus.    Plan  - CT A/P w PO & IV contrast   - Pain control with Tylenol, Dilaudid PRN  - OOB/ambulate/ PT/ IS while in bed  - NPO/ NGT  - TPN for prolonged NPO status/malnutrition  - Home meds; holding HTN medications  - DVT prophylaxis: Lovenox   - [facility] endocrinology recs; plan to restart insulin pump on discharge    - NPO: low ISS    - Clears: if glucose > 180 x 2, start levemir 15 units, low ISS    - Regular: levemir 15 units and admelog 5 units TID, low ISS  - Dispo: pending    D Team Surgery  [id number]    73 year old female with PMH asthma, T2DM on insulin pump, GERD, HTN, HLD, OA, obesity presents s/p robotic partial colectomy for 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 Course c/b ileus.    Plan  - CT A/P w PO & IV contrast   - Pain control with Tylenol, Dilaudid PRN  - OOB/ambulate/ PT/ IS while in bed  - NPO/ NGT  - TPN for prolonged NPO status/malnutrition  - Home meds; holding HTN medications  - DVT prophylaxis: Lovenox   - [facility] endocrinology recs; plan to restart insulin pump on discharge    - NPO: low ISS    - Clears: if glucose > 180 x 2, start levemir 15 units, low ISS    - Regular: levemir 15 units and admelog 5 units TID, low ISS  - Dispo: pending    D Team Surgery  [id number]

## 2023-12-04 NOTE — PROGRESS NOTE ADULT - SUBJECTIVE AND OBJECTIVE BOX
NUTRITION NOTE  [id number] THOMASTUCKER  ===============================    Interval events - Patient was seen and examined at bedside, no acute events overnight. Patient denies chest pain, shortness of breath, nausea or vomiting at this time. PICC placed and TPN was started on 23 for nutritional support. Pt did not receive TPN bag last night because bag leaked during transport and could not be used. Pt was started on IVF in the interim. New TPN bad ordered for this evening. Pt remains NPO with NGT in place.    ROS: Except as noted above, all other systems reviewed and are negative     Allergies  Darvocet A500 (Other; Urticaria)  Percocet 10/325 (Other; Urticaria)  codeine (Other)  Lantus (Swelling)  Purell    PAST MEDICAL & SURGICAL HISTORY:  History of hypertension  Diabetes wears insulin pump  GERD (gastroesophageal reflux disease)  Uterine leiomyoma  Hyperlipidemia  Asthma  Obesity  Lymphadenopathy left lower extremity ;  - current  H/O insertion of insulin pump  OA (osteoarthritis)  Malignant neoplasm of cecum  H/O bilateral breast reduction surgery  History of appendectomy  History of cholecystectomy  H/O: hysterectomy  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 - [facility]  H/O total knee replacement, left  S/P bunionectomy    FAMILY HISTORY:  Diabetes mellitus (Father)  FH: breast cancer (Aunt)    Vital Signs Last 24 Hrs  T(C): 36.8 (04 Dec 2023 09:14), Max: 37 (03 Dec 2023 12:43)  T(F): 98.2 (04 Dec 2023 09:14), Max: 98.6 (03 Dec 2023 12:43)  HR: 91 (04 Dec 2023 09:14) (89 - 111)  BP: 102/57 (04 Dec 2023 09:14) (92/43 - 114/57)  BP(mean): 59 (04 Dec 2023 06:25) (59 - 59)  RR: 18 (04 Dec 2023 09:14) (18 - 18)  SpO2: 100% (04 Dec 2023 09:14) (99% - 100%)    MEDICATIONS  (STANDING):  acetaminophen   IVPB .. 1000 milliGRAM(s) IV Intermittent every 6 hours  albuterol    90 MICROgram(s) HFA Inhaler 2 Puff(s) Inhalation two times a day  budesonide 160 MICROgram(s)/formoterol 4.5 MICROgram(s) Inhaler 2 Puff(s) Inhalation two times a day  chlorhexidine 2% Cloths 1 Application(s) Topical daily  dextrose 5% + sodium chloride 0.45% with potassium chloride 20 mEq/L 1000 milliLiter(s) (100 mL/Hr) IV Continuous <Continuous>  dextrose 50% Injectable 25 Gram(s) IV Push once  enoxaparin Injectable 40 milliGRAM(s) SubCutaneous every 24 hours  glucagon  Injectable 1 milliGRAM(s) IntraMuscular once  influenza  Vaccine (HIGH DOSE) 0.7 milliLiter(s) IntraMuscular once  insulin lispro (ADMELOG) corrective regimen sliding scale   SubCutaneous every 6 hours  lipid, fat emulsion (Fish Oil and Plant Based) 20% Infusion 20.8 mL/Hr (20.8 mL/Hr) IV Continuous <Continuous>  pantoprazole  Injectable 40 milliGRAM(s) IV Push daily  Parenteral Nutrition - Adult 1 Each (63 mL/Hr) TPN Continuous <Continuous>  sodium chloride 0.9% lock flush 3 milliLiter(s) IV Push every 8 hours    MEDICATIONS  (PRN):  dextrose Oral Gel 15 Gram(s) Oral once PRN Blood Glucose LESS THAN 70 milliGRAM(s)/deciliter  HYDROmorphone  Injectable 0.25 milliGRAM(s) IV Push every 3 hours PRN Moderate to Severe Pain (4- 10)  ondansetron Injectable 4 milliGRAM(s) IV Push every 6 hours PRN Nausea    I&O's Detail    03 Dec 2023 07:01  -  04 Dec 2023 07:00  --------------------------------------------------------  IN:    dextrose 5% + sodium chloride 0.45% w/ Additives: 1400 mL    TPN (Total Parenteral Nutrition): 630 mL  Total IN: 2030 mL    OUT:    Emesis (mL): 0 mL    Fat Emulsion (Fish Oil &amp; Plant Based) 20% Infusion: 0 mL    Nasogastric/Oral tube (mL): 1300 mL    Oral Fluid: 0 mL    Voided (mL): 1100 mL  Total OUT: 2400 mL    Total NET: -370 mL      04 Dec 2023 07:01  -  04 Dec 2023 10:59  --------------------------------------------------------  IN:    dextrose 5% + sodium chloride 0.45% w/ Additives: 300 mL  Total IN: 300 mL    OUT:    Emesis (mL): 0 mL    Nasogastric/Oral tube (mL): 200 mL    Oral Fluid: 0 mL    Voided (mL): 300 mL  Total OUT: 500 mL    Total NET: -200 mL    POCT Blood Glucose.: 243 mg/dL (04 Dec 2023 05:47)  POCT Blood Glucose.: 223 mg/dL (04 Dec 2023 01:56)  POCT Blood Glucose.: 224 mg/dL (04 Dec 2023 00:42)  POCT Blood Glucose.: 202 mg/dL (03 Dec 2023 17:27)  POCT Blood Glucose.: 263 mg/dL (03 Dec 2023 12:11)    Daily Weight in k.6 (03 Dec 2023 00:00)    Drug Dosing Weight  Height (cm): 152.4 (2023 06:23)  Weight (kg): 75.659 (2023 06:23)  BMI (kg/m2): 32.6 (2023 06:23)  BSA (m2): 1.73 (2023 06:23)    PHYSICAL EXAM:  Constitutional: A&Ox3, NAD, resting comfortably in bed  Respiratory: Unlabored breathing  Abdomen: Soft, nondistended, mildly tender to palpation in lower quadrants   Extremities: WWP, SEAMAN spontaneously  PICC Site: double lumen PICC placed on 23 in [facility] clean and dry     Diet: NPO with sips and TPN/lipids (started on 23)    LABORATORY                                                 9.6    11.17 )-----------( 437      ( 04 Dec 2023 07:33 )             30.0   12-04    137  |  101  |  6<L>  ----------------------------<  230<H>  3.9   |  27  |  0.39<L>    Ca    8.4      04 Dec 2023 07:33  Phos  2.4     -  Mg     1.80      Chol 65 LDL -- HDL 29<L> Trig 81    CT Abdomen and Pelvis 23: IMPRESSION: Dilated loops of small bowel the level of the terminal ileum, likely ileus in the postoperative setting. Postoperative pneumoperitoneum and diffuse subcutaneous emphysema.    ASSESSMENT/PLAN:  72 y/o female with PMH asthma, T2DM on insulin pump, GERD, HTN, HLD, OA, obesity is s/p robotic partial colectomy for moderately differentiated adenocarcinoma of cecal mass on 23. CT abd/pelvis on 23 showed findings consistent with post op ileus. Pt remains NPO with NGT in place. Nutrition consult called for initiation of parenteral nutrition in view of severe protein calorie malnutrition and prolonged NPO status. PICC placed and TPN was started on 23.    continue TPN with infusion volume of 1.5L, TPN will provide 1240 kcal/day    labs reviewed - electrolytes adjusted in TPN bag    monitor fingersticks, obtain daily weights - increased to 25 units of insulin in tonight's TPN bag, will continue to trend FS and make insulin adjustments as needed; pt has a known hx of DM2 with A1c 6.8% and is on an insulin pump at home    continue parenteral nutrition at this time, will follow up with primary team on plan     1.  Severe protein calorie malnutrition being optimized with TPN: CHO [100] gm.  AA [100] gm. SMOF Lipids [50] gm.  2.  Hyperglycemia managed with: [25] units of regular insulin    3.  Check fluid balance daily.  Strict I/O  [ ] [ ]   4.  Daily BMP, Ionized Calcium, Magnesium and Phosphorous   5.  Triglycerides at initiation of TPN and monthly  Chol 65 LDL -- HDL 29<L> Trig 81    Nutrition Support [id number]  NUTRITION NOTE  [id number] THOMASTUCKER  ===============================    Interval events - Patient was seen and examined at bedside, no acute events overnight. Patient denies chest pain, shortness of breath, nausea or vomiting at this time. PICC placed and TPN was started on 23 for nutritional support. Pt did not receive TPN bag last night because bag leaked during transport and could not be used. Pt was started on IVF in the interim. New TPN bad ordered for this evening. Pt remains NPO with NGT in place.    ROS: Except as noted above, all other systems reviewed and are negative     Allergies  Darvocet A500 (Other; Urticaria)  Percocet 10/325 (Other; Urticaria)  codeine (Other)  Lantus (Swelling)  Purell    PAST MEDICAL & SURGICAL HISTORY:  History of hypertension  Diabetes wears insulin pump  GERD (gastroesophageal reflux disease)  Uterine leiomyoma  Hyperlipidemia  Asthma  Obesity  Lymphadenopathy left lower extremity ;  - current  H/O insertion of insulin pump  OA (osteoarthritis)  Malignant neoplasm of cecum  H/O bilateral breast reduction surgery  History of appendectomy  History of cholecystectomy  H/O: hysterectomy  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 - [facility]  H/O total knee replacement, left  S/P bunionectomy    FAMILY HISTORY:  Diabetes mellitus (Father)  FH: breast cancer (Aunt)    Vital Signs Last 24 Hrs  T(C): 36.8 (04 Dec 2023 09:14), Max: 37 (03 Dec 2023 12:43)  T(F): 98.2 (04 Dec 2023 09:14), Max: 98.6 (03 Dec 2023 12:43)  HR: 91 (04 Dec 2023 09:14) (89 - 111)  BP: 102/57 (04 Dec 2023 09:14) (92/43 - 114/57)  BP(mean): 59 (04 Dec 2023 06:25) (59 - 59)  RR: 18 (04 Dec 2023 09:14) (18 - 18)  SpO2: 100% (04 Dec 2023 09:14) (99% - 100%)    MEDICATIONS  (STANDING):  acetaminophen   IVPB .. 1000 milliGRAM(s) IV Intermittent every 6 hours  albuterol    90 MICROgram(s) HFA Inhaler 2 Puff(s) Inhalation two times a day  budesonide 160 MICROgram(s)/formoterol 4.5 MICROgram(s) Inhaler 2 Puff(s) Inhalation two times a day  chlorhexidine 2% Cloths 1 Application(s) Topical daily  dextrose 5% + sodium chloride 0.45% with potassium chloride 20 mEq/L 1000 milliLiter(s) (100 mL/Hr) IV Continuous <Continuous>  dextrose 50% Injectable 25 Gram(s) IV Push once  enoxaparin Injectable 40 milliGRAM(s) SubCutaneous every 24 hours  glucagon  Injectable 1 milliGRAM(s) IntraMuscular once  influenza  Vaccine (HIGH DOSE) 0.7 milliLiter(s) IntraMuscular once  insulin lispro (ADMELOG) corrective regimen sliding scale   SubCutaneous every 6 hours  lipid, fat emulsion (Fish Oil and Plant Based) 20% Infusion 20.8 mL/Hr (20.8 mL/Hr) IV Continuous <Continuous>  pantoprazole  Injectable 40 milliGRAM(s) IV Push daily  Parenteral Nutrition - Adult 1 Each (63 mL/Hr) TPN Continuous <Continuous>  sodium chloride 0.9% lock flush 3 milliLiter(s) IV Push every 8 hours    MEDICATIONS  (PRN):  dextrose Oral Gel 15 Gram(s) Oral once PRN Blood Glucose LESS THAN 70 milliGRAM(s)/deciliter  HYDROmorphone  Injectable 0.25 milliGRAM(s) IV Push every 3 hours PRN Moderate to Severe Pain (4- 10)  ondansetron Injectable 4 milliGRAM(s) IV Push every 6 hours PRN Nausea    I&O's Detail    03 Dec 2023 07:01  -  04 Dec 2023 07:00  --------------------------------------------------------  IN:    dextrose 5% + sodium chloride 0.45% w/ Additives: 1400 mL    TPN (Total Parenteral Nutrition): 630 mL  Total IN: 2030 mL    OUT:    Emesis (mL): 0 mL    Fat Emulsion (Fish Oil &amp; Plant Based) 20% Infusion: 0 mL    Nasogastric/Oral tube (mL): 1300 mL    Oral Fluid: 0 mL    Voided (mL): 1100 mL  Total OUT: 2400 mL    Total NET: -370 mL      04 Dec 2023 07:01  -  04 Dec 2023 10:59  --------------------------------------------------------  IN:    dextrose 5% + sodium chloride 0.45% w/ Additives: 300 mL  Total IN: 300 mL    OUT:    Emesis (mL): 0 mL    Nasogastric/Oral tube (mL): 200 mL    Oral Fluid: 0 mL    Voided (mL): 300 mL  Total OUT: 500 mL    Total NET: -200 mL    POCT Blood Glucose.: 243 mg/dL (04 Dec 2023 05:47)  POCT Blood Glucose.: 223 mg/dL (04 Dec 2023 01:56)  POCT Blood Glucose.: 224 mg/dL (04 Dec 2023 00:42)  POCT Blood Glucose.: 202 mg/dL (03 Dec 2023 17:27)  POCT Blood Glucose.: 263 mg/dL (03 Dec 2023 12:11)    Daily Weight in k.6 (03 Dec 2023 00:00)    Drug Dosing Weight  Height (cm): 152.4 (2023 06:23)  Weight (kg): 75.659 (2023 06:23)  BMI (kg/m2): 32.6 (2023 06:23)  BSA (m2): 1.73 (2023 06:23)    PHYSICAL EXAM:  Constitutional: A&Ox3, NAD, resting comfortably in bed  Respiratory: Unlabored breathing  Abdomen: Soft, nondistended, mildly tender to palpation in lower quadrants   Extremities: WWP, SEAMAN spontaneously  PICC Site: double lumen PICC placed on 23 in [facility] clean and dry     Diet: NPO with sips and TPN/lipids (started on 23)    LABORATORY                                                 9.6    11.17 )-----------( 437      ( 04 Dec 2023 07:33 )             30.0   12-04    137  |  101  |  6<L>  ----------------------------<  230<H>  3.9   |  27  |  0.39<L>    Ca    8.4      04 Dec 2023 07:33  Phos  2.4     -  Mg     1.80      Chol 65 LDL -- HDL 29<L> Trig 81    CT Abdomen and Pelvis 23: IMPRESSION: Dilated loops of small bowel the level of the terminal ileum, likely ileus in the postoperative setting. Postoperative pneumoperitoneum and diffuse subcutaneous emphysema.    ASSESSMENT/PLAN:  74 y/o female with PMH asthma, T2DM on insulin pump, GERD, HTN, HLD, OA, obesity is s/p robotic partial colectomy for moderately differentiated adenocarcinoma of cecal mass on 23. CT abd/pelvis on 23 showed findings consistent with post op ileus. Pt remains NPO with NGT in place. Nutrition consult called for initiation of parenteral nutrition in view of severe protein calorie malnutrition and prolonged NPO status. PICC placed and TPN was started on 23.    continue TPN with infusion volume of 1.5L, TPN will provide 1240 kcal/day    labs reviewed - electrolytes adjusted in TPN bag    monitor fingersticks, obtain daily weights - increased to 25 units of insulin in tonight's TPN bag, will continue to trend FS and make insulin adjustments as needed; pt has a known hx of DM2 with A1c 6.8% and is on an insulin pump at home    continue parenteral nutrition at this time, will follow up with primary team on plan     1.  Severe protein calorie malnutrition being optimized with TPN: CHO [100] gm.  AA [100] gm. SMOF Lipids [50] gm.  2.  Hyperglycemia managed with: [25] units of regular insulin    3.  Check fluid balance daily.  Strict I/O  [ ] [ ]   4.  Daily BMP, Ionized Calcium, Magnesium and Phosphorous   5.  Triglycerides at initiation of TPN and monthly  Chol 65 LDL -- HDL 29<L> Trig 81    Nutrition Support [id number]

## 2023-12-04 NOTE — PROGRESS NOTE ADULT - NS ATTEND AMEND GEN_ALL_CORE FT
I agree with the above history, physical examination, chief complaint/diagnosis, and plan, which I have reviewed and edited where appropriate.  I agree with notes/assessment and detailed interval history of health care providers on my service.  I have seen and examined the patient.  I reviewed the laboratory and available data and agree with the history, physical assessment and plan.  I reviewed and discussed with all consultants, house staff and PA's.  The Nutrition Support Team (NST) discusses on an ongoing basis with the primary team and all consultants, House staff and PA's to have a permanent risk benefit analyses on all decisions and coordinating care.  I was physically present for the key portions of the evaluation and management (E/M) service provided.  72 y/o female s/p robotic partial colectomy for moderately differentiated adenocarcinoma of cecal mass with post op ileus. Pt remains receiving parenteral nutrition in view of severe protein calorie malnutrition and prolonged NPO status.  NAD  Lung clear  Heart RR  Abd soft  labs reviewed - electrolytes adjusted   continue TPN; 1.5L/1240 kcal/day I agree with the above history, physical examination, chief complaint/diagnosis, and plan, which I have reviewed and edited where appropriate.  I agree with notes/assessment and detailed interval history of health care providers on my service.  I have seen and examined the patient.  I reviewed the laboratory and available data and agree with the history, physical assessment and plan.  I reviewed and discussed with all consultants, house staff and PA's.  The Nutrition Support Team (NST) discusses on an ongoing basis with the primary team and all consultants, House staff and PA's to have a permanent risk benefit analyses on all decisions and coordinating care.  I was physically present for the key portions of the evaluation and management (E/M) service provided.  74 y/o female s/p robotic partial colectomy for moderately differentiated adenocarcinoma of cecal mass with post op ileus. Pt remains receiving parenteral nutrition in view of severe protein calorie malnutrition and prolonged NPO status.  NAD  Lung clear  Heart RR  Abd soft  labs reviewed - electrolytes adjusted   continue TPN; 1.5L/1240 kcal/day

## 2023-12-04 NOTE — PROGRESS NOTE ADULT - SUBJECTIVE AND OBJECTIVE BOX
SURGERY DAILY PROGRESS NOTE    SUBJECTIVE: Patient seen and evaluated on AM rounds. No flatus or bowel movements yet. TPN bag damaged during transport.   Denies fevers/chills, chest pain, vomiting, or diarrhea    Overnight Events:  No acute events overnight  -----------------------------------------------------------------------------------------------------------------------------------------------------------------------------------------------------------  OBJECTIVE:  Vital Signs Last 24 Hrs  T(C): 36.7 (04 Dec 2023 05:55), Max: 37 (03 Dec 2023 08:20)  T(F): 98 (04 Dec 2023 05:55), Max: 98.6 (03 Dec 2023 08:20)  HR: 89 (04 Dec 2023 05:55) (89 - 111)  BP: 95/47 (04 Dec 2023 06:25) (92/43 - 118/76)  BP(mean): 59 (04 Dec 2023 06:25) (59 - 59)  RR: 18 (04 Dec 2023 05:55) (18 - 18)  SpO2: 100% (04 Dec 2023 05:55) (99% - 100%)    Parameters below as of 04 Dec 2023 05:55  Patient On (Oxygen Delivery Method): room air      I&O's Detail    03 Dec 2023 07:01  -  04 Dec 2023 07:00  --------------------------------------------------------  IN:    dextrose 5% + sodium chloride 0.45% w/ Additives: 1400 mL    TPN (Total Parenteral Nutrition): 630 mL  Total IN: 2030 mL    OUT:    Emesis (mL): 0 mL    Fat Emulsion (Fish Oil &amp; Plant Based) 20% Infusion: 0 mL    Nasogastric/Oral tube (mL): 1300 mL    Oral Fluid: 0 mL    Voided (mL): 1100 mL  Total OUT: 2400 mL    Total NET: -370 mL        Daily     Daily   MEDICATIONS  (STANDING):  acetaminophen   IVPB .. 1000 milliGRAM(s) IV Intermittent every 6 hours  albuterol    90 MICROgram(s) HFA Inhaler 2 Puff(s) Inhalation two times a day  budesonide 160 MICROgram(s)/formoterol 4.5 MICROgram(s) Inhaler 2 Puff(s) Inhalation two times a day  chlorhexidine 2% Cloths 1 Application(s) Topical daily  dextrose 5% + sodium chloride 0.45% with potassium chloride 20 mEq/L 1000 milliLiter(s) (100 mL/Hr) IV Continuous <Continuous>  dextrose 50% Injectable 25 Gram(s) IV Push once  enoxaparin Injectable 40 milliGRAM(s) SubCutaneous every 24 hours  glucagon  Injectable 1 milliGRAM(s) IntraMuscular once  influenza  Vaccine (HIGH DOSE) 0.7 milliLiter(s) IntraMuscular once  insulin lispro (ADMELOG) corrective regimen sliding scale   SubCutaneous every 6 hours  lipid, fat emulsion (Fish Oil and Plant Based) 20% Infusion 20.8 mL/Hr (20.8 mL/Hr) IV Continuous <Continuous>  pantoprazole  Injectable 40 milliGRAM(s) IV Push daily  Parenteral Nutrition - Adult 1 Each (63 mL/Hr) TPN Continuous <Continuous>  sodium chloride 0.9% lock flush 3 milliLiter(s) IV Push every 8 hours    MEDICATIONS  (PRN):  dextrose Oral Gel 15 Gram(s) Oral once PRN Blood Glucose LESS THAN 70 milliGRAM(s)/deciliter  HYDROmorphone  Injectable 0.25 milliGRAM(s) IV Push every 3 hours PRN Moderate to Severe Pain (4- 10)  ondansetron Injectable 4 milliGRAM(s) IV Push every 6 hours PRN Nausea      LABS:                        10.0   13.75 )-----------( 429      ( 03 Dec 2023 05:48 )             31.1     12-03    140  |  101  |  8   ----------------------------<  252<H>  3.4<L>   |  29  |  0.36<L>    Ca    8.7      03 Dec 2023 05:48  Phos  2.6     12-03  Mg     1.90     12-03        Urinalysis Basic - ( 03 Dec 2023 05:48 )    Color: x / Appearance: x / SG: x / pH: x  Gluc: 252 mg/dL / Ketone: x  / Bili: x / Urobili: x   Blood: x / Protein: x / Nitrite: x   Leuk Esterase: x / RBC: x / WBC x   Sq Epi: x / Non Sq Epi: x / Bacteria: x        PHYSICAL EXAM  GEN: No acute distress   CV: RRR, no JVD, mild peripheral edema  LUNGS: Respirations unlabored, no use of accessory muscles  ABD: Abdomen soft,, appropriate incisional tenderness, mild infraumbilical TTP, non distended  EXT: Regular range of motion.   INCISION: clean and dry

## 2023-12-05 LAB
ANION GAP SERPL CALC-SCNC: 9 MMOL/L — SIGNIFICANT CHANGE UP (ref 7–14)
ANION GAP SERPL CALC-SCNC: 9 MMOL/L — SIGNIFICANT CHANGE UP (ref 7–14)
BASOPHILS # BLD AUTO: 0.01 K/UL — SIGNIFICANT CHANGE UP (ref 0–0.2)
BASOPHILS # BLD AUTO: 0.01 K/UL — SIGNIFICANT CHANGE UP (ref 0–0.2)
BASOPHILS NFR BLD AUTO: 0.1 % — SIGNIFICANT CHANGE UP (ref 0–2)
BASOPHILS NFR BLD AUTO: 0.1 % — SIGNIFICANT CHANGE UP (ref 0–2)
BUN SERPL-MCNC: 8 MG/DL — SIGNIFICANT CHANGE UP (ref 7–23)
BUN SERPL-MCNC: 8 MG/DL — SIGNIFICANT CHANGE UP (ref 7–23)
CALCIUM SERPL-MCNC: 8.6 MG/DL — SIGNIFICANT CHANGE UP (ref 8.4–10.5)
CALCIUM SERPL-MCNC: 8.6 MG/DL — SIGNIFICANT CHANGE UP (ref 8.4–10.5)
CHLORIDE SERPL-SCNC: 101 MMOL/L — SIGNIFICANT CHANGE UP (ref 98–107)
CHLORIDE SERPL-SCNC: 101 MMOL/L — SIGNIFICANT CHANGE UP (ref 98–107)
CO2 SERPL-SCNC: 29 MMOL/L — SIGNIFICANT CHANGE UP (ref 22–31)
CO2 SERPL-SCNC: 29 MMOL/L — SIGNIFICANT CHANGE UP (ref 22–31)
CREAT SERPL-MCNC: 0.4 MG/DL — LOW (ref 0.5–1.3)
CREAT SERPL-MCNC: 0.4 MG/DL — LOW (ref 0.5–1.3)
EGFR: 105 ML/MIN/1.73M2 — SIGNIFICANT CHANGE UP
EGFR: 105 ML/MIN/1.73M2 — SIGNIFICANT CHANGE UP
EOSINOPHIL # BLD AUTO: 0.14 K/UL — SIGNIFICANT CHANGE UP (ref 0–0.5)
EOSINOPHIL # BLD AUTO: 0.14 K/UL — SIGNIFICANT CHANGE UP (ref 0–0.5)
EOSINOPHIL NFR BLD AUTO: 1.3 % — SIGNIFICANT CHANGE UP (ref 0–6)
EOSINOPHIL NFR BLD AUTO: 1.3 % — SIGNIFICANT CHANGE UP (ref 0–6)
GLUCOSE BLDC GLUCOMTR-MCNC: 166 MG/DL — HIGH (ref 70–99)
GLUCOSE BLDC GLUCOMTR-MCNC: 166 MG/DL — HIGH (ref 70–99)
GLUCOSE BLDC GLUCOMTR-MCNC: 180 MG/DL — HIGH (ref 70–99)
GLUCOSE BLDC GLUCOMTR-MCNC: 180 MG/DL — HIGH (ref 70–99)
GLUCOSE BLDC GLUCOMTR-MCNC: 210 MG/DL — HIGH (ref 70–99)
GLUCOSE BLDC GLUCOMTR-MCNC: 210 MG/DL — HIGH (ref 70–99)
GLUCOSE BLDC GLUCOMTR-MCNC: 215 MG/DL — HIGH (ref 70–99)
GLUCOSE BLDC GLUCOMTR-MCNC: 215 MG/DL — HIGH (ref 70–99)
GLUCOSE BLDC GLUCOMTR-MCNC: 222 MG/DL — HIGH (ref 70–99)
GLUCOSE BLDC GLUCOMTR-MCNC: 222 MG/DL — HIGH (ref 70–99)
GLUCOSE BLDC GLUCOMTR-MCNC: 239 MG/DL — HIGH (ref 70–99)
GLUCOSE BLDC GLUCOMTR-MCNC: 239 MG/DL — HIGH (ref 70–99)
GLUCOSE SERPL-MCNC: 216 MG/DL — HIGH (ref 70–99)
GLUCOSE SERPL-MCNC: 216 MG/DL — HIGH (ref 70–99)
HCT VFR BLD CALC: 29.8 % — LOW (ref 34.5–45)
HCT VFR BLD CALC: 29.8 % — LOW (ref 34.5–45)
HGB BLD-MCNC: 9.3 G/DL — LOW (ref 11.5–15.5)
HGB BLD-MCNC: 9.3 G/DL — LOW (ref 11.5–15.5)
IANC: 7.18 K/UL — SIGNIFICANT CHANGE UP (ref 1.8–7.4)
IANC: 7.18 K/UL — SIGNIFICANT CHANGE UP (ref 1.8–7.4)
IMM GRANULOCYTES NFR BLD AUTO: 0.9 % — SIGNIFICANT CHANGE UP (ref 0–0.9)
IMM GRANULOCYTES NFR BLD AUTO: 0.9 % — SIGNIFICANT CHANGE UP (ref 0–0.9)
LYMPHOCYTES # BLD AUTO: 1.82 K/UL — SIGNIFICANT CHANGE UP (ref 1–3.3)
LYMPHOCYTES # BLD AUTO: 1.82 K/UL — SIGNIFICANT CHANGE UP (ref 1–3.3)
LYMPHOCYTES # BLD AUTO: 17.1 % — SIGNIFICANT CHANGE UP (ref 13–44)
LYMPHOCYTES # BLD AUTO: 17.1 % — SIGNIFICANT CHANGE UP (ref 13–44)
MAGNESIUM SERPL-MCNC: 1.9 MG/DL — SIGNIFICANT CHANGE UP (ref 1.6–2.6)
MAGNESIUM SERPL-MCNC: 1.9 MG/DL — SIGNIFICANT CHANGE UP (ref 1.6–2.6)
MCHC RBC-ENTMCNC: 25 PG — LOW (ref 27–34)
MCHC RBC-ENTMCNC: 25 PG — LOW (ref 27–34)
MCHC RBC-ENTMCNC: 31.2 GM/DL — LOW (ref 32–36)
MCHC RBC-ENTMCNC: 31.2 GM/DL — LOW (ref 32–36)
MCV RBC AUTO: 80.1 FL — SIGNIFICANT CHANGE UP (ref 80–100)
MCV RBC AUTO: 80.1 FL — SIGNIFICANT CHANGE UP (ref 80–100)
MONOCYTES # BLD AUTO: 1.39 K/UL — HIGH (ref 0–0.9)
MONOCYTES # BLD AUTO: 1.39 K/UL — HIGH (ref 0–0.9)
MONOCYTES NFR BLD AUTO: 13.1 % — SIGNIFICANT CHANGE UP (ref 2–14)
MONOCYTES NFR BLD AUTO: 13.1 % — SIGNIFICANT CHANGE UP (ref 2–14)
NEUTROPHILS # BLD AUTO: 7.18 K/UL — SIGNIFICANT CHANGE UP (ref 1.8–7.4)
NEUTROPHILS # BLD AUTO: 7.18 K/UL — SIGNIFICANT CHANGE UP (ref 1.8–7.4)
NEUTROPHILS NFR BLD AUTO: 67.5 % — SIGNIFICANT CHANGE UP (ref 43–77)
NEUTROPHILS NFR BLD AUTO: 67.5 % — SIGNIFICANT CHANGE UP (ref 43–77)
NRBC # BLD: 0 /100 WBCS — SIGNIFICANT CHANGE UP (ref 0–0)
NRBC # BLD: 0 /100 WBCS — SIGNIFICANT CHANGE UP (ref 0–0)
NRBC # FLD: 0 K/UL — SIGNIFICANT CHANGE UP (ref 0–0)
NRBC # FLD: 0 K/UL — SIGNIFICANT CHANGE UP (ref 0–0)
PHOSPHATE SERPL-MCNC: 2.9 MG/DL — SIGNIFICANT CHANGE UP (ref 2.5–4.5)
PHOSPHATE SERPL-MCNC: 2.9 MG/DL — SIGNIFICANT CHANGE UP (ref 2.5–4.5)
PLATELET # BLD AUTO: 496 K/UL — HIGH (ref 150–400)
PLATELET # BLD AUTO: 496 K/UL — HIGH (ref 150–400)
POTASSIUM SERPL-MCNC: 4.6 MMOL/L — SIGNIFICANT CHANGE UP (ref 3.5–5.3)
POTASSIUM SERPL-MCNC: 4.6 MMOL/L — SIGNIFICANT CHANGE UP (ref 3.5–5.3)
POTASSIUM SERPL-SCNC: 4.6 MMOL/L — SIGNIFICANT CHANGE UP (ref 3.5–5.3)
POTASSIUM SERPL-SCNC: 4.6 MMOL/L — SIGNIFICANT CHANGE UP (ref 3.5–5.3)
RBC # BLD: 3.72 M/UL — LOW (ref 3.8–5.2)
RBC # BLD: 3.72 M/UL — LOW (ref 3.8–5.2)
RBC # FLD: 18.5 % — HIGH (ref 10.3–14.5)
RBC # FLD: 18.5 % — HIGH (ref 10.3–14.5)
SODIUM SERPL-SCNC: 139 MMOL/L — SIGNIFICANT CHANGE UP (ref 135–145)
SODIUM SERPL-SCNC: 139 MMOL/L — SIGNIFICANT CHANGE UP (ref 135–145)
WBC # BLD: 10.64 K/UL — HIGH (ref 3.8–10.5)
WBC # BLD: 10.64 K/UL — HIGH (ref 3.8–10.5)
WBC # FLD AUTO: 10.64 K/UL — HIGH (ref 3.8–10.5)
WBC # FLD AUTO: 10.64 K/UL — HIGH (ref 3.8–10.5)

## 2023-12-05 PROCEDURE — 71045 X-RAY EXAM CHEST 1 VIEW: CPT | Mod: 26

## 2023-12-05 PROCEDURE — 99232 SBSQ HOSP IP/OBS MODERATE 35: CPT

## 2023-12-05 PROCEDURE — 74018 RADEX ABDOMEN 1 VIEW: CPT | Mod: 26,76

## 2023-12-05 RX ORDER — ENOXAPARIN SODIUM 100 MG/ML
40 INJECTION SUBCUTANEOUS ONCE
Refills: 0 | Status: COMPLETED | OUTPATIENT
Start: 2023-12-05 | End: 2023-12-05

## 2023-12-05 RX ORDER — ACETAMINOPHEN 500 MG
1000 TABLET ORAL EVERY 6 HOURS
Refills: 0 | Status: COMPLETED | OUTPATIENT
Start: 2023-12-05 | End: 2023-12-06

## 2023-12-05 RX ORDER — I.V. FAT EMULSION 20 G/100ML
20.8 EMULSION INTRAVENOUS
Qty: 50 | Refills: 0 | Status: DISCONTINUED | OUTPATIENT
Start: 2023-12-05 | End: 2023-12-06

## 2023-12-05 RX ORDER — PIPERACILLIN AND TAZOBACTAM 4; .5 G/20ML; G/20ML
3.38 INJECTION, POWDER, LYOPHILIZED, FOR SOLUTION INTRAVENOUS EVERY 8 HOURS
Refills: 0 | Status: DISCONTINUED | OUTPATIENT
Start: 2023-12-05 | End: 2023-12-06

## 2023-12-05 RX ORDER — PIPERACILLIN AND TAZOBACTAM 4; .5 G/20ML; G/20ML
3.38 INJECTION, POWDER, LYOPHILIZED, FOR SOLUTION INTRAVENOUS ONCE
Refills: 0 | Status: COMPLETED | OUTPATIENT
Start: 2023-12-05 | End: 2023-12-05

## 2023-12-05 RX ORDER — DIATRIZOATE MEGLUMINE 180 MG/ML
120 INJECTION, SOLUTION INTRAVESICAL ONCE
Refills: 0 | Status: COMPLETED | OUTPATIENT
Start: 2023-12-05 | End: 2023-12-05

## 2023-12-05 RX ORDER — ELECTROLYTE SOLUTION,INJ
1 VIAL (ML) INTRAVENOUS
Refills: 0 | Status: DISCONTINUED | OUTPATIENT
Start: 2023-12-05 | End: 2023-12-05

## 2023-12-05 RX ORDER — INSULIN LISPRO 100/ML
VIAL (ML) SUBCUTANEOUS EVERY 6 HOURS
Refills: 0 | Status: DISCONTINUED | OUTPATIENT
Start: 2023-12-05 | End: 2023-12-07

## 2023-12-05 RX ADMIN — HYDROMORPHONE HYDROCHLORIDE 0.25 MILLIGRAM(S): 2 INJECTION INTRAMUSCULAR; INTRAVENOUS; SUBCUTANEOUS at 19:11

## 2023-12-05 RX ADMIN — I.V. FAT EMULSION 20.8 ML/HR: 20 EMULSION INTRAVENOUS at 19:08

## 2023-12-05 RX ADMIN — BUDESONIDE AND FORMOTEROL FUMARATE DIHYDRATE 2 PUFF(S): 160; 4.5 AEROSOL RESPIRATORY (INHALATION) at 09:27

## 2023-12-05 RX ADMIN — Medication 400 MILLIGRAM(S): at 18:25

## 2023-12-05 RX ADMIN — SODIUM CHLORIDE 3 MILLILITER(S): 9 INJECTION INTRAMUSCULAR; INTRAVENOUS; SUBCUTANEOUS at 14:15

## 2023-12-05 RX ADMIN — Medication 1 EACH: at 19:07

## 2023-12-05 RX ADMIN — PIPERACILLIN AND TAZOBACTAM 25 GRAM(S): 4; .5 INJECTION, POWDER, LYOPHILIZED, FOR SOLUTION INTRAVENOUS at 13:31

## 2023-12-05 RX ADMIN — CHLORHEXIDINE GLUCONATE 1 APPLICATION(S): 213 SOLUTION TOPICAL at 13:32

## 2023-12-05 RX ADMIN — ENOXAPARIN SODIUM 40 MILLIGRAM(S): 100 INJECTION SUBCUTANEOUS at 18:25

## 2023-12-05 RX ADMIN — Medication 1000 MILLIGRAM(S): at 07:00

## 2023-12-05 RX ADMIN — PIPERACILLIN AND TAZOBACTAM 200 GRAM(S): 4; .5 INJECTION, POWDER, LYOPHILIZED, FOR SOLUTION INTRAVENOUS at 09:26

## 2023-12-05 RX ADMIN — Medication 2: at 08:09

## 2023-12-05 RX ADMIN — Medication 4: at 13:32

## 2023-12-05 RX ADMIN — Medication 400 MILLIGRAM(S): at 05:59

## 2023-12-05 RX ADMIN — PANTOPRAZOLE SODIUM 40 MILLIGRAM(S): 20 TABLET, DELAYED RELEASE ORAL at 13:32

## 2023-12-05 RX ADMIN — ALBUTEROL 2 PUFF(S): 90 AEROSOL, METERED ORAL at 09:27

## 2023-12-05 RX ADMIN — Medication 1000 MILLIGRAM(S): at 00:51

## 2023-12-05 RX ADMIN — DIATRIZOATE MEGLUMINE 120 MILLILITER(S): 180 INJECTION, SOLUTION INTRAVESICAL at 09:27

## 2023-12-05 RX ADMIN — Medication 1000 MILLIGRAM(S): at 16:00

## 2023-12-05 RX ADMIN — HYDROMORPHONE HYDROCHLORIDE 0.25 MILLIGRAM(S): 2 INJECTION INTRAMUSCULAR; INTRAVENOUS; SUBCUTANEOUS at 09:56

## 2023-12-05 RX ADMIN — HYDROMORPHONE HYDROCHLORIDE 0.25 MILLIGRAM(S): 2 INJECTION INTRAMUSCULAR; INTRAVENOUS; SUBCUTANEOUS at 18:52

## 2023-12-05 RX ADMIN — Medication 400 MILLIGRAM(S): at 13:31

## 2023-12-05 RX ADMIN — SODIUM CHLORIDE 3 MILLILITER(S): 9 INJECTION INTRAMUSCULAR; INTRAVENOUS; SUBCUTANEOUS at 06:00

## 2023-12-05 RX ADMIN — Medication 1: at 00:41

## 2023-12-05 RX ADMIN — Medication 1000 MILLIGRAM(S): at 19:00

## 2023-12-05 RX ADMIN — HYDROMORPHONE HYDROCHLORIDE 0.25 MILLIGRAM(S): 2 INJECTION INTRAMUSCULAR; INTRAVENOUS; SUBCUTANEOUS at 09:26

## 2023-12-05 RX ADMIN — Medication 4: at 18:25

## 2023-12-05 RX ADMIN — HYDROMORPHONE HYDROCHLORIDE 0.25 MILLIGRAM(S): 2 INJECTION INTRAMUSCULAR; INTRAVENOUS; SUBCUTANEOUS at 02:33

## 2023-12-05 NOTE — PROGRESS NOTE ADULT - SUBJECTIVE AND OBJECTIVE BOX
TEAM [ D ] Surgery Daily Progress Note  =====================================================    SUBJECTIVE: Patient seen and examined at bedside on AM rounds. No acute overnight events. Patient reports that they're feeling okay. Reports she is having nauseous, no episodes of emesis. Still has not passed Flatus or BM. OOB/Amublating as tolerated. Denies fever, chills, SOB, chest pain.     PAST MEDICAL & SURGICAL HISTORY:  History of hypertension  Diabetes  GERD (gastroesophageal reflux disease)  Uterine leiomyoma  Hyperlipidemia  Asthma  Obesity  Lymphadenopathy  left lower extremitiy ; 1966 - current  OA (osteoarthritis)  Malignant neoplasm of cecum  H/O bilateral breast reduction surgery  History of appendectomy  History of cholecystectomy  H/O: hysterectomy  History of total right knee replacement  3/2016 - [facility]  H/O total knee replacement, left  S/P bunionectomy    ALLERGIES:  Darvocet A500 (Other; Urticaria)  Percocet 10/325 (Other; Urticaria)  codeine (Other)  Lantus (Swelling)  PURELL.  pt said, &quot;I felt my airway closing&quot; after she smelled the Purell. The incident occured at the pulmonologist office. (Other; Short breath)      --------------------------------------------------------------------------------------    MEDICATIONS:    Neurologic Medications  acetaminophen   IVPB .. 1000 milliGRAM(s) IV Intermittent every 6 hours  HYDROmorphone  Injectable 0.25 milliGRAM(s) IV Push every 3 hours PRN Moderate to Severe Pain (4- 10)  ondansetron Injectable 4 milliGRAM(s) IV Push every 6 hours PRN Nausea    Respiratory Medications  albuterol    90 MICROgram(s) HFA Inhaler 2 Puff(s) Inhalation two times a day  budesonide 160 MICROgram(s)/formoterol 4.5 MICROgram(s) Inhaler 2 Puff(s) Inhalation two times a day    Cardiovascular Medications    Gastrointestinal Medications  lipid, fat emulsion (Fish Oil and Plant Based) 20% Infusion 20.8 mL/Hr IV Continuous <Continuous>  pantoprazole  Injectable 40 milliGRAM(s) IV Push daily  Parenteral Nutrition - Adult 1 Each TPN Continuous <Continuous>  sodium chloride 0.9% lock flush 3 milliLiter(s) IV Push every 8 hours    Genitourinary Medications    Hematologic/Oncologic Medications  enoxaparin Injectable 40 milliGRAM(s) SubCutaneous every 24 hours  influenza  Vaccine (HIGH DOSE) 0.7 milliLiter(s) IntraMuscular once    Antimicrobial/Immunologic Medications    Endocrine/Metabolic Medications  dextrose 50% Injectable 25 Gram(s) IV Push once  dextrose Oral Gel 15 Gram(s) Oral once PRN Blood Glucose LESS THAN 70 milliGRAM(s)/deciliter  insulin lispro (ADMELOG) corrective regimen sliding scale   SubCutaneous every 6 hours    Topical/Other Medications  chlorhexidine 2% Cloths 1 Application(s) Topical daily    --------------------------------------------------------------------------------------    VITAL SIGNS:  T(C): 36.9 (12-05-23 @ 05:03), Max: 36.9 (12-04-23 @ 22:10)  HR: 98 (12-05-23 @ 05:03) (65 - 98)  BP: 125/61 (12-05-23 @ 05:03) (95/45 - 125/61)  RR: 18 (12-05-23 @ 05:03) (18 - 18)  SpO2: 100% (12-05-23 @ 05:03) (98% - 100%)  --------------------------------------------------------------------------------------    EXAM  General: NAD, resting in bed. A&Ox4.   Cardiac: S1, S2. pulse present   Respiratory: Nonlabored respirations, normal cw expansion. No signs of respiratory distress.   Abdomen: soft, nondistended, tender to periumbilical/suprapubic area. NGT with bilious output.   Extremities: no deformities, moving all extremities spontaneously.     --------------------------------------------------------------------------------------    LABS                          9.6    11.17 )-----------( 437      ( 04 Dec 2023 07:33 )             30.0     12-04    137  |  101  |  6<L>  ----------------------------<  230<H>  3.9   |  27  |  0.39<L>    Ca    8.4      04 Dec 2023 07:33  Phos  2.4     12-04  Mg     1.80     12-04          --------------------------------------------------------------------------------------    INS AND OUTS:    12-04-23 @ 07:01  -  12-05-23 @ 07:00  --------------------------------------------------------  IN: 1100 mL / OUT: 3200 mL / NET: -2100 mL      --------------------------------------------------------------------------------------     TEAM [ D ] Surgery Daily Progress Note  =====================================================    SUBJECTIVE: Patient seen and examined at bedside on AM rounds. No acute overnight events. Patient reports that they're feeling okay. Reports she is having nauseous, no episodes of emesis. Still has not passed Flatus or BM. OOB/Amublating as tolerated. Denies fever, chills, SOB, chest pain.     PAST MEDICAL & SURGICAL HISTORY:  History of hypertension  Diabetes  GERD (gastroesophageal reflux disease)  Uterine leiomyoma  Hyperlipidemia  Asthma  Obesity  Lymphadenopathy  left lower extremitiy ; 1966 - current  OA (osteoarthritis)  Malignant neoplasm of cecum  H/O bilateral breast reduction surgery  History of appendectomy  History of cholecystectomy  H/O: hysterectomy  History of total right knee replacement  3/2016 - [facility]  H/O total knee replacement, left  S/P bunionectomy    ALLERGIES:  Darvocet A500 (Other; Urticaria)  Percocet 10/325 (Other; Urticaria)  codeine (Other)  Lantus (Swelling)  PURELL.  pt said, &quot;I felt my airway closing&quot; after she smelled the Purell. The incident occured at the pulmonologist office. (Other; Short breath)      --------------------------------------------------------------------------------------    MEDICATIONS:    Neurologic Medications  acetaminophen   IVPB .. 1000 milliGRAM(s) IV Intermittent every 6 hours  HYDROmorphone  Injectable 0.25 milliGRAM(s) IV Push every 3 hours PRN Moderate to Severe Pain (4- 10)  ondansetron Injectable 4 milliGRAM(s) IV Push every 6 hours PRN Nausea    Respiratory Medications  albuterol    90 MICROgram(s) HFA Inhaler 2 Puff(s) Inhalation two times a day  budesonide 160 MICROgram(s)/formoterol 4.5 MICROgram(s) Inhaler 2 Puff(s) Inhalation two times a day    Cardiovascular Medications    Gastrointestinal Medications  lipid, fat emulsion (Fish Oil and Plant Based) 20% Infusion 20.8 mL/Hr IV Continuous <Continuous>  pantoprazole  Injectable 40 milliGRAM(s) IV Push daily  Parenteral Nutrition - Adult 1 Each TPN Continuous <Continuous>  sodium chloride 0.9% lock flush 3 milliLiter(s) IV Push every 8 hours    Genitourinary Medications    Hematologic/Oncologic Medications  enoxaparin Injectable 40 milliGRAM(s) SubCutaneous every 24 hours  influenza  Vaccine (HIGH DOSE) 0.7 milliLiter(s) IntraMuscular once    Antimicrobial/Immunologic Medications    Endocrine/Metabolic Medications  dextrose 50% Injectable 25 Gram(s) IV Push once  dextrose Oral Gel 15 Gram(s) Oral once PRN Blood Glucose LESS THAN 70 milliGRAM(s)/deciliter  insulin lispro (ADMELOG) corrective regimen sliding scale   SubCutaneous every 6 hours    Topical/Other Medications  chlorhexidine 2% Cloths 1 Application(s) Topical daily    --------------------------------------------------------------------------------------    VITAL SIGNS:  T(C): 36.9 (12-05-23 @ 05:03), Max: 36.9 (12-04-23 @ 22:10)  HR: 98 (12-05-23 @ 05:03) (65 - 98)  BP: 125/61 (12-05-23 @ 05:03) (95/45 - 125/61)  RR: 18 (12-05-23 @ 05:03) (18 - 18)  SpO2: 100% (12-05-23 @ 05:03) (98% - 100%)  --------------------------------------------------------------------------------------    EXAM  General: NAD, resting in bed. A&Ox4.   Cardiac: S1, S2. pulse present   Respiratory: Nonlabored respirations, normal cw expansion. No signs of respiratory distress.   Abdomen: soft, nondistended, tender to periumbilical/suprapubic area. NGT with bilious output.   Extremities: no deformities, moving all extremities spontaneously.     --------------------------------------------------------------------------------------    LABS                          9.6    11.17 )-----------( 437      ( 04 Dec 2023 07:33 )             30.0     12-04    137  |  101  |  6<L>  ----------------------------<  230<H>  3.9   |  27  |  0.39<L>    Ca    8.4      04 Dec 2023 07:33  Phos  2.4     12-04  Mg     1.80     12-04          --------------------------------------------------------------------------------------    INS AND OUTS:    12-04-23 @ 07:01  -  12-05-23 @ 07:00  --------------------------------------------------------  IN: 1100 mL / OUT: 3200 mL / NET: -2100 mL      --------------------------------------------------------------------------------------

## 2023-12-05 NOTE — PROGRESS NOTE ADULT - ASSESSMENT
This is a 72 yo F /w a PMH of DM2 on an insulin pump and colorectal cancer s/p right hemicolectomy today. Endocrinology consulted for diabetes management and DM2. Based on current insulin pump settings, suspect patient is being over-basalized given high basal rates compared to the insulin:carb ratio    Home Regimen:  Patient uses the medtronic insulin pump and Libre2  Pump settings are as follows:  TDD 52.425  12AM 1.8 units per hour  6:30AM 2 units per hour  12:30PM 2.45 units per hour  6:30PM 2.55 units per hour  8PM 2.55 units per hour    ICR:  12AM 1:15  7AM 1:12  12:30PM 1:15    ISF 1:30    #DM2 A1c 6.8%  #insulin pump at home. Off pump while in the hospital  -Patient instructed to remove old pump insertion site on ABD, see HPI  -Glucose Target 100-180: Stable today. Patient is on TPN managed Nutrition Support Team: 100 G dextrose, 34 units of Regular insulin   -Changed from low to moderate Admelog correction scale every 6 hours while NPO  -Check FS q 6 hours   -Hypoglycemia Protocol     For future use if started on clears:  -consistent carbohydrate consideration   -low admelog correction scales before meals   -low admelog scales before bedtime  -hold basal insulin until noting glucose trend   if glucose >180 X 2 - than start levemir 15 units every 24 hours   -FSG before meals and before bedtime  -Carb consistent diet once able to tolerate  -hypoglycemia protocol in place if needed    when advanced to regular carb consistent diet   than will need basal bolus + correction scale   anticipate Levemir 15 units and ademelog 5 units premeals and low dose correction scale     pt has her on CGM on- also monitoring her own BG while inpt on bonnie due to be changed on 12/5    #Discharge  -follow up with Dr. Anand  -basal bolus vs resume insulin pump on discharge, likely will need adjustment in settings prior to resuming pump     #HTN  -BP target <130/80  -Consider resuming losartan 25mg daily and hctz 12.5mg daily- management as per primary team  -outpt mc/cr ratio     #HLD  -On Pravastatin 40 mg QHS at home   -Resume once able to tolerate orals if no contraindications   -LDL goal less than 70   -Management as per primary team     Adrienne Taveras  Nurse Practitioner  Division of Endocrinology & Diabetes  In house pager #[phone]    If before 9AM or after 6PM, or on weekends/holidays, please call endocrine answering service for assistance ([phone]).For nonurgent matters email [email or web address] for assistance.    This is a 74 yo F /w a PMH of DM2 on an insulin pump and colorectal cancer s/p right hemicolectomy today. Endocrinology consulted for diabetes management and DM2. Based on current insulin pump settings, suspect patient is being over-basalized given high basal rates compared to the insulin:carb ratio    Home Regimen:  Patient uses the medtronic insulin pump and Libre2  Pump settings are as follows:  TDD 52.425  12AM 1.8 units per hour  6:30AM 2 units per hour  12:30PM 2.45 units per hour  6:30PM 2.55 units per hour  8PM 2.55 units per hour    ICR:  12AM 1:15  7AM 1:12  12:30PM 1:15    ISF 1:30    #DM2 A1c 6.8%  #insulin pump at home. Off pump while in the hospital  -Patient instructed to remove old pump insertion site on ABD, see HPI  -Glucose Target 100-180: Stable today. Patient is on TPN managed Nutrition Support Team: 100 G dextrose, 34 units of Regular insulin   -Changed from low to moderate Admelog correction scale every 6 hours while NPO  -Check FS q 6 hours   -Hypoglycemia Protocol     For future use if started on clears:  -consistent carbohydrate consideration   -low admelog correction scales before meals   -low admelog scales before bedtime  -hold basal insulin until noting glucose trend   if glucose >180 X 2 - than start levemir 15 units every 24 hours   -FSG before meals and before bedtime  -Carb consistent diet once able to tolerate  -hypoglycemia protocol in place if needed    when advanced to regular carb consistent diet   than will need basal bolus + correction scale   anticipate Levemir 15 units and ademelog 5 units premeals and low dose correction scale     pt has her on CGM on- also monitoring her own BG while inpt on bonnie due to be changed on 12/5    #Discharge  -follow up with Dr. Anand  -basal bolus vs resume insulin pump on discharge, likely will need adjustment in settings prior to resuming pump     #HTN  -BP target <130/80  -Consider resuming losartan 25mg daily and hctz 12.5mg daily- management as per primary team  -outpt mc/cr ratio     #HLD  -On Pravastatin 40 mg QHS at home   -Resume once able to tolerate orals if no contraindications   -LDL goal less than 70   -Management as per primary team     Adrienne Taveras  Nurse Practitioner  Division of Endocrinology & Diabetes  In house pager #[phone]    If before 9AM or after 6PM, or on weekends/holidays, please call endocrine answering service for assistance ([phone]).For nonurgent matters email [email or web address] for assistance.

## 2023-12-05 NOTE — CHART NOTE - NSCHARTNOTEFT_GEN_A_CORE
Pre-Interventional Radiology Procedure Note    73y  Female    Procedure: abscess drainage   Diagnosis/Indication: Patient is a 73y old  Female who presents with a chief complaint of hemicolecty (03 Dec 2023 10:53)      Interventional Radiology Attending Physician: FREDIS  Ordering Attending Physician: Dr. Nichols    PAST MEDICAL & SURGICAL HISTORY:  History of hypertension  Diabetes  wears insulin pump  GERD (gastroesophageal reflux disease)  Uterine leiomyoma  Hyperlipidemia  Asthma  Obesity  Lymphadenopathy  left lower extremitiy ; 1966 - current  H/O insertion of insulin pump  OA (osteoarthritis)  Malignant neoplasm of cecum  H/O bilateral breast reduction surgery  History of appendectomy  History of cholecystectomy  H/O: hysterectomy  History of total right knee replacement  3/2016 - [facility]  H/O total knee replacement, left  S/P bunionectomy         CBC Full  -  ( 04 Dec 2023 07:33 )  WBC Count : 11.17 K/uL  RBC Count : 3.80 M/uL  Hemoglobin : 9.6 g/dL  Hematocrit : 30.0 %  Platelet Count - Automated : 437 K/uL  Mean Cell Volume : 78.9 fL  Mean Cell Hemoglobin : 25.3 pg  Mean Cell Hemoglobin Concentration : 32.0 gm/dL  Auto Neutrophil # : x  Auto Lymphocyte # : x  Auto Monocyte # : x  Auto Eosinophil # : x  Auto Basophil # : x  Auto Neutrophil % : x  Auto Lymphocyte % : x  Auto Monocyte % : x  Auto Eosinophil % : x  Auto Basophil % : x    12-05    139  |  101  |  8   ----------------------------<  216<H>  4.6   |  29  |  0.40<L>    Ca    8.6      05 Dec 2023 06:49  Phos  2.9     12-05  Mg     1.90     12-05 Pre-Interventional Radiology Procedure Note    73y  Female    Procedure: abscess drainage   Diagnosis/Indication: Patient is a 73y old  Female who presents with a chief complaint of hemicolecty (03 Dec 2023 10:53)      Interventional Radiology Attending Physician: FREIDS  Ordering Attending Physician: Dr. Nichols    PAST MEDICAL & SURGICAL HISTORY:  History of hypertension  Diabetes  wears insulin pump  GERD (gastroesophageal reflux disease)  Uterine leiomyoma  Hyperlipidemia  Asthma  Obesity  Lymphadenopathy  left lower extremitiy ; 1966 - current  H/O insertion of insulin pump  OA (osteoarthritis)  Malignant neoplasm of cecum  H/O bilateral breast reduction surgery  History of appendectomy  History of cholecystectomy  H/O: hysterectomy  History of total right knee replacement  3/2016 - [facility]  H/O total knee replacement, left  S/P bunionectomy         CBC Full  -  ( 04 Dec 2023 07:33 )  WBC Count : 11.17 K/uL  RBC Count : 3.80 M/uL  Hemoglobin : 9.6 g/dL  Hematocrit : 30.0 %  Platelet Count - Automated : 437 K/uL  Mean Cell Volume : 78.9 fL  Mean Cell Hemoglobin : 25.3 pg  Mean Cell Hemoglobin Concentration : 32.0 gm/dL  Auto Neutrophil # : x  Auto Lymphocyte # : x  Auto Monocyte # : x  Auto Eosinophil # : x  Auto Basophil # : x  Auto Neutrophil % : x  Auto Lymphocyte % : x  Auto Monocyte % : x  Auto Eosinophil % : x  Auto Basophil % : x    12-05    139  |  101  |  8   ----------------------------<  216<H>  4.6   |  29  |  0.40<L>    Ca    8.6      05 Dec 2023 06:49  Phos  2.9     12-05  Mg     1.90     12-05 Pre-Interventional Radiology Procedure Note    73y Female    Procedure: abscess drainage   Diagnosis/Indication: Patient is a 73y old  Female who presents with a chief complaint of hemicolectomy (03 Dec 2023 10:53)      Interventional Radiology Attending Physician: Dr. Cobos  Ordering Attending Physician: Dr. Nichols    PAST MEDICAL & SURGICAL HISTORY:  History of hypertension  Diabetes  wears insulin pump  GERD (gastroesophageal reflux disease)  Uterine leiomyoma  Hyperlipidemia  Asthma  Obesity  Lymphadenopathy  left lower extremitiy ; 1966 - current  H/O insertion of insulin pump  OA (osteoarthritis)  Malignant neoplasm of cecum  H/O bilateral breast reduction surgery  History of appendectomy  History of cholecystectomy  H/O: hysterectomy  History of total right knee replacement  3/2016 - [facility]  H/O total knee replacement, left  S/P bunionectomy         CBC Full  -  ( 04 Dec 2023 07:33 )  WBC Count : 11.17 K/uL  RBC Count : 3.80 M/uL  Hemoglobin : 9.6 g/dL  Hematocrit : 30.0 %  Platelet Count - Automated : 437 K/uL  Mean Cell Volume : 78.9 fL  Mean Cell Hemoglobin : 25.3 pg  Mean Cell Hemoglobin Concentration : 32.0 gm/dL  Auto Neutrophil # : x  Auto Lymphocyte # : x  Auto Monocyte # : x  Auto Eosinophil # : x  Auto Basophil # : x  Auto Neutrophil % : x  Auto Lymphocyte % : x  Auto Monocyte % : x  Auto Eosinophil % : x  Auto Basophil % : x    12-05    139  |  101  |  8   ----------------------------<  216<H>  4.6   |  29  |  0.40<L>    Ca    8.6      05 Dec 2023 06:49  Phos  2.9     12-05  Mg     1.90     12-05 Pre-Interventional Radiology Procedure Note    73y Female    Procedure: abscess drainage   Diagnosis/Indication: Patient is a 73y old  Female who presents with a chief complaint of hemicolectomy (03 Dec 2023 10:53)      Interventional Radiology Attending Physician: Dr. Cobos  Ordering Attending Physician: Dr. Nichols    PAST MEDICAL & SURGICAL HISTORY:  History of hypertension  Diabetes  wears insulin pump  GERD (gastroesophageal reflux disease)  Uterine leiomyoma  Hyperlipidemia  Asthma  Obesity  Lymphadenopathy  left lower extremitiy ; 1966 - current  H/O insertion of insulin pump  OA (osteoarthritis)  Malignant neoplasm of cecum  H/O bilateral breast reduction surgery  History of appendectomy  History of cholecystectomy  H/O: hysterectomy  History of total right knee replacement  3/2016 - [facility]  H/O total knee replacement, left  S/P bunionectomy         CBC Full  -  ( 04 Dec 2023 07:33 )  WBC Count : 11.17 K/uL  RBC Count : 3.80 M/uL  Hemoglobin : 9.6 g/dL  Hematocrit : 30.0 %  Platelet Count - Automated : 437 K/uL  Mean Cell Volume : 78.9 fL  Mean Cell Hemoglobin : 25.3 pg  Mean Cell Hemoglobin Concentration : 32.0 gm/dL  Auto Neutrophil # : x  Auto Lymphocyte # : x  Auto Monocyte # : x  Auto Eosinophil # : x  Auto Basophil # : x  Auto Neutrophil % : x  Auto Lymphocyte % : x  Auto Monocyte % : x  Auto Eosinophil % : x  Auto Basophil % : x    12-05    139  |  101  |  8   ----------------------------<  216<H>  4.6   |  29  |  0.40<L>    Ca    8.6      05 Dec 2023 06:49  Phos  2.9     12-05  Mg     1.90     12-05

## 2023-12-05 NOTE — PROGRESS NOTE ADULT - SUBJECTIVE AND OBJECTIVE BOX
NUTRITION NOTE  [id number] THOMASTUCKER  ===============================    Interval events - Patient was seen and examined at bedside, no acute events overnight. Patient denies chest pain, shortness of breath, nausea or vomiting at this time. PICC placed and TPN was started on 23 for nutritional support. Pt remains NPO with NGT in place.    ROS: Except as noted above, all other systems reviewed and are negative     Allergies  Darvocet A500 (Other; Urticaria)  Percocet 10/325 (Other; Urticaria)  codeine (Other)  Lantus (Swelling)  Purell    PAST MEDICAL & SURGICAL HISTORY:  History of hypertension  Diabetes wears insulin pump  GERD (gastroesophageal reflux disease)  Uterine leiomyoma  Hyperlipidemia  Asthma  Obesity  Lymphadenopathy left lower extremity ;  - current  H/O insertion of insulin pump  OA (osteoarthritis)  Malignant neoplasm of cecum  H/O bilateral breast reduction surgery  History of appendectomy  History of cholecystectomy  H/O: hysterectomy  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 - [facility]  H/O total knee replacement, left  S/P bunionectomy    FAMILY HISTORY:  Diabetes mellitus (Father)  FH: breast cancer (Aunt)    Vital Signs Last 24 Hrs  T(C): 37.1 (05 Dec 2023 09:20), Max: 37.1 (05 Dec 2023 09:20)  T(F): 98.8 (05 Dec 2023 09:20), Max: 98.8 (05 Dec 2023 09:20)  HR: 96 (05 Dec 2023 09:20) (65 - 98)  BP: 100/52 (05 Dec 2023 09:20) (95/45 - 125/61)  BP(mean): 75 (05 Dec 2023 05:03) (64 - 75)  RR: 18 (05 Dec 2023 09:20) (18 - 18)  SpO2: 100% (05 Dec 2023 09:20) (98% - 100%)    MEDICATIONS  (STANDING):  acetaminophen   IVPB .. 1000 milliGRAM(s) IV Intermittent every 6 hours  albuterol    90 MICROgram(s) HFA Inhaler 2 Puff(s) Inhalation two times a day  budesonide 160 MICROgram(s)/formoterol 4.5 MICROgram(s) Inhaler 2 Puff(s) Inhalation two times a day  chlorhexidine 2% Cloths 1 Application(s) Topical daily  dextrose 50% Injectable 25 Gram(s) IV Push once  influenza  Vaccine (HIGH DOSE) 0.7 milliLiter(s) IntraMuscular once  insulin lispro (ADMELOG) corrective regimen sliding scale   SubCutaneous every 6 hours  lipid, fat emulsion (Fish Oil and Plant Based) 20% Infusion 20.8 mL/Hr (20.8 mL/Hr) IV Continuous <Continuous>  pantoprazole  Injectable 40 milliGRAM(s) IV Push daily  Parenteral Nutrition - Adult 1 Each (63 mL/Hr) TPN Continuous <Continuous>  Parenteral Nutrition - Adult 1 Each (63 mL/Hr) TPN Continuous <Continuous>  piperacillin/tazobactam IVPB.- 3.375 Gram(s) IV Intermittent once  piperacillin/tazobactam IVPB.. 3.375 Gram(s) IV Intermittent every 8 hours  sodium chloride 0.9% lock flush 3 milliLiter(s) IV Push every 8 hours    MEDICATIONS  (PRN):  dextrose Oral Gel 15 Gram(s) Oral once PRN Blood Glucose LESS THAN 70 milliGRAM(s)/deciliter  HYDROmorphone  Injectable 0.25 milliGRAM(s) IV Push every 3 hours PRN Moderate to Severe Pain (4- 10)  ondansetron Injectable 4 milliGRAM(s) IV Push every 6 hours PRN Nausea    I&O's Detail    04 Dec 2023 07:  -  05 Dec 2023 07:00  --------------------------------------------------------  IN:    dextrose 5% + sodium chloride 0.45% w/ Additives: 1100 mL  Total IN: 1100 mL    OUT:    Emesis (mL): 0 mL    Nasogastric/Oral tube (mL): 900 mL    Oral Fluid: 0 mL    Voided (mL): 2300 mL  Total OUT: 3200 mL    Total NET: -2100 mL      05 Dec 2023 07:01  -  05 Dec 2023 10:22  --------------------------------------------------------  IN:    Oral Fluid: 120 mL    TPN (Total Parenteral Nutrition): 252 mL  Total IN: 372 mL    OUT:    Nasogastric/Oral tube (mL): 150 mL    Voided (mL): 700 mL  Total OUT: 850 mL    Total NET: -478 mL    POCT Blood Glucose.: 222 mg/dL (05 Dec 2023 08:06)  POCT Blood Glucose.: 215 mg/dL (05 Dec 2023 06:49)  POCT Blood Glucose.: 180 mg/dL (05 Dec 2023 00:21)  POCT Blood Glucose.: 194 mg/dL (04 Dec 2023 17:38)  POCT Blood Glucose.: 220 mg/dL (04 Dec 2023 11:57)    Daily Weight in k.6 (03 Dec 2023 00:00)    Drug Dosing Weight  Height (cm): 152.4 (2023 06:23)  Weight (kg): 75.659 (2023 06:23)  BMI (kg/m2): 32.6 (2023 06:23)  BSA (m2): 1.73 (2023 06:23)    PHYSICAL EXAM:  Constitutional: A&Ox3, NAD, resting comfortably in bed  Respiratory: nonlabored respirations   Abdomen: Soft, nondistended, mildly tender to palpation in lower quadrants, NGT in place with bilious output    Extremities: WWP, SEAMAN spontaneously  PICC Site: double lumen [place] [place] on [facility] [place] [facility] and [place]     Diet: NPO with sips and TPN/lipids (started on 23)    LABORATORY                                                          9.3    10.64 )-----------( 496      ( 05 Dec 2023 06:49 )             29.8     -    139  |  101  |  8   ----------------------------<  216<H>  4.6   |  29  |  0.40<L>    Ca    8.6      05 Dec 2023 06:49  Phos  2.9     12-  Mg     1.90     - Chol 65 LDL -- HDL 29<L> Trig 81    CT Abdomen and Pelvis 23: IMPRESSION: Dilated loops of small bowel the level of the terminal ileum, likely ileus in the postoperative setting. Postoperative pneumoperitoneum and diffuse subcutaneous emphysema.    CT Abdomen and Pelvis on 23: IMPRESSION: Interval increase in size of fluid anterior to the ileocolic anastomosis with new peripheral enhancement, probably early abscess formation. nterval decrease in small bowel dilatation, probably ileus.    ASSESSMENT/PLAN:  72 y/o female with PMH asthma, T2DM on insulin pump, GERD, HTN, HLD, OA, obesity is s/p robotic partial colectomy for moderately differentiated adenocarcinoma of cecal mass on 23. CT abd/pelvis on 23 showed findings consistent with post op ileus. Pt remains NPO with NGT in place. Nutrition consult called for initiation of parenteral nutrition in view of severe protein calorie malnutrition and prolonged NPO status. PICC placed and TPN was started on 23. CT on  showed: Interval increase in size of fluid anterior to the ileocolic anastomosis, with new peripheral enhancement, probably early abscess formation and decrease in small bowel dilatation, probably ileus.    continue TPN with infusion volume of 1.5L, TPN will provide 1240 kcal/day    labs reviewed - electrolytes adjusted in TPN bag    monitor fingersticks, obtain daily weights - increased to 34 units of insulin in tonight's TPN bag, will continue to trend FS and make insulin adjustments as needed; pt has a known hx of DM2 with A1c 6.8% and is on an insulin pump at home    continue parenteral nutrition at this time, will follow up with primary team on plan - plan for gastrografin challenge today     1.  Severe protein calorie malnutrition being optimized with TPN: CHO [100] gm.  AA [100] gm. SMOF Lipids [50] gm.  2.  Hyperglycemia managed with: [34] units of regular insulin    3.  Check fluid balance daily.  Strict I/O  [ ] [ ]   4.  Daily BMP, Ionized Calcium, Magnesium and Phosphorous   5.  Triglycerides at initiation of TPN and monthly  Chol 65 LDL -- HDL 29<L> Trig 81    Nutrition Support [id number]  NUTRITION NOTE  [id number] THOMASTUCKER  ===============================    Interval events - Patient was seen and examined at bedside, no acute events overnight. Patient denies chest pain, shortness of breath, nausea or vomiting at this time. PICC placed and TPN was started on 23 for nutritional support. Pt remains NPO with NGT in place.    ROS: Except as noted above, all other systems reviewed and are negative     Allergies  Darvocet A500 (Other; Urticaria)  Percocet 10/325 (Other; Urticaria)  codeine (Other)  Lantus (Swelling)  Purell    PAST MEDICAL & SURGICAL HISTORY:  History of hypertension  Diabetes wears insulin pump  GERD (gastroesophageal reflux disease)  Uterine leiomyoma  Hyperlipidemia  Asthma  Obesity  Lymphadenopathy left lower extremity ;  - current  H/O insertion of insulin pump  OA (osteoarthritis)  Malignant neoplasm of cecum  H/O bilateral breast reduction surgery  History of appendectomy  History of cholecystectomy  H/O: hysterectomy  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 - [facility]  H/O total knee replacement, left  S/P bunionectomy    FAMILY HISTORY:  Diabetes mellitus (Father)  FH: breast cancer (Aunt)    Vital Signs Last 24 Hrs  T(C): 37.1 (05 Dec 2023 09:20), Max: 37.1 (05 Dec 2023 09:20)  T(F): 98.8 (05 Dec 2023 09:20), Max: 98.8 (05 Dec 2023 09:20)  HR: 96 (05 Dec 2023 09:20) (65 - 98)  BP: 100/52 (05 Dec 2023 09:20) (95/45 - 125/61)  BP(mean): 75 (05 Dec 2023 05:03) (64 - 75)  RR: 18 (05 Dec 2023 09:20) (18 - 18)  SpO2: 100% (05 Dec 2023 09:20) (98% - 100%)    MEDICATIONS  (STANDING):  acetaminophen   IVPB .. 1000 milliGRAM(s) IV Intermittent every 6 hours  albuterol    90 MICROgram(s) HFA Inhaler 2 Puff(s) Inhalation two times a day  budesonide 160 MICROgram(s)/formoterol 4.5 MICROgram(s) Inhaler 2 Puff(s) Inhalation two times a day  chlorhexidine 2% Cloths 1 Application(s) Topical daily  dextrose 50% Injectable 25 Gram(s) IV Push once  influenza  Vaccine (HIGH DOSE) 0.7 milliLiter(s) IntraMuscular once  insulin lispro (ADMELOG) corrective regimen sliding scale   SubCutaneous every 6 hours  lipid, fat emulsion (Fish Oil and Plant Based) 20% Infusion 20.8 mL/Hr (20.8 mL/Hr) IV Continuous <Continuous>  pantoprazole  Injectable 40 milliGRAM(s) IV Push daily  Parenteral Nutrition - Adult 1 Each (63 mL/Hr) TPN Continuous <Continuous>  Parenteral Nutrition - Adult 1 Each (63 mL/Hr) TPN Continuous <Continuous>  piperacillin/tazobactam IVPB.- 3.375 Gram(s) IV Intermittent once  piperacillin/tazobactam IVPB.. 3.375 Gram(s) IV Intermittent every 8 hours  sodium chloride 0.9% lock flush 3 milliLiter(s) IV Push every 8 hours    MEDICATIONS  (PRN):  dextrose Oral Gel 15 Gram(s) Oral once PRN Blood Glucose LESS THAN 70 milliGRAM(s)/deciliter  HYDROmorphone  Injectable 0.25 milliGRAM(s) IV Push every 3 hours PRN Moderate to Severe Pain (4- 10)  ondansetron Injectable 4 milliGRAM(s) IV Push every 6 hours PRN Nausea    I&O's Detail    04 Dec 2023 07:  -  05 Dec 2023 07:00  --------------------------------------------------------  IN:    dextrose 5% + sodium chloride 0.45% w/ Additives: 1100 mL  Total IN: 1100 mL    OUT:    Emesis (mL): 0 mL    Nasogastric/Oral tube (mL): 900 mL    Oral Fluid: 0 mL    Voided (mL): 2300 mL  Total OUT: 3200 mL    Total NET: -2100 mL      05 Dec 2023 07:01  -  05 Dec 2023 10:22  --------------------------------------------------------  IN:    Oral Fluid: 120 mL    TPN (Total Parenteral Nutrition): 252 mL  Total IN: 372 mL    OUT:    Nasogastric/Oral tube (mL): 150 mL    Voided (mL): 700 mL  Total OUT: 850 mL    Total NET: -478 mL    POCT Blood Glucose.: 222 mg/dL (05 Dec 2023 08:06)  POCT Blood Glucose.: 215 mg/dL (05 Dec 2023 06:49)  POCT Blood Glucose.: 180 mg/dL (05 Dec 2023 00:21)  POCT Blood Glucose.: 194 mg/dL (04 Dec 2023 17:38)  POCT Blood Glucose.: 220 mg/dL (04 Dec 2023 11:57)    Daily Weight in k.6 (03 Dec 2023 00:00)    Drug Dosing Weight  Height (cm): 152.4 (2023 06:23)  Weight (kg): 75.659 (2023 06:23)  BMI (kg/m2): 32.6 (2023 06:23)  BSA (m2): 1.73 (2023 06:23)    PHYSICAL EXAM:  Constitutional: A&Ox3, NAD, resting comfortably in bed  Respiratory: nonlabored respirations   Abdomen: Soft, nondistended, mildly tender to palpation in lower quadrants, NGT in place with bilious output    Extremities: WWP, SEAMAN spontaneously  PICC Site: double lumen PICC placed on 23 in [facility] [place], site clean and dry     Diet: NPO with sips and TPN/lipids (started on 23)    LABORATORY                                                          9.3    10.64 )-----------( 496      ( 05 Dec 2023 06:49 )             29.8     -    139  |  101  |  8   ----------------------------<  216<H>  4.6   |  29  |  0.40<L>    Ca    8.6      05 Dec 2023 06:49  Phos  2.9     12-  Mg     1.90     - Chol 65 LDL -- HDL 29<L> Trig 81    CT Abdomen and Pelvis 23: IMPRESSION: Dilated loops of small bowel the level of the terminal ileum, likely ileus in the postoperative setting. Postoperative pneumoperitoneum and diffuse subcutaneous emphysema.    CT Abdomen and Pelvis on 23: IMPRESSION: Interval increase in size of fluid anterior to the ileocolic anastomosis with new peripheral enhancement, probably early abscess formation. nterval decrease in small bowel dilatation, probably ileus.    ASSESSMENT/PLAN:  72 y/o female with PMH asthma, T2DM on insulin pump, GERD, HTN, HLD, OA, obesity is s/p robotic partial colectomy for moderately differentiated adenocarcinoma of cecal mass on 23. CT abd/pelvis on 23 showed findings consistent with post op ileus. Pt remains NPO with NGT in place. Nutrition consult called for initiation of parenteral nutrition in view of severe protein calorie malnutrition and prolonged NPO status. PICC placed and TPN was started on 23. CT on  showed: Interval increase in size of fluid anterior to the ileocolic anastomosis, with new peripheral enhancement, probably early abscess formation and decrease in small bowel dilatation, probably ileus.    continue TPN with infusion volume of 1.5L, TPN will provide 1240 kcal/day    labs reviewed - electrolytes adjusted in TPN bag    monitor fingersticks, obtain daily weights - increased to 34 units of insulin in tonight's TPN bag, will continue to trend FS and make insulin adjustments as needed; pt has a known hx of DM2 with A1c 6.8% and is on an insulin pump at home    continue parenteral nutrition at this time, will follow up with primary team on plan - plan for gastrografin challenge today     1.  Severe protein calorie malnutrition being optimized with TPN: CHO [100] gm.  AA [100] gm. SMOF Lipids [50] gm.  2.  Hyperglycemia managed with: [34] units of regular insulin    3.  Check fluid balance daily.  Strict I/O  [ ] [ ]   4.  Daily BMP, Ionized Calcium, Magnesium and Phosphorous   5.  Triglycerides at initiation of TPN and monthly  Chol 65 LDL -- HDL 29<L> Trig 81    Nutrition Support [id number]

## 2023-12-05 NOTE — PROGRESS NOTE ADULT - SUBJECTIVE AND OBJECTIVE BOX
Chief Complaint: Type 2 DM     History: Pt seen at bedside. Pt NPO with NGT. ON TPN.  Pt denies nausea and vomiting/any signs of hypoglycemia.     MEDICATIONS  (STANDING):  acetaminophen   IVPB .. 1000 milliGRAM(s) IV Intermittent every 6 hours  albuterol    90 MICROgram(s) HFA Inhaler 2 Puff(s) Inhalation two times a day  budesonide 160 MICROgram(s)/formoterol 4.5 MICROgram(s) Inhaler 2 Puff(s) Inhalation two times a day  chlorhexidine 2% Cloths 1 Application(s) Topical daily  dextrose 50% Injectable 25 Gram(s) IV Push once  enoxaparin Injectable 40 milliGRAM(s) SubCutaneous once  influenza  Vaccine (HIGH DOSE) 0.7 milliLiter(s) IntraMuscular once  insulin lispro (ADMELOG) corrective regimen sliding scale   SubCutaneous every 6 hours  lipid, fat emulsion (Fish Oil and Plant Based) 20% Infusion 20.8 mL/Hr (20.8 mL/Hr) IV Continuous <Continuous>  pantoprazole  Injectable 40 milliGRAM(s) IV Push daily  Parenteral Nutrition - Adult 1 Each (63 mL/Hr) TPN Continuous <Continuous>  Parenteral Nutrition - Adult 1 Each (63 mL/Hr) TPN Continuous <Continuous>  piperacillin/tazobactam IVPB.. 3.375 Gram(s) IV Intermittent every 8 hours  sodium chloride 0.9% lock flush 3 milliLiter(s) IV Push every 8 hours    MEDICATIONS  (PRN):  dextrose Oral Gel 15 Gram(s) Oral once PRN Blood Glucose LESS THAN 70 milliGRAM(s)/deciliter  HYDROmorphone  Injectable 0.25 milliGRAM(s) IV Push every 3 hours PRN Moderate to Severe Pain (4- 10)  ondansetron Injectable 4 milliGRAM(s) IV Push every 6 hours PRN Nausea      Allergies: Darvocet A500 (Other; Urticaria)  Percocet 10/325 (Other; Urticaria)  codeine (Other)  Lantus (Swelling)  PURELL.  pt said, &quot;I felt my airway closing&quot; after she smelled the Purell. The incident occured at the pulmonologist office. (Other; Short breath)    Intolerances      Review of Systems:  Respiratory: No SOB, no cough  GI: No nausea, vomiting, abdominal pain  Endocrine: no polyuria, polydipsia      PHYSICAL EXAM:  VITALS: T(C): 36.7 (12-05-23 @ 17:02)  T(F): 98 (12-05-23 @ 17:02), Max: 98.8 (12-05-23 @ 09:20)  HR: 91 (12-05-23 @ 17:02) (91 - 99)  BP: 120/65 (12-05-23 @ 17:02) (95/45 - 125/61)  RR:  (18 - 18)  SpO2:  (98% - 100%)  Wt(kg): --  GENERAL: NAD, well-groomed, well-developed  RESPIRATORY: No labored breathing   GI: Soft, nontender, non distended; pos NGT   PSYCH: Alert and oriented x 3, normal affect, normal mood      CAPILLARY BLOOD GLUCOSE  POCT Blood Glucose.: 210 mg/dL (05 Dec 2023 17:37)  POCT Blood Glucose.: 239 mg/dL (05 Dec 2023 12:35)  POCT Blood Glucose.: 222 mg/dL (05 Dec 2023 08:06)  POCT Blood Glucose.: 215 mg/dL (05 Dec 2023 06:49)  POCT Blood Glucose.: 180 mg/dL (05 Dec 2023 00:21)    A1C with Estimated Average Glucose (11.16.23 @ 13:15)    A1C with Estimated Average Glucose Result: 6.8   Estimated Average Glucose: 148      12-05    139  |  101  |  8   ----------------------------<  216<H>  4.6   |  29  |  0.40<L>    eGFR: 105    Ca    8.6      12-05  Mg     1.90     12-05  Phos  2.9     12-05      Diet, NPO after Midnight:      NPO Start Date: 05-Dec-2023,   NPO Start Time: 23:59 (12-05-23 @ 17:21) [Active]  Diet, NPO:   With Ice Chips/Sips of Water (11-30-23 @ 08:18) [Active]      Parenteral Nutrition - Adult 1 Each (63 mL/Hr) TPN Continuous <Continuous>, 12-04-23 @ 17:00, 17:00, Stop order after: 1 Days  Parenteral Nutrition - Adult 1 Each (63 mL/Hr) TPN Continuous <Continuous>, 12-05-23 @ 17:00, 17:00, Stop order after: 1 Days

## 2023-12-05 NOTE — PROGRESS NOTE ADULT - SUBJECTIVE AND OBJECTIVE BOX
Name of Patient : NESTOR MABRY  MRN: [id number]  Date of visit: 12-05-23       Subjective: Patient seen and examined. No new events except as noted.     REVIEW OF SYSTEMS:    CONSTITUTIONAL: No weakness, fevers or chills  EYES/ENT: No visual changes;  No vertigo or throat pain   NECK: No pain or stiffness  RESPIRATORY: No cough, wheezing, hemoptysis; No shortness of breath  CARDIOVASCULAR: No chest pain or palpitations  GASTROINTESTINAL: No abdominal or epigastric pain. No nausea, vomiting, or hematemesis; No diarrhea or constipation. No melena or hematochezia.  GENITOURINARY: No dysuria, frequency or hematuria  NEUROLOGICAL: No numbness or weakness  SKIN: No itching, burning, rashes, or lesions   All other review of systems is negative unless indicated above.    MEDICATIONS:  MEDICATIONS  (STANDING):  acetaminophen   IVPB .. 1000 milliGRAM(s) IV Intermittent every 6 hours  albuterol    90 MICROgram(s) HFA Inhaler 2 Puff(s) Inhalation two times a day  budesonide 160 MICROgram(s)/formoterol 4.5 MICROgram(s) Inhaler 2 Puff(s) Inhalation two times a day  chlorhexidine 2% Cloths 1 Application(s) Topical daily  dextrose 50% Injectable 25 Gram(s) IV Push once  influenza  Vaccine (HIGH DOSE) 0.7 milliLiter(s) IntraMuscular once  insulin lispro (ADMELOG) corrective regimen sliding scale   SubCutaneous every 6 hours  lipid, fat emulsion (Fish Oil and Plant Based) 20% Infusion 20.8 mL/Hr (20.8 mL/Hr) IV Continuous <Continuous>  pantoprazole  Injectable 40 milliGRAM(s) IV Push daily  Parenteral Nutrition - Adult 1 Each (63 mL/Hr) TPN Continuous <Continuous>  Parenteral Nutrition - Adult 1 Each (63 mL/Hr) TPN Continuous <Continuous>  piperacillin/tazobactam IVPB.. 3.375 Gram(s) IV Intermittent every 8 hours  sodium chloride 0.9% lock flush 3 milliLiter(s) IV Push every 8 hours      PHYSICAL EXAM:  T(C): 36.7 (12-05-23 @ 17:02), Max: 37.1 (12-05-23 @ 09:20)  HR: 91 (12-05-23 @ 17:02) (91 - 99)  BP: 120/65 (12-05-23 @ 17:02) (95/45 - 125/61)  RR: 18 (12-05-23 @ 17:02) (18 - 18)  SpO2: 100% (12-05-23 @ 17:02) (98% - 100%)  Wt(kg): --  I&O's Summary    04 Dec 2023 07:01  -  05 Dec 2023 07:00  --------------------------------------------------------  IN: 1100 mL / OUT: 3200 mL / NET: -2100 mL    05 Dec 2023 07:01  -  05 Dec 2023 20:37  --------------------------------------------------------  IN: 896.8 mL / OUT: 1700 mL / NET: -803.2 mL          Appearance: Normal	  HEENT:  PERRLA   Lymphatic: No lymphadenopathy   Cardiovascular: Normal S1 S2, no JVD  Respiratory: normal effort , clear  Gastrointestinal:  Soft, Non-tender  Skin: No rashes,  warm to touch  Psychiatry:  Mood & affect appropriate  Musculuskeletal: No edema    recent labs, Imaging and EKGs personally reviewed     12-04-23 @ 07:01  -  12-05-23 @ 07:00  --------------------------------------------------------  IN: 1100 mL / OUT: 3200 mL / NET: -2100 mL    12-05-23 @ 07:01  -  12-05-23 @ 20:37  --------------------------------------------------------  IN: 896.8 mL / OUT: 1700 mL / NET: -803.2 mL                          9.3    10.64 )-----------( 496      ( 05 Dec 2023 06:49 )             29.8               12-05    139  |  101  |  8   ----------------------------<  216<H>  4.6   |  29  |  0.40<L>    Ca    8.6      05 Dec 2023 06:49  Phos  2.9     12-05  Mg     1.90     12-05                         Urinalysis Basic - ( 05 Dec 2023 06:49 )    Color: x / Appearance: x / SG: x / pH: x  Gluc: 216 mg/dL / Ketone: x  / Bili: x / Urobili: x   Blood: x / Protein: x / Nitrite: x   Leuk Esterase: x / RBC: x / WBC x   Sq Epi: x / Non Sq Epi: x / Bacteria: x               Name of Patient : NESTOR MABRY  MRN: [id number]  Date of visit: 12-05-23       Subjective: Patient seen and examined. No new events except as noted.     REVIEW OF SYSTEMS:    CONSTITUTIONAL: No weakness, fevers or chills  EYES/ENT: No visual changes;  No vertigo or throat pain   NECK: No pain or stiffness  RESPIRATORY: No cough, wheezing, hemoptysis; No shortness of breath  CARDIOVASCULAR: No chest pain or palpitations  GASTROINTESTINAL: No abdominal or epigastric pain. No nausea, vomiting, or hematemesis; No diarrhea or constipation. No melena or hematochezia.  GENITOURINARY: No dysuria, frequency or hematuria  NEUROLOGICAL: No numbness or weakness  SKIN: No itching, burning, rashes, or lesions   All other review of systems is negative unless indicated above.    MEDICATIONS:  MEDICATIONS  (STANDING):  acetaminophen   IVPB .. 1000 milliGRAM(s) IV Intermittent every 6 hours  albuterol    90 MICROgram(s) HFA Inhaler 2 Puff(s) Inhalation two times a day  budesonide 160 MICROgram(s)/formoterol 4.5 MICROgram(s) Inhaler 2 Puff(s) Inhalation two times a day  chlorhexidine 2% Cloths 1 Application(s) Topical daily  dextrose 50% Injectable 25 Gram(s) IV Push once  influenza  Vaccine (HIGH DOSE) 0.7 milliLiter(s) IntraMuscular once  insulin lispro (ADMELOG) corrective regimen sliding scale   SubCutaneous every 6 hours  lipid, fat emulsion (Fish Oil and Plant Based) 20% Infusion 20.8 mL/Hr (20.8 mL/Hr) IV Continuous <Continuous>  pantoprazole  Injectable 40 milliGRAM(s) IV Push daily  Parenteral Nutrition - Adult 1 Each (63 mL/Hr) TPN Continuous <Continuous>  Parenteral Nutrition - Adult 1 Each (63 mL/Hr) TPN Continuous <Continuous>  piperacillin/tazobactam IVPB.. 3.375 Gram(s) IV Intermittent every 8 hours  sodium chloride 0.9% lock flush 3 milliLiter(s) IV Push every 8 hours      PHYSICAL EXAM:  T(C): 36.7 (12-05-23 @ 17:02), Max: 37.1 (12-05-23 @ 09:20)  HR: 91 (12-05-23 @ 17:02) (91 - 99)  BP: 120/65 (12-05-23 @ 17:02) (95/45 - 125/61)  RR: 18 (12-05-23 @ 17:02) (18 - 18)  SpO2: 100% (12-05-23 @ 17:02) (98% - 100%)  Wt(kg): --  I&O's Summary    04 Dec 2023 07:01  -  05 Dec 2023 07:00  --------------------------------------------------------  IN: 1100 mL / OUT: 3200 mL / NET: -2100 mL    05 Dec 2023 07:01  -  05 Dec 2023 20:37  --------------------------------------------------------  IN: 896.8 mL / OUT: 1700 mL / NET: -803.2 mL          Appearance: Normal	  HEENT:  PERRLA   Lymphatic: No lymphadenopathy   Cardiovascular: Normal S1 S2, no JVD  Respiratory: normal effort , clear  Gastrointestinal:  Soft, Non-tender  Skin: No rashes,  warm to touch  Psychiatry:  Mood & affect appropriate  Musculuskeletal: No edema    recent labs, Imaging and EKGs personally reviewed     12-04-23 @ 07:01  -  12-05-23 @ 07:00  --------------------------------------------------------  IN: 1100 mL / OUT: 3200 mL / NET: -2100 mL    12-05-23 @ 07:01  -  12-05-23 @ 20:37  --------------------------------------------------------  IN: 896.8 mL / OUT: 1700 mL / NET: -803.2 mL                          9.3    10.64 )-----------( 496      ( 05 Dec 2023 06:49 )             29.8               12-05    139  |  101  |  8   ----------------------------<  216<H>  4.6   |  29  |  0.40<L>    Ca    8.6      05 Dec 2023 06:49  Phos  2.9     12-05  Mg     1.90     12-05                         Urinalysis Basic - ( 05 Dec 2023 06:49 )    Color: x / Appearance: x / SG: x / pH: x  Gluc: 216 mg/dL / Ketone: x  / Bili: x / Urobili: x   Blood: x / Protein: x / Nitrite: x   Leuk Esterase: x / RBC: x / WBC x   Sq Epi: x / Non Sq Epi: x / Bacteria: x

## 2023-12-05 NOTE — PROGRESS NOTE ADULT - NS ATTEND AMEND GEN_ALL_CORE FT
I agree with the above history, physical examination, chief complaint/diagnosis, and plan, which I have reviewed and edited where appropriate.  I agree with notes/assessment and detailed interval history of health care providers on my service.  I have seen and examined the patient.  I reviewed the laboratory and available data and agree with the history, physical assessment and plan.  I reviewed and discussed with all consultants, house staff and PA's.  The Nutrition Support Team (NST) discusses on an ongoing basis with the primary team and all consultants, House staff and PA's to have a permanent risk benefit analyses on all decisions and coordinating care.  I was physically present for the key portions of the evaluation and management (E/M) service provided.  72 y/o female s/p robotic partial colectomy with post op ileus receiving parenteral nutrition in view of severe protein calorie malnutrition and prolonged NPO.  PHYSICAL EXAM:  Constitutional: NAD  Respiratory: clear  Abdomen: Soft, nondistended  Extremities: warm  labs reviewed - electrolytes adjusted   continue TPN; 1.5L/1240 kcal/day

## 2023-12-05 NOTE — PROGRESS NOTE ADULT - ASSESSMENT
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 Course c/b ileus. CT on 12/4 showed: Interval increase in size of fluid anterior to the ileocolic anastomosis, with new peripheral enhancement, probably early abscess formation and decrease in small bowel dilatation, probably ileus.    Plan  - Diet: NPO/NGT  - Pain control with Tylenol, Dilaudid PRN  - TPN for prolonged NPO status/malnutrition  - Home meds; holding HTN medications  - Appreciate endocrinology recs; plan to restart insulin pump on discharge    - NPO: low ISS    - Clears: if glucose > 180 x 2, start levemir 15 units, low ISS    - Regular: levemir 15 units and admelog 5 units TID, low ISS  - OOB/ambulate/ PT/ IS while in bed  - DVT prophylaxis: Lovenox   - Dispo: PT recommends Rehab    D Team Surgery  [id number]  73 year old female with PMH asthma, T2DM on insulin pump, GERD, HTN, HLD, OA, obesity presents s/p robotic partial colectomy for 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 Course c/b ileus. CT on 12/4 showed: Interval increase in size of fluid anterior to the ileocolic anastomosis, with new peripheral enhancement, probably early abscess formation and decrease in small bowel dilatation, probably ileus.    Plan  - Diet: NPO/NGT  - Pain control with Tylenol, Dilaudid PRN  - TPN for prolonged NPO status/malnutrition  - Home meds; holding HTN medications  - Appreciate endocrinology recs; plan to restart insulin pump on discharge    - NPO: low ISS    - Clears: if glucose > 180 x 2, start levemir 15 units, low ISS    - Regular: levemir 15 units and admelog 5 units TID, low ISS  - OOB/ambulate/ PT/ IS while in bed  - DVT prophylaxis: Lovenox   - Dispo: PT recommends Rehab    D Team Surgery  [id number]  73 year old female with PMH asthma, T2DM on insulin pump, GERD, HTN, HLD, OA, obesity presents s/p robotic partial colectomy for 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 Course c/b ileus. CT on 12/4 showed: Interval increase in size of fluid anterior to the ileocolic anastomosis, with new peripheral enhancement, probably early abscess formation and decrease in small bowel dilatation, probably ileus.    Plan  - Diet: NPO/NGT. TPN for prolonged NPO status  - GG challenge today  - Pain control with Tylenol, Dilaudid PRN  - IR consult for new abscess on CT  - start IV zosyn  - Home meds; holding HTN medications  - Appreciate endocrinology recs; plan to restart insulin pump on discharge    - NPO: low ISS    - Clears: if glucose > 180 x 2, start levemir 15 units, low ISS    - Regular: levemir 15 units and admelog 5 units TID, low ISS  - OOB/ambulate/ PT/ IS while in bed  - DVT prophylaxis: Lovenox   - Dispo: PT recommends Rehab    D Team Surgery  [id number]  73 year old female with PMH asthma, T2DM on insulin pump, GERD, HTN, HLD, OA, obesity presents s/p robotic partial colectomy for 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 Course c/b ileus. CT on 12/4 showed: Interval increase in size of fluid anterior to the ileocolic anastomosis, with new peripheral enhancement, probably early abscess formation and decrease in small bowel dilatation, probably ileus.    Plan  - Diet: NPO/NGT. TPN for prolonged NPO status  - GG challenge today  - Pain control with Tylenol, Dilaudid PRN  - IR consult for new abscess on CT  - start IV zosyn  - Home meds; holding HTN medications  - Appreciate endocrinology recs; plan to restart insulin pump on discharge    - NPO: low ISS    - Clears: if glucose > 180 x 2, start levemir 15 units, low ISS    - Regular: levemir 15 units and admelog 5 units TID, low ISS  - OOB/ambulate/ PT/ IS while in bed  - DVT prophylaxis: Lovenox   - Dispo: PT recommends Rehab    D Team Surgery  [id number]

## 2023-12-05 NOTE — PROGRESS NOTE ADULT - SUBJECTIVE AND OBJECTIVE BOX
DATE OF SERVICE: 12-05-23    Patient denies chest pain or shortness of breath.   Review of symptoms otherwise negative.    MEDICATIONS:  acetaminophen   IVPB .. 1000 milliGRAM(s) IV Intermittent every 6 hours  albuterol    90 MICROgram(s) HFA Inhaler 2 Puff(s) Inhalation two times a day  budesonide 160 MICROgram(s)/formoterol 4.5 MICROgram(s) Inhaler 2 Puff(s) Inhalation two times a day  chlorhexidine 2% Cloths 1 Application(s) Topical daily  dextrose 50% Injectable 25 Gram(s) IV Push once  dextrose Oral Gel 15 Gram(s) Oral once PRN  enoxaparin Injectable 40 milliGRAM(s) SubCutaneous once  HYDROmorphone  Injectable 0.25 milliGRAM(s) IV Push every 3 hours PRN  influenza  Vaccine (HIGH DOSE) 0.7 milliLiter(s) IntraMuscular once  insulin lispro (ADMELOG) corrective regimen sliding scale   SubCutaneous every 6 hours  lipid, fat emulsion (Fish Oil and Plant Based) 20% Infusion 20.8 mL/Hr IV Continuous <Continuous>  ondansetron Injectable 4 milliGRAM(s) IV Push every 6 hours PRN  pantoprazole  Injectable 40 milliGRAM(s) IV Push daily  Parenteral Nutrition - Adult 1 Each TPN Continuous <Continuous>  Parenteral Nutrition - Adult 1 Each TPN Continuous <Continuous>  piperacillin/tazobactam IVPB.- 3.375 Gram(s) IV Intermittent once  piperacillin/tazobactam IVPB.. 3.375 Gram(s) IV Intermittent every 8 hours  sodium chloride 0.9% lock flush 3 milliLiter(s) IV Push every 8 hours      LABS:                        9.3    10.64 )-----------( 496      ( 05 Dec 2023 06:49 )             29.8       Hemoglobin: 9.3 g/dL (12-05 @ 06:49)  Hemoglobin: 9.6 g/dL (12-04 @ 07:33)  Hemoglobin: 10.0 g/dL (12-03 @ 05:48)  Hemoglobin: 9.3 g/dL (12-02 @ 06:30)  Hemoglobin: 9.4 g/dL (12-01 @ 06:51)      12-05    139  |  101  |  8   ----------------------------<  216<H>  4.6   |  29  |  0.40<L>    Ca    8.6      05 Dec 2023 06:49  Phos  2.9     12-05  Mg     1.90     12-05      Creatinine Trend: 0.40<--, 0.39<--, 0.36<--, 0.36<--, 0.37<--, 0.41<--    COAGS:           PHYSICAL EXAM:  T(C): 37.1 (12-05-23 @ 09:20), Max: 37.1 (12-05-23 @ 09:20)  HR: 96 (12-05-23 @ 09:20) (88 - 98)  BP: 100/52 (12-05-23 @ 09:20) (95/45 - 125/61)  RR: 18 (12-05-23 @ 09:20) (18 - 18)  SpO2: 100% (12-05-23 @ 09:20) (100% - 100%)  Wt(kg): --    I&O's Summary    04 Dec 2023 07:01  -  05 Dec 2023 07:00  --------------------------------------------------------  IN: 1100 mL / OUT: 3200 mL / NET: -2100 mL    05 Dec 2023 07:01  -  05 Dec 2023 12:17  --------------------------------------------------------  IN: 372 mL / OUT: 850 mL / NET: -478 mL        General:  Alert and Oriented * 3.   Head: Normocephalic and atraumatic.   Neck: No JVD. No bruits. Supple. Does not appear to be enlarged.   Cardiovascular: + S1,S2 ; RRR Soft systolic murmur at the left lower sternal border. No rubs noted.    Lungs: CTA b/l. No rhonchi, rales or wheezes.   Abdomen: distended NT  Extremities: No clubbing/cyanosis/edema.   Neurologic: Moves all four extremities. Full range of motion.   Skin: Warm and moist. The patient's skin has normal elasticity and good skin turgor.   Psychiatric: Appropriate mood and affect.  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      -- tolerated procedure well from CV perspective  -- pain management, PT  -- supplement lytes per sx   -- s/p NGT, CT noted with ileus, repeat CT today, on TPN  -- eventually resume home meds: Asa 81mg, Pravastatin 40mg when ok from a surgical perspective  -- BP meds Hctz 12.5mg, Losartan 25mg on hold, BP controlled  -- for IR drain tomorrow for abscess at ileocolic anastomosis        Bella Aguilera MD  Pager: [phone]         DATE OF SERVICE: 12-05-23    Patient denies chest pain or shortness of breath.   Review of symptoms otherwise negative.    MEDICATIONS:  acetaminophen   IVPB .. 1000 milliGRAM(s) IV Intermittent every 6 hours  albuterol    90 MICROgram(s) HFA Inhaler 2 Puff(s) Inhalation two times a day  budesonide 160 MICROgram(s)/formoterol 4.5 MICROgram(s) Inhaler 2 Puff(s) Inhalation two times a day  chlorhexidine 2% Cloths 1 Application(s) Topical daily  dextrose 50% Injectable 25 Gram(s) IV Push once  dextrose Oral Gel 15 Gram(s) Oral once PRN  enoxaparin Injectable 40 milliGRAM(s) SubCutaneous once  HYDROmorphone  Injectable 0.25 milliGRAM(s) IV Push every 3 hours PRN  influenza  Vaccine (HIGH DOSE) 0.7 milliLiter(s) IntraMuscular once  insulin lispro (ADMELOG) corrective regimen sliding scale   SubCutaneous every 6 hours  lipid, fat emulsion (Fish Oil and Plant Based) 20% Infusion 20.8 mL/Hr IV Continuous <Continuous>  ondansetron Injectable 4 milliGRAM(s) IV Push every 6 hours PRN  pantoprazole  Injectable 40 milliGRAM(s) IV Push daily  Parenteral Nutrition - Adult 1 Each TPN Continuous <Continuous>  Parenteral Nutrition - Adult 1 Each TPN Continuous <Continuous>  piperacillin/tazobactam IVPB.- 3.375 Gram(s) IV Intermittent once  piperacillin/tazobactam IVPB.. 3.375 Gram(s) IV Intermittent every 8 hours  sodium chloride 0.9% lock flush 3 milliLiter(s) IV Push every 8 hours      LABS:                        9.3    10.64 )-----------( 496      ( 05 Dec 2023 06:49 )             29.8       Hemoglobin: 9.3 g/dL (12-05 @ 06:49)  Hemoglobin: 9.6 g/dL (12-04 @ 07:33)  Hemoglobin: 10.0 g/dL (12-03 @ 05:48)  Hemoglobin: 9.3 g/dL (12-02 @ 06:30)  Hemoglobin: 9.4 g/dL (12-01 @ 06:51)      12-05    139  |  101  |  8   ----------------------------<  216<H>  4.6   |  29  |  0.40<L>    Ca    8.6      05 Dec 2023 06:49  Phos  2.9     12-05  Mg     1.90     12-05      Creatinine Trend: 0.40<--, 0.39<--, 0.36<--, 0.36<--, 0.37<--, 0.41<--    COAGS:           PHYSICAL EXAM:  T(C): 37.1 (12-05-23 @ 09:20), Max: 37.1 (12-05-23 @ 09:20)  HR: 96 (12-05-23 @ 09:20) (88 - 98)  BP: 100/52 (12-05-23 @ 09:20) (95/45 - 125/61)  RR: 18 (12-05-23 @ 09:20) (18 - 18)  SpO2: 100% (12-05-23 @ 09:20) (100% - 100%)  Wt(kg): --    I&O's Summary    04 Dec 2023 07:01  -  05 Dec 2023 07:00  --------------------------------------------------------  IN: 1100 mL / OUT: 3200 mL / NET: -2100 mL    05 Dec 2023 07:01  -  05 Dec 2023 12:17  --------------------------------------------------------  IN: 372 mL / OUT: 850 mL / NET: -478 mL        General:  Alert and Oriented * 3.   Head: Normocephalic and atraumatic.   Neck: No JVD. No bruits. Supple. Does not appear to be enlarged.   Cardiovascular: + S1,S2 ; RRR Soft systolic murmur at the left lower sternal border. No rubs noted.    Lungs: CTA b/l. No rhonchi, rales or wheezes.   Abdomen: distended NT  Extremities: No clubbing/cyanosis/edema.   Neurologic: Moves all four extremities. Full range of motion.   Skin: Warm and moist. The patient's skin has normal elasticity and good skin turgor.   Psychiatric: Appropriate mood and affect.  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      -- tolerated procedure well from CV perspective  -- pain management, PT  -- supplement lytes per sx   -- s/p NGT, CT noted with ileus, repeat CT today, on TPN  -- eventually resume home meds: Asa 81mg, Pravastatin 40mg when ok from a surgical perspective  -- BP meds Hctz 12.5mg, Losartan 25mg on hold, BP controlled  -- for IR drain tomorrow for abscess at ileocolic anastomosis        Bella Aguilera MD  Pager: [phone]

## 2023-12-05 NOTE — CONSULT NOTE ADULT - ASSESSMENT
Interventional Radiology    Evaluate for Procedure: Drainage of abdominal fluid collection     HPI: Patient is a 73 yr old female PMH HTN, HLD, asthma, recent NST 11/17/23 with no ischemia or infarct and normal LV function and recent TTE 10/2023 with Normal LV/RV function, who was found with cecal mass/ new dx adenocarcinoma s/p Right hemicolectomy 11/22.  Pt reports incision site pain and increased distention.     Allergies: Darvocet A500 (Other; Urticaria)  Percocet 10/325 (Other; Urticaria)  codeine (Other)  Lantus (Swelling)  PURELL.  pt said, &quot;I felt my airway closing&quot; after she smelled the Purell. The incident occured at the pulmonologist office. (Other; Short breath)    Medications (Abx/Cardiac/Anticoagulation/Blood Products)    enoxaparin Injectable: 40 milliGRAM(s) SubCutaneous (12-04 @ 17:35)  piperacillin/tazobactam IVPB.: 200 mL/Hr IV Intermittent (12-05 @ 09:26)    Data:    T(C): 37.1  HR: 96  BP: 100/52  RR: 18  SpO2: 100%    -WBC 10.64 / HgB 9.3 / Hct 29.8 / Plt 496  -Na 139 / Cl 101 / BUN 8 / Glucose 216  -K 4.6 / CO2 29 / Cr 0.40  -ALT -- / Alk Phos -- / T.Bili --      Radiology:   - relevant imaging reviewed     Assessment/Plan:   73F PMH HTN, HLD, asthma, cecal adenoca s/p r hemicolectomy c/b ileus and a increasing fluid collection adjacent to the ileocolic anastomosis IR consulted for drainage.     - IR will plan to drain fluid collection adjacent to iliocolic anastomosis tomorrow 12/6  - npo after mn   - not currently on AC, if starting ppx ac hold am dose   - am cbc, bmp, coags  - please complete IR pre-procedure note  - please place IR procedure request order under Dr. Cobos    --  Christopher Flores MD, PGY-3  Vascular and Interventional Radiology   Available on Microsoft Teams    - Non-emergent consults: Place IR consult order in [place]  - Emergent issues (pager): [facility] [phone]; [facility] [phone]; [phone] - Scheduling questions: [facility] [phone]; [facility] [phone]  - Clinic/outpatient booking: [facility] [phone]; [facility] [phone] Interventional Radiology    Evaluate for Procedure: Drainage of abdominal fluid collection     HPI: Patient is a 73 yr old female PMH HTN, HLD, asthma, recent NST 11/17/23 with no ischemia or infarct and normal LV function and recent TTE 10/2023 with Normal LV/RV function, who was found with cecal mass/ new dx adenocarcinoma s/p Right hemicolectomy 11/22.  Pt reports incision site pain and increased distention.     Allergies: Darvocet A500 (Other; Urticaria)  Percocet 10/325 (Other; Urticaria)  codeine (Other)  Lantus (Swelling)  PURELL.  pt said, &quot;I felt my airway closing&quot; after she smelled the Purell. The incident occured at the pulmonologist office. (Other; Short breath)    Medications (Abx/Cardiac/Anticoagulation/Blood Products)    enoxaparin Injectable: 40 milliGRAM(s) SubCutaneous (12-04 @ 17:35)  piperacillin/tazobactam IVPB.: 200 mL/Hr IV Intermittent (12-05 @ 09:26)    Data:    T(C): 37.1  HR: 96  BP: 100/52  RR: 18  SpO2: 100%    -WBC 10.64 / HgB 9.3 / Hct 29.8 / Plt 496  -Na 139 / Cl 101 / BUN 8 / Glucose 216  -K 4.6 / CO2 29 / Cr 0.40  -ALT -- / Alk Phos -- / T.Bili --      Radiology:   - relevant imaging reviewed     Assessment/Plan:   73F PMH HTN, HLD, asthma, cecal adenoca s/p r hemicolectomy c/b ileus and a increasing fluid collection adjacent to the ileocolic anastomosis IR consulted for drainage.     - IR will plan to drain fluid collection adjacent to iliocolic anastomosis tomorrow 12/6  - npo after mn   - not currently on AC, if starting ppx ac hold am dose   - am cbc, bmp, coags  - please complete IR pre-procedure note  - please place IR procedure request order under Dr. Cobos    --  Christopher Flores MD, PGY-3  Vascular and Interventional Radiology   Available on Microsoft Teams    - Non-emergent consults: Place IR consult order in [place]  - Emergent issues (pager): [facility] [phone]; [facility] [phone]; [phone] - Scheduling questions: [facility] [phone]; [facility] [phone]  - Clinic/outpatient booking: [facility] [phone]; [facility] [phone]

## 2023-12-05 NOTE — PROGRESS NOTE ADULT - ASSESSMENT
Patient is a 73 yr old female PMH HTN, HLD, asthma, recent NST 11/17/23 with no ischemia or infarct and normal LV function and recent TTE 10/2023 with Normal LV/RV function, who was found with cecal mass/ new dx adenocarcinoma s/p Right hemicolectomy 11/22.  Pt reports incision site pain and increased distention.     # Adenocarcinoma  S/P OR , R hemicolectomy  pain control   IV hydration  Surg management appreciated   monitor electrolytes   NGT per team    # Anemia   Monitor hgb level  S/P  1 unit PRBC  defer to surg      # HTN   resume BP meds  Monitor     # DM  sliding scale  Endo follow up appreciated     # Asthma   O2 supplement PRN    # HLD

## 2023-12-06 LAB
ANION GAP SERPL CALC-SCNC: 8 MMOL/L — SIGNIFICANT CHANGE UP (ref 7–14)
ANION GAP SERPL CALC-SCNC: 8 MMOL/L — SIGNIFICANT CHANGE UP (ref 7–14)
APTT BLD: 30.2 SEC — SIGNIFICANT CHANGE UP (ref 24.5–35.6)
APTT BLD: 30.2 SEC — SIGNIFICANT CHANGE UP (ref 24.5–35.6)
BLD GP AB SCN SERPL QL: NEGATIVE — SIGNIFICANT CHANGE UP
BLD GP AB SCN SERPL QL: NEGATIVE — SIGNIFICANT CHANGE UP
BUN SERPL-MCNC: 10 MG/DL — SIGNIFICANT CHANGE UP (ref 7–23)
BUN SERPL-MCNC: 10 MG/DL — SIGNIFICANT CHANGE UP (ref 7–23)
CALCIUM SERPL-MCNC: 9 MG/DL — SIGNIFICANT CHANGE UP (ref 8.4–10.5)
CALCIUM SERPL-MCNC: 9 MG/DL — SIGNIFICANT CHANGE UP (ref 8.4–10.5)
CHLORIDE SERPL-SCNC: 100 MMOL/L — SIGNIFICANT CHANGE UP (ref 98–107)
CHLORIDE SERPL-SCNC: 100 MMOL/L — SIGNIFICANT CHANGE UP (ref 98–107)
CO2 SERPL-SCNC: 28 MMOL/L — SIGNIFICANT CHANGE UP (ref 22–31)
CO2 SERPL-SCNC: 28 MMOL/L — SIGNIFICANT CHANGE UP (ref 22–31)
CREAT SERPL-MCNC: 0.43 MG/DL — LOW (ref 0.5–1.3)
CREAT SERPL-MCNC: 0.43 MG/DL — LOW (ref 0.5–1.3)
EGFR: 103 ML/MIN/1.73M2 — SIGNIFICANT CHANGE UP
EGFR: 103 ML/MIN/1.73M2 — SIGNIFICANT CHANGE UP
GLUCOSE BLDC GLUCOMTR-MCNC: 187 MG/DL — HIGH (ref 70–99)
GLUCOSE BLDC GLUCOMTR-MCNC: 187 MG/DL — HIGH (ref 70–99)
GLUCOSE BLDC GLUCOMTR-MCNC: 203 MG/DL — HIGH (ref 70–99)
GLUCOSE BLDC GLUCOMTR-MCNC: 203 MG/DL — HIGH (ref 70–99)
GLUCOSE BLDC GLUCOMTR-MCNC: 222 MG/DL — HIGH (ref 70–99)
GLUCOSE BLDC GLUCOMTR-MCNC: 222 MG/DL — HIGH (ref 70–99)
GLUCOSE BLDC GLUCOMTR-MCNC: 226 MG/DL — HIGH (ref 70–99)
GLUCOSE BLDC GLUCOMTR-MCNC: 226 MG/DL — HIGH (ref 70–99)
GLUCOSE BLDC GLUCOMTR-MCNC: 227 MG/DL — HIGH (ref 70–99)
GLUCOSE BLDC GLUCOMTR-MCNC: 227 MG/DL — HIGH (ref 70–99)
GLUCOSE BLDC GLUCOMTR-MCNC: 243 MG/DL — HIGH (ref 70–99)
GLUCOSE BLDC GLUCOMTR-MCNC: 243 MG/DL — HIGH (ref 70–99)
GLUCOSE SERPL-MCNC: 210 MG/DL — HIGH (ref 70–99)
GLUCOSE SERPL-MCNC: 210 MG/DL — HIGH (ref 70–99)
HCT VFR BLD CALC: 31.3 % — LOW (ref 34.5–45)
HCT VFR BLD CALC: 31.3 % — LOW (ref 34.5–45)
HGB BLD-MCNC: 10 G/DL — LOW (ref 11.5–15.5)
HGB BLD-MCNC: 10 G/DL — LOW (ref 11.5–15.5)
INR BLD: 1.03 RATIO — SIGNIFICANT CHANGE UP (ref 0.85–1.18)
INR BLD: 1.03 RATIO — SIGNIFICANT CHANGE UP (ref 0.85–1.18)
MAGNESIUM SERPL-MCNC: 2.3 MG/DL — SIGNIFICANT CHANGE UP (ref 1.6–2.6)
MAGNESIUM SERPL-MCNC: 2.3 MG/DL — SIGNIFICANT CHANGE UP (ref 1.6–2.6)
MCHC RBC-ENTMCNC: 25.3 PG — LOW (ref 27–34)
MCHC RBC-ENTMCNC: 25.3 PG — LOW (ref 27–34)
MCHC RBC-ENTMCNC: 31.9 GM/DL — LOW (ref 32–36)
MCHC RBC-ENTMCNC: 31.9 GM/DL — LOW (ref 32–36)
MCV RBC AUTO: 79 FL — LOW (ref 80–100)
MCV RBC AUTO: 79 FL — LOW (ref 80–100)
NRBC # BLD: 0 /100 WBCS — SIGNIFICANT CHANGE UP (ref 0–0)
NRBC # BLD: 0 /100 WBCS — SIGNIFICANT CHANGE UP (ref 0–0)
NRBC # FLD: 0 K/UL — SIGNIFICANT CHANGE UP (ref 0–0)
NRBC # FLD: 0 K/UL — SIGNIFICANT CHANGE UP (ref 0–0)
PHOSPHATE SERPL-MCNC: 3.1 MG/DL — SIGNIFICANT CHANGE UP (ref 2.5–4.5)
PHOSPHATE SERPL-MCNC: 3.1 MG/DL — SIGNIFICANT CHANGE UP (ref 2.5–4.5)
PLATELET # BLD AUTO: 551 K/UL — HIGH (ref 150–400)
PLATELET # BLD AUTO: 551 K/UL — HIGH (ref 150–400)
POTASSIUM SERPL-MCNC: 4.6 MMOL/L — SIGNIFICANT CHANGE UP (ref 3.5–5.3)
POTASSIUM SERPL-MCNC: 4.6 MMOL/L — SIGNIFICANT CHANGE UP (ref 3.5–5.3)
POTASSIUM SERPL-SCNC: 4.6 MMOL/L — SIGNIFICANT CHANGE UP (ref 3.5–5.3)
POTASSIUM SERPL-SCNC: 4.6 MMOL/L — SIGNIFICANT CHANGE UP (ref 3.5–5.3)
PROTHROM AB SERPL-ACNC: 11.5 SEC — SIGNIFICANT CHANGE UP (ref 9.5–13)
PROTHROM AB SERPL-ACNC: 11.5 SEC — SIGNIFICANT CHANGE UP (ref 9.5–13)
RBC # BLD: 3.96 M/UL — SIGNIFICANT CHANGE UP (ref 3.8–5.2)
RBC # BLD: 3.96 M/UL — SIGNIFICANT CHANGE UP (ref 3.8–5.2)
RBC # FLD: 18.6 % — HIGH (ref 10.3–14.5)
RBC # FLD: 18.6 % — HIGH (ref 10.3–14.5)
RH IG SCN BLD-IMP: POSITIVE — SIGNIFICANT CHANGE UP
RH IG SCN BLD-IMP: POSITIVE — SIGNIFICANT CHANGE UP
SODIUM SERPL-SCNC: 136 MMOL/L — SIGNIFICANT CHANGE UP (ref 135–145)
SODIUM SERPL-SCNC: 136 MMOL/L — SIGNIFICANT CHANGE UP (ref 135–145)
WBC # BLD: 10.68 K/UL — HIGH (ref 3.8–10.5)
WBC # BLD: 10.68 K/UL — HIGH (ref 3.8–10.5)
WBC # FLD AUTO: 10.68 K/UL — HIGH (ref 3.8–10.5)
WBC # FLD AUTO: 10.68 K/UL — HIGH (ref 3.8–10.5)

## 2023-12-06 PROCEDURE — 74270 X-RAY XM COLON 1CNTRST STD: CPT | Mod: 26

## 2023-12-06 PROCEDURE — 93010 ELECTROCARDIOGRAM REPORT: CPT

## 2023-12-06 PROCEDURE — 99232 SBSQ HOSP IP/OBS MODERATE 35: CPT

## 2023-12-06 PROCEDURE — 71045 X-RAY EXAM CHEST 1 VIEW: CPT | Mod: 26

## 2023-12-06 PROCEDURE — 74018 RADEX ABDOMEN 1 VIEW: CPT | Mod: 26,XU

## 2023-12-06 RX ORDER — PIPERACILLIN AND TAZOBACTAM 4; .5 G/20ML; G/20ML
3.38 INJECTION, POWDER, LYOPHILIZED, FOR SOLUTION INTRAVENOUS EVERY 8 HOURS
Refills: 0 | Status: DISCONTINUED | OUTPATIENT
Start: 2023-12-06 | End: 2023-12-08

## 2023-12-06 RX ORDER — ELECTROLYTE SOLUTION,INJ
1 VIAL (ML) INTRAVENOUS
Refills: 0 | Status: DISCONTINUED | OUTPATIENT
Start: 2023-12-06 | End: 2023-12-06

## 2023-12-06 RX ORDER — METOCLOPRAMIDE HCL 10 MG
10 TABLET ORAL EVERY 8 HOURS
Refills: 0 | Status: DISCONTINUED | OUTPATIENT
Start: 2023-12-06 | End: 2023-12-12

## 2023-12-06 RX ORDER — I.V. FAT EMULSION 20 G/100ML
20.8 EMULSION INTRAVENOUS
Qty: 50 | Refills: 0 | Status: DISCONTINUED | OUTPATIENT
Start: 2023-12-06 | End: 2023-12-07

## 2023-12-06 RX ORDER — ACETAMINOPHEN 500 MG
1000 TABLET ORAL EVERY 6 HOURS
Refills: 0 | Status: COMPLETED | OUTPATIENT
Start: 2023-12-06 | End: 2023-12-07

## 2023-12-06 RX ADMIN — CHLORHEXIDINE GLUCONATE 1 APPLICATION(S): 213 SOLUTION TOPICAL at 12:21

## 2023-12-06 RX ADMIN — PIPERACILLIN AND TAZOBACTAM 25 GRAM(S): 4; .5 INJECTION, POWDER, LYOPHILIZED, FOR SOLUTION INTRAVENOUS at 00:46

## 2023-12-06 RX ADMIN — HYDROMORPHONE HYDROCHLORIDE 0.25 MILLIGRAM(S): 2 INJECTION INTRAMUSCULAR; INTRAVENOUS; SUBCUTANEOUS at 09:33

## 2023-12-06 RX ADMIN — BUDESONIDE AND FORMOTEROL FUMARATE DIHYDRATE 2 PUFF(S): 160; 4.5 AEROSOL RESPIRATORY (INHALATION) at 09:35

## 2023-12-06 RX ADMIN — Medication 4: at 18:34

## 2023-12-06 RX ADMIN — HYDROMORPHONE HYDROCHLORIDE 0.25 MILLIGRAM(S): 2 INJECTION INTRAMUSCULAR; INTRAVENOUS; SUBCUTANEOUS at 10:00

## 2023-12-06 RX ADMIN — BUDESONIDE AND FORMOTEROL FUMARATE DIHYDRATE 2 PUFF(S): 160; 4.5 AEROSOL RESPIRATORY (INHALATION) at 00:47

## 2023-12-06 RX ADMIN — Medication 400 MILLIGRAM(S): at 18:26

## 2023-12-06 RX ADMIN — SODIUM CHLORIDE 3 MILLILITER(S): 9 INJECTION INTRAMUSCULAR; INTRAVENOUS; SUBCUTANEOUS at 12:59

## 2023-12-06 RX ADMIN — PANTOPRAZOLE SODIUM 40 MILLIGRAM(S): 20 TABLET, DELAYED RELEASE ORAL at 12:19

## 2023-12-06 RX ADMIN — Medication 1000 MILLIGRAM(S): at 12:59

## 2023-12-06 RX ADMIN — Medication 1000 MILLIGRAM(S): at 01:00

## 2023-12-06 RX ADMIN — PIPERACILLIN AND TAZOBACTAM 25 GRAM(S): 4; .5 INJECTION, POWDER, LYOPHILIZED, FOR SOLUTION INTRAVENOUS at 14:32

## 2023-12-06 RX ADMIN — SODIUM CHLORIDE 3 MILLILITER(S): 9 INJECTION INTRAMUSCULAR; INTRAVENOUS; SUBCUTANEOUS at 00:38

## 2023-12-06 RX ADMIN — Medication 1000 MILLIGRAM(S): at 19:08

## 2023-12-06 RX ADMIN — Medication 400 MILLIGRAM(S): at 00:46

## 2023-12-06 RX ADMIN — ALBUTEROL 2 PUFF(S): 90 AEROSOL, METERED ORAL at 09:35

## 2023-12-06 RX ADMIN — Medication 4: at 07:30

## 2023-12-06 RX ADMIN — Medication 400 MILLIGRAM(S): at 12:21

## 2023-12-06 RX ADMIN — Medication 1 EACH: at 18:31

## 2023-12-06 RX ADMIN — Medication 1000 MILLIGRAM(S): at 07:00

## 2023-12-06 RX ADMIN — Medication 2: at 01:48

## 2023-12-06 RX ADMIN — Medication 400 MILLIGRAM(S): at 06:37

## 2023-12-06 RX ADMIN — PIPERACILLIN AND TAZOBACTAM 25 GRAM(S): 4; .5 INJECTION, POWDER, LYOPHILIZED, FOR SOLUTION INTRAVENOUS at 23:07

## 2023-12-06 RX ADMIN — Medication 4: at 12:19

## 2023-12-06 RX ADMIN — Medication 10 MILLIGRAM(S): at 23:08

## 2023-12-06 RX ADMIN — I.V. FAT EMULSION 20.8 ML/HR: 20 EMULSION INTRAVENOUS at 18:31

## 2023-12-06 RX ADMIN — PIPERACILLIN AND TAZOBACTAM 25 GRAM(S): 4; .5 INJECTION, POWDER, LYOPHILIZED, FOR SOLUTION INTRAVENOUS at 06:37

## 2023-12-06 RX ADMIN — ALBUTEROL 2 PUFF(S): 90 AEROSOL, METERED ORAL at 00:00

## 2023-12-06 RX ADMIN — SODIUM CHLORIDE 3 MILLILITER(S): 9 INJECTION INTRAMUSCULAR; INTRAVENOUS; SUBCUTANEOUS at 07:05

## 2023-12-06 NOTE — PROGRESS NOTE ADULT - SUBJECTIVE AND OBJECTIVE BOX
Chief Complaint: Type 2 DM    History: Pt seen at bedside. Pt tolerating oral diet. Pt denies nausea and vomiting/any signs of hypoglycemia. Pt reports an adequate appetite.     MEDICATIONS  (STANDING):  acetaminophen   IVPB .. 1000 milliGRAM(s) IV Intermittent every 6 hours  albuterol    90 MICROgram(s) HFA Inhaler 2 Puff(s) Inhalation two times a day  budesonide 160 MICROgram(s)/formoterol 4.5 MICROgram(s) Inhaler 2 Puff(s) Inhalation two times a day  chlorhexidine 2% Cloths 1 Application(s) Topical daily  influenza  Vaccine (HIGH DOSE) 0.7 milliLiter(s) IntraMuscular once  insulin lispro (ADMELOG) corrective regimen sliding scale   SubCutaneous every 6 hours  lipid, fat emulsion (Fish Oil and Plant Based) 20% Infusion 20.8 mL/Hr (20.8 mL/Hr) IV Continuous <Continuous>  pantoprazole  Injectable 40 milliGRAM(s) IV Push daily  Parenteral Nutrition - Adult 1 Each (63 mL/Hr) TPN Continuous <Continuous>  Parenteral Nutrition - Adult 1 Each (63 mL/Hr) TPN Continuous <Continuous>  piperacillin/tazobactam IVPB.. 3.375 Gram(s) IV Intermittent every 8 hours  sodium chloride 0.9% lock flush 3 milliLiter(s) IV Push every 8 hours    MEDICATIONS  (PRN):  HYDROmorphone  Injectable 0.25 milliGRAM(s) IV Push every 3 hours PRN Moderate to Severe Pain (4- 10)  ondansetron Injectable 4 milliGRAM(s) IV Push every 6 hours PRN Nausea      Allergies: Darvocet A500 (Other; Urticaria)  Percocet 10/325 (Other; Urticaria)  codeine (Other)  Lantus (Swelling)  PURELL.  pt said, &quot;I felt my airway closing&quot; after she smelled the Purell. The incident occured at the pulmonologist office. (Other; Short breath)      Review of Systems:  Respiratory: No SOB, no cough  GI: No nausea, vomiting, abdominal pain  Endocrine: no polyuria, polydipsia      PHYSICAL EXAM:  VITALS: T(C): 36.7 (12-06-23 @ 12:15)  T(F): 98.1 (12-06-23 @ 12:15), Max: 99 (12-06-23 @ 09:11)  HR: 91 (12-06-23 @ 12:15) (91 - 100)  BP: 107/67 (12-06-23 @ 12:15) (106/54 - 120/65)  RR:  (16 - 18)  SpO2:  (97% - 100%)  Wt(kg): --  GENERAL: NAD, well-groomed, well-developed  RESPIRATORY: No labored breathing   GI: Soft, nontender, non distended  PSYCH: Alert and oriented x 3, normal affect, normal mood      CAPILLARY BLOOD GLUCOSE  POCT Blood Glucose.: 227 mg/dL (06 Dec 2023 12:03)  POCT Blood Glucose.: 226 mg/dL (06 Dec 2023 07:14)  POCT Blood Glucose.: 203 mg/dL (06 Dec 2023 04:36)  POCT Blood Glucose.: 187 mg/dL (06 Dec 2023 01:32)  POCT Blood Glucose.: 166 mg/dL (05 Dec 2023 23:54)  POCT Blood Glucose.: 210 mg/dL (05 Dec 2023 17:37)    A1C with Estimated Average Glucose (11.16.23 @ 13:15)    A1C with Estimated Average Glucose Result: 6.8   Estimated Average Glucose: 148      12-06    136  |  100  |  10  ----------------------------<  210<H>  4.6   |  28  |  0.43<L>    eGFR: 103    Ca    9.0      12-06  Mg     2.30     12-06  Phos  3.1     12-06    Diet, NPO (12-06-23 @ 14:35) [Active]      Parenteral Nutrition - Adult 1 Each (63 mL/Hr) TPN Continuous <Continuous>, 12-05-23 @ 17:00, 17:00, Stop order after: 1 Days  Parenteral Nutrition - Adult 1 Each (63 mL/Hr) TPN Continuous <Continuous>, 12-06-23 @ 17:00, 17:00, Stop order after: 1 Days

## 2023-12-06 NOTE — CHART NOTE - NSCHARTNOTEFT_GEN_A_CORE
IR Follow Up    patient was tentatively scheduled for image guided abscess drainage, however due to emergent procedure and limited anesthesia availability, will postpone procedure until tomorrow, 12/7.    -OK for patient to eat for remainder of day from IR perspective  -NPO after midnight  -hold AM DVT ppx  -stat AM labs  -plan reviewed with IR attending Dr. Morales and discussed with primary team    Mikhail Nunes MD  PGY-VI, Interventional Radiology Integrated Resident    -Available on [employer] TEAMS  -Emergent issues: [facility]-p.[phone]; [facility]-p.[phone] ([phone])  -Non-emergent consults: Please place a [place] order "IR Consult" with an appropriate callback number  -Scheduling questions: [facility]: [phone]; [facility]: [phone]  -Clinic/Outpatient booking: [facility]: [phone]; [facility]: [phone] IR Follow Up    patient was tentatively scheduled for image guided abscess drainage, however due to emergent procedure and limited anesthesia availability, will postpone procedure until tomorrow, 12/7.    -OK for patient to eat for remainder of day from IR perspective  -NPO after midnight  -hold AM DVT ppx  -stat AM labs  -plan reviewed with IR attending Dr. Morales and discussed with primary team    Mikhail Nunes MD  PGY-VI, Interventional Radiology Integrated Resident    -Available on [employer] TEAMS  -Emergent issues: [facility]-[phone]; [facility]-p.[phone] ([phone])  -Non-emergent consults: Please place a [place] order "IR Consult" with an appropriate callback number  -Scheduling questions: [facility]: [phone]; [facility]: [phone]  -Clinic/Outpatient booking: [facility]: [phone]; [facility]: [phone]

## 2023-12-06 NOTE — PROGRESS NOTE ADULT - ASSESSMENT
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 Course c/b ileus. CT on 12/4 showed: Interval increase in size of fluid anterior to the ileocolic anastomosis, with new peripheral enhancement, probably early abscess formation and decrease in small bowel dilatation, probably ileus.    Plan  - Add on tomorrow for SHAYAN  - IR drainage of intraabdominal collection adjacent to anastomosis today  - Diet: NPO/NGT  - TPN via PICC line  - Pain control with Tylenol, Dilaudid PRN  - C/w zosyn  - Protonix 40 mg daily  - Home meds; holding HTN medications  - Appreciate endocrinology recs; plan to restart insulin pump on discharge  - OOB/ambulate/ PT/ IS while in bed  - DVT prophylaxis: Lovenox   - Dispo: PT recommends Rehab    D Team Surgery  [id number]    73 year old female with PMH asthma, T2DM on insulin pump, GERD, HTN, HLD, OA, obesity presents s/p robotic partial colectomy for 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 Course c/b ileus. CT on 12/4 showed: Interval increase in size of fluid anterior to the ileocolic anastomosis, with new peripheral enhancement, probably early abscess formation and decrease in small bowel dilatation, probably ileus.    Plan  - Add on tomorrow for SHAYAN  - IR drainage of intraabdominal collection adjacent to anastomosis today  - Diet: NPO/NGT  - TPN via PICC line  - Pain control with Tylenol, Dilaudid PRN  - C/w zosyn  - Protonix 40 mg daily  - Home meds; holding HTN medications  - Appreciate endocrinology recs; plan to restart insulin pump on discharge  - OOB/ambulate/ PT/ IS while in bed  - DVT prophylaxis: Lovenox   - Dispo: PT recommends Rehab    D Team Surgery  [id number]    73 year old female with PMH asthma, T2DM on insulin pump, GERD, HTN, HLD, OA, obesity presents s/p robotic partial colectomy for 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 Course c/b ileus. CT on 12/4 showed: Interval increase in size of fluid anterior to the ileocolic anastomosis, with new peripheral enhancement, probably early abscess formation and decrease in small bowel dilatation, probably ileus.    Plan  - Add on tomorrow for SHAYAN  - IR drainage of intraabdominal collection adjacent to anastomosis today  - Diet: NPO/NGT  - TPN via PICC line  - Pain control with Tylenol, Dilaudid PRN  - C/w zosyn  - Protonix 40 mg daily  - Home meds; holding HTN medications  - Appreciate endocrinology recs; plan to restart insulin pump on discharge  - OOB/ambulate/ PT/ IS while in bed  - DVT prophylaxis: Lovenox   - Dispo: Pt recommends Home PT with walker    D Team Surgery  [id number]    73 year old female with PMH asthma, T2DM on insulin pump, GERD, HTN, HLD, OA, obesity presents s/p robotic partial colectomy for 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 Course c/b ileus. CT on 12/4 showed: Interval increase in size of fluid anterior to the ileocolic anastomosis, with new peripheral enhancement, probably early abscess formation and decrease in small bowel dilatation, probably ileus.    Plan  - Add on tomorrow for SHAYAN  - IR drainage of intraabdominal collection adjacent to anastomosis today  - Diet: NPO/NGT  - TPN via PICC line  - Pain control with Tylenol, Dilaudid PRN  - C/w zosyn  - Protonix 40 mg daily  - Home meds; holding HTN medications  - Appreciate endocrinology recs; plan to restart insulin pump on discharge  - OOB/ambulate/ PT/ IS while in bed  - DVT prophylaxis: Lovenox   - Dispo: Pt recommends Home PT with walker    D Team Surgery  [id number]

## 2023-12-06 NOTE — PROGRESS NOTE ADULT - SUBJECTIVE AND OBJECTIVE BOX
TEAM [ D ] Surgery Daily Progress Note  =====================================================    SUBJECTIVE: Patient seen and examined at bedside on AM rounds. Patient reports that they're feeling well. She is not passing gas nor having bowel movements. Denies fever, chills, N/V, chest pain, SOB    ALLERGIES:  Darvocet A500 (Other; Urticaria)  Percocet 10/325 (Other; Urticaria)  codeine (Other)  Lantus (Swelling)  PURELL.  pt said, &quot;I felt my airway closing&quot; after she smelled the Purell. The incident occured at the pulmonologist office. (Other; Short breath)      --------------------------------------------------------------------------------------    MEDICATIONS:    Neurologic Medications  acetaminophen   IVPB .. 1000 milliGRAM(s) IV Intermittent every 6 hours  HYDROmorphone  Injectable 0.25 milliGRAM(s) IV Push every 3 hours PRN Moderate to Severe Pain (4- 10)  ondansetron Injectable 4 milliGRAM(s) IV Push every 6 hours PRN Nausea    Respiratory Medications  albuterol    90 MICROgram(s) HFA Inhaler 2 Puff(s) Inhalation two times a day  budesonide 160 MICROgram(s)/formoterol 4.5 MICROgram(s) Inhaler 2 Puff(s) Inhalation two times a day    Cardiovascular Medications    Gastrointestinal Medications  lipid, fat emulsion (Fish Oil and Plant Based) 20% Infusion 20.8 mL/Hr IV Continuous <Continuous>  pantoprazole  Injectable 40 milliGRAM(s) IV Push daily  Parenteral Nutrition - Adult 1 Each TPN Continuous <Continuous>  sodium chloride 0.9% lock flush 3 milliLiter(s) IV Push every 8 hours    Genitourinary Medications    Hematologic/Oncologic Medications  influenza  Vaccine (HIGH DOSE) 0.7 milliLiter(s) IntraMuscular once    Antimicrobial/Immunologic Medications  piperacillin/tazobactam IVPB.. 3.375 Gram(s) IV Intermittent every 8 hours    Endocrine/Metabolic Medications  dextrose 50% Injectable 25 Gram(s) IV Push once  dextrose Oral Gel 15 Gram(s) Oral once PRN Blood Glucose LESS THAN 70 milliGRAM(s)/deciliter  insulin lispro (ADMELOG) corrective regimen sliding scale   SubCutaneous every 6 hours    Topical/Other Medications  chlorhexidine 2% Cloths 1 Application(s) Topical daily    --------------------------------------------------------------------------------------    VITAL SIGNS:  T(C): 37.1 (12-06-23 @ 05:09), Max: 37.1 (12-05-23 @ 09:20)  HR: 98 (12-06-23 @ 05:09) (91 - 99)  BP: 118/60 (12-06-23 @ 05:09) (100/52 - 120/65)  RR: 18 (12-06-23 @ 05:09) (18 - 18)  SpO2: 100% (12-06-23 @ 05:09) (97% - 100%)  --------------------------------------------------------------------------------------    INS AND OUTS:    12-05-23 @ 07:01  -  12-06-23 @ 07:00  --------------------------------------------------------  IN: 896.8 mL / OUT: 2800 mL / NET: -1903.2 mL      --------------------------------------------------------------------------------------      EXAM    General: NAD, resting in bed comfortably.  Cardiac: regular rate, warm and well perfused  Respiratory: Nonlabored respirations, normal cw expansion.  Abdomen: soft, nontender, nondistended, port sites c/d/i  Extremities: normal strength, FROM, no deformities    --------------------------------------------------------------------------------------    LABS                        10.0   10.68 )-----------( 551      ( 06 Dec 2023 04:37 )             31.3   12-06    136  |  100  |  10  ----------------------------<  210<H>  4.6   |  28  |  0.43<L>    Ca    9.0      06 Dec 2023 04:37  Phos  3.1     12-06  Mg     2.30     12-06

## 2023-12-06 NOTE — PROGRESS NOTE ADULT - SUBJECTIVE AND OBJECTIVE BOX
NUTRITION NOTE  [id number] THOMASTUCKER  ===============================    Interval events - Patient was seen and examined at bedside, no acute events overnight. Patient denies chest pain, shortness of breath, nausea or vomiting at this time. PICC placed and TPN was started on 23 for nutritional support. Pt remains NPO with NGT in place.    ROS: Except as noted above, all other systems reviewed and are negative     Allergies  Darvocet A500 (Other; Urticaria)  Percocet 10/325 (Other; Urticaria)  codeine (Other)  Lantus (Swelling)  Purell    PAST MEDICAL & SURGICAL HISTORY:  History of hypertension  Diabetes wears insulin pump  GERD (gastroesophageal reflux disease)  Uterine leiomyoma  Hyperlipidemia  Asthma  Obesity  Lymphadenopathy left lower extremity ;  - current  H/O insertion of insulin pump  OA (osteoarthritis)  Malignant neoplasm of cecum  H/O bilateral breast reduction surgery  History of appendectomy  History of cholecystectomy  H/O: hysterectomy  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 - [facility]  H/O total knee replacement, left  S/P bunionectomy    FAMILY HISTORY:  Diabetes mellitus (Father)  FH: breast cancer (Aunt)    Vital Signs Last 24 Hrs  T(C): 36.7 (06 Dec 2023 12:15), Max: 37.2 (06 Dec 2023 09:11)  T(F): 98.1 (06 Dec 2023 12:15), Max: 99 (06 Dec 2023 09:11)  HR: 91 (06 Dec 2023 12:15) (91 - 100)  BP: 107/67 (06 Dec 2023 12:15) (106/54 - 120/65)  RR: 17 (06 Dec 2023 12:15) (16 - 18)  SpO2: 99% (06 Dec 2023 12:15) (97% - 100%)    MEDICATIONS  (STANDING):  acetaminophen   IVPB .. 1000 milliGRAM(s) IV Intermittent every 6 hours  albuterol    90 MICROgram(s) HFA Inhaler 2 Puff(s) Inhalation two times a day  budesonide 160 MICROgram(s)/formoterol 4.5 MICROgram(s) Inhaler 2 Puff(s) Inhalation two times a day  chlorhexidine 2% Cloths 1 Application(s) Topical daily  influenza  Vaccine (HIGH DOSE) 0.7 milliLiter(s) IntraMuscular once  insulin lispro (ADMELOG) corrective regimen sliding scale   SubCutaneous every 6 hours  lipid, fat emulsion (Fish Oil and Plant Based) 20% Infusion 20.8 mL/Hr (20.8 mL/Hr) IV Continuous <Continuous>  pantoprazole  Injectable 40 milliGRAM(s) IV Push daily  Parenteral Nutrition - Adult 1 Each (63 mL/Hr) TPN Continuous <Continuous>  Parenteral Nutrition - Adult 1 Each (63 mL/Hr) TPN Continuous <Continuous>  piperacillin/tazobactam IVPB.. 3.375 Gram(s) IV Intermittent every 8 hours  sodium chloride 0.9% lock flush 3 milliLiter(s) IV Push every 8 hours    MEDICATIONS  (PRN):  HYDROmorphone  Injectable 0.25 milliGRAM(s) IV Push every 3 hours PRN Moderate to Severe Pain (4- 10)  ondansetron Injectable 4 milliGRAM(s) IV Push every 6 hours PRN Nausea    I&O's Detail    05 Dec 2023 07:01  -  06 Dec 2023 07:00  --------------------------------------------------------  IN:    Fat Emulsion (Fish Oil &amp; Plant Based) 20% Infusion: 20.8 mL    Oral Fluid: 120 mL    TPN (Total Parenteral Nutrition): 756 mL  Total IN: 896.8 mL    OUT:    Nasogastric/Oral tube (mL): 1350 mL    Voided (mL): 1900 mL  Total OUT: 3250 mL    Total NET: -2353.2 mL      06 Dec 2023 07:01  -  06 Dec 2023 13:14  --------------------------------------------------------  IN:    TPN (Total Parenteral Nutrition): 378 mL  Total IN: 378 mL    OUT:    Nasogastric/Oral tube (mL): 500 mL    Voided (mL): 800 mL  Total OUT: 1300 mL    Total NET: -922 mL    POCT Blood Glucose.: 227 mg/dL (06 Dec 2023 12:03)  POCT Blood Glucose.: 226 mg/dL (06 Dec 2023 07:14)  POCT Blood Glucose.: 203 mg/dL (06 Dec 2023 04:36)  POCT Blood Glucose.: 187 mg/dL (06 Dec 2023 01:32)  POCT Blood Glucose.: 166 mg/dL (05 Dec 2023 23:54)  POCT Blood Glucose.: 210 mg/dL (05 Dec 2023 17:37)    Daily Weight in k.6 (03 Dec 2023 00:00)    Drug Dosing Weight  Height (cm): 152.4 (2023 06:23)  Weight (kg): 75.659 (2023 06:23)  BMI (kg/m2): 32.6 (:23)  BSA (m2): 1.73 (2023 06:23)    PHYSICAL EXAM:  Constitutional: A&Ox3, NAD, resting comfortably in bed  Respiratory: nonlabored respirations   Abdomen: Soft, nondistended, mildly tender to palpation in lower quadrants, NGT in place with bilious output    Extremities: WWP, SEAMAN spontaneously  PICC Site: double lumen PICC placed on 23 in [facility] [place], site clean and dry     Diet: NPO with sips and TPN/lipids (started on 23)    LABORATORY                      10.0   10.68 )-----------( 551      ( 06 Dec 2023 04:37 )             31.3       136  |  100  |  10  ----------------------------<  210<H>  4.6   |  28  |  0.43<L>    Ca    9.0      06 Dec 2023 04:37  Phos  3.1       Mg     2.30      Chol 65 LDL -- HDL 29<L> Trig 81    CT Abdomen and Pelvis 23: IMPRESSION: Dilated loops of small bowel the level of the terminal ileum, likely ileus in the postoperative setting. Postoperative pneumoperitoneum and diffuse subcutaneous emphysema.    CT Abdomen and Pelvis on 23: IMPRESSION: Interval increase in size of fluid anterior to the ileocolic anastomosis with new peripheral enhancement, probably early abscess formation. nterval decrease in small bowel dilatation, probably ileus.    ASSESSMENT/PLAN:  74 y/o female with PMH asthma, T2DM on insulin pump, GERD, HTN, HLD, OA, obesity is s/p robotic partial colectomy for moderately differentiated adenocarcinoma of cecal mass on 23. CT abd/pelvis on 23 showed findings consistent with post op ileus. Pt remains NPO with NGT in place. Nutrition consult called for initiation of parenteral nutrition in view of severe protein calorie malnutrition and prolonged NPO status. PICC placed and TPN was started on 23. CT on  showed: Interval increase in size of fluid anterior to the ileocolic anastomosis, with new peripheral enhancement, probably early abscess formation and decrease in small bowel dilatation, probably ileus.    continue TPN with infusion volume of 1.5L, TPN will provide 1240 kcal/day    labs reviewed - electrolytes adjusted in TPN bag    monitor fingersticks, obtain daily weights - increased to 44 units of insulin in tonight's TPN bag, will continue to trend FS and make insulin adjustments as needed; pt has a known hx of DM2 with A1c 6.8% and is on an insulin pump at home    continue parenteral nutrition at this time, will follow up with primary team on plan - IR drainage of intraabdominal collection adjacent to anastomosis today, OR tomorrow     1.  Severe protein calorie malnutrition being optimized with TPN: CHO [100] gm.  AA [100] gm. SMOF Lipids [50] gm.  2.  Hyperglycemia managed with: [44] units of regular insulin    3.  Check fluid balance daily.  Strict I/O  [ ] [ ]   4.  Daily BMP, Ionized Calcium, Magnesium and Phosphorous   5.  Triglycerides at initiation of TPN and monthly  Chol 65 LDL -- HDL 29<L> Trig 81    Nutrition Support [id number]  NUTRITION NOTE  [id number] THOMASTUCKER  ===============================    Interval events - Patient was seen and examined at bedside, no acute events overnight. Patient denies chest pain, shortness of breath, nausea or vomiting at this time. PICC placed and TPN was started on 23 for nutritional support. Pt remains NPO with NGT in place.    ROS: Except as noted above, all other systems reviewed and are negative     Allergies  Darvocet A500 (Other; Urticaria)  Percocet 10/325 (Other; Urticaria)  codeine (Other)  Lantus (Swelling)  Purell    PAST MEDICAL & SURGICAL HISTORY:  History of hypertension  Diabetes wears insulin pump  GERD (gastroesophageal reflux disease)  Uterine leiomyoma  Hyperlipidemia  Asthma  Obesity  Lymphadenopathy left lower extremity ;  - current  H/O insertion of insulin pump  OA (osteoarthritis)  Malignant neoplasm of cecum  H/O bilateral breast reduction surgery  History of appendectomy  History of cholecystectomy  H/O: hysterectomy  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 - [facility]  H/O total knee replacement, left  S/P bunionectomy    FAMILY HISTORY:  Diabetes mellitus (Father)  FH: breast cancer (Aunt)    Vital Signs Last 24 Hrs  T(C): 36.7 (06 Dec 2023 12:15), Max: 37.2 (06 Dec 2023 09:11)  T(F): 98.1 (06 Dec 2023 12:15), Max: 99 (06 Dec 2023 09:11)  HR: 91 (06 Dec 2023 12:15) (91 - 100)  BP: 107/67 (06 Dec 2023 12:15) (106/54 - 120/65)  RR: 17 (06 Dec 2023 12:15) (16 - 18)  SpO2: 99% (06 Dec 2023 12:15) (97% - 100%)    MEDICATIONS  (STANDING):  acetaminophen   IVPB .. 1000 milliGRAM(s) IV Intermittent every 6 hours  albuterol    90 MICROgram(s) HFA Inhaler 2 Puff(s) Inhalation two times a day  budesonide 160 MICROgram(s)/formoterol 4.5 MICROgram(s) Inhaler 2 Puff(s) Inhalation two times a day  chlorhexidine 2% Cloths 1 Application(s) Topical daily  influenza  Vaccine (HIGH DOSE) 0.7 milliLiter(s) IntraMuscular once  insulin lispro (ADMELOG) corrective regimen sliding scale   SubCutaneous every 6 hours  lipid, fat emulsion (Fish Oil and Plant Based) 20% Infusion 20.8 mL/Hr (20.8 mL/Hr) IV Continuous <Continuous>  pantoprazole  Injectable 40 milliGRAM(s) IV Push daily  Parenteral Nutrition - Adult 1 Each (63 mL/Hr) TPN Continuous <Continuous>  Parenteral Nutrition - Adult 1 Each (63 mL/Hr) TPN Continuous <Continuous>  piperacillin/tazobactam IVPB.. 3.375 Gram(s) IV Intermittent every 8 hours  sodium chloride 0.9% lock flush 3 milliLiter(s) IV Push every 8 hours    MEDICATIONS  (PRN):  HYDROmorphone  Injectable 0.25 milliGRAM(s) IV Push every 3 hours PRN Moderate to Severe Pain (4- 10)  ondansetron Injectable 4 milliGRAM(s) IV Push every 6 hours PRN Nausea    I&O's Detail    05 Dec 2023 07:01  -  06 Dec 2023 07:00  --------------------------------------------------------  IN:    Fat Emulsion (Fish Oil &amp; Plant Based) 20% Infusion: 20.8 mL    Oral Fluid: 120 mL    TPN (Total Parenteral Nutrition): 756 mL  Total IN: 896.8 mL    OUT:    Nasogastric/Oral tube (mL): 1350 mL    Voided (mL): 1900 mL  Total OUT: 3250 mL    Total NET: -2353.2 mL      06 Dec 2023 07:01  -  06 Dec 2023 13:14  --------------------------------------------------------  IN:    TPN (Total Parenteral Nutrition): 378 mL  Total IN: 378 mL    OUT:    Nasogastric/Oral tube (mL): 500 mL    Voided (mL): 800 mL  Total OUT: 1300 mL    Total NET: -922 mL    POCT Blood Glucose.: 227 mg/dL (06 Dec 2023 12:03)  POCT Blood Glucose.: 226 mg/dL (06 Dec 2023 07:14)  POCT Blood Glucose.: 203 mg/dL (06 Dec 2023 04:36)  POCT Blood Glucose.: 187 mg/dL (06 Dec 2023 01:32)  POCT Blood Glucose.: 166 mg/dL (05 Dec 2023 23:54)  POCT Blood Glucose.: 210 mg/dL (05 Dec 2023 17:37)    Daily Weight in k.6 (03 Dec 2023 00:00)    Drug Dosing Weight  Height (cm): 152.4 (2023 06:23)  Weight (kg): 75.659 (2023 06:23)  BMI (kg/m2): 32.6 (:23)  BSA (m2): 1.73 (2023 06:23)    PHYSICAL EXAM:  Constitutional: A&Ox3, NAD, resting comfortably in bed  Respiratory: nonlabored respirations   Abdomen: Soft, nondistended, mildly tender to palpation in lower quadrants, NGT in place with bilious output    Extremities: WWP, SEAMAN spontaneously  PICC Site: double lumen PICC placed on 23 in [facility] [place], site clean and dry     Diet: NPO with sips and TPN/lipids (started on 23)    LABORATORY                      10.0   10.68 )-----------( 551      ( 06 Dec 2023 04:37 )             31.3       136  |  100  |  10  ----------------------------<  210<H>  4.6   |  28  |  0.43<L>    Ca    9.0      06 Dec 2023 04:37  Phos  3.1       Mg     2.30      Chol 65 LDL -- HDL 29<L> Trig 81    CT Abdomen and Pelvis 23: IMPRESSION: Dilated loops of small bowel the level of the terminal ileum, likely ileus in the postoperative setting. Postoperative pneumoperitoneum and diffuse subcutaneous emphysema.    CT Abdomen and Pelvis on 23: IMPRESSION: Interval increase in size of fluid anterior to the ileocolic anastomosis with new peripheral enhancement, probably early abscess formation. nterval decrease in small bowel dilatation, probably ileus.    ASSESSMENT/PLAN:  72 y/o female with PMH asthma, T2DM on insulin pump, GERD, HTN, HLD, OA, obesity is s/p robotic partial colectomy for moderately differentiated adenocarcinoma of cecal mass on 23. CT abd/pelvis on 23 showed findings consistent with post op ileus. Pt remains NPO with NGT in place. Nutrition consult called for initiation of parenteral nutrition in view of severe protein calorie malnutrition and prolonged NPO status. PICC placed and TPN was started on 23. CT on  showed: Interval increase in size of fluid anterior to the ileocolic anastomosis, with new peripheral enhancement, probably early abscess formation and decrease in small bowel dilatation, probably ileus.    continue TPN with infusion volume of 1.5L, TPN will provide 1240 kcal/day    labs reviewed - electrolytes adjusted in TPN bag    monitor fingersticks, obtain daily weights - increased to 44 units of insulin in tonight's TPN bag, will continue to trend FS and make insulin adjustments as needed; pt has a known hx of DM2 with A1c 6.8% and is on an insulin pump at home    continue parenteral nutrition at this time, will follow up with primary team on plan - IR drainage of intraabdominal collection adjacent to anastomosis today, OR tomorrow     1.  Severe protein calorie malnutrition being optimized with TPN: CHO [100] gm.  AA [100] gm. SMOF Lipids [50] gm.  2.  Hyperglycemia managed with: [44] units of regular insulin    3.  Check fluid balance daily.  Strict I/O  [ ] [ ]   4.  Daily BMP, Ionized Calcium, Magnesium and Phosphorous   5.  Triglycerides at initiation of TPN and monthly  Chol 65 LDL -- HDL 29<L> Trig 81    Nutrition Support [id number]

## 2023-12-06 NOTE — CHART NOTE - NSCHARTNOTEFT_GEN_A_CORE
Pt seen for Nutrition follow up (TPN)    Nutrition Course: Pt A&Ox4, TPN started 11/30 for nutrition support 2/2 prolonged NPO status. Pt seen in room, high output from NGT per RN flowsheets. 2400ml NGT output over past 24hrs. Per surgery note 12/6 -BM/-flatus. Labs notable for elevated POCT 160-220mg/dg, insulin in TPN bag increased and CHO decreased for better glucose control. Current TPN order providing 16kcal/kg and 1.3 g/kg of dosing weight. Suggest increasing TPN to better meet pt's estimated nutrition needs with consideration for malignant ca with prolonged inadequate nutrition since admission.   Pt with weight gain since admission, possibly due to abdominal fluid collection. IR procedure of abscess drainage postponed today and rescheduled for tomorrow 12/7.    Diet Prescription: Diet, NPO (12-06-23 @ 14:35).     Parenteral nutrition: TPN with infusion volume of 1.5L x24hrs (100 gm amino acids, 100 gm dextrose, 50 gm SMOF lipids) to provide 1240 kcal/day (16kcal/kg and 1.3 gm protein/kg per dosing wt 75.2 kg).     Pertinent Medications: MEDICATIONS  (STANDING):  acetaminophen   IVPB .. 1000 milliGRAM(s) IV Intermittent every 6 hours  albuterol    90 MICROgram(s) HFA Inhaler 2 Puff(s) Inhalation two times a day  budesonide 160 MICROgram(s)/formoterol 4.5 MICROgram(s) Inhaler 2 Puff(s) Inhalation two times a day  chlorhexidine 2% Cloths 1 Application(s) Topical daily  influenza  Vaccine (HIGH DOSE) 0.7 milliLiter(s) IntraMuscular once  insulin lispro (ADMELOG) corrective regimen sliding scale   SubCutaneous every 6 hours  lipid, fat emulsion (Fish Oil and Plant Based) 20% Infusion 20.8 mL/Hr (20.8 mL/Hr) IV Continuous <Continuous>  pantoprazole  Injectable 40 milliGRAM(s) IV Push daily  Parenteral Nutrition - Adult 1 Each (63 mL/Hr) TPN Continuous <Continuous>  Parenteral Nutrition - Adult 1 Each (63 mL/Hr) TPN Continuous <Continuous>  piperacillin/tazobactam IVPB.. 3.375 Gram(s) IV Intermittent every 8 hours  sodium chloride 0.9% lock flush 3 milliLiter(s) IV Push every 8 hours    MEDICATIONS  (PRN):  HYDROmorphone  Injectable 0.25 milliGRAM(s) IV Push every 3 hours PRN Moderate to Severe Pain (4- 10)  ondansetron Injectable 4 milliGRAM(s) IV Push every 6 hours PRN Nausea    Pertinent Labs: 12-06 Na136 mmol/L Glu 210 mg/dL<H> K+ 4.6 mmol/L Cr  0.43 mg/dL<L> BUN 10 mg/dL 12-06 Phos 3.1 mg/dL 11-29 Chol 65 mg/dL LDL --    HDL 29 mg/dL<L> Trig 81 mg/dL     CAPILLARY BLOOD GLUCOSE  POCT Blood Glucose.: 227 mg/dL (06 Dec 2023 12:03)  POCT Blood Glucose.: 226 mg/dL (06 Dec 2023 07:14)  POCT Blood Glucose.: 203 mg/dL (06 Dec 2023 04:36)  POCT Blood Glucose.: 187 mg/dL (06 Dec 2023 01:32)  POCT Blood Glucose.: 166 mg/dL (05 Dec 2023 23:54)  POCT Blood Glucose.: 210 mg/dL (05 Dec 2023 17:37)      Weight Assessment: 12/3: 79.6kg, 12/2: 83kg, 11/22: 75.7kg (Question accuracy of weight 12/2. 5.4% weight gain 11/22-12/3, noted patient with abdominal fluid collection.)    Physical Assessment, per flowsheets:  Edema: No edema noted per RN flowsheets   Skin: Intact w/ no pressure injuries noted per RN flowsheets       Estimated Needs:   [X] No change since previous assessment  1504 - 1880 kcals/day (20-25 kcals/kg of admit wt - 75.2 kg)  82 - 91 gms protein/day (1.8-2.0 gms protein/kg of IBW - 45.5 kg)      Previous Nutrition Diagnosis: [x ] Inadequate Energy Intake  Nutrition Diagnosis is [x ] ongoing     Education:  [ x ] Not warranted at present    Interventions:   1) Suggest increasing TPN to meet estimated needs.  2) Monitor POCT and adjust insulin as needed.  3) Obtain daily weights   4) RD available prn    Monitor & Evaluate:  nutrition related lab values, weight trends, BMs/GI distress, hydration status, skin integrity.    Fior Valencia MS, RD| [id number] (Also available on TEAMS) Pt seen for Nutrition follow up (TPN)    Nutrition Course: Pt A&Ox4, TPN started 11/30 for nutrition support 2/2 prolonged NPO status. Pt seen in room, high output from NGT per RN flowsheets. 2400ml NGT output over past 24hrs. Per surgery note 12/6 -BM/-flatus. Labs notable for elevated POCT 160-220mg/dg, insulin in TPN bag increased and CHO decreased for better glucose control. Current TPN order providing 16kcal/kg and 1.3 g/kg of dosing weight. Suggest increasing TPN to better meet pt's estimated nutrition needs with consideration for malignant ca with prolonged inadequate nutrition since admission.   Pt with weight gain since admission, possibly due to abdominal fluid collection. IR procedure of abscess drainage postponed today and rescheduled for tomorrow 12/7.    Diet Prescription: Diet, NPO (12-06-23 @ 14:35).     Parenteral nutrition: TPN with infusion volume of 1.5L x24hrs (100 gm amino acids, 100 gm dextrose, 50 gm SMOF lipids) to provide 1240 kcal/day (16kcal/kg and 1.3 gm protein/kg per dosing wt 75.2 kg).     Pertinent Medications: MEDICATIONS  (STANDING):  acetaminophen   IVPB .. 1000 milliGRAM(s) IV Intermittent every 6 hours  albuterol    90 MICROgram(s) HFA Inhaler 2 Puff(s) Inhalation two times a day  budesonide 160 MICROgram(s)/formoterol 4.5 MICROgram(s) Inhaler 2 Puff(s) Inhalation two times a day  chlorhexidine 2% Cloths 1 Application(s) Topical daily  influenza  Vaccine (HIGH DOSE) 0.7 milliLiter(s) IntraMuscular once  insulin lispro (ADMELOG) corrective regimen sliding scale   SubCutaneous every 6 hours  lipid, fat emulsion (Fish Oil and Plant Based) 20% Infusion 20.8 mL/Hr (20.8 mL/Hr) IV Continuous <Continuous>  pantoprazole  Injectable 40 milliGRAM(s) IV Push daily  Parenteral Nutrition - Adult 1 Each (63 mL/Hr) TPN Continuous <Continuous>  Parenteral Nutrition - Adult 1 Each (63 mL/Hr) TPN Continuous <Continuous>  piperacillin/tazobactam IVPB.. 3.375 Gram(s) IV Intermittent every 8 hours  sodium chloride 0.9% lock flush 3 milliLiter(s) IV Push every 8 hours    MEDICATIONS  (PRN):  HYDROmorphone  Injectable 0.25 milliGRAM(s) IV Push every 3 hours PRN Moderate to Severe Pain (4- 10)  ondansetron Injectable 4 milliGRAM(s) IV Push every 6 hours PRN Nausea    Pertinent Labs: 12-06 Na136 mmol/L Glu 210 mg/dL<H> K+ 4.6 mmol/L Cr  0.43 mg/dL<L> BUN 10 mg/dL 12-06 Phos 3.1 mg/dL 11-29 Chol 65 mg/dL LDL --    HDL 29 mg/dL<L> Trig 81 mg/dL     CAPILLARY BLOOD GLUCOSE  POCT Blood Glucose.: 227 mg/dL (06 Dec 2023 12:03)  POCT Blood Glucose.: 226 mg/dL (06 Dec 2023 07:14)  POCT Blood Glucose.: 203 mg/dL (06 Dec 2023 04:36)  POCT Blood Glucose.: 187 mg/dL (06 Dec 2023 01:32)  POCT Blood Glucose.: 166 mg/dL (05 Dec 2023 23:54)  POCT Blood Glucose.: 210 mg/dL (05 Dec 2023 17:37)      Weight Assessment: 12/3: 79.6kg, 12/2: 83kg, 11/22: 75.7kg (Question accuracy of weight 12/2. 5.4% weight gain 11/22-12/3, noted patient with abdominal fluid collection.)    Physical Assessment, per flowsheets:  Edema: No edema noted per RN flowsheets   Skin: Intact w/ no pressure injuries noted per RN flowsheets       Estimated Needs:   [X] No change since previous assessment  1504 - 1880 kcals/day (20-25 kcals/kg of admit wt - 75.2 kg)  82 - 91 gms protein/day (1.8-2.0 gms protein/kg of IBW - 45.5 kg)      Previous Nutrition Diagnosis: [x ] Inadequate Energy Intake  Nutrition Diagnosis is [x ] ongoing     Education:  [ x ] Not warranted at present    Interventions:   1) Suggest increasing TPN to meet estimated needs.  2) Monitor POCT and adjust insulin as needed.  3) Obtain daily weights   4) RD available prn    Monitor & Evaluate:  nutrition related lab values, weight trends, BMs/GI distress, hydration status, skin integrity.    Fior Valencia MS, RD| [id number] (Also available on TEAMS)

## 2023-12-06 NOTE — PROGRESS NOTE ADULT - SUBJECTIVE AND OBJECTIVE BOX
Name of Patient : NESTOR MABRY  MRN: [id number]  Date of visit: 12-06-23    Subjective: Patient seen and examined. No new events except as noted.       MEDICATIONS:  MEDICATIONS  (STANDING):  acetaminophen   IVPB .. 1000 milliGRAM(s) IV Intermittent every 6 hours  albuterol    90 MICROgram(s) HFA Inhaler 2 Puff(s) Inhalation two times a day  budesonide 160 MICROgram(s)/formoterol 4.5 MICROgram(s) Inhaler 2 Puff(s) Inhalation two times a day  chlorhexidine 2% Cloths 1 Application(s) Topical daily  influenza  Vaccine (HIGH DOSE) 0.7 milliLiter(s) IntraMuscular once  insulin lispro (ADMELOG) corrective regimen sliding scale   SubCutaneous every 6 hours  lipid, fat emulsion (Fish Oil and Plant Based) 20% Infusion 20.8 mL/Hr (20.8 mL/Hr) IV Continuous <Continuous>  pantoprazole  Injectable 40 milliGRAM(s) IV Push daily  Parenteral Nutrition - Adult 1 Each (63 mL/Hr) TPN Continuous <Continuous>  Parenteral Nutrition - Adult 1 Each (63 mL/Hr) TPN Continuous <Continuous>  piperacillin/tazobactam IVPB.. 3.375 Gram(s) IV Intermittent every 8 hours  sodium chloride 0.9% lock flush 3 milliLiter(s) IV Push every 8 hours      PHYSICAL EXAM:  T(C): 36.4 (12-06-23 @ 16:13), Max: 37.2 (12-06-23 @ 09:11)  HR: 102 (12-06-23 @ 16:13) (91 - 102)  BP: 118/66 (12-06-23 @ 16:13) (106/54 - 120/65)  RR: 18 (12-06-23 @ 16:13) (16 - 18)  SpO2: 100% (12-06-23 @ 16:13) (97% - 100%)  Wt(kg): --  I&O's Summary    05 Dec 2023 07:01  -  06 Dec 2023 07:00  --------------------------------------------------------  IN: 896.8 mL / OUT: 3250 mL / NET: -2353.2 mL    06 Dec 2023 07:01  -  06 Dec 2023 16:53  --------------------------------------------------------  IN: 378 mL / OUT: 2300 mL / NET: -1922 mL          Appearance: Normal	  HEENT:  PERRLA   Lymphatic: No lymphadenopathy   Cardiovascular: Normal S1 S2, no JVD  Respiratory: normal effort , clear  Gastrointestinal:  Soft, Non-tender  Skin: No rashes,  warm to touch  Psychiatry:  Mood & affect appropriate  Musculuskeletal: No edema    recent labs, Imaging and EKGs personally reviewed     12-05-23 @ 07:01  -  12-06-23 @ 07:00  --------------------------------------------------------  IN: 896.8 mL / OUT: 3250 mL / NET: -2353.2 mL    12-06-23 @ 07:01  -  12-06-23 @ 16:53  --------------------------------------------------------  IN: 378 mL / OUT: 2300 mL / NET: -1922 mL                          10.0   10.68 )-----------( 551      ( 06 Dec 2023 04:37 )             31.3               12-06    136  |  100  |  10  ----------------------------<  210<H>  4.6   |  28  |  0.43<L>    Ca    9.0      06 Dec 2023 04:37  Phos  3.1     12-06  Mg     2.30     12-06      PT/INR - ( 06 Dec 2023 04:37 )   PT: 11.5 sec;   INR: 1.03 ratio         PTT - ( 06 Dec 2023 04:37 )  PTT:30.2 sec                   Urinalysis Basic - ( 06 Dec 2023 04:37 )    Color: x / Appearance: x / SG: x / pH: x  Gluc: 210 mg/dL / Ketone: x  / Bili: x / Urobili: x   Blood: x / Protein: x / Nitrite: x   Leuk Esterase: x / RBC: x / WBC x   Sq Epi: x / Non Sq Epi: x / Bacteria: x               Name of Patient : NESTOR MABRY  MRN: [id number]  Date of visit: 12-06-23    Subjective: Patient seen and examined. No new events except as noted.       MEDICATIONS:  MEDICATIONS  (STANDING):  acetaminophen   IVPB .. 1000 milliGRAM(s) IV Intermittent every 6 hours  albuterol    90 MICROgram(s) HFA Inhaler 2 Puff(s) Inhalation two times a day  budesonide 160 MICROgram(s)/formoterol 4.5 MICROgram(s) Inhaler 2 Puff(s) Inhalation two times a day  chlorhexidine 2% Cloths 1 Application(s) Topical daily  influenza  Vaccine (HIGH DOSE) 0.7 milliLiter(s) IntraMuscular once  insulin lispro (ADMELOG) corrective regimen sliding scale   SubCutaneous every 6 hours  lipid, fat emulsion (Fish Oil and Plant Based) 20% Infusion 20.8 mL/Hr (20.8 mL/Hr) IV Continuous <Continuous>  pantoprazole  Injectable 40 milliGRAM(s) IV Push daily  Parenteral Nutrition - Adult 1 Each (63 mL/Hr) TPN Continuous <Continuous>  Parenteral Nutrition - Adult 1 Each (63 mL/Hr) TPN Continuous <Continuous>  piperacillin/tazobactam IVPB.. 3.375 Gram(s) IV Intermittent every 8 hours  sodium chloride 0.9% lock flush 3 milliLiter(s) IV Push every 8 hours      PHYSICAL EXAM:  T(C): 36.4 (12-06-23 @ 16:13), Max: 37.2 (12-06-23 @ 09:11)  HR: 102 (12-06-23 @ 16:13) (91 - 102)  BP: 118/66 (12-06-23 @ 16:13) (106/54 - 120/65)  RR: 18 (12-06-23 @ 16:13) (16 - 18)  SpO2: 100% (12-06-23 @ 16:13) (97% - 100%)  Wt(kg): --  I&O's Summary    05 Dec 2023 07:01  -  06 Dec 2023 07:00  --------------------------------------------------------  IN: 896.8 mL / OUT: 3250 mL / NET: -2353.2 mL    06 Dec 2023 07:01  -  06 Dec 2023 16:53  --------------------------------------------------------  IN: 378 mL / OUT: 2300 mL / NET: -1922 mL          Appearance: Normal	  HEENT:  PERRLA   Lymphatic: No lymphadenopathy   Cardiovascular: Normal S1 S2, no JVD  Respiratory: normal effort , clear  Gastrointestinal:  Soft, Non-tender  Skin: No rashes,  warm to touch  Psychiatry:  Mood & affect appropriate  Musculuskeletal: No edema    recent labs, Imaging and EKGs personally reviewed     12-05-23 @ 07:01  -  12-06-23 @ 07:00  --------------------------------------------------------  IN: 896.8 mL / OUT: 3250 mL / NET: -2353.2 mL    12-06-23 @ 07:01  -  12-06-23 @ 16:53  --------------------------------------------------------  IN: 378 mL / OUT: 2300 mL / NET: -1922 mL                          10.0   10.68 )-----------( 551      ( 06 Dec 2023 04:37 )             31.3               12-06    136  |  100  |  10  ----------------------------<  210<H>  4.6   |  28  |  0.43<L>    Ca    9.0      06 Dec 2023 04:37  Phos  3.1     12-06  Mg     2.30     12-06      PT/INR - ( 06 Dec 2023 04:37 )   PT: 11.5 sec;   INR: 1.03 ratio         PTT - ( 06 Dec 2023 04:37 )  PTT:30.2 sec                   Urinalysis Basic - ( 06 Dec 2023 04:37 )    Color: x / Appearance: x / SG: x / pH: x  Gluc: 210 mg/dL / Ketone: x  / Bili: x / Urobili: x   Blood: x / Protein: x / Nitrite: x   Leuk Esterase: x / RBC: x / WBC x   Sq Epi: x / Non Sq Epi: x / Bacteria: x

## 2023-12-06 NOTE — PROGRESS NOTE ADULT - NS ATTEND AMEND GEN_ALL_CORE FT
I agree with the above history, physical examination, chief complaint/diagnosis, and plan, which I have reviewed and edited where appropriate.  I agree with notes/assessment and detailed interval history of health care providers on my service.  I have seen and examined the patient.  I reviewed the laboratory and available data and agree with the history, physical assessment and plan.  I reviewed and discussed with all consultants, house staff and PA's.  The Nutrition Support Team (NST) discusses on an ongoing basis with the primary team and all consultants, House staff and PA's to have a permanent risk benefit analyses on all decisions and coordinating care.  I was physically present for the key portions of the evaluation and management (E/M) service provided.  72 y/o female s/p robotic partial colectomy with post op ileus receiving parenteral nutrition in view of severe protein calorie malnutrition and prolonged NPO status.  PHYSICAL EXAM:  Constitutional: NAD  Respiratory: clear  Abdomen: Soft, nondistended  Extremities: warm  labs reviewed - electrolytes adjusted   continue TPN; 1.5L/1240 kcal/day I agree with the above history, physical examination, chief complaint/diagnosis, and plan, which I have reviewed and edited where appropriate.  I agree with notes/assessment and detailed interval history of health care providers on my service.  I have seen and examined the patient.  I reviewed the laboratory and available data and agree with the history, physical assessment and plan.  I reviewed and discussed with all consultants, house staff and PA's.  The Nutrition Support Team (NST) discusses on an ongoing basis with the primary team and all consultants, House staff and PA's to have a permanent risk benefit analyses on all decisions and coordinating care.  I was physically present for the key portions of the evaluation and management (E/M) service provided.  74 y/o female s/p robotic partial colectomy with post op ileus receiving parenteral nutrition in view of severe protein calorie malnutrition and prolonged NPO status.  PHYSICAL EXAM:  Constitutional: NAD  Respiratory: clear  Abdomen: Soft, nondistended  Extremities: warm  labs reviewed - electrolytes adjusted   continue TPN; 1.5L/1240 kcal/day

## 2023-12-06 NOTE — PROGRESS NOTE ADULT - ASSESSMENT
This is a 72 yo F /w a PMH of DM2 on an insulin pump and colorectal cancer s/p right hemicolectomy today. Endocrinology consulted for diabetes management and DM2. Based on current insulin pump settings, suspect patient is being over-basalized given high basal rates compared to the insulin:carb ratio    Home Regimen:  Patient uses the medtronic insulin pump and Libre2  Pump settings are as follows:  TDD 52.425  12AM 1.8 units per hour  6:30AM 2 units per hour  12:30PM 2.45 units per hour  6:30PM 2.55 units per hour  8PM 2.55 units per hour    ICR:  12AM 1:15  7AM 1:12  12:30PM 1:15    ISF 1:30    #DM2 A1c 6.8%  #insulin pump at home. Off pump while in the hospital  -Glucose Target 100-180: above goal. Patient is on TPN managed Nutrition Support Team: 100 G dextrose, increased to 44 units of Regular insulin   -Continue Moderate Admelog correction scale every 6 hours while NPO  -Check FS q 6 hours   -Hypoglycemia Protocol   -Consider changing abx solution from dextrose to non-dextrose fluids if no contraindications this may help with Glycemic control    For future use if started on clears:  -consistent carbohydrate consideration   -low admelog correction scales before meals   -low admelog scales before bedtime  -hold basal insulin until noting glucose trend   if glucose >180 X 2 - than start levemir 15 units every 24 hours   -FSG before meals and before bedtime  -Carb consistent diet once able to tolerate  -hypoglycemia protocol in place if needed    when advanced to regular carb consistent diet   than will need basal bolus + correction scale   anticipate Levemir 15 units and ademelog 5 units premeals and low dose correction scale     pt has her on CGM on- also monitoring her own BG while inpt on bonnie due to be changed on 12/5    #Discharge  -follow up with Dr. Anand  -basal bolus vs resume insulin pump on discharge, likely will need adjustment in settings prior to resuming pump   -Will have Advanced Practice Nurse Consult Diabetes Specialist see pt closer to discharge    #HTN  -BP target <130/80  -Consider resuming losartan 25mg daily and hctz 12.5mg daily- management as per primary team  -outpt mc/cr ratio     #HLD  -On Pravastatin 40 mg QHS at home   -Resume once able to tolerate orals if no contraindications   -LDL goal less than 70   -Management as per primary team     D/w Surgery team Jeni Taveras  Nurse Practitioner  Division of Endocrinology & Diabetes  In house pager #[phone]    If before 9AM or after 6PM, or on weekends/holidays, please call endocrine answering service for assistance ([phone]).For nonurgent matters email [email or web address] for assistance.    This is a 72 yo F /w a PMH of DM2 on an insulin pump and colorectal cancer s/p right hemicolectomy today. Endocrinology consulted for diabetes management and DM2. Based on current insulin pump settings, suspect patient is being over-basalized given high basal rates compared to the insulin:carb ratio    Home Regimen:  Patient uses the medtronic insulin pump and Libre2  Pump settings are as follows:  TDD 52.425  12AM 1.8 units per hour  6:30AM 2 units per hour  12:30PM 2.45 units per hour  6:30PM 2.55 units per hour  8PM 2.55 units per hour    ICR:  12AM 1:15  7AM 1:12  12:30PM 1:15    ISF 1:30    #DM2 A1c 6.8%  #insulin pump at home. Off pump while in the hospital  -Glucose Target 100-180: above goal. Patient is on TPN managed Nutrition Support Team: 100 G dextrose, increased to 44 units of Regular insulin   -Continue Moderate Admelog correction scale every 6 hours while NPO  -Check FS q 6 hours   -Hypoglycemia Protocol   -Consider changing abx solution from dextrose to non-dextrose fluids if no contraindications this may help with Glycemic control    For future use if started on clears:  -consistent carbohydrate consideration   -low admelog correction scales before meals   -low admelog scales before bedtime  -hold basal insulin until noting glucose trend   if glucose >180 X 2 - than start levemir 15 units every 24 hours   -FSG before meals and before bedtime  -Carb consistent diet once able to tolerate  -hypoglycemia protocol in place if needed    when advanced to regular carb consistent diet   than will need basal bolus + correction scale   anticipate Levemir 15 units and ademelog 5 units premeals and low dose correction scale     pt has her on CGM on- also monitoring her own BG while inpt on bonnie due to be changed on 12/5    #Discharge  -follow up with Dr. Anand  -basal bolus vs resume insulin pump on discharge, likely will need adjustment in settings prior to resuming pump   -Will have Advanced Practice Nurse Consult Diabetes Specialist see pt closer to discharge    #HTN  -BP target <130/80  -Consider resuming losartan 25mg daily and hctz 12.5mg daily- management as per primary team  -outpt mc/cr ratio     #HLD  -On Pravastatin 40 mg QHS at home   -Resume once able to tolerate orals if no contraindications   -LDL goal less than 70   -Management as per primary team     D/w Surgery team Jeni Taveras  Nurse Practitioner  Division of Endocrinology & Diabetes  In house pager #[phone]    If before 9AM or after 6PM, or on weekends/holidays, please call endocrine answering service for assistance ([phone]).For nonurgent matters email [email or web address] for assistance.

## 2023-12-06 NOTE — PROGRESS NOTE ADULT - NS ATTEND AMEND GEN_ALL_CORE FT
Patient seen and examined. Agree with plan as detailed in PA/NP Note.       BP meds Hctz 12.5mg, Losartan 25mg on hold, BP controlled    Suchet Willy GO  Pager: [phone] Patient seen and examined. Agree with plan as detailed in PA/NP Note.       BP meds Hctz 12.5mg, Losartan 25mg on hold, BP controlled    Suchet Willy GO  Pager: [phone]

## 2023-12-06 NOTE — PROGRESS NOTE ADULT - SUBJECTIVE AND OBJECTIVE BOX
DATE OF SERVICE: 12-06-23    Patient denies chest pain or shortness of breath.   Review of symptoms otherwise negative.      Review of Systems:   Constitutional: [ ] fevers, [ ] chills.   Skin: [ ] dry skin. [ ] rashes.  Psychiatric: [ ] depression, [ ] anxiety.   Gastrointestinal: [ ] BRBPR, [ ] melena.   Neurological: [ ] confusion. [ ] seizures. [ ] shuffling gait.   Ears,Nose,Mouth and Throat: [ ] ear pain [ ] sore throat.   Eyes: [ ] diplopia.   Respiratory: [ ] hemoptysis. [ ] shortness of breath  Cardiovascular: See HPI above  Hematologic/Lymphatic: [ ] anemia. [ ] painful nodes. [ ] prolonged bleeding.   Genitourinary: [ ] hematuria. [ ] flank pain.   Endocrine: [ ] significant change in weight. [ ] intolerance to heat and cold.     Review of systems [ x] otherwise negative, [ ] otherwise unable to obtain    FH: no family history of sudden cardiac death in first degree relatives    SH: [ ] tobacco, [ ] alcohol, [ ] drugs    acetaminophen   IVPB .. 1000 milliGRAM(s) IV Intermittent every 6 hours  albuterol    90 MICROgram(s) HFA Inhaler 2 Puff(s) Inhalation two times a day  budesonide 160 MICROgram(s)/formoterol 4.5 MICROgram(s) Inhaler 2 Puff(s) Inhalation two times a day  chlorhexidine 2% Cloths 1 Application(s) Topical daily  HYDROmorphone  Injectable 0.25 milliGRAM(s) IV Push every 3 hours PRN  influenza  Vaccine (HIGH DOSE) 0.7 milliLiter(s) IntraMuscular once  insulin lispro (ADMELOG) corrective regimen sliding scale   SubCutaneous every 6 hours  lipid, fat emulsion (Fish Oil and Plant Based) 20% Infusion 20.8 mL/Hr IV Continuous <Continuous>  ondansetron Injectable 4 milliGRAM(s) IV Push every 6 hours PRN  pantoprazole  Injectable 40 milliGRAM(s) IV Push daily  Parenteral Nutrition - Adult 1 Each TPN Continuous <Continuous>  Parenteral Nutrition - Adult 1 Each TPN Continuous <Continuous>  piperacillin/tazobactam IVPB.. 3.375 Gram(s) IV Intermittent every 8 hours  sodium chloride 0.9% lock flush 3 milliLiter(s) IV Push every 8 hours                            10.0   10.68 )-----------( 551      ( 06 Dec 2023 04:37 )             31.3       12-06    136  |  100  |  10  ----------------------------<  210<H>  4.6   |  28  |  0.43<L>    Ca    9.0      06 Dec 2023 04:37  Phos  3.1     12-06  Mg     2.30     12-06      T(C): 36.7 (12-06-23 @ 12:15), Max: 37.2 (12-06-23 @ 09:11)  HR: 91 (12-06-23 @ 12:15) (91 - 100)  BP: 107/67 (12-06-23 @ 12:15) (106/54 - 120/65)  RR: 17 (12-06-23 @ 12:15) (16 - 18)  SpO2: 99% (12-06-23 @ 12:15) (97% - 100%)  Wt(kg): --    I&O's Summary    05 Dec 2023 07:01  -  06 Dec 2023 07:00  --------------------------------------------------------  IN: 896.8 mL / OUT: 3250 mL / NET: -2353.2 mL    06 Dec 2023 07:01  -  06 Dec 2023 14:40  --------------------------------------------------------  IN: 378 mL / OUT: 1300 mL / NET: -922 mL      General:  Alert and Oriented * 3.   Head: Normocephalic and atraumatic.   Neck: No JVD. No bruits. Supple. Does not appear to be enlarged.   Cardiovascular: + S1,S2 ; RRR Soft systolic murmur at the left lower sternal border. No rubs noted.    Lungs: CTA b/l. No rhonchi, rales or wheezes.   Abdomen: distended NT  Extremities: No clubbing/cyanosis/edema.   Neurologic: Moves all four extremities. Full range of motion.   Skin: Warm and moist. The patient's skin has normal elasticity and good skin turgor.   Psychiatric: Appropriate mood and affect.  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      -- tolerated procedure well from CV perspective  -- pain management, PT  -- supplement lytes per sx   -- s/p NGT, CT noted with ileus, repeat CT noted, on TPN  -- eventually resume home meds: Asa 81mg, Pravastatin 40mg when ok from a surgical perspective  -- BP meds Hctz 12.5mg, Losartan 25mg on hold, BP controlled  -- for IR drain tomorrow for abscess at ileocolic anastomosis

## 2023-12-07 LAB
ANION GAP SERPL CALC-SCNC: 13 MMOL/L — SIGNIFICANT CHANGE UP (ref 7–14)
ANION GAP SERPL CALC-SCNC: 13 MMOL/L — SIGNIFICANT CHANGE UP (ref 7–14)
APTT BLD: 29.7 SEC — SIGNIFICANT CHANGE UP (ref 24.5–35.6)
APTT BLD: 29.7 SEC — SIGNIFICANT CHANGE UP (ref 24.5–35.6)
BASOPHILS # BLD AUTO: 0.06 K/UL — SIGNIFICANT CHANGE UP (ref 0–0.2)
BASOPHILS # BLD AUTO: 0.06 K/UL — SIGNIFICANT CHANGE UP (ref 0–0.2)
BASOPHILS NFR BLD AUTO: 0.4 % — SIGNIFICANT CHANGE UP (ref 0–2)
BASOPHILS NFR BLD AUTO: 0.4 % — SIGNIFICANT CHANGE UP (ref 0–2)
BUN SERPL-MCNC: 18 MG/DL — SIGNIFICANT CHANGE UP (ref 7–23)
BUN SERPL-MCNC: 18 MG/DL — SIGNIFICANT CHANGE UP (ref 7–23)
CA-I BLD-SCNC: 1.13 MMOL/L — LOW (ref 1.15–1.29)
CA-I BLD-SCNC: 1.13 MMOL/L — LOW (ref 1.15–1.29)
CALCIUM SERPL-MCNC: 9.5 MG/DL — SIGNIFICANT CHANGE UP (ref 8.4–10.5)
CALCIUM SERPL-MCNC: 9.5 MG/DL — SIGNIFICANT CHANGE UP (ref 8.4–10.5)
CHLORIDE SERPL-SCNC: 98 MMOL/L — SIGNIFICANT CHANGE UP (ref 98–107)
CHLORIDE SERPL-SCNC: 98 MMOL/L — SIGNIFICANT CHANGE UP (ref 98–107)
CO2 SERPL-SCNC: 26 MMOL/L — SIGNIFICANT CHANGE UP (ref 22–31)
CO2 SERPL-SCNC: 26 MMOL/L — SIGNIFICANT CHANGE UP (ref 22–31)
CREAT SERPL-MCNC: 0.51 MG/DL — SIGNIFICANT CHANGE UP (ref 0.5–1.3)
CREAT SERPL-MCNC: 0.51 MG/DL — SIGNIFICANT CHANGE UP (ref 0.5–1.3)
EGFR: 99 ML/MIN/1.73M2 — SIGNIFICANT CHANGE UP
EGFR: 99 ML/MIN/1.73M2 — SIGNIFICANT CHANGE UP
EOSINOPHIL # BLD AUTO: 0.23 K/UL — SIGNIFICANT CHANGE UP (ref 0–0.5)
EOSINOPHIL # BLD AUTO: 0.23 K/UL — SIGNIFICANT CHANGE UP (ref 0–0.5)
EOSINOPHIL NFR BLD AUTO: 1.6 % — SIGNIFICANT CHANGE UP (ref 0–6)
EOSINOPHIL NFR BLD AUTO: 1.6 % — SIGNIFICANT CHANGE UP (ref 0–6)
GLUCOSE BLDC GLUCOMTR-MCNC: 189 MG/DL — HIGH (ref 70–99)
GLUCOSE BLDC GLUCOMTR-MCNC: 189 MG/DL — HIGH (ref 70–99)
GLUCOSE BLDC GLUCOMTR-MCNC: 194 MG/DL — HIGH (ref 70–99)
GLUCOSE BLDC GLUCOMTR-MCNC: 194 MG/DL — HIGH (ref 70–99)
GLUCOSE BLDC GLUCOMTR-MCNC: 199 MG/DL — HIGH (ref 70–99)
GLUCOSE BLDC GLUCOMTR-MCNC: 199 MG/DL — HIGH (ref 70–99)
GLUCOSE BLDC GLUCOMTR-MCNC: 232 MG/DL — HIGH (ref 70–99)
GLUCOSE BLDC GLUCOMTR-MCNC: 232 MG/DL — HIGH (ref 70–99)
GLUCOSE BLDC GLUCOMTR-MCNC: 245 MG/DL — HIGH (ref 70–99)
GLUCOSE BLDC GLUCOMTR-MCNC: 245 MG/DL — HIGH (ref 70–99)
GLUCOSE SERPL-MCNC: 237 MG/DL — HIGH (ref 70–99)
GLUCOSE SERPL-MCNC: 237 MG/DL — HIGH (ref 70–99)
GRAM STN FLD: SIGNIFICANT CHANGE UP
GRAM STN FLD: SIGNIFICANT CHANGE UP
HCT VFR BLD CALC: 36.2 % — SIGNIFICANT CHANGE UP (ref 34.5–45)
HCT VFR BLD CALC: 36.2 % — SIGNIFICANT CHANGE UP (ref 34.5–45)
HGB BLD-MCNC: 11.4 G/DL — LOW (ref 11.5–15.5)
HGB BLD-MCNC: 11.4 G/DL — LOW (ref 11.5–15.5)
IANC: 10.88 K/UL — HIGH (ref 1.8–7.4)
IANC: 10.88 K/UL — HIGH (ref 1.8–7.4)
IMM GRANULOCYTES NFR BLD AUTO: 0.5 % — SIGNIFICANT CHANGE UP (ref 0–0.9)
IMM GRANULOCYTES NFR BLD AUTO: 0.5 % — SIGNIFICANT CHANGE UP (ref 0–0.9)
INR BLD: 1.05 RATIO — SIGNIFICANT CHANGE UP (ref 0.85–1.18)
INR BLD: 1.05 RATIO — SIGNIFICANT CHANGE UP (ref 0.85–1.18)
LYMPHOCYTES # BLD AUTO: 1.95 K/UL — SIGNIFICANT CHANGE UP (ref 1–3.3)
LYMPHOCYTES # BLD AUTO: 1.95 K/UL — SIGNIFICANT CHANGE UP (ref 1–3.3)
LYMPHOCYTES # BLD AUTO: 13.4 % — SIGNIFICANT CHANGE UP (ref 13–44)
LYMPHOCYTES # BLD AUTO: 13.4 % — SIGNIFICANT CHANGE UP (ref 13–44)
MAGNESIUM SERPL-MCNC: 2.6 MG/DL — SIGNIFICANT CHANGE UP (ref 1.6–2.6)
MAGNESIUM SERPL-MCNC: 2.6 MG/DL — SIGNIFICANT CHANGE UP (ref 1.6–2.6)
MCHC RBC-ENTMCNC: 25.3 PG — LOW (ref 27–34)
MCHC RBC-ENTMCNC: 25.3 PG — LOW (ref 27–34)
MCHC RBC-ENTMCNC: 31.5 GM/DL — LOW (ref 32–36)
MCHC RBC-ENTMCNC: 31.5 GM/DL — LOW (ref 32–36)
MCV RBC AUTO: 80.4 FL — SIGNIFICANT CHANGE UP (ref 80–100)
MCV RBC AUTO: 80.4 FL — SIGNIFICANT CHANGE UP (ref 80–100)
MONOCYTES # BLD AUTO: 1.34 K/UL — HIGH (ref 0–0.9)
MONOCYTES # BLD AUTO: 1.34 K/UL — HIGH (ref 0–0.9)
MONOCYTES NFR BLD AUTO: 9.2 % — SIGNIFICANT CHANGE UP (ref 2–14)
MONOCYTES NFR BLD AUTO: 9.2 % — SIGNIFICANT CHANGE UP (ref 2–14)
NEUTROPHILS # BLD AUTO: 10.88 K/UL — HIGH (ref 1.8–7.4)
NEUTROPHILS # BLD AUTO: 10.88 K/UL — HIGH (ref 1.8–7.4)
NEUTROPHILS NFR BLD AUTO: 74.9 % — SIGNIFICANT CHANGE UP (ref 43–77)
NEUTROPHILS NFR BLD AUTO: 74.9 % — SIGNIFICANT CHANGE UP (ref 43–77)
NRBC # BLD: 0 /100 WBCS — SIGNIFICANT CHANGE UP (ref 0–0)
NRBC # BLD: 0 /100 WBCS — SIGNIFICANT CHANGE UP (ref 0–0)
NRBC # FLD: 0 K/UL — SIGNIFICANT CHANGE UP (ref 0–0)
NRBC # FLD: 0 K/UL — SIGNIFICANT CHANGE UP (ref 0–0)
PHOSPHATE SERPL-MCNC: 3.6 MG/DL — SIGNIFICANT CHANGE UP (ref 2.5–4.5)
PHOSPHATE SERPL-MCNC: 3.6 MG/DL — SIGNIFICANT CHANGE UP (ref 2.5–4.5)
PLATELET # BLD AUTO: 611 K/UL — HIGH (ref 150–400)
PLATELET # BLD AUTO: 611 K/UL — HIGH (ref 150–400)
POTASSIUM SERPL-MCNC: 4.6 MMOL/L — SIGNIFICANT CHANGE UP (ref 3.5–5.3)
POTASSIUM SERPL-MCNC: 4.6 MMOL/L — SIGNIFICANT CHANGE UP (ref 3.5–5.3)
POTASSIUM SERPL-SCNC: 4.6 MMOL/L — SIGNIFICANT CHANGE UP (ref 3.5–5.3)
POTASSIUM SERPL-SCNC: 4.6 MMOL/L — SIGNIFICANT CHANGE UP (ref 3.5–5.3)
PROTHROM AB SERPL-ACNC: 11.9 SEC — SIGNIFICANT CHANGE UP (ref 9.5–13)
PROTHROM AB SERPL-ACNC: 11.9 SEC — SIGNIFICANT CHANGE UP (ref 9.5–13)
RBC # BLD: 4.5 M/UL — SIGNIFICANT CHANGE UP (ref 3.8–5.2)
RBC # BLD: 4.5 M/UL — SIGNIFICANT CHANGE UP (ref 3.8–5.2)
RBC # FLD: 19.3 % — HIGH (ref 10.3–14.5)
RBC # FLD: 19.3 % — HIGH (ref 10.3–14.5)
SODIUM SERPL-SCNC: 137 MMOL/L — SIGNIFICANT CHANGE UP (ref 135–145)
SODIUM SERPL-SCNC: 137 MMOL/L — SIGNIFICANT CHANGE UP (ref 135–145)
SPECIMEN SOURCE: SIGNIFICANT CHANGE UP
SPECIMEN SOURCE: SIGNIFICANT CHANGE UP
WBC # BLD: 14.53 K/UL — HIGH (ref 3.8–10.5)
WBC # BLD: 14.53 K/UL — HIGH (ref 3.8–10.5)
WBC # FLD AUTO: 14.53 K/UL — HIGH (ref 3.8–10.5)
WBC # FLD AUTO: 14.53 K/UL — HIGH (ref 3.8–10.5)

## 2023-12-07 PROCEDURE — 99232 SBSQ HOSP IP/OBS MODERATE 35: CPT

## 2023-12-07 PROCEDURE — 49406 IMAGE CATH FLUID PERI/RETRO: CPT

## 2023-12-07 RX ORDER — ACETAMINOPHEN 500 MG
1000 TABLET ORAL EVERY 6 HOURS
Refills: 0 | Status: COMPLETED | OUTPATIENT
Start: 2023-12-08 | End: 2023-12-08

## 2023-12-07 RX ORDER — DEXTROSE 50 % IN WATER 50 %
25 SYRINGE (ML) INTRAVENOUS ONCE
Refills: 0 | Status: DISCONTINUED | OUTPATIENT
Start: 2023-12-07 | End: 2023-12-14

## 2023-12-07 RX ORDER — DEXTROSE 50 % IN WATER 50 %
12.5 SYRINGE (ML) INTRAVENOUS ONCE
Refills: 0 | Status: DISCONTINUED | OUTPATIENT
Start: 2023-12-07 | End: 2023-12-14

## 2023-12-07 RX ORDER — ERYTHROMYCIN ETHYLSUCCINATE 400 MG
400 TABLET ORAL EVERY 8 HOURS
Refills: 0 | Status: DISCONTINUED | OUTPATIENT
Start: 2023-12-07 | End: 2023-12-12

## 2023-12-07 RX ORDER — INSULIN LISPRO 100/ML
VIAL (ML) SUBCUTANEOUS EVERY 6 HOURS
Refills: 0 | Status: DISCONTINUED | OUTPATIENT
Start: 2023-12-07 | End: 2023-12-12

## 2023-12-07 RX ORDER — HYDROMORPHONE HYDROCHLORIDE 2 MG/ML
0.2 INJECTION INTRAMUSCULAR; INTRAVENOUS; SUBCUTANEOUS ONCE
Refills: 0 | Status: DISCONTINUED | OUTPATIENT
Start: 2023-12-07 | End: 2023-12-07

## 2023-12-07 RX ORDER — ACETAMINOPHEN 500 MG
1000 TABLET ORAL EVERY 6 HOURS
Refills: 0 | Status: COMPLETED | OUTPATIENT
Start: 2023-12-07 | End: 2023-12-08

## 2023-12-07 RX ORDER — ELECTROLYTE SOLUTION,INJ
1 VIAL (ML) INTRAVENOUS
Refills: 0 | Status: DISCONTINUED | OUTPATIENT
Start: 2023-12-07 | End: 2023-12-07

## 2023-12-07 RX ORDER — ENOXAPARIN SODIUM 100 MG/ML
40 INJECTION SUBCUTANEOUS EVERY 24 HOURS
Refills: 0 | Status: DISCONTINUED | OUTPATIENT
Start: 2023-12-08 | End: 2023-12-13

## 2023-12-07 RX ORDER — GLUCAGON INJECTION, SOLUTION 0.5 MG/.1ML
1 INJECTION, SOLUTION SUBCUTANEOUS ONCE
Refills: 0 | Status: DISCONTINUED | OUTPATIENT
Start: 2023-12-07 | End: 2023-12-08

## 2023-12-07 RX ORDER — I.V. FAT EMULSION 20 G/100ML
20.8 EMULSION INTRAVENOUS
Qty: 50 | Refills: 0 | Status: DISCONTINUED | OUTPATIENT
Start: 2023-12-07 | End: 2023-12-08

## 2023-12-07 RX ORDER — ACETAMINOPHEN 500 MG
1000 TABLET ORAL ONCE
Refills: 0 | Status: COMPLETED | OUTPATIENT
Start: 2023-12-07 | End: 2023-12-07

## 2023-12-07 RX ORDER — DEXTROSE 50 % IN WATER 50 %
15 SYRINGE (ML) INTRAVENOUS ONCE
Refills: 0 | Status: DISCONTINUED | OUTPATIENT
Start: 2023-12-07 | End: 2023-12-14

## 2023-12-07 RX ADMIN — Medication 400 MILLIGRAM(S): at 14:10

## 2023-12-07 RX ADMIN — SODIUM CHLORIDE 3 MILLILITER(S): 9 INJECTION INTRAMUSCULAR; INTRAVENOUS; SUBCUTANEOUS at 01:00

## 2023-12-07 RX ADMIN — I.V. FAT EMULSION 20.8 ML/HR: 20 EMULSION INTRAVENOUS at 17:45

## 2023-12-07 RX ADMIN — Medication 10 MILLIGRAM(S): at 13:24

## 2023-12-07 RX ADMIN — Medication 4: at 06:06

## 2023-12-07 RX ADMIN — BUDESONIDE AND FORMOTEROL FUMARATE DIHYDRATE 2 PUFF(S): 160; 4.5 AEROSOL RESPIRATORY (INHALATION) at 21:21

## 2023-12-07 RX ADMIN — SODIUM CHLORIDE 3 MILLILITER(S): 9 INJECTION INTRAMUSCULAR; INTRAVENOUS; SUBCUTANEOUS at 21:36

## 2023-12-07 RX ADMIN — Medication 400 MILLIGRAM(S): at 21:21

## 2023-12-07 RX ADMIN — Medication 5: at 12:43

## 2023-12-07 RX ADMIN — PANTOPRAZOLE SODIUM 40 MILLIGRAM(S): 20 TABLET, DELAYED RELEASE ORAL at 12:44

## 2023-12-07 RX ADMIN — Medication 3: at 17:37

## 2023-12-07 RX ADMIN — Medication 400 MILLIGRAM(S): at 04:44

## 2023-12-07 RX ADMIN — Medication 10 MILLIGRAM(S): at 21:22

## 2023-12-07 RX ADMIN — Medication 10 MILLIGRAM(S): at 05:27

## 2023-12-07 RX ADMIN — Medication 1 EACH: at 17:45

## 2023-12-07 RX ADMIN — I.V. FAT EMULSION 20.8 ML/HR: 20 EMULSION INTRAVENOUS at 18:42

## 2023-12-07 RX ADMIN — Medication 400 MILLIGRAM(S): at 22:41

## 2023-12-07 RX ADMIN — Medication 1 EACH: at 18:41

## 2023-12-07 RX ADMIN — HYDROMORPHONE HYDROCHLORIDE 0.2 MILLIGRAM(S): 2 INJECTION INTRAMUSCULAR; INTRAVENOUS; SUBCUTANEOUS at 06:05

## 2023-12-07 RX ADMIN — PIPERACILLIN AND TAZOBACTAM 25 GRAM(S): 4; .5 INJECTION, POWDER, LYOPHILIZED, FOR SOLUTION INTRAVENOUS at 05:26

## 2023-12-07 RX ADMIN — ALBUTEROL 2 PUFF(S): 90 AEROSOL, METERED ORAL at 21:20

## 2023-12-07 RX ADMIN — PIPERACILLIN AND TAZOBACTAM 25 GRAM(S): 4; .5 INJECTION, POWDER, LYOPHILIZED, FOR SOLUTION INTRAVENOUS at 21:21

## 2023-12-07 RX ADMIN — SODIUM CHLORIDE 3 MILLILITER(S): 9 INJECTION INTRAMUSCULAR; INTRAVENOUS; SUBCUTANEOUS at 06:27

## 2023-12-07 RX ADMIN — CHLORHEXIDINE GLUCONATE 1 APPLICATION(S): 213 SOLUTION TOPICAL at 12:43

## 2023-12-07 RX ADMIN — PIPERACILLIN AND TAZOBACTAM 25 GRAM(S): 4; .5 INJECTION, POWDER, LYOPHILIZED, FOR SOLUTION INTRAVENOUS at 13:24

## 2023-12-07 RX ADMIN — Medication 4: at 00:20

## 2023-12-07 RX ADMIN — Medication 400 MILLIGRAM(S): at 13:23

## 2023-12-07 NOTE — PROGRESS NOTE ADULT - SUBJECTIVE AND OBJECTIVE BOX
Name of Patient : NESTOR MABRY  MRN: [id number]  Date of visit: 12-07-23       Subjective: Patient seen and examined. No new events except as noted.         MEDICATIONS:  MEDICATIONS  (STANDING):  acetaminophen   IVPB .. 1000 milliGRAM(s) IV Intermittent every 6 hours  albuterol    90 MICROgram(s) HFA Inhaler 2 Puff(s) Inhalation two times a day  budesonide 160 MICROgram(s)/formoterol 4.5 MICROgram(s) Inhaler 2 Puff(s) Inhalation two times a day  chlorhexidine 2% Cloths 1 Application(s) Topical daily  erythromycin    ethylsuccinate Suspension 40 mG/mL 400 milliGRAM(s) Oral every 8 hours  influenza  Vaccine (HIGH DOSE) 0.7 milliLiter(s) IntraMuscular once  insulin lispro (ADMELOG) corrective regimen sliding scale   SubCutaneous every 6 hours  lipid, fat emulsion (Fish Oil and Plant Based) 20% Infusion 20.8 mL/Hr (20.8 mL/Hr) IV Continuous <Continuous>  metoclopramide Injectable 10 milliGRAM(s) IV Push every 8 hours  pantoprazole  Injectable 40 milliGRAM(s) IV Push daily  Parenteral Nutrition - Adult 1 Each (63 mL/Hr) TPN Continuous <Continuous>  Parenteral Nutrition - Adult 1 Each (63 mL/Hr) TPN Continuous <Continuous>  piperacillin/tazobactam IVPB.. 3.375 Gram(s) IV Intermittent every 8 hours  sodium chloride 0.9% lock flush 3 milliLiter(s) IV Push every 8 hours      PHYSICAL EXAM:  T(C): 36.7 (12-07-23 @ 08:48), Max: 37.2 (12-07-23 @ 04:42)  HR: 111 (12-07-23 @ 08:48) (102 - 118)  BP: 111/63 (12-07-23 @ 08:48) (111/63 - 123/75)  RR: 16 (12-07-23 @ 08:48) (16 - 18)  SpO2: 100% (12-07-23 @ 08:48) (98% - 100%)  Wt(kg): --  I&O's Summary    06 Dec 2023 07:01  -  07 Dec 2023 07:00  --------------------------------------------------------  IN: 1488.8 mL / OUT: 4050 mL / NET: -2561.2 mL    07 Dec 2023 07:01  -  07 Dec 2023 12:22  --------------------------------------------------------  IN: 315 mL / OUT: 400 mL / NET: -85 mL          Appearance: Normal	  HEENT:  PERRLA   Lymphatic: No lymphadenopathy   Cardiovascular: Normal S1 S2, no JVD  Respiratory: normal effort , clear  Gastrointestinal:  Soft, Non-tender  Skin: No rashes,  warm to touch  Psychiatry:  Mood & affect appropriate  Musculuskeletal: No edema    recent labs, Imaging and EKGs personally reviewed       12-06-23 @ 07:01  -  12-07-23 @ 07:00  --------------------------------------------------------  IN: 1488.8 mL / OUT: 4050 mL / NET: -2561.2 mL    12-07-23 @ 07:01  -  12-07-23 @ 12:22  --------------------------------------------------------  IN: 315 mL / OUT: 400 mL / NET: -85 mL             Name of Patient : NESTOR MABRY  MRN: [id number]  Date of visit: 12-07-23       Subjective: Patient seen and examined. No new events except as noted.         MEDICATIONS:  MEDICATIONS  (STANDING):  acetaminophen   IVPB .. 1000 milliGRAM(s) IV Intermittent every 6 hours  albuterol    90 MICROgram(s) HFA Inhaler 2 Puff(s) Inhalation two times a day  budesonide 160 MICROgram(s)/formoterol 4.5 MICROgram(s) Inhaler 2 Puff(s) Inhalation two times a day  chlorhexidine 2% Cloths 1 Application(s) Topical daily  erythromycin    ethylsuccinate Suspension 40 mG/mL 400 milliGRAM(s) Oral every 8 hours  influenza  Vaccine (HIGH DOSE) 0.7 milliLiter(s) IntraMuscular once  insulin lispro (ADMELOG) corrective regimen sliding scale   SubCutaneous every 6 hours  lipid, fat emulsion (Fish Oil and Plant Based) 20% Infusion 20.8 mL/Hr (20.8 mL/Hr) IV Continuous <Continuous>  metoclopramide Injectable 10 milliGRAM(s) IV Push every 8 hours  pantoprazole  Injectable 40 milliGRAM(s) IV Push daily  Parenteral Nutrition - Adult 1 Each (63 mL/Hr) TPN Continuous <Continuous>  Parenteral Nutrition - Adult 1 Each (63 mL/Hr) TPN Continuous <Continuous>  piperacillin/tazobactam IVPB.. 3.375 Gram(s) IV Intermittent every 8 hours  sodium chloride 0.9% lock flush 3 milliLiter(s) IV Push every 8 hours      PHYSICAL EXAM:  T(C): 36.7 (12-07-23 @ 08:48), Max: 37.2 (12-07-23 @ 04:42)  HR: 111 (12-07-23 @ 08:48) (102 - 118)  BP: 111/63 (12-07-23 @ 08:48) (111/63 - 123/75)  RR: 16 (12-07-23 @ 08:48) (16 - 18)  SpO2: 100% (12-07-23 @ 08:48) (98% - 100%)  Wt(kg): --  I&O's Summary    06 Dec 2023 07:01  -  07 Dec 2023 07:00  --------------------------------------------------------  IN: 1488.8 mL / OUT: 4050 mL / NET: -2561.2 mL    07 Dec 2023 07:01  -  07 Dec 2023 12:22  --------------------------------------------------------  IN: 315 mL / OUT: 400 mL / NET: -85 mL          Appearance: Normal	  HEENT:  PERRLA   Lymphatic: No lymphadenopathy   Cardiovascular: Normal S1 S2, no JVD  Respiratory: normal effort , clear  Gastrointestinal:  Soft, Non-tender  Skin: No rashes,  warm to touch  Psychiatry:  Mood & affect appropriate  Musculuskeletal: No edema    recent labs, Imaging and EKGs personally reviewed       12-06-23 @ 07:01  -  12-07-23 @ 07:00  --------------------------------------------------------  IN: 1488.8 mL / OUT: 4050 mL / NET: -2561.2 mL    12-07-23 @ 07:01  -  12-07-23 @ 12:22  --------------------------------------------------------  IN: 315 mL / OUT: 400 mL / NET: -85 mL

## 2023-12-07 NOTE — PROGRESS NOTE ADULT - SUBJECTIVE AND OBJECTIVE BOX
Chief Complaint: Type 2 DM     History: Pt seen at bedside. Pt NPO on TPN. Pt also has NGT. Pt denies nausea and vomiting/any signs of hypoglycemia.     MEDICATIONS  (STANDING):  acetaminophen   IVPB .. 1000 milliGRAM(s) IV Intermittent every 6 hours  albuterol    90 MICROgram(s) HFA Inhaler 2 Puff(s) Inhalation two times a day  budesonide 160 MICROgram(s)/formoterol 4.5 MICROgram(s) Inhaler 2 Puff(s) Inhalation two times a day  chlorhexidine 2% Cloths 1 Application(s) Topical daily  erythromycin    ethylsuccinate Suspension 40 mG/mL 400 milliGRAM(s) Oral every 8 hours  influenza  Vaccine (HIGH DOSE) 0.7 milliLiter(s) IntraMuscular once  insulin lispro (ADMELOG) corrective regimen sliding scale   SubCutaneous every 6 hours  lipid, fat emulsion (Fish Oil and Plant Based) 20% Infusion 20.8 mL/Hr (20.8 mL/Hr) IV Continuous <Continuous>  metoclopramide Injectable 10 milliGRAM(s) IV Push every 8 hours  pantoprazole  Injectable 40 milliGRAM(s) IV Push daily  Parenteral Nutrition - Adult 1 Each (63 mL/Hr) TPN Continuous <Continuous>  Parenteral Nutrition - Adult 1 Each (63 mL/Hr) TPN Continuous <Continuous>  piperacillin/tazobactam IVPB.. 3.375 Gram(s) IV Intermittent every 8 hours  sodium chloride 0.9% lock flush 3 milliLiter(s) IV Push every 8 hours    MEDICATIONS  (PRN):      Allergies: Darvocet A500 (Other; Urticaria)  Percocet 10/325 (Other; Urticaria)  codeine (Other)  Lantus (Swelling)  PURELL.  pt said, &quot;I felt my airway closing&quot; after she smelled the Purell. The incident occured at the pulmonologist office. (Other; Short breath)        Review of Systems:  Respiratory: No SOB, no cough  GI: No nausea, vomiting, abdominal pain  Endocrine: no polyuria, polydipsia      PHYSICAL EXAM:  VITALS: T(C): 36.4 (12-07-23 @ 12:53)  T(F): 97.5 (12-07-23 @ 12:53), Max: 98.9 (12-07-23 @ 04:42)  HR: 110 (12-07-23 @ 12:53) (102 - 118)  BP: 125/67 (12-07-23 @ 12:53) (111/63 - 125/67)  RR:  (16 - 18)  SpO2:  (95% - 100%)  Wt(kg): --  GENERAL: NAD, well-groomed, well-developed  RESPIRATORY: No labored breathing   GI: pos NGT   PSYCH: Alert and oriented x 3, normal affect, normal mood      CAPILLARY BLOOD GLUCOSE  POCT Blood Glucose.: 232 mg/dL (07 Dec 2023 12:19)  POCT Blood Glucose.: 245 mg/dL (07 Dec 2023 05:47)  POCT Blood Glucose.: 222 mg/dL (06 Dec 2023 23:55)  POCT Blood Glucose.: 243 mg/dL (06 Dec 2023 18:33)    A1C with Estimated Average Glucose (11.16.23 @ 13:15)    A1C with Estimated Average Glucose Result: 6.8   Estimated Average Glucose: 148      12-07    137  |  98  |  18  ----------------------------<  237<H>  4.6   |  26  |  0.51    eGFR: 99    Ca    9.5      12-07  Mg     2.60     12-07  Phos  3.6     12-07    Diet, NPO (12-06-23 @ 14:35) [Active]      Parenteral Nutrition - Adult 1 Each (63 mL/Hr) TPN Continuous <Continuous>, 12-07-23 @ 17:00, 17:00, Stop order after: 1 Days  Parenteral Nutrition - Adult 1 Each (63 mL/Hr) TPN Continuous <Continuous>, 12-06-23 @ 17:00, 17:00, Stop order after: 1 Days

## 2023-12-07 NOTE — PROGRESS NOTE ADULT - ASSESSMENT
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 Course c/b ileus. CT on 12/4 showed: Interval increase in size of fluid anterior to the ileocolic anastomosis, with new peripheral enhancement, probably early abscess formation and decrease in small bowel dilatation, probably ileus. Patient pending IR drainage for abscess seen on CT.     Plan  - IR drainage of intraabdominal collection adjacent to anastomosis today  - Diet: NPO/NGT  - TPN via PICC line  - Pain control with Tylenol, Dilaudid PRN  - C/w zosyn  - Protonix 40 mg daily  - Home meds; holding HTN medications  - Appreciate endocrinology recs; plan to restart insulin pump on discharge  - OOB/ambulate/ PT/ IS while in bed  - DVT prophylaxis: Lovenox   - Dispo: Pt recommends Home PT with walker    D Team Surgery  [id number]  73 year old female with PMH asthma, T2DM on insulin pump, GERD, HTN, HLD, OA, obesity presents s/p robotic partial colectomy for 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 Course c/b ileus. CT on 12/4 showed: Interval increase in size of fluid anterior to the ileocolic anastomosis, with new peripheral enhancement, probably early abscess formation and decrease in small bowel dilatation, probably ileus. Patient pending IR drainage for abscess seen on CT.     Plan  - IR drainage of intraabdominal collection adjacent to anastomosis today  - Diet: NPO/NGT  - TPN via PICC line  - Pain control with Tylenol, Dilaudid PRN  - C/w zosyn  - Protonix 40 mg daily  - Home meds; holding HTN medications  - Appreciate endocrinology recs; plan to restart insulin pump on discharge  - OOB/ambulate/ PT/ IS while in bed  - DVT prophylaxis: Lovenox   - Dispo: Pt recommends Home PT with walker    D Team Surgery  [id number]  73 year old female with PMH asthma, T2DM on insulin pump, GERD, HTN, HLD, OA, obesity presents s/p robotic partial colectomy for 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 Course c/b ileus. CT on 12/4 showed: Interval increase in size of fluid anterior to the ileocolic anastomosis, with new peripheral enhancement, probably early abscess formation and decrease in small bowel dilatation, probably ileus. Patient pending IR drainage for abscess seen on CT.     Plan  - IR drainage of intraabdominal collection adjacent to anastomosis today  - Diet: NPO/NGT  - TPN via PICC line  - Pain control with Tylenol, Dilaudid PRN  - C/w zosyn. start erythromycin   - Protonix 40 mg daily  - Home meds; holding HTN medications  - Appreciate endocrinology recs; plan to restart insulin pump on discharge  - OOB/ambulate/ PT/ IS while in bed  - DVT prophylaxis: Lovenox   - Dispo: Pt recommends Home PT with walker    D Team Surgery  [id number]  73 year old female with PMH asthma, T2DM on insulin pump, GERD, HTN, HLD, OA, obesity presents s/p robotic partial colectomy for 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 Course c/b ileus. CT on 12/4 showed: Interval increase in size of fluid anterior to the ileocolic anastomosis, with new peripheral enhancement, probably early abscess formation and decrease in small bowel dilatation, probably ileus. Patient pending IR drainage for abscess seen on CT.     Plan  - IR drainage of intraabdominal collection adjacent to anastomosis today  - Diet: NPO/NGT  - TPN via PICC line  - Pain control with Tylenol, Dilaudid PRN  - C/w zosyn. start erythromycin   - Protonix 40 mg daily  - Home meds; holding HTN medications  - Appreciate endocrinology recs; plan to restart insulin pump on discharge  - OOB/ambulate/ PT/ IS while in bed  - DVT prophylaxis: Lovenox   - Dispo: Pt recommends Home PT with walker    D Team Surgery  [id number]  73 year old female with PMH asthma, T2DM on insulin pump, GERD, HTN, HLD, OA, obesity presents s/p robotic partial colectomy for 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 Course c/b ileus. CT on 12/4 showed: Interval increase in size of fluid anterior to the ileocolic anastomosis, with new peripheral enhancement, probably early abscess formation and decrease in small bowel dilatation, probably ileus. Patient pending IR drainage for abscess seen on CT.     Plan  - IR drainage of intraabdominal collection adjacent to anastomosis today  - Diet: NPO/NGT  - TPN via PICC line  - Pain control with Tylenol, Dilaudid PRN  - C/w zosyn  - start erythromycin for motility  - Protonix 40 mg daily  - Home meds; holding HTN medications  - Appreciate endocrinology recs; plan to restart insulin pump on discharge  - OOB/ambulate/ PT/ IS while in bed  - DVT prophylaxis: Lovenox   - Dispo: Pt recommends Home PT with walker    D Team Surgery  [id number]  73 year old female with PMH asthma, T2DM on insulin pump, GERD, HTN, HLD, OA, obesity presents s/p robotic partial colectomy for 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 Course c/b ileus. CT on 12/4 showed: Interval increase in size of fluid anterior to the ileocolic anastomosis, with new peripheral enhancement, probably early abscess formation and decrease in small bowel dilatation, probably ileus. Patient pending IR drainage for abscess seen on CT.     Plan  - IR drainage of intraabdominal collection adjacent to anastomosis today  - Diet: NPO/NGT  - TPN via PICC line  - Pain control with Tylenol, Dilaudid PRN  - C/w zosyn  - start erythromycin for motility  - Protonix 40 mg daily  - Home meds; holding HTN medications  - Appreciate endocrinology recs; plan to restart insulin pump on discharge  - OOB/ambulate/ PT/ IS while in bed  - DVT prophylaxis: Lovenox   - Dispo: Pt recommends Home PT with walker    D Team Surgery  [id number]  73 year old female with PMH asthma, T2DM on insulin pump, GERD, HTN, HLD, OA, obesity presents s/p robotic partial colectomy for 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 Course c/b ileus. CT on 12/4 showed: Interval increase in size of fluid anterior to the ileocolic anastomosis, with new peripheral enhancement, probably early abscess formation and decrease in small bowel dilatation, probably ileus. Patient pending IR drainage for abscess seen on CT.     Plan  - IR drainage of intraabdominal collection adjacent to anastomosis today  - Diet: NPO/NGT  - TPN via PICC line  - Pain control with Tylenol, Dilaudid PRN  - C/w zosyn  - on reglan for motility, start erythromycin today  - Protonix 40 mg daily  - Home meds; holding HTN medications  - Appreciate endocrinology recs; plan to restart insulin pump on discharge  - OOB/ambulate/ PT/ IS while in bed  - DVT prophylaxis: Lovenox   - Dispo: Pt recommends Home PT with walker    D Team Surgery  [id number]  73 year old female with PMH asthma, T2DM on insulin pump, GERD, HTN, HLD, OA, obesity presents s/p robotic partial colectomy for 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 Course c/b ileus. CT on 12/4 showed: Interval increase in size of fluid anterior to the ileocolic anastomosis, with new peripheral enhancement, probably early abscess formation and decrease in small bowel dilatation, probably ileus. Patient pending IR drainage for abscess seen on CT.     Plan  - IR drainage of intraabdominal collection adjacent to anastomosis today  - Diet: NPO/NGT  - TPN via PICC line  - Pain control with Tylenol, Dilaudid PRN  - C/w zosyn  - on reglan for motility, start erythromycin today  - Protonix 40 mg daily  - Home meds; holding HTN medications  - Appreciate endocrinology recs; plan to restart insulin pump on discharge  - OOB/ambulate/ PT/ IS while in bed  - DVT prophylaxis: Lovenox   - Dispo: Pt recommends Home PT with walker    D Team Surgery  [id number]

## 2023-12-07 NOTE — PROGRESS NOTE ADULT - ASSESSMENT
This is a 72 yo F /w a PMH of DM2 on an insulin pump and colorectal cancer s/p right hemicolectomy today. Endocrinology consulted for diabetes management and DM2. Based on current insulin pump settings, suspect patient is being over-basalized given high basal rates compared to the insulin:carb ratio    Home Regimen:  Patient uses the medtronic insulin pump and Libre2  Pump settings are as follows:  TDD 52.425  12AM 1.8 units per hour  6:30AM 2 units per hour  12:30PM 2.45 units per hour  6:30PM 2.55 units per hour  8PM 2.55 units per hour    ICR:  12AM 1:15  7AM 1:12  12:30PM 1:15    ISF 1:30    #DM2 A1c 6.8%  #insulin pump at home. Off pump while in the hospital  - Glucose Target 100-180: above goal. Patient is on TPN managed Nutrition Support Team: 80G dextrose, continue 44 units of Regular insulin   - If glucose above goal may need to add Lantus will reassess in am   - Change from Moderate to customized moderate Admelog correction scale every 6 hours while NPO  - Check FS q 6 hours   - Hypoglycemia Protocol       For future use if started on clears:  -consistent carbohydrate consideration   -low admelog correction scales before meals   -low admelog scales before bedtime  -hold basal insulin until noting glucose trend   if glucose >180 X 2 - than start levemir 15 units every 24 hours   -FSG before meals and before bedtime  -Carb consistent diet once able to tolerate  -hypoglycemia protocol in place if needed    when advanced to regular carb consistent diet   than will need basal bolus + correction scale   anticipate Levemir 15 units and ademelog 5 units premeals and low dose correction scale         #Discharge  -follow up with Dr. Anand  -basal bolus vs resume insulin pump on discharge, likely will need adjustment in settings prior to resuming pump   -Continue glucose monitoring with Free style bonnie   -Will have Advanced Practice Nurse Consult Diabetes Specialist see pt closer to discharge    #HTN  -BP target <130/80  -Consider resuming losartan 25mg daily and hctz 12.5mg daily- management as per primary team  -outpt mc/cr ratio     #HLD  -On Pravastatin 40 mg QHS at home   -Resume once able to tolerate orals if no contraindications   -LDL goal less than 70   -Management as per primary team     D/w Surgery team [id number]     Adrienne Taveras  Nurse Practitioner  Division of Endocrinology & Diabetes  In house pager #[phone]    If before 9AM or after 6PM, or on weekends/holidays, please call endocrine answering service for assistance ([phone]).For nonurgent matters email [email or web address] for assistance.    This is a 72 yo F /w a PMH of DM2 on an insulin pump and colorectal cancer s/p right hemicolectomy today. Endocrinology consulted for diabetes management and DM2. Based on current insulin pump settings, suspect patient is being over-basalized given high basal rates compared to the insulin:carb ratio    Home Regimen:  Patient uses the medtronic insulin pump and Libre2  Pump settings are as follows:  TDD 52.425  12AM 1.8 units per hour  6:30AM 2 units per hour  12:30PM 2.45 units per hour  6:30PM 2.55 units per hour  8PM 2.55 units per hour    ICR:  12AM 1:15  7AM 1:12  12:30PM 1:15    ISF 1:30    #DM2 A1c 6.8%  #insulin pump at home. Off pump while in the hospital  - Glucose Target 100-180: above goal. Patient is on TPN managed Nutrition Support Team: 80G dextrose, continue 44 units of Regular insulin   - If glucose above goal may need to add Lantus will reassess in am   - Change from Moderate to customized moderate Admelog correction scale every 6 hours while NPO  - Check FS q 6 hours   - Hypoglycemia Protocol       For future use if started on clears:  -consistent carbohydrate consideration   -low admelog correction scales before meals   -low admelog scales before bedtime  -hold basal insulin until noting glucose trend   if glucose >180 X 2 - than start levemir 15 units every 24 hours   -FSG before meals and before bedtime  -Carb consistent diet once able to tolerate  -hypoglycemia protocol in place if needed    when advanced to regular carb consistent diet   than will need basal bolus + correction scale   anticipate Levemir 15 units and ademelog 5 units premeals and low dose correction scale         #Discharge  -follow up with Dr. Anand  -basal bolus vs resume insulin pump on discharge, likely will need adjustment in settings prior to resuming pump   -Continue glucose monitoring with Free style bonnie   -Will have Advanced Practice Nurse Consult Diabetes Specialist see pt closer to discharge    #HTN  -BP target <130/80  -Consider resuming losartan 25mg daily and hctz 12.5mg daily- management as per primary team  -outpt mc/cr ratio     #HLD  -On Pravastatin 40 mg QHS at home   -Resume once able to tolerate orals if no contraindications   -LDL goal less than 70   -Management as per primary team     D/w Surgery team [id number]     Adrienne Taveras  Nurse Practitioner  Division of Endocrinology & Diabetes  In house pager #[phone]    If before 9AM or after 6PM, or on weekends/holidays, please call endocrine answering service for assistance ([phone]).For nonurgent matters email [email or web address] for assistance.

## 2023-12-07 NOTE — PROGRESS NOTE ADULT - NS ATTEND AMEND GEN_ALL_CORE FT
Patient seen and examined. Agree with plan as detailed in PA/NP Note.     For IR drain today    Bella Aguilera MD  Pager: [phone] Patient seen and examined. Agree with plan as detailed in PA/NP Note.     For IR drain today    Bella Aguilera MD  Pager: [phone]

## 2023-12-07 NOTE — PROCEDURE NOTE - PROCEDURE FINDINGS AND DETAILS
Successful placement of left arm double lumen PICC, via left basilic vein.  Length: 38cm  Tip of PICC in SVC.   Of note, right arm brachial and basilic veins were attempted first; however wire was unable to be advanced though both veins. Decision was then made to switch arms.
10 F drain placed into anterior abdominal collection. Return of dark red fluid.

## 2023-12-07 NOTE — PROGRESS NOTE ADULT - SUBJECTIVE AND OBJECTIVE BOX
TEAM [ D ] Surgery Daily Progress Note  =====================================================    SUBJECTIVE: Patient seen and examined at bedside on AM rounds. No acute overnight events. Patient reports that they're feeling okay, reports some abdominal pain and emesis episode yesterday. Patient not currently nauseous. Denies Flatus/ reports BM after contrast yesterday. OOB/Amublating as tolerated. Denies  fever, chills, SOB, chest pain.    PAST MEDICAL & SURGICAL HISTORY:  History of hypertension  Diabetes  GERD (gastroesophageal reflux disease)  Uterine leiomyoma  Hyperlipidemia  Asthma  Obesity  Lymphadenopathy  left lower extremitiy ; 1966 - current  H/O insertion of insulin pump  OA (osteoarthritis)  Malignant neoplasm of cecum  H/O bilateral breast reduction surgery  History of appendectomy  History of cholecystectomy  H/O: hysterectomy  History of total right knee replacement  3/2016 - [facility]  H/O total knee replacement, left  S/P bunionectomy      ALLERGIES:  Darvocet A500 (Other; Urticaria)  Percocet 10/325 (Other; Urticaria)  codeine (Other)  Lantus (Swelling)  PURELL.  pt said, &quot;I felt my airway closing&quot; after she smelled the Purell. The incident occured at the pulmonologist office. (Other; Short breath)      --------------------------------------------------------------------------------------    MEDICATIONS:    Neurologic Medications  acetaminophen   IVPB .. 1000 milliGRAM(s) IV Intermittent every 6 hours  metoclopramide Injectable 10 milliGRAM(s) IV Push every 8 hours  ondansetron Injectable 4 milliGRAM(s) IV Push every 6 hours PRN Nausea    Respiratory Medications  albuterol    90 MICROgram(s) HFA Inhaler 2 Puff(s) Inhalation two times a day  budesonide 160 MICROgram(s)/formoterol 4.5 MICROgram(s) Inhaler 2 Puff(s) Inhalation two times a day    Cardiovascular Medications    Gastrointestinal Medications  lipid, fat emulsion (Fish Oil and Plant Based) 20% Infusion 20.8 mL/Hr IV Continuous <Continuous>  pantoprazole  Injectable 40 milliGRAM(s) IV Push daily  Parenteral Nutrition - Adult 1 Each TPN Continuous <Continuous>  sodium chloride 0.9% lock flush 3 milliLiter(s) IV Push every 8 hours    Genitourinary Medications    Hematologic/Oncologic Medications  influenza  Vaccine (HIGH DOSE) 0.7 milliLiter(s) IntraMuscular once    Antimicrobial/Immunologic Medications  piperacillin/tazobactam IVPB.. 3.375 Gram(s) IV Intermittent every 8 hours    Endocrine/Metabolic Medications  insulin lispro (ADMELOG) corrective regimen sliding scale   SubCutaneous every 6 hours    Topical/Other Medications  chlorhexidine 2% Cloths 1 Application(s) Topical daily    --------------------------------------------------------------------------------------    VITAL SIGNS:  T(C): 37.2 (12-07-23 @ 04:42), Max: 37.2 (12-06-23 @ 09:11)  HR: 113 (12-07-23 @ 04:42) (91 - 118)  BP: 111/67 (12-07-23 @ 04:42) (106/54 - 123/75)  RR: 18 (12-07-23 @ 04:42) (16 - 18)  SpO2: 98% (12-07-23 @ 04:42) (98% - 100%)  --------------------------------------------------------------------------------------    EXAM  General: NAD, resting in bed comfortably. A&Ox4.   Cardiac: S1, S2. pulse present   Respiratory: Nonlabored respirations, normal cw expansion. No signs of respiratory distress.   Abdomen: soft, nondistended, slightly tender to periumbilical area. NGT in place  Extremities: no deformities, moving all extremities spontaneously.     --------------------------------------------------------------------------------------    LABS                          11.4   14.53 )-----------( 611      ( 07 Dec 2023 02:05 )             36.2     12-07    137  |  98  |  18  ----------------------------<  237<H>  4.6   |  26  |  0.51    Ca    9.5      07 Dec 2023 02:05  Phos  3.6     12-07  Mg     2.60     12-07        --------------------------------------------------------------------------------------    INS AND OUTS:    12-06-23 @ 07:01  -  12-07-23 @ 07:00  --------------------------------------------------------  IN: 1488.8 mL / OUT: 4050 mL / NET: -2561.2 mL      --------------------------------------------------------------------------------------         TEAM [ D ] Surgery Daily Progress Note  =====================================================    SUBJECTIVE: Patient seen and examined at bedside on AM rounds. No acute overnight events. Patient reports that they're feeling okay, reports some abdominal pain and emesis episode yesterday. Patient not currently nauseous. Denies Flatus/ reports BM after contrast yesterday. OOB/Amublating as tolerated. Denies  fever, chills, SOB, chest pain.    PAST MEDICAL & SURGICAL HISTORY:  History of hypertension  Diabetes  GERD (gastroesophageal reflux disease)  Uterine leiomyoma  Hyperlipidemia  Asthma  Obesity  Lymphadenopathy  left lower extremitiy ; 1966 - current  H/O insertion of insulin pump  OA (osteoarthritis)  Malignant neoplasm of cecum  H/O bilateral breast reduction surgery  History of appendectomy  History of cholecystectomy  H/O: hysterectomy  History of total right knee replacement  3/2016 - [facility]  H/O total knee replacement, left  S/P bunionectomy      ALLERGIES:  Darvocet A500 (Other; Urticaria)  Percocet 10/325 (Other; Urticaria)  codeine (Other)  Lantus (Swelling)  PURELL.  pt said, &quot;I felt my airway closing&quot; after she smelled the Purell. The incident occured at the pulmonologist office. (Other; Short breath)      --------------------------------------------------------------------------------------    MEDICATIONS:    Neurologic Medications  acetaminophen   IVPB .. 1000 milliGRAM(s) IV Intermittent every 6 hours  metoclopramide Injectable 10 milliGRAM(s) IV Push every 8 hours  ondansetron Injectable 4 milliGRAM(s) IV Push every 6 hours PRN Nausea    Respiratory Medications  albuterol    90 MICROgram(s) HFA Inhaler 2 Puff(s) Inhalation two times a day  budesonide 160 MICROgram(s)/formoterol 4.5 MICROgram(s) Inhaler 2 Puff(s) Inhalation two times a day    Cardiovascular Medications    Gastrointestinal Medications  lipid, fat emulsion (Fish Oil and Plant Based) 20% Infusion 20.8 mL/Hr IV Continuous <Continuous>  pantoprazole  Injectable 40 milliGRAM(s) IV Push daily  Parenteral Nutrition - Adult 1 Each TPN Continuous <Continuous>  sodium chloride 0.9% lock flush 3 milliLiter(s) IV Push every 8 hours    Genitourinary Medications    Hematologic/Oncologic Medications  influenza  Vaccine (HIGH DOSE) 0.7 milliLiter(s) IntraMuscular once    Antimicrobial/Immunologic Medications  piperacillin/tazobactam IVPB.. 3.375 Gram(s) IV Intermittent every 8 hours    Endocrine/Metabolic Medications  insulin lispro (ADMELOG) corrective regimen sliding scale   SubCutaneous every 6 hours    Topical/Other Medications  chlorhexidine 2% Cloths 1 Application(s) Topical daily    --------------------------------------------------------------------------------------    VITAL SIGNS:  T(C): 37.2 (12-07-23 @ 04:42), Max: 37.2 (12-06-23 @ 09:11)  HR: 113 (12-07-23 @ 04:42) (91 - 118)  BP: 111/67 (12-07-23 @ 04:42) (106/54 - 123/75)  RR: 18 (12-07-23 @ 04:42) (16 - 18)  SpO2: 98% (12-07-23 @ 04:42) (98% - 100%)  --------------------------------------------------------------------------------------    EXAM  General: NAD, resting in bed comfortably. A&Ox4.   Cardiac: S1, S2. pulse present   Respiratory: Nonlabored respirations, normal cw expansion. No signs of respiratory distress.   Abdomen: soft, nondistended, slightly tender to periumbilical area. NGT in place  Extremities: no deformities, moving all extremities spontaneously.     --------------------------------------------------------------------------------------    LABS                          11.4   14.53 )-----------( 611      ( 07 Dec 2023 02:05 )             36.2     12-07    137  |  98  |  18  ----------------------------<  237<H>  4.6   |  26  |  0.51    Ca    9.5      07 Dec 2023 02:05  Phos  3.6     12-07  Mg     2.60     12-07        --------------------------------------------------------------------------------------    INS AND OUTS:    12-06-23 @ 07:01  -  12-07-23 @ 07:00  --------------------------------------------------------  IN: 1488.8 mL / OUT: 4050 mL / NET: -2561.2 mL      --------------------------------------------------------------------------------------

## 2023-12-07 NOTE — PROCEDURE NOTE - PLAN
- OK to use left arm PICC line
- 1 hour recovery then to floor  - record output from drain  - remainder of care per primary team

## 2023-12-07 NOTE — PROGRESS NOTE ADULT - NS ATTEND AMEND GEN_ALL_CORE FT
I agree with the above history, physical examination, chief complaint/diagnosis, and plan, which I have reviewed and edited where appropriate.  I agree with notes/assessment and detailed interval history of health care providers on my service.  I have seen and examined the patient.  I reviewed the laboratory and available data and agree with the history, physical assessment and plan.  I reviewed and discussed with all consultants, house staff and PA's.  The Nutrition Support Team (NST) discusses on an ongoing basis with the primary team and all consultants, House staff and PA's to have a permanent risk benefit analyses on all decisions and coordinating care.  I was physically present for the key portions of the evaluation and management (E/M) service provided.  74 y/o female s/p robotic partial colectomy receiving parenteral nutrition in view of severe protein calorie malnutrition and prolonged NPO.  PHYSICAL EXAM:  Constitutional: NAD  Respiratory: clear  Abdomen: Soft, nondistended  Extremities: warm  labs reviewed - electrolytes adjusted   continue TPN; 1.5L/1172 kcal/day I agree with the above history, physical examination, chief complaint/diagnosis, and plan, which I have reviewed and edited where appropriate.  I agree with notes/assessment and detailed interval history of health care providers on my service.  I have seen and examined the patient.  I reviewed the laboratory and available data and agree with the history, physical assessment and plan.  I reviewed and discussed with all consultants, house staff and PA's.  The Nutrition Support Team (NST) discusses on an ongoing basis with the primary team and all consultants, House staff and PA's to have a permanent risk benefit analyses on all decisions and coordinating care.  I was physically present for the key portions of the evaluation and management (E/M) service provided.  72 y/o female s/p robotic partial colectomy receiving parenteral nutrition in view of severe protein calorie malnutrition and prolonged NPO.  PHYSICAL EXAM:  Constitutional: NAD  Respiratory: clear  Abdomen: Soft, nondistended  Extremities: warm  labs reviewed - electrolytes adjusted   continue TPN; 1.5L/1172 kcal/day

## 2023-12-07 NOTE — PROGRESS NOTE ADULT - SUBJECTIVE AND OBJECTIVE BOX
NUTRITION NOTE  [id number] THOMASTUCKER  ===============================    Interval events - Patient was seen and examined at bedside, no acute events overnight. Patient denies chest pain, shortness of breath, nausea or vomiting at this time. PICC placed and TPN was started on 23 for nutritional support. Pt remains NPO with NGT in place.    ROS: Except as noted above, all other systems reviewed and are negative     Allergies  Darvocet A500 (Other; Urticaria)  Percocet 10/325 (Other; Urticaria)  codeine (Other)  Lantus (Swelling)  Purell    PAST MEDICAL & SURGICAL HISTORY:  History of hypertension  Diabetes wears insulin pump  GERD (gastroesophageal reflux disease)  Uterine leiomyoma  Hyperlipidemia  Asthma  Obesity  Lymphadenopathy left lower extremity ;  - current  H/O insertion of insulin pump  OA (osteoarthritis)  Malignant neoplasm of cecum  H/O bilateral breast reduction surgery  History of appendectomy  History of cholecystectomy  H/O: hysterectomy  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 - [facility]  H/O total knee replacement, left  S/P bunionectomy    FAMILY HISTORY:  Diabetes mellitus (Father)  FH: breast cancer (Aunt)    Vital Signs Last 24 Hrs  T(C): 36.7 (07 Dec 2023 08:48), Max: 37.2 (07 Dec 2023 04:42)  T(F): 98 (07 Dec 2023 08:48), Max: 98.9 (07 Dec 2023 04:42)  HR: 111 (07 Dec 2023 08:48) (91 - 118)  BP: 111/63 (07 Dec 2023 08:48) (107/67 - 123/75)  RR: 16 (07 Dec 2023 08:48) (16 - 18)  SpO2: 100% (07 Dec 2023 08:48) (98% - 100%)    MEDICATIONS  (STANDING):  acetaminophen   IVPB .. 1000 milliGRAM(s) IV Intermittent every 6 hours  albuterol    90 MICROgram(s) HFA Inhaler 2 Puff(s) Inhalation two times a day  budesonide 160 MICROgram(s)/formoterol 4.5 MICROgram(s) Inhaler 2 Puff(s) Inhalation two times a day  chlorhexidine 2% Cloths 1 Application(s) Topical daily  erythromycin    ethylsuccinate Suspension 40 mG/mL 400 milliGRAM(s) Oral every 8 hours  influenza  Vaccine (HIGH DOSE) 0.7 milliLiter(s) IntraMuscular once  insulin lispro (ADMELOG) corrective regimen sliding scale   SubCutaneous every 6 hours  lipid, fat emulsion (Fish Oil and Plant Based) 20% Infusion 20.8 mL/Hr (20.8 mL/Hr) IV Continuous <Continuous>  metoclopramide Injectable 10 milliGRAM(s) IV Push every 8 hours  pantoprazole  Injectable 40 milliGRAM(s) IV Push daily  Parenteral Nutrition - Adult 1 Each (63 mL/Hr) TPN Continuous <Continuous>  Parenteral Nutrition - Adult 1 Each (63 mL/Hr) TPN Continuous <Continuous>  piperacillin/tazobactam IVPB.. 3.375 Gram(s) IV Intermittent every 8 hours  sodium chloride 0.9% lock flush 3 milliLiter(s) IV Push every 8 hours    I&O's Detail    06 Dec 2023 07:01  -  07 Dec 2023 07:00  --------------------------------------------------------  IN:    Fat Emulsion (Fish Oil &amp; Plant Based) 20% Infusion: 228.8 mL    TPN (Total Parenteral Nutrition): 1260 mL  Total IN: 1488.8 mL    OUT:    Nasogastric/Oral tube (mL): 2500 mL    Voided (mL): 1550 mL  Total OUT: 4050 mL    Total NET: -2561.2 mL      07 Dec 2023 07:01  -  07 Dec 2023 11:46  --------------------------------------------------------  IN:    TPN (Total Parenteral Nutrition): 315 mL  Total IN: 315 mL    OUT:    Nasogastric/Oral tube (mL): 200 mL    Voided (mL): 200 mL  Total OUT: 400 mL    Total NET: -85 mL    Daily Weight in k.6 (03 Dec 2023 00:00)    Drug Dosing Weight  Height (cm): 152.4 (2023 06:23)  Weight (kg): 75.659 (2023 06:23)  BMI (kg/m2): 32.6 (2023 06:23)  BSA (m2): 1.73 (2023 06:23)    PHYSICAL EXAM:  Constitutional: A&Ox3, NAD, resting comfortably in bed  Respiratory: nonlabored respirations   Abdomen: Soft, nondistended, mildly tender to palpation in lower quadrants, NGT in place with bilious output    Extremities: WWP, SEAMAN spontaneously  PICC Site: double lumen PICC placed on 23 in LUE, site clean and dry     Diet: NPO and TPN/lipids (started on 23)    LABORATORY                                 11.4   14.53 )-----------( 611      ( 07 Dec 2023 02:05 )             36.2       137  |  98  |  18  ----------------------------<  237<H>  4.6   |  26  |  0.51    Ca    9.5      07 Dec 2023 02:05  Phos  3.6     12-  Mg     2.60      Chol 65 LDL -- HDL 29<L> Trig 81    CT Abdomen and Pelvis 23: IMPRESSION: Dilated loops of small bowel the level of the terminal ileum, likely ileus in the postoperative setting. Postoperative pneumoperitoneum and diffuse subcutaneous emphysema.    CT Abdomen and Pelvis on 23: IMPRESSION: Interval increase in size of fluid anterior to the ileocolic anastomosis with new peripheral enhancement, probably early abscess formation. nterval decrease in small bowel dilatation, probably ileus.    ASSESSMENT/PLAN:  72 y/o female with PMH asthma, T2DM on insulin pump, GERD, HTN, HLD, OA, obesity is s/p robotic partial colectomy for moderately differentiated adenocarcinoma of cecal mass on 23. CT abd/pelvis on 23 showed findings consistent with post op ileus. Pt remains NPO with NGT in place. Nutrition consult called for initiation of parenteral nutrition in view of severe protein calorie malnutrition and prolonged NPO status. PICC placed and TPN was started on 23. CT on  showed: Interval increase in size of fluid anterior to the ileocolic anastomosis, with new peripheral enhancement, probably early abscess formation and decrease in small bowel dilatation, probably ileus. Pt is s/p anterior abdominal drainage catheter placement on 23.    continue TPN with infusion volume of 1.5L, TPN will provide 1172 kcal/day - dextrose calories decreased in TPN bag     labs reviewed - electrolytes adjusted in TPN bag    monitor fingersticks, obtain daily weights - continue with 44 units of insulin in tonight's TPN bag, will continue to trend FS and make insulin adjustments as needed, will follow up with Endocrine service tomorrow for continued management    continue parenteral nutrition at this time, will follow up with primary team on plan     1.  Severe protein calorie malnutrition being optimized with TPN: CHO [80] gm.  AA [100] gm. SMOF Lipids [50] gm.  2.  Hyperglycemia managed with: [44] units of regular insulin    3.  Check fluid balance daily.  Strict I/O  [ ] [ ]   4.  Daily BMP, Ionized Calcium, Magnesium and Phosphorous   5.  Triglycerides at initiation of TPN and monthly - Chol 65 LDL -- HDL 29<L> Trig 81    Nutrition Support [id number]  NUTRITION NOTE  [id number] THOMASTUCKER  ===============================    Interval events - Patient was seen and examined at bedside, no acute events overnight. Patient denies chest pain, shortness of breath, nausea or vomiting at this time. PICC placed and TPN was started on 23 for nutritional support. Pt remains NPO with NGT in place.    ROS: Except as noted above, all other systems reviewed and are negative     Allergies  Darvocet A500 (Other; Urticaria)  Percocet 10/325 (Other; Urticaria)  codeine (Other)  Lantus (Swelling)  Purell    PAST MEDICAL & SURGICAL HISTORY:  History of hypertension  Diabetes wears insulin pump  GERD (gastroesophageal reflux disease)  Uterine leiomyoma  Hyperlipidemia  Asthma  Obesity  Lymphadenopathy left lower extremity ;  - current  H/O insertion of insulin pump  OA (osteoarthritis)  Malignant neoplasm of cecum  H/O bilateral breast reduction surgery  History of appendectomy  History of cholecystectomy  H/O: hysterectomy  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 - [facility]  H/O total knee replacement, left  S/P bunionectomy    FAMILY HISTORY:  Diabetes mellitus (Father)  FH: breast cancer (Aunt)    Vital Signs Last 24 Hrs  T(C): 36.7 (07 Dec 2023 08:48), Max: 37.2 (07 Dec 2023 04:42)  T(F): 98 (07 Dec 2023 08:48), Max: 98.9 (07 Dec 2023 04:42)  HR: 111 (07 Dec 2023 08:48) (91 - 118)  BP: 111/63 (07 Dec 2023 08:48) (107/67 - 123/75)  RR: 16 (07 Dec 2023 08:48) (16 - 18)  SpO2: 100% (07 Dec 2023 08:48) (98% - 100%)    MEDICATIONS  (STANDING):  acetaminophen   IVPB .. 1000 milliGRAM(s) IV Intermittent every 6 hours  albuterol    90 MICROgram(s) HFA Inhaler 2 Puff(s) Inhalation two times a day  budesonide 160 MICROgram(s)/formoterol 4.5 MICROgram(s) Inhaler 2 Puff(s) Inhalation two times a day  chlorhexidine 2% Cloths 1 Application(s) Topical daily  erythromycin    ethylsuccinate Suspension 40 mG/mL 400 milliGRAM(s) Oral every 8 hours  influenza  Vaccine (HIGH DOSE) 0.7 milliLiter(s) IntraMuscular once  insulin lispro (ADMELOG) corrective regimen sliding scale   SubCutaneous every 6 hours  lipid, fat emulsion (Fish Oil and Plant Based) 20% Infusion 20.8 mL/Hr (20.8 mL/Hr) IV Continuous <Continuous>  metoclopramide Injectable 10 milliGRAM(s) IV Push every 8 hours  pantoprazole  Injectable 40 milliGRAM(s) IV Push daily  Parenteral Nutrition - Adult 1 Each (63 mL/Hr) TPN Continuous <Continuous>  Parenteral Nutrition - Adult 1 Each (63 mL/Hr) TPN Continuous <Continuous>  piperacillin/tazobactam IVPB.. 3.375 Gram(s) IV Intermittent every 8 hours  sodium chloride 0.9% lock flush 3 milliLiter(s) IV Push every 8 hours    I&O's Detail    06 Dec 2023 07:01  -  07 Dec 2023 07:00  --------------------------------------------------------  IN:    Fat Emulsion (Fish Oil &amp; Plant Based) 20% Infusion: 228.8 mL    TPN (Total Parenteral Nutrition): 1260 mL  Total IN: 1488.8 mL    OUT:    Nasogastric/Oral tube (mL): 2500 mL    Voided (mL): 1550 mL  Total OUT: 4050 mL    Total NET: -2561.2 mL      07 Dec 2023 07:01  -  07 Dec 2023 11:46  --------------------------------------------------------  IN:    TPN (Total Parenteral Nutrition): 315 mL  Total IN: 315 mL    OUT:    Nasogastric/Oral tube (mL): 200 mL    Voided (mL): 200 mL  Total OUT: 400 mL    Total NET: -85 mL    Daily Weight in k.6 (03 Dec 2023 00:00)    Drug Dosing Weight  Height (cm): 152.4 (2023 06:23)  Weight (kg): 75.659 (2023 06:23)  BMI (kg/m2): 32.6 (2023 06:23)  BSA (m2): 1.73 (2023 06:23)    PHYSICAL EXAM:  Constitutional: A&Ox3, NAD, resting comfortably in bed  Respiratory: nonlabored respirations   Abdomen: Soft, nondistended, mildly tender to palpation in lower quadrants, NGT in place with bilious output    Extremities: WWP, SEAMAN spontaneously  PICC Site: double lumen PICC placed on 23 in LUE, site clean and dry     Diet: NPO and TPN/lipids (started on 23)    LABORATORY                                 11.4   14.53 )-----------( 611      ( 07 Dec 2023 02:05 )             36.2       137  |  98  |  18  ----------------------------<  237<H>  4.6   |  26  |  0.51    Ca    9.5      07 Dec 2023 02:05  Phos  3.6     12-  Mg     2.60      Chol 65 LDL -- HDL 29<L> Trig 81    CT Abdomen and Pelvis 23: IMPRESSION: Dilated loops of small bowel the level of the terminal ileum, likely ileus in the postoperative setting. Postoperative pneumoperitoneum and diffuse subcutaneous emphysema.    CT Abdomen and Pelvis on 23: IMPRESSION: Interval increase in size of fluid anterior to the ileocolic anastomosis with new peripheral enhancement, probably early abscess formation. nterval decrease in small bowel dilatation, probably ileus.    ASSESSMENT/PLAN:  72 y/o female with PMH asthma, T2DM on insulin pump, GERD, HTN, HLD, OA, obesity is s/p robotic partial colectomy for moderately differentiated adenocarcinoma of cecal mass on 23. CT abd/pelvis on 23 showed findings consistent with post op ileus. Pt remains NPO with NGT in place. Nutrition consult called for initiation of parenteral nutrition in view of severe protein calorie malnutrition and prolonged NPO status. PICC placed and TPN was started on 23. CT on  showed: Interval increase in size of fluid anterior to the ileocolic anastomosis, with new peripheral enhancement, probably early abscess formation and decrease in small bowel dilatation, probably ileus. Pt is s/p anterior abdominal drainage catheter placement on 23.    continue TPN with infusion volume of 1.5L, TPN will provide 1172 kcal/day - dextrose calories decreased in TPN bag     labs reviewed - electrolytes adjusted in TPN bag    monitor fingersticks, obtain daily weights - continue with 44 units of insulin in tonight's TPN bag, will continue to trend FS and make insulin adjustments as needed, will follow up with Endocrine service tomorrow for continued management    continue parenteral nutrition at this time, will follow up with primary team on plan     1.  Severe protein calorie malnutrition being optimized with TPN: CHO [80] gm.  AA [100] gm. SMOF Lipids [50] gm.  2.  Hyperglycemia managed with: [44] units of regular insulin    3.  Check fluid balance daily.  Strict I/O  [ ] [ ]   4.  Daily BMP, Ionized Calcium, Magnesium and Phosphorous   5.  Triglycerides at initiation of TPN and monthly - Chol 65 LDL -- HDL 29<L> Trig 81    Nutrition Support [id number]

## 2023-12-07 NOTE — PROGRESS NOTE ADULT - SUBJECTIVE AND OBJECTIVE BOX
DATE OF SERVICE: 12-07-23    Patient denies chest pain or shortness of breath.   Review of symptoms otherwise negative.     Review of Systems:   Constitutional: [ ] fevers, [ ] chills.   Skin: [ ] dry skin. [ ] rashes.  Psychiatric: [ ] depression, [ ] anxiety.   Gastrointestinal: [ ] BRBPR, [ ] melena.   Neurological: [ ] confusion. [ ] seizures. [ ] shuffling gait.   Ears,Nose,Mouth and Throat: [ ] ear pain [ ] sore throat.   Eyes: [ ] diplopia.   Respiratory: [ ] hemoptysis. [ ] shortness of breath  Cardiovascular: See HPI above  Hematologic/Lymphatic: [ ] anemia. [ ] painful nodes. [ ] prolonged bleeding.   Genitourinary: [ ] hematuria. [ ] flank pain.   Endocrine: [ ] significant change in weight. [ ] intolerance to heat and cold.     Review of systems [ x] otherwise negative, [ ] otherwise unable to obtain    FH: no family history of sudden cardiac death in first degree relatives    SH: [ ] tobacco, [ ] alcohol, [ ] drugs    acetaminophen   IVPB .. 1000 milliGRAM(s) IV Intermittent every 6 hours  albuterol    90 MICROgram(s) HFA Inhaler 2 Puff(s) Inhalation two times a day  budesonide 160 MICROgram(s)/formoterol 4.5 MICROgram(s) Inhaler 2 Puff(s) Inhalation two times a day  chlorhexidine 2% Cloths 1 Application(s) Topical daily  erythromycin    ethylsuccinate Suspension 40 mG/mL 400 milliGRAM(s) Oral every 8 hours  influenza  Vaccine (HIGH DOSE) 0.7 milliLiter(s) IntraMuscular once  insulin lispro (ADMELOG) corrective regimen sliding scale   SubCutaneous every 6 hours  lipid, fat emulsion (Fish Oil and Plant Based) 20% Infusion 20.8 mL/Hr IV Continuous <Continuous>  metoclopramide Injectable 10 milliGRAM(s) IV Push every 8 hours  pantoprazole  Injectable 40 milliGRAM(s) IV Push daily  Parenteral Nutrition - Adult 1 Each TPN Continuous <Continuous>  Parenteral Nutrition - Adult 1 Each TPN Continuous <Continuous>  piperacillin/tazobactam IVPB.. 3.375 Gram(s) IV Intermittent every 8 hours  sodium chloride 0.9% lock flush 3 milliLiter(s) IV Push every 8 hours                            11.4   14.53 )-----------( 611      ( 07 Dec 2023 02:05 )             36.2       12-07    137  |  98  |  18  ----------------------------<  237<H>  4.6   |  26  |  0.51    Ca    9.5      07 Dec 2023 02:05  Phos  3.6     12-07  Mg     2.60     12-07        T(C): 36.4 (12-07-23 @ 12:53), Max: 37.2 (12-07-23 @ 04:42)  HR: 110 (12-07-23 @ 12:53) (102 - 118)  BP: 125/67 (12-07-23 @ 12:53) (111/63 - 125/67)  RR: 16 (12-07-23 @ 12:53) (16 - 18)  SpO2: 95% (12-07-23 @ 12:53) (95% - 100%)  Wt(kg): --    I&O's Summary    06 Dec 2023 07:01  -  07 Dec 2023 07:00  --------------------------------------------------------  IN: 1488.8 mL / OUT: 4050 mL / NET: -2561.2 mL    07 Dec 2023 07:01  -  07 Dec 2023 14:25  --------------------------------------------------------  IN: 315 mL / OUT: 400 mL / NET: -85 mL    General:  Alert and Oriented * 3.   Head: Normocephalic and atraumatic.   Neck: No JVD. No bruits. Supple. Does not appear to be enlarged.   Cardiovascular: + S1,S2 ; RRR Soft systolic murmur at the left lower sternal border. No rubs noted.    Lungs: CTA b/l. No rhonchi, rales or wheezes.   Abdomen: distended NT  Extremities: No clubbing/cyanosis/edema.   Neurologic: Moves all four extremities. Full range of motion.   Skin: Warm and moist. The patient's skin has normal elasticity and good skin turgor.   Psychiatric: Appropriate mood and affect.  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      -- tolerated procedure well from CV perspective  -- pain management, PT  -- supplement lytes per sx   -- s/p NGT, CT noted with ileus, repeat CT noted, on TPN  -- eventually resume home meds: Asa 81mg, Pravastatin 40mg when ok from a surgical perspective  -- BP meds Hctz 12.5mg, Losartan 25mg on hold, BP controlled  --s/p placement of IR drain          DATE OF SERVICE: 12-07-23    Patient denies chest pain or shortness of breath.   Review of symptoms otherwise negative.     Review of Systems:   Constitutional: [ ] fevers, [ ] chills.   Skin: [ ] dry skin. [ ] rashes.  Psychiatric: [ ] depression, [ ] anxiety.   Gastrointestinal: [ ] BRBPR, [ ] melena.   Neurological: [ ] confusion. [ ] seizures. [ ] shuffling gait.   Ears,Nose,Mouth and Throat: [ ] ear pain [ ] sore throat.   Eyes: [ ] diplopia.   Respiratory: [ ] hemoptysis. [ ] shortness of breath  Cardiovascular: See HPI above  Hematologic/Lymphatic: [ ] anemia. [ ] painful nodes. [ ] prolonged bleeding.   Genitourinary: [ ] hematuria. [ ] flank pain.   Endocrine: [ ] significant change in weight. [ ] intolerance to heat and cold.     Review of systems [ x] otherwise negative, [ ] otherwise unable to obtain    FH: no family history of sudden cardiac death in first degree relatives    SH: [ ] tobacco, [ ] alcohol, [ ] drugs    acetaminophen   IVPB .. 1000 milliGRAM(s) IV Intermittent every 6 hours  albuterol    90 MICROgram(s) HFA Inhaler 2 Puff(s) Inhalation two times a day  budesonide 160 MICROgram(s)/formoterol 4.5 MICROgram(s) Inhaler 2 Puff(s) Inhalation two times a day  chlorhexidine 2% Cloths 1 Application(s) Topical daily  erythromycin    ethylsuccinate Suspension 40 mG/mL 400 milliGRAM(s) Oral every 8 hours  influenza  Vaccine (HIGH DOSE) 0.7 milliLiter(s) IntraMuscular once  insulin lispro (ADMELOG) corrective regimen sliding scale   SubCutaneous every 6 hours  lipid, fat emulsion (Fish Oil and Plant Based) 20% Infusion 20.8 mL/Hr IV Continuous <Continuous>  metoclopramide Injectable 10 milliGRAM(s) IV Push every 8 hours  pantoprazole  Injectable 40 milliGRAM(s) IV Push daily  Parenteral Nutrition - Adult 1 Each TPN Continuous <Continuous>  Parenteral Nutrition - Adult 1 Each TPN Continuous <Continuous>  piperacillin/tazobactam IVPB.. 3.375 Gram(s) IV Intermittent every 8 hours  sodium chloride 0.9% lock flush 3 milliLiter(s) IV Push every 8 hours                            11.4   14.53 )-----------( 611      ( 07 Dec 2023 02:05 )             36.2       12-07    137  |  98  |  18  ----------------------------<  237<H>  4.6   |  26  |  0.51    Ca    9.5      07 Dec 2023 02:05  Phos  3.6     12-07  Mg     2.60     12-07        T(C): 36.4 (12-07-23 @ 12:53), Max: 37.2 (12-07-23 @ 04:42)  HR: 110 (12-07-23 @ 12:53) (102 - 118)  BP: 125/67 (12-07-23 @ 12:53) (111/63 - 125/67)  RR: 16 (12-07-23 @ 12:53) (16 - 18)  SpO2: 95% (12-07-23 @ 12:53) (95% - 100%)  Wt(kg): --    I&O's Summary    06 Dec 2023 07:01  -  07 Dec 2023 07:00  --------------------------------------------------------  IN: 1488.8 mL / OUT: 4050 mL / NET: -2561.2 mL    07 Dec 2023 07:01  -  07 Dec 2023 14:25  --------------------------------------------------------  IN: 315 mL / OUT: 400 mL / NET: -85 mL    General:  Alert and Oriented * 3.   Head: Normocephalic and atraumatic.   Neck: No JVD. No bruits. Supple. Does not appear to be enlarged.   Cardiovascular: + S1,S2 ; RRR Soft systolic murmur at the left lower sternal border. No rubs noted.    Lungs: CTA b/l. No rhonchi, rales or wheezes.   Abdomen: distended NT  Extremities: No clubbing/cyanosis/edema.   Neurologic: Moves all four extremities. Full range of motion.   Skin: Warm and moist. The patient's skin has normal elasticity and good skin turgor.   Psychiatric: Appropriate mood and affect.  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      -- tolerated procedure well from CV perspective  -- pain management, PT  -- supplement lytes per sx   -- s/p NGT, CT noted with ileus, repeat CT noted, on TPN  -- eventually resume home meds: Asa 81mg, Pravastatin 40mg when ok from a surgical perspective  -- BP meds Hctz 12.5mg, Losartan 25mg on hold, BP controlled  -- s/p placement of IR drain

## 2023-12-07 NOTE — PRE PROCEDURE NOTE - PRE PROCEDURE EVALUATION
Interventional Radiology    HPI: 73F PMH HTN, HLD, asthma, cecal adenoca s/p r hemicolectomy c/b ileus and a increasing fluid collection adjacent to the ileocolic anastomosis IR consulted for drainage.     Allergies: Darvocet A500 (Other; Urticaria)  Percocet 10/325 (Other; Urticaria)  codeine (Other)  Lantus (Swelling)  PURELL.  pt said, &quot;I felt my airway closing&quot; after she smelled the Purell. The incident occured at the pulmonologist office. (Other; Short breath)    Medications (Abx/Cardiac/Anticoagulation/Blood Products)    enoxaparin Injectable: 40 milliGRAM(s) SubCutaneous (12-05 @ 18:25)  piperacillin/tazobactam IVPB.-: 25 mL/Hr IV Intermittent (12-05 @ 13:31)  piperacillin/tazobactam IVPB..: 25 mL/Hr IV Intermittent (12-07 @ 05:26)  piperacillin/tazobactam IVPB..: 25 mL/Hr IV Intermittent (12-06 @ 06:37)    Data:    T(C): 36.7  HR: 111  BP: 111/63  RR: 16  SpO2: 100%    Exam  General: No acute distress  Chest: Non labored breathing  Abdomen: Non-distended  Extremities: No swelling, warm    -WBC 14.53 / HgB 11.4 / Hct 36.2 / Plt 611  -Na 137 / Cl 98 / BUN 18 / Glucose 237  -K 4.6 / CO2 26 / Cr 0.51  -ALT -- / Alk Phos -- / T.Bili --  -INR1.05    Imaging:   - relevant imaging reviewed     Plan:     -- Plan for image guided fluid collection drainage   -- Relevant imaging and labs were reviewed.   -- Risks, benefits, and alternatives were explained to the patient and informed consent was obtained.   Interventional Radiology    HPI: 73F PMH HTN, HLD, asthma, cecal adenoca s/p r hemicolectomy c/b ileus and a increasing fluid collection adjacent to the ileocolic anastomosis IR consulted for drainage.     Allergies: Darvocet A500 (Other; Urticaria)  Percocet 10/325 (Other; Urticaria)  codeine (Other)  Lantus (Swelling)  PURELL.  pt said, &quot;I felt my airway closing&quot; after she smelled the Purell. The incident occured at the pulmonologist office. (Other; Short breath)    Medications (Abx/Cardiac/Anticoagulation/Blood Products)    enoxaparin Injectable: 40 milliGRAM(s) SubCutaneous (12-05 @ 18:25)  piperacillin/tazobactam IVPB.-: 25 mL/Hr IV Intermittent (12-05 @ 13:31)  piperacillin/tazobactam IVPB..: 25 mL/Hr IV Intermittent (12-07 @ 05:26)  piperacillin/tazobactam IVPB..: 25 mL/Hr IV Intermittent (12-06 @ 06:37)    Data:    T(C): 36.7  HR: 111  BP: 111/63  RR: 16  SpO2: 100%    Exam  General: No acute distress  Chest: Non labored breathing  Abdomen: Non-distended  Extremities: No swelling, warm    -WBC 14.53 / HgB 11.4 / Hct 36.2 / Plt 611  -Na 137 / Cl 98 / BUN 18 / Glucose 237  -K 4.6 / CO2 26 / Cr 0.51  -ALT -- / Alk Phos -- / T.Bili --  -INR1.05    Imaging:   - relevant imaging reviewed     Plan:     -- Plan for image guided abdominal fluid collection drainage   -- Relevant imaging and labs were reviewed.   -- Risks, benefits, and alternatives were explained to the patient and informed consent was obtained.

## 2023-12-08 LAB
ANION GAP SERPL CALC-SCNC: 10 MMOL/L — SIGNIFICANT CHANGE UP (ref 7–14)
ANION GAP SERPL CALC-SCNC: 10 MMOL/L — SIGNIFICANT CHANGE UP (ref 7–14)
BASOPHILS # BLD AUTO: 0.05 K/UL — SIGNIFICANT CHANGE UP (ref 0–0.2)
BASOPHILS # BLD AUTO: 0.05 K/UL — SIGNIFICANT CHANGE UP (ref 0–0.2)
BASOPHILS NFR BLD AUTO: 0.4 % — SIGNIFICANT CHANGE UP (ref 0–2)
BASOPHILS NFR BLD AUTO: 0.4 % — SIGNIFICANT CHANGE UP (ref 0–2)
BUN SERPL-MCNC: 20 MG/DL — SIGNIFICANT CHANGE UP (ref 7–23)
BUN SERPL-MCNC: 20 MG/DL — SIGNIFICANT CHANGE UP (ref 7–23)
CA-I BLD-SCNC: 1.15 MMOL/L — SIGNIFICANT CHANGE UP (ref 1.15–1.29)
CA-I BLD-SCNC: 1.15 MMOL/L — SIGNIFICANT CHANGE UP (ref 1.15–1.29)
CALCIUM SERPL-MCNC: 8.9 MG/DL — SIGNIFICANT CHANGE UP (ref 8.4–10.5)
CALCIUM SERPL-MCNC: 8.9 MG/DL — SIGNIFICANT CHANGE UP (ref 8.4–10.5)
CHLORIDE SERPL-SCNC: 101 MMOL/L — SIGNIFICANT CHANGE UP (ref 98–107)
CHLORIDE SERPL-SCNC: 101 MMOL/L — SIGNIFICANT CHANGE UP (ref 98–107)
CO2 SERPL-SCNC: 26 MMOL/L — SIGNIFICANT CHANGE UP (ref 22–31)
CO2 SERPL-SCNC: 26 MMOL/L — SIGNIFICANT CHANGE UP (ref 22–31)
CREAT SERPL-MCNC: 0.47 MG/DL — LOW (ref 0.5–1.3)
CREAT SERPL-MCNC: 0.47 MG/DL — LOW (ref 0.5–1.3)
EGFR: 101 ML/MIN/1.73M2 — SIGNIFICANT CHANGE UP
EGFR: 101 ML/MIN/1.73M2 — SIGNIFICANT CHANGE UP
EOSINOPHIL # BLD AUTO: 0.39 K/UL — SIGNIFICANT CHANGE UP (ref 0–0.5)
EOSINOPHIL # BLD AUTO: 0.39 K/UL — SIGNIFICANT CHANGE UP (ref 0–0.5)
EOSINOPHIL NFR BLD AUTO: 2.9 % — SIGNIFICANT CHANGE UP (ref 0–6)
EOSINOPHIL NFR BLD AUTO: 2.9 % — SIGNIFICANT CHANGE UP (ref 0–6)
GLUCOSE BLDC GLUCOMTR-MCNC: 165 MG/DL — HIGH (ref 70–99)
GLUCOSE BLDC GLUCOMTR-MCNC: 165 MG/DL — HIGH (ref 70–99)
GLUCOSE BLDC GLUCOMTR-MCNC: 170 MG/DL — HIGH (ref 70–99)
GLUCOSE BLDC GLUCOMTR-MCNC: 170 MG/DL — HIGH (ref 70–99)
GLUCOSE BLDC GLUCOMTR-MCNC: 173 MG/DL — HIGH (ref 70–99)
GLUCOSE BLDC GLUCOMTR-MCNC: 173 MG/DL — HIGH (ref 70–99)
GLUCOSE BLDC GLUCOMTR-MCNC: 174 MG/DL — HIGH (ref 70–99)
GLUCOSE BLDC GLUCOMTR-MCNC: 174 MG/DL — HIGH (ref 70–99)
GLUCOSE BLDC GLUCOMTR-MCNC: 175 MG/DL — HIGH (ref 70–99)
GLUCOSE BLDC GLUCOMTR-MCNC: 175 MG/DL — HIGH (ref 70–99)
GLUCOSE SERPL-MCNC: 185 MG/DL — HIGH (ref 70–99)
GLUCOSE SERPL-MCNC: 185 MG/DL — HIGH (ref 70–99)
HCT VFR BLD CALC: 32.8 % — LOW (ref 34.5–45)
HCT VFR BLD CALC: 32.8 % — LOW (ref 34.5–45)
HGB BLD-MCNC: 10.4 G/DL — LOW (ref 11.5–15.5)
HGB BLD-MCNC: 10.4 G/DL — LOW (ref 11.5–15.5)
IANC: 9.9 K/UL — HIGH (ref 1.8–7.4)
IANC: 9.9 K/UL — HIGH (ref 1.8–7.4)
IMM GRANULOCYTES NFR BLD AUTO: 0.6 % — SIGNIFICANT CHANGE UP (ref 0–0.9)
IMM GRANULOCYTES NFR BLD AUTO: 0.6 % — SIGNIFICANT CHANGE UP (ref 0–0.9)
LYMPHOCYTES # BLD AUTO: 1.63 K/UL — SIGNIFICANT CHANGE UP (ref 1–3.3)
LYMPHOCYTES # BLD AUTO: 1.63 K/UL — SIGNIFICANT CHANGE UP (ref 1–3.3)
LYMPHOCYTES # BLD AUTO: 12.2 % — LOW (ref 13–44)
LYMPHOCYTES # BLD AUTO: 12.2 % — LOW (ref 13–44)
MAGNESIUM SERPL-MCNC: 2.4 MG/DL — SIGNIFICANT CHANGE UP (ref 1.6–2.6)
MAGNESIUM SERPL-MCNC: 2.4 MG/DL — SIGNIFICANT CHANGE UP (ref 1.6–2.6)
MCHC RBC-ENTMCNC: 25.5 PG — LOW (ref 27–34)
MCHC RBC-ENTMCNC: 25.5 PG — LOW (ref 27–34)
MCHC RBC-ENTMCNC: 31.7 GM/DL — LOW (ref 32–36)
MCHC RBC-ENTMCNC: 31.7 GM/DL — LOW (ref 32–36)
MCV RBC AUTO: 80.4 FL — SIGNIFICANT CHANGE UP (ref 80–100)
MCV RBC AUTO: 80.4 FL — SIGNIFICANT CHANGE UP (ref 80–100)
MONOCYTES # BLD AUTO: 1.3 K/UL — HIGH (ref 0–0.9)
MONOCYTES # BLD AUTO: 1.3 K/UL — HIGH (ref 0–0.9)
MONOCYTES NFR BLD AUTO: 9.7 % — SIGNIFICANT CHANGE UP (ref 2–14)
MONOCYTES NFR BLD AUTO: 9.7 % — SIGNIFICANT CHANGE UP (ref 2–14)
NEUTROPHILS # BLD AUTO: 9.9 K/UL — HIGH (ref 1.8–7.4)
NEUTROPHILS # BLD AUTO: 9.9 K/UL — HIGH (ref 1.8–7.4)
NEUTROPHILS NFR BLD AUTO: 74.2 % — SIGNIFICANT CHANGE UP (ref 43–77)
NEUTROPHILS NFR BLD AUTO: 74.2 % — SIGNIFICANT CHANGE UP (ref 43–77)
NRBC # BLD: 0 /100 WBCS — SIGNIFICANT CHANGE UP (ref 0–0)
NRBC # BLD: 0 /100 WBCS — SIGNIFICANT CHANGE UP (ref 0–0)
NRBC # FLD: 0 K/UL — SIGNIFICANT CHANGE UP (ref 0–0)
NRBC # FLD: 0 K/UL — SIGNIFICANT CHANGE UP (ref 0–0)
PHOSPHATE SERPL-MCNC: 3.3 MG/DL — SIGNIFICANT CHANGE UP (ref 2.5–4.5)
PHOSPHATE SERPL-MCNC: 3.3 MG/DL — SIGNIFICANT CHANGE UP (ref 2.5–4.5)
PLATELET # BLD AUTO: 555 K/UL — HIGH (ref 150–400)
PLATELET # BLD AUTO: 555 K/UL — HIGH (ref 150–400)
POTASSIUM SERPL-MCNC: 4.4 MMOL/L — SIGNIFICANT CHANGE UP (ref 3.5–5.3)
POTASSIUM SERPL-MCNC: 4.4 MMOL/L — SIGNIFICANT CHANGE UP (ref 3.5–5.3)
POTASSIUM SERPL-SCNC: 4.4 MMOL/L — SIGNIFICANT CHANGE UP (ref 3.5–5.3)
POTASSIUM SERPL-SCNC: 4.4 MMOL/L — SIGNIFICANT CHANGE UP (ref 3.5–5.3)
RBC # BLD: 4.08 M/UL — SIGNIFICANT CHANGE UP (ref 3.8–5.2)
RBC # BLD: 4.08 M/UL — SIGNIFICANT CHANGE UP (ref 3.8–5.2)
RBC # FLD: 19.2 % — HIGH (ref 10.3–14.5)
RBC # FLD: 19.2 % — HIGH (ref 10.3–14.5)
SODIUM SERPL-SCNC: 137 MMOL/L — SIGNIFICANT CHANGE UP (ref 135–145)
SODIUM SERPL-SCNC: 137 MMOL/L — SIGNIFICANT CHANGE UP (ref 135–145)
WBC # BLD: 13.35 K/UL — HIGH (ref 3.8–10.5)
WBC # BLD: 13.35 K/UL — HIGH (ref 3.8–10.5)
WBC # FLD AUTO: 13.35 K/UL — HIGH (ref 3.8–10.5)
WBC # FLD AUTO: 13.35 K/UL — HIGH (ref 3.8–10.5)

## 2023-12-08 PROCEDURE — 99232 SBSQ HOSP IP/OBS MODERATE 35: CPT

## 2023-12-08 RX ORDER — ELECTROLYTE SOLUTION,INJ
1 VIAL (ML) INTRAVENOUS
Refills: 0 | Status: DISCONTINUED | OUTPATIENT
Start: 2023-12-08 | End: 2023-12-08

## 2023-12-08 RX ORDER — INSULIN DETEMIR 100/ML (3)
5 INSULIN PEN (ML) SUBCUTANEOUS AT BEDTIME
Refills: 0 | Status: DISCONTINUED | OUTPATIENT
Start: 2023-12-08 | End: 2023-12-14

## 2023-12-08 RX ORDER — I.V. FAT EMULSION 20 G/100ML
20.8 EMULSION INTRAVENOUS
Qty: 50 | Refills: 0 | Status: DISCONTINUED | OUTPATIENT
Start: 2023-12-08 | End: 2023-12-09

## 2023-12-08 RX ADMIN — Medication 1000 MILLIGRAM(S): at 16:00

## 2023-12-08 RX ADMIN — Medication 400 MILLIGRAM(S): at 05:44

## 2023-12-08 RX ADMIN — ALBUTEROL 2 PUFF(S): 90 AEROSOL, METERED ORAL at 11:23

## 2023-12-08 RX ADMIN — BUDESONIDE AND FORMOTEROL FUMARATE DIHYDRATE 2 PUFF(S): 160; 4.5 AEROSOL RESPIRATORY (INHALATION) at 11:22

## 2023-12-08 RX ADMIN — SODIUM CHLORIDE 3 MILLILITER(S): 9 INJECTION INTRAMUSCULAR; INTRAVENOUS; SUBCUTANEOUS at 22:55

## 2023-12-08 RX ADMIN — Medication 400 MILLIGRAM(S): at 11:21

## 2023-12-08 RX ADMIN — Medication 3: at 06:33

## 2023-12-08 RX ADMIN — Medication 400 MILLIGRAM(S): at 05:45

## 2023-12-08 RX ADMIN — Medication 10 MILLIGRAM(S): at 13:09

## 2023-12-08 RX ADMIN — Medication 3: at 00:23

## 2023-12-08 RX ADMIN — ENOXAPARIN SODIUM 40 MILLIGRAM(S): 100 INJECTION SUBCUTANEOUS at 05:44

## 2023-12-08 RX ADMIN — Medication 3: at 18:34

## 2023-12-08 RX ADMIN — CHLORHEXIDINE GLUCONATE 1 APPLICATION(S): 213 SOLUTION TOPICAL at 13:20

## 2023-12-08 RX ADMIN — Medication 10 MILLIGRAM(S): at 22:56

## 2023-12-08 RX ADMIN — Medication 10 MILLIGRAM(S): at 05:46

## 2023-12-08 RX ADMIN — Medication 400 MILLIGRAM(S): at 13:10

## 2023-12-08 RX ADMIN — Medication 3: at 23:01

## 2023-12-08 RX ADMIN — Medication 400 MILLIGRAM(S): at 23:01

## 2023-12-08 RX ADMIN — Medication 1 EACH: at 18:08

## 2023-12-08 RX ADMIN — Medication 5 UNIT(S): at 22:56

## 2023-12-08 RX ADMIN — I.V. FAT EMULSION 20.8 ML/HR: 20 EMULSION INTRAVENOUS at 18:08

## 2023-12-08 RX ADMIN — SODIUM CHLORIDE 3 MILLILITER(S): 9 INJECTION INTRAMUSCULAR; INTRAVENOUS; SUBCUTANEOUS at 07:19

## 2023-12-08 RX ADMIN — Medication 400 MILLIGRAM(S): at 18:11

## 2023-12-08 RX ADMIN — PANTOPRAZOLE SODIUM 40 MILLIGRAM(S): 20 TABLET, DELAYED RELEASE ORAL at 13:09

## 2023-12-08 RX ADMIN — Medication 3: at 13:13

## 2023-12-08 NOTE — PROGRESS NOTE ADULT - SUBJECTIVE AND OBJECTIVE BOX
73y Female s/p anterior abdominal drainage catheter placement on 12/7 in Interventional Radiology.     Patient seen and examined at bedside, resting comfortably. No complaints offered.    T(F): 98.3 (12-08-23 @ 08:30), Max: 98.6 (12-07-23 @ 20:15)  HR: 101 (12-08-23 @ 08:30) (101 - 110)  BP: 120/67 (12-08-23 @ 08:30) (110/54 - 136/64)  RR: 19 (12-08-23 @ 08:30) (16 - 19)  SpO2: 100% (12-08-23 @ 08:30) (95% - 100%)  Wt(kg): --    LABS:                        10.4   13.35 )-----------( 555      ( 08 Dec 2023 06:23 )             32.8     12-08    137  |  101  |  20  ----------------------------<  185<H>  4.4   |  26  |  0.47<L>    Ca    8.9      08 Dec 2023 06:23  Phos  3.3     12-08  Mg     2.40     12-08      PT/INR - ( 07 Dec 2023 02:05 )   PT: 11.9 sec;   INR: 1.05 ratio         PTT - ( 07 Dec 2023 02:05 )  PTT:29.7 sec  I&O's Detail    07 Dec 2023 07:01  -  08 Dec 2023 07:00  --------------------------------------------------------  IN:    Fat Emulsion (Fish Oil &amp; Plant Based) 20% Infusion: 249.6 mL    IV PiggyBack: 100 mL    TPN (Total Parenteral Nutrition): 1512 mL  Total IN: 1861.6 mL    OUT:    Drain (mL): 20 mL    Nasogastric/Oral tube (mL): 850 mL    Oral Fluid: 0 mL    Voided (mL): 900 mL  Total OUT: 1770 mL    Total NET: 91.6 mL      08 Dec 2023 07:01  -  08 Dec 2023 10:18  --------------------------------------------------------  IN:  Total IN: 0 mL    OUT:    Drain (mL): 6 mL    Nasogastric/Oral tube (mL): 200 mL    Voided (mL): 450 mL  Total OUT: 656 mL    Total NET: -656 mL        PHYSICAL EXAM:  General: Nontoxic, resting comfortably in NAD  Abdominal Drain: dressing c/d/i, drainage bag with dark red fluid. Drain was flushed freely with 5cc NS

## 2023-12-08 NOTE — PROGRESS NOTE ADULT - SUBJECTIVE AND OBJECTIVE BOX
ANESTHESIA POSTOP CHECK    73y Female POSTOP DAY 1 S/P abdominal collection drainage    Vital Signs Last 24 Hrs  T(C): 37.1 (08 Dec 2023 11:17), Max: 37.1 (08 Dec 2023 11:17)  T(F): 98.7 (08 Dec 2023 11:17), Max: 98.7 (08 Dec 2023 11:17)  HR: 102 (08 Dec 2023 11:17) (101 - 108)  BP: 120/58 (08 Dec 2023 11:17) (110/54 - 136/64)  BP(mean): --  RR: 18 (08 Dec 2023 11:17) (16 - 19)  SpO2: 100% (08 Dec 2023 11:17) (97% - 100%)    Parameters below as of 08 Dec 2023 11:17  Patient On (Oxygen Delivery Method): room air      I&O's Summary    07 Dec 2023 07:01  -  08 Dec 2023 07:00  --------------------------------------------------------  IN: 1861.6 mL / OUT: 1770 mL / NET: 91.6 mL    08 Dec 2023 07:01  -  08 Dec 2023 13:42  --------------------------------------------------------  IN: 0 mL / OUT: 656 mL / NET: -656 mL        [X ] NO APPARENT ANESTHESIA COMPLICATIONS      Comments:

## 2023-12-08 NOTE — PROGRESS NOTE ADULT - ASSESSMENT
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 Course c/b ileus. CT on 12/4 showed: Interval increase in size of fluid anterior to the ileocolic anastomosis, with new peripheral enhancement, probably early abscess formation and decrease in small bowel dilatation, probably ileus. Patient pending IR drainage for abscess seen on CT.     Plan  - IR drainage of intraabdominal collection adjacent to anastomosis today  - Diet: NPO/NGT  - TPN via PICC line  - Pain control with Tylenol, Dilaudid PRN  - C/w zosyn  - on reglan for motility, start erythromycin today  - Protonix 40 mg daily  - Home meds; holding HTN medications  - Appreciate endocrinology recs; plan to restart insulin pump on discharge  - OOB/ambulate/ PT/ IS while in bed  - DVT prophylaxis: Lovenox   - Dispo: Pt recommends Home PT with walker    D Team Surgery  [id number]    73 year old female with PMH asthma, T2DM on insulin pump, GERD, HTN, HLD, OA, obesity presents s/p robotic partial colectomy for 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 Course c/b ileus. CT on 12/4 showed: Interval increase in size of fluid anterior to the ileocolic anastomosis, with new peripheral enhancement, probably early abscess formation and decrease in small bowel dilatation, probably ileus. Patient pending IR drainage for abscess seen on CT.     Plan  - IR drainage of intraabdominal collection adjacent to anastomosis today  - Diet: NPO/NGT  - TPN via PICC line  - Pain control with Tylenol, Dilaudid PRN  - C/w zosyn  - on reglan for motility, start erythromycin today  - Protonix 40 mg daily  - Home meds; holding HTN medications  - Appreciate endocrinology recs; plan to restart insulin pump on discharge  - OOB/ambulate/ PT/ IS while in bed  - DVT prophylaxis: Lovenox   - Dispo: Pt recommends Home PT with walker    D Team Surgery  [id number]    73 year old female with PMH asthma, T2DM on insulin pump, GERD, HTN, HLD, OA, obesity presents s/p robotic partial colectomy for 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 Course c/b ileus. CT on 12/4 showed: Interval increase in size of fluid anterior to the ileocolic anastomosis, with new peripheral enhancement, probably early abscess formation and decrease in small bowel dilatation, probably ileus. Patient pending IR drainage for abscess seen on CT.     Plan  - IR drainage of intraabdominal collection adjacent to anastomosis today  - Diet: NPO/NGT, possibly clamp NGT this weekend  - TPN via PICC line  - Pain control with Tylenol, Dilaudid PRN  - C/w zosyn  - on reglan for motility, start erythromycin today  - Protonix 40 mg daily  - Home meds; holding HTN medications  - Appreciate endocrinology recs; plan to restart insulin pump on discharge  - OOB/ambulate/ PT/ IS while in bed  - DVT prophylaxis: Lovenox   - Dispo: Pt recommends Home PT with walker    D Team Surgery  [id number]    73 year old female with PMH asthma, T2DM on insulin pump, GERD, HTN, HLD, OA, obesity presents s/p robotic partial colectomy for 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 Course c/b ileus. CT on 12/4 showed: Interval increase in size of fluid anterior to the ileocolic anastomosis, with new peripheral enhancement, probably early abscess formation and decrease in small bowel dilatation, probably ileus. Patient pending IR drainage for abscess seen on CT.     Plan  - IR drainage of intraabdominal collection adjacent to anastomosis today  - Diet: NPO/NGT, possibly clamp NGT this weekend  - TPN via PICC line  - Pain control with Tylenol, Dilaudid PRN  - C/w zosyn  - on reglan for motility, start erythromycin today  - Protonix 40 mg daily  - Home meds; holding HTN medications  - Appreciate endocrinology recs; plan to restart insulin pump on discharge  - OOB/ambulate/ PT/ IS while in bed  - DVT prophylaxis: Lovenox   - Dispo: Pt recommends Home PT with walker    D Team Surgery  [id number]

## 2023-12-08 NOTE — PROGRESS NOTE ADULT - SUBJECTIVE AND OBJECTIVE BOX
DATE OF SERVICE: 12-08-23    s/p drain yesterday, no overnight events    MEDICATIONS:  acetaminophen   IVPB .. 1000 milliGRAM(s) IV Intermittent every 6 hours  albuterol    90 MICROgram(s) HFA Inhaler 2 Puff(s) Inhalation two times a day  budesonide 160 MICROgram(s)/formoterol 4.5 MICROgram(s) Inhaler 2 Puff(s) Inhalation two times a day  chlorhexidine 2% Cloths 1 Application(s) Topical daily  dextrose 50% Injectable 12.5 Gram(s) IV Push once  dextrose 50% Injectable 25 Gram(s) IV Push once  dextrose 50% Injectable 25 Gram(s) IV Push once  dextrose Oral Gel 15 Gram(s) Oral once  enoxaparin Injectable 40 milliGRAM(s) SubCutaneous every 24 hours  erythromycin    ethylsuccinate Suspension 40 mG/mL 400 milliGRAM(s) Oral every 8 hours  influenza  Vaccine (HIGH DOSE) 0.7 milliLiter(s) IntraMuscular once  insulin detemir injectable (LEVEMIR) 5 Unit(s) SubCutaneous at bedtime  insulin lispro (ADMELOG) corrective regimen sliding scale   SubCutaneous every 6 hours  lipid, fat emulsion (Fish Oil and Plant Based) 20% Infusion 20.8 mL/Hr IV Continuous <Continuous>  metoclopramide Injectable 10 milliGRAM(s) IV Push every 8 hours  pantoprazole  Injectable 40 milliGRAM(s) IV Push daily  Parenteral Nutrition - Adult 1 Each TPN Continuous <Continuous>  Parenteral Nutrition - Adult 1 Each TPN Continuous <Continuous>  sodium chloride 0.9% lock flush 3 milliLiter(s) IV Push every 8 hours      LABS:                        10.4   13.35 )-----------( 555      ( 08 Dec 2023 06:23 )             32.8       Hemoglobin: 10.4 g/dL (12-08 @ 06:23)  Hemoglobin: 11.4 g/dL (12-07 @ 02:05)  Hemoglobin: 10.0 g/dL (12-06 @ 04:37)  Hemoglobin: 9.3 g/dL (12-05 @ 06:49)  Hemoglobin: 9.6 g/dL (12-04 @ 07:33)      12-08    137  |  101  |  20  ----------------------------<  185<H>  4.4   |  26  |  0.47<L>    Ca    8.9      08 Dec 2023 06:23  Phos  3.3     12-08  Mg     2.40     12-08      Creatinine Trend: 0.47<--, 0.51<--, 0.43<--, 0.40<--, 0.39<--, 0.36<--    COAGS:           PHYSICAL EXAM:  T(C): 37.1 (12-08-23 @ 11:17), Max: 37.1 (12-08-23 @ 11:17)  HR: 102 (12-08-23 @ 11:17) (101 - 110)  BP: 120/58 (12-08-23 @ 11:17) (110/54 - 136/64)  RR: 18 (12-08-23 @ 11:17) (16 - 19)  SpO2: 100% (12-08-23 @ 11:17) (95% - 100%)  Wt(kg): --    I&O's Summary    07 Dec 2023 07:01  -  08 Dec 2023 07:00  --------------------------------------------------------  IN: 1861.6 mL / OUT: 1770 mL / NET: 91.6 mL    08 Dec 2023 07:01  -  08 Dec 2023 12:08  --------------------------------------------------------  IN: 0 mL / OUT: 656 mL / NET: -656 mL          General:  Alert and Oriented * 3.   Head: Normocephalic and atraumatic.   Neck: No JVD. No bruits. Supple. Does not appear to be enlarged.   Cardiovascular: + S1,S2 ; RRR Soft systolic murmur at the left lower sternal border. No rubs noted.    Lungs: CTA b/l. No rhonchi, rales or wheezes.   Abdomen: distended NT  Extremities: No clubbing/cyanosis/edema.   Neurologic: Moves all four extremities. Full range of motion.   Skin: Warm and moist. The patient's skin has normal elasticity and good skin turgor.   Psychiatric: Appropriate mood and affect.  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      -- tolerated procedure well from CV perspective  -- pain management, PT  -- supplement lytes per sx   -- s/p NGT, CT noted with ileus, repeat CT noted, on TPN  -- eventually resume home meds: Asa 81mg, Pravastatin 40mg when ok from a surgical perspective  -- BP meds Hctz 12.5mg, Losartan 25mg on hold, BP controlled  -- s/p placement of IR drain

## 2023-12-08 NOTE — PROGRESS NOTE ADULT - ASSESSMENT
73y Female with history of HTN, HLD, asthma, cecal adenoca s/p r hemicolectomy c/b ileus and a increasing fluid collection adjacent to the ileocolic anastomosis now s/p anterior abdominal drainage catheter placement on 12/7 in Interventional Radiology.     Plan:  - Continue drainage, monitor output  - Flush drain 5cc NS qd  - Can be discharged home with drain if outputs remain high  - Will need noncontrast CT + IR tube check once outputs < 10cc/24hr, can be arranged as outpatient follow-up. Outpatient IR office [phone]    [id number]

## 2023-12-08 NOTE — PROGRESS NOTE ADULT - SUBJECTIVE AND OBJECTIVE BOX
NUTRITION NOTE  [id number] THOMASTUCKER  ===============================    Interval events - Patient was seen and examined at bedside, no acute events overnight. Patient denies chest pain, shortness of breath, nausea or vomiting at this time. PICC placed and TPN was started on 23 for nutritional support. Pt remains NPO with NGT in place. Pt is s/p anterior abdominal drainage catheter placement on  in IR.    ROS: Except as noted above, all other systems reviewed and are negative     Allergies  Darvocet A500 (Other; Urticaria)  Percocet 10/325 (Other; Urticaria)  codeine (Other)  Lantus (Swelling)  Purell    PAST MEDICAL & SURGICAL HISTORY:  History of hypertension  Diabetes wears insulin pump  GERD (gastroesophageal reflux disease)  Uterine leiomyoma  Hyperlipidemia  Asthma  Obesity  Lymphadenopathy left lower extremity ;  - current  H/O insertion of insulin pump  OA (osteoarthritis)  Malignant neoplasm of cecum  H/O bilateral breast reduction surgery  History of appendectomy  History of cholecystectomy  H/O: hysterectomy  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 - [facility]  H/O total knee replacement, left  S/P bunionectomy    FAMILY HISTORY:  Diabetes mellitus (Father)  FH: breast cancer (Aunt)    Vital Signs Last 24 Hrs  T(C): 37.1 (08 Dec 2023 11:17), Max: 37.1 (08 Dec 2023 11:17)  T(F): 98.7 (08 Dec 2023 11:17), Max: 98.7 (08 Dec 2023 11:17)  HR: 102 (08 Dec 2023 11:17) (101 - 110)  BP: 120/58 (08 Dec 2023 11:17) (110/54 - 136/64)  RR: 18 (08 Dec 2023 11:17) (16 - 19)  SpO2: 100% (08 Dec 2023 11:17) (95% - 100%)    MEDICATIONS  (STANDING):  acetaminophen   IVPB .. 1000 milliGRAM(s) IV Intermittent every 6 hours  albuterol    90 MICROgram(s) HFA Inhaler 2 Puff(s) Inhalation two times a day  budesonide 160 MICROgram(s)/formoterol 4.5 MICROgram(s) Inhaler 2 Puff(s) Inhalation two times a day  chlorhexidine 2% Cloths 1 Application(s) Topical daily  dextrose 50% Injectable 12.5 Gram(s) IV Push once  dextrose 50% Injectable 25 Gram(s) IV Push once  dextrose 50% Injectable 25 Gram(s) IV Push once  dextrose Oral Gel 15 Gram(s) Oral once  enoxaparin Injectable 40 milliGRAM(s) SubCutaneous every 24 hours  erythromycin    ethylsuccinate Suspension 40 mG/mL 400 milliGRAM(s) Oral every 8 hours  influenza  Vaccine (HIGH DOSE) 0.7 milliLiter(s) IntraMuscular once  insulin detemir injectable (LEVEMIR) 5 Unit(s) SubCutaneous at bedtime  insulin lispro (ADMELOG) corrective regimen sliding scale   SubCutaneous every 6 hours  lipid, fat emulsion (Fish Oil and Plant Based) 20% Infusion 20.8 mL/Hr (20.8 mL/Hr) IV Continuous <Continuous>  metoclopramide Injectable 10 milliGRAM(s) IV Push every 8 hours  pantoprazole  Injectable 40 milliGRAM(s) IV Push daily  Parenteral Nutrition - Adult 1 Each (63 mL/Hr) TPN Continuous <Continuous>  Parenteral Nutrition - Adult 1 Each (63 mL/Hr) TPN Continuous <Continuous>  sodium chloride 0.9% lock flush 3 milliLiter(s) IV Push every 8 hours    I&O's Detail    07 Dec 2023 07:01  -  08 Dec 2023 07:00  --------------------------------------------------------  IN:    Fat Emulsion (Fish Oil &amp; Plant Based) 20% Infusion: 249.6 mL    IV PiggyBack: 100 mL    TPN (Total Parenteral Nutrition): 1512 mL  Total IN: 1861.6 mL    OUT:    Drain (mL): 20 mL    Nasogastric/Oral tube (mL): 850 mL    Oral Fluid: 0 mL    Voided (mL): 900 mL  Total OUT: 1770 mL    Total NET: 91.6 mL      08 Dec 2023 07:01  -  08 Dec 2023 12:06  --------------------------------------------------------  IN:  Total IN: 0 mL    OUT:    Drain (mL): 6 mL    Nasogastric/Oral tube (mL): 200 mL    Voided (mL): 450 mL  Total OUT: 656 mL    Total NET: -656 mL    POCT Blood Glucose.: 173 mg/dL (08 Dec 2023 06:14)  POCT Blood Glucose.: 189 mg/dL (07 Dec 2023 23:41)  POCT Blood Glucose.: 199 mg/dL (07 Dec 2023 21:38)  POCT Blood Glucose.: 194 mg/dL (07 Dec 2023 17:21)  POCT Blood Glucose.: 232 mg/dL (07 Dec 2023 12:19)    Daily Weight in k.6 (03 Dec 2023 00:00)    Drug Dosing Weight  Height (cm): 152.4 (2023 06:23)  Weight (kg): 75.659 (2023 06:23)  BMI (kg/m2): 32.6 (2023 06:23)  BSA (m2): 1.73 (2023 06:23)    PHYSICAL EXAM:  Constitutional: A&Ox3, NAD, resting comfortably in bed  Respiratory: nonlabored respirations   Abdomen: Soft, nondistended, drainage bag with dark red fluid, NGT in place with bilious output    Extremities: WWP, SEAMAN spontaneously  PICC Site: double lumen PICC placed on 23 in [facility], site clean and dry     Diet: NPO and TPN/lipids (started on 23)    LABORATORY                                          10.4   13.35 )-----------( 555      ( 08 Dec 2023 06:23 )             32.8   12-    137  |  101  |  20  ----------------------------<  185<H>  4.4   |  26  |  0.47<L>    Ca    8.9      08 Dec 2023 06:23  Phos  3.3     12-08  Mg     2.40     12- Chol 65 LDL -- HDL 29<L> Trig 81    CT Abdomen and Pelvis 23: IMPRESSION: Dilated loops of small bowel the level of the terminal ileum, likely ileus in the postoperative setting. Postoperative pneumoperitoneum and diffuse subcutaneous emphysema.    CT Abdomen and Pelvis on 23: IMPRESSION: Interval increase in size of fluid anterior to the ileocolic anastomosis with new peripheral enhancement, probably early abscess formation. nterval decrease in small bowel dilatation, probably ileus.    ASSESSMENT/PLAN:  74 y/o female with PMH asthma, T2DM on insulin pump, GERD, HTN, HLD, OA, obesity is s/p robotic partial colectomy for moderately differentiated adenocarcinoma of cecal mass on 23. CT abd/pelvis on 23 showed findings consistent with post op ileus. Pt remains NPO with NGT in place. Nutrition consult called for initiation of parenteral nutrition in view of severe protein calorie malnutrition and prolonged NPO status. PICC placed and TPN was started on 23. CT on  showed: Interval increase in size of fluid anterior to the ileocolic anastomosis, with new peripheral enhancement, probably early abscess formation and decrease in small bowel dilatation, probably ileus. Pt is s/p anterior abdominal drainage catheter placement on 23.    continue TPN with infusion volume of 1.5L, TPN will provide 1240 kcal/day     labs reviewed - electrolytes adjusted in TPN bag    monitor fingersticks, obtain daily weights - continue with 44 units of insulin in tonight's TPN bag, will continue to trend FS and make insulin adjustments as needed, will follow up with Endocrine service for continued management    continue parenteral nutrition at this time, will follow up with primary team on plan     1.  Severe protein calorie malnutrition being optimized with TPN: CHO [100] gm.  AA [100] gm. SMOF Lipids [50] gm.  2.  Hyperglycemia managed with: [44] units of regular insulin    3.  Check fluid balance daily.  Strict I/O  [ ] [ ]   4.  Daily BMP, Ionized Calcium, Magnesium and Phosphorous   5.  Triglycerides at initiation of TPN and monthly  Chol 65 LDL -- HDL 29<L> Trig 81    Nutrition Support [id number]  NUTRITION NOTE  [id number] THOMASTUCKER  ===============================    Interval events - Patient was seen and examined at bedside, no acute events overnight. Patient denies chest pain, shortness of breath, nausea or vomiting at this time. PICC placed and TPN was started on 23 for nutritional support. Pt remains NPO with NGT in place. Pt is s/p anterior abdominal drainage catheter placement on  in IR.    ROS: Except as noted above, all other systems reviewed and are negative     Allergies  Darvocet A500 (Other; Urticaria)  Percocet 10/325 (Other; Urticaria)  codeine (Other)  Lantus (Swelling)  Purell    PAST MEDICAL & SURGICAL HISTORY:  History of hypertension  Diabetes wears insulin pump  GERD (gastroesophageal reflux disease)  Uterine leiomyoma  Hyperlipidemia  Asthma  Obesity  Lymphadenopathy left lower extremity ;  - current  H/O insertion of insulin pump  OA (osteoarthritis)  Malignant neoplasm of cecum  H/O bilateral breast reduction surgery  History of appendectomy  History of cholecystectomy  H/O: hysterectomy  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 - [facility]  H/O total knee replacement, left  S/P bunionectomy    FAMILY HISTORY:  Diabetes mellitus (Father)  FH: breast cancer (Aunt)    Vital Signs Last 24 Hrs  T(C): 37.1 (08 Dec 2023 11:17), Max: 37.1 (08 Dec 2023 11:17)  T(F): 98.7 (08 Dec 2023 11:17), Max: 98.7 (08 Dec 2023 11:17)  HR: 102 (08 Dec 2023 11:17) (101 - 110)  BP: 120/58 (08 Dec 2023 11:17) (110/54 - 136/64)  RR: 18 (08 Dec 2023 11:17) (16 - 19)  SpO2: 100% (08 Dec 2023 11:17) (95% - 100%)    MEDICATIONS  (STANDING):  acetaminophen   IVPB .. 1000 milliGRAM(s) IV Intermittent every 6 hours  albuterol    90 MICROgram(s) HFA Inhaler 2 Puff(s) Inhalation two times a day  budesonide 160 MICROgram(s)/formoterol 4.5 MICROgram(s) Inhaler 2 Puff(s) Inhalation two times a day  chlorhexidine 2% Cloths 1 Application(s) Topical daily  dextrose 50% Injectable 12.5 Gram(s) IV Push once  dextrose 50% Injectable 25 Gram(s) IV Push once  dextrose 50% Injectable 25 Gram(s) IV Push once  dextrose Oral Gel 15 Gram(s) Oral once  enoxaparin Injectable 40 milliGRAM(s) SubCutaneous every 24 hours  erythromycin    ethylsuccinate Suspension 40 mG/mL 400 milliGRAM(s) Oral every 8 hours  influenza  Vaccine (HIGH DOSE) 0.7 milliLiter(s) IntraMuscular once  insulin detemir injectable (LEVEMIR) 5 Unit(s) SubCutaneous at bedtime  insulin lispro (ADMELOG) corrective regimen sliding scale   SubCutaneous every 6 hours  lipid, fat emulsion (Fish Oil and Plant Based) 20% Infusion 20.8 mL/Hr (20.8 mL/Hr) IV Continuous <Continuous>  metoclopramide Injectable 10 milliGRAM(s) IV Push every 8 hours  pantoprazole  Injectable 40 milliGRAM(s) IV Push daily  Parenteral Nutrition - Adult 1 Each (63 mL/Hr) TPN Continuous <Continuous>  Parenteral Nutrition - Adult 1 Each (63 mL/Hr) TPN Continuous <Continuous>  sodium chloride 0.9% lock flush 3 milliLiter(s) IV Push every 8 hours    I&O's Detail    07 Dec 2023 07:01  -  08 Dec 2023 07:00  --------------------------------------------------------  IN:    Fat Emulsion (Fish Oil &amp; Plant Based) 20% Infusion: 249.6 mL    IV PiggyBack: 100 mL    TPN (Total Parenteral Nutrition): 1512 mL  Total IN: 1861.6 mL    OUT:    Drain (mL): 20 mL    Nasogastric/Oral tube (mL): 850 mL    Oral Fluid: 0 mL    Voided (mL): 900 mL  Total OUT: 1770 mL    Total NET: 91.6 mL      08 Dec 2023 07:01  -  08 Dec 2023 12:06  --------------------------------------------------------  IN:  Total IN: 0 mL    OUT:    Drain (mL): 6 mL    Nasogastric/Oral tube (mL): 200 mL    Voided (mL): 450 mL  Total OUT: 656 mL    Total NET: -656 mL    POCT Blood Glucose.: 173 mg/dL (08 Dec 2023 06:14)  POCT Blood Glucose.: 189 mg/dL (07 Dec 2023 23:41)  POCT Blood Glucose.: 199 mg/dL (07 Dec 2023 21:38)  POCT Blood Glucose.: 194 mg/dL (07 Dec 2023 17:21)  POCT Blood Glucose.: 232 mg/dL (07 Dec 2023 12:19)    Daily Weight in k.6 (03 Dec 2023 00:00)    Drug Dosing Weight  Height (cm): 152.4 (2023 06:23)  Weight (kg): 75.659 (2023 06:23)  BMI (kg/m2): 32.6 (2023 06:23)  BSA (m2): 1.73 (2023 06:23)    PHYSICAL EXAM:  Constitutional: A&Ox3, NAD, resting comfortably in bed  Respiratory: nonlabored respirations   Abdomen: Soft, nondistended, drainage bag with dark red fluid, NGT in place with bilious output    Extremities: WWP, SAEMAN spontaneously  PICC Site: double lumen PICC placed on 23 in [facility], site clean and dry     Diet: NPO and TPN/lipids (started on 23)    LABORATORY                                          10.4   13.35 )-----------( 555      ( 08 Dec 2023 06:23 )             32.8   12-    137  |  101  |  20  ----------------------------<  185<H>  4.4   |  26  |  0.47<L>    Ca    8.9      08 Dec 2023 06:23  Phos  3.3     12-08  Mg     2.40     12- Chol 65 LDL -- HDL 29<L> Trig 81    CT Abdomen and Pelvis 23: IMPRESSION: Dilated loops of small bowel the level of the terminal ileum, likely ileus in the postoperative setting. Postoperative pneumoperitoneum and diffuse subcutaneous emphysema.    CT Abdomen and Pelvis on 23: IMPRESSION: Interval increase in size of fluid anterior to the ileocolic anastomosis with new peripheral enhancement, probably early abscess formation. nterval decrease in small bowel dilatation, probably ileus.    ASSESSMENT/PLAN:  74 y/o female with PMH asthma, T2DM on insulin pump, GERD, HTN, HLD, OA, obesity is s/p robotic partial colectomy for moderately differentiated adenocarcinoma of cecal mass on 23. CT abd/pelvis on 23 showed findings consistent with post op ileus. Pt remains NPO with NGT in place. Nutrition consult called for initiation of parenteral nutrition in view of severe protein calorie malnutrition and prolonged NPO status. PICC placed and TPN was started on 23. CT on  showed: Interval increase in size of fluid anterior to the ileocolic anastomosis, with new peripheral enhancement, probably early abscess formation and decrease in small bowel dilatation, probably ileus. Pt is s/p anterior abdominal drainage catheter placement on 23.    continue TPN with infusion volume of 1.5L, TPN will provide 1240 kcal/day     labs reviewed - electrolytes adjusted in TPN bag    monitor fingersticks, obtain daily weights - continue with 44 units of insulin in tonight's TPN bag, will continue to trend FS and make insulin adjustments as needed, will follow up with Endocrine service for continued management    continue parenteral nutrition at this time, will follow up with primary team on plan     1.  Severe protein calorie malnutrition being optimized with TPN: CHO [100] gm.  AA [100] gm. SMOF Lipids [50] gm.  2.  Hyperglycemia managed with: [44] units of regular insulin    3.  Check fluid balance daily.  Strict I/O  [ ] [ ]   4.  Daily BMP, Ionized Calcium, Magnesium and Phosphorous   5.  Triglycerides at initiation of TPN and monthly  Chol 65 LDL -- HDL 29<L> Trig 81    Nutrition Support [id number]

## 2023-12-08 NOTE — PROGRESS NOTE ADULT - SUBJECTIVE AND OBJECTIVE BOX
Chief Complaint: Type 2 DM     History: Pt seen at bedside. Pt tolerating oral diet. Pt denies nausea and vomiting/any signs of hypoglycemia. Pt reports an adequate appetite.     MEDICATIONS  (STANDING):  acetaminophen   IVPB .. 1000 milliGRAM(s) IV Intermittent every 6 hours  albuterol    90 MICROgram(s) HFA Inhaler 2 Puff(s) Inhalation two times a day  budesonide 160 MICROgram(s)/formoterol 4.5 MICROgram(s) Inhaler 2 Puff(s) Inhalation two times a day  chlorhexidine 2% Cloths 1 Application(s) Topical daily  dextrose 50% Injectable 25 Gram(s) IV Push once  dextrose 50% Injectable 12.5 Gram(s) IV Push once  dextrose 50% Injectable 25 Gram(s) IV Push once  dextrose Oral Gel 15 Gram(s) Oral once  enoxaparin Injectable 40 milliGRAM(s) SubCutaneous every 24 hours  erythromycin    ethylsuccinate Suspension 40 mG/mL 400 milliGRAM(s) Oral every 8 hours  influenza  Vaccine (HIGH DOSE) 0.7 milliLiter(s) IntraMuscular once  insulin detemir injectable (LEVEMIR) 5 Unit(s) SubCutaneous at bedtime  insulin lispro (ADMELOG) corrective regimen sliding scale   SubCutaneous every 6 hours  lipid, fat emulsion (Fish Oil and Plant Based) 20% Infusion 20.8 mL/Hr (20.8 mL/Hr) IV Continuous <Continuous>  metoclopramide Injectable 10 milliGRAM(s) IV Push every 8 hours  pantoprazole  Injectable 40 milliGRAM(s) IV Push daily  Parenteral Nutrition - Adult 1 Each (63 mL/Hr) TPN Continuous <Continuous>  Parenteral Nutrition - Adult 1 Each (63 mL/Hr) TPN Continuous <Continuous>  sodium chloride 0.9% lock flush 3 milliLiter(s) IV Push every 8 hours    MEDICATIONS  (PRN):      Allergies: Darvocet A500 (Other; Urticaria)  Percocet 10/325 (Other; Urticaria)  codeine (Other)  Lantus (Swelling)  PURELL.  pt said, &quot;I felt my airway closing&quot; after she smelled the Purell. The incident occured at the pulmonologist office. (Other; Short breath)    Review of Systems:  Respiratory: No SOB, no cough  GI: No nausea, vomiting, abdominal pain  Endocrine: no polyuria, polydipsia      PHYSICAL EXAM:  VITALS: T(C): 37.1 (12-08-23 @ 11:17)  T(F): 98.7 (12-08-23 @ 11:17), Max: 98.7 (12-08-23 @ 11:17)  HR: 102 (12-08-23 @ 11:17) (101 - 108)  BP: 120/58 (12-08-23 @ 11:17) (110/54 - 136/64)  RR:  (16 - 19)  SpO2:  (97% - 100%)  Wt(kg): --  GENERAL: NAD, well-groomed, well-developed  RESPIRATORY: No labored breathing   GI: Soft, nontender, non distended  PSYCH: Alert and oriented x 3, normal affect, normal mood      CAPILLARY BLOOD GLUCOSE  POCT Blood Glucose.: 174 mg/dL (08 Dec 2023 12:22)  POCT Blood Glucose.: 173 mg/dL (08 Dec 2023 06:14)  POCT Blood Glucose.: 189 mg/dL (07 Dec 2023 23:41)  POCT Blood Glucose.: 199 mg/dL (07 Dec 2023 21:38)  POCT Blood Glucose.: 194 mg/dL (07 Dec 2023 17:21)    A1C with Estimated Average Glucose (11.16.23 @ 13:15)    A1C with Estimated Average Glucose Result: 6.8   Estimated Average Glucose: 148      12-08    137  |  101  |  20  ----------------------------<  185<H>  4.4   |  26  |  0.47<L>    eGFR: 101    Ca    8.9      12-08  Mg     2.40     12-08  Phos  3.3     12-08      Diet, NPO (12-06-23 @ 14:35) [Active]      Parenteral Nutrition - Adult 1 Each (63 mL/Hr) TPN Continuous <Continuous>, 12-08-23 @ 17:00, 17:00, Stop order after: 1 Days  Parenteral Nutrition - Adult 1 Each (63 mL/Hr) TPN Continuous <Continuous>, 12-07-23 @ 17:00, 17:00, Stop order after: 1 Days

## 2023-12-08 NOTE — PROGRESS NOTE ADULT - SUBJECTIVE AND OBJECTIVE BOX
Morning Surgical Progress Note  Patient is a 73y old  Female who presents with a chief complaint of hemicolect (03 Dec 2023 10:53)    SUBJECTIVE: Patient seen and examined at bedside with surgical team, patient without complaints. Denies flatus    Vital Signs Last 24 Hrs  T(C): 37 (08 Dec 2023 04:56), Max: 37 (07 Dec 2023 20:15)  T(F): 98.6 (08 Dec 2023 04:56), Max: 98.6 (07 Dec 2023 20:15)  HR: 104 (08 Dec 2023 04:56) (104 - 111)  BP: 136/64 (08 Dec 2023 04:56) (110/54 - 136/64)  BP(mean): --  RR: 18 (08 Dec 2023 04:56) (16 - 18)  SpO2: 100% (08 Dec 2023 04:56) (95% - 100%)    Parameters below as of 08 Dec 2023 04:56  Patient On (Oxygen Delivery Method): room air    I&O's Detail    06 Dec 2023 07:01  -  07 Dec 2023 07:00  --------------------------------------------------------  IN:    Fat Emulsion (Fish Oil &amp; Plant Based) 20% Infusion: 228.8 mL    TPN (Total Parenteral Nutrition): 1260 mL  Total IN: 1488.8 mL    OUT:    Nasogastric/Oral tube (mL): 2500 mL    Voided (mL): 1550 mL  Total OUT: 4050 mL    Total NET: -2561.2 mL      07 Dec 2023 07:01  -  08 Dec 2023 06:25  --------------------------------------------------------  IN:    Fat Emulsion (Fish Oil &amp; Plant Based) 20% Infusion: 249.6 mL    IV PiggyBack: 100 mL    TPN (Total Parenteral Nutrition): 1512 mL  Total IN: 1861.6 mL    OUT:    Drain (mL): 20 mL    Nasogastric/Oral tube (mL): 850 mL    Oral Fluid: 0 mL    Voided (mL): 900 mL  Total OUT: 1770 mL    Total NET: 91.6 mL        Medications  MEDICATIONS  (STANDING):  acetaminophen   IVPB .. 1000 milliGRAM(s) IV Intermittent every 6 hours  albuterol    90 MICROgram(s) HFA Inhaler 2 Puff(s) Inhalation two times a day  budesonide 160 MICROgram(s)/formoterol 4.5 MICROgram(s) Inhaler 2 Puff(s) Inhalation two times a day  chlorhexidine 2% Cloths 1 Application(s) Topical daily  dextrose 50% Injectable 25 Gram(s) IV Push once  dextrose 50% Injectable 25 Gram(s) IV Push once  dextrose 50% Injectable 12.5 Gram(s) IV Push once  dextrose Oral Gel 15 Gram(s) Oral once  enoxaparin Injectable 40 milliGRAM(s) SubCutaneous every 24 hours  erythromycin    ethylsuccinate Suspension 40 mG/mL 400 milliGRAM(s) Oral every 8 hours  glucagon  Injectable 1 milliGRAM(s) IntraMuscular once  influenza  Vaccine (HIGH DOSE) 0.7 milliLiter(s) IntraMuscular once  insulin lispro (ADMELOG) corrective regimen sliding scale   SubCutaneous every 6 hours  lipid, fat emulsion (Fish Oil and Plant Based) 20% Infusion 20.8 mL/Hr (20.8 mL/Hr) IV Continuous <Continuous>  metoclopramide Injectable 10 milliGRAM(s) IV Push every 8 hours  pantoprazole  Injectable 40 milliGRAM(s) IV Push daily  Parenteral Nutrition - Adult 1 Each (63 mL/Hr) TPN Continuous <Continuous>  sodium chloride 0.9% lock flush 3 milliLiter(s) IV Push every 8 hours    MEDICATIONS  (PRN):    Physical Exam  Constitutional: A&Ox3, NAD, + NG tube  Gastrointestinal: Soft nontender, nondistended, midline c/d/i, drain old blood  Extremities: Moving all extremities, no edema  Skin: No Rashes, Hematoma, Ecchymosis  LABS:                        11.4   14.53 )-----------( 611      ( 07 Dec 2023 02:05 )             36.2     12-07    137  |  98  |  18  ----------------------------<  237<H>  4.6   |  26  |  0.51    Ca    9.5      07 Dec 2023 02:05  Phos  3.6     12-07  Mg     2.60     12-07      PT/INR - ( 07 Dec 2023 02:05 )   PT: 11.9 sec;   INR: 1.05 ratio         PTT - ( 07 Dec 2023 02:05 )  PTT:29.7 sec    Urinalysis Basic - ( 07 Dec 2023 02:05 )    Color: x / Appearance: x / SG: x / pH: x  Gluc: 237 mg/dL / Ketone: x  / Bili: x / Urobili: x   Blood: x / Protein: x / Nitrite: x   Leuk Esterase: x / RBC: x / WBC x   Sq Epi: x / Non Sq Epi: x / Bacteria: x

## 2023-12-08 NOTE — PROGRESS NOTE ADULT - SUBJECTIVE AND OBJECTIVE BOX
Name of Patient : NESTOR MABRY  MRN: [id number]  Date of visit: 12-08-23       Subjective: Patient seen and examined. No new events except as noted.     REVIEW OF SYSTEMS:    CONSTITUTIONAL: No weakness, fevers or chills  EYES/ENT: No visual changes;  No vertigo or throat pain   NECK: No pain or stiffness  RESPIRATORY: No cough, wheezing, hemoptysis; No shortness of breath  CARDIOVASCULAR: No chest pain or palpitations  GASTROINTESTINAL: No abdominal or epigastric pain. No nausea, vomiting, or hematemesis; No diarrhea or constipation. No melena or hematochezia.  GENITOURINARY: No dysuria, frequency or hematuria  NEUROLOGICAL: No numbness or weakness  SKIN: No itching, burning, rashes, or lesions   All other review of systems is negative unless indicated above.    MEDICATIONS:  MEDICATIONS  (STANDING):  acetaminophen   IVPB .. 1000 milliGRAM(s) IV Intermittent every 6 hours  albuterol    90 MICROgram(s) HFA Inhaler 2 Puff(s) Inhalation two times a day  budesonide 160 MICROgram(s)/formoterol 4.5 MICROgram(s) Inhaler 2 Puff(s) Inhalation two times a day  chlorhexidine 2% Cloths 1 Application(s) Topical daily  dextrose 50% Injectable 25 Gram(s) IV Push once  dextrose 50% Injectable 25 Gram(s) IV Push once  dextrose 50% Injectable 12.5 Gram(s) IV Push once  dextrose Oral Gel 15 Gram(s) Oral once  enoxaparin Injectable 40 milliGRAM(s) SubCutaneous every 24 hours  erythromycin    ethylsuccinate Suspension 40 mG/mL 400 milliGRAM(s) Oral every 8 hours  influenza  Vaccine (HIGH DOSE) 0.7 milliLiter(s) IntraMuscular once  insulin detemir injectable (LEVEMIR) 5 Unit(s) SubCutaneous at bedtime  insulin lispro (ADMELOG) corrective regimen sliding scale   SubCutaneous every 6 hours  lipid, fat emulsion (Fish Oil and Plant Based) 20% Infusion 20.8 mL/Hr (20.8 mL/Hr) IV Continuous <Continuous>  metoclopramide Injectable 10 milliGRAM(s) IV Push every 8 hours  pantoprazole  Injectable 40 milliGRAM(s) IV Push daily  Parenteral Nutrition - Adult 1 Each (63 mL/Hr) TPN Continuous <Continuous>  sodium chloride 0.9% lock flush 3 milliLiter(s) IV Push every 8 hours      PHYSICAL EXAM:  T(C): 37 (12-08-23 @ 20:19), Max: 37.1 (12-08-23 @ 11:17)  HR: 101 (12-08-23 @ 20:19) (96 - 106)  BP: 106/53 (12-08-23 @ 20:19) (106/53 - 136/64)  RR: 18 (12-08-23 @ 20:19) (18 - 19)  SpO2: 100% (12-08-23 @ 20:19) (97% - 100%)  Wt(kg): --  I&O's Summary    07 Dec 2023 07:01  -  08 Dec 2023 07:00  --------------------------------------------------------  IN: 1861.6 mL / OUT: 1770 mL / NET: 91.6 mL    08 Dec 2023 07:01  -  08 Dec 2023 23:12  --------------------------------------------------------  IN: 90 mL / OUT: 1256 mL / NET: -1166 mL          Appearance: Normal	  HEENT:  PERRLA   Lymphatic: No lymphadenopathy   Cardiovascular: Normal S1 S2, no JVD  Respiratory: normal effort , clear  Gastrointestinal:  Soft, Non-tender  Skin: No rashes,  warm to touch  Psychiatry:  Mood & affect appropriate  Musculuskeletal: No edema    recent labs, Imaging and EKGs personally reviewed     12-07-23 @ 07:01  -  12-08-23 @ 07:00  --------------------------------------------------------  IN: 1861.6 mL / OUT: 1770 mL / NET: 91.6 mL    12-08-23 @ 07:01  -  12-08-23 @ 23:12  --------------------------------------------------------  IN: 90 mL / OUT: 1256 mL / NET: -1166 mL                          10.4   13.35 )-----------( 555      ( 08 Dec 2023 06:23 )             32.8               12-08    137  |  101  |  20  ----------------------------<  185<H>  4.4   |  26  |  0.47<L>    Ca    8.9      08 Dec 2023 06:23  Phos  3.3     12-08  Mg     2.40     12-08      PT/INR - ( 07 Dec 2023 02:05 )   PT: 11.9 sec;   INR: 1.05 ratio         PTT - ( 07 Dec 2023 02:05 )  PTT:29.7 sec                   Urinalysis Basic - ( 08 Dec 2023 06:23 )    Color: x / Appearance: x / SG: x / pH: x  Gluc: 185 mg/dL / Ketone: x  / Bili: x / Urobili: x   Blood: x / Protein: x / Nitrite: x   Leuk Esterase: x / RBC: x / WBC x   Sq Epi: x / Non Sq Epi: x / Bacteria: x               Name of Patient : NESTOR MABRY  MRN: [id number]  Date of visit: 12-08-23       Subjective: Patient seen and examined. No new events except as noted.     REVIEW OF SYSTEMS:    CONSTITUTIONAL: No weakness, fevers or chills  EYES/ENT: No visual changes;  No vertigo or throat pain   NECK: No pain or stiffness  RESPIRATORY: No cough, wheezing, hemoptysis; No shortness of breath  CARDIOVASCULAR: No chest pain or palpitations  GASTROINTESTINAL: No abdominal or epigastric pain. No nausea, vomiting, or hematemesis; No diarrhea or constipation. No melena or hematochezia.  GENITOURINARY: No dysuria, frequency or hematuria  NEUROLOGICAL: No numbness or weakness  SKIN: No itching, burning, rashes, or lesions   All other review of systems is negative unless indicated above.    MEDICATIONS:  MEDICATIONS  (STANDING):  acetaminophen   IVPB .. 1000 milliGRAM(s) IV Intermittent every 6 hours  albuterol    90 MICROgram(s) HFA Inhaler 2 Puff(s) Inhalation two times a day  budesonide 160 MICROgram(s)/formoterol 4.5 MICROgram(s) Inhaler 2 Puff(s) Inhalation two times a day  chlorhexidine 2% Cloths 1 Application(s) Topical daily  dextrose 50% Injectable 25 Gram(s) IV Push once  dextrose 50% Injectable 25 Gram(s) IV Push once  dextrose 50% Injectable 12.5 Gram(s) IV Push once  dextrose Oral Gel 15 Gram(s) Oral once  enoxaparin Injectable 40 milliGRAM(s) SubCutaneous every 24 hours  erythromycin    ethylsuccinate Suspension 40 mG/mL 400 milliGRAM(s) Oral every 8 hours  influenza  Vaccine (HIGH DOSE) 0.7 milliLiter(s) IntraMuscular once  insulin detemir injectable (LEVEMIR) 5 Unit(s) SubCutaneous at bedtime  insulin lispro (ADMELOG) corrective regimen sliding scale   SubCutaneous every 6 hours  lipid, fat emulsion (Fish Oil and Plant Based) 20% Infusion 20.8 mL/Hr (20.8 mL/Hr) IV Continuous <Continuous>  metoclopramide Injectable 10 milliGRAM(s) IV Push every 8 hours  pantoprazole  Injectable 40 milliGRAM(s) IV Push daily  Parenteral Nutrition - Adult 1 Each (63 mL/Hr) TPN Continuous <Continuous>  sodium chloride 0.9% lock flush 3 milliLiter(s) IV Push every 8 hours      PHYSICAL EXAM:  T(C): 37 (12-08-23 @ 20:19), Max: 37.1 (12-08-23 @ 11:17)  HR: 101 (12-08-23 @ 20:19) (96 - 106)  BP: 106/53 (12-08-23 @ 20:19) (106/53 - 136/64)  RR: 18 (12-08-23 @ 20:19) (18 - 19)  SpO2: 100% (12-08-23 @ 20:19) (97% - 100%)  Wt(kg): --  I&O's Summary    07 Dec 2023 07:01  -  08 Dec 2023 07:00  --------------------------------------------------------  IN: 1861.6 mL / OUT: 1770 mL / NET: 91.6 mL    08 Dec 2023 07:01  -  08 Dec 2023 23:12  --------------------------------------------------------  IN: 90 mL / OUT: 1256 mL / NET: -1166 mL          Appearance: Normal	  HEENT:  PERRLA   Lymphatic: No lymphadenopathy   Cardiovascular: Normal S1 S2, no JVD  Respiratory: normal effort , clear  Gastrointestinal:  Soft, Non-tender  Skin: No rashes,  warm to touch  Psychiatry:  Mood & affect appropriate  Musculuskeletal: No edema    recent labs, Imaging and EKGs personally reviewed     12-07-23 @ 07:01  -  12-08-23 @ 07:00  --------------------------------------------------------  IN: 1861.6 mL / OUT: 1770 mL / NET: 91.6 mL    12-08-23 @ 07:01  -  12-08-23 @ 23:12  --------------------------------------------------------  IN: 90 mL / OUT: 1256 mL / NET: -1166 mL                          10.4   13.35 )-----------( 555      ( 08 Dec 2023 06:23 )             32.8               12-08    137  |  101  |  20  ----------------------------<  185<H>  4.4   |  26  |  0.47<L>    Ca    8.9      08 Dec 2023 06:23  Phos  3.3     12-08  Mg     2.40     12-08      PT/INR - ( 07 Dec 2023 02:05 )   PT: 11.9 sec;   INR: 1.05 ratio         PTT - ( 07 Dec 2023 02:05 )  PTT:29.7 sec                   Urinalysis Basic - ( 08 Dec 2023 06:23 )    Color: x / Appearance: x / SG: x / pH: x  Gluc: 185 mg/dL / Ketone: x  / Bili: x / Urobili: x   Blood: x / Protein: x / Nitrite: x   Leuk Esterase: x / RBC: x / WBC x   Sq Epi: x / Non Sq Epi: x / Bacteria: x

## 2023-12-08 NOTE — PROGRESS NOTE ADULT - ASSESSMENT
This is a 74 yo F /w a PMH of DM2 on an insulin pump and colorectal cancer s/p right hemicolectomy today. Endocrinology consulted for diabetes management and DM2. Based on current insulin pump settings, suspect patient is being over-basalized given high basal rates compared to the insulin:carb ratio    Home Regimen:  Patient uses the medtronic insulin pump and Libre2  Pump settings are as follows:  TDD 52.425  12AM 1.8 units per hour  6:30AM 2 units per hour  12:30PM 2.45 units per hour  6:30PM 2.55 units per hour  8PM 2.55 units per hour    ICR:  12AM 1:15  7AM 1:12  12:30PM 1:15    ISF 1:30    #DM2 A1c 6.8%  #insulin pump at home. Off pump while in the hospital  - Glucose Target 100-180: overall improving towards goal . Patient is on TPN managed Nutrition Support Team: increased to 100G dextrose, continue 44 units of Regular insulin   - Add Levemir 5 units sq at bedtime   - Continue Moderate customized moderate Admelog correction scale every 6 hours while NPO  - Check FS q 6 hours   - Hypoglycemia Protocol       For future use if started on clears:  Please clarify with Endocrine   -consistent carbohydrate consideration   -low admelog correction scales before meals   -low admelog scales before bedtime  -hold basal insulin until noting glucose trend   if glucose >180 X 2 - than start levemir 15 units every 24 hours   -FSG before meals and before bedtime  -Carb consistent diet once able to tolerate  -hypoglycemia protocol in place if needed    when advanced to regular carb consistent diet   than will need basal bolus + correction scale   anticipate Levemir 15 units and ademelog 5 units premeals and low dose correction scale     #Discharge  -follow up with Dr. Anand  -basal bolus vs resume insulin pump on discharge, likely will need adjustment in settings prior to resuming pump   -Continue glucose monitoring with Free style bonnie   -Will have Advanced Practice Nurse Consult Diabetes Specialist see pt closer to discharge    #HTN  -BP target <130/80  -Consider resuming losartan 25mg daily and hctz 12.5mg daily- management as per primary team  -outpt mc/cr ratio     #HLD  -On Pravastatin 40 mg QHS at home   -Resume once able to tolerate orals if no contraindications   -LDL goal less than 70   -Management as per primary team     D/w Surgery team [id number]     Adrienne Taveras  Nurse Practitioner  Division of Endocrinology & Diabetes  In house pager #[phone]    If before 9AM or after 6PM, or on weekends/holidays, please call endocrine answering service for assistance ([phone]).For nonurgent matters email [email or web address] for assistance.    This is a 74 yo F /w a PMH of DM2 on an insulin pump and colorectal cancer s/p right hemicolectomy today. Endocrinology consulted for diabetes management and DM2. Based on current insulin pump settings, suspect patient is being over-basalized given high basal rates compared to the insulin:carb ratio    Home Regimen:  Patient uses the medtronic insulin pump and Libre2  Pump settings are as follows:  TDD 52.425  12AM 1.8 units per hour  6:30AM 2 units per hour  12:30PM 2.45 units per hour  6:30PM 2.55 units per hour  8PM 2.55 units per hour    ICR:  12AM 1:15  7AM 1:12  12:30PM 1:15    ISF 1:30    #DM2 A1c 6.8%  #insulin pump at home. Off pump while in the hospital  - Glucose Target 100-180: overall improving towards goal . Patient is on TPN managed Nutrition Support Team: increased to 100G dextrose, continue 44 units of Regular insulin   - Add Levemir 5 units sq at bedtime   - Continue Moderate customized moderate Admelog correction scale every 6 hours while NPO  - Check FS q 6 hours   - Hypoglycemia Protocol       For future use if started on clears:  Please clarify with Endocrine   -consistent carbohydrate consideration   -low admelog correction scales before meals   -low admelog scales before bedtime  -hold basal insulin until noting glucose trend   if glucose >180 X 2 - than start levemir 15 units every 24 hours   -FSG before meals and before bedtime  -Carb consistent diet once able to tolerate  -hypoglycemia protocol in place if needed    when advanced to regular carb consistent diet   than will need basal bolus + correction scale   anticipate Levemir 15 units and ademelog 5 units premeals and low dose correction scale     #Discharge  -follow up with Dr. Anand  -basal bolus vs resume insulin pump on discharge, likely will need adjustment in settings prior to resuming pump   -Continue glucose monitoring with Free style bonnie   -Will have Advanced Practice Nurse Consult Diabetes Specialist see pt closer to discharge    #HTN  -BP target <130/80  -Consider resuming losartan 25mg daily and hctz 12.5mg daily- management as per primary team  -outpt mc/cr ratio     #HLD  -On Pravastatin 40 mg QHS at home   -Resume once able to tolerate orals if no contraindications   -LDL goal less than 70   -Management as per primary team     D/w Surgery team [id number]     Adrienne Taveras  Nurse Practitioner  Division of Endocrinology & Diabetes  In house pager #[phone]    If before 9AM or after 6PM, or on weekends/holidays, please call endocrine answering service for assistance ([phone]).For nonurgent matters email [email or web address] for assistance.

## 2023-12-09 LAB
ANION GAP SERPL CALC-SCNC: 9 MMOL/L — SIGNIFICANT CHANGE UP (ref 7–14)
ANION GAP SERPL CALC-SCNC: 9 MMOL/L — SIGNIFICANT CHANGE UP (ref 7–14)
BASOPHILS # BLD AUTO: 0.05 K/UL — SIGNIFICANT CHANGE UP (ref 0–0.2)
BASOPHILS # BLD AUTO: 0.05 K/UL — SIGNIFICANT CHANGE UP (ref 0–0.2)
BASOPHILS NFR BLD AUTO: 0.4 % — SIGNIFICANT CHANGE UP (ref 0–2)
BASOPHILS NFR BLD AUTO: 0.4 % — SIGNIFICANT CHANGE UP (ref 0–2)
BLD GP AB SCN SERPL QL: NEGATIVE — SIGNIFICANT CHANGE UP
BLD GP AB SCN SERPL QL: NEGATIVE — SIGNIFICANT CHANGE UP
BUN SERPL-MCNC: 18 MG/DL — SIGNIFICANT CHANGE UP (ref 7–23)
BUN SERPL-MCNC: 18 MG/DL — SIGNIFICANT CHANGE UP (ref 7–23)
CALCIUM SERPL-MCNC: 9.1 MG/DL — SIGNIFICANT CHANGE UP (ref 8.4–10.5)
CALCIUM SERPL-MCNC: 9.1 MG/DL — SIGNIFICANT CHANGE UP (ref 8.4–10.5)
CHLORIDE SERPL-SCNC: 103 MMOL/L — SIGNIFICANT CHANGE UP (ref 98–107)
CHLORIDE SERPL-SCNC: 103 MMOL/L — SIGNIFICANT CHANGE UP (ref 98–107)
CO2 SERPL-SCNC: 26 MMOL/L — SIGNIFICANT CHANGE UP (ref 22–31)
CO2 SERPL-SCNC: 26 MMOL/L — SIGNIFICANT CHANGE UP (ref 22–31)
CREAT SERPL-MCNC: 0.39 MG/DL — LOW (ref 0.5–1.3)
CREAT SERPL-MCNC: 0.39 MG/DL — LOW (ref 0.5–1.3)
EGFR: 106 ML/MIN/1.73M2 — SIGNIFICANT CHANGE UP
EGFR: 106 ML/MIN/1.73M2 — SIGNIFICANT CHANGE UP
EOSINOPHIL # BLD AUTO: 0.32 K/UL — SIGNIFICANT CHANGE UP (ref 0–0.5)
EOSINOPHIL # BLD AUTO: 0.32 K/UL — SIGNIFICANT CHANGE UP (ref 0–0.5)
EOSINOPHIL NFR BLD AUTO: 2.6 % — SIGNIFICANT CHANGE UP (ref 0–6)
EOSINOPHIL NFR BLD AUTO: 2.6 % — SIGNIFICANT CHANGE UP (ref 0–6)
GLUCOSE BLDC GLUCOMTR-MCNC: 139 MG/DL — HIGH (ref 70–99)
GLUCOSE BLDC GLUCOMTR-MCNC: 139 MG/DL — HIGH (ref 70–99)
GLUCOSE BLDC GLUCOMTR-MCNC: 201 MG/DL — HIGH (ref 70–99)
GLUCOSE BLDC GLUCOMTR-MCNC: 201 MG/DL — HIGH (ref 70–99)
GLUCOSE BLDC GLUCOMTR-MCNC: 205 MG/DL — HIGH (ref 70–99)
GLUCOSE BLDC GLUCOMTR-MCNC: 205 MG/DL — HIGH (ref 70–99)
GLUCOSE SERPL-MCNC: 205 MG/DL — HIGH (ref 70–99)
GLUCOSE SERPL-MCNC: 205 MG/DL — HIGH (ref 70–99)
HCT VFR BLD CALC: 33.2 % — LOW (ref 34.5–45)
HCT VFR BLD CALC: 33.2 % — LOW (ref 34.5–45)
HGB BLD-MCNC: 10.3 G/DL — LOW (ref 11.5–15.5)
HGB BLD-MCNC: 10.3 G/DL — LOW (ref 11.5–15.5)
IANC: 9.13 K/UL — HIGH (ref 1.8–7.4)
IANC: 9.13 K/UL — HIGH (ref 1.8–7.4)
IMM GRANULOCYTES NFR BLD AUTO: 0.5 % — SIGNIFICANT CHANGE UP (ref 0–0.9)
IMM GRANULOCYTES NFR BLD AUTO: 0.5 % — SIGNIFICANT CHANGE UP (ref 0–0.9)
LYMPHOCYTES # BLD AUTO: 1.47 K/UL — SIGNIFICANT CHANGE UP (ref 1–3.3)
LYMPHOCYTES # BLD AUTO: 1.47 K/UL — SIGNIFICANT CHANGE UP (ref 1–3.3)
LYMPHOCYTES # BLD AUTO: 12.1 % — LOW (ref 13–44)
LYMPHOCYTES # BLD AUTO: 12.1 % — LOW (ref 13–44)
MAGNESIUM SERPL-MCNC: 2.2 MG/DL — SIGNIFICANT CHANGE UP (ref 1.6–2.6)
MAGNESIUM SERPL-MCNC: 2.2 MG/DL — SIGNIFICANT CHANGE UP (ref 1.6–2.6)
MCHC RBC-ENTMCNC: 25.1 PG — LOW (ref 27–34)
MCHC RBC-ENTMCNC: 25.1 PG — LOW (ref 27–34)
MCHC RBC-ENTMCNC: 31 GM/DL — LOW (ref 32–36)
MCHC RBC-ENTMCNC: 31 GM/DL — LOW (ref 32–36)
MCV RBC AUTO: 80.8 FL — SIGNIFICANT CHANGE UP (ref 80–100)
MCV RBC AUTO: 80.8 FL — SIGNIFICANT CHANGE UP (ref 80–100)
MONOCYTES # BLD AUTO: 1.11 K/UL — HIGH (ref 0–0.9)
MONOCYTES # BLD AUTO: 1.11 K/UL — HIGH (ref 0–0.9)
MONOCYTES NFR BLD AUTO: 9.1 % — SIGNIFICANT CHANGE UP (ref 2–14)
MONOCYTES NFR BLD AUTO: 9.1 % — SIGNIFICANT CHANGE UP (ref 2–14)
NEUTROPHILS # BLD AUTO: 9.13 K/UL — HIGH (ref 1.8–7.4)
NEUTROPHILS # BLD AUTO: 9.13 K/UL — HIGH (ref 1.8–7.4)
NEUTROPHILS NFR BLD AUTO: 75.3 % — SIGNIFICANT CHANGE UP (ref 43–77)
NEUTROPHILS NFR BLD AUTO: 75.3 % — SIGNIFICANT CHANGE UP (ref 43–77)
NRBC # BLD: 0 /100 WBCS — SIGNIFICANT CHANGE UP (ref 0–0)
NRBC # BLD: 0 /100 WBCS — SIGNIFICANT CHANGE UP (ref 0–0)
NRBC # FLD: 0 K/UL — SIGNIFICANT CHANGE UP (ref 0–0)
NRBC # FLD: 0 K/UL — SIGNIFICANT CHANGE UP (ref 0–0)
PHOSPHATE SERPL-MCNC: 2.8 MG/DL — SIGNIFICANT CHANGE UP (ref 2.5–4.5)
PHOSPHATE SERPL-MCNC: 2.8 MG/DL — SIGNIFICANT CHANGE UP (ref 2.5–4.5)
PLATELET # BLD AUTO: 551 K/UL — HIGH (ref 150–400)
PLATELET # BLD AUTO: 551 K/UL — HIGH (ref 150–400)
POTASSIUM SERPL-MCNC: 4.1 MMOL/L — SIGNIFICANT CHANGE UP (ref 3.5–5.3)
POTASSIUM SERPL-MCNC: 4.1 MMOL/L — SIGNIFICANT CHANGE UP (ref 3.5–5.3)
POTASSIUM SERPL-SCNC: 4.1 MMOL/L — SIGNIFICANT CHANGE UP (ref 3.5–5.3)
POTASSIUM SERPL-SCNC: 4.1 MMOL/L — SIGNIFICANT CHANGE UP (ref 3.5–5.3)
RBC # BLD: 4.11 M/UL — SIGNIFICANT CHANGE UP (ref 3.8–5.2)
RBC # BLD: 4.11 M/UL — SIGNIFICANT CHANGE UP (ref 3.8–5.2)
RBC # FLD: 19.1 % — HIGH (ref 10.3–14.5)
RBC # FLD: 19.1 % — HIGH (ref 10.3–14.5)
RH IG SCN BLD-IMP: POSITIVE — SIGNIFICANT CHANGE UP
RH IG SCN BLD-IMP: POSITIVE — SIGNIFICANT CHANGE UP
SODIUM SERPL-SCNC: 138 MMOL/L — SIGNIFICANT CHANGE UP (ref 135–145)
SODIUM SERPL-SCNC: 138 MMOL/L — SIGNIFICANT CHANGE UP (ref 135–145)
WBC # BLD: 12.14 K/UL — HIGH (ref 3.8–10.5)
WBC # BLD: 12.14 K/UL — HIGH (ref 3.8–10.5)
WBC # FLD AUTO: 12.14 K/UL — HIGH (ref 3.8–10.5)
WBC # FLD AUTO: 12.14 K/UL — HIGH (ref 3.8–10.5)

## 2023-12-09 PROCEDURE — 71045 X-RAY EXAM CHEST 1 VIEW: CPT | Mod: 26

## 2023-12-09 PROCEDURE — 99232 SBSQ HOSP IP/OBS MODERATE 35: CPT | Mod: 24

## 2023-12-09 RX ORDER — I.V. FAT EMULSION 20 G/100ML
20.8 EMULSION INTRAVENOUS
Qty: 50 | Refills: 0 | Status: DISCONTINUED | OUTPATIENT
Start: 2023-12-09 | End: 2023-12-11

## 2023-12-09 RX ORDER — ELECTROLYTE SOLUTION,INJ
1 VIAL (ML) INTRAVENOUS
Refills: 0 | Status: DISCONTINUED | OUTPATIENT
Start: 2023-12-09 | End: 2023-12-09

## 2023-12-09 RX ORDER — ACETAMINOPHEN 500 MG
1000 TABLET ORAL EVERY 6 HOURS
Refills: 0 | Status: COMPLETED | OUTPATIENT
Start: 2023-12-09 | End: 2023-12-10

## 2023-12-09 RX ADMIN — Medication 400 MILLIGRAM(S): at 14:10

## 2023-12-09 RX ADMIN — Medication 5: at 06:03

## 2023-12-09 RX ADMIN — Medication 10 MILLIGRAM(S): at 05:53

## 2023-12-09 RX ADMIN — Medication 1000 MILLIGRAM(S): at 14:10

## 2023-12-09 RX ADMIN — Medication 400 MILLIGRAM(S): at 14:00

## 2023-12-09 RX ADMIN — I.V. FAT EMULSION 20.8 ML/HR: 20 EMULSION INTRAVENOUS at 18:56

## 2023-12-09 RX ADMIN — Medication 1000 MILLIGRAM(S): at 06:17

## 2023-12-09 RX ADMIN — SODIUM CHLORIDE 3 MILLILITER(S): 9 INJECTION INTRAMUSCULAR; INTRAVENOUS; SUBCUTANEOUS at 13:52

## 2023-12-09 RX ADMIN — Medication 400 MILLIGRAM(S): at 20:56

## 2023-12-09 RX ADMIN — ENOXAPARIN SODIUM 40 MILLIGRAM(S): 100 INJECTION SUBCUTANEOUS at 06:03

## 2023-12-09 RX ADMIN — SODIUM CHLORIDE 3 MILLILITER(S): 9 INJECTION INTRAMUSCULAR; INTRAVENOUS; SUBCUTANEOUS at 21:43

## 2023-12-09 RX ADMIN — SODIUM CHLORIDE 3 MILLILITER(S): 9 INJECTION INTRAMUSCULAR; INTRAVENOUS; SUBCUTANEOUS at 06:00

## 2023-12-09 RX ADMIN — Medication 10 MILLIGRAM(S): at 20:56

## 2023-12-09 RX ADMIN — Medication 400 MILLIGRAM(S): at 06:02

## 2023-12-09 RX ADMIN — BUDESONIDE AND FORMOTEROL FUMARATE DIHYDRATE 2 PUFF(S): 160; 4.5 AEROSOL RESPIRATORY (INHALATION) at 09:00

## 2023-12-09 RX ADMIN — BUDESONIDE AND FORMOTEROL FUMARATE DIHYDRATE 2 PUFF(S): 160; 4.5 AEROSOL RESPIRATORY (INHALATION) at 20:55

## 2023-12-09 RX ADMIN — CHLORHEXIDINE GLUCONATE 1 APPLICATION(S): 213 SOLUTION TOPICAL at 14:11

## 2023-12-09 RX ADMIN — Medication 1 EACH: at 18:51

## 2023-12-09 RX ADMIN — ALBUTEROL 2 PUFF(S): 90 AEROSOL, METERED ORAL at 20:55

## 2023-12-09 RX ADMIN — Medication 5: at 14:10

## 2023-12-09 RX ADMIN — ALBUTEROL 2 PUFF(S): 90 AEROSOL, METERED ORAL at 09:00

## 2023-12-09 RX ADMIN — Medication 400 MILLIGRAM(S): at 05:53

## 2023-12-09 RX ADMIN — Medication 1000 MILLIGRAM(S): at 21:26

## 2023-12-09 RX ADMIN — PANTOPRAZOLE SODIUM 40 MILLIGRAM(S): 20 TABLET, DELAYED RELEASE ORAL at 14:11

## 2023-12-09 NOTE — PROGRESS NOTE ADULT - SUBJECTIVE AND OBJECTIVE BOX
DATE OF SERVICE: 12-09-23      no events overnight       acetaminophen   IVPB .. 1000 milliGRAM(s) IV Intermittent every 6 hours  albuterol    90 MICROgram(s) HFA Inhaler 2 Puff(s) Inhalation two times a day  budesonide 160 MICROgram(s)/formoterol 4.5 MICROgram(s) Inhaler 2 Puff(s) Inhalation two times a day  chlorhexidine 2% Cloths 1 Application(s) Topical daily  dextrose 50% Injectable 25 Gram(s) IV Push once  dextrose 50% Injectable 25 Gram(s) IV Push once  dextrose 50% Injectable 12.5 Gram(s) IV Push once  dextrose Oral Gel 15 Gram(s) Oral once  enoxaparin Injectable 40 milliGRAM(s) SubCutaneous every 24 hours  erythromycin    ethylsuccinate Suspension 40 mG/mL 400 milliGRAM(s) Oral every 8 hours  influenza  Vaccine (HIGH DOSE) 0.7 milliLiter(s) IntraMuscular once  insulin detemir injectable (LEVEMIR) 5 Unit(s) SubCutaneous at bedtime  insulin lispro (ADMELOG) corrective regimen sliding scale   SubCutaneous every 6 hours  lipid, fat emulsion (Fish Oil and Plant Based) 20% Infusion 20.8 mL/Hr IV Continuous <Continuous>  metoclopramide Injectable 10 milliGRAM(s) IV Push every 8 hours  pantoprazole  Injectable 40 milliGRAM(s) IV Push daily  Parenteral Nutrition - Adult 1 Each TPN Continuous <Continuous>  Parenteral Nutrition - Adult 1 Each TPN Continuous <Continuous>  sodium chloride 0.9% lock flush 3 milliLiter(s) IV Push every 8 hours  sodium phosphate 30 milliMole(s)/500 mL IVPB 30 milliMole(s) IV Intermittent once                            10.3   12.14 )-----------( 551      ( 09 Dec 2023 05:20 )             33.2       Hemoglobin: 10.3 g/dL (12-09 @ 05:20)  Hemoglobin: 10.4 g/dL (12-08 @ 06:23)  Hemoglobin: 11.4 g/dL (12-07 @ 02:05)  Hemoglobin: 10.0 g/dL (12-06 @ 04:37)  Hemoglobin: 9.3 g/dL (12-05 @ 06:49)      12-09    138  |  103  |  18  ----------------------------<  205<H>  4.1   |  26  |  0.39<L>    Ca    9.1      09 Dec 2023 05:20  Phos  2.8     12-09  Mg     2.20     12-09      Creatinine Trend: 0.39<--, 0.47<--, 0.51<--, 0.43<--, 0.40<--, 0.39<--    COAGS:           T(C): 37 (12-09-23 @ 08:45), Max: 37 (12-08-23 @ 20:19)  HR: 96 (12-09-23 @ 08:45) (96 - 101)  BP: 103/50 (12-09-23 @ 08:45) (103/50 - 119/63)  RR: 18 (12-09-23 @ 08:45) (18 - 18)  SpO2: 100% (12-09-23 @ 08:45) (99% - 100%)  Wt(kg): --    I&O's Summary    08 Dec 2023 07:01  -  09 Dec 2023 07:00  --------------------------------------------------------  IN: 843.6 mL / OUT: 1806 mL / NET: -962.4 mL    09 Dec 2023 07:01  -  09 Dec 2023 11:46  --------------------------------------------------------  IN: 252 mL / OUT: 220 mL / NET: 32 mL       General:  Alert and Oriented * 3.   Head: Normocephalic and atraumatic.   Neck: No JVD. No bruits. Supple. Does not appear to be enlarged.   Cardiovascular: + S1,S2 ; RRR Soft systolic murmur at the left lower sternal border. No rubs noted.    Lungs: CTA b/l. No rhonchi, rales or wheezes.   Abdomen: distended NT  Extremities: No clubbing/cyanosis/edema.   Neurologic: Moves all four extremities. Full range of motion.   Skin: Warm and moist. The patient's skin has normal elasticity and good skin turgor.   Psychiatric: Appropriate mood and affect.  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      -- tolerated procedure well from CV perspective  -- pain management, PT  -- supplement lytes per sx   -- s/p NGT- clamp per sx, CT noted with ileus, repeat CT noted, on TPN  -- eventually resume home meds: Asa 81mg, Pravastatin 40mg when ok from a surgical perspective  -- BP meds Hctz 12.5mg, Losartan 25mg on hold, BP controlled  -- s/p placement of IR drain

## 2023-12-09 NOTE — PROGRESS NOTE ADULT - NS ATTEND AMEND GEN_ALL_CORE FT
Patient seen and examined. Agree with plan as detailed in PA/NP Note.     Bella Aguilera MD  Pager: [phone] Patient seen and examined. Agree with plan as detailed in PA/NP Note.     Bella Aguilera MD  Pager: [phone]

## 2023-12-09 NOTE — PROGRESS NOTE ADULT - SUBJECTIVE AND OBJECTIVE BOX
NUTRITION NOTE  [id number] THOMASTUCKER  ===============================    Interval events - Patient was seen and examined at bedside, no acute events overnight. Patient denies chest pain, shortness of breath, nausea or vomiting at this time. PICC placed and TPN was started on 23 for nutritional support. Pt remains NPO with NGT in place. Pt is s/p anterior abdominal drainage catheter placement on  in IR.    ROS: Except as noted above, all other systems reviewed and are negative     Allergies  Darvocet A500 (Other; Urticaria)  Percocet 10/325 (Other; Urticaria)  codeine (Other)  Lantus (Swelling)  Purell    PAST MEDICAL & SURGICAL HISTORY:  History of hypertension  Diabetes wears insulin pump  GERD (gastroesophageal reflux disease)  Uterine leiomyoma  Hyperlipidemia  Asthma  Obesity  Lymphadenopathy left lower extremity ;  - current  H/O insertion of insulin pump  OA (osteoarthritis)  Malignant neoplasm of cecum  H/O bilateral breast reduction surgery  History of appendectomy  History of cholecystectomy  H/O: hysterectomy  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 - [facility]  H/O total knee replacement, left  S/P bunionectomy    FAMILY HISTORY:  Diabetes mellitus (Father)  FH: breast cancer (Aunt)    Vital Signs Last 24 Hrs  T(C): 37 (09 Dec 2023 08:45), Max: 37 (08 Dec 2023 20:19)  T(F): 98.6 (09 Dec 2023 08:45), Max: 98.6 (08 Dec 2023 20:19)  HR: 96 (09 Dec 2023 08:45) (96 - 101)  BP: 103/50 (09 Dec 2023 08:45) (103/50 - 119/63)  RR: 18 (09 Dec 2023 08:45) (18 - 18)  SpO2: 100% (09 Dec 2023 08:45) (99% - 100%)    MEDICATIONS  (STANDING):  acetaminophen   IVPB .. 1000 milliGRAM(s) IV Intermittent every 6 hours  albuterol    90 MICROgram(s) HFA Inhaler 2 Puff(s) Inhalation two times a day  budesonide 160 MICROgram(s)/formoterol 4.5 MICROgram(s) Inhaler 2 Puff(s) Inhalation two times a day  chlorhexidine 2% Cloths 1 Application(s) Topical daily  dextrose 50% Injectable 25 Gram(s) IV Push once  dextrose 50% Injectable 12.5 Gram(s) IV Push once  dextrose 50% Injectable 25 Gram(s) IV Push once  dextrose Oral Gel 15 Gram(s) Oral once  enoxaparin Injectable 40 milliGRAM(s) SubCutaneous every 24 hours  erythromycin    ethylsuccinate Suspension 40 mG/mL 400 milliGRAM(s) Oral every 8 hours  influenza  Vaccine (HIGH DOSE) 0.7 milliLiter(s) IntraMuscular once  insulin detemir injectable (LEVEMIR) 5 Unit(s) SubCutaneous at bedtime  insulin lispro (ADMELOG) corrective regimen sliding scale   SubCutaneous every 6 hours  lipid, fat emulsion (Fish Oil and Plant Based) 20% Infusion 20.8 mL/Hr (20.8 mL/Hr) IV Continuous <Continuous>  metoclopramide Injectable 10 milliGRAM(s) IV Push every 8 hours  pantoprazole  Injectable 40 milliGRAM(s) IV Push daily  Parenteral Nutrition - Adult 1 Each (63 mL/Hr) TPN Continuous <Continuous>  Parenteral Nutrition - Adult 1 Each (63 mL/Hr) TPN Continuous <Continuous>  sodium chloride 0.9% lock flush 3 milliLiter(s) IV Push every 8 hours  sodium phosphate 30 milliMole(s)/500 mL IVPB 30 milliMole(s) IV Intermittent once    I&O's Detail    08 Dec 2023 07:  -  09 Dec 2023 07:00  --------------------------------------------------------  IN:    Fat Emulsion (Fish Oil &amp; Plant Based) 20% Infusion: 249.6 mL    Free Water: 90 mL    TPN (Total Parenteral Nutrition): 504 mL  Total IN: 843.6 mL    OUT:    Drain (mL): 6 mL    Nasogastric/Oral tube (mL): 600 mL    Oral Fluid: 0 mL    Voided (mL): 1200 mL  Total OUT: 1806 mL    Total NET: -962.4 mL      09 Dec 2023 07:  -  09 Dec 2023 11:32  --------------------------------------------------------  IN:    TPN (Total Parenteral Nutrition): 252 mL  Total IN: 252 mL    OUT:    Drain (mL): 20 mL    Nasogastric/Oral tube (mL): 0 mL    Voided (mL): 200 mL  Total OUT: 220 mL    Total NET: 32 mL    POCT Blood Glucose.: 205 mg/dL (09 Dec 2023 06:00)  POCT Blood Glucose.: 175 mg/dL (08 Dec 2023 22:51)  POCT Blood Glucose.: 170 mg/dL (08 Dec 2023 18:21)  POCT Blood Glucose.: 165 mg/dL (08 Dec 2023 17:11)  POCT Blood Glucose.: 174 mg/dL (08 Dec 2023 12:22)    Daily Weight in k.6 (03 Dec 2023 00:00)    Drug Dosing Weight  Height (cm): 152.4 (2023 06:23)  Weight (kg): 75.659 (2023 06:23)  BMI (kg/m2): 32.6 (2023 06:23)  BSA (m2): 1.73 (2023 06:23)    PHYSICAL EXAM:  Constitutional: A&Ox3, NAD, resting comfortably in bed  Respiratory: nonlabored respirations   Abdomen: Soft, nondistended, drainage bag with dark red fluid, NGT in place with bilious output    Extremities: WWP, SEAMAN spontaneously  PICC Site: double lumen PICC placed on 23 in [facility] clean and dry     Diet: NPO and TPN/lipids (started on 23)    LABORATORY                                                   10.3   12.14 )-----------( 551      ( 09 Dec 2023 05:20 )             33.2       138  |  103  |  18  ----------------------------<  205<H>  4.1   |  26  |  0.39<L>    Ca    9.1      09 Dec 2023 05:20  Phos  2.8       Mg     2.20      Chol 65 LDL -- HDL 29<L> Trig 81    CT Abdomen and Pelvis 23: IMPRESSION: Dilated loops of small bowel the level of the terminal ileum, likely ileus in the postoperative setting. Postoperative pneumoperitoneum and diffuse subcutaneous emphysema.    CT Abdomen and Pelvis on 23: IMPRESSION: Interval increase in size of fluid anterior to the ileocolic anastomosis with new peripheral enhancement, probably early abscess formation. nterval decrease in small bowel dilatation, probably ileus.    ASSESSMENT/PLAN:  72 y/o female with PMH asthma, T2DM on insulin pump, GERD, HTN, HLD, OA, obesity is s/p robotic partial colectomy for moderately differentiated adenocarcinoma of cecal mass on 23. CT abd/pelvis on 23 showed findings consistent with post op ileus. Pt remains NPO with NGT in place. Nutrition consult called for initiation of parenteral nutrition in view of severe protein calorie malnutrition and prolonged NPO status. PICC placed and TPN was started on 23. CT on  showed: Interval increase in size of fluid anterior to the ileocolic anastomosis, with new peripheral enhancement, probably early abscess formation and decrease in small bowel dilatation, probably ileus. Pt is s/p anterior abdominal drainage catheter placement on 23.    continue TPN with infusion volume of 1.5L, TPN will provide 1189 kcal/day, decreased dextrose in tonight' TPN bag      labs reviewed - electrolytes adjusted in TPN bag    monitor fingersticks, obtain daily weights - continue with 44 units of insulin in tonight's TPN bag, will continue to trend FS and make insulin adjustments as needed, will follow up with Endocrine service for continued management    continue parenteral nutrition at this time, will follow up with primary team on plan     1.  Severe protein calorie malnutrition being optimized with TPN: CHO [85] gm.  AA [100] gm. SMOF Lipids [50] gm.  2.  Hyperglycemia managed with: [44] units of regular insulin    3.  Check fluid balance daily.  Strict I/O  [ ] [ ]   4.  Daily BMP, Ionized Calcium, Magnesium and Phosphorous   5.  Triglycerides at initiation of TPN and monthly  Chol 65 LDL -- HDL 29<L> Trig 81    Nutrition Support [id number]  NUTRITION NOTE  [id number] THOMASTUCKER  ===============================    Interval events - Patient was seen and examined at bedside, no acute events overnight. Patient denies chest pain, shortness of breath, nausea or vomiting at this time. PICC placed and TPN was started on 23 for nutritional support. Pt remains NPO with NGT in place. Pt is s/p anterior abdominal drainage catheter placement on  in IR.    ROS: Except as noted above, all other systems reviewed and are negative     Allergies  Darvocet A500 (Other; Urticaria)  Percocet 10/325 (Other; Urticaria)  codeine (Other)  Lantus (Swelling)  Purell    PAST MEDICAL & SURGICAL HISTORY:  History of hypertension  Diabetes wears insulin pump  GERD (gastroesophageal reflux disease)  Uterine leiomyoma  Hyperlipidemia  Asthma  Obesity  Lymphadenopathy left lower extremity ;  - current  H/O insertion of insulin pump  OA (osteoarthritis)  Malignant neoplasm of cecum  H/O bilateral breast reduction surgery  History of appendectomy  History of cholecystectomy  H/O: hysterectomy  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 - [facility]  H/O total knee replacement, left  S/P bunionectomy    FAMILY HISTORY:  Diabetes mellitus (Father)  FH: breast cancer (Aunt)    Vital Signs Last 24 Hrs  T(C): 37 (09 Dec 2023 08:45), Max: 37 (08 Dec 2023 20:19)  T(F): 98.6 (09 Dec 2023 08:45), Max: 98.6 (08 Dec 2023 20:19)  HR: 96 (09 Dec 2023 08:45) (96 - 101)  BP: 103/50 (09 Dec 2023 08:45) (103/50 - 119/63)  RR: 18 (09 Dec 2023 08:45) (18 - 18)  SpO2: 100% (09 Dec 2023 08:45) (99% - 100%)    MEDICATIONS  (STANDING):  acetaminophen   IVPB .. 1000 milliGRAM(s) IV Intermittent every 6 hours  albuterol    90 MICROgram(s) HFA Inhaler 2 Puff(s) Inhalation two times a day  budesonide 160 MICROgram(s)/formoterol 4.5 MICROgram(s) Inhaler 2 Puff(s) Inhalation two times a day  chlorhexidine 2% Cloths 1 Application(s) Topical daily  dextrose 50% Injectable 25 Gram(s) IV Push once  dextrose 50% Injectable 12.5 Gram(s) IV Push once  dextrose 50% Injectable 25 Gram(s) IV Push once  dextrose Oral Gel 15 Gram(s) Oral once  enoxaparin Injectable 40 milliGRAM(s) SubCutaneous every 24 hours  erythromycin    ethylsuccinate Suspension 40 mG/mL 400 milliGRAM(s) Oral every 8 hours  influenza  Vaccine (HIGH DOSE) 0.7 milliLiter(s) IntraMuscular once  insulin detemir injectable (LEVEMIR) 5 Unit(s) SubCutaneous at bedtime  insulin lispro (ADMELOG) corrective regimen sliding scale   SubCutaneous every 6 hours  lipid, fat emulsion (Fish Oil and Plant Based) 20% Infusion 20.8 mL/Hr (20.8 mL/Hr) IV Continuous <Continuous>  metoclopramide Injectable 10 milliGRAM(s) IV Push every 8 hours  pantoprazole  Injectable 40 milliGRAM(s) IV Push daily  Parenteral Nutrition - Adult 1 Each (63 mL/Hr) TPN Continuous <Continuous>  Parenteral Nutrition - Adult 1 Each (63 mL/Hr) TPN Continuous <Continuous>  sodium chloride 0.9% lock flush 3 milliLiter(s) IV Push every 8 hours  sodium phosphate 30 milliMole(s)/500 mL IVPB 30 milliMole(s) IV Intermittent once    I&O's Detail    08 Dec 2023 07:  -  09 Dec 2023 07:00  --------------------------------------------------------  IN:    Fat Emulsion (Fish Oil &amp; Plant Based) 20% Infusion: 249.6 mL    Free Water: 90 mL    TPN (Total Parenteral Nutrition): 504 mL  Total IN: 843.6 mL    OUT:    Drain (mL): 6 mL    Nasogastric/Oral tube (mL): 600 mL    Oral Fluid: 0 mL    Voided (mL): 1200 mL  Total OUT: 1806 mL    Total NET: -962.4 mL      09 Dec 2023 07:  -  09 Dec 2023 11:32  --------------------------------------------------------  IN:    TPN (Total Parenteral Nutrition): 252 mL  Total IN: 252 mL    OUT:    Drain (mL): 20 mL    Nasogastric/Oral tube (mL): 0 mL    Voided (mL): 200 mL  Total OUT: 220 mL    Total NET: 32 mL    POCT Blood Glucose.: 205 mg/dL (09 Dec 2023 06:00)  POCT Blood Glucose.: 175 mg/dL (08 Dec 2023 22:51)  POCT Blood Glucose.: 170 mg/dL (08 Dec 2023 18:21)  POCT Blood Glucose.: 165 mg/dL (08 Dec 2023 17:11)  POCT Blood Glucose.: 174 mg/dL (08 Dec 2023 12:22)    Daily Weight in k.6 (03 Dec 2023 00:00)    Drug Dosing Weight  Height (cm): 152.4 (2023 06:23)  Weight (kg): 75.659 (2023 06:23)  BMI (kg/m2): 32.6 (2023 06:23)  BSA (m2): 1.73 (2023 06:23)    PHYSICAL EXAM:  Constitutional: A&Ox3, NAD, resting comfortably in bed  Respiratory: nonlabored respirations   Abdomen: Soft, nondistended, drainage bag with dark red fluid, NGT in place with bilious output    Extremities: WWP, SEAMAN spontaneously  PICC Site: double lumen PICC placed on 23 in [facility] clean and dry     Diet: NPO and TPN/lipids (started on 23)    LABORATORY                                                   10.3   12.14 )-----------( 551      ( 09 Dec 2023 05:20 )             33.2       138  |  103  |  18  ----------------------------<  205<H>  4.1   |  26  |  0.39<L>    Ca    9.1      09 Dec 2023 05:20  Phos  2.8       Mg     2.20      Chol 65 LDL -- HDL 29<L> Trig 81    CT Abdomen and Pelvis 23: IMPRESSION: Dilated loops of small bowel the level of the terminal ileum, likely ileus in the postoperative setting. Postoperative pneumoperitoneum and diffuse subcutaneous emphysema.    CT Abdomen and Pelvis on 23: IMPRESSION: Interval increase in size of fluid anterior to the ileocolic anastomosis with new peripheral enhancement, probably early abscess formation. nterval decrease in small bowel dilatation, probably ileus.    ASSESSMENT/PLAN:  74 y/o female with PMH asthma, T2DM on insulin pump, GERD, HTN, HLD, OA, obesity is s/p robotic partial colectomy for moderately differentiated adenocarcinoma of cecal mass on 23. CT abd/pelvis on 23 showed findings consistent with post op ileus. Pt remains NPO with NGT in place. Nutrition consult called for initiation of parenteral nutrition in view of severe protein calorie malnutrition and prolonged NPO status. PICC placed and TPN was started on 23. CT on  showed: Interval increase in size of fluid anterior to the ileocolic anastomosis, with new peripheral enhancement, probably early abscess formation and decrease in small bowel dilatation, probably ileus. Pt is s/p anterior abdominal drainage catheter placement on 23.    continue TPN with infusion volume of 1.5L, TPN will provide 1189 kcal/day, decreased dextrose in tonight' TPN bag      labs reviewed - electrolytes adjusted in TPN bag    monitor fingersticks, obtain daily weights - continue with 44 units of insulin in tonight's TPN bag, will continue to trend FS and make insulin adjustments as needed, will follow up with Endocrine service for continued management    continue parenteral nutrition at this time, will follow up with primary team on plan     1.  Severe protein calorie malnutrition being optimized with TPN: CHO [85] gm.  AA [100] gm. SMOF Lipids [50] gm.  2.  Hyperglycemia managed with: [44] units of regular insulin    3.  Check fluid balance daily.  Strict I/O  [ ] [ ]   4.  Daily BMP, Ionized Calcium, Magnesium and Phosphorous   5.  Triglycerides at initiation of TPN and monthly  Chol 65 LDL -- HDL 29<L> Trig 81    Nutrition Support [id number]

## 2023-12-09 NOTE — PROGRESS NOTE ADULT - SUBJECTIVE AND OBJECTIVE BOX
GENERAL SURGERY PROGRESS NOTE    Patient: NESTOR MABRY , 73y ([birth year])Female   MRN: [id number]  Location: [place] [address] A  Visit: 11-22-23 Inpatient  Date: 12-09-23 @ 11:41    Post-Op Day #:17 hemicolectomy    Subjective: No acute events overnight. Patient resting comfortably in bed, no complaints. Passing gas and had bowel movement. No N/V.     PAST MEDICAL & SURGICAL HISTORY:  History of hypertension  Diabetes  wears insulin pump  GERD (gastroesophageal reflux disease)  Uterine leiomyoma  Hyperlipdemia  Asthma  Obesity  Lymphadenopathy  left lower extremitiy ; 1966 - current  H/O insertion of insulin pump  OA (osteoarthritis)  Malignant neoplasm of cecum  H/O bilateral breast reduction surgery  History of appendectomy  Htory of cholecystectomy  H/O: hysterectomy  History of total right knee replacement  3/2016 - [facility]  H/O total knee replacement, left  S/P bunionectomy    Vitals: T(F): 98.6 (12-09-23 @ 08:45), Max: 98.6 (12-08-23 @ 20:19)  HR: 96 (12-09-23 @ 08:45)  BP: 103/50 (12-09-23 @ 08:45)  RR: 18 (12-09-23 @ 08:45)  SpO2: 100% (12-09-23 @ 08:45)      Diet, NPO      12-08-23 @ 07:01  -  12-09-23 @ 07:00  --------------------------------------------------------  IN:    Fat Emulsion (Fish Oil &amp; Plant Based) 20% Infusion: 249.6 mL    Free Water: 90 mL    TPN (Total Parenteral Nutrition): 504 mL  Total IN: 843.6 mL    OUT:    Drain (mL): 6 mL    Nasogastric/Oral tube (mL): 600 mL    Oral Fluid: 0 mL    Voided (mL): 1200 mL  Total OUT: 1806 mL    Total NET: -962.4 mL    PHYSICAL EXAM  GEN: No acute distress   CV: RRR, no JVD  LUNGS: Respirations unlabored, no use of accessory muscles  ABD: Abdomen soft, mildly tender, ND  EXT: No peripheral edema. Regular range of motion.   INCISION: clean and dry. IR drain in place.     MEDICATIONS  (STANDING):  acetaminophen   IVPB .. 1000 milliGRAM(s) IV Intermittent every 6 hours  albuterol    90 MICROgram(s) HFA Inhaler 2 Puff(s) Inhalation two times a day  budesonide 160 MICROgram(s)/formoterol 4.5 MICROgram(s) Inhaler 2 Puff(s) Inhalation two times a day  chlorhexidine 2% Cloths 1 Application(s) Topical daily  dextrose 50% Injectable 25 Gram(s) IV Push once  dextrose 50% Injectable 25 Gram(s) IV Push once  dextrose 50% Injectable 12.5 Gram(s) IV Push once  dextrose Oral Gel 15 Gram(s) Oral once  enoxaparin Injectable 40 milliGRAM(s) SubCutaneous every 24 hours  erythromycin    ethylsuccinate Suspension 40 mG/mL 400 milliGRAM(s) Oral every 8 hours  influenza  Vaccine (HIGH DOSE) 0.7 milliLiter(s) IntraMuscular once  insulin detemir injectable (LEVEMIR) 5 Unit(s) SubCutaneous at bedtime  insulin lispro (ADMELOG) corrective regimen sliding scale   SubCutaneous every 6 hours  lipid, fat emulsion (Fish Oil and Plant Based) 20% Infusion 20.8 mL/Hr (20.8 mL/Hr) IV Continuous <Continuous>  metoclopramide Injectable 10 milliGRAM(s) IV Push every 8 hours  pantoprazole  Injectable 40 milliGRAM(s) IV Push daily  Parenteral Nutrition - Adult 1 Each (63 mL/Hr) TPN Continuous <Continuous>  Parenteral Nutrition - Adult 1 Each (63 mL/Hr) TPN Continuous <Continuous>  sodium chloride 0.9% lock flush 3 milliLiter(s) IV Push every 8 hours  sodium phosphate 30 milliMole(s)/500 mL IVPB 30 milliMole(s) IV Intermittent once    MEDICATIONS  (PRN):      DVT PROPHYLAXIS: SCDs, enoxaparin Injectable 40 milliGRAM(s) SubCutaneous every 24 hours    GI PROPHYLAXIS: pantoprazole  Injectable 40 milliGRAM(s) IV Push daily    ANTICOAGULATION:   ANTIBIOTICS: erythromycin    ethylsuccinate Suspension 40 mG/mL 400 milliGRAM(s)        LAB/STUDIES:  CAPILLARY BLOOD GLUCOSE      POCT Blood Glucose.: 205 mg/dL (09 Dec 2023 06:00)  POCT Blood Glucose.: 175 mg/dL (08 Dec 2023 22:51)  POCT Blood Glucose.: 170 mg/dL (08 Dec 2023 18:21)  POCT Blood Glucose.: 165 mg/dL (08 Dec 2023 17:11)  POCT Blood Glucose.: 174 mg/dL (08 Dec 2023 12:22)                          10.3   12.14 )-----------( 551      ( 09 Dec 2023 05:20 )             33.2     12-09    138  |  103  |  18  ----------------------------<  205<H>  4.1   |  26  |  0.39<L>    Ca    9.1      09 Dec 2023 05:20  Phos  2.8     12-09  Mg     2.20     12-09          Urinalysis Basic - ( 09 Dec 2023 05:20 )    Color: x / Appearance: x / SG: x / pH: x  Gluc: 205 mg/dL / Ketone: x  / Bili: x / Urobili: x   Blood: x / Protein: x / Nitrite: x   Leuk Esterase: x / RBC: x / WBC x   Sq Epi: x / Non Sq Epi: x / Bacteria: x    Culture - Body Fluid with Gram Stain (collected 07 Dec 2023 12:28)  Source: .Body Fluid  Gram Stain (07 Dec 2023 23:38):    No polymorphonuclear leukocytes per low power field    No organisms seen per oil power field  Preliminary Report (08 Dec 2023 14:12):    No growth    Culture - Fungal, Body Fluid (collected 07 Dec 2023 12:28)  Source: .Body Fluid  Preliminary Report (08 Dec 2023 08:07):    Testing in progress      IMAGING:    ASSESSMENT:  73 year old female with PMH asthma, T2DM on insulin pump, GERD, HTN, HLD, OA, obesity presents s/p robotic partial colectomy for 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 Course c/b ileus. CT on 12/4 showed: Interval increase in size of fluid anterior to the ileocolic anastomosis, with new peripheral enhancement, probably early abscess formation and decrease in small bowel dilatation, probably ileus. Patient pending IR drainage for abscess seen on CT.     Plan  - IR drainage of intraabdominal collection adjacent to anastomosis. Flush 5ccqd.   - Diet: NPO/NGT, possibly clamp NGT this weekend  - TPN via PICC line  - Pain control with Tylenol, Dilaudid PRN  - C/w zosyn  - on reglan for motility, erythromycin   - Protonix 40 mg daily  - Home meds; holding HTN medications  - Appreciate endocrinology recs; plan to restart insulin pump on discharge  - OOB/ambulate/ PT/ IS while in bed  - DVT prophylaxis: Lovenox   - Dispo: Pt recommends Home PT with walker    D Team Surgery  [id number]     Pt seen and examined by Dr. Small   GENERAL SURGERY PROGRESS NOTE    Patient: NESTOR MABRY , 73y ([birth year])Female   MRN: [id number]  Location: [place] [address] A  Visit: 11-22-23 Inpatient  Date: 12-09-23 @ 11:41    Post-Op Day #:17 hemicolectomy    Subjective: No acute events overnight. Patient resting comfortably in bed, no complaints. Passing gas and had bowel movement. No N/V.     PAST MEDICAL & SURGICAL HISTORY:  History of hypertension  Diabetes  wears insulin pump  GERD (gastroesophageal reflux disease)  Uterine leiomyoma  Hyperlipdemia  Asthma  Obesity  Lymphadenopathy  left lower extremitiy ; 1966 - current  H/O insertion of insulin pump  OA (osteoarthritis)  Malignant neoplasm of cecum  H/O bilateral breast reduction surgery  History of appendectomy  Htory of cholecystectomy  H/O: hysterectomy  History of total right knee replacement  3/2016 - [facility]  H/O total knee replacement, left  S/P bunionectomy    Vitals: T(F): 98.6 (12-09-23 @ 08:45), Max: 98.6 (12-08-23 @ 20:19)  HR: 96 (12-09-23 @ 08:45)  BP: 103/50 (12-09-23 @ 08:45)  RR: 18 (12-09-23 @ 08:45)  SpO2: 100% (12-09-23 @ 08:45)      Diet, NPO      12-08-23 @ 07:01  -  12-09-23 @ 07:00  --------------------------------------------------------  IN:    Fat Emulsion (Fish Oil &amp; Plant Based) 20% Infusion: 249.6 mL    Free Water: 90 mL    TPN (Total Parenteral Nutrition): 504 mL  Total IN: 843.6 mL    OUT:    Drain (mL): 6 mL    Nasogastric/Oral tube (mL): 600 mL    Oral Fluid: 0 mL    Voided (mL): 1200 mL  Total OUT: 1806 mL    Total NET: -962.4 mL    PHYSICAL EXAM  GEN: No acute distress   CV: RRR, no JVD  LUNGS: Respirations unlabored, no use of accessory muscles  ABD: Abdomen soft, mildly tender, ND  EXT: No peripheral edema. Regular range of motion.   INCISION: clean and dry. IR drain in place.     MEDICATIONS  (STANDING):  acetaminophen   IVPB .. 1000 milliGRAM(s) IV Intermittent every 6 hours  albuterol    90 MICROgram(s) HFA Inhaler 2 Puff(s) Inhalation two times a day  budesonide 160 MICROgram(s)/formoterol 4.5 MICROgram(s) Inhaler 2 Puff(s) Inhalation two times a day  chlorhexidine 2% Cloths 1 Application(s) Topical daily  dextrose 50% Injectable 25 Gram(s) IV Push once  dextrose 50% Injectable 25 Gram(s) IV Push once  dextrose 50% Injectable 12.5 Gram(s) IV Push once  dextrose Oral Gel 15 Gram(s) Oral once  enoxaparin Injectable 40 milliGRAM(s) SubCutaneous every 24 hours  erythromycin    ethylsuccinate Suspension 40 mG/mL 400 milliGRAM(s) Oral every 8 hours  influenza  Vaccine (HIGH DOSE) 0.7 milliLiter(s) IntraMuscular once  insulin detemir injectable (LEVEMIR) 5 Unit(s) SubCutaneous at bedtime  insulin lispro (ADMELOG) corrective regimen sliding scale   SubCutaneous every 6 hours  lipid, fat emulsion (Fish Oil and Plant Based) 20% Infusion 20.8 mL/Hr (20.8 mL/Hr) IV Continuous <Continuous>  metoclopramide Injectable 10 milliGRAM(s) IV Push every 8 hours  pantoprazole  Injectable 40 milliGRAM(s) IV Push daily  Parenteral Nutrition - Adult 1 Each (63 mL/Hr) TPN Continuous <Continuous>  Parenteral Nutrition - Adult 1 Each (63 mL/Hr) TPN Continuous <Continuous>  sodium chloride 0.9% lock flush 3 milliLiter(s) IV Push every 8 hours  sodium phosphate 30 milliMole(s)/500 mL IVPB 30 milliMole(s) IV Intermittent once    MEDICATIONS  (PRN):      DVT PROPHYLAXIS: SCDs, enoxaparin Injectable 40 milliGRAM(s) SubCutaneous every 24 hours    GI PROPHYLAXIS: pantoprazole  Injectable 40 milliGRAM(s) IV Push daily    ANTICOAGULATION:   ANTIBIOTICS: erythromycin    ethylsuccinate Suspension 40 mG/mL 400 milliGRAM(s)        LAB/STUDIES:  CAPILLARY BLOOD GLUCOSE      POCT Blood Glucose.: 205 mg/dL (09 Dec 2023 06:00)  POCT Blood Glucose.: 175 mg/dL (08 Dec 2023 22:51)  POCT Blood Glucose.: 170 mg/dL (08 Dec 2023 18:21)  POCT Blood Glucose.: 165 mg/dL (08 Dec 2023 17:11)  POCT Blood Glucose.: 174 mg/dL (08 Dec 2023 12:22)                          10.3   12.14 )-----------( 551      ( 09 Dec 2023 05:20 )             33.2     12-09    138  |  103  |  18  ----------------------------<  205<H>  4.1   |  26  |  0.39<L>    Ca    9.1      09 Dec 2023 05:20  Phos  2.8     12-09  Mg     2.20     12-09          Urinalysis Basic - ( 09 Dec 2023 05:20 )    Color: x / Appearance: x / SG: x / pH: x  Gluc: 205 mg/dL / Ketone: x  / Bili: x / Urobili: x   Blood: x / Protein: x / Nitrite: x   Leuk Esterase: x / RBC: x / WBC x   Sq Epi: x / Non Sq Epi: x / Bacteria: x    Culture - Body Fluid with Gram Stain (collected 07 Dec 2023 12:28)  Source: .Body Fluid  Gram Stain (07 Dec 2023 23:38):    No polymorphonuclear leukocytes per low power field    No organisms seen per oil power field  Preliminary Report (08 Dec 2023 14:12):    No growth    Culture - Fungal, Body Fluid (collected 07 Dec 2023 12:28)  Source: .Body Fluid  Preliminary Report (08 Dec 2023 08:07):    Testing in progress      IMAGING:    ASSESSMENT:  73 year old female with PMH asthma, T2DM on insulin pump, GERD, HTN, HLD, OA, obesity presents s/p robotic partial colectomy for 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 Course c/b ileus. CT on 12/4 showed: Interval increase in size of fluid anterior to the ileocolic anastomosis, with new peripheral enhancement, probably early abscess formation and decrease in small bowel dilatation, probably ileus. Patient pending IR drainage for abscess seen on CT.     Plan  - IR drainage of intraabdominal collection adjacent to anastomosis. Flush 5ccqd.   - Diet: NPO/NGT, possibly clamp NGT this weekend  - TPN via PICC line  - Pain control with Tylenol, Dilaudid PRN  - C/w zosyn  - on reglan for motility, erythromycin   - Protonix 40 mg daily  - Home meds; holding HTN medications  - Appreciate endocrinology recs; plan to restart insulin pump on discharge  - OOB/ambulate/ PT/ IS while in bed  - DVT prophylaxis: Lovenox   - Dispo: Pt recommends Home PT with walker    D Team Surgery  [id number]     Pt seen and examined by Dr. Small

## 2023-12-09 NOTE — PROGRESS NOTE ADULT - SUBJECTIVE AND OBJECTIVE BOX
Name of Patient : NESTOR MABRY  MRN: [id number]  Date of visit: 12-09-23       Subjective: Patient seen and examined. No new events except as noted.     REVIEW OF SYSTEMS:    CONSTITUTIONAL: No weakness, fevers or chills  EYES/ENT: No visual changes;  No vertigo or throat pain   NECK: No pain or stiffness  RESPIRATORY: No cough, wheezing, hemoptysis; No shortness of breath  CARDIOVASCULAR: No chest pain or palpitations  GASTROINTESTINAL: No abdominal or epigastric pain. No nausea, vomiting, or hematemesis; No diarrhea or constipation. No melena or hematochezia.  GENITOURINARY: No dysuria, frequency or hematuria  NEUROLOGICAL: No numbness or weakness  SKIN: No itching, burning, rashes, or lesions   All other review of systems is negative unless indicated above.    MEDICATIONS:  MEDICATIONS  (STANDING):  acetaminophen   IVPB .. 1000 milliGRAM(s) IV Intermittent every 6 hours  albuterol    90 MICROgram(s) HFA Inhaler 2 Puff(s) Inhalation two times a day  budesonide 160 MICROgram(s)/formoterol 4.5 MICROgram(s) Inhaler 2 Puff(s) Inhalation two times a day  chlorhexidine 2% Cloths 1 Application(s) Topical daily  dextrose 50% Injectable 25 Gram(s) IV Push once  dextrose 50% Injectable 12.5 Gram(s) IV Push once  dextrose 50% Injectable 25 Gram(s) IV Push once  dextrose Oral Gel 15 Gram(s) Oral once  enoxaparin Injectable 40 milliGRAM(s) SubCutaneous every 24 hours  erythromycin    ethylsuccinate Suspension 40 mG/mL 400 milliGRAM(s) Oral every 8 hours  influenza  Vaccine (HIGH DOSE) 0.7 milliLiter(s) IntraMuscular once  insulin detemir injectable (LEVEMIR) 5 Unit(s) SubCutaneous at bedtime  insulin lispro (ADMELOG) corrective regimen sliding scale   SubCutaneous every 6 hours  lipid, fat emulsion (Fish Oil and Plant Based) 20% Infusion 20.8 mL/Hr (20.8 mL/Hr) IV Continuous <Continuous>  metoclopramide Injectable 10 milliGRAM(s) IV Push every 8 hours  pantoprazole  Injectable 40 milliGRAM(s) IV Push daily  Parenteral Nutrition - Adult 1 Each (63 mL/Hr) TPN Continuous <Continuous>  Parenteral Nutrition - Adult 1 Each (63 mL/Hr) TPN Continuous <Continuous>  sodium chloride 0.9% lock flush 3 milliLiter(s) IV Push every 8 hours  sodium phosphate 30 milliMole(s)/500 mL IVPB 30 milliMole(s) IV Intermittent once      PHYSICAL EXAM:  T(C): 37.4 (12-09-23 @ 20:10), Max: 37.4 (12-09-23 @ 20:10)  HR: 92 (12-09-23 @ 20:10) (91 - 97)  BP: 113/57 (12-09-23 @ 20:10) (103/50 - 125/64)  RR: 16 (12-09-23 @ 20:10) (16 - 18)  SpO2: 98% (12-09-23 @ 20:10) (98% - 100%)  Wt(kg): --  I&O's Summary    08 Dec 2023 07:01  -  09 Dec 2023 07:00  --------------------------------------------------------  IN: 843.6 mL / OUT: 1806 mL / NET: -962.4 mL    09 Dec 2023 07:01  -  09 Dec 2023 22:01  --------------------------------------------------------  IN: 630 mL / OUT: 730 mL / NET: -100 mL          Appearance: Normal	  HEENT:  PERRLA   Lymphatic: No lymphadenopathy   Cardiovascular: Normal S1 S2, no JVD  Respiratory: normal effort , clear  Gastrointestinal:  Soft, Non-tender  Skin: No rashes,  warm to touch  Psychiatry:  Mood & affect appropriate  Musculuskeletal: No edema    recent labs, Imaging and EKGs personally reviewed       12-08-23 @ 07:01  -  12-09-23 @ 07:00  --------------------------------------------------------  IN: 843.6 mL / OUT: 1806 mL / NET: -962.4 mL    12-09-23 @ 07:01  -  12-09-23 @ 22:01  --------------------------------------------------------  IN: 630 mL / OUT: 730 mL / NET: -100 mL             Name of Patient : NESTOR MABRY  MRN: [id number]  Date of visit: 12-09-23       Subjective: Patient seen and examined. No new events except as noted.     REVIEW OF SYSTEMS:    CONSTITUTIONAL: No weakness, fevers or chills  EYES/ENT: No visual changes;  No vertigo or throat pain   NECK: No pain or stiffness  RESPIRATORY: No cough, wheezing, hemoptysis; No shortness of breath  CARDIOVASCULAR: No chest pain or palpitations  GASTROINTESTINAL: No abdominal or epigastric pain. No nausea, vomiting, or hematemesis; No diarrhea or constipation. No melena or hematochezia.  GENITOURINARY: No dysuria, frequency or hematuria  NEUROLOGICAL: No numbness or weakness  SKIN: No itching, burning, rashes, or lesions   All other review of systems is negative unless indicated above.    MEDICATIONS:  MEDICATIONS  (STANDING):  acetaminophen   IVPB .. 1000 milliGRAM(s) IV Intermittent every 6 hours  albuterol    90 MICROgram(s) HFA Inhaler 2 Puff(s) Inhalation two times a day  budesonide 160 MICROgram(s)/formoterol 4.5 MICROgram(s) Inhaler 2 Puff(s) Inhalation two times a day  chlorhexidine 2% Cloths 1 Application(s) Topical daily  dextrose 50% Injectable 25 Gram(s) IV Push once  dextrose 50% Injectable 12.5 Gram(s) IV Push once  dextrose 50% Injectable 25 Gram(s) IV Push once  dextrose Oral Gel 15 Gram(s) Oral once  enoxaparin Injectable 40 milliGRAM(s) SubCutaneous every 24 hours  erythromycin    ethylsuccinate Suspension 40 mG/mL 400 milliGRAM(s) Oral every 8 hours  influenza  Vaccine (HIGH DOSE) 0.7 milliLiter(s) IntraMuscular once  insulin detemir injectable (LEVEMIR) 5 Unit(s) SubCutaneous at bedtime  insulin lispro (ADMELOG) corrective regimen sliding scale   SubCutaneous every 6 hours  lipid, fat emulsion (Fish Oil and Plant Based) 20% Infusion 20.8 mL/Hr (20.8 mL/Hr) IV Continuous <Continuous>  metoclopramide Injectable 10 milliGRAM(s) IV Push every 8 hours  pantoprazole  Injectable 40 milliGRAM(s) IV Push daily  Parenteral Nutrition - Adult 1 Each (63 mL/Hr) TPN Continuous <Continuous>  Parenteral Nutrition - Adult 1 Each (63 mL/Hr) TPN Continuous <Continuous>  sodium chloride 0.9% lock flush 3 milliLiter(s) IV Push every 8 hours  sodium phosphate 30 milliMole(s)/500 mL IVPB 30 milliMole(s) IV Intermittent once      PHYSICAL EXAM:  T(C): 37.4 (12-09-23 @ 20:10), Max: 37.4 (12-09-23 @ 20:10)  HR: 92 (12-09-23 @ 20:10) (91 - 97)  BP: 113/57 (12-09-23 @ 20:10) (103/50 - 125/64)  RR: 16 (12-09-23 @ 20:10) (16 - 18)  SpO2: 98% (12-09-23 @ 20:10) (98% - 100%)  Wt(kg): --  I&O's Summary    08 Dec 2023 07:01  -  09 Dec 2023 07:00  --------------------------------------------------------  IN: 843.6 mL / OUT: 1806 mL / NET: -962.4 mL    09 Dec 2023 07:01  -  09 Dec 2023 22:01  --------------------------------------------------------  IN: 630 mL / OUT: 730 mL / NET: -100 mL          Appearance: Normal	  HEENT:  PERRLA   Lymphatic: No lymphadenopathy   Cardiovascular: Normal S1 S2, no JVD  Respiratory: normal effort , clear  Gastrointestinal:  Soft, Non-tender  Skin: No rashes,  warm to touch  Psychiatry:  Mood & affect appropriate  Musculuskeletal: No edema    recent labs, Imaging and EKGs personally reviewed       12-08-23 @ 07:01  -  12-09-23 @ 07:00  --------------------------------------------------------  IN: 843.6 mL / OUT: 1806 mL / NET: -962.4 mL    12-09-23 @ 07:01  -  12-09-23 @ 22:01  --------------------------------------------------------  IN: 630 mL / OUT: 730 mL / NET: -100 mL

## 2023-12-10 LAB
ANION GAP SERPL CALC-SCNC: 11 MMOL/L — SIGNIFICANT CHANGE UP (ref 7–14)
ANION GAP SERPL CALC-SCNC: 11 MMOL/L — SIGNIFICANT CHANGE UP (ref 7–14)
BUN SERPL-MCNC: 19 MG/DL — SIGNIFICANT CHANGE UP (ref 7–23)
BUN SERPL-MCNC: 19 MG/DL — SIGNIFICANT CHANGE UP (ref 7–23)
CALCIUM SERPL-MCNC: 9.1 MG/DL — SIGNIFICANT CHANGE UP (ref 8.4–10.5)
CALCIUM SERPL-MCNC: 9.1 MG/DL — SIGNIFICANT CHANGE UP (ref 8.4–10.5)
CHLORIDE SERPL-SCNC: 103 MMOL/L — SIGNIFICANT CHANGE UP (ref 98–107)
CHLORIDE SERPL-SCNC: 103 MMOL/L — SIGNIFICANT CHANGE UP (ref 98–107)
CO2 SERPL-SCNC: 25 MMOL/L — SIGNIFICANT CHANGE UP (ref 22–31)
CO2 SERPL-SCNC: 25 MMOL/L — SIGNIFICANT CHANGE UP (ref 22–31)
CREAT SERPL-MCNC: 0.41 MG/DL — LOW (ref 0.5–1.3)
CREAT SERPL-MCNC: 0.41 MG/DL — LOW (ref 0.5–1.3)
EGFR: 104 ML/MIN/1.73M2 — SIGNIFICANT CHANGE UP
EGFR: 104 ML/MIN/1.73M2 — SIGNIFICANT CHANGE UP
GLUCOSE BLDC GLUCOMTR-MCNC: 153 MG/DL — HIGH (ref 70–99)
GLUCOSE BLDC GLUCOMTR-MCNC: 153 MG/DL — HIGH (ref 70–99)
GLUCOSE BLDC GLUCOMTR-MCNC: 161 MG/DL — HIGH (ref 70–99)
GLUCOSE BLDC GLUCOMTR-MCNC: 161 MG/DL — HIGH (ref 70–99)
GLUCOSE BLDC GLUCOMTR-MCNC: 168 MG/DL — HIGH (ref 70–99)
GLUCOSE BLDC GLUCOMTR-MCNC: 168 MG/DL — HIGH (ref 70–99)
GLUCOSE BLDC GLUCOMTR-MCNC: 179 MG/DL — HIGH (ref 70–99)
GLUCOSE BLDC GLUCOMTR-MCNC: 179 MG/DL — HIGH (ref 70–99)
GLUCOSE BLDC GLUCOMTR-MCNC: 182 MG/DL — HIGH (ref 70–99)
GLUCOSE BLDC GLUCOMTR-MCNC: 182 MG/DL — HIGH (ref 70–99)
GLUCOSE SERPL-MCNC: 192 MG/DL — HIGH (ref 70–99)
GLUCOSE SERPL-MCNC: 192 MG/DL — HIGH (ref 70–99)
HCT VFR BLD CALC: 33.3 % — LOW (ref 34.5–45)
HCT VFR BLD CALC: 33.3 % — LOW (ref 34.5–45)
HGB BLD-MCNC: 10.6 G/DL — LOW (ref 11.5–15.5)
HGB BLD-MCNC: 10.6 G/DL — LOW (ref 11.5–15.5)
MAGNESIUM SERPL-MCNC: 2.2 MG/DL — SIGNIFICANT CHANGE UP (ref 1.6–2.6)
MAGNESIUM SERPL-MCNC: 2.2 MG/DL — SIGNIFICANT CHANGE UP (ref 1.6–2.6)
MCHC RBC-ENTMCNC: 25.9 PG — LOW (ref 27–34)
MCHC RBC-ENTMCNC: 25.9 PG — LOW (ref 27–34)
MCHC RBC-ENTMCNC: 31.8 GM/DL — LOW (ref 32–36)
MCHC RBC-ENTMCNC: 31.8 GM/DL — LOW (ref 32–36)
MCV RBC AUTO: 81.2 FL — SIGNIFICANT CHANGE UP (ref 80–100)
MCV RBC AUTO: 81.2 FL — SIGNIFICANT CHANGE UP (ref 80–100)
NRBC # BLD: 0 /100 WBCS — SIGNIFICANT CHANGE UP (ref 0–0)
NRBC # BLD: 0 /100 WBCS — SIGNIFICANT CHANGE UP (ref 0–0)
NRBC # FLD: 0 K/UL — SIGNIFICANT CHANGE UP (ref 0–0)
NRBC # FLD: 0 K/UL — SIGNIFICANT CHANGE UP (ref 0–0)
PHOSPHATE SERPL-MCNC: 3.1 MG/DL — SIGNIFICANT CHANGE UP (ref 2.5–4.5)
PHOSPHATE SERPL-MCNC: 3.1 MG/DL — SIGNIFICANT CHANGE UP (ref 2.5–4.5)
PLATELET # BLD AUTO: 532 K/UL — HIGH (ref 150–400)
PLATELET # BLD AUTO: 532 K/UL — HIGH (ref 150–400)
POTASSIUM SERPL-MCNC: 4.1 MMOL/L — SIGNIFICANT CHANGE UP (ref 3.5–5.3)
POTASSIUM SERPL-MCNC: 4.1 MMOL/L — SIGNIFICANT CHANGE UP (ref 3.5–5.3)
POTASSIUM SERPL-SCNC: 4.1 MMOL/L — SIGNIFICANT CHANGE UP (ref 3.5–5.3)
POTASSIUM SERPL-SCNC: 4.1 MMOL/L — SIGNIFICANT CHANGE UP (ref 3.5–5.3)
RBC # BLD: 4.1 M/UL — SIGNIFICANT CHANGE UP (ref 3.8–5.2)
RBC # BLD: 4.1 M/UL — SIGNIFICANT CHANGE UP (ref 3.8–5.2)
RBC # FLD: 19.2 % — HIGH (ref 10.3–14.5)
RBC # FLD: 19.2 % — HIGH (ref 10.3–14.5)
SODIUM SERPL-SCNC: 139 MMOL/L — SIGNIFICANT CHANGE UP (ref 135–145)
SODIUM SERPL-SCNC: 139 MMOL/L — SIGNIFICANT CHANGE UP (ref 135–145)
WBC # BLD: 11.16 K/UL — HIGH (ref 3.8–10.5)
WBC # BLD: 11.16 K/UL — HIGH (ref 3.8–10.5)
WBC # FLD AUTO: 11.16 K/UL — HIGH (ref 3.8–10.5)
WBC # FLD AUTO: 11.16 K/UL — HIGH (ref 3.8–10.5)

## 2023-12-10 RX ORDER — ACETAMINOPHEN 500 MG
1000 TABLET ORAL EVERY 6 HOURS
Refills: 0 | Status: COMPLETED | OUTPATIENT
Start: 2023-12-10 | End: 2023-12-11

## 2023-12-10 RX ORDER — ACETAMINOPHEN 500 MG
1000 TABLET ORAL EVERY 6 HOURS
Refills: 0 | Status: COMPLETED | OUTPATIENT
Start: 2023-12-11 | End: 2023-12-12

## 2023-12-10 RX ORDER — ELECTROLYTE SOLUTION,INJ
1 VIAL (ML) INTRAVENOUS
Refills: 0 | Status: DISCONTINUED | OUTPATIENT
Start: 2023-12-10 | End: 2023-12-10

## 2023-12-10 RX ORDER — I.V. FAT EMULSION 20 G/100ML
20.8 EMULSION INTRAVENOUS
Qty: 50 | Refills: 0 | Status: DISCONTINUED | OUTPATIENT
Start: 2023-12-10 | End: 2023-12-11

## 2023-12-10 RX ORDER — I.V. FAT EMULSION 20 G/100ML
20.8 EMULSION INTRAVENOUS
Qty: 50 | Refills: 0 | Status: DISCONTINUED | OUTPATIENT
Start: 2023-12-10 | End: 2023-12-10

## 2023-12-10 RX ADMIN — Medication 10 MILLIGRAM(S): at 22:48

## 2023-12-10 RX ADMIN — ALBUTEROL 2 PUFF(S): 90 AEROSOL, METERED ORAL at 22:47

## 2023-12-10 RX ADMIN — Medication 400 MILLIGRAM(S): at 22:48

## 2023-12-10 RX ADMIN — I.V. FAT EMULSION 20.8 ML/HR: 20 EMULSION INTRAVENOUS at 18:50

## 2023-12-10 RX ADMIN — Medication 3: at 18:17

## 2023-12-10 RX ADMIN — CHLORHEXIDINE GLUCONATE 1 APPLICATION(S): 213 SOLUTION TOPICAL at 14:06

## 2023-12-10 RX ADMIN — ALBUTEROL 2 PUFF(S): 90 AEROSOL, METERED ORAL at 09:00

## 2023-12-10 RX ADMIN — Medication 1000 MILLIGRAM(S): at 14:08

## 2023-12-10 RX ADMIN — BUDESONIDE AND FORMOTEROL FUMARATE DIHYDRATE 2 PUFF(S): 160; 4.5 AEROSOL RESPIRATORY (INHALATION) at 09:00

## 2023-12-10 RX ADMIN — SODIUM CHLORIDE 3 MILLILITER(S): 9 INJECTION INTRAMUSCULAR; INTRAVENOUS; SUBCUTANEOUS at 15:00

## 2023-12-10 RX ADMIN — Medication 1 EACH: at 18:48

## 2023-12-10 RX ADMIN — Medication 3: at 14:04

## 2023-12-10 RX ADMIN — Medication 3: at 00:44

## 2023-12-10 RX ADMIN — Medication 10 MILLIGRAM(S): at 07:29

## 2023-12-10 RX ADMIN — BUDESONIDE AND FORMOTEROL FUMARATE DIHYDRATE 2 PUFF(S): 160; 4.5 AEROSOL RESPIRATORY (INHALATION) at 22:47

## 2023-12-10 RX ADMIN — Medication 1000 MILLIGRAM(S): at 02:14

## 2023-12-10 RX ADMIN — Medication 5 UNIT(S): at 23:11

## 2023-12-10 RX ADMIN — PANTOPRAZOLE SODIUM 40 MILLIGRAM(S): 20 TABLET, DELAYED RELEASE ORAL at 07:29

## 2023-12-10 RX ADMIN — Medication 400 MILLIGRAM(S): at 14:08

## 2023-12-10 RX ADMIN — ENOXAPARIN SODIUM 40 MILLIGRAM(S): 100 INJECTION SUBCUTANEOUS at 07:28

## 2023-12-10 RX ADMIN — Medication 5 UNIT(S): at 00:42

## 2023-12-10 RX ADMIN — Medication 3: at 06:29

## 2023-12-10 RX ADMIN — SODIUM CHLORIDE 3 MILLILITER(S): 9 INJECTION INTRAMUSCULAR; INTRAVENOUS; SUBCUTANEOUS at 05:18

## 2023-12-10 RX ADMIN — Medication 400 MILLIGRAM(S): at 07:27

## 2023-12-10 RX ADMIN — Medication 400 MILLIGRAM(S): at 01:44

## 2023-12-10 RX ADMIN — Medication 3: at 23:12

## 2023-12-10 RX ADMIN — Medication 400 MILLIGRAM(S): at 14:06

## 2023-12-10 RX ADMIN — Medication 1000 MILLIGRAM(S): at 21:32

## 2023-12-10 RX ADMIN — Medication 400 MILLIGRAM(S): at 21:02

## 2023-12-10 NOTE — PROGRESS NOTE ADULT - SUBJECTIVE AND OBJECTIVE BOX
SURGERY DAILY PROGRESS NOTE    SUBJECTIVE: Patient seen and evaluated on AM rounds. Pain controlled. Passing flatus. Denies fevers/chills, chest pain, dyspnea, abdominal pain, nausea, vomiting, and diarrhea    Overnight Events:  No acute events overnight  -----------------------------------------------------------------------------------------------------------------------------------------------------------------------------------------------------------  OBJECTIVE:  Vital Signs Last 24 Hrs  T(C): 36.9 (10 Dec 2023 09:25), Max: 37.4 (09 Dec 2023 20:10)  T(F): 98.4 (10 Dec 2023 09:25), Max: 99.4 (09 Dec 2023 20:10)  HR: 100 (10 Dec 2023 09:25) (91 - 100)  BP: 105/55 (10 Dec 2023 09:25) (105/55 - 125/64)  BP(mean): --  RR: 18 (10 Dec 2023 09:25) (16 - 18)  SpO2: 100% (10 Dec 2023 09:25) (98% - 100%)    Parameters below as of 10 Dec 2023 09:25  Patient On (Oxygen Delivery Method): room air      I&O's Detail    09 Dec 2023 07:01  -  10 Dec 2023 07:00  --------------------------------------------------------  IN:    Fat Emulsion (Fish Oil &amp; Plant Based) 20% Infusion: 208 mL    Free Water: 50 mL    TPN (Total Parenteral Nutrition): 1260 mL  Total IN: 1518 mL    OUT:    Drain (mL): 35 mL    Nasogastric/Oral tube (mL): 400 mL    Oral Fluid: 0 mL    Voided (mL): 1050 mL  Total OUT: 1485 mL    Total NET: 33 mL      10 Dec 2023 07:01  -  10 Dec 2023 10:49  --------------------------------------------------------  IN:    TPN (Total Parenteral Nutrition): 252 mL  Total IN: 252 mL    OUT:    Drain (mL): 0 mL    Nasogastric/Oral tube (mL): 0 mL    Voided (mL): 150 mL  Total OUT: 150 mL    Total NET: 102 mL        Daily     Daily   MEDICATIONS  (STANDING):  acetaminophen   IVPB .. 1000 milliGRAM(s) IV Intermittent every 6 hours  albuterol    90 MICROgram(s) HFA Inhaler 2 Puff(s) Inhalation two times a day  budesonide 160 MICROgram(s)/formoterol 4.5 MICROgram(s) Inhaler 2 Puff(s) Inhalation two times a day  chlorhexidine 2% Cloths 1 Application(s) Topical daily  dextrose 50% Injectable 25 Gram(s) IV Push once  dextrose 50% Injectable 12.5 Gram(s) IV Push once  dextrose 50% Injectable 25 Gram(s) IV Push once  dextrose Oral Gel 15 Gram(s) Oral once  enoxaparin Injectable 40 milliGRAM(s) SubCutaneous every 24 hours  erythromycin    ethylsuccinate Suspension 40 mG/mL 400 milliGRAM(s) Oral every 8 hours  influenza  Vaccine (HIGH DOSE) 0.7 milliLiter(s) IntraMuscular once  insulin detemir injectable (LEVEMIR) 5 Unit(s) SubCutaneous at bedtime  insulin lispro (ADMELOG) corrective regimen sliding scale   SubCutaneous every 6 hours  lipid, fat emulsion (Fish Oil and Plant Based) 20% Infusion 20.8 mL/Hr (20.8 mL/Hr) IV Continuous <Continuous>  lipid, fat emulsion (Fish Oil and Plant Based) 20% Infusion 20.8 mL/Hr (20.8 mL/Hr) IV Continuous <Continuous>  metoclopramide Injectable 10 milliGRAM(s) IV Push every 8 hours  pantoprazole  Injectable 40 milliGRAM(s) IV Push daily  Parenteral Nutrition - Adult 1 Each (63 mL/Hr) TPN Continuous <Continuous>  Parenteral Nutrition - Adult 1 Each (63 mL/Hr) TPN Continuous <Continuous>  sodium chloride 0.9% lock flush 3 milliLiter(s) IV Push every 8 hours  sodium phosphate 30 milliMole(s)/500 mL IVPB 30 milliMole(s) IV Intermittent once    MEDICATIONS  (PRN):      LABS:                        10.6   11.16 )-----------( 532      ( 10 Dec 2023 05:56 )             33.3     12-10    139  |  103  |  19  ----------------------------<  192<H>  4.1   |  25  |  0.41<L>    Ca    9.1      10 Dec 2023 05:56  Phos  3.1     12-10  Mg     2.20     12-10        Urinalysis Basic - ( 10 Dec 2023 05:56 )    Color: x / Appearance: x / SG: x / pH: x  Gluc: 192 mg/dL / Ketone: x  / Bili: x / Urobili: x   Blood: x / Protein: x / Nitrite: x   Leuk Esterase: x / RBC: x / WBC x   Sq Epi: x / Non Sq Epi: x / Bacteria: x        PHYSICAL EXAM  GEN: No acute distress   CV: RRR, no JVD  LUNGS: Respirations unlabored, no use of accessory muscles  ABD: Abdomen soft, mildly tender, ND  EXT: No peripheral edema. Regular range of motion.   INCISION: clean and dry. IR drain in place.

## 2023-12-10 NOTE — PROGRESS NOTE ADULT - SUBJECTIVE AND OBJECTIVE BOX
Name of Patient : NESTOR MABRY  MRN: [id number]  Date of visit: 12-10-23       Subjective: Patient seen and examined. No new events except as noted.     REVIEW OF SYSTEMS:    CONSTITUTIONAL: No weakness, fevers or chills  EYES/ENT: No visual changes;  No vertigo or throat pain   NECK: No pain or stiffness  RESPIRATORY: No cough, wheezing, hemoptysis; No shortness of breath  CARDIOVASCULAR: No chest pain or palpitations  GASTROINTESTINAL: No abdominal or epigastric pain. No nausea, vomiting, or hematemesis; No diarrhea or constipation. No melena or hematochezia.  GENITOURINARY: No dysuria, frequency or hematuria  NEUROLOGICAL: No numbness or weakness  SKIN: No itching, burning, rashes, or lesions   All other review of systems is negative unless indicated above.    MEDICATIONS:  MEDICATIONS  (STANDING):  acetaminophen   IVPB .. 1000 milliGRAM(s) IV Intermittent every 6 hours  albuterol    90 MICROgram(s) HFA Inhaler 2 Puff(s) Inhalation two times a day  budesonide 160 MICROgram(s)/formoterol 4.5 MICROgram(s) Inhaler 2 Puff(s) Inhalation two times a day  chlorhexidine 2% Cloths 1 Application(s) Topical daily  dextrose 50% Injectable 25 Gram(s) IV Push once  dextrose 50% Injectable 25 Gram(s) IV Push once  dextrose 50% Injectable 12.5 Gram(s) IV Push once  dextrose Oral Gel 15 Gram(s) Oral once  enoxaparin Injectable 40 milliGRAM(s) SubCutaneous every 24 hours  erythromycin    ethylsuccinate Suspension 40 mG/mL 400 milliGRAM(s) Oral every 8 hours  influenza  Vaccine (HIGH DOSE) 0.7 milliLiter(s) IntraMuscular once  insulin detemir injectable (LEVEMIR) 5 Unit(s) SubCutaneous at bedtime  insulin lispro (ADMELOG) corrective regimen sliding scale   SubCutaneous every 6 hours  lipid, fat emulsion (Fish Oil and Plant Based) 20% Infusion 20.8 mL/Hr (20.8 mL/Hr) IV Continuous <Continuous>  lipid, fat emulsion (Fish Oil and Plant Based) 20% Infusion 20.8 mL/Hr (20.8 mL/Hr) IV Continuous <Continuous>  metoclopramide Injectable 10 milliGRAM(s) IV Push every 8 hours  pantoprazole  Injectable 40 milliGRAM(s) IV Push daily  Parenteral Nutrition - Adult 1 Each (63 mL/Hr) TPN Continuous <Continuous>  sodium chloride 0.9% lock flush 3 milliLiter(s) IV Push every 8 hours  sodium phosphate 30 milliMole(s)/500 mL IVPB 30 milliMole(s) IV Intermittent once      PHYSICAL EXAM:  T(C): 36.9 (12-10-23 @ 20:24), Max: 37.2 (12-10-23 @ 04:13)  HR: 101 (12-10-23 @ 20:24) (93 - 106)  BP: 123/65 (12-10-23 @ 20:24) (105/55 - 123/65)  RR: 17 (12-10-23 @ 20:24) (16 - 18)  SpO2: 100% (12-10-23 @ 20:24) (98% - 100%)  Wt(kg): --  I&O's Summary    09 Dec 2023 07:01  -  10 Dec 2023 07:00  --------------------------------------------------------  IN: 1518 mL / OUT: 1485 mL / NET: 33 mL    10 Dec 2023 07:01  -  10 Dec 2023 23:31  --------------------------------------------------------  IN: 730 mL / OUT: 510 mL / NET: 220 mL          Appearance: Normal	  HEENT:  PERRLA   Lymphatic: No lymphadenopathy   Cardiovascular: Normal S1 S2, no JVD  Respiratory: normal effort , clear  Gastrointestinal:  Soft, Non-tender  Skin: No rashes,  warm to touch  Psychiatry:  Mood & affect appropriate  Musculuskeletal: No edema    recent labs, Imaging and EKGs personally reviewed     12-09-23 @ 07:01  -  12-10-23 @ 07:00  --------------------------------------------------------  IN: 1518 mL / OUT: 1485 mL / NET: 33 mL    12-10-23 @ 07:01  -  12-10-23 @ 23:31  --------------------------------------------------------  IN: 730 mL / OUT: 510 mL / NET: 220 mL                              10.6   11.16 )-----------( 532      ( 10 Dec 2023 05:56 )             33.3               12-10    139  |  103  |  19  ----------------------------<  192<H>  4.1   |  25  |  0.41<L>    Ca    9.1      10 Dec 2023 05:56  Phos  3.1     12-10  Mg     2.20     12-10                         Urinalysis Basic - ( 10 Dec 2023 05:56 )    Color: x / Appearance: x / SG: x / pH: x  Gluc: 192 mg/dL / Ketone: x  / Bili: x / Urobili: x   Blood: x / Protein: x / Nitrite: x   Leuk Esterase: x / RBC: x / WBC x   Sq Epi: x / Non Sq Epi: x / Bacteria: x           Name of Patient : NESTOR MABRY  MRN: [id number]  Date of visit: 12-10-23       Subjective: Patient seen and examined. No new events except as noted.     REVIEW OF SYSTEMS:    CONSTITUTIONAL: No weakness, fevers or chills  EYES/ENT: No visual changes;  No vertigo or throat pain   NECK: No pain or stiffness  RESPIRATORY: No cough, wheezing, hemoptysis; No shortness of breath  CARDIOVASCULAR: No chest pain or palpitations  GASTROINTESTINAL: No abdominal or epigastric pain. No nausea, vomiting, or hematemesis; No diarrhea or constipation. No melena or hematochezia.  GENITOURINARY: No dysuria, frequency or hematuria  NEUROLOGICAL: No numbness or weakness  SKIN: No itching, burning, rashes, or lesions   All other review of systems is negative unless indicated above.    MEDICATIONS:  MEDICATIONS  (STANDING):  acetaminophen   IVPB .. 1000 milliGRAM(s) IV Intermittent every 6 hours  albuterol    90 MICROgram(s) HFA Inhaler 2 Puff(s) Inhalation two times a day  budesonide 160 MICROgram(s)/formoterol 4.5 MICROgram(s) Inhaler 2 Puff(s) Inhalation two times a day  chlorhexidine 2% Cloths 1 Application(s) Topical daily  dextrose 50% Injectable 25 Gram(s) IV Push once  dextrose 50% Injectable 25 Gram(s) IV Push once  dextrose 50% Injectable 12.5 Gram(s) IV Push once  dextrose Oral Gel 15 Gram(s) Oral once  enoxaparin Injectable 40 milliGRAM(s) SubCutaneous every 24 hours  erythromycin    ethylsuccinate Suspension 40 mG/mL 400 milliGRAM(s) Oral every 8 hours  influenza  Vaccine (HIGH DOSE) 0.7 milliLiter(s) IntraMuscular once  insulin detemir injectable (LEVEMIR) 5 Unit(s) SubCutaneous at bedtime  insulin lispro (ADMELOG) corrective regimen sliding scale   SubCutaneous every 6 hours  lipid, fat emulsion (Fish Oil and Plant Based) 20% Infusion 20.8 mL/Hr (20.8 mL/Hr) IV Continuous <Continuous>  lipid, fat emulsion (Fish Oil and Plant Based) 20% Infusion 20.8 mL/Hr (20.8 mL/Hr) IV Continuous <Continuous>  metoclopramide Injectable 10 milliGRAM(s) IV Push every 8 hours  pantoprazole  Injectable 40 milliGRAM(s) IV Push daily  Parenteral Nutrition - Adult 1 Each (63 mL/Hr) TPN Continuous <Continuous>  sodium chloride 0.9% lock flush 3 milliLiter(s) IV Push every 8 hours  sodium phosphate 30 milliMole(s)/500 mL IVPB 30 milliMole(s) IV Intermittent once      PHYSICAL EXAM:  T(C): 36.9 (12-10-23 @ 20:24), Max: 37.2 (12-10-23 @ 04:13)  HR: 101 (12-10-23 @ 20:24) (93 - 106)  BP: 123/65 (12-10-23 @ 20:24) (105/55 - 123/65)  RR: 17 (12-10-23 @ 20:24) (16 - 18)  SpO2: 100% (12-10-23 @ 20:24) (98% - 100%)  Wt(kg): --  I&O's Summary    09 Dec 2023 07:01  -  10 Dec 2023 07:00  --------------------------------------------------------  IN: 1518 mL / OUT: 1485 mL / NET: 33 mL    10 Dec 2023 07:01  -  10 Dec 2023 23:31  --------------------------------------------------------  IN: 730 mL / OUT: 510 mL / NET: 220 mL          Appearance: Normal	  HEENT:  PERRLA   Lymphatic: No lymphadenopathy   Cardiovascular: Normal S1 S2, no JVD  Respiratory: normal effort , clear  Gastrointestinal:  Soft, Non-tender  Skin: No rashes,  warm to touch  Psychiatry:  Mood & affect appropriate  Musculuskeletal: No edema    recent labs, Imaging and EKGs personally reviewed     12-09-23 @ 07:01  -  12-10-23 @ 07:00  --------------------------------------------------------  IN: 1518 mL / OUT: 1485 mL / NET: 33 mL    12-10-23 @ 07:01  -  12-10-23 @ 23:31  --------------------------------------------------------  IN: 730 mL / OUT: 510 mL / NET: 220 mL                              10.6   11.16 )-----------( 532      ( 10 Dec 2023 05:56 )             33.3               12-10    139  |  103  |  19  ----------------------------<  192<H>  4.1   |  25  |  0.41<L>    Ca    9.1      10 Dec 2023 05:56  Phos  3.1     12-10  Mg     2.20     12-10                         Urinalysis Basic - ( 10 Dec 2023 05:56 )    Color: x / Appearance: x / SG: x / pH: x  Gluc: 192 mg/dL / Ketone: x  / Bili: x / Urobili: x   Blood: x / Protein: x / Nitrite: x   Leuk Esterase: x / RBC: x / WBC x   Sq Epi: x / Non Sq Epi: x / Bacteria: x

## 2023-12-10 NOTE — PROGRESS NOTE ADULT - ASSESSMENT
ASSESSMENT:  73 year old female with PMH asthma, T2DM on insulin pump, GERD, HTN, HLD, OA, obesity presents s/p robotic partial colectomy for 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 Course c/b ileus. CT on 12/4 showed: Interval increase in size of fluid anterior to the ileocolic anastomosis, with new peripheral enhancement, probably early abscess formation and decrease in small bowel dilatation, probably ileus. Patient pending IR drainage for abscess seen on CT.     Plan  - UGI Monday   - IR drainage of intraabdominal collection adjacent to anastomosis. Flush 5ccqd.   - Diet: NPO/NGT, possible clamp tomorrow if lower output   - TPN via PICC line  - Pain control with Tylenol, Dilaudid PRN  - C/w zosyn  - on reglan for motility, erythromycin   - Protonix 40 mg daily  - Home meds; holding HTN medications  - Appreciate endocrinology recs; plan to restart insulin pump on discharge  - OOB/ambulate/ PT/ IS while in bed  - DVT prophylaxis: Lovenox   - Dispo: Pt recommends Home PT with walker    D Team Surgery  [id number]     Pt seen and examined by Dr. Small   ASSESSMENT:  73 year old female with PMH asthma, T2DM on insulin pump, GERD, HTN, HLD, OA, obesity presents s/p robotic partial colectomy for 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 Course c/b ileus. CT on 12/4 showed: Interval increase in size of fluid anterior to the ileocolic anastomosis, with new peripheral enhancement, probably early abscess formation and decrease in small bowel dilatation, probably ileus. Patient pending IR drainage for abscess seen on CT.     Plan  - UGI Monday   - IR drainage of intraabdominal collection adjacent to anastomosis. Flush 5ccqd.   - Diet: NPO/NGT, possible clamp tomorrow if lower output   - TPN via PICC line  - Pain control with Tylenol, Dilaudid PRN  - C/w zosyn  - on reglan for motility, erythromycin   - Protonix 40 mg daily  - Home meds; holding HTN medications  - Appreciate endocrinology recs; plan to restart insulin pump on discharge  - OOB/ambulate/ PT/ IS while in bed  - DVT prophylaxis: Lovenox   - Dispo: Pt recommends Home PT with walker    D Team Surgery  [id number]     Pt seen and examined by Dr. Small

## 2023-12-10 NOTE — PROGRESS NOTE ADULT - NS ATTEND AMEND GEN_ALL_CORE FT
Patient seen and examined. Agree with plan as detailed in PA/NP Note.     Bella Aguilera MD  Pager: [phone] Patient seen and examined. Agree with plan as detailed in PA/NP Note.     Bella Aguilera MD  Pager: [phone]

## 2023-12-10 NOTE — PROGRESS NOTE ADULT - SUBJECTIVE AND OBJECTIVE BOX
DATE OF SERVICE: 12-10-23      no chest pain or sob        acetaminophen   IVPB .. 1000 milliGRAM(s) IV Intermittent every 6 hours  albuterol    90 MICROgram(s) HFA Inhaler 2 Puff(s) Inhalation two times a day  budesonide 160 MICROgram(s)/formoterol 4.5 MICROgram(s) Inhaler 2 Puff(s) Inhalation two times a day  chlorhexidine 2% Cloths 1 Application(s) Topical daily  dextrose 50% Injectable 25 Gram(s) IV Push once  dextrose 50% Injectable 25 Gram(s) IV Push once  dextrose 50% Injectable 12.5 Gram(s) IV Push once  dextrose Oral Gel 15 Gram(s) Oral once  enoxaparin Injectable 40 milliGRAM(s) SubCutaneous every 24 hours  erythromycin    ethylsuccinate Suspension 40 mG/mL 400 milliGRAM(s) Oral every 8 hours  influenza  Vaccine (HIGH DOSE) 0.7 milliLiter(s) IntraMuscular once  insulin detemir injectable (LEVEMIR) 5 Unit(s) SubCutaneous at bedtime  insulin lispro (ADMELOG) corrective regimen sliding scale   SubCutaneous every 6 hours  lipid, fat emulsion (Fish Oil and Plant Based) 20% Infusion 20.8 mL/Hr IV Continuous <Continuous>  lipid, fat emulsion (Fish Oil and Plant Based) 20% Infusion 20.8 mL/Hr IV Continuous <Continuous>  metoclopramide Injectable 10 milliGRAM(s) IV Push every 8 hours  pantoprazole  Injectable 40 milliGRAM(s) IV Push daily  Parenteral Nutrition - Adult 1 Each TPN Continuous <Continuous>  Parenteral Nutrition - Adult 1 Each TPN Continuous <Continuous>  sodium chloride 0.9% lock flush 3 milliLiter(s) IV Push every 8 hours  sodium phosphate 30 milliMole(s)/500 mL IVPB 30 milliMole(s) IV Intermittent once                            10.6   11.16 )-----------( 532      ( 10 Dec 2023 05:56 )             33.3       Hemoglobin: 10.6 g/dL (12-10 @ 05:56)  Hemoglobin: 10.3 g/dL (12-09 @ 05:20)  Hemoglobin: 10.4 g/dL (12-08 @ 06:23)  Hemoglobin: 11.4 g/dL (12-07 @ 02:05)  Hemoglobin: 10.0 g/dL (12-06 @ 04:37)      12-10    139  |  103  |  19  ----------------------------<  192<H>  4.1   |  25  |  0.41<L>    Ca    9.1      10 Dec 2023 05:56  Phos  3.1     12-10  Mg     2.20     12-10      Creatinine Trend: 0.41<--, 0.39<--, 0.47<--, 0.51<--, 0.43<--, 0.40<--    COAGS:           T(C): 37.2 (12-10-23 @ 04:13), Max: 37.4 (12-09-23 @ 20:10)  HR: 96 (12-10-23 @ 04:13) (91 - 96)  BP: 115/61 (12-10-23 @ 04:13) (106/54 - 125/64)  RR: 16 (12-10-23 @ 04:13) (16 - 18)  SpO2: 100% (12-10-23 @ 04:13) (98% - 100%)  Wt(kg): --    I&O's Summary    09 Dec 2023 07:01  -  10 Dec 2023 07:00  --------------------------------------------------------  IN: 1518 mL / OUT: 1485 mL / NET: 33 mL         General:  Alert and Oriented * 3.   Head: Normocephalic and atraumatic.   Neck: No JVD. No bruits. Supple. Does not appear to be enlarged.   Cardiovascular: + S1,S2 ; RRR Soft systolic murmur at the left lower sternal border. No rubs noted.    Lungs: CTA b/l. No rhonchi, rales or wheezes.   Abdomen: distended NT  Extremities: No clubbing/cyanosis/edema.   Neurologic: Moves all four extremities. Full range of motion.   Skin: Warm and moist. The patient's skin has normal elasticity and good skin turgor.   Psychiatric: Appropriate mood and affect.  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      -- tolerated procedure well from CV perspective  -- pain management, PT  -- supplement lytes per sx   -- s/p NGT- clamp per sx, CT noted with ileus, repeat CT noted, on TPN  -- eventually resume home meds: Asa 81mg, Pravastatin 40mg when ok from a surgical perspective  -- BP meds Hctz 12.5mg, Losartan 25mg on hold, BP controlled  -- s/p placement of IR drain

## 2023-12-10 NOTE — PROGRESS NOTE ADULT - SUBJECTIVE AND OBJECTIVE BOX
NUTRITION NOTE  [id number] THOMASTUCKER  ===============================    Interval events; no acute events overnight; possible NGT clamp trial tomorrow     VITAL SIGNS:  T(C): 36.9 (12-10-23 @ 09:25), Max: 37.4 (12-09-23 @ 20:10)  HR: 100 (12-10-23 @ 09:25) (91 - 100)  BP: 105/55 (12-10-23 @ 09:25) (105/55 - 125/64)  ABP: --  ABP(mean): --  RR: 18 (12-10-23 @ 09:25) (16 - 18)  SpO2: 100% (12-10-23 @ 09:25) (98% - 100%)  CVP(mm Hg): --  12-09 @ 07:01  -  12-10 @ 07:00  --------------------------------------------------------  IN: 1518 mL / OUT: 1485 mL / NET: 33 mL    12-10 @ 07:01  -  12-10 @ 10:26  --------------------------------------------------------  IN: 252 mL / OUT: 150 mL / NET: 102 mL      Glucose 161-179    PHYSICAL EXAM:  Constitutional: A&Ox3, NAD, resting comfortably in bed  Respiratory: nonlabored respirations   Abdomen: Soft, nondistended, drainage bag with dark red fluid, NGT in place with bilious output    Extremities: WWP, SEAMAN spontaneously  [place]ICC [place] [facility] [place]     Diet: NPO and TPN/lipids (started on 11/30/23)    Metabolic/FLUIDS/ELECTROLYTES/NUTRITION:  Gastrointestinal Medications:  lipid, fat emulsion (Fish Oil and Plant Based) 20% Infusion 20.8 mL/Hr IV Continuous <Continuous>  lipid, fat emulsion (Fish Oil and Plant Based) 20% Infusion 20.8 mL/Hr IV Continuous <Continuous>  pantoprazole  Injectable 40 milliGRAM(s) IV Push daily  Parenteral Nutrition - Adult 1 Each TPN Continuous <Continuous>  Parenteral Nutrition - Adult 1 Each TPN Continuous <Continuous>  sodium chloride 0.9% lock flush 3 milliLiter(s) IV Push every 8 hours  sodium phosphate 30 milliMole(s)/500 mL IVPB 30 milliMole(s) IV Intermittent once    I&O's Detail  73y  Daily   12-10    139  |  103  |  19  ----------------------------<  192<H>  4.1   |  25  |  0.41<L>    Ca    9.1      10 Dec 2023 05:56  Phos  3.1     12-10  Mg     2.20     12-10    INFECTIOUS DISEASE:  Antimicrobials/Immunologic Medications:  erythromycin    ethylsuccinate Suspension 40 mG/mL 400 milliGRAM(s) Oral every 8 hours  influenza  Vaccine (HIGH DOSE) 0.7 milliLiter(s) IntraMuscular once    RECENT CULTURES:  12-07 @ 12:28 .Body Fluid     Culture is being performed. Fungal cultures are held for 4 weeks.    No polymorphonuclear leukocytes per low power field  No organisms seen per oil power field    OTHER MEDICATIONS:  Endocrine/Metabolic Medications:  dextrose 50% Injectable 25 Gram(s) IV Push once  dextrose 50% Injectable 25 Gram(s) IV Push once  dextrose 50% Injectable 12.5 Gram(s) IV Push once  dextrose Oral Gel 15 Gram(s) Oral once  insulin detemir injectable (LEVEMIR) 5 Unit(s) SubCutaneous at bedtime  insulin lispro (ADMELOG) corrective regimen sliding scale   SubCutaneous every 6 hours    Genitourinary Medications:    Topical/Other Medications:  chlorhexidine 2% Cloths 1 Application(s) Topical daily    ASSESSMENT/PLAN:  74 y/o female with PMH asthma, T2DM on insulin pump, GERD, HTN, HLD, OA, obesity is s/p robotic partial colectomy for moderately differentiated adenocarcinoma of cecal mass on 11/22/23. CT abd/pelvis on 11/29/23 showed findings consistent with post op ileus. Pt remains NPO with NGT in place. Nutrition consult called for initiation of parenteral nutrition in view of severe protein calorie malnutrition and prolonged NPO status. PICC placed and TPN was started on 11/30/23. CT on 12/4 showed: Interval increase in size of fluid anterior to the ileocolic anastomosis, with new peripheral enhancement, probably early abscess formation and decrease in small bowel dilatation, probably ileus. Pt is s/p anterior abdominal drainage catheter placement on 12/7/23.    continue TPN with infusion volume of 1.5L, TPN will provide 1189 kcal/day     labs reviewed - electrolytes adjusted in TPN bag    monitor fingersticks, obtain daily weights - continue with 44 units of insulin; will continue to trend FS and make insulin adjustments as needed, will follow up with Endocrine service for continued management    continue parenteral nutrition at this time, will follow up with primary team on plan     1.  Severe protein calorie malnutrition being optimized with TPN: CHO [85] gm.  AA [100] gm. SMOF Lipids [50] gm.  2.  Hyperglycemia managed with: [44] units of regular insulin    3.  Check fluid balance daily.  Strict I/O  [ ] [ ]   4.  Daily BMP, Ionized Calcium, Magnesium and Phosphorous   5.  Triglycerides at initiation of TPN and monthly 11-29 Chol 65 LDL -- HDL 29<L> Trig 81    Nutrition Support [id number]         1. Protein calorie malnutrition being optimized with TPN: CHO [ ] gm.  AA [ ] gm. Lipids [ ] gm.  2.  Hyperglycemia managed with: [ ] units of regular insulin    3.  Check fluid balance daily.  Strict I/O  [ ] [ ]   4.  Daily BMP, Ionized Calcium, Magnesium and Phosphorous   5.  Triglycerides at initiation of TPN and monthly [ ] [ ]   6.  Pepcid in TPN for Gi prophylaxis  [ ]  NUTRITION NOTE  [id number] THOMASTUCKER  ===============================    Interval events; no acute events overnight; possible NGT clamp trial tomorrow     VITAL SIGNS:  T(C): 36.9 (12-10-23 @ 09:25), Max: 37.4 (12-09-23 @ 20:10)  HR: 100 (12-10-23 @ 09:25) (91 - 100)  BP: 105/55 (12-10-23 @ 09:25) (105/55 - 125/64)  ABP: --  ABP(mean): --  RR: 18 (12-10-23 @ 09:25) (16 - 18)  SpO2: 100% (12-10-23 @ 09:25) (98% - 100%)  CVP(mm Hg): --  12-09 @ 07:01  -  12-10 @ 07:00  --------------------------------------------------------  IN: 1518 mL / OUT: 1485 mL / NET: 33 mL    12-10 @ 07:01  -  12-10 @ 10:26  --------------------------------------------------------  IN: 252 mL / OUT: 150 mL / NET: 102 mL      Glucose 161-179    PHYSICAL EXAM:  Constitutional: A&Ox3, NAD, resting comfortably in bed  Respiratory: nonlabored respirations   Abdomen: Soft, nondistended, drainage bag with dark red fluid, NGT in place with bilious output    Extremities: WWP, SEAMAN spontaneously  PICC Site: double lumen PICC placed on 11/30/23 in [facility] [place], site clean and dry     Diet: NPO and TPN/lipids (started on 11/30/23)    Metabolic/FLUIDS/ELECTROLYTES/NUTRITION:  Gastrointestinal Medications:  lipid, fat emulsion (Fish Oil and Plant Based) 20% Infusion 20.8 mL/Hr IV Continuous <Continuous>  lipid, fat emulsion (Fish Oil and Plant Based) 20% Infusion 20.8 mL/Hr IV Continuous <Continuous>  pantoprazole  Injectable 40 milliGRAM(s) IV Push daily  Parenteral Nutrition - Adult 1 Each TPN Continuous <Continuous>  Parenteral Nutrition - Adult 1 Each TPN Continuous <Continuous>  sodium chloride 0.9% lock flush 3 milliLiter(s) IV Push every 8 hours  sodium phosphate 30 milliMole(s)/500 mL IVPB 30 milliMole(s) IV Intermittent once    I&O's Detail  73y  Daily   12-10    139  |  103  |  19  ----------------------------<  192<H>  4.1   |  25  |  0.41<L>    Ca    9.1      10 Dec 2023 05:56  Phos  3.1     12-10  Mg     2.20     12-10    INFECTIOUS DISEASE:  Antimicrobials/Immunologic Medications:  erythromycin    ethylsuccinate Suspension 40 mG/mL 400 milliGRAM(s) Oral every 8 hours  influenza  Vaccine (HIGH DOSE) 0.7 milliLiter(s) IntraMuscular once    RECENT CULTURES:  12-07 @ 12:28 .Body Fluid     Culture is being performed. Fungal cultures are held for 4 weeks.    No polymorphonuclear leukocytes per low power field  No organisms seen per oil power field    OTHER MEDICATIONS:  Endocrine/Metabolic Medications:  dextrose 50% Injectable 25 Gram(s) IV Push once  dextrose 50% Injectable 25 Gram(s) IV Push once  dextrose 50% Injectable 12.5 Gram(s) IV Push once  dextrose Oral Gel 15 Gram(s) Oral once  insulin detemir injectable (LEVEMIR) 5 Unit(s) SubCutaneous at bedtime  insulin lispro (ADMELOG) corrective regimen sliding scale   SubCutaneous every 6 hours    Genitourinary Medications:    Topical/Other Medications:  chlorhexidine 2% Cloths 1 Application(s) Topical daily    ASSESSMENT/PLAN:  74 y/o female with PMH asthma, T2DM on insulin pump, GERD, HTN, HLD, OA, obesity is s/p robotic partial colectomy for moderately differentiated adenocarcinoma of cecal mass on 11/22/23. CT abd/pelvis on 11/29/23 showed findings consistent with post op ileus. Pt remains NPO with NGT in place. Nutrition consult called for initiation of parenteral nutrition in view of severe protein calorie malnutrition and prolonged NPO status. PICC placed and TPN was started on 11/30/23. CT on 12/4 showed: Interval increase in size of fluid anterior to the ileocolic anastomosis, with new peripheral enhancement, probably early abscess formation and decrease in small bowel dilatation, probably ileus. Pt is s/p anterior abdominal drainage catheter placement on 12/7/23.    continue TPN with infusion volume of 1.5L, TPN will provide 1189 kcal/day     labs reviewed - electrolytes adjusted in TPN bag    monitor fingersticks, obtain daily weights - continue with 44 units of insulin; will continue to trend FS and make insulin adjustments as needed, will follow up with Endocrine service for continued management    continue parenteral nutrition at this time, will follow up with primary team on plan     1.  Severe protein calorie malnutrition being optimized with TPN: CHO [85] gm.  AA [100] gm. SMOF Lipids [50] gm.  2.  Hyperglycemia managed with: [44] units of regular insulin    3.  Check fluid balance daily.  Strict I/O  [ ] [ ]   4.  Daily BMP, Ionized Calcium, Magnesium and Phosphorous   5.  Triglycerides at initiation of TPN and monthly 11-29 Chol 65 LDL -- HDL 29<L> Trig 81    Nutrition Support [id number]         1. Protein calorie malnutrition being optimized with TPN: CHO [ ] gm.  AA [ ] gm. Lipids [ ] gm.  2.  Hyperglycemia managed with: [ ] units of regular insulin    3.  Check fluid balance daily.  Strict I/O  [ ] [ ]   4.  Daily BMP, Ionized Calcium, Magnesium and Phosphorous   5.  Triglycerides at initiation of TPN and monthly [ ] [ ]   6.  Pepcid in TPN for Gi prophylaxis  [ ]

## 2023-12-11 LAB
ANION GAP SERPL CALC-SCNC: 9 MMOL/L — SIGNIFICANT CHANGE UP (ref 7–14)
ANION GAP SERPL CALC-SCNC: 9 MMOL/L — SIGNIFICANT CHANGE UP (ref 7–14)
BUN SERPL-MCNC: 21 MG/DL — SIGNIFICANT CHANGE UP (ref 7–23)
BUN SERPL-MCNC: 21 MG/DL — SIGNIFICANT CHANGE UP (ref 7–23)
CA-I BLD-SCNC: 1.21 MMOL/L — SIGNIFICANT CHANGE UP (ref 1.15–1.29)
CA-I BLD-SCNC: 1.21 MMOL/L — SIGNIFICANT CHANGE UP (ref 1.15–1.29)
CALCIUM SERPL-MCNC: 9.3 MG/DL — SIGNIFICANT CHANGE UP (ref 8.4–10.5)
CALCIUM SERPL-MCNC: 9.3 MG/DL — SIGNIFICANT CHANGE UP (ref 8.4–10.5)
CHLORIDE SERPL-SCNC: 104 MMOL/L — SIGNIFICANT CHANGE UP (ref 98–107)
CHLORIDE SERPL-SCNC: 104 MMOL/L — SIGNIFICANT CHANGE UP (ref 98–107)
CO2 SERPL-SCNC: 28 MMOL/L — SIGNIFICANT CHANGE UP (ref 22–31)
CO2 SERPL-SCNC: 28 MMOL/L — SIGNIFICANT CHANGE UP (ref 22–31)
CREAT SERPL-MCNC: 0.43 MG/DL — LOW (ref 0.5–1.3)
CREAT SERPL-MCNC: 0.43 MG/DL — LOW (ref 0.5–1.3)
EGFR: 103 ML/MIN/1.73M2 — SIGNIFICANT CHANGE UP
EGFR: 103 ML/MIN/1.73M2 — SIGNIFICANT CHANGE UP
GLUCOSE BLDC GLUCOMTR-MCNC: 126 MG/DL — HIGH (ref 70–99)
GLUCOSE BLDC GLUCOMTR-MCNC: 126 MG/DL — HIGH (ref 70–99)
GLUCOSE BLDC GLUCOMTR-MCNC: 145 MG/DL — HIGH (ref 70–99)
GLUCOSE BLDC GLUCOMTR-MCNC: 145 MG/DL — HIGH (ref 70–99)
GLUCOSE BLDC GLUCOMTR-MCNC: 163 MG/DL — HIGH (ref 70–99)
GLUCOSE BLDC GLUCOMTR-MCNC: 163 MG/DL — HIGH (ref 70–99)
GLUCOSE BLDC GLUCOMTR-MCNC: 173 MG/DL — HIGH (ref 70–99)
GLUCOSE BLDC GLUCOMTR-MCNC: 173 MG/DL — HIGH (ref 70–99)
GLUCOSE BLDC GLUCOMTR-MCNC: 196 MG/DL — HIGH (ref 70–99)
GLUCOSE BLDC GLUCOMTR-MCNC: 196 MG/DL — HIGH (ref 70–99)
GLUCOSE SERPL-MCNC: 200 MG/DL — HIGH (ref 70–99)
GLUCOSE SERPL-MCNC: 200 MG/DL — HIGH (ref 70–99)
HCT VFR BLD CALC: 34.2 % — LOW (ref 34.5–45)
HCT VFR BLD CALC: 34.2 % — LOW (ref 34.5–45)
HGB BLD-MCNC: 10.7 G/DL — LOW (ref 11.5–15.5)
HGB BLD-MCNC: 10.7 G/DL — LOW (ref 11.5–15.5)
MAGNESIUM SERPL-MCNC: 2.2 MG/DL — SIGNIFICANT CHANGE UP (ref 1.6–2.6)
MAGNESIUM SERPL-MCNC: 2.2 MG/DL — SIGNIFICANT CHANGE UP (ref 1.6–2.6)
MCHC RBC-ENTMCNC: 25.1 PG — LOW (ref 27–34)
MCHC RBC-ENTMCNC: 25.1 PG — LOW (ref 27–34)
MCHC RBC-ENTMCNC: 31.3 GM/DL — LOW (ref 32–36)
MCHC RBC-ENTMCNC: 31.3 GM/DL — LOW (ref 32–36)
MCV RBC AUTO: 80.3 FL — SIGNIFICANT CHANGE UP (ref 80–100)
MCV RBC AUTO: 80.3 FL — SIGNIFICANT CHANGE UP (ref 80–100)
NRBC # BLD: 0 /100 WBCS — SIGNIFICANT CHANGE UP (ref 0–0)
NRBC # BLD: 0 /100 WBCS — SIGNIFICANT CHANGE UP (ref 0–0)
NRBC # FLD: 0 K/UL — SIGNIFICANT CHANGE UP (ref 0–0)
NRBC # FLD: 0 K/UL — SIGNIFICANT CHANGE UP (ref 0–0)
PHOSPHATE SERPL-MCNC: 3 MG/DL — SIGNIFICANT CHANGE UP (ref 2.5–4.5)
PHOSPHATE SERPL-MCNC: 3 MG/DL — SIGNIFICANT CHANGE UP (ref 2.5–4.5)
PLATELET # BLD AUTO: 487 K/UL — HIGH (ref 150–400)
PLATELET # BLD AUTO: 487 K/UL — HIGH (ref 150–400)
POTASSIUM SERPL-MCNC: 4.3 MMOL/L — SIGNIFICANT CHANGE UP (ref 3.5–5.3)
POTASSIUM SERPL-MCNC: 4.3 MMOL/L — SIGNIFICANT CHANGE UP (ref 3.5–5.3)
POTASSIUM SERPL-SCNC: 4.3 MMOL/L — SIGNIFICANT CHANGE UP (ref 3.5–5.3)
POTASSIUM SERPL-SCNC: 4.3 MMOL/L — SIGNIFICANT CHANGE UP (ref 3.5–5.3)
RBC # BLD: 4.26 M/UL — SIGNIFICANT CHANGE UP (ref 3.8–5.2)
RBC # BLD: 4.26 M/UL — SIGNIFICANT CHANGE UP (ref 3.8–5.2)
RBC # FLD: 19.3 % — HIGH (ref 10.3–14.5)
RBC # FLD: 19.3 % — HIGH (ref 10.3–14.5)
SODIUM SERPL-SCNC: 141 MMOL/L — SIGNIFICANT CHANGE UP (ref 135–145)
SODIUM SERPL-SCNC: 141 MMOL/L — SIGNIFICANT CHANGE UP (ref 135–145)
SURGICAL PATHOLOGY STUDY: SIGNIFICANT CHANGE UP
SURGICAL PATHOLOGY STUDY: SIGNIFICANT CHANGE UP
WBC # BLD: 11.66 K/UL — HIGH (ref 3.8–10.5)
WBC # BLD: 11.66 K/UL — HIGH (ref 3.8–10.5)
WBC # FLD AUTO: 11.66 K/UL — HIGH (ref 3.8–10.5)
WBC # FLD AUTO: 11.66 K/UL — HIGH (ref 3.8–10.5)

## 2023-12-11 PROCEDURE — 99232 SBSQ HOSP IP/OBS MODERATE 35: CPT

## 2023-12-11 PROCEDURE — 74240 X-RAY XM UPR GI TRC 1CNTRST: CPT | Mod: 26

## 2023-12-11 PROCEDURE — 74018 RADEX ABDOMEN 1 VIEW: CPT | Mod: 26

## 2023-12-11 RX ORDER — ELECTROLYTE SOLUTION,INJ
1 VIAL (ML) INTRAVENOUS
Refills: 0 | Status: DISCONTINUED | OUTPATIENT
Start: 2023-12-11 | End: 2023-12-11

## 2023-12-11 RX ORDER — I.V. FAT EMULSION 20 G/100ML
20.8 EMULSION INTRAVENOUS
Qty: 50 | Refills: 0 | Status: DISCONTINUED | OUTPATIENT
Start: 2023-12-11 | End: 2023-12-12

## 2023-12-11 RX ADMIN — CHLORHEXIDINE GLUCONATE 1 APPLICATION(S): 213 SOLUTION TOPICAL at 12:00

## 2023-12-11 RX ADMIN — BUDESONIDE AND FORMOTEROL FUMARATE DIHYDRATE 2 PUFF(S): 160; 4.5 AEROSOL RESPIRATORY (INHALATION) at 09:00

## 2023-12-11 RX ADMIN — ALBUTEROL 2 PUFF(S): 90 AEROSOL, METERED ORAL at 21:49

## 2023-12-11 RX ADMIN — Medication 5 UNIT(S): at 23:40

## 2023-12-11 RX ADMIN — Medication 3: at 13:17

## 2023-12-11 RX ADMIN — Medication 3: at 18:23

## 2023-12-11 RX ADMIN — Medication 1000 MILLIGRAM(S): at 06:56

## 2023-12-11 RX ADMIN — Medication 400 MILLIGRAM(S): at 23:41

## 2023-12-11 RX ADMIN — Medication 400 MILLIGRAM(S): at 13:17

## 2023-12-11 RX ADMIN — Medication 3: at 06:33

## 2023-12-11 RX ADMIN — SODIUM CHLORIDE 3 MILLILITER(S): 9 INJECTION INTRAMUSCULAR; INTRAVENOUS; SUBCUTANEOUS at 22:47

## 2023-12-11 RX ADMIN — SODIUM CHLORIDE 3 MILLILITER(S): 9 INJECTION INTRAMUSCULAR; INTRAVENOUS; SUBCUTANEOUS at 12:34

## 2023-12-11 RX ADMIN — SODIUM CHLORIDE 3 MILLILITER(S): 9 INJECTION INTRAMUSCULAR; INTRAVENOUS; SUBCUTANEOUS at 00:07

## 2023-12-11 RX ADMIN — Medication 400 MILLIGRAM(S): at 06:28

## 2023-12-11 RX ADMIN — Medication 1000 MILLIGRAM(S): at 20:00

## 2023-12-11 RX ADMIN — BUDESONIDE AND FORMOTEROL FUMARATE DIHYDRATE 2 PUFF(S): 160; 4.5 AEROSOL RESPIRATORY (INHALATION) at 21:49

## 2023-12-11 RX ADMIN — SODIUM CHLORIDE 3 MILLILITER(S): 9 INJECTION INTRAMUSCULAR; INTRAVENOUS; SUBCUTANEOUS at 07:38

## 2023-12-11 RX ADMIN — Medication 10 MILLIGRAM(S): at 21:50

## 2023-12-11 RX ADMIN — Medication 400 MILLIGRAM(S): at 06:26

## 2023-12-11 RX ADMIN — PANTOPRAZOLE SODIUM 40 MILLIGRAM(S): 20 TABLET, DELAYED RELEASE ORAL at 13:17

## 2023-12-11 RX ADMIN — Medication 10 MILLIGRAM(S): at 06:28

## 2023-12-11 RX ADMIN — ENOXAPARIN SODIUM 40 MILLIGRAM(S): 100 INJECTION SUBCUTANEOUS at 06:28

## 2023-12-11 RX ADMIN — Medication 400 MILLIGRAM(S): at 18:22

## 2023-12-11 RX ADMIN — ALBUTEROL 2 PUFF(S): 90 AEROSOL, METERED ORAL at 09:00

## 2023-12-11 NOTE — PROGRESS NOTE ADULT - ASSESSMENT
This is a 72 yo F /w a PMH of DM2 on an insulin pump and colorectal cancer s/p right hemicolectomy today. Endocrinology consulted for diabetes management and DM2. Based on current insulin pump settings, suspect patient is being over-basalized given high basal rates compared to the insulin:carb ratio    Home Regimen:  Patient uses the medtronic insulin pump and Libre2  Pump settings are as follows:  TDD 52.425  12AM 1.8 units per hour  6:30AM 2 units per hour  12:30PM 2.45 units per hour  6:30PM 2.55 units per hour  8PM 2.55 units per hour    ICR:  12AM 1:15  7AM 1:12  12:30PM 1:15    ISF 1:30    #DM2 A1c 6.8%  #insulin pump at home. Off pump while in the hospital  - Glucose Target 100-180: overall improving towards goal . Patient is on TPN managed Nutrition Support Team: increased to 100G dextrose, Increased Regular insulin to 50 units in TPN  - Continue Levemir 5 units sq at bedtime   - Continue Moderate customized moderate Admelog correction scale every 6 hours while NPO  - Check FS q 6 hours   - Hypoglycemia Protocol       For future use if started on clears:  Please clarify with Endocrine   -consistent carbohydrate consideration   -low admelog correction scales before meals   -low admelog scales before bedtime  -hold basal insulin until noting glucose trend   if glucose >180 X 2 - than start levemir 15 units every 24 hours   -FSG before meals and before bedtime  -Carb consistent diet once able to tolerate  -hypoglycemia protocol in place if needed    when advanced to regular carb consistent diet   than will need basal bolus + correction scale   anticipate Levemir 15 units and ademelog 5 units premeals and low dose correction scale     #Discharge  -follow up with Dr. Anand  -basal bolus vs resume insulin pump on discharge, likely will need adjustment in settings prior to resuming pump   -Continue glucose monitoring with Free style bonnie   -Will have Advanced Practice Nurse Consult Diabetes Specialist see pt closer to discharge    #HTN  -BP target <130/80  -Consider resuming losartan 25mg daily and hctz 12.5mg daily- management as per primary team  -outpt mc/cr ratio     #HLD  -On Pravastatin 40 mg QHS at home   -Resume once able to tolerate orals if no contraindications   -LDL goal less than 70   -Management as per primary team     D/w Surgery team [id number]     Adrienne Taveras  Nurse Practitioner  Division of Endocrinology & Diabetes  In house pager #[phone]    If before 9AM or after 6PM, or on weekends/holidays, please call endocrine answering service for assistance ([phone]).For nonurgent matters email [email or web address] for assistance.    This is a 74 yo F /w a PMH of DM2 on an insulin pump and colorectal cancer s/p right hemicolectomy today. Endocrinology consulted for diabetes management and DM2. Based on current insulin pump settings, suspect patient is being over-basalized given high basal rates compared to the insulin:carb ratio    Home Regimen:  Patient uses the medtronic insulin pump and Libre2  Pump settings are as follows:  TDD 52.425  12AM 1.8 units per hour  6:30AM 2 units per hour  12:30PM 2.45 units per hour  6:30PM 2.55 units per hour  8PM 2.55 units per hour    ICR:  12AM 1:15  7AM 1:12  12:30PM 1:15    ISF 1:30    #DM2 A1c 6.8%  #insulin pump at home. Off pump while in the hospital  - Glucose Target 100-180: overall improving towards goal . Patient is on TPN managed Nutrition Support Team: increased to 100G dextrose, Increased Regular insulin to 50 units in TPN  - Continue Levemir 5 units sq at bedtime   - Continue Moderate customized moderate Admelog correction scale every 6 hours while NPO  - Check FS q 6 hours   - Hypoglycemia Protocol       For future use if started on clears:  Please clarify with Endocrine   -consistent carbohydrate consideration   -low admelog correction scales before meals   -low admelog scales before bedtime  -hold basal insulin until noting glucose trend   if glucose >180 X 2 - than start levemir 15 units every 24 hours   -FSG before meals and before bedtime  -Carb consistent diet once able to tolerate  -hypoglycemia protocol in place if needed    when advanced to regular carb consistent diet   than will need basal bolus + correction scale   anticipate Levemir 15 units and ademelog 5 units premeals and low dose correction scale     #Discharge  -follow up with Dr. Anand  -basal bolus vs resume insulin pump on discharge, likely will need adjustment in settings prior to resuming pump   -Continue glucose monitoring with Free style bonnie   -Will have Advanced Practice Nurse Consult Diabetes Specialist see pt closer to discharge    #HTN  -BP target <130/80  -Consider resuming losartan 25mg daily and hctz 12.5mg daily- management as per primary team  -outpt mc/cr ratio     #HLD  -On Pravastatin 40 mg QHS at home   -Resume once able to tolerate orals if no contraindications   -LDL goal less than 70   -Management as per primary team     D/w Surgery team [id number]     Adrienne Taveras  Nurse Practitioner  Division of Endocrinology & Diabetes  In house pager #[phone]    If before 9AM or after 6PM, or on weekends/holidays, please call endocrine answering service for assistance ([phone]).For nonurgent matters email [email or web address] for assistance.

## 2023-12-11 NOTE — PROGRESS NOTE ADULT - SUBJECTIVE AND OBJECTIVE BOX
NUTRITION NOTE  [id number] THOMASTUCKER  ===============================    Interval events - Patient was seen and examined at bedside, no acute events overnight. Patient denies chest pain, shortness of breath, nausea or vomiting at this time. PICC placed and TPN was started on 23 for nutritional support. Pt remains NPO with NGT in place.    ROS: Except as noted above, all other systems reviewed and are negative     Allergies  Darvocet A500 (Other; Urticaria)  Percocet 10/325 (Other; Urticaria)  codeine (Other)  Lantus (Swelling)  Purell    PAST MEDICAL & SURGICAL HISTORY:  History of hypertension  Diabetes wears insulin pump  GERD (gastroesophageal reflux disease)  Uterine leiomyoma  Hyperlipidemia  Asthma  Obesity  Lymphadenopathy left lower extremity ;  - current  H/O insertion of insulin pump  OA (osteoarthritis)  Malignant neoplasm of cecum  H/O bilateral breast reduction surgery  History of appendectomy  History of cholecystectomy  H/O: hysterectomy  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 1000 milliGRAM(s) IV Intermittent every 6 hours  albuterol    90 MICROgram(s) HFA Inhaler 2 Puff(s) Inhalation two times a day  budesonide 160 MICROgram(s)/formoterol 4.5 MICROgram(s) Inhaler 2 Puff(s) Inhalation two times a day  chlorhexidine 2% Cloths 1 Application(s) Topical daily  dextrose 50% Injectable 25 Gram(s) IV Push once  dextrose 50% Injectable 12.5 Gram(s) IV Push once  dextrose 50% Injectable 25 Gram(s) IV Push once  dextrose Oral Gel 15 Gram(s) Oral once  enoxaparin Injectable 40 milliGRAM(s) SubCutaneous every 24 hours  erythromycin    ethylsuccinate Suspension 40 mG/mL 400 milliGRAM(s) Oral every 8 hours  influenza  Vaccine (HIGH DOSE) 0.7 milliLiter(s) IntraMuscular once  insulin detemir injectable (LEVEMIR) 5 Unit(s) SubCutaneous at bedtime  insulin lispro (ADMELOG) corrective regimen sliding scale   SubCutaneous every 6 hours  lipid, fat emulsion (Fish Oil and Plant Based) 20% Infusion 20.8 mL/Hr (20.8 mL/Hr) IV Continuous <Continuous>  metoclopramide Injectable 10 milliGRAM(s) IV Push every 8 hours  pantoprazole  Injectable 40 milliGRAM(s) IV Push daily  Parenteral Nutrition - Adult 1 Each (63 mL/Hr) TPN Continuous <Continuous>  Parenteral Nutrition - Adult 1 Each (63 mL/Hr) TPN Continuous <Continuous>  sodium chloride 0.9% lock flush 3 milliLiter(s) IV Push every 8 hours    I&O's Detail    10 Dec 2023 07:01  -  11 Dec 2023 07:00  --------------------------------------------------------  IN:    Fat Emulsion (Fish Oil &amp; Plant Based) 20% Infusion: 208 mL    Free Water: 100 mL    IV PiggyBack: 100 mL    TPN (Total Parenteral Nutrition): 1323 mL  Total IN: 1731 mL    OUT:    Drain (mL): 10 mL    Drain (mL): 5 mL    Emesis (mL): 0 mL    Nasogastric/Oral tube (mL): 250 mL    Oral Fluid: 0 mL    Voided (mL): 1225 mL  Total OUT: 1490 mL    Total NET: 241 mL      11 Dec 2023 07:01  -  11 Dec 2023 11:03  --------------------------------------------------------  IN:    TPN (Total Parenteral Nutrition): 126 mL  Total IN: 126 mL    OUT:    Drain (mL): 0 mL    Nasogastric/Oral tube (mL): 0 mL    Voided (mL): 100 mL  Total OUT: 100 mL    Total NET: 26 mL    POCT Blood Glucose.: 173 mg/dL (11 Dec 2023 05:57)  POCT Blood Glucose.: 145 mg/dL (11 Dec 2023 00:35)  POCT Blood Glucose.: 153 mg/dL (10 Dec 2023 23:10)  POCT Blood Glucose.: 168 mg/dL (10 Dec 2023 17:25)  POCT Blood Glucose.: 182 mg/dL (10 Dec 2023 12:12)    Daily Weight in k.6 (03 Dec 2023 00:00)    Drug Dosing Weight  Height (cm): 152.4 (2023 06:23)  Weight (kg): 75.659 (2023 06:23)  BMI (kg/m2): 32.6 (2023 06:23)  BSA (m2): 1.73 (2023 06:23)    PHYSICAL EXAM:  Constitutional: A&Ox3, NAD, resting comfortably in bed  Respiratory: nonlabored respirations   Abdomen: Soft, nondistended, drainage bag with dark red output, NGT in place with bilious output    Extremities: WWP, SEAMAN spontaneously  PICC Site: double lumen PICC placed on 23 in [facility] [place], site clean and dry     Diet: NPO and TPN/lipids (started on 23)    LABORATORY                                                            10.7   11.66 )-----------( 487      ( 11 Dec 2023 10:31 )             34.2   12-    141  |  104  |  21  ----------------------------<  200<H>  4.3   |  28  |  0.43<L>    Ca    9.3      11 Dec 2023 10:31  Phos  3.0     12-11  Mg     2.20     12- Chol 65 LDL -- HDL 29<L> Trig 81    CT Abdomen and Pelvis 23: IMPRESSION: Dilated loops of small bowel the level of the terminal ileum, likely ileus in the postoperative setting. Postoperative pneumoperitoneum and diffuse subcutaneous emphysema.    CT Abdomen and Pelvis on 23: IMPRESSION: Interval increase in size of fluid anterior to the ileocolic anastomosis with new peripheral enhancement, probably early abscess formation. nterval decrease in small bowel dilatation, probably ileus.    ASSESSMENT/PLAN:  74 y/o female with PMH asthma, T2DM on insulin pump, GERD, HTN, HLD, OA, obesity is s/p robotic partial colectomy for moderately differentiated adenocarcinoma of cecal mass on 23. CT abd/pelvis on 23 showed findings consistent with post op ileus. Pt remains NPO with NGT in place. Nutrition consult called for initiation of parenteral nutrition in view of severe protein calorie malnutrition and prolonged NPO status. PICC placed and TPN was started on 23. CT on  showed: Interval increase in size of fluid anterior to the ileocolic anastomosis, with new peripheral enhancement, probably early abscess formation and decrease in small bowel dilatation, probably ileus. Pt is s/p anterior abdominal drainage catheter placement on 23.    continue TPN with infusion volume of 1.5L, TPN will provide 1240 kcal/day, dextrose increase from 85 gm to 100 gm in TPN bag tonight     labs reviewed - electrolytes adjusted in TPN bag    monitor fingersticks, obtain daily weights --182, increased to 50 units of insulin in tonight's TPN bag, will continue to trend FS and make insulin adjustments as needed, will follow up with Endocrine service for continued management    continue parenteral nutrition at this time, will follow up with primary team on plan - plan for UGI today with SB follow through    1.  Severe protein calorie malnutrition being optimized with TPN: CHO [100] gm.  AA [100] gm. SMOF Lipids [50] gm.  2.  Hyperglycemia managed with: [50] units of regular insulin    3.  Check fluid balance daily.  Strict I/O  [ ] [ ]   4.  Daily BMP, Ionized Calcium, Magnesium and Phosphorous   5.  Triglycerides at initiation of TPN and monthly  Chol 65 LDL -- HDL 29<L> Trig 81    Nutrition Support [id number]  NUTRITION NOTE  [id number] THOMASTUCKER  ===============================    Interval events - Patient was seen and examined at bedside, no acute events overnight. Patient denies chest pain, shortness of breath, nausea or vomiting at this time. PICC placed and TPN was started on 23 for nutritional support. Pt remains NPO with NGT in place.    ROS: Except as noted above, all other systems reviewed and are negative     Allergies  Darvocet A500 (Other; Urticaria)  Percocet 10/325 (Other; Urticaria)  codeine (Other)  Lantus (Swelling)  Purell    PAST MEDICAL & SURGICAL HISTORY:  History of hypertension  Diabetes wears insulin pump  GERD (gastroesophageal reflux disease)  Uterine leiomyoma  Hyperlipidemia  Asthma  Obesity  Lymphadenopathy left lower extremity ;  - current  H/O insertion of insulin pump  OA (osteoarthritis)  Malignant neoplasm of cecum  H/O bilateral breast reduction surgery  History of appendectomy  History of cholecystectomy  H/O: hysterectomy  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 - [facility]  H/O total knee replacement, left  S/P bunionectomy    FAMILY HISTORY:  Diabetes mellitus (Father)  FH: breast cancer (Aunt)    Vital Signs Last 24 Hrs  T(C): 36.8 (11 Dec 2023 08:20), Max: 37 (10 Dec 2023 13:14)  T(F): 98.3 (11 Dec 2023 08:20), Max: 98.6 (10 Dec 2023 13:14)  HR: 63 (11 Dec 2023 08:20) (63 - 106)  BP: 103/67 (11 Dec 2023 08:20) (103/67 - 128/62)  RR: 18 (11 Dec 2023 08:20) (16 - 18)  SpO2: 100% (11 Dec 2023 08:20) (100% - 100%)    MEDICATIONS  (STANDING):  acetaminophen   IVPB .. 1000 milliGRAM(s) IV Intermittent every 6 hours  albuterol    90 MICROgram(s) HFA Inhaler 2 Puff(s) Inhalation two times a day  budesonide 160 MICROgram(s)/formoterol 4.5 MICROgram(s) Inhaler 2 Puff(s) Inhalation two times a day  chlorhexidine 2% Cloths 1 Application(s) Topical daily  dextrose 50% Injectable 25 Gram(s) IV Push once  dextrose 50% Injectable 12.5 Gram(s) IV Push once  dextrose 50% Injectable 25 Gram(s) IV Push once  dextrose Oral Gel 15 Gram(s) Oral once  enoxaparin Injectable 40 milliGRAM(s) SubCutaneous every 24 hours  erythromycin    ethylsuccinate Suspension 40 mG/mL 400 milliGRAM(s) Oral every 8 hours  influenza  Vaccine (HIGH DOSE) 0.7 milliLiter(s) IntraMuscular once  insulin detemir injectable (LEVEMIR) 5 Unit(s) SubCutaneous at bedtime  insulin lispro (ADMELOG) corrective regimen sliding scale   SubCutaneous every 6 hours  lipid, fat emulsion (Fish Oil and Plant Based) 20% Infusion 20.8 mL/Hr (20.8 mL/Hr) IV Continuous <Continuous>  metoclopramide Injectable 10 milliGRAM(s) IV Push every 8 hours  pantoprazole  Injectable 40 milliGRAM(s) IV Push daily  Parenteral Nutrition - Adult 1 Each (63 mL/Hr) TPN Continuous <Continuous>  Parenteral Nutrition - Adult 1 Each (63 mL/Hr) TPN Continuous <Continuous>  sodium chloride 0.9% lock flush 3 milliLiter(s) IV Push every 8 hours    I&O's Detail    10 Dec 2023 07:01  -  11 Dec 2023 07:00  --------------------------------------------------------  IN:    Fat Emulsion (Fish Oil &amp; Plant Based) 20% Infusion: 208 mL    Free Water: 100 mL    IV PiggyBack: 100 mL    TPN (Total Parenteral Nutrition): 1323 mL  Total IN: 1731 mL    OUT:    Drain (mL): 10 mL    Drain (mL): 5 mL    Emesis (mL): 0 mL    Nasogastric/Oral tube (mL): 250 mL    Oral Fluid: 0 mL    Voided (mL): 1225 mL  Total OUT: 1490 mL    Total NET: 241 mL      11 Dec 2023 07:01  -  11 Dec 2023 11:03  --------------------------------------------------------  IN:    TPN (Total Parenteral Nutrition): 126 mL  Total IN: 126 mL    OUT:    Drain (mL): 0 mL    Nasogastric/Oral tube (mL): 0 mL    Voided (mL): 100 mL  Total OUT: 100 mL    Total NET: 26 mL    POCT Blood Glucose.: 173 mg/dL (11 Dec 2023 05:57)  POCT Blood Glucose.: 145 mg/dL (11 Dec 2023 00:35)  POCT Blood Glucose.: 153 mg/dL (10 Dec 2023 23:10)  POCT Blood Glucose.: 168 mg/dL (10 Dec 2023 17:25)  POCT Blood Glucose.: 182 mg/dL (10 Dec 2023 12:12)    Daily Weight in k.6 (03 Dec 2023 00:00)    Drug Dosing Weight  Height (cm): 152.4 (2023 06:23)  Weight (kg): 75.659 (2023 06:23)  BMI (kg/m2): 32.6 (2023 06:23)  BSA (m2): 1.73 (2023 06:23)    PHYSICAL EXAM:  Constitutional: A&Ox3, NAD, resting comfortably in bed  Respiratory: nonlabored respirations   Abdomen: Soft, nondistended, drainage bag with dark red output, NGT in place with bilious output    Extremities: WWP, SEAMAN spontaneously  PICC Site: double lumen PICC placed on 23 in [facility] [place], site clean and dry     Diet: NPO and TPN/lipids (started on 23)    LABORATORY                                                            10.7   11.66 )-----------( 487      ( 11 Dec 2023 10:31 )             34.2   12-    141  |  104  |  21  ----------------------------<  200<H>  4.3   |  28  |  0.43<L>    Ca    9.3      11 Dec 2023 10:31  Phos  3.0     12-11  Mg     2.20     12- Chol 65 LDL -- HDL 29<L> Trig 81    CT Abdomen and Pelvis 23: IMPRESSION: Dilated loops of small bowel the level of the terminal ileum, likely ileus in the postoperative setting. Postoperative pneumoperitoneum and diffuse subcutaneous emphysema.    CT Abdomen and Pelvis on 23: IMPRESSION: Interval increase in size of fluid anterior to the ileocolic anastomosis with new peripheral enhancement, probably early abscess formation. nterval decrease in small bowel dilatation, probably ileus.    ASSESSMENT/PLAN:  74 y/o female with PMH asthma, T2DM on insulin pump, GERD, HTN, HLD, OA, obesity is s/p robotic partial colectomy for moderately differentiated adenocarcinoma of cecal mass on 23. CT abd/pelvis on 23 showed findings consistent with post op ileus. Pt remains NPO with NGT in place. Nutrition consult called for initiation of parenteral nutrition in view of severe protein calorie malnutrition and prolonged NPO status. PICC placed and TPN was started on 23. CT on  showed: Interval increase in size of fluid anterior to the ileocolic anastomosis, with new peripheral enhancement, probably early abscess formation and decrease in small bowel dilatation, probably ileus. Pt is s/p anterior abdominal drainage catheter placement on 23.    continue TPN with infusion volume of 1.5L, TPN will provide 1240 kcal/day, dextrose increase from 85 gm to 100 gm in TPN bag tonight     labs reviewed - electrolytes adjusted in TPN bag    monitor fingersticks, obtain daily weights --182, increased to 50 units of insulin in tonight's TPN bag, will continue to trend FS and make insulin adjustments as needed, will follow up with Endocrine service for continued management    continue parenteral nutrition at this time, will follow up with primary team on plan - plan for UGI today with SB follow through    1.  Severe protein calorie malnutrition being optimized with TPN: CHO [100] gm.  AA [100] gm. SMOF Lipids [50] gm.  2.  Hyperglycemia managed with: [50] units of regular insulin    3.  Check fluid balance daily.  Strict I/O  [ ] [ ]   4.  Daily BMP, Ionized Calcium, Magnesium and Phosphorous   5.  Triglycerides at initiation of TPN and monthly  Chol 65 LDL -- HDL 29<L> Trig 81    Nutrition Support [id number]

## 2023-12-11 NOTE — PROGRESS NOTE ADULT - ASSESSMENT
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 Course c/b ileus. CT on 12/4 showed: Interval increase in size of fluid anterior to the ileocolic anastomosis, with new peripheral enhancement, probably early abscess formation and decrease in small bowel dilatation, probably ileus. Patient s/p IR drainage on 12/7.     Plan  - UGI today with SB follow through  - Diet: NPO/NGT. may clamp NGT after UGI  - TPN via PICC line  - Pain control with Tylenol, Dilaudid PRN  - C/w zosyn  - on reglan for motility, erythromycin   - Protonix 40 mg daily  - Home meds; holding HTN medications  - Appreciate endocrinology recs; plan to restart insulin pump on discharge  - OOB/ambulate/ PT/ IS while in bed  - DVT prophylaxis: Lovenox   - Dispo: Pt recommends Home PT with walker    D Team Surgery  [id number]  73 year old female with PMH asthma, T2DM on insulin pump, GERD, HTN, HLD, OA, obesity presents s/p robotic partial colectomy for 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 Course c/b ileus. CT on 12/4 showed: Interval increase in size of fluid anterior to the ileocolic anastomosis, with new peripheral enhancement, probably early abscess formation and decrease in small bowel dilatation, probably ileus. Patient s/p IR drainage on 12/7.     Plan  - UGI today with SB follow through  - Diet: NPO/NGT. may clamp NGT after UGI  - TPN via PICC line  - Pain control with Tylenol, Dilaudid PRN  - C/w zosyn  - on reglan for motility, erythromycin   - Protonix 40 mg daily  - Home meds; holding HTN medications  - Appreciate endocrinology recs; plan to restart insulin pump on discharge  - OOB/ambulate/ PT/ IS while in bed  - DVT prophylaxis: Lovenox   - Dispo: Pt recommends Home PT with walker    D Team Surgery  [id number]

## 2023-12-11 NOTE — PROGRESS NOTE ADULT - SUBJECTIVE AND OBJECTIVE BOX
TEAM [ D ] Surgery Daily Progress Note  =====================================================    SUBJECTIVE: Patient seen and examined at bedside on AM rounds. No acute overnight events. Patient reports that they're feeling well, has no complaints. Reports Flatus/ and BM. OOB/Amublating as tolerated. Denies nausea, vomiting, fever, chills, SOB, chest pain.     PAST MEDICAL & SURGICAL HISTORY:  History of hypertension  Diabetes  GERD (gastroesophageal reflux disease)  Uterine leiomyoma  Hyperlipidemia  Asthma  Obesity  Lymphadenopathy  left lower extremitiy ; 1966 - current  H/O insertion of insulin pump  OA (osteoarthritis)  Malignant neoplasm of cecum  H/O bilateral breast reduction surgery  History of appendectomy  History of cholecystectomy  H/O: hysterectomy  History of total right knee replacement  3/2016 - [facility]  H/O total knee replacement, left  S/P bunionectomy      ALLERGIES:  Darvocet A500 (Other; Urticaria)  Percocet 10/325 (Other; Urticaria)  codeine (Other)  Lantus (Swelling)  PURELL.  pt said, &quot;I felt my airway closing&quot; after she smelled the Purell. The incident occured at the pulmonologist office. (Other; Short breath)      --------------------------------------------------------------------------------------    MEDICATIONS:    Neurologic Medications  acetaminophen   IVPB .. 1000 milliGRAM(s) IV Intermittent every 6 hours  metoclopramide Injectable 10 milliGRAM(s) IV Push every 8 hours    Respiratory Medications  albuterol    90 MICROgram(s) HFA Inhaler 2 Puff(s) Inhalation two times a day  budesonide 160 MICROgram(s)/formoterol 4.5 MICROgram(s) Inhaler 2 Puff(s) Inhalation two times a day    Cardiovascular Medications    Gastrointestinal Medications  lipid, fat emulsion (Fish Oil and Plant Based) 20% Infusion 20.8 mL/Hr IV Continuous <Continuous>  lipid, fat emulsion (Fish Oil and Plant Based) 20% Infusion 20.8 mL/Hr IV Continuous <Continuous>  pantoprazole  Injectable 40 milliGRAM(s) IV Push daily  Parenteral Nutrition - Adult 1 Each TPN Continuous <Continuous>  sodium chloride 0.9% lock flush 3 milliLiter(s) IV Push every 8 hours  sodium phosphate 30 milliMole(s)/500 mL IVPB 30 milliMole(s) IV Intermittent once    Genitourinary Medications    Hematologic/Oncologic Medications  enoxaparin Injectable 40 milliGRAM(s) SubCutaneous every 24 hours  influenza  Vaccine (HIGH DOSE) 0.7 milliLiter(s) IntraMuscular once    Antimicrobial/Immunologic Medications  erythromycin    ethylsuccinate Suspension 40 mG/mL 400 milliGRAM(s) Oral every 8 hours    Endocrine/Metabolic Medications  dextrose 50% Injectable 25 Gram(s) IV Push once  dextrose 50% Injectable 25 Gram(s) IV Push once  dextrose 50% Injectable 12.5 Gram(s) IV Push once  dextrose Oral Gel 15 Gram(s) Oral once  insulin detemir injectable (LEVEMIR) 5 Unit(s) SubCutaneous at bedtime  insulin lispro (ADMELOG) corrective regimen sliding scale   SubCutaneous every 6 hours    Topical/Other Medications  chlorhexidine 2% Cloths 1 Application(s) Topical daily    --------------------------------------------------------------------------------------    VITAL SIGNS:  T(C): 36.9 (12-11-23 @ 04:56), Max: 37 (12-10-23 @ 13:14)  HR: 96 (12-11-23 @ 04:56) (96 - 106)  BP: 118/61 (12-11-23 @ 04:56) (105/55 - 128/62)  RR: 16 (12-11-23 @ 04:56) (16 - 18)  SpO2: 100% (12-11-23 @ 04:56) (100% - 100%)  --------------------------------------------------------------------------------------    EXAM  General: NAD, resting in bed comfortably. A&Ox4.   Cardiac: S1, S2. pulse present   Respiratory: Nonlabored respirations, normal cw expansion.   Abdomen: soft, nontender, nondistended. NGT in place. IR drain in place with minimal dark red output   Extremities: no deformities, moving all extremities spontaneously.     --------------------------------------------------------------------------------------    LABS                          10.6   11.16 )-----------( 532      ( 10 Dec 2023 05:56 )             33.3     12-10    139  |  103  |  19  ----------------------------<  192<H>  4.1   |  25  |  0.41<L>    Ca    9.1      10 Dec 2023 05:56  Phos  3.1     12-10  Mg     2.20     12-10          --------------------------------------------------------------------------------------    INS AND OUTS:    12-10-23 @ 07:01  -  12-11-23 @ 07:00  --------------------------------------------------------  IN: 1668 mL / OUT: 1490 mL / NET: 178 mL      --------------------------------------------------------------------------------------         TEAM [ D ] Surgery Daily Progress Note  =====================================================    SUBJECTIVE: Patient seen and examined at bedside on AM rounds. No acute overnight events. Patient reports that they're feeling well, has no complaints. Reports Flatus/ and BM. OOB/Amublating as tolerated. Denies nausea, vomiting, fever, chills, SOB, chest pain.     PAST MEDICAL & SURGICAL HISTORY:  History of hypertension  Diabetes  GERD (gastroesophageal reflux disease)  Uterine leiomyoma  Hyperlipidemia  Asthma  Obesity  Lymphadenopathy  left lower extremitiy ; 1966 - current  H/O insertion of insulin pump  OA (osteoarthritis)  Malignant neoplasm of cecum  H/O bilateral breast reduction surgery  History of appendectomy  History of cholecystectomy  H/O: hysterectomy  History of total right knee replacement  3/2016 - [facility]  H/O total knee replacement, left  S/P bunionectomy      ALLERGIES:  Darvocet A500 (Other; Urticaria)  Percocet 10/325 (Other; Urticaria)  codeine (Other)  Lantus (Swelling)  PURELL.  pt said, &quot;I felt my airway closing&quot; after she smelled the Purell. The incident occured at the pulmonologist office. (Other; Short breath)      --------------------------------------------------------------------------------------    MEDICATIONS:    Neurologic Medications  acetaminophen   IVPB .. 1000 milliGRAM(s) IV Intermittent every 6 hours  metoclopramide Injectable 10 milliGRAM(s) IV Push every 8 hours    Respiratory Medications  albuterol    90 MICROgram(s) HFA Inhaler 2 Puff(s) Inhalation two times a day  budesonide 160 MICROgram(s)/formoterol 4.5 MICROgram(s) Inhaler 2 Puff(s) Inhalation two times a day    Cardiovascular Medications    Gastrointestinal Medications  lipid, fat emulsion (Fish Oil and Plant Based) 20% Infusion 20.8 mL/Hr IV Continuous <Continuous>  lipid, fat emulsion (Fish Oil and Plant Based) 20% Infusion 20.8 mL/Hr IV Continuous <Continuous>  pantoprazole  Injectable 40 milliGRAM(s) IV Push daily  Parenteral Nutrition - Adult 1 Each TPN Continuous <Continuous>  sodium chloride 0.9% lock flush 3 milliLiter(s) IV Push every 8 hours  sodium phosphate 30 milliMole(s)/500 mL IVPB 30 milliMole(s) IV Intermittent once    Genitourinary Medications    Hematologic/Oncologic Medications  enoxaparin Injectable 40 milliGRAM(s) SubCutaneous every 24 hours  influenza  Vaccine (HIGH DOSE) 0.7 milliLiter(s) IntraMuscular once    Antimicrobial/Immunologic Medications  erythromycin    ethylsuccinate Suspension 40 mG/mL 400 milliGRAM(s) Oral every 8 hours    Endocrine/Metabolic Medications  dextrose 50% Injectable 25 Gram(s) IV Push once  dextrose 50% Injectable 25 Gram(s) IV Push once  dextrose 50% Injectable 12.5 Gram(s) IV Push once  dextrose Oral Gel 15 Gram(s) Oral once  insulin detemir injectable (LEVEMIR) 5 Unit(s) SubCutaneous at bedtime  insulin lispro (ADMELOG) corrective regimen sliding scale   SubCutaneous every 6 hours    Topical/Other Medications  chlorhexidine 2% Cloths 1 Application(s) Topical daily    --------------------------------------------------------------------------------------    VITAL SIGNS:  T(C): 36.9 (12-11-23 @ 04:56), Max: 37 (12-10-23 @ 13:14)  HR: 96 (12-11-23 @ 04:56) (96 - 106)  BP: 118/61 (12-11-23 @ 04:56) (105/55 - 128/62)  RR: 16 (12-11-23 @ 04:56) (16 - 18)  SpO2: 100% (12-11-23 @ 04:56) (100% - 100%)  --------------------------------------------------------------------------------------    EXAM  General: NAD, resting in bed comfortably. A&Ox4.   Cardiac: S1, S2. pulse present   Respiratory: Nonlabored respirations, normal cw expansion.   Abdomen: soft, nontender, nondistended. NGT in place. IR drain in place with minimal dark red output   Extremities: no deformities, moving all extremities spontaneously.     --------------------------------------------------------------------------------------    LABS                          10.6   11.16 )-----------( 532      ( 10 Dec 2023 05:56 )             33.3     12-10    139  |  103  |  19  ----------------------------<  192<H>  4.1   |  25  |  0.41<L>    Ca    9.1      10 Dec 2023 05:56  Phos  3.1     12-10  Mg     2.20     12-10          --------------------------------------------------------------------------------------    INS AND OUTS:    12-10-23 @ 07:01  -  12-11-23 @ 07:00  --------------------------------------------------------  IN: 1668 mL / OUT: 1490 mL / NET: 178 mL      --------------------------------------------------------------------------------------

## 2023-12-11 NOTE — PROGRESS NOTE ADULT - NS ATTEND AMEND GEN_ALL_CORE FT
I agree with the above history, physical examination, chief complaint/diagnosis, and plan, which I have reviewed and edited where appropriate.  I agree with notes/assessment and detailed interval history of health care providers on my service.  I have seen and examined the patient.  I reviewed the laboratory and available data and agree with the history, physical assessment and plan.  I reviewed and discussed with all consultants, house staff and PA's.  The Nutrition Support Team (NST) discusses on an ongoing basis with the primary team and all consultants, House staff and PA's to have a permanent risk benefit analyses on all decisions and coordinating care.  I was physically present for the key portions of the evaluation and management (E/M) service provided.  72 y/o female s/p robotic partial colectomy with post op ileus receiving parenteral nutrition in view of severe protein calorie malnutrition and prolonged NPO status.   PHYSICAL EXAM:  Constitutional: resting comfortably in bed  Respiratory: clear  Abdomen: Soft, nondistended  Extremities: warm  labs reviewed - electrolytes adjusted  continue TPN; 1.5L/1240 kcal/day

## 2023-12-11 NOTE — PROGRESS NOTE ADULT - SUBJECTIVE AND OBJECTIVE BOX
Name of Patient : NESTOR MABRY  MRN: [id number]  Date of visit: 12-11-23       Subjective: Patient seen and examined. No new events except as noted.   UGIS     REVIEW OF SYSTEMS:    CONSTITUTIONAL: No weakness, fevers or chills  EYES/ENT: No visual changes;  No vertigo or throat pain   NECK: No pain or stiffness  RESPIRATORY: No cough, wheezing, hemoptysis; No shortness of breath  CARDIOVASCULAR: No chest pain or palpitations  GASTROINTESTINAL: No abdominal or epigastric pain. No nausea, vomiting, or hematemesis; No diarrhea or constipation. No melena or hematochezia.  GENITOURINARY: No dysuria, frequency or hematuria  NEUROLOGICAL: No numbness or weakness  SKIN: No itching, burning, rashes, or lesions   All other review of systems is negative unless indicated above.    MEDICATIONS:  MEDICATIONS  (STANDING):  acetaminophen   IVPB .. 1000 milliGRAM(s) IV Intermittent every 6 hours  albuterol    90 MICROgram(s) HFA Inhaler 2 Puff(s) Inhalation two times a day  budesonide 160 MICROgram(s)/formoterol 4.5 MICROgram(s) Inhaler 2 Puff(s) Inhalation two times a day  chlorhexidine 2% Cloths 1 Application(s) Topical daily  dextrose 50% Injectable 25 Gram(s) IV Push once  dextrose 50% Injectable 25 Gram(s) IV Push once  dextrose 50% Injectable 12.5 Gram(s) IV Push once  dextrose Oral Gel 15 Gram(s) Oral once  enoxaparin Injectable 40 milliGRAM(s) SubCutaneous every 24 hours  erythromycin    ethylsuccinate Suspension 40 mG/mL 400 milliGRAM(s) Oral every 8 hours  influenza  Vaccine (HIGH DOSE) 0.7 milliLiter(s) IntraMuscular once  insulin detemir injectable (LEVEMIR) 5 Unit(s) SubCutaneous at bedtime  insulin lispro (ADMELOG) corrective regimen sliding scale   SubCutaneous every 6 hours  lipid, fat emulsion (Fish Oil and Plant Based) 20% Infusion 20.8 mL/Hr (20.8 mL/Hr) IV Continuous <Continuous>  metoclopramide Injectable 10 milliGRAM(s) IV Push every 8 hours  pantoprazole  Injectable 40 milliGRAM(s) IV Push daily  Parenteral Nutrition - Adult 1 Each (63 mL/Hr) TPN Continuous <Continuous>  Parenteral Nutrition - Adult 1 Each (63 mL/Hr) TPN Continuous <Continuous>  sodium chloride 0.9% lock flush 3 milliLiter(s) IV Push every 8 hours      PHYSICAL EXAM:  T(C): 36.4 (12-11-23 @ 13:04), Max: 37 (12-10-23 @ 17:00)  HR: 98 (12-11-23 @ 13:04) (63 - 101)  BP: 141/69 (12-11-23 @ 13:04) (103/67 - 141/69)  RR: 18 (12-11-23 @ 13:04) (16 - 18)  SpO2: 98% (12-11-23 @ 13:04) (98% - 100%)  Wt(kg): --  I&O's Summary    10 Dec 2023 07:01  -  11 Dec 2023 07:00  --------------------------------------------------------  IN: 1731 mL / OUT: 1490 mL / NET: 241 mL    11 Dec 2023 07:01  -  11 Dec 2023 15:37  --------------------------------------------------------  IN: 126 mL / OUT: 100 mL / NET: 26 mL          Appearance: Normal	  HEENT:  PERRLA   Lymphatic: No lymphadenopathy   Cardiovascular: Normal S1 S2, no JVD  Respiratory: normal effort , clear  Gastrointestinal:  Soft, Non-tender, NGT   Skin: No rashes,  warm to touch  Psychiatry:  Mood & affect appropriate  Musculuskeletal: No edema    recent labs, Imaging and EKGs personally reviewed     12-10-23 @ 07:01  -  12-11-23 @ 07:00  --------------------------------------------------------  IN: 1731 mL / OUT: 1490 mL / NET: 241 mL    12-11-23 @ 07:01 - 12-11-23 @ 15:37  --------------------------------------------------------  IN: 126 mL / OUT: 100 mL / NET: 26 mL                            10.7   11.66 )-----------( 487      ( 11 Dec 2023 10:31 )             34.2               12-11    141  |  104  |  21  ----------------------------<  200<H>  4.3   |  28  |  0.43<L>    Ca    9.3      11 Dec 2023 10:31  Phos  3.0     12-11  Mg     2.20     12-11                         Urinalysis Basic - ( 11 Dec 2023 10:31 )    Color: x / Appearance: x / SG: x / pH: x  Gluc: 200 mg/dL / Ketone: x  / Bili: x / Urobili: x   Blood: x / Protein: x / Nitrite: x   Leuk Esterase: x / RBC: x / WBC x   Sq Epi: x / Non Sq Epi: x / Bacteria: x             Name of Patient : NESTOR MABRY  MRN: [id number]  Date of visit: 12-11-23       Subjective: Patient seen and examined. No new events except as noted.   UGIS     REVIEW OF SYSTEMS:    CONSTITUTIONAL: No weakness, fevers or chills  EYES/ENT: No visual changes;  No vertigo or throat pain   NECK: No pain or stiffness  RESPIRATORY: No cough, wheezing, hemoptysis; No shortness of breath  CARDIOVASCULAR: No chest pain or palpitations  GASTROINTESTINAL: No abdominal or epigastric pain. No nausea, vomiting, or hematemesis; No diarrhea or constipation. No melena or hematochezia.  GENITOURINARY: No dysuria, frequency or hematuria  NEUROLOGICAL: No numbness or weakness  SKIN: No itching, burning, rashes, or lesions   All other review of systems is negative unless indicated above.    MEDICATIONS:  MEDICATIONS  (STANDING):  acetaminophen   IVPB .. 1000 milliGRAM(s) IV Intermittent every 6 hours  albuterol    90 MICROgram(s) HFA Inhaler 2 Puff(s) Inhalation two times a day  budesonide 160 MICROgram(s)/formoterol 4.5 MICROgram(s) Inhaler 2 Puff(s) Inhalation two times a day  chlorhexidine 2% Cloths 1 Application(s) Topical daily  dextrose 50% Injectable 25 Gram(s) IV Push once  dextrose 50% Injectable 25 Gram(s) IV Push once  dextrose 50% Injectable 12.5 Gram(s) IV Push once  dextrose Oral Gel 15 Gram(s) Oral once  enoxaparin Injectable 40 milliGRAM(s) SubCutaneous every 24 hours  erythromycin    ethylsuccinate Suspension 40 mG/mL 400 milliGRAM(s) Oral every 8 hours  influenza  Vaccine (HIGH DOSE) 0.7 milliLiter(s) IntraMuscular once  insulin detemir injectable (LEVEMIR) 5 Unit(s) SubCutaneous at bedtime  insulin lispro (ADMELOG) corrective regimen sliding scale   SubCutaneous every 6 hours  lipid, fat emulsion (Fish Oil and Plant Based) 20% Infusion 20.8 mL/Hr (20.8 mL/Hr) IV Continuous <Continuous>  metoclopramide Injectable 10 milliGRAM(s) IV Push every 8 hours  pantoprazole  Injectable 40 milliGRAM(s) IV Push daily  Parenteral Nutrition - Adult 1 Each (63 mL/Hr) TPN Continuous <Continuous>  Parenteral Nutrition - Adult 1 Each (63 mL/Hr) TPN Continuous <Continuous>  sodium chloride 0.9% lock flush 3 milliLiter(s) IV Push every 8 hours      PHYSICAL EXAM:  T(C): 36.4 (12-11-23 @ 13:04), Max: 37 (12-10-23 @ 17:00)  HR: 98 (12-11-23 @ 13:04) (63 - 101)  BP: 141/69 (12-11-23 @ 13:04) (103/67 - 141/69)  RR: 18 (12-11-23 @ 13:04) (16 - 18)  SpO2: 98% (12-11-23 @ 13:04) (98% - 100%)  Wt(kg): --  I&O's Summary    10 Dec 2023 07:01  -  11 Dec 2023 07:00  --------------------------------------------------------  IN: 1731 mL / OUT: 1490 mL / NET: 241 mL    11 Dec 2023 07:01  -  11 Dec 2023 15:37  --------------------------------------------------------  IN: 126 mL / OUT: 100 mL / NET: 26 mL          Appearance: Normal	  HEENT:  PERRLA   Lymphatic: No lymphadenopathy   Cardiovascular: Normal S1 S2, no JVD  Respiratory: normal effort , clear  Gastrointestinal:  Soft, Non-tender, NGT   Skin: No rashes,  warm to touch  Psychiatry:  Mood & affect appropriate  Musculuskeletal: No edema    recent labs, Imaging and EKGs personally reviewed     12-10-23 @ 07:01  -  12-11-23 @ 07:00  --------------------------------------------------------  IN: 1731 mL / OUT: 1490 mL / NET: 241 mL    12-11-23 @ 07:01 - 12-11-23 @ 15:37  --------------------------------------------------------  IN: 126 mL / OUT: 100 mL / NET: 26 mL                            10.7   11.66 )-----------( 487      ( 11 Dec 2023 10:31 )             34.2               12-11    141  |  104  |  21  ----------------------------<  200<H>  4.3   |  28  |  0.43<L>    Ca    9.3      11 Dec 2023 10:31  Phos  3.0     12-11  Mg     2.20     12-11                         Urinalysis Basic - ( 11 Dec 2023 10:31 )    Color: x / Appearance: x / SG: x / pH: x  Gluc: 200 mg/dL / Ketone: x  / Bili: x / Urobili: x   Blood: x / Protein: x / Nitrite: x   Leuk Esterase: x / RBC: x / WBC x   Sq Epi: x / Non Sq Epi: x / Bacteria: x

## 2023-12-11 NOTE — PROGRESS NOTE ADULT - ASSESSMENT
Patient is a 73 yr old female PMH HTN, HLD, asthma, recent NST 11/17/23 with no ischemia or infarct and normal LV function and recent TTE 10/2023 with Normal LV/RV function, who was found with cecal mass/ new dx adenocarcinoma s/p Right hemicolectomy 11/22.  Pt reports incision site pain and increased distention.     # Adenocarcinoma  S/P OR , R hemicolectomy  pain control   IV hydration  Surg management appreciated   monitor electrolytes   NGT per team, UGIS       # Anemia   Monitor hgb level  S/P  1 unit PRBC  defer to surg      # HTN   resume BP meds  Monitor     # DM  sliding scale  Endo follow up appreciated     # Asthma   O2 supplement PRN    # HLD

## 2023-12-11 NOTE — PROGRESS NOTE ADULT - NS ATTEND AMEND GEN_ALL_CORE FT
Patient seen and examined. Agree with plan as detailed in PA/NP Note.     Bp controlled, contine to hold losartan and hctz    Bella Aguilera MD  Pager: [phone] Patient seen and examined. Agree with plan as detailed in PA/NP Note.     Bp controlled, contine to hold losartan and hctz    Bella Aguilera MD  Pager: [phone]

## 2023-12-11 NOTE — PROGRESS NOTE ADULT - SUBJECTIVE AND OBJECTIVE BOX
DATE OF SERVICE: 12-11-23      no chest pain or sob     Review of Systems:   Constitutional: [ ] fevers, [ ] chills.   Skin: [ ] dry skin. [ ] rashes.  Psychiatric: [ ] depression, [ ] anxiety.   Gastrointestinal: [ ] BRBPR, [ ] melena.   Neurological: [ ] confusion. [ ] seizures. [ ] shuffling gait.   Ears,Nose,Mouth and Throat: [ ] ear pain [ ] sore throat.   Eyes: [ ] diplopia.   Respiratory: [ ] hemoptysis. [ ] shortness of breath  Cardiovascular: See HPI above  Hematologic/Lymphatic: [ ] anemia. [ ] painful nodes. [ ] prolonged bleeding.   Genitourinary: [ ] hematuria. [ ] flank pain.   Endocrine: [ ] significant change in weight. [ ] intolerance to heat and cold.     Review of systems [ x] otherwise negative, [ ] otherwise unable to obtain    FH: no family history of sudden cardiac death in first degree relatives    SH: [ ] tobacco, [ ] alcohol, [ ] drugs    acetaminophen   IVPB .. 1000 milliGRAM(s) IV Intermittent every 6 hours  albuterol    90 MICROgram(s) HFA Inhaler 2 Puff(s) Inhalation two times a day  budesonide 160 MICROgram(s)/formoterol 4.5 MICROgram(s) Inhaler 2 Puff(s) Inhalation two times a day  chlorhexidine 2% Cloths 1 Application(s) Topical daily  dextrose 50% Injectable 25 Gram(s) IV Push once  dextrose 50% Injectable 25 Gram(s) IV Push once  dextrose 50% Injectable 12.5 Gram(s) IV Push once  dextrose Oral Gel 15 Gram(s) Oral once  enoxaparin Injectable 40 milliGRAM(s) SubCutaneous every 24 hours  erythromycin    ethylsuccinate Suspension 40 mG/mL 400 milliGRAM(s) Oral every 8 hours  influenza  Vaccine (HIGH DOSE) 0.7 milliLiter(s) IntraMuscular once  insulin detemir injectable (LEVEMIR) 5 Unit(s) SubCutaneous at bedtime  insulin lispro (ADMELOG) corrective regimen sliding scale   SubCutaneous every 6 hours  lipid, fat emulsion (Fish Oil and Plant Based) 20% Infusion 20.8 mL/Hr IV Continuous <Continuous>  metoclopramide Injectable 10 milliGRAM(s) IV Push every 8 hours  pantoprazole  Injectable 40 milliGRAM(s) IV Push daily  Parenteral Nutrition - Adult 1 Each TPN Continuous <Continuous>  Parenteral Nutrition - Adult 1 Each TPN Continuous <Continuous>  sodium chloride 0.9% lock flush 3 milliLiter(s) IV Push every 8 hours                            10.7   11.66 )-----------( 487      ( 11 Dec 2023 10:31 )             34.2       12-11    141  |  104  |  21  ----------------------------<  200<H>  4.3   |  28  |  0.43<L>    Ca    9.3      11 Dec 2023 10:31  Phos  3.0     12-11  Mg     2.20     12-11              T(C): 36.4 (12-11-23 @ 13:04), Max: 37 (12-10-23 @ 17:00)  HR: 98 (12-11-23 @ 13:04) (63 - 101)  BP: 141/69 (12-11-23 @ 13:04) (103/67 - 141/69)  RR: 18 (12-11-23 @ 13:04) (16 - 18)  SpO2: 98% (12-11-23 @ 13:04) (98% - 100%)  Wt(kg): --    I&O's Summary    10 Dec 2023 07:01  -  11 Dec 2023 07:00  --------------------------------------------------------  IN: 1731 mL / OUT: 1490 mL / NET: 241 mL    11 Dec 2023 07:01  -  11 Dec 2023 16:32  --------------------------------------------------------  IN: 126 mL / OUT: 100 mL / NET: 26 mL       General:  Alert and Oriented * 3.   Head: Normocephalic and atraumatic.   Neck: No JVD. No bruits. Supple. Does not appear to be enlarged.   Cardiovascular: + S1,S2 ; RRR Soft systolic murmur at the left lower sternal border. No rubs noted.    Lungs: CTA b/l. No rhonchi, rales or wheezes.   Abdomen: distended NT  Extremities: No clubbing/cyanosis/edema.   Neurologic: Moves all four extremities. Full range of motion.   Skin: Warm and moist. The patient's skin has normal elasticity and good skin turgor.   Psychiatric: Appropriate mood and affect.  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      -- tolerated procedure well from CV perspective  -- pain management  -- supplement lytes per sx   -- s/p NGT- clamp per sx, CT noted with ileus, repeat CT noted, on TPN  -- eventually resume home meds: Asa 81mg, Pravastatin 40mg when ok from a surgical perspective  -- BP meds Hctz 12.5mg, Losartan 25mg on hold, BP controlled  -- s/p placement of IR drain  --f/u UGI series        DATE OF SERVICE: 12-11-23      no chest pain or sob     Review of Systems:   Constitutional: [ ] fevers, [ ] chills.   Skin: [ ] dry skin. [ ] rashes.  Psychiatric: [ ] depression, [ ] anxiety.   Gastrointestinal: [ ] BRBPR, [ ] melena.   Neurological: [ ] confusion. [ ] seizures. [ ] shuffling gait.   Ears,Nose,Mouth and Throat: [ ] ear pain [ ] sore throat.   Eyes: [ ] diplopia.   Respiratory: [ ] hemoptysis. [ ] shortness of breath  Cardiovascular: See HPI above  Hematologic/Lymphatic: [ ] anemia. [ ] painful nodes. [ ] prolonged bleeding.   Genitourinary: [ ] hematuria. [ ] flank pain.   Endocrine: [ ] significant change in weight. [ ] intolerance to heat and cold.     Review of systems [ x] otherwise negative, [ ] otherwise unable to obtain    FH: no family history of sudden cardiac death in first degree relatives    SH: [ ] tobacco, [ ] alcohol, [ ] drugs    acetaminophen   IVPB .. 1000 milliGRAM(s) IV Intermittent every 6 hours  albuterol    90 MICROgram(s) HFA Inhaler 2 Puff(s) Inhalation two times a day  budesonide 160 MICROgram(s)/formoterol 4.5 MICROgram(s) Inhaler 2 Puff(s) Inhalation two times a day  chlorhexidine 2% Cloths 1 Application(s) Topical daily  dextrose 50% Injectable 25 Gram(s) IV Push once  dextrose 50% Injectable 25 Gram(s) IV Push once  dextrose 50% Injectable 12.5 Gram(s) IV Push once  dextrose Oral Gel 15 Gram(s) Oral once  enoxaparin Injectable 40 milliGRAM(s) SubCutaneous every 24 hours  erythromycin    ethylsuccinate Suspension 40 mG/mL 400 milliGRAM(s) Oral every 8 hours  influenza  Vaccine (HIGH DOSE) 0.7 milliLiter(s) IntraMuscular once  insulin detemir injectable (LEVEMIR) 5 Unit(s) SubCutaneous at bedtime  insulin lispro (ADMELOG) corrective regimen sliding scale   SubCutaneous every 6 hours  lipid, fat emulsion (Fish Oil and Plant Based) 20% Infusion 20.8 mL/Hr IV Continuous <Continuous>  metoclopramide Injectable 10 milliGRAM(s) IV Push every 8 hours  pantoprazole  Injectable 40 milliGRAM(s) IV Push daily  Parenteral Nutrition - Adult 1 Each TPN Continuous <Continuous>  Parenteral Nutrition - Adult 1 Each TPN Continuous <Continuous>  sodium chloride 0.9% lock flush 3 milliLiter(s) IV Push every 8 hours                            10.7   11.66 )-----------( 487      ( 11 Dec 2023 10:31 )             34.2       12-11    141  |  104  |  21  ----------------------------<  200<H>  4.3   |  28  |  0.43<L>    Ca    9.3      11 Dec 2023 10:31  Phos  3.0     12-11  Mg     2.20     12-11              T(C): 36.4 (12-11-23 @ 13:04), Max: 37 (12-10-23 @ 17:00)  HR: 98 (12-11-23 @ 13:04) (63 - 101)  BP: 141/69 (12-11-23 @ 13:04) (103/67 - 141/69)  RR: 18 (12-11-23 @ 13:04) (16 - 18)  SpO2: 98% (12-11-23 @ 13:04) (98% - 100%)  Wt(kg): --    I&O's Summary    10 Dec 2023 07:01  -  11 Dec 2023 07:00  --------------------------------------------------------  IN: 1731 mL / OUT: 1490 mL / NET: 241 mL    11 Dec 2023 07:01  -  11 Dec 2023 16:32  --------------------------------------------------------  IN: 126 mL / OUT: 100 mL / NET: 26 mL       General:  Alert and Oriented * 3.   Head: Normocephalic and atraumatic.   Neck: No JVD. No bruits. Supple. Does not appear to be enlarged.   Cardiovascular: + S1,S2 ; RRR Soft systolic murmur at the left lower sternal border. No rubs noted.    Lungs: CTA b/l. No rhonchi, rales or wheezes.   Abdomen: distended NT  Extremities: No clubbing/cyanosis/edema.   Neurologic: Moves all four extremities. Full range of motion.   Skin: Warm and moist. The patient's skin has normal elasticity and good skin turgor.   Psychiatric: Appropriate mood and affect.  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      -- tolerated procedure well from CV perspective  -- pain management  -- supplement lytes per sx   -- s/p NGT- clamp per sx, CT noted with ileus, repeat CT noted, on TPN  -- eventually resume home meds: Asa 81mg, Pravastatin 40mg when ok from a surgical perspective  -- BP meds Hctz 12.5mg, Losartan 25mg on hold, BP controlled  -- s/p placement of IR drain  - -f/u UGI series

## 2023-12-11 NOTE — PROGRESS NOTE ADULT - SUBJECTIVE AND OBJECTIVE BOX
Chief Complaint: Type 2 DM     History: Pt seen at bedside. Pt on TPN and has NGT to suction. Pt denies any history of nausea and vomiting/any signs of hypoglycemia. Pt reports an adequate appetite.     MEDICATIONS  (STANDING):  acetaminophen   IVPB .. 1000 milliGRAM(s) IV Intermittent every 6 hours  albuterol    90 MICROgram(s) HFA Inhaler 2 Puff(s) Inhalation two times a day  budesonide 160 MICROgram(s)/formoterol 4.5 MICROgram(s) Inhaler 2 Puff(s) Inhalation two times a day  chlorhexidine 2% Cloths 1 Application(s) Topical daily  dextrose 50% Injectable 25 Gram(s) IV Push once  dextrose 50% Injectable 12.5 Gram(s) IV Push once  dextrose 50% Injectable 25 Gram(s) IV Push once  dextrose Oral Gel 15 Gram(s) Oral once  enoxaparin Injectable 40 milliGRAM(s) SubCutaneous every 24 hours  erythromycin    ethylsuccinate Suspension 40 mG/mL 400 milliGRAM(s) Oral every 8 hours  influenza  Vaccine (HIGH DOSE) 0.7 milliLiter(s) IntraMuscular once  insulin detemir injectable (LEVEMIR) 5 Unit(s) SubCutaneous at bedtime  insulin lispro (ADMELOG) corrective regimen sliding scale   SubCutaneous every 6 hours  lipid, fat emulsion (Fish Oil and Plant Based) 20% Infusion 20.8 mL/Hr (20.8 mL/Hr) IV Continuous <Continuous>  metoclopramide Injectable 10 milliGRAM(s) IV Push every 8 hours  pantoprazole  Injectable 40 milliGRAM(s) IV Push daily  Parenteral Nutrition - Adult 1 Each (63 mL/Hr) TPN Continuous <Continuous>  Parenteral Nutrition - Adult 1 Each (63 mL/Hr) TPN Continuous <Continuous>  sodium chloride 0.9% lock flush 3 milliLiter(s) IV Push every 8 hours    MEDICATIONS  (PRN):      Allergies: Darvocet A500 (Other; Urticaria)  Percocet 10/325 (Other; Urticaria)  codeine (Other)  Lantus (Swelling)  PURELL.  pt said, &quot;I felt my airway closing&quot; after she smelled the Purell. The incident occured at the pulmonologist office. (Other; Short breath)      Review of Systems:  Respiratory: No SOB, no cough  GI: No nausea, vomiting, abdominal pain  Endocrine: no polyuria, polydipsia      PHYSICAL EXAM:  VITALS: T(C): 36.4 (12-11-23 @ 13:04)  T(F): 97.6 (12-11-23 @ 13:04), Max: 98.6 (12-10-23 @ 17:00)  HR: 98 (12-11-23 @ 13:04) (63 - 101)  BP: 141/69 (12-11-23 @ 13:04) (103/67 - 141/69)  RR:  (16 - 18)  SpO2:  (98% - 100%)  Wt(kg): --  GENERAL: NAD, well-groomed, well-developed  RESPIRATORY: No labored breathing   GI: Soft, nontender, non distended  PSYCH: Alert and oriented x 3, normal affect, normal mood      CAPILLARY BLOOD GLUCOSE  POCT Blood Glucose.: 196 mg/dL (11 Dec 2023 12:28)  POCT Blood Glucose.: 173 mg/dL (11 Dec 2023 05:57)  POCT Blood Glucose.: 145 mg/dL (11 Dec 2023 00:35)  POCT Blood Glucose.: 153 mg/dL (10 Dec 2023 23:10)  POCT Blood Glucose.: 168 mg/dL (10 Dec 2023 17:25)    A1C with Estimated Average Glucose (11.16.23 @ 13:15)    A1C with Estimated Average Glucose Result: 6.8   Estimated Average Glucose: 148      12-11    141  |  104  |  21  ----------------------------<  200<H>  4.3   |  28  |  0.43<L>    eGFR: 103    Ca    9.3      12-11  Mg     2.20     12-11  Phos  3.0     12-11    Diet, Consistent Carbohydrate Clear Liquid:   No Carbonated Beverages (12-11-23 @ 15:25) [Active]      Parenteral Nutrition - Adult 1 Each (63 mL/Hr) TPN Continuous <Continuous>, 12-10-23 @ 17:00, , Stop order after: 1 Days  Parenteral Nutrition - Adult 1 Each (63 mL/Hr) TPN Continuous <Continuous>, 12-11-23 @ 17:00, 17:00, Stop order after: 1 Days

## 2023-12-12 LAB
ANION GAP SERPL CALC-SCNC: 9 MMOL/L — SIGNIFICANT CHANGE UP (ref 7–14)
ANION GAP SERPL CALC-SCNC: 9 MMOL/L — SIGNIFICANT CHANGE UP (ref 7–14)
BASOPHILS # BLD AUTO: 0.06 K/UL — SIGNIFICANT CHANGE UP (ref 0–0.2)
BASOPHILS # BLD AUTO: 0.06 K/UL — SIGNIFICANT CHANGE UP (ref 0–0.2)
BASOPHILS NFR BLD AUTO: 0.6 % — SIGNIFICANT CHANGE UP (ref 0–2)
BASOPHILS NFR BLD AUTO: 0.6 % — SIGNIFICANT CHANGE UP (ref 0–2)
BUN SERPL-MCNC: 22 MG/DL — SIGNIFICANT CHANGE UP (ref 7–23)
BUN SERPL-MCNC: 22 MG/DL — SIGNIFICANT CHANGE UP (ref 7–23)
CA-I BLD-SCNC: 1.15 MMOL/L — SIGNIFICANT CHANGE UP (ref 1.15–1.29)
CA-I BLD-SCNC: 1.15 MMOL/L — SIGNIFICANT CHANGE UP (ref 1.15–1.29)
CALCIUM SERPL-MCNC: 9.1 MG/DL — SIGNIFICANT CHANGE UP (ref 8.4–10.5)
CALCIUM SERPL-MCNC: 9.1 MG/DL — SIGNIFICANT CHANGE UP (ref 8.4–10.5)
CHLORIDE SERPL-SCNC: 105 MMOL/L — SIGNIFICANT CHANGE UP (ref 98–107)
CHLORIDE SERPL-SCNC: 105 MMOL/L — SIGNIFICANT CHANGE UP (ref 98–107)
CO2 SERPL-SCNC: 26 MMOL/L — SIGNIFICANT CHANGE UP (ref 22–31)
CO2 SERPL-SCNC: 26 MMOL/L — SIGNIFICANT CHANGE UP (ref 22–31)
CREAT SERPL-MCNC: 0.46 MG/DL — LOW (ref 0.5–1.3)
CREAT SERPL-MCNC: 0.46 MG/DL — LOW (ref 0.5–1.3)
CULTURE RESULTS: SIGNIFICANT CHANGE UP
CULTURE RESULTS: SIGNIFICANT CHANGE UP
EGFR: 102 ML/MIN/1.73M2 — SIGNIFICANT CHANGE UP
EGFR: 102 ML/MIN/1.73M2 — SIGNIFICANT CHANGE UP
EOSINOPHIL # BLD AUTO: 0.31 K/UL — SIGNIFICANT CHANGE UP (ref 0–0.5)
EOSINOPHIL # BLD AUTO: 0.31 K/UL — SIGNIFICANT CHANGE UP (ref 0–0.5)
EOSINOPHIL NFR BLD AUTO: 3 % — SIGNIFICANT CHANGE UP (ref 0–6)
EOSINOPHIL NFR BLD AUTO: 3 % — SIGNIFICANT CHANGE UP (ref 0–6)
GLUCOSE BLDC GLUCOMTR-MCNC: 158 MG/DL — HIGH (ref 70–99)
GLUCOSE BLDC GLUCOMTR-MCNC: 158 MG/DL — HIGH (ref 70–99)
GLUCOSE BLDC GLUCOMTR-MCNC: 164 MG/DL — HIGH (ref 70–99)
GLUCOSE BLDC GLUCOMTR-MCNC: 164 MG/DL — HIGH (ref 70–99)
GLUCOSE BLDC GLUCOMTR-MCNC: 171 MG/DL — HIGH (ref 70–99)
GLUCOSE BLDC GLUCOMTR-MCNC: 171 MG/DL — HIGH (ref 70–99)
GLUCOSE BLDC GLUCOMTR-MCNC: 192 MG/DL — HIGH (ref 70–99)
GLUCOSE BLDC GLUCOMTR-MCNC: 192 MG/DL — HIGH (ref 70–99)
GLUCOSE SERPL-MCNC: 154 MG/DL — HIGH (ref 70–99)
GLUCOSE SERPL-MCNC: 154 MG/DL — HIGH (ref 70–99)
HCT VFR BLD CALC: 31.7 % — LOW (ref 34.5–45)
HCT VFR BLD CALC: 31.7 % — LOW (ref 34.5–45)
HGB BLD-MCNC: 9.9 G/DL — LOW (ref 11.5–15.5)
HGB BLD-MCNC: 9.9 G/DL — LOW (ref 11.5–15.5)
IANC: 7.09 K/UL — SIGNIFICANT CHANGE UP (ref 1.8–7.4)
IANC: 7.09 K/UL — SIGNIFICANT CHANGE UP (ref 1.8–7.4)
IMM GRANULOCYTES NFR BLD AUTO: 0.4 % — SIGNIFICANT CHANGE UP (ref 0–0.9)
IMM GRANULOCYTES NFR BLD AUTO: 0.4 % — SIGNIFICANT CHANGE UP (ref 0–0.9)
LYMPHOCYTES # BLD AUTO: 1.85 K/UL — SIGNIFICANT CHANGE UP (ref 1–3.3)
LYMPHOCYTES # BLD AUTO: 1.85 K/UL — SIGNIFICANT CHANGE UP (ref 1–3.3)
LYMPHOCYTES # BLD AUTO: 17.9 % — SIGNIFICANT CHANGE UP (ref 13–44)
LYMPHOCYTES # BLD AUTO: 17.9 % — SIGNIFICANT CHANGE UP (ref 13–44)
MAGNESIUM SERPL-MCNC: 2.1 MG/DL — SIGNIFICANT CHANGE UP (ref 1.6–2.6)
MAGNESIUM SERPL-MCNC: 2.1 MG/DL — SIGNIFICANT CHANGE UP (ref 1.6–2.6)
MCHC RBC-ENTMCNC: 25.2 PG — LOW (ref 27–34)
MCHC RBC-ENTMCNC: 25.2 PG — LOW (ref 27–34)
MCHC RBC-ENTMCNC: 31.2 GM/DL — LOW (ref 32–36)
MCHC RBC-ENTMCNC: 31.2 GM/DL — LOW (ref 32–36)
MCV RBC AUTO: 80.7 FL — SIGNIFICANT CHANGE UP (ref 80–100)
MCV RBC AUTO: 80.7 FL — SIGNIFICANT CHANGE UP (ref 80–100)
MONOCYTES # BLD AUTO: 0.96 K/UL — HIGH (ref 0–0.9)
MONOCYTES # BLD AUTO: 0.96 K/UL — HIGH (ref 0–0.9)
MONOCYTES NFR BLD AUTO: 9.3 % — SIGNIFICANT CHANGE UP (ref 2–14)
MONOCYTES NFR BLD AUTO: 9.3 % — SIGNIFICANT CHANGE UP (ref 2–14)
NEUTROPHILS # BLD AUTO: 7.09 K/UL — SIGNIFICANT CHANGE UP (ref 1.8–7.4)
NEUTROPHILS # BLD AUTO: 7.09 K/UL — SIGNIFICANT CHANGE UP (ref 1.8–7.4)
NEUTROPHILS NFR BLD AUTO: 68.8 % — SIGNIFICANT CHANGE UP (ref 43–77)
NEUTROPHILS NFR BLD AUTO: 68.8 % — SIGNIFICANT CHANGE UP (ref 43–77)
NRBC # BLD: 0 /100 WBCS — SIGNIFICANT CHANGE UP (ref 0–0)
NRBC # BLD: 0 /100 WBCS — SIGNIFICANT CHANGE UP (ref 0–0)
NRBC # FLD: 0 K/UL — SIGNIFICANT CHANGE UP (ref 0–0)
NRBC # FLD: 0 K/UL — SIGNIFICANT CHANGE UP (ref 0–0)
PHOSPHATE SERPL-MCNC: 3.2 MG/DL — SIGNIFICANT CHANGE UP (ref 2.5–4.5)
PHOSPHATE SERPL-MCNC: 3.2 MG/DL — SIGNIFICANT CHANGE UP (ref 2.5–4.5)
PLATELET # BLD AUTO: 422 K/UL — HIGH (ref 150–400)
PLATELET # BLD AUTO: 422 K/UL — HIGH (ref 150–400)
POTASSIUM SERPL-MCNC: 3.8 MMOL/L — SIGNIFICANT CHANGE UP (ref 3.5–5.3)
POTASSIUM SERPL-MCNC: 3.8 MMOL/L — SIGNIFICANT CHANGE UP (ref 3.5–5.3)
POTASSIUM SERPL-SCNC: 3.8 MMOL/L — SIGNIFICANT CHANGE UP (ref 3.5–5.3)
POTASSIUM SERPL-SCNC: 3.8 MMOL/L — SIGNIFICANT CHANGE UP (ref 3.5–5.3)
RBC # BLD: 3.93 M/UL — SIGNIFICANT CHANGE UP (ref 3.8–5.2)
RBC # BLD: 3.93 M/UL — SIGNIFICANT CHANGE UP (ref 3.8–5.2)
RBC # FLD: 19 % — HIGH (ref 10.3–14.5)
RBC # FLD: 19 % — HIGH (ref 10.3–14.5)
SODIUM SERPL-SCNC: 140 MMOL/L — SIGNIFICANT CHANGE UP (ref 135–145)
SODIUM SERPL-SCNC: 140 MMOL/L — SIGNIFICANT CHANGE UP (ref 135–145)
SPECIMEN SOURCE: SIGNIFICANT CHANGE UP
SPECIMEN SOURCE: SIGNIFICANT CHANGE UP
WBC # BLD: 10.31 K/UL — SIGNIFICANT CHANGE UP (ref 3.8–10.5)
WBC # BLD: 10.31 K/UL — SIGNIFICANT CHANGE UP (ref 3.8–10.5)
WBC # FLD AUTO: 10.31 K/UL — SIGNIFICANT CHANGE UP (ref 3.8–10.5)
WBC # FLD AUTO: 10.31 K/UL — SIGNIFICANT CHANGE UP (ref 3.8–10.5)

## 2023-12-12 PROCEDURE — 99232 SBSQ HOSP IP/OBS MODERATE 35: CPT

## 2023-12-12 PROCEDURE — 99232 SBSQ HOSP IP/OBS MODERATE 35: CPT | Mod: 24

## 2023-12-12 PROCEDURE — 74176 CT ABD & PELVIS W/O CONTRAST: CPT | Mod: 26

## 2023-12-12 RX ORDER — INSULIN LISPRO 100/ML
VIAL (ML) SUBCUTANEOUS
Refills: 0 | Status: DISCONTINUED | OUTPATIENT
Start: 2023-12-12 | End: 2023-12-13

## 2023-12-12 RX ORDER — ELECTROLYTE SOLUTION,INJ
1 VIAL (ML) INTRAVENOUS
Refills: 0 | Status: DISCONTINUED | OUTPATIENT
Start: 2023-12-12 | End: 2023-12-12

## 2023-12-12 RX ORDER — I.V. FAT EMULSION 20 G/100ML
10.4 EMULSION INTRAVENOUS
Qty: 25 | Refills: 0 | Status: DISCONTINUED | OUTPATIENT
Start: 2023-12-12 | End: 2023-12-13

## 2023-12-12 RX ORDER — INSULIN LISPRO 100/ML
VIAL (ML) SUBCUTANEOUS AT BEDTIME
Refills: 0 | Status: DISCONTINUED | OUTPATIENT
Start: 2023-12-12 | End: 2023-12-13

## 2023-12-12 RX ORDER — ACETAMINOPHEN 500 MG
975 TABLET ORAL EVERY 6 HOURS
Refills: 0 | Status: DISCONTINUED | OUTPATIENT
Start: 2023-12-12 | End: 2023-12-15

## 2023-12-12 RX ORDER — PANTOPRAZOLE SODIUM 20 MG/1
40 TABLET, DELAYED RELEASE ORAL
Refills: 0 | Status: DISCONTINUED | OUTPATIENT
Start: 2023-12-12 | End: 2023-12-15

## 2023-12-12 RX ADMIN — Medication 5 UNIT(S): at 22:42

## 2023-12-12 RX ADMIN — BUDESONIDE AND FORMOTEROL FUMARATE DIHYDRATE 2 PUFF(S): 160; 4.5 AEROSOL RESPIRATORY (INHALATION) at 22:40

## 2023-12-12 RX ADMIN — SODIUM CHLORIDE 3 MILLILITER(S): 9 INJECTION INTRAMUSCULAR; INTRAVENOUS; SUBCUTANEOUS at 22:39

## 2023-12-12 RX ADMIN — BUDESONIDE AND FORMOTEROL FUMARATE DIHYDRATE 2 PUFF(S): 160; 4.5 AEROSOL RESPIRATORY (INHALATION) at 09:28

## 2023-12-12 RX ADMIN — CHLORHEXIDINE GLUCONATE 1 APPLICATION(S): 213 SOLUTION TOPICAL at 12:31

## 2023-12-12 RX ADMIN — ENOXAPARIN SODIUM 40 MILLIGRAM(S): 100 INJECTION SUBCUTANEOUS at 05:47

## 2023-12-12 RX ADMIN — ALBUTEROL 2 PUFF(S): 90 AEROSOL, METERED ORAL at 22:40

## 2023-12-12 RX ADMIN — ALBUTEROL 2 PUFF(S): 90 AEROSOL, METERED ORAL at 09:27

## 2023-12-12 RX ADMIN — Medication 1: at 17:57

## 2023-12-12 RX ADMIN — Medication 1 EACH: at 17:55

## 2023-12-12 RX ADMIN — Medication 1: at 12:29

## 2023-12-12 RX ADMIN — Medication 975 MILLIGRAM(S): at 22:44

## 2023-12-12 RX ADMIN — Medication 1000 MILLIGRAM(S): at 00:11

## 2023-12-12 RX ADMIN — Medication 400 MILLIGRAM(S): at 05:46

## 2023-12-12 RX ADMIN — SODIUM CHLORIDE 3 MILLILITER(S): 9 INJECTION INTRAMUSCULAR; INTRAVENOUS; SUBCUTANEOUS at 06:35

## 2023-12-12 RX ADMIN — Medication 10 MILLIGRAM(S): at 05:47

## 2023-12-12 RX ADMIN — Medication 975 MILLIGRAM(S): at 13:33

## 2023-12-12 RX ADMIN — Medication 1000 MILLIGRAM(S): at 06:16

## 2023-12-12 RX ADMIN — Medication 3: at 05:47

## 2023-12-12 RX ADMIN — PANTOPRAZOLE SODIUM 40 MILLIGRAM(S): 20 TABLET, DELAYED RELEASE ORAL at 12:33

## 2023-12-12 RX ADMIN — Medication 975 MILLIGRAM(S): at 13:03

## 2023-12-12 RX ADMIN — I.V. FAT EMULSION 10.4 ML/HR: 20 EMULSION INTRAVENOUS at 17:56

## 2023-12-12 NOTE — PROGRESS NOTE ADULT - ASSESSMENT
This is a 72 yo F /w a PMH of DM2 on an insulin pump and colorectal cancer s/p right hemicolectomy today. Endocrinology consulted for diabetes management and DM2. Based on current insulin pump settings, suspect patient is being over-basalized given high basal rates compared to the insulin:carb ratio    Home Regimen:  Patient uses the medtronic insulin pump and Libre2  Pump settings are as follows:  TDD 52.425  12AM 1.8 units per hour  6:30AM 2 units per hour  12:30PM 2.45 units per hour  6:30PM 2.55 units per hour  8PM 2.55 units per hour    ICR:  12AM 1:15  7AM 1:12  12:30PM 1:15    ISF 1:30    #DM2 A1c 6.8%  #insulin pump at home. Off pump while in the hospital  - Glucose Target 100-180: overall improving towards goal . Patient is on TPN managed Nutrition Support Team: Decreased to 50g dextrose, Decreased Regular insulin to 20 units in TPN  - Continue Levemir 5 units sq at bedtime   - Changed to low Admelog correction scale TID AC and Low Admelog correction scale at bedtime; by primary team; Endocrine in agreement as unclear how much pt will eat  - Check FS q 6 hours   - Hypoglycemia Protocol     #Discharge  -Basal/Bolus vs resume insulin pump on discharge, likely will need adjustment in settings prior to resuming pump; leaning more towards basal/bolus will d/w pt in am   -If agrees with basal/bolus please check for insurance coverage   -Review insulin pen administration prior to discharge   -Continue glucose monitoring with Free style bonnie   Please send Lantus solostar pen and humalog kwikpen as test script to check insurance coverage.  Ok to send with current doses and update prior to d/c    If Lantus not covered- can try alternating with one of following   tresiba/basaglar/toujeo/Levemir    If Humalog not covered- can try alternating with one of following  novolog/apidra/admelog/fiasp  -Will have Advanced Practice Nurse Consult Diabetes Specialist see pt closer to discharge  -Will review importance of glucose monitoring, medication adherence, following a consistent carb diet  -Please call your doctor if your fs is 70 or below and 250 and above   -Reviewed Hypoglycemia and Intervention   -Ensure patient has working glucometer, test strips, lancets, alcohol pads, and BD sofia pen needles  -Please also prescribe glucose tabs, Baqsimi nasal spray or glucagon emergency kit for hypoglycemia risk   -Please follow up with your pcp, podiatry, opthalmology, and endocrinology as an outpt   -Please follow up with Dr. Anand    #HTN  -BP target <130/80  -Consider resuming losartan 25mg daily and hctz 12.5mg daily- management as per primary team  -outpt mc/cr ratio     #HLD  -On Pravastatin 40 mg QHS at home   -Resume once able to tolerate orals if no contraindications   -LDL goal less than 70   -Management as per primary team     D/w Surgery team [id number]     Adrienne Taveras  Nurse Practitioner  Division of Endocrinology & Diabetes  In house pager #[phone]    If before 9AM or after 6PM, or on weekends/holidays, please call endocrine answering service for assistance ([phone]).For nonurgent matters email [email or web address] for assistance.    This is a 72 yo F /w a PMH of DM2 on an insulin pump and colorectal cancer s/p right hemicolectomy today. Endocrinology consulted for diabetes management and DM2. Based on current insulin pump settings, suspect patient is being over-basalized given high basal rates compared to the insulin:carb ratio    Home Regimen:  Patient uses the medtronic insulin pump and Libre2  Pump settings are as follows:  TDD 52.425  12AM 1.8 units per hour  6:30AM 2 units per hour  12:30PM 2.45 units per hour  6:30PM 2.55 units per hour  8PM 2.55 units per hour    ICR:  12AM 1:15  7AM 1:12  12:30PM 1:15    ISF 1:30    #DM2 A1c 6.8%  #insulin pump at home. Off pump while in the hospital  - Glucose Target 100-180: overall improving towards goal . Patient is on TPN managed Nutrition Support Team: Decreased to 50g dextrose, Decreased Regular insulin to 20 units in TPN  - Continue Levemir 5 units sq at bedtime   - Changed to low Admelog correction scale TID AC and Low Admelog correction scale at bedtime; by primary team; Endocrine in agreement as unclear how much pt will eat  - Check FS q 6 hours   - Hypoglycemia Protocol     #Discharge  -Basal/Bolus vs resume insulin pump on discharge, likely will need adjustment in settings prior to resuming pump; leaning more towards basal/bolus will d/w pt in am   -If agrees with basal/bolus please check for insurance coverage   -Review insulin pen administration prior to discharge   -Continue glucose monitoring with Free style bonnie   Please send Lantus solostar pen and humalog kwikpen as test script to check insurance coverage.  Ok to send with current doses and update prior to d/c    If Lantus not covered- can try alternating with one of following   tresiba/basaglar/toujeo/Levemir    If Humalog not covered- can try alternating with one of following  novolog/apidra/admelog/fiasp  -Will have Advanced Practice Nurse Consult Diabetes Specialist see pt closer to discharge  -Will review importance of glucose monitoring, medication adherence, following a consistent carb diet  -Please call your doctor if your fs is 70 or below and 250 and above   -Reviewed Hypoglycemia and Intervention   -Ensure patient has working glucometer, test strips, lancets, alcohol pads, and BD sofia pen needles  -Please also prescribe glucose tabs, Baqsimi nasal spray or glucagon emergency kit for hypoglycemia risk   -Please follow up with your pcp, podiatry, opthalmology, and endocrinology as an outpt   -Please follow up with Dr. Anand    #HTN  -BP target <130/80  -Consider resuming losartan 25mg daily and hctz 12.5mg daily- management as per primary team  -outpt mc/cr ratio     #HLD  -On Pravastatin 40 mg QHS at home   -Resume once able to tolerate orals if no contraindications   -LDL goal less than 70   -Management as per primary team     D/w Surgery team [id number]     Adrienne Taveras  Nurse Practitioner  Division of Endocrinology & Diabetes  In house pager #[phone]    If before 9AM or after 6PM, or on weekends/holidays, please call endocrine answering service for assistance ([phone]).For nonurgent matters email [email or web address] for assistance.

## 2023-12-12 NOTE — PROGRESS NOTE ADULT - NS ATTEND AMEND GEN_ALL_CORE FT
I agree with the above history, physical examination, chief complaint/diagnosis, and plan, which I have reviewed and edited where appropriate.  I agree with notes/assessment and detailed interval history of health care providers on my service.  I have seen and examined the patient.  I reviewed the laboratory and available data and agree with the history, physical assessment and plan.  I reviewed and discussed with all consultants, house staff and PA's.  The Nutrition Support Team (NST) discusses on an ongoing basis with the primary team and all consultants, House staff and PA's to have a permanent risk benefit analyses on all decisions and coordinating care.  I was physically present for the key portions of the evaluation and management (E/M) service provided.  74 y/o female s/p robotic partial colectomy with post op ileus receiving parenteral nutrition in view of severe protein calorie malnutrition and prolonged NPO status.  PHYSICAL EXAM:  Constitutional: NAD  Lung clear  Heart RR  Abdomen: Soft, nondistended  Extremities: warm  labs reviewed - electrolytes adjusted  TPN; 1L/620 kcal/day I agree with the above history, physical examination, chief complaint/diagnosis, and plan, which I have reviewed and edited where appropriate.  I agree with notes/assessment and detailed interval history of health care providers on my service.  I have seen and examined the patient.  I reviewed the laboratory and available data and agree with the history, physical assessment and plan.  I reviewed and discussed with all consultants, house staff and PA's.  The Nutrition Support Team (NST) discusses on an ongoing basis with the primary team and all consultants, House staff and PA's to have a permanent risk benefit analyses on all decisions and coordinating care.  I was physically present for the key portions of the evaluation and management (E/M) service provided.  72 y/o female s/p robotic partial colectomy with post op ileus receiving parenteral nutrition in view of severe protein calorie malnutrition and prolonged NPO status.  PHYSICAL EXAM:  Constitutional: NAD  Lung clear  Heart RR  Abdomen: Soft, nondistended  Extremities: warm  labs reviewed - electrolytes adjusted  TPN; 1L/620 kcal/day

## 2023-12-12 NOTE — PROGRESS NOTE ADULT - SUBJECTIVE AND OBJECTIVE BOX
NUTRITION NOTE  [id number] THOMASTUCKER  ===============================    Interval events - Patient was seen and examined at bedside, no acute events overnight. Patient denies chest pain, shortness of breath, nausea or vomiting at this time. PICC placed and TPN was started on 23 for nutritional support. Pt tolerated liquids and is now on a regular diet. TPN volume and calories decreased today.     ROS: Except as noted above, all other systems reviewed and are negative     Allergies  Darvocet A500 (Other; Urticaria)  Percocet 10/325 (Other; Urticaria)  codeine (Other)  Lantus (Swelling)  Purell    PAST MEDICAL & SURGICAL HISTORY:  History of hypertension  Diabetes wears insulin pump  GERD (gastroesophageal reflux disease)  Uterine leiomyoma  Hyperlipidemia  Asthma  Obesity  Lymphadenopathy left lower extremity ;  - current  H/O insertion of insulin pump  OA (osteoarthritis)  Malignant neoplasm of cecum  H/O bilateral breast reduction surgery  History of appendectomy  History of cholecystectomy  H/O: hysterectomy  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 - [facility]  H/O total knee replacement, left  S/P bunionectomy    FAMILY HISTORY:  Diabetes mellitus (Father)  FH: breast cancer (Aunt)    Vital Signs Last 24 Hrs  T(C): 36.9 (12 Dec 2023 08:30), Max: 37.1 (12 Dec 2023 00:14)  T(F): 98.5 (12 Dec 2023 08:30), Max: 98.7 (12 Dec 2023 00:14)  HR: 96 (12 Dec 2023 08:30) (64 - 98)  BP: 102/51 (12 Dec 2023 08:30) (102/51 - 141/69)  RR: 16 (12 Dec 2023 08:30) (16 - 18)  SpO2: 100% (12 Dec 2023 08:30) (97% - 100%)    MEDICATIONS  (STANDING):  albuterol    90 MICROgram(s) HFA Inhaler 2 Puff(s) Inhalation two times a day  budesonide 160 MICROgram(s)/formoterol 4.5 MICROgram(s) Inhaler 2 Puff(s) Inhalation two times a day  chlorhexidine 2% Cloths 1 Application(s) Topical daily  dextrose 50% Injectable 25 Gram(s) IV Push once  dextrose 50% Injectable 25 Gram(s) IV Push once  dextrose 50% Injectable 12.5 Gram(s) IV Push once  dextrose Oral Gel 15 Gram(s) Oral once  enoxaparin Injectable 40 milliGRAM(s) SubCutaneous every 24 hours  influenza  Vaccine (HIGH DOSE) 0.7 milliLiter(s) IntraMuscular once  insulin detemir injectable (LEVEMIR) 5 Unit(s) SubCutaneous at bedtime  insulin lispro (ADMELOG) corrective regimen sliding scale   SubCutaneous three times a day before meals  insulin lispro (ADMELOG) corrective regimen sliding scale   SubCutaneous at bedtime  lipid, fat emulsion (Fish Oil and Plant Based) 20% Infusion 10.4 mL/Hr (10.4 mL/Hr) IV Continuous <Continuous>  pantoprazole    Tablet 40 milliGRAM(s) Oral before breakfast  Parenteral Nutrition - Adult 1 Each (42 mL/Hr) TPN Continuous <Continuous>  Parenteral Nutrition - Adult 1 Each (63 mL/Hr) TPN Continuous <Continuous>  sodium chloride 0.9% lock flush 3 milliLiter(s) IV Push every 8 hours    I&O's Detail    11 Dec 2023 07:  -  12 Dec 2023 07:00  --------------------------------------------------------  IN:    Fat Emulsion (Fish Oil &amp; Plant Based) 20% Infusion: 228.8 mL    Oral Fluid: 460 mL    TPN (Total Parenteral Nutrition): 1449 mL  Total IN: 2137.8 mL    OUT:    Drain (mL): 2.5 mL    Drain (mL): 5 mL    Emesis (mL): 0 mL    Nasogastric/Oral tube (mL): 150 mL    Voided (mL): 1200 mL  Total OUT: 1357.5 mL    Total NET: 780.3 mL      12 Dec 2023 07:  -  12 Dec 2023 10:47  --------------------------------------------------------  IN:  Total IN: 0 mL    OUT:    Oral Fluid: 0 mL    Voided (mL): 250 mL  Total OUT: 250 mL    Total NET: -250 mL    POCT Blood Glucose.: 171 mg/dL (12 Dec 2023 05:11)  POCT Blood Glucose.: 126 mg/dL (11 Dec 2023 22:49)  POCT Blood Glucose.: 163 mg/dL (11 Dec 2023 17:58)  POCT Blood Glucose.: 196 mg/dL (11 Dec 2023 12:28)    Daily Weight in k.6 (03 Dec 2023 00:00)    Drug Dosing Weight  Height (cm): 152.4 (2023 06:23)  Weight (kg): 75.659 (2023 06:23)  BMI (kg/m2): 32.6 (2023 06:23)  BSA (m2): 1.73 (2023 06:23)    PHYSICAL EXAM:  Constitutional: A&Ox3, NAD, resting comfortably in bed  Respiratory: nonlabored respirations   Abdomen: Soft, nondistended, drainage bag with dark red output, NGT in place with bilious output    Extremities: WWP, SEAMAN spontaneously  PICC Site: double lumen PICC placed on 23 in [facility] clean and dry     Diet: regular diet and TPN/lipids (started on 23)    LABORATORY                             9.9    10.31 )-----------( 422      ( 12 Dec 2023 06:07 )             31.7         140  |  105  |  22  ----------------------------<  154<H>  3.8   |  26  |  0.46<L>    Ca    9.1      12 Dec 2023 06:07  Phos  3.2     12-  Mg     2.10      Chol 65 LDL -- HDL 29<L> Trig 81    CT Abdomen and Pelvis 23: IMPRESSION: Dilated loops of small bowel the level of the terminal ileum, likely ileus in the postoperative setting. Postoperative pneumoperitoneum and diffuse subcutaneous emphysema.    CT Abdomen and Pelvis on 23: IMPRESSION: Interval increase in size of fluid anterior to the ileocolic anastomosis with new peripheral enhancement, probably early abscess formation. nterval decrease in small bowel dilatation, probably ileus.    Upper GI study on 23: IMPRESSION: Limited evaluation as patient had difficulty tolerating sufficient quantities of oral contrast. Due to significant delay of the oral contrast progression, the evaluation of the distal stomach and duodenum is limited.    ASSESSMENT/PLAN:  72 y/o female with PMH asthma, T2DM on insulin pump, GERD, HTN, HLD, OA, obesity is s/p robotic partial colectomy for moderately differentiated adenocarcinoma of cecal mass on 23. CT abd/pelvis on 23 showed findings consistent with post op ileus. Pt remains NPO with NGT in place. Nutrition consult called for initiation of parenteral nutrition in view of severe protein calorie malnutrition and prolonged NPO status. PICC placed and TPN was started on 23. CT on  showed: Interval increase in size of fluid anterior to the ileocolic anastomosis, with new peripheral enhancement, probably early abscess formation and decrease in small bowel dilatation, probably ileus. Pt is s/p anterior abdominal drainage catheter placement on 23.    TPN infusion volume decreased to 1L, TPN will provide 620 kcal/day    labs reviewed - electrolytes adjusted in TPN bag    monitor fingersticks, obtain daily weights - decreased to 20 units of insulin in tonight's TPN bag    continue parenteral nutrition at this time, will follow up with primary team on plan - TPN volume and calories decreased today, plan to d/c TPN tomorrow     1.  Severe protein calorie malnutrition being optimized with TPN: CHO [50] gm.  AA [50] gm. SMOF Lipids [25] gm.  2.  Hyperglycemia managed with: [25] units of regular insulin    3.  Check fluid balance daily.  Strict I/O  [ ] [ ]   4.  Daily BMP, Ionized Calcium, Magnesium and Phosphorous   5.  Triglycerides at initiation of TPN and monthly  Chol 65 LDL -- HDL 29<L> Trig 81    Nutrition Support [id number]  NUTRITION NOTE  [id number] THOMASTUCKER  ===============================    Interval events - Patient was seen and examined at bedside, no acute events overnight. Patient denies chest pain, shortness of breath, nausea or vomiting at this time. PICC placed and TPN was started on 23 for nutritional support. Pt tolerated liquids and is now on a regular diet. TPN volume and calories decreased today.     ROS: Except as noted above, all other systems reviewed and are negative     Allergies  Darvocet A500 (Other; Urticaria)  Percocet 10/325 (Other; Urticaria)  codeine (Other)  Lantus (Swelling)  Purell    PAST MEDICAL & SURGICAL HISTORY:  History of hypertension  Diabetes wears insulin pump  GERD (gastroesophageal reflux disease)  Uterine leiomyoma  Hyperlipidemia  Asthma  Obesity  Lymphadenopathy left lower extremity ;  - current  H/O insertion of insulin pump  OA (osteoarthritis)  Malignant neoplasm of cecum  H/O bilateral breast reduction surgery  History of appendectomy  History of cholecystectomy  H/O: hysterectomy  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 - [facility]  H/O total knee replacement, left  S/P bunionectomy    FAMILY HISTORY:  Diabetes mellitus (Father)  FH: breast cancer (Aunt)    Vital Signs Last 24 Hrs  T(C): 36.9 (12 Dec 2023 08:30), Max: 37.1 (12 Dec 2023 00:14)  T(F): 98.5 (12 Dec 2023 08:30), Max: 98.7 (12 Dec 2023 00:14)  HR: 96 (12 Dec 2023 08:30) (64 - 98)  BP: 102/51 (12 Dec 2023 08:30) (102/51 - 141/69)  RR: 16 (12 Dec 2023 08:30) (16 - 18)  SpO2: 100% (12 Dec 2023 08:30) (97% - 100%)    MEDICATIONS  (STANDING):  albuterol    90 MICROgram(s) HFA Inhaler 2 Puff(s) Inhalation two times a day  budesonide 160 MICROgram(s)/formoterol 4.5 MICROgram(s) Inhaler 2 Puff(s) Inhalation two times a day  chlorhexidine 2% Cloths 1 Application(s) Topical daily  dextrose 50% Injectable 25 Gram(s) IV Push once  dextrose 50% Injectable 25 Gram(s) IV Push once  dextrose 50% Injectable 12.5 Gram(s) IV Push once  dextrose Oral Gel 15 Gram(s) Oral once  enoxaparin Injectable 40 milliGRAM(s) SubCutaneous every 24 hours  influenza  Vaccine (HIGH DOSE) 0.7 milliLiter(s) IntraMuscular once  insulin detemir injectable (LEVEMIR) 5 Unit(s) SubCutaneous at bedtime  insulin lispro (ADMELOG) corrective regimen sliding scale   SubCutaneous three times a day before meals  insulin lispro (ADMELOG) corrective regimen sliding scale   SubCutaneous at bedtime  lipid, fat emulsion (Fish Oil and Plant Based) 20% Infusion 10.4 mL/Hr (10.4 mL/Hr) IV Continuous <Continuous>  pantoprazole    Tablet 40 milliGRAM(s) Oral before breakfast  Parenteral Nutrition - Adult 1 Each (42 mL/Hr) TPN Continuous <Continuous>  Parenteral Nutrition - Adult 1 Each (63 mL/Hr) TPN Continuous <Continuous>  sodium chloride 0.9% lock flush 3 milliLiter(s) IV Push every 8 hours    I&O's Detail    11 Dec 2023 07:  -  12 Dec 2023 07:00  --------------------------------------------------------  IN:    Fat Emulsion (Fish Oil &amp; Plant Based) 20% Infusion: 228.8 mL    Oral Fluid: 460 mL    TPN (Total Parenteral Nutrition): 1449 mL  Total IN: 2137.8 mL    OUT:    Drain (mL): 2.5 mL    Drain (mL): 5 mL    Emesis (mL): 0 mL    Nasogastric/Oral tube (mL): 150 mL    Voided (mL): 1200 mL  Total OUT: 1357.5 mL    Total NET: 780.3 mL      12 Dec 2023 07:  -  12 Dec 2023 10:47  --------------------------------------------------------  IN:  Total IN: 0 mL    OUT:    Oral Fluid: 0 mL    Voided (mL): 250 mL  Total OUT: 250 mL    Total NET: -250 mL    POCT Blood Glucose.: 171 mg/dL (12 Dec 2023 05:11)  POCT Blood Glucose.: 126 mg/dL (11 Dec 2023 22:49)  POCT Blood Glucose.: 163 mg/dL (11 Dec 2023 17:58)  POCT Blood Glucose.: 196 mg/dL (11 Dec 2023 12:28)    Daily Weight in k.6 (03 Dec 2023 00:00)    Drug Dosing Weight  Height (cm): 152.4 (2023 06:23)  Weight (kg): 75.659 (2023 06:23)  BMI (kg/m2): 32.6 (2023 06:23)  BSA (m2): 1.73 (2023 06:23)    PHYSICAL EXAM:  Constitutional: A&Ox3, NAD, resting comfortably in bed  Respiratory: nonlabored respirations   Abdomen: Soft, nondistended, drainage bag with dark red output, NGT in place with bilious output    Extremities: WWP, SEAMAN spontaneously  PICC Site: double lumen PICC placed on 23 in [facility] [place] clean and dry     Diet: regular diet and TPN/lipids (started on 23)    LABORATORY                             9.9    10.31 )-----------( 422      ( 12 Dec 2023 06:07 )             31.7         140  |  105  |  22  ----------------------------<  154<H>  3.8   |  26  |  0.46<L>    Ca    9.1      12 Dec 2023 06:07  Phos  3.2     12-  Mg     2.10      Chol 65 LDL -- HDL 29<L> Trig 81    CT Abdomen and Pelvis 23: IMPRESSION: Dilated loops of small bowel the level of the terminal ileum, likely ileus in the postoperative setting. Postoperative pneumoperitoneum and diffuse subcutaneous emphysema.    CT Abdomen and Pelvis on 23: IMPRESSION: Interval increase in size of fluid anterior to the ileocolic anastomosis with new peripheral enhancement, probably early abscess formation. nterval decrease in small bowel dilatation, probably ileus.    Upper GI study on 23: IMPRESSION: Limited evaluation as patient had difficulty tolerating sufficient quantities of oral contrast. Due to significant delay of the oral contrast progression, the evaluation of the distal stomach and duodenum is limited.    ASSESSMENT/PLAN:  74 y/o female with PMH asthma, T2DM on insulin pump, GERD, HTN, HLD, OA, obesity is s/p robotic partial colectomy for moderately differentiated adenocarcinoma of cecal mass on 23. CT abd/pelvis on 23 showed findings consistent with post op ileus. Pt remains NPO with NGT in place. Nutrition consult called for initiation of parenteral nutrition in view of severe protein calorie malnutrition and prolonged NPO status. PICC placed and TPN was started on 23. CT on  showed: Interval increase in size of fluid anterior to the ileocolic anastomosis, with new peripheral enhancement, probably early abscess formation and decrease in small bowel dilatation, probably ileus. Pt is s/p anterior abdominal drainage catheter placement on 23.    TPN infusion volume decreased to 1L, TPN will provide 620 kcal/day    labs reviewed - electrolytes adjusted in TPN bag    monitor fingersticks, obtain daily weights - decreased to 20 units of insulin in tonight's TPN bag    continue parenteral nutrition at this time, will follow up with primary team on plan - TPN volume and calories decreased today, plan to d/c TPN tomorrow     1.  Severe protein calorie malnutrition being optimized with TPN: CHO [50] gm.  AA [50] gm. SMOF Lipids [25] gm.  2.  Hyperglycemia managed with: [25] units of regular insulin    3.  Check fluid balance daily.  Strict I/O  [ ] [ ]   4.  Daily BMP, Ionized Calcium, Magnesium and Phosphorous   5.  Triglycerides at initiation of TPN and monthly  Chol 65 LDL -- HDL 29<L> Trig 81    Nutrition Support [id number]

## 2023-12-12 NOTE — PROGRESS NOTE ADULT - SUBJECTIVE AND OBJECTIVE BOX
Name of Patient : NESTOR MABRY  MRN: [id number]  Date of visit: 12-12-23       Subjective: Patient seen and examined. No new events except as noted.     REVIEW OF SYSTEMS:    CONSTITUTIONAL: No weakness, fevers or chills  EYES/ENT: No visual changes;  No vertigo or throat pain   NECK: No pain or stiffness  RESPIRATORY: No cough, wheezing, hemoptysis; No shortness of breath  CARDIOVASCULAR: No chest pain or palpitations  GASTROINTESTINAL: No abdominal or epigastric pain. No nausea, vomiting, or hematemesis; No diarrhea or constipation. No melena or hematochezia.  GENITOURINARY: No dysuria, frequency or hematuria  NEUROLOGICAL: No numbness or weakness  SKIN: No itching, burning, rashes, or lesions   All other review of systems is negative unless indicated above.    MEDICATIONS:  MEDICATIONS  (STANDING):  albuterol    90 MICROgram(s) HFA Inhaler 2 Puff(s) Inhalation two times a day  budesonide 160 MICROgram(s)/formoterol 4.5 MICROgram(s) Inhaler 2 Puff(s) Inhalation two times a day  chlorhexidine 2% Cloths 1 Application(s) Topical daily  dextrose 50% Injectable 25 Gram(s) IV Push once  dextrose 50% Injectable 25 Gram(s) IV Push once  dextrose 50% Injectable 12.5 Gram(s) IV Push once  dextrose Oral Gel 15 Gram(s) Oral once  enoxaparin Injectable 40 milliGRAM(s) SubCutaneous every 24 hours  influenza  Vaccine (HIGH DOSE) 0.7 milliLiter(s) IntraMuscular once  insulin detemir injectable (LEVEMIR) 5 Unit(s) SubCutaneous at bedtime  insulin lispro (ADMELOG) corrective regimen sliding scale   SubCutaneous three times a day before meals  insulin lispro (ADMELOG) corrective regimen sliding scale   SubCutaneous at bedtime  lipid, fat emulsion (Fish Oil and Plant Based) 20% Infusion 10.4 mL/Hr (10.4 mL/Hr) IV Continuous <Continuous>  pantoprazole    Tablet 40 milliGRAM(s) Oral before breakfast  Parenteral Nutrition - Adult 1 Each (42 mL/Hr) TPN Continuous <Continuous>  Parenteral Nutrition - Adult 1 Each (63 mL/Hr) TPN Continuous <Continuous>  sodium chloride 0.9% lock flush 3 milliLiter(s) IV Push every 8 hours      PHYSICAL EXAM:  T(C): 37.2 (12-12-23 @ 18:04), Max: 37.2 (12-12-23 @ 18:04)  HR: 93 (12-12-23 @ 18:04) (93 - 100)  BP: 107/67 (12-12-23 @ 18:04) (102/51 - 113/63)  RR: 19 (12-12-23 @ 18:04) (16 - 19)  SpO2: 100% (12-12-23 @ 18:04) (100% - 100%)  Wt(kg): --  I&O's Summary    11 Dec 2023 07:01  -  12 Dec 2023 07:00  --------------------------------------------------------  IN: 2137.8 mL / OUT: 1357.5 mL / NET: 780.3 mL    12 Dec 2023 07:01  -  12 Dec 2023 18:55  --------------------------------------------------------  IN: 400 mL / OUT: 255 mL / NET: 145 mL          Appearance: Normal	  HEENT:  PERRLA   Lymphatic: No lymphadenopathy   Cardiovascular: Normal S1 S2, no JVD  Respiratory: normal effort , clear  Gastrointestinal:  Soft, Non-tender  Skin: No rashes,  warm to touch  Psychiatry:  Mood & affect appropriate  Musculuskeletal: No edema    recent labs, Imaging and EKGs personally reviewed       12-11-23 @ 07:01  -  12-12-23 @ 07:00  --------------------------------------------------------  IN: 2137.8 mL / OUT: 1357.5 mL / NET: 780.3 mL    12-12-23 @ 07:01  -  12-12-23 @ 18:55  --------------------------------------------------------  IN: 400 mL / OUT: 255 mL / NET: 145 mL                          9.9    10.31 )-----------( 422      ( 12 Dec 2023 06:07 )             31.7               12-12    140  |  105  |  22  ----------------------------<  154<H>  3.8   |  26  |  0.46<L>    Ca    9.1      12 Dec 2023 06:07  Phos  3.2     12-12  Mg     2.10     12-12                         Urinalysis Basic - ( 12 Dec 2023 06:07 )    Color: x / Appearance: x / SG: x / pH: x  Gluc: 154 mg/dL / Ketone: x  / Bili: x / Urobili: x   Blood: x / Protein: x / Nitrite: x   Leuk Esterase: x / RBC: x / WBC x   Sq Epi: x / Non Sq Epi: x / Bacteria: x               Name of Patient : NESTOR MABRY  MRN: [id number]  Date of visit: 12-12-23       Subjective: Patient seen and examined. No new events except as noted.     REVIEW OF SYSTEMS:    CONSTITUTIONAL: No weakness, fevers or chills  EYES/ENT: No visual changes;  No vertigo or throat pain   NECK: No pain or stiffness  RESPIRATORY: No cough, wheezing, hemoptysis; No shortness of breath  CARDIOVASCULAR: No chest pain or palpitations  GASTROINTESTINAL: No abdominal or epigastric pain. No nausea, vomiting, or hematemesis; No diarrhea or constipation. No melena or hematochezia.  GENITOURINARY: No dysuria, frequency or hematuria  NEUROLOGICAL: No numbness or weakness  SKIN: No itching, burning, rashes, or lesions   All other review of systems is negative unless indicated above.    MEDICATIONS:  MEDICATIONS  (STANDING):  albuterol    90 MICROgram(s) HFA Inhaler 2 Puff(s) Inhalation two times a day  budesonide 160 MICROgram(s)/formoterol 4.5 MICROgram(s) Inhaler 2 Puff(s) Inhalation two times a day  chlorhexidine 2% Cloths 1 Application(s) Topical daily  dextrose 50% Injectable 25 Gram(s) IV Push once  dextrose 50% Injectable 25 Gram(s) IV Push once  dextrose 50% Injectable 12.5 Gram(s) IV Push once  dextrose Oral Gel 15 Gram(s) Oral once  enoxaparin Injectable 40 milliGRAM(s) SubCutaneous every 24 hours  influenza  Vaccine (HIGH DOSE) 0.7 milliLiter(s) IntraMuscular once  insulin detemir injectable (LEVEMIR) 5 Unit(s) SubCutaneous at bedtime  insulin lispro (ADMELOG) corrective regimen sliding scale   SubCutaneous three times a day before meals  insulin lispro (ADMELOG) corrective regimen sliding scale   SubCutaneous at bedtime  lipid, fat emulsion (Fish Oil and Plant Based) 20% Infusion 10.4 mL/Hr (10.4 mL/Hr) IV Continuous <Continuous>  pantoprazole    Tablet 40 milliGRAM(s) Oral before breakfast  Parenteral Nutrition - Adult 1 Each (42 mL/Hr) TPN Continuous <Continuous>  Parenteral Nutrition - Adult 1 Each (63 mL/Hr) TPN Continuous <Continuous>  sodium chloride 0.9% lock flush 3 milliLiter(s) IV Push every 8 hours      PHYSICAL EXAM:  T(C): 37.2 (12-12-23 @ 18:04), Max: 37.2 (12-12-23 @ 18:04)  HR: 93 (12-12-23 @ 18:04) (93 - 100)  BP: 107/67 (12-12-23 @ 18:04) (102/51 - 113/63)  RR: 19 (12-12-23 @ 18:04) (16 - 19)  SpO2: 100% (12-12-23 @ 18:04) (100% - 100%)  Wt(kg): --  I&O's Summary    11 Dec 2023 07:01  -  12 Dec 2023 07:00  --------------------------------------------------------  IN: 2137.8 mL / OUT: 1357.5 mL / NET: 780.3 mL    12 Dec 2023 07:01  -  12 Dec 2023 18:55  --------------------------------------------------------  IN: 400 mL / OUT: 255 mL / NET: 145 mL          Appearance: Normal	  HEENT:  PERRLA   Lymphatic: No lymphadenopathy   Cardiovascular: Normal S1 S2, no JVD  Respiratory: normal effort , clear  Gastrointestinal:  Soft, Non-tender  Skin: No rashes,  warm to touch  Psychiatry:  Mood & affect appropriate  Musculuskeletal: No edema    recent labs, Imaging and EKGs personally reviewed       12-11-23 @ 07:01  -  12-12-23 @ 07:00  --------------------------------------------------------  IN: 2137.8 mL / OUT: 1357.5 mL / NET: 780.3 mL    12-12-23 @ 07:01  -  12-12-23 @ 18:55  --------------------------------------------------------  IN: 400 mL / OUT: 255 mL / NET: 145 mL                          9.9    10.31 )-----------( 422      ( 12 Dec 2023 06:07 )             31.7               12-12    140  |  105  |  22  ----------------------------<  154<H>  3.8   |  26  |  0.46<L>    Ca    9.1      12 Dec 2023 06:07  Phos  3.2     12-12  Mg     2.10     12-12                         Urinalysis Basic - ( 12 Dec 2023 06:07 )    Color: x / Appearance: x / SG: x / pH: x  Gluc: 154 mg/dL / Ketone: x  / Bili: x / Urobili: x   Blood: x / Protein: x / Nitrite: x   Leuk Esterase: x / RBC: x / WBC x   Sq Epi: x / Non Sq Epi: x / Bacteria: x

## 2023-12-12 NOTE — CHART NOTE - NSCHARTNOTEFT_GEN_A_CORE
AFter reviewing the case we recommend getting an additional CTAP noncontrast to evaluate the collection size at this time. Thank you.    Jalen Rick MD  Vascular and Interventional Radiology

## 2023-12-12 NOTE — PROGRESS NOTE ADULT - SUBJECTIVE AND OBJECTIVE BOX
TEAM [ D ] Surgery Daily Progress Note  =====================================================    SUBJECTIVE: Patient seen and examined at bedside on AM rounds. No acute overnight events. Patient reports that they're feeling well, tolerating CLD without any issues. Reports Flatus and BM. OOB/Amublating as tolerated. Denies nausea, vomiting, fever, chills, SOB, chest pain.     PAST MEDICAL & SURGICAL HISTORY:  History of hypertension  Diabetes  GERD (gastroesophageal reflux disease)  Uterine leiomyoma  Hyperlipidemia  Asthma  Obesity  Lymphadenopathy  left lower extremitiy ; 1966 - current  H/O insertion of insulin pump  OA (osteoarthritis)  Malignant neoplasm of cecum  H/O bilateral breast reduction surgery  History of appendectomy  History of cholecystectomy  H/O: hysterectomy  History of total right knee replacement  3/2016 - [facility]  H/O total knee replacement, left  S/P bunionectomy      ALLERGIES:  Darvocet A500 (Other; Urticaria)  Percocet 10/325 (Other; Urticaria)  codeine (Other)  Lantus (Swelling)  PURELL.  pt said, &quot;I felt my airway closing&quot; after she smelled the Purell. The incident occured at the pulmonologist office. (Other; Short breath)      --------------------------------------------------------------------------------------    MEDICATIONS:    Neurologic Medications  metoclopramide Injectable 10 milliGRAM(s) IV Push every 8 hours    Respiratory Medications  albuterol    90 MICROgram(s) HFA Inhaler 2 Puff(s) Inhalation two times a day  budesonide 160 MICROgram(s)/formoterol 4.5 MICROgram(s) Inhaler 2 Puff(s) Inhalation two times a day    Cardiovascular Medications    Gastrointestinal Medications  lipid, fat emulsion (Fish Oil and Plant Based) 20% Infusion 20.8 mL/Hr IV Continuous <Continuous>  pantoprazole  Injectable 40 milliGRAM(s) IV Push daily  Parenteral Nutrition - Adult 1 Each TPN Continuous <Continuous>  sodium chloride 0.9% lock flush 3 milliLiter(s) IV Push every 8 hours    Genitourinary Medications    Hematologic/Oncologic Medications  enoxaparin Injectable 40 milliGRAM(s) SubCutaneous every 24 hours  influenza  Vaccine (HIGH DOSE) 0.7 milliLiter(s) IntraMuscular once    Antimicrobial/Immunologic Medications    Endocrine/Metabolic Medications  dextrose 50% Injectable 25 Gram(s) IV Push once  dextrose 50% Injectable 25 Gram(s) IV Push once  dextrose 50% Injectable 12.5 Gram(s) IV Push once  dextrose Oral Gel 15 Gram(s) Oral once  insulin detemir injectable (LEVEMIR) 5 Unit(s) SubCutaneous at bedtime  insulin lispro (ADMELOG) corrective regimen sliding scale   SubCutaneous every 6 hours    Topical/Other Medications  chlorhexidine 2% Cloths 1 Application(s) Topical daily    --------------------------------------------------------------------------------------    VITAL SIGNS:  T(C): 36.9 (12-12-23 @ 05:05), Max: 37.1 (12-12-23 @ 00:14)  HR: 98 (12-12-23 @ 05:05) (63 - 98)  BP: 113/63 (12-12-23 @ 05:05) (103/67 - 141/69)  RR: 16 (12-12-23 @ 05:05) (16 - 18)  SpO2: 100% (12-12-23 @ 05:05) (97% - 100%)  --------------------------------------------------------------------------------------    EXAM  General: NAD, resting in bed comfortably. A&Ox4.   Cardiac: S1, S2. pulse present   Respiratory: Nonlabored respirations, normal cw expansion. No signs of respiratory distress.   Abdomen: soft, nontender, nondistended. IR drain with minimal dark red output  Extremities: no deformities, moving all extremities spontaneously.     --------------------------------------------------------------------------------------    LABS                       10.7   11.66 )-----------( 487      ( 11 Dec 2023 10:31 )             34.2     12-11    141  |  104  |  21  ----------------------------<  200<H>  4.3   |  28  |  0.43<L>    Ca    9.3      11 Dec 2023 10:31  Phos  3.0     12-11  Mg     2.20     12-11          --------------------------------------------------------------------------------------    INS AND OUTS:    12-11-23 @ 07:01  -  12-12-23 @ 07:00  --------------------------------------------------------  IN: 2137.8 mL / OUT: 1357.5 mL / NET: 780.3 mL      --------------------------------------------------------------------------------------     TEAM [ D ] Surgery Daily Progress Note  =====================================================    SUBJECTIVE: Patient seen and examined at bedside on AM rounds. No acute overnight events. Patient reports that they're feeling well, tolerating CLD without any issues. Reports Flatus and BM. OOB/Amublating as tolerated. Denies nausea, vomiting, fever, chills, SOB, chest pain.     PAST MEDICAL & SURGICAL HISTORY:  History of hypertension  Diabetes  GERD (gastroesophageal reflux disease)  Uterine leiomyoma  Hyperlipidemia  Asthma  Obesity  Lymphadenopathy  left lower extremitiy ; 1966 - current  H/O insertion of insulin pump  OA (osteoarthritis)  Malignant neoplasm of cecum  H/O bilateral breast reduction surgery  History of appendectomy  History of cholecystectomy  H/O: hysterectomy  History of total right knee replacement  3/2016 - [facility]  H/O total knee replacement, left  S/P bunionectomy      ALLERGIES:  Darvocet A500 (Other; Urticaria)  Percocet 10/325 (Other; Urticaria)  codeine (Other)  Lantus (Swelling)  PURELL.  pt said, &quot;I felt my airway closing&quot; after she smelled the Purell. The incident occured at the pulmonologist office. (Other; Short breath)      --------------------------------------------------------------------------------------    MEDICATIONS:    Neurologic Medications  metoclopramide Injectable 10 milliGRAM(s) IV Push every 8 hours    Respiratory Medications  albuterol    90 MICROgram(s) HFA Inhaler 2 Puff(s) Inhalation two times a day  budesonide 160 MICROgram(s)/formoterol 4.5 MICROgram(s) Inhaler 2 Puff(s) Inhalation two times a day    Cardiovascular Medications    Gastrointestinal Medications  lipid, fat emulsion (Fish Oil and Plant Based) 20% Infusion 20.8 mL/Hr IV Continuous <Continuous>  pantoprazole  Injectable 40 milliGRAM(s) IV Push daily  Parenteral Nutrition - Adult 1 Each TPN Continuous <Continuous>  sodium chloride 0.9% lock flush 3 milliLiter(s) IV Push every 8 hours    Genitourinary Medications    Hematologic/Oncologic Medications  enoxaparin Injectable 40 milliGRAM(s) SubCutaneous every 24 hours  influenza  Vaccine (HIGH DOSE) 0.7 milliLiter(s) IntraMuscular once    Antimicrobial/Immunologic Medications    Endocrine/Metabolic Medications  dextrose 50% Injectable 25 Gram(s) IV Push once  dextrose 50% Injectable 25 Gram(s) IV Push once  dextrose 50% Injectable 12.5 Gram(s) IV Push once  dextrose Oral Gel 15 Gram(s) Oral once  insulin detemir injectable (LEVEMIR) 5 Unit(s) SubCutaneous at bedtime  insulin lispro (ADMELOG) corrective regimen sliding scale   SubCutaneous every 6 hours    Topical/Other Medications  chlorhexidine 2% Cloths 1 Application(s) Topical daily    --------------------------------------------------------------------------------------    VITAL SIGNS:  T(C): 36.9 (12-12-23 @ 05:05), Max: 37.1 (12-12-23 @ 00:14)  HR: 98 (12-12-23 @ 05:05) (63 - 98)  BP: 113/63 (12-12-23 @ 05:05) (103/67 - 141/69)  RR: 16 (12-12-23 @ 05:05) (16 - 18)  SpO2: 100% (12-12-23 @ 05:05) (97% - 100%)  --------------------------------------------------------------------------------------    EXAM  General: NAD, resting in bed comfortably. A&Ox4.   Cardiac: S1, S2. pulse present   Respiratory: Nonlabored respirations, normal cw expansion. No signs of respiratory distress.   Abdomen: soft, nontender, nondistended. IR drain with minimal dark red output  Extremities: no deformities, moving all extremities spontaneously.     --------------------------------------------------------------------------------------    LABS                       10.7   11.66 )-----------( 487      ( 11 Dec 2023 10:31 )             34.2     12-11    141  |  104  |  21  ----------------------------<  200<H>  4.3   |  28  |  0.43<L>    Ca    9.3      11 Dec 2023 10:31  Phos  3.0     12-11  Mg     2.20     12-11          --------------------------------------------------------------------------------------    INS AND OUTS:    12-11-23 @ 07:01  -  12-12-23 @ 07:00  --------------------------------------------------------  IN: 2137.8 mL / OUT: 1357.5 mL / NET: 780.3 mL      --------------------------------------------------------------------------------------

## 2023-12-12 NOTE — PROGRESS NOTE ADULT - ASSESSMENT
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 Course c/b ileus. CT on 12/4 showed: Interval increase in size of fluid anterior to the ileocolic anastomosis, with new peripheral enhancement, probably early abscess formation and decrease in small bowel dilatation, probably ileus. Patient s/p IR drainage on 12/7.     Plan  - Diet: CLD, may advance to regular   - TPN via PICC line. 1/2 TPN today   - Pain control with Tylenol, Dilaudid PRN  - C/w zosyn  - tube check with IR (drain <10cc/day)  - on reglan for motility. d/c erythromycin   - Protonix 40 mg daily  - Home meds; holding HTN medications  - Appreciate endocrinology recs; plan to restart insulin pump on discharge  - OOB/ambulate/ PT/ IS while in bed  - DVT prophylaxis: Lovenox   - Dispo: Pt recommends Home PT with walker    D Team Surgery  [id number]      73 year old female with PMH asthma, T2DM on insulin pump, GERD, HTN, HLD, OA, obesity presents s/p robotic partial colectomy for 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 Course c/b ileus. CT on 12/4 showed: Interval increase in size of fluid anterior to the ileocolic anastomosis, with new peripheral enhancement, probably early abscess formation and decrease in small bowel dilatation, probably ileus. Patient s/p IR drainage on 12/7.     Plan  - Diet: CLD, may advance to regular   - TPN via PICC line. 1/2 TPN today   - Pain control with Tylenol, Dilaudid PRN  - C/w zosyn  - tube check with IR (drain <10cc/day)  - on reglan for motility. d/c erythromycin   - Protonix 40 mg daily  - Home meds; holding HTN medications  - Appreciate endocrinology recs; plan to restart insulin pump on discharge  - OOB/ambulate/ PT/ IS while in bed  - DVT prophylaxis: Lovenox   - Dispo: Pt recommends Home PT with walker    D Team Surgery  [id number]      73 year old female with PMH asthma, T2DM on insulin pump, GERD, HTN, HLD, OA, obesity presents s/p robotic partial colectomy for 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 Course c/b ileus. CT on 12/4 showed: Interval increase in size of fluid anterior to the ileocolic anastomosis, with new peripheral enhancement, probably early abscess formation and decrease in small bowel dilatation, probably ileus. Patient s/p IR drainage on 12/7.     Plan  - Diet: LRD  - TPN via PICC line. 1/2 TPN today   - Pain control with Tylenol, Dilaudid PRN  - C/w zosyn  - tube check with IR (drain <10cc/day)  - d/c reglan and erythromycin   - Protonix 40 mg daily today  - Home meds; holding HTN medications  - [facility] endocrinology recs; plan to restart insulin pump on discharge  - OOB/ambulate/ PT/ IS while in bed  - DVT prophylaxis: Lovenox   - Dispo: Pt recommends Home PT with walker    D Team Surgery  [id number]      73 year old female with PMH asthma, T2DM on insulin pump, GERD, HTN, HLD, OA, obesity presents s/p robotic partial colectomy for 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 Course c/b ileus. CT on 12/4 showed: Interval increase in size of fluid anterior to the ileocolic anastomosis, with new peripheral enhancement, probably early abscess formation and decrease in small bowel dilatation, probably ileus. Patient s/p IR drainage on 12/7.     Plan  - Diet: LRD  - TPN via PICC line. 1/2 TPN today   - Pain control with Tylenol, Dilaudid PRN  - C/w zosyn  - tube check with IR (drain <10cc/day)  - d/c reglan and erythromycin   - Protonix 40 mg daily today  - Home meds; holding HTN medications  - [facility] endocrinology recs; plan to restart insulin pump on discharge  - OOB/ambulate/ PT/ IS while in bed  - DVT prophylaxis: Lovenox   - Dispo: Pt recommends Home PT with walker    D Team Surgery  [id number]

## 2023-12-12 NOTE — PROGRESS NOTE ADULT - SUBJECTIVE AND OBJECTIVE BOX
DATE OF SERVICE: 12-12-23    Patient denies chest pain or shortness of breath.   Review of symptoms otherwise negative.    MEDICATIONS:  acetaminophen     Tablet .. 975 milliGRAM(s) Oral every 6 hours PRN  albuterol    90 MICROgram(s) HFA Inhaler 2 Puff(s) Inhalation two times a day  budesonide 160 MICROgram(s)/formoterol 4.5 MICROgram(s) Inhaler 2 Puff(s) Inhalation two times a day  chlorhexidine 2% Cloths 1 Application(s) Topical daily  dextrose 50% Injectable 25 Gram(s) IV Push once  dextrose 50% Injectable 12.5 Gram(s) IV Push once  dextrose 50% Injectable 25 Gram(s) IV Push once  dextrose Oral Gel 15 Gram(s) Oral once  enoxaparin Injectable 40 milliGRAM(s) SubCutaneous every 24 hours  influenza  Vaccine (HIGH DOSE) 0.7 milliLiter(s) IntraMuscular once  insulin detemir injectable (LEVEMIR) 5 Unit(s) SubCutaneous at bedtime  insulin lispro (ADMELOG) corrective regimen sliding scale   SubCutaneous three times a day before meals  insulin lispro (ADMELOG) corrective regimen sliding scale   SubCutaneous at bedtime  lipid, fat emulsion (Fish Oil and Plant Based) 20% Infusion 10.4 mL/Hr IV Continuous <Continuous>  pantoprazole    Tablet 40 milliGRAM(s) Oral before breakfast  Parenteral Nutrition - Adult 1 Each TPN Continuous <Continuous>  Parenteral Nutrition - Adult 1 Each TPN Continuous <Continuous>  sodium chloride 0.9% lock flush 3 milliLiter(s) IV Push every 8 hours      LABS:                        9.9    10.31 )-----------( 422      ( 12 Dec 2023 06:07 )             31.7       Hemoglobin: 9.9 g/dL (12-12 @ 06:07)  Hemoglobin: 10.7 g/dL (12-11 @ 10:31)  Hemoglobin: 10.6 g/dL (12-10 @ 05:56)  Hemoglobin: 10.3 g/dL (12-09 @ 05:20)  Hemoglobin: 10.4 g/dL (12-08 @ 06:23)      12-12    140  |  105  |  22  ----------------------------<  154<H>  3.8   |  26  |  0.46<L>    Ca    9.1      12 Dec 2023 06:07  Phos  3.2     12-12  Mg     2.10     12-12      Creatinine Trend: 0.46<--, 0.43<--, 0.41<--, 0.39<--, 0.47<--, 0.51<--        PHYSICAL EXAM:  T(C): 36.8 (12-12-23 @ 11:58), Max: 37.1 (12-12-23 @ 00:14)  HR: 100 (12-12-23 @ 11:58) (64 - 100)  BP: 111/63 (12-12-23 @ 11:58) (102/51 - 141/69)  RR: 17 (12-12-23 @ 11:58) (16 - 18)  SpO2: 100% (12-12-23 @ 11:58) (97% - 100%)  Wt(kg): --    I&O's Summary    11 Dec 2023 07:01  -  12 Dec 2023 07:00  --------------------------------------------------------  IN: 2137.8 mL / OUT: 1357.5 mL / NET: 780.3 mL    12 Dec 2023 07:01  -  12 Dec 2023 12:48  --------------------------------------------------------  IN: 400 mL / OUT: 250 mL / NET: 150 mL          General:  Alert and Oriented * 3.   Head: Normocephalic and atraumatic.   Neck: No JVD. No bruits. Supple. Does not appear to be enlarged.   Cardiovascular: + S1,S2 ; RRR Soft systolic murmur at the left lower sternal border. No rubs noted.    Lungs: CTA b/l. No rhonchi, rales or wheezes.   Abdomen: distended NT  Extremities: No clubbing/cyanosis/edema.   Neurologic: Moves all four extremities. Full range of motion.   Skin: Warm and moist. The patient's skin has normal elasticity and good skin turgor.   Psychiatric: Appropriate mood and affect.  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      -- tolerated procedure well from CV perspective  -- pain management  -- supplement lytes per sx   -- s/p NGT- clamp per sx, CT noted with ileus, repeat CT noted, on TPN  -- eventually resume home meds: Asa 81mg, Pravastatin 40mg when ok from a surgical perspective  -- BP meds Hctz 12.5mg, Losartan 25mg on hold, BP controlled  -- s/p placement of IR drain  -- NGT removed  -- IR recomending repeat CT    Bella Aguilera MD  Pager: [phone]         DATE OF SERVICE: 12-12-23    Patient denies chest pain or shortness of breath.   Review of symptoms otherwise negative.    MEDICATIONS:  acetaminophen     Tablet .. 975 milliGRAM(s) Oral every 6 hours PRN  albuterol    90 MICROgram(s) HFA Inhaler 2 Puff(s) Inhalation two times a day  budesonide 160 MICROgram(s)/formoterol 4.5 MICROgram(s) Inhaler 2 Puff(s) Inhalation two times a day  chlorhexidine 2% Cloths 1 Application(s) Topical daily  dextrose 50% Injectable 25 Gram(s) IV Push once  dextrose 50% Injectable 12.5 Gram(s) IV Push once  dextrose 50% Injectable 25 Gram(s) IV Push once  dextrose Oral Gel 15 Gram(s) Oral once  enoxaparin Injectable 40 milliGRAM(s) SubCutaneous every 24 hours  influenza  Vaccine (HIGH DOSE) 0.7 milliLiter(s) IntraMuscular once  insulin detemir injectable (LEVEMIR) 5 Unit(s) SubCutaneous at bedtime  insulin lispro (ADMELOG) corrective regimen sliding scale   SubCutaneous three times a day before meals  insulin lispro (ADMELOG) corrective regimen sliding scale   SubCutaneous at bedtime  lipid, fat emulsion (Fish Oil and Plant Based) 20% Infusion 10.4 mL/Hr IV Continuous <Continuous>  pantoprazole    Tablet 40 milliGRAM(s) Oral before breakfast  Parenteral Nutrition - Adult 1 Each TPN Continuous <Continuous>  Parenteral Nutrition - Adult 1 Each TPN Continuous <Continuous>  sodium chloride 0.9% lock flush 3 milliLiter(s) IV Push every 8 hours      LABS:                        9.9    10.31 )-----------( 422      ( 12 Dec 2023 06:07 )             31.7       Hemoglobin: 9.9 g/dL (12-12 @ 06:07)  Hemoglobin: 10.7 g/dL (12-11 @ 10:31)  Hemoglobin: 10.6 g/dL (12-10 @ 05:56)  Hemoglobin: 10.3 g/dL (12-09 @ 05:20)  Hemoglobin: 10.4 g/dL (12-08 @ 06:23)      12-12    140  |  105  |  22  ----------------------------<  154<H>  3.8   |  26  |  0.46<L>    Ca    9.1      12 Dec 2023 06:07  Phos  3.2     12-12  Mg     2.10     12-12      Creatinine Trend: 0.46<--, 0.43<--, 0.41<--, 0.39<--, 0.47<--, 0.51<--        PHYSICAL EXAM:  T(C): 36.8 (12-12-23 @ 11:58), Max: 37.1 (12-12-23 @ 00:14)  HR: 100 (12-12-23 @ 11:58) (64 - 100)  BP: 111/63 (12-12-23 @ 11:58) (102/51 - 141/69)  RR: 17 (12-12-23 @ 11:58) (16 - 18)  SpO2: 100% (12-12-23 @ 11:58) (97% - 100%)  Wt(kg): --    I&O's Summary    11 Dec 2023 07:01  -  12 Dec 2023 07:00  --------------------------------------------------------  IN: 2137.8 mL / OUT: 1357.5 mL / NET: 780.3 mL    12 Dec 2023 07:01  -  12 Dec 2023 12:48  --------------------------------------------------------  IN: 400 mL / OUT: 250 mL / NET: 150 mL          General:  Alert and Oriented * 3.   Head: Normocephalic and atraumatic.   Neck: No JVD. No bruits. Supple. Does not appear to be enlarged.   Cardiovascular: + S1,S2 ; RRR Soft systolic murmur at the left lower sternal border. No rubs noted.    Lungs: CTA b/l. No rhonchi, rales or wheezes.   Abdomen: distended NT  Extremities: No clubbing/cyanosis/edema.   Neurologic: Moves all four extremities. Full range of motion.   Skin: Warm and moist. The patient's skin has normal elasticity and good skin turgor.   Psychiatric: Appropriate mood and affect.  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      -- tolerated procedure well from CV perspective  -- pain management  -- supplement lytes per sx   -- s/p NGT- clamp per sx, CT noted with ileus, repeat CT noted, on TPN  -- eventually resume home meds: Asa 81mg, Pravastatin 40mg when ok from a surgical perspective  -- BP meds Hctz 12.5mg, Losartan 25mg on hold, BP controlled  -- s/p placement of IR drain  -- NGT removed  -- IR recomending repeat CT    Bella Aguilera MD  Pager: [phone]

## 2023-12-12 NOTE — PROGRESS NOTE ADULT - SUBJECTIVE AND OBJECTIVE BOX
Chief Complaint: DM2 A1c 6.8%    History: Pt seen at bedside. Pt tolerating oral diet. Pt denies nausea and vomiting/any signs of hypoglycemia. Pt reports an adequate appetite.     MEDICATIONS  (STANDING):  albuterol    90 MICROgram(s) HFA Inhaler 2 Puff(s) Inhalation two times a day  budesonide 160 MICROgram(s)/formoterol 4.5 MICROgram(s) Inhaler 2 Puff(s) Inhalation two times a day  chlorhexidine 2% Cloths 1 Application(s) Topical daily  dextrose 50% Injectable 25 Gram(s) IV Push once  dextrose 50% Injectable 25 Gram(s) IV Push once  dextrose 50% Injectable 12.5 Gram(s) IV Push once  dextrose Oral Gel 15 Gram(s) Oral once  enoxaparin Injectable 40 milliGRAM(s) SubCutaneous every 24 hours  influenza  Vaccine (HIGH DOSE) 0.7 milliLiter(s) IntraMuscular once  insulin detemir injectable (LEVEMIR) 5 Unit(s) SubCutaneous at bedtime  insulin lispro (ADMELOG) corrective regimen sliding scale   SubCutaneous three times a day before meals  insulin lispro (ADMELOG) corrective regimen sliding scale   SubCutaneous at bedtime  lipid, fat emulsion (Fish Oil and Plant Based) 20% Infusion 10.4 mL/Hr (10.4 mL/Hr) IV Continuous <Continuous>  pantoprazole    Tablet 40 milliGRAM(s) Oral before breakfast  Parenteral Nutrition - Adult 1 Each (42 mL/Hr) TPN Continuous <Continuous>  Parenteral Nutrition - Adult 1 Each (63 mL/Hr) TPN Continuous <Continuous>  sodium chloride 0.9% lock flush 3 milliLiter(s) IV Push every 8 hours    MEDICATIONS  (PRN):  acetaminophen     Tablet .. 975 milliGRAM(s) Oral every 6 hours PRN Mild Pain (1 - 3)      Allergies: Darvocet A500 (Other; Urticaria)  Percocet 10/325 (Other; Urticaria)  codeine (Other)  Lantus (Swelling)  PURELL.  pt said, &quot;I felt my airway closing&quot; after she smelled the Purell. The incident occured at the pulmonologist office. (Other; Short breath)        Review of Systems:  Respiratory: No SOB, no cough  GI: No nausea, vomiting, abdominal pain  Endocrine: no polyuria, polydipsia      PHYSICAL EXAM:  VITALS: T(C): 36.8 (12-12-23 @ 11:58)  T(F): 98.3 (12-12-23 @ 11:58), Max: 98.7 (12-12-23 @ 00:14)  HR: 100 (12-12-23 @ 11:58) (64 - 100)  BP: 111/63 (12-12-23 @ 11:58) (102/51 - 124/69)  RR:  (16 - 18)  SpO2:  (97% - 100%)  Wt(kg): --  GENERAL: NAD, well-groomed, well-developed  RESPIRATORY: No labored breathing   GI: Soft, nontender, non distended  PSYCH: Alert and oriented x 3, normal affect, normal mood      CAPILLARY BLOOD GLUCOSE  POCT Blood Glucose.: 192 mg/dL (12 Dec 2023 11:59)  POCT Blood Glucose.: 171 mg/dL (12 Dec 2023 05:11)  POCT Blood Glucose.: 126 mg/dL (11 Dec 2023 22:49)  POCT Blood Glucose.: 163 mg/dL (11 Dec 2023 17:58)    A1C with Estimated Average Glucose (11.16.23 @ 13:15)    A1C with Estimated Average Glucose Result: 6.8   Estimated Average Glucose: 148      12-12    140  |  105  |  22  ----------------------------<  154<H>  3.8   |  26  |  0.46<L>    eGFR: 102    Ca    9.1      12-12  Mg     2.10     12-12  Phos  3.2     12-12      Diet, Regular:   Consistent Carbohydrate Evening Snack (CSTCHOSN)  Fiber/Residue Restricted (LOWFIBER) (12-12-23 @ 08:33) [Active]      Parenteral Nutrition - Adult 1 Each (63 mL/Hr) TPN Continuous <Continuous>, 12-11-23 @ 17:00, 17:00, Stop order after: 1 Days  Parenteral Nutrition - Adult 1 Each (42 mL/Hr) TPN Continuous <Continuous>, 12-12-23 @ 17:00, 17:00, Stop order after: 1 Days

## 2023-12-13 LAB
ANION GAP SERPL CALC-SCNC: 9 MMOL/L — SIGNIFICANT CHANGE UP (ref 7–14)
ANION GAP SERPL CALC-SCNC: 9 MMOL/L — SIGNIFICANT CHANGE UP (ref 7–14)
BASOPHILS # BLD AUTO: 0.05 K/UL — SIGNIFICANT CHANGE UP (ref 0–0.2)
BASOPHILS # BLD AUTO: 0.05 K/UL — SIGNIFICANT CHANGE UP (ref 0–0.2)
BASOPHILS NFR BLD AUTO: 0.5 % — SIGNIFICANT CHANGE UP (ref 0–2)
BASOPHILS NFR BLD AUTO: 0.5 % — SIGNIFICANT CHANGE UP (ref 0–2)
BUN SERPL-MCNC: 17 MG/DL — SIGNIFICANT CHANGE UP (ref 7–23)
BUN SERPL-MCNC: 17 MG/DL — SIGNIFICANT CHANGE UP (ref 7–23)
CALCIUM SERPL-MCNC: 8.8 MG/DL — SIGNIFICANT CHANGE UP (ref 8.4–10.5)
CALCIUM SERPL-MCNC: 8.8 MG/DL — SIGNIFICANT CHANGE UP (ref 8.4–10.5)
CHLORIDE SERPL-SCNC: 104 MMOL/L — SIGNIFICANT CHANGE UP (ref 98–107)
CHLORIDE SERPL-SCNC: 104 MMOL/L — SIGNIFICANT CHANGE UP (ref 98–107)
CO2 SERPL-SCNC: 25 MMOL/L — SIGNIFICANT CHANGE UP (ref 22–31)
CO2 SERPL-SCNC: 25 MMOL/L — SIGNIFICANT CHANGE UP (ref 22–31)
CREAT SERPL-MCNC: 0.43 MG/DL — LOW (ref 0.5–1.3)
CREAT SERPL-MCNC: 0.43 MG/DL — LOW (ref 0.5–1.3)
EGFR: 103 ML/MIN/1.73M2 — SIGNIFICANT CHANGE UP
EGFR: 103 ML/MIN/1.73M2 — SIGNIFICANT CHANGE UP
EOSINOPHIL # BLD AUTO: 0.44 K/UL — SIGNIFICANT CHANGE UP (ref 0–0.5)
EOSINOPHIL # BLD AUTO: 0.44 K/UL — SIGNIFICANT CHANGE UP (ref 0–0.5)
EOSINOPHIL NFR BLD AUTO: 4.6 % — SIGNIFICANT CHANGE UP (ref 0–6)
EOSINOPHIL NFR BLD AUTO: 4.6 % — SIGNIFICANT CHANGE UP (ref 0–6)
GLUCOSE BLDC GLUCOMTR-MCNC: 152 MG/DL — HIGH (ref 70–99)
GLUCOSE BLDC GLUCOMTR-MCNC: 231 MG/DL — HIGH (ref 70–99)
GLUCOSE BLDC GLUCOMTR-MCNC: 231 MG/DL — HIGH (ref 70–99)
GLUCOSE BLDC GLUCOMTR-MCNC: 235 MG/DL — HIGH (ref 70–99)
GLUCOSE BLDC GLUCOMTR-MCNC: 235 MG/DL — HIGH (ref 70–99)
GLUCOSE SERPL-MCNC: 219 MG/DL — HIGH (ref 70–99)
GLUCOSE SERPL-MCNC: 219 MG/DL — HIGH (ref 70–99)
HCT VFR BLD CALC: 32.4 % — LOW (ref 34.5–45)
HCT VFR BLD CALC: 32.4 % — LOW (ref 34.5–45)
HGB BLD-MCNC: 9.9 G/DL — LOW (ref 11.5–15.5)
HGB BLD-MCNC: 9.9 G/DL — LOW (ref 11.5–15.5)
IANC: 6.57 K/UL — SIGNIFICANT CHANGE UP (ref 1.8–7.4)
IANC: 6.57 K/UL — SIGNIFICANT CHANGE UP (ref 1.8–7.4)
IMM GRANULOCYTES NFR BLD AUTO: 0.4 % — SIGNIFICANT CHANGE UP (ref 0–0.9)
IMM GRANULOCYTES NFR BLD AUTO: 0.4 % — SIGNIFICANT CHANGE UP (ref 0–0.9)
LYMPHOCYTES # BLD AUTO: 1.55 K/UL — SIGNIFICANT CHANGE UP (ref 1–3.3)
LYMPHOCYTES # BLD AUTO: 1.55 K/UL — SIGNIFICANT CHANGE UP (ref 1–3.3)
LYMPHOCYTES # BLD AUTO: 16.4 % — SIGNIFICANT CHANGE UP (ref 13–44)
LYMPHOCYTES # BLD AUTO: 16.4 % — SIGNIFICANT CHANGE UP (ref 13–44)
MAGNESIUM SERPL-MCNC: 1.9 MG/DL — SIGNIFICANT CHANGE UP (ref 1.6–2.6)
MAGNESIUM SERPL-MCNC: 1.9 MG/DL — SIGNIFICANT CHANGE UP (ref 1.6–2.6)
MCHC RBC-ENTMCNC: 25.3 PG — LOW (ref 27–34)
MCHC RBC-ENTMCNC: 25.3 PG — LOW (ref 27–34)
MCHC RBC-ENTMCNC: 30.6 GM/DL — LOW (ref 32–36)
MCHC RBC-ENTMCNC: 30.6 GM/DL — LOW (ref 32–36)
MCV RBC AUTO: 82.7 FL — SIGNIFICANT CHANGE UP (ref 80–100)
MCV RBC AUTO: 82.7 FL — SIGNIFICANT CHANGE UP (ref 80–100)
MONOCYTES # BLD AUTO: 0.82 K/UL — SIGNIFICANT CHANGE UP (ref 0–0.9)
MONOCYTES # BLD AUTO: 0.82 K/UL — SIGNIFICANT CHANGE UP (ref 0–0.9)
MONOCYTES NFR BLD AUTO: 8.7 % — SIGNIFICANT CHANGE UP (ref 2–14)
MONOCYTES NFR BLD AUTO: 8.7 % — SIGNIFICANT CHANGE UP (ref 2–14)
NEUTROPHILS # BLD AUTO: 6.57 K/UL — SIGNIFICANT CHANGE UP (ref 1.8–7.4)
NEUTROPHILS # BLD AUTO: 6.57 K/UL — SIGNIFICANT CHANGE UP (ref 1.8–7.4)
NEUTROPHILS NFR BLD AUTO: 69.4 % — SIGNIFICANT CHANGE UP (ref 43–77)
NEUTROPHILS NFR BLD AUTO: 69.4 % — SIGNIFICANT CHANGE UP (ref 43–77)
NRBC # BLD: 0 /100 WBCS — SIGNIFICANT CHANGE UP (ref 0–0)
NRBC # BLD: 0 /100 WBCS — SIGNIFICANT CHANGE UP (ref 0–0)
NRBC # FLD: 0 K/UL — SIGNIFICANT CHANGE UP (ref 0–0)
NRBC # FLD: 0 K/UL — SIGNIFICANT CHANGE UP (ref 0–0)
PHOSPHATE SERPL-MCNC: 2.6 MG/DL — SIGNIFICANT CHANGE UP (ref 2.5–4.5)
PHOSPHATE SERPL-MCNC: 2.6 MG/DL — SIGNIFICANT CHANGE UP (ref 2.5–4.5)
PLATELET # BLD AUTO: 367 K/UL — SIGNIFICANT CHANGE UP (ref 150–400)
PLATELET # BLD AUTO: 367 K/UL — SIGNIFICANT CHANGE UP (ref 150–400)
POTASSIUM SERPL-MCNC: 4 MMOL/L — SIGNIFICANT CHANGE UP (ref 3.5–5.3)
POTASSIUM SERPL-MCNC: 4 MMOL/L — SIGNIFICANT CHANGE UP (ref 3.5–5.3)
POTASSIUM SERPL-SCNC: 4 MMOL/L — SIGNIFICANT CHANGE UP (ref 3.5–5.3)
POTASSIUM SERPL-SCNC: 4 MMOL/L — SIGNIFICANT CHANGE UP (ref 3.5–5.3)
RBC # BLD: 3.92 M/UL — SIGNIFICANT CHANGE UP (ref 3.8–5.2)
RBC # BLD: 3.92 M/UL — SIGNIFICANT CHANGE UP (ref 3.8–5.2)
RBC # FLD: 19.3 % — HIGH (ref 10.3–14.5)
RBC # FLD: 19.3 % — HIGH (ref 10.3–14.5)
SODIUM SERPL-SCNC: 138 MMOL/L — SIGNIFICANT CHANGE UP (ref 135–145)
SODIUM SERPL-SCNC: 138 MMOL/L — SIGNIFICANT CHANGE UP (ref 135–145)
WBC # BLD: 9.47 K/UL — SIGNIFICANT CHANGE UP (ref 3.8–10.5)
WBC # BLD: 9.47 K/UL — SIGNIFICANT CHANGE UP (ref 3.8–10.5)
WBC # FLD AUTO: 9.47 K/UL — SIGNIFICANT CHANGE UP (ref 3.8–10.5)
WBC # FLD AUTO: 9.47 K/UL — SIGNIFICANT CHANGE UP (ref 3.8–10.5)

## 2023-12-13 PROCEDURE — 99232 SBSQ HOSP IP/OBS MODERATE 35: CPT

## 2023-12-13 RX ORDER — INSULIN LISPRO 100/ML
VIAL (ML) SUBCUTANEOUS
Refills: 0 | Status: DISCONTINUED | OUTPATIENT
Start: 2023-12-13 | End: 2023-12-13

## 2023-12-13 RX ORDER — INSULIN LISPRO 100/ML
VIAL (ML) SUBCUTANEOUS
Refills: 0 | Status: DISCONTINUED | OUTPATIENT
Start: 2023-12-13 | End: 2023-12-15

## 2023-12-13 RX ORDER — INSULIN LISPRO 100/ML
VIAL (ML) SUBCUTANEOUS AT BEDTIME
Refills: 0 | Status: DISCONTINUED | OUTPATIENT
Start: 2023-12-13 | End: 2023-12-15

## 2023-12-13 RX ORDER — OXYCODONE HYDROCHLORIDE 5 MG/1
5 TABLET ORAL EVERY 6 HOURS
Refills: 0 | Status: DISCONTINUED | OUTPATIENT
Start: 2023-12-13 | End: 2023-12-15

## 2023-12-13 RX ORDER — SODIUM CHLORIDE 9 MG/ML
1000 INJECTION, SOLUTION INTRAVENOUS
Refills: 0 | Status: DISCONTINUED | OUTPATIENT
Start: 2023-12-13 | End: 2023-12-14

## 2023-12-13 RX ORDER — OXYCODONE HYDROCHLORIDE 5 MG/1
2.5 TABLET ORAL EVERY 6 HOURS
Refills: 0 | Status: DISCONTINUED | OUTPATIENT
Start: 2023-12-13 | End: 2023-12-15

## 2023-12-13 RX ADMIN — SODIUM CHLORIDE 3 MILLILITER(S): 9 INJECTION INTRAMUSCULAR; INTRAVENOUS; SUBCUTANEOUS at 19:37

## 2023-12-13 RX ADMIN — BUDESONIDE AND FORMOTEROL FUMARATE DIHYDRATE 2 PUFF(S): 160; 4.5 AEROSOL RESPIRATORY (INHALATION) at 22:21

## 2023-12-13 RX ADMIN — PANTOPRAZOLE SODIUM 40 MILLIGRAM(S): 20 TABLET, DELAYED RELEASE ORAL at 05:25

## 2023-12-13 RX ADMIN — OXYCODONE HYDROCHLORIDE 5 MILLIGRAM(S): 5 TABLET ORAL at 11:51

## 2023-12-13 RX ADMIN — CHLORHEXIDINE GLUCONATE 1 APPLICATION(S): 213 SOLUTION TOPICAL at 11:01

## 2023-12-13 RX ADMIN — ALBUTEROL 2 PUFF(S): 90 AEROSOL, METERED ORAL at 08:48

## 2023-12-13 RX ADMIN — OXYCODONE HYDROCHLORIDE 5 MILLIGRAM(S): 5 TABLET ORAL at 22:19

## 2023-12-13 RX ADMIN — ENOXAPARIN SODIUM 40 MILLIGRAM(S): 100 INJECTION SUBCUTANEOUS at 05:21

## 2023-12-13 RX ADMIN — Medication 2: at 08:47

## 2023-12-13 RX ADMIN — Medication 5 UNIT(S): at 22:21

## 2023-12-13 RX ADMIN — BUDESONIDE AND FORMOTEROL FUMARATE DIHYDRATE 2 PUFF(S): 160; 4.5 AEROSOL RESPIRATORY (INHALATION) at 08:48

## 2023-12-13 RX ADMIN — OXYCODONE HYDROCHLORIDE 5 MILLIGRAM(S): 5 TABLET ORAL at 11:00

## 2023-12-13 RX ADMIN — SODIUM CHLORIDE 3 MILLILITER(S): 9 INJECTION INTRAMUSCULAR; INTRAVENOUS; SUBCUTANEOUS at 13:21

## 2023-12-13 RX ADMIN — Medication 4: at 12:32

## 2023-12-13 RX ADMIN — ALBUTEROL 2 PUFF(S): 90 AEROSOL, METERED ORAL at 22:19

## 2023-12-13 RX ADMIN — Medication 975 MILLIGRAM(S): at 02:02

## 2023-12-13 RX ADMIN — Medication 2: at 17:39

## 2023-12-13 RX ADMIN — SODIUM CHLORIDE 3 MILLILITER(S): 9 INJECTION INTRAMUSCULAR; INTRAVENOUS; SUBCUTANEOUS at 07:10

## 2023-12-13 RX ADMIN — OXYCODONE HYDROCHLORIDE 5 MILLIGRAM(S): 5 TABLET ORAL at 10:42

## 2023-12-13 RX ADMIN — SODIUM CHLORIDE 100 MILLILITER(S): 9 INJECTION, SOLUTION INTRAVENOUS at 18:15

## 2023-12-13 NOTE — PROGRESS NOTE ADULT - SUBJECTIVE AND OBJECTIVE BOX
NUTRITION NOTE  [id number] MABRY  ===============================    Interval events - Patient was seen and examined at bedside, no acute events overnight. Patient denies chest pain, shortness of breath, nausea or vomiting at this time. Pt is tolerating PO diet without any nausea or vomiting. Plan to d/c parenteral nutrition today.    ROS: Except as noted above, all other systems reviewed and are negative     Allergies  Darvocet A500 (Other; Urticaria)  Percocet 10/325 (Other; Urticaria)  codeine (Other)  Lantus (Swelling)  Purell    PAST MEDICAL & SURGICAL HISTORY:  History of hypertension  Diabetes wears insulin pump  GERD (gastroesophageal reflux disease)  Uterine leiomyoma  Hyperlipidemia  Asthma  Obesity  Lymphadenopathy left lower extremity ;  - current  H/O insertion of insulin pump  OA (osteoarthritis)  Malignant neoplasm of cecum  H/O bilateral breast reduction surgery  History of appendectomy  History of cholecystectomy  H/O: hysterectomy  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 - [facility]  H/O total knee replacement, left  S/P bunionectomy    FAMILY HISTORY:  Diabetes mellitus (Father)  FH: breast cancer (Aunt)    Vital Signs Last 24 Hrs  T(C): 36.9 (13 Dec 2023 09:30), Max: 37.3 (13 Dec 2023 00:27)  T(F): 98.4 (13 Dec 2023 09:30), Max: 99.1 (13 Dec 2023 00:27)  HR: 102 (13 Dec 2023 09:30) (90 - 102)  BP: 110/55 (13 Dec 2023 09:30) (104/53 - 121/64)  RR: 16 (13 Dec 2023 09:30) (16 - 19)  SpO2: 100% (13 Dec 2023 09:30) (99% - 100%)    MEDICATIONS  (STANDING):  albuterol    90 MICROgram(s) HFA Inhaler 2 Puff(s) Inhalation two times a day  budesonide 160 MICROgram(s)/formoterol 4.5 MICROgram(s) Inhaler 2 Puff(s) Inhalation two times a day  chlorhexidine 2% Cloths 1 Application(s) Topical daily  dextrose 50% Injectable 25 Gram(s) IV Push once  dextrose 50% Injectable 12.5 Gram(s) IV Push once  dextrose 50% Injectable 25 Gram(s) IV Push once  dextrose Oral Gel 15 Gram(s) Oral once  influenza  Vaccine (HIGH DOSE) 0.7 milliLiter(s) IntraMuscular once  insulin detemir injectable (LEVEMIR) 5 Unit(s) SubCutaneous at bedtime  insulin lispro (ADMELOG) corrective regimen sliding scale   SubCutaneous three times a day before meals  insulin lispro (ADMELOG) corrective regimen sliding scale   SubCutaneous at bedtime  pantoprazole    Tablet 40 milliGRAM(s) Oral before breakfast  Parenteral Nutrition - Adult 1 Each (42 mL/Hr) TPN Continuous <Continuous>  sodium chloride 0.9% lock flush 3 milliLiter(s) IV Push every 8 hours    MEDICATIONS  (PRN):  acetaminophen     Tablet .. 975 milliGRAM(s) Oral every 6 hours PRN Mild Pain (1 - 3)    I&O's Detail    12 Dec 2023 07:01  -  13 Dec 2023 07:00  --------------------------------------------------------  IN:    Fat Emulsion (Fish Oil &amp; Plant Based) 20% Infusion: 124.8 mL    Oral Fluid: 500 mL    TPN (Total Parenteral Nutrition): 504 mL  Total IN: 1128.8 mL    OUT:    Drain (mL): 5 mL    Voided (mL): 750 mL  Total OUT: 755 mL    Total NET: 373.8 mL      13 Dec 2023 07:01  -  13 Dec 2023 10:44  --------------------------------------------------------  IN:    Oral Fluid: 240 mL    TPN (Total Parenteral Nutrition): 42 mL  Total IN: 282 mL    OUT:    Drain (mL): 0 mL    Voided (mL): 200 mL  Total OUT: 200 mL    Total NET: 82 mL    POCT Blood Glucose.: 231 mg/dL (13 Dec 2023 08:43)  POCT Blood Glucose.: 158 mg/dL (12 Dec 2023 22:19)  POCT Blood Glucose.: 164 mg/dL (12 Dec 2023 17:31)  POCT Blood Glucose.: 192 mg/dL (12 Dec 2023 11:59)    Daily Weight in k.6 (03 Dec 2023 00:00)    Drug Dosing Weight  Height (cm): 152.4 (2023 06:23)  Weight (kg): 75.659 (2023 06:23)  BMI (kg/m2): 32.6 (2023 06:23)  BSA (m2): 1.73 (2023 06:23)    PHYSICAL EXAM:  Constitutional: A&Ox3, NAD, sitting in chair   Respiratory: nonlabored respirations   Abdomen: Soft, nondistended, nontender, IR drain with minimal dark red output  Extremities: WWP, SEAMAN spontaneously  PICC Site: double lumen PICC placed on 23 in E, site clean and dry     Diet: regular diet and TPN/lipids (started on 23, d/c on 23)    LABORATORY                                     9.9    9.47  )-----------( 367      ( 13 Dec 2023 06:20 )             32.4   12-    138  |  104  |  17  ----------------------------<  219<H>  4.0   |  25  |  0.43<L>    Ca    8.8      13 Dec 2023 06:20  Phos  2.6     12-13  Mg     1.90     - Chol 65 LDL -- HDL 29<L> Trig 81    CT Abdomen and Pelvis 23: IMPRESSION: Dilated loops of small bowel the level of the terminal ileum, likely ileus in the postoperative setting. Postoperative pneumoperitoneum and diffuse subcutaneous emphysema.    CT Abdomen and Pelvis on 23: IMPRESSION: Interval increase in size of fluid anterior to the ileocolic anastomosis with new peripheral enhancement, probably early abscess formation. nterval decrease in small bowel dilatation, probably ileus.    Upper GI study on 23: IMPRESSION: Limited evaluation as patient had difficulty tolerating sufficient quantities of oral contrast. Due to significant delay of the oral contrast progression, the evaluation of the distal stomach and duodenum is limited.    ASSESSMENT/PLAN:  72 y/o female with PMH asthma, T2DM on insulin pump, GERD, HTN, HLD, OA, obesity is s/p robotic partial colectomy for moderately differentiated adenocarcinoma of cecal mass on 23. CT abd/pelvis on 23 showed findings consistent with post op ileus. Pt remains NPO with NGT in place. Nutrition consult called for initiation of parenteral nutrition in view of severe protein calorie malnutrition and prolonged NPO status. PICC placed and TPN was started on 23. CT on  showed: Interval increase in size of fluid anterior to the ileocolic anastomosis, with new peripheral enhancement, probably early abscess formation and decrease in small bowel dilatation, probably ileus. Pt is s/p anterior abdominal drainage catheter placement on 23.    IR will plan to perform drain study with possible adjustment vs. removal 23    Pt is now tolerating PO diet, plan to d/c parenteral nutrition today.    continue to monitor PO intake, remove PICC    d/w pt and primary team    Nutrition Support [id number]  NUTRITION NOTE  [id number] MABRY  ===============================    Interval events - Patient was seen and examined at bedside, no acute events overnight. Patient denies chest pain, shortness of breath, nausea or vomiting at this time. Pt is tolerating PO diet without any nausea or vomiting. Plan to d/c parenteral nutrition today.    ROS: Except as noted above, all other systems reviewed and are negative     Allergies  Darvocet A500 (Other; Urticaria)  Percocet 10/325 (Other; Urticaria)  codeine (Other)  Lantus (Swelling)  Purell    PAST MEDICAL & SURGICAL HISTORY:  History of hypertension  Diabetes wears insulin pump  GERD (gastroesophageal reflux disease)  Uterine leiomyoma  Hyperlipidemia  Asthma  Obesity  Lymphadenopathy left lower extremity ;  - current  H/O insertion of insulin pump  OA (osteoarthritis)  Malignant neoplasm of cecum  H/O bilateral breast reduction surgery  History of appendectomy  History of cholecystectomy  H/O: hysterectomy  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 - [facility]  H/O total knee replacement, left  S/P bunionectomy    FAMILY HISTORY:  Diabetes mellitus (Father)  FH: breast cancer (Aunt)    Vital Signs Last 24 Hrs  T(C): 36.9 (13 Dec 2023 09:30), Max: 37.3 (13 Dec 2023 00:27)  T(F): 98.4 (13 Dec 2023 09:30), Max: 99.1 (13 Dec 2023 00:27)  HR: 102 (13 Dec 2023 09:30) (90 - 102)  BP: 110/55 (13 Dec 2023 09:30) (104/53 - 121/64)  RR: 16 (13 Dec 2023 09:30) (16 - 19)  SpO2: 100% (13 Dec 2023 09:30) (99% - 100%)    MEDICATIONS  (STANDING):  albuterol    90 MICROgram(s) HFA Inhaler 2 Puff(s) Inhalation two times a day  budesonide 160 MICROgram(s)/formoterol 4.5 MICROgram(s) Inhaler 2 Puff(s) Inhalation two times a day  chlorhexidine 2% Cloths 1 Application(s) Topical daily  dextrose 50% Injectable 25 Gram(s) IV Push once  dextrose 50% Injectable 12.5 Gram(s) IV Push once  dextrose 50% Injectable 25 Gram(s) IV Push once  dextrose Oral Gel 15 Gram(s) Oral once  influenza  Vaccine (HIGH DOSE) 0.7 milliLiter(s) IntraMuscular once  insulin detemir injectable (LEVEMIR) 5 Unit(s) SubCutaneous at bedtime  insulin lispro (ADMELOG) corrective regimen sliding scale   SubCutaneous three times a day before meals  insulin lispro (ADMELOG) corrective regimen sliding scale   SubCutaneous at bedtime  pantoprazole    Tablet 40 milliGRAM(s) Oral before breakfast  Parenteral Nutrition - Adult 1 Each (42 mL/Hr) TPN Continuous <Continuous>  sodium chloride 0.9% lock flush 3 milliLiter(s) IV Push every 8 hours    MEDICATIONS  (PRN):  acetaminophen     Tablet .. 975 milliGRAM(s) Oral every 6 hours PRN Mild Pain (1 - 3)    I&O's Detail    12 Dec 2023 07:01  -  13 Dec 2023 07:00  --------------------------------------------------------  IN:    Fat Emulsion (Fish Oil &amp; Plant Based) 20% Infusion: 124.8 mL    Oral Fluid: 500 mL    TPN (Total Parenteral Nutrition): 504 mL  Total IN: 1128.8 mL    OUT:    Drain (mL): 5 mL    Voided (mL): 750 mL  Total OUT: 755 mL    Total NET: 373.8 mL      13 Dec 2023 07:01  -  13 Dec 2023 10:44  --------------------------------------------------------  IN:    Oral Fluid: 240 mL    TPN (Total Parenteral Nutrition): 42 mL  Total IN: 282 mL    OUT:    Drain (mL): 0 mL    Voided (mL): 200 mL  Total OUT: 200 mL    Total NET: 82 mL    POCT Blood Glucose.: 231 mg/dL (13 Dec 2023 08:43)  POCT Blood Glucose.: 158 mg/dL (12 Dec 2023 22:19)  POCT Blood Glucose.: 164 mg/dL (12 Dec 2023 17:31)  POCT Blood Glucose.: 192 mg/dL (12 Dec 2023 11:59)    Daily Weight in k.6 (03 Dec 2023 00:00)    Drug Dosing Weight  Height (cm): 152.4 (2023 06:23)  Weight (kg): 75.659 (2023 06:23)  BMI (kg/m2): 32.6 (2023 06:23)  BSA (m2): 1.73 (2023 06:23)    PHYSICAL EXAM:  Constitutional: A&Ox3, NAD, sitting in chair   Respiratory: nonlabored respirations   Abdomen: Soft, nondistended, nontender, IR drain with minimal dark red output  Extremities: WWP, SEAMAN spontaneously  PICC Site: double lumen PICC placed on 23 in E, site clean and dry     Diet: regular diet and TPN/lipids (started on 23, d/c on 23)    LABORATORY                                     9.9    9.47  )-----------( 367      ( 13 Dec 2023 06:20 )             32.4   12-    138  |  104  |  17  ----------------------------<  219<H>  4.0   |  25  |  0.43<L>    Ca    8.8      13 Dec 2023 06:20  Phos  2.6     12-13  Mg     1.90     - Chol 65 LDL -- HDL 29<L> Trig 81    CT Abdomen and Pelvis 23: IMPRESSION: Dilated loops of small bowel the level of the terminal ileum, likely ileus in the postoperative setting. Postoperative pneumoperitoneum and diffuse subcutaneous emphysema.    CT Abdomen and Pelvis on 23: IMPRESSION: Interval increase in size of fluid anterior to the ileocolic anastomosis with new peripheral enhancement, probably early abscess formation. nterval decrease in small bowel dilatation, probably ileus.    Upper GI study on 23: IMPRESSION: Limited evaluation as patient had difficulty tolerating sufficient quantities of oral contrast. Due to significant delay of the oral contrast progression, the evaluation of the distal stomach and duodenum is limited.    ASSESSMENT/PLAN:  74 y/o female with PMH asthma, T2DM on insulin pump, GERD, HTN, HLD, OA, obesity is s/p robotic partial colectomy for moderately differentiated adenocarcinoma of cecal mass on 23. CT abd/pelvis on 23 showed findings consistent with post op ileus. Pt remains NPO with NGT in place. Nutrition consult called for initiation of parenteral nutrition in view of severe protein calorie malnutrition and prolonged NPO status. PICC placed and TPN was started on 23. CT on  showed: Interval increase in size of fluid anterior to the ileocolic anastomosis, with new peripheral enhancement, probably early abscess formation and decrease in small bowel dilatation, probably ileus. Pt is s/p anterior abdominal drainage catheter placement on 23.    IR will plan to perform drain study with possible adjustment vs. removal 23    Pt is now tolerating PO diet, plan to d/c parenteral nutrition today.    continue to monitor PO intake, remove PICC    d/w pt and primary team    Nutrition Support [id number]

## 2023-12-13 NOTE — PROGRESS NOTE ADULT - SUBJECTIVE AND OBJECTIVE BOX
Name of Patient : NESTOR MABRY  MRN: [id number]  Date of visit: 12-13-23       Subjective: Patient seen and examined. No new events except as noted.   Doing okay    REVIEW OF SYSTEMS:    CONSTITUTIONAL: No weakness, fevers or chills  EYES/ENT: No visual changes;  No vertigo or throat pain   NECK: No pain or stiffness  RESPIRATORY: No cough, wheezing, hemoptysis; No shortness of breath  CARDIOVASCULAR: No chest pain or palpitations  GASTROINTESTINAL: No abdominal or epigastric pain. No nausea, vomiting, or hematemesis; No diarrhea or constipation. No melena or hematochezia.  GENITOURINARY: No dysuria, frequency or hematuria  NEUROLOGICAL: No numbness or weakness  SKIN: No itching, burning, rashes, or lesions   All other review of systems is negative unless indicated above.    MEDICATIONS:  MEDICATIONS  (STANDING):  albuterol    90 MICROgram(s) HFA Inhaler 2 Puff(s) Inhalation two times a day  budesonide 160 MICROgram(s)/formoterol 4.5 MICROgram(s) Inhaler 2 Puff(s) Inhalation two times a day  chlorhexidine 2% Cloths 1 Application(s) Topical daily  dextrose 50% Injectable 25 Gram(s) IV Push once  dextrose 50% Injectable 25 Gram(s) IV Push once  dextrose 50% Injectable 12.5 Gram(s) IV Push once  dextrose Oral Gel 15 Gram(s) Oral once  influenza  Vaccine (HIGH DOSE) 0.7 milliLiter(s) IntraMuscular once  insulin detemir injectable (LEVEMIR) 5 Unit(s) SubCutaneous at bedtime  insulin lispro (ADMELOG) corrective regimen sliding scale   SubCutaneous at bedtime  insulin lispro (ADMELOG) corrective regimen sliding scale   SubCutaneous three times a day before meals  lactated ringers. 1000 milliLiter(s) (100 mL/Hr) IV Continuous <Continuous>  pantoprazole    Tablet 40 milliGRAM(s) Oral before breakfast  sodium chloride 0.9% lock flush 3 milliLiter(s) IV Push every 8 hours      PHYSICAL EXAM:  T(C): 37 (12-13-23 @ 20:04), Max: 37.3 (12-13-23 @ 00:27)  HR: 100 (12-13-23 @ 20:04) (92 - 102)  BP: 110/58 (12-13-23 @ 20:04) (104/53 - 121/64)  RR: 18 (12-13-23 @ 20:04) (16 - 18)  SpO2: 100% (12-13-23 @ 20:04) (99% - 100%)  Wt(kg): --  I&O's Summary    12 Dec 2023 07:01  -  13 Dec 2023 07:00  --------------------------------------------------------  IN: 1128.8 mL / OUT: 755 mL / NET: 373.8 mL    13 Dec 2023 07:01  -  13 Dec 2023 23:20  --------------------------------------------------------  IN: 1438 mL / OUT: 305 mL / NET: 1133 mL          Appearance: Normal	  HEENT:  PERRLA   Lymphatic: No lymphadenopathy   Cardiovascular: Normal S1 S2, no JVD  Respiratory: normal effort , clear  Gastrointestinal:  Soft, Non-tender  Skin: No rashes,  warm to touch  Psychiatry:  Mood & affect appropriate  Musculuskeletal: No edema    recent labs, Imaging and EKGs personally reviewed     12-12-23 @ 07:01  -  12-13-23 @ 07:00  --------------------------------------------------------  IN: 1128.8 mL / OUT: 755 mL / NET: 373.8 mL    12-13-23 @ 07:01  -  12-13-23 @ 23:20  --------------------------------------------------------  IN: 1438 mL / OUT: 305 mL / NET: 1133 mL                          9.9    9.47  )-----------( 367      ( 13 Dec 2023 06:20 )             32.4               12-13    138  |  104  |  17  ----------------------------<  219<H>  4.0   |  25  |  0.43<L>    Ca    8.8      13 Dec 2023 06:20  Phos  2.6     12-13  Mg     1.90     12-13                         Urinalysis Basic - ( 13 Dec 2023 06:20 )    Color: x / Appearance: x / SG: x / pH: x  Gluc: 219 mg/dL / Ketone: x  / Bili: x / Urobili: x   Blood: x / Protein: x / Nitrite: x   Leuk Esterase: x / RBC: x / WBC x   Sq Epi: x / Non Sq Epi: x / Bacteria: x               Name of Patient : NESTOR MABRY  MRN: [id number]  Date of visit: 12-13-23       Subjective: Patient seen and examined. No new events except as noted.   Doing okay    REVIEW OF SYSTEMS:    CONSTITUTIONAL: No weakness, fevers or chills  EYES/ENT: No visual changes;  No vertigo or throat pain   NECK: No pain or stiffness  RESPIRATORY: No cough, wheezing, hemoptysis; No shortness of breath  CARDIOVASCULAR: No chest pain or palpitations  GASTROINTESTINAL: No abdominal or epigastric pain. No nausea, vomiting, or hematemesis; No diarrhea or constipation. No melena or hematochezia.  GENITOURINARY: No dysuria, frequency or hematuria  NEUROLOGICAL: No numbness or weakness  SKIN: No itching, burning, rashes, or lesions   All other review of systems is negative unless indicated above.    MEDICATIONS:  MEDICATIONS  (STANDING):  albuterol    90 MICROgram(s) HFA Inhaler 2 Puff(s) Inhalation two times a day  budesonide 160 MICROgram(s)/formoterol 4.5 MICROgram(s) Inhaler 2 Puff(s) Inhalation two times a day  chlorhexidine 2% Cloths 1 Application(s) Topical daily  dextrose 50% Injectable 25 Gram(s) IV Push once  dextrose 50% Injectable 25 Gram(s) IV Push once  dextrose 50% Injectable 12.5 Gram(s) IV Push once  dextrose Oral Gel 15 Gram(s) Oral once  influenza  Vaccine (HIGH DOSE) 0.7 milliLiter(s) IntraMuscular once  insulin detemir injectable (LEVEMIR) 5 Unit(s) SubCutaneous at bedtime  insulin lispro (ADMELOG) corrective regimen sliding scale   SubCutaneous at bedtime  insulin lispro (ADMELOG) corrective regimen sliding scale   SubCutaneous three times a day before meals  lactated ringers. 1000 milliLiter(s) (100 mL/Hr) IV Continuous <Continuous>  pantoprazole    Tablet 40 milliGRAM(s) Oral before breakfast  sodium chloride 0.9% lock flush 3 milliLiter(s) IV Push every 8 hours      PHYSICAL EXAM:  T(C): 37 (12-13-23 @ 20:04), Max: 37.3 (12-13-23 @ 00:27)  HR: 100 (12-13-23 @ 20:04) (92 - 102)  BP: 110/58 (12-13-23 @ 20:04) (104/53 - 121/64)  RR: 18 (12-13-23 @ 20:04) (16 - 18)  SpO2: 100% (12-13-23 @ 20:04) (99% - 100%)  Wt(kg): --  I&O's Summary    12 Dec 2023 07:01  -  13 Dec 2023 07:00  --------------------------------------------------------  IN: 1128.8 mL / OUT: 755 mL / NET: 373.8 mL    13 Dec 2023 07:01  -  13 Dec 2023 23:20  --------------------------------------------------------  IN: 1438 mL / OUT: 305 mL / NET: 1133 mL          Appearance: Normal	  HEENT:  PERRLA   Lymphatic: No lymphadenopathy   Cardiovascular: Normal S1 S2, no JVD  Respiratory: normal effort , clear  Gastrointestinal:  Soft, Non-tender  Skin: No rashes,  warm to touch  Psychiatry:  Mood & affect appropriate  Musculuskeletal: No edema    recent labs, Imaging and EKGs personally reviewed     12-12-23 @ 07:01  -  12-13-23 @ 07:00  --------------------------------------------------------  IN: 1128.8 mL / OUT: 755 mL / NET: 373.8 mL    12-13-23 @ 07:01  -  12-13-23 @ 23:20  --------------------------------------------------------  IN: 1438 mL / OUT: 305 mL / NET: 1133 mL                          9.9    9.47  )-----------( 367      ( 13 Dec 2023 06:20 )             32.4               12-13    138  |  104  |  17  ----------------------------<  219<H>  4.0   |  25  |  0.43<L>    Ca    8.8      13 Dec 2023 06:20  Phos  2.6     12-13  Mg     1.90     12-13                         Urinalysis Basic - ( 13 Dec 2023 06:20 )    Color: x / Appearance: x / SG: x / pH: x  Gluc: 219 mg/dL / Ketone: x  / Bili: x / Urobili: x   Blood: x / Protein: x / Nitrite: x   Leuk Esterase: x / RBC: x / WBC x   Sq Epi: x / Non Sq Epi: x / Bacteria: x

## 2023-12-13 NOTE — PROGRESS NOTE ADULT - ASSESSMENT
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 Course c/b ileus. CT on 12/4 showed: Interval increase in size of fluid anterior to the ileocolic anastomosis, with new peripheral enhancement, probably early abscess formation and decrease in small bowel dilatation, probably ileus. Patient s/p IR drainage on 12/7.     Plan  - Diet: LRD  - TPN via PICC line. discontinue TPN today   - Pain control with Tylenol, Dilaudid PRN  - tube check with IR (drain <10cc/day)  - Protonix 40 mg daily today  - Home meds; holding HTN medications  - Appreciate endocrinology recs; plan to restart insulin pump on discharge  - OOB/ambulate/ PT/ IS while in bed  - DVT prophylaxis: Lovenox   - Dispo: Pt recommends Home PT with walker    D Team Surgery  [id number]  73 year old female with PMH asthma, T2DM on insulin pump, GERD, HTN, HLD, OA, obesity presents s/p robotic partial colectomy for 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 Course c/b ileus. CT on 12/4 showed: Interval increase in size of fluid anterior to the ileocolic anastomosis, with new peripheral enhancement, probably early abscess formation and decrease in small bowel dilatation, probably ileus. Patient s/p IR drainage on 12/7.     Plan  - Diet: LRD  - TPN via PICC line. discontinue TPN today   - Pain control with Tylenol, Dilaudid PRN  - tube check with IR (drain <10cc/day)  - Protonix 40 mg daily today  - Home meds; holding HTN medications  - Appreciate endocrinology recs; plan to restart insulin pump on discharge  - OOB/ambulate/ PT/ IS while in bed  - DVT prophylaxis: Lovenox   - Dispo: Pt recommends Home PT with walker    D Team Surgery  [id number]  73 year old female with PMH asthma, T2DM on insulin pump, GERD, HTN, HLD, OA, obesity presents s/p robotic partial colectomy for 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 Course c/b ileus. CT on 12/4 showed: Interval increase in size of fluid anterior to the ileocolic anastomosis, with new peripheral enhancement, probably early abscess formation and decrease in small bowel dilatation, probably ileus. Patient s/p IR drainage on 12/7.     Plan  - Diet: LRD  - TPN via PICC line. discontinue TPN today   - Pain control with Tylenol, Dilaudid PRN  - tube check with IR (drain <10cc/day)  - Protonix 40 mg daily today  - Home meds; holding HTN medications  - Appreciate endocrinology recs; plan to restart insulin pump on discharge  - OOB/ambulate/ PT/ IS while in bed  - DVT prophylaxis: Lovenox   - Dispo: Pt recommends Home PT with walker. plan for d/c tomorrow     D Team Surgery  [id number]  73 year old female with PMH asthma, T2DM on insulin pump, GERD, HTN, HLD, OA, obesity presents s/p robotic partial colectomy for 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 Course c/b ileus. CT on 12/4 showed: Interval increase in size of fluid anterior to the ileocolic anastomosis, with new peripheral enhancement, probably early abscess formation and decrease in small bowel dilatation, probably ileus. Patient s/p IR drainage on 12/7.     Plan  - Diet: LRD  - TPN via PICC line. discontinue TPN today   - Pain control with Tylenol, Dilaudid PRN  - tube check with IR (drain <10cc/day)  - Protonix 40 mg daily today  - Home meds; holding HTN medications  - Appreciate endocrinology recs; plan to restart insulin pump on discharge  - OOB/ambulate/ PT/ IS while in bed  - DVT prophylaxis: Lovenox   - Dispo: Pt recommends Home PT with walker. plan for d/c tomorrow     D Team Surgery  [id number]

## 2023-12-13 NOTE — PROGRESS NOTE ADULT - SUBJECTIVE AND OBJECTIVE BOX
TEAM [ D ] Surgery Daily Progress Note  =====================================================    SUBJECTIVE: Patient seen and examined at bedside on AM rounds. No acute overnight events. Patient reports that they're feeling well, tolerating LRD without any issues. Reports Flatus and BM. OOB/Amublating as tolerated. Denies nausea, vomiting, fever, chills, SOB, chest pain.     PAST MEDICAL & SURGICAL HISTORY:  History of hypertension  Diabetes  GERD (gastroesophageal reflux disease)  Uterine leiomyoma  Hyperlipidemia  Asthma  Obesity  Lymphadenopathy  left lower extremitiy ; 1966 - current  H/O insertion of insulin pump  OA (osteoarthritis)  Malignant neoplasm of cecum  H/O bilateral breast reduction surgery  History of appendectomy  History of cholecystectomy  H/O: hysterectomy  History of total right knee replacement  3/2016 - [facility]  H/O total knee replacement, left  S/P bunionectomy    ALLERGIES:  Darvocet A500 (Other; Urticaria)  Percocet 10/325 (Other; Urticaria)  codeine (Other)  Lantus (Swelling)  PURELL.  pt said, &quot;I felt my airway closing&quot; after she smelled the Purell. The incident occured at the pulmonologist office. (Other; Short breath)      --------------------------------------------------------------------------------------    MEDICATIONS:    Neurologic Medications  acetaminophen     Tablet .. 975 milliGRAM(s) Oral every 6 hours PRN Mild Pain (1 - 3)    Respiratory Medications  albuterol    90 MICROgram(s) HFA Inhaler 2 Puff(s) Inhalation two times a day  budesonide 160 MICROgram(s)/formoterol 4.5 MICROgram(s) Inhaler 2 Puff(s) Inhalation two times a day    Cardiovascular Medications    Gastrointestinal Medications  lipid, fat emulsion (Fish Oil and Plant Based) 20% Infusion 10.4 mL/Hr IV Continuous <Continuous>  pantoprazole    Tablet 40 milliGRAM(s) Oral before breakfast  Parenteral Nutrition - Adult 1 Each TPN Continuous <Continuous>  sodium chloride 0.9% lock flush 3 milliLiter(s) IV Push every 8 hours    Genitourinary Medications    Hematologic/Oncologic Medications  enoxaparin Injectable 40 milliGRAM(s) SubCutaneous every 24 hours  influenza  Vaccine (HIGH DOSE) 0.7 milliLiter(s) IntraMuscular once    Antimicrobial/Immunologic Medications    Endocrine/Metabolic Medications  dextrose 50% Injectable 25 Gram(s) IV Push once  dextrose 50% Injectable 25 Gram(s) IV Push once  dextrose 50% Injectable 12.5 Gram(s) IV Push once  dextrose Oral Gel 15 Gram(s) Oral once  insulin detemir injectable (LEVEMIR) 5 Unit(s) SubCutaneous at bedtime  insulin lispro (ADMELOG) corrective regimen sliding scale   SubCutaneous three times a day before meals  insulin lispro (ADMELOG) corrective regimen sliding scale   SubCutaneous at bedtime    Topical/Other Medications  chlorhexidine 2% Cloths 1 Application(s) Topical daily    --------------------------------------------------------------------------------------    VITAL SIGNS:  T(C): 36.9 (12-13-23 @ 04:52), Max: 37.3 (12-13-23 @ 00:27)  HR: 100 (12-13-23 @ 04:52) (90 - 100)  BP: 121/64 (12-13-23 @ 04:52) (102/51 - 121/64)  RR: 18 (12-13-23 @ 04:52) (16 - 19)  SpO2: 99% (12-13-23 @ 04:52) (99% - 100%)  --------------------------------------------------------------------------------------    EXAM  General: NAD, resting in bed comfortably. A&Ox4.   Cardiac: S1, S2. pulse present   Respiratory: Nonlabored respirations, normal cw expansion.   Abdomen: soft, nontender, nondistended. IR drain with minimal dark red output  Extremities: no deformities, moving all extremities spontaneously.     --------------------------------------------------------------------------------------    LABS                          9.9    9.47  )-----------( 367      ( 13 Dec 2023 06:20 )             32.4     12-12    140  |  105  |  22  ----------------------------<  154<H>  3.8   |  26  |  0.46<L>    Ca    9.1      12 Dec 2023 06:07  Phos  3.2     12-12  Mg     2.10     12-12          --------------------------------------------------------------------------------------    INS AND OUTS:    12-12-23 @ 07:01  -  12-13-23 @ 07:00  --------------------------------------------------------  IN: 1128.8 mL / OUT: 755 mL / NET: 373.8 mL      --------------------------------------------------------------------------------------         TEAM [ D ] Surgery Daily Progress Note  =====================================================    SUBJECTIVE: Patient seen and examined at bedside on AM rounds. No acute overnight events. Patient reports that they're feeling well, tolerating LRD without any issues. Reports Flatus and BM. OOB/Amublating as tolerated. Denies nausea, vomiting, fever, chills, SOB, chest pain.     PAST MEDICAL & SURGICAL HISTORY:  History of hypertension  Diabetes  GERD (gastroesophageal reflux disease)  Uterine leiomyoma  Hyperlipidemia  Asthma  Obesity  Lymphadenopathy  left lower extremitiy ; 1966 - current  H/O insertion of insulin pump  OA (osteoarthritis)  Malignant neoplasm of cecum  H/O bilateral breast reduction surgery  History of appendectomy  History of cholecystectomy  H/O: hysterectomy  History of total right knee replacement  3/2016 - [facility]  H/O total knee replacement, left  S/P bunionectomy    ALLERGIES:  Darvocet A500 (Other; Urticaria)  Percocet 10/325 (Other; Urticaria)  codeine (Other)  Lantus (Swelling)  PURELL.  pt said, &quot;I felt my airway closing&quot; after she smelled the Purell. The incident occured at the pulmonologist office. (Other; Short breath)      --------------------------------------------------------------------------------------    MEDICATIONS:    Neurologic Medications  acetaminophen     Tablet .. 975 milliGRAM(s) Oral every 6 hours PRN Mild Pain (1 - 3)    Respiratory Medications  albuterol    90 MICROgram(s) HFA Inhaler 2 Puff(s) Inhalation two times a day  budesonide 160 MICROgram(s)/formoterol 4.5 MICROgram(s) Inhaler 2 Puff(s) Inhalation two times a day    Cardiovascular Medications    Gastrointestinal Medications  lipid, fat emulsion (Fish Oil and Plant Based) 20% Infusion 10.4 mL/Hr IV Continuous <Continuous>  pantoprazole    Tablet 40 milliGRAM(s) Oral before breakfast  Parenteral Nutrition - Adult 1 Each TPN Continuous <Continuous>  sodium chloride 0.9% lock flush 3 milliLiter(s) IV Push every 8 hours    Genitourinary Medications    Hematologic/Oncologic Medications  enoxaparin Injectable 40 milliGRAM(s) SubCutaneous every 24 hours  influenza  Vaccine (HIGH DOSE) 0.7 milliLiter(s) IntraMuscular once    Antimicrobial/Immunologic Medications    Endocrine/Metabolic Medications  dextrose 50% Injectable 25 Gram(s) IV Push once  dextrose 50% Injectable 25 Gram(s) IV Push once  dextrose 50% Injectable 12.5 Gram(s) IV Push once  dextrose Oral Gel 15 Gram(s) Oral once  insulin detemir injectable (LEVEMIR) 5 Unit(s) SubCutaneous at bedtime  insulin lispro (ADMELOG) corrective regimen sliding scale   SubCutaneous three times a day before meals  insulin lispro (ADMELOG) corrective regimen sliding scale   SubCutaneous at bedtime    Topical/Other Medications  chlorhexidine 2% Cloths 1 Application(s) Topical daily    --------------------------------------------------------------------------------------    VITAL SIGNS:  T(C): 36.9 (12-13-23 @ 04:52), Max: 37.3 (12-13-23 @ 00:27)  HR: 100 (12-13-23 @ 04:52) (90 - 100)  BP: 121/64 (12-13-23 @ 04:52) (102/51 - 121/64)  RR: 18 (12-13-23 @ 04:52) (16 - 19)  SpO2: 99% (12-13-23 @ 04:52) (99% - 100%)  --------------------------------------------------------------------------------------    EXAM  General: NAD, resting in bed comfortably. A&Ox4.   Cardiac: S1, S2. pulse present   Respiratory: Nonlabored respirations, normal cw expansion.   Abdomen: soft, nontender, nondistended. IR drain with minimal dark red output  Extremities: no deformities, moving all extremities spontaneously.     --------------------------------------------------------------------------------------    LABS                          9.9    9.47  )-----------( 367      ( 13 Dec 2023 06:20 )             32.4     12-12    140  |  105  |  22  ----------------------------<  154<H>  3.8   |  26  |  0.46<L>    Ca    9.1      12 Dec 2023 06:07  Phos  3.2     12-12  Mg     2.10     12-12          --------------------------------------------------------------------------------------    INS AND OUTS:    12-12-23 @ 07:01  -  12-13-23 @ 07:00  --------------------------------------------------------  IN: 1128.8 mL / OUT: 755 mL / NET: 373.8 mL      --------------------------------------------------------------------------------------

## 2023-12-13 NOTE — CHART NOTE - NSCHARTNOTEFT_GEN_A_CORE
Pre-Interventional Radiology Procedure Note    73y  Female    Procedure: IR drain readjustment vs removal scheduled for 12/14    Diagnosis/Indication: Patient is a 73y old  Female who presents with a chief complaint of hemicolect (03 Dec 2023 10:53)    Interventional Radiology Attending Physician: Dr. Cobos  Ordering Attending Physician: Dr. Nichols    PAST MEDICAL & SURGICAL HISTORY:  History of hypertension  Diabetes  wears insulin pump  GERD (gastroesophageal reflux disease)  Uterine leiomyoma  Hyperlipidemia  Asthma  Obesity  Lymphadenopathy  left lower extremitiy ; 1966 - current  H/O insertion of insulin pump  OA (osteoarthritis)  Malignant neoplasm of cecum  H/O bilateral breast reduction surgery  History of appendectomy  History of cholecystectomy  H/O: hysterectomy  History of total right knee replacement  3/2016 - [facility]  H/O total knee replacement, left  S/P bunionectomy       CBC Full  -  ( 13 Dec 2023 06:20 )  WBC Count : 9.47 K/uL  RBC Count : 3.92 M/uL  Hemoglobin : 9.9 g/dL  Hematocrit : 32.4 %  Platelet Count - Automated : 367 K/uL  Mean Cell Volume : 82.7 fL  Mean Cell Hemoglobin : 25.3 pg  Mean Cell Hemoglobin Concentration : 30.6 gm/dL  Auto Neutrophil # : 6.57 K/uL  Auto Lymphocyte # : 1.55 K/uL  Auto Monocyte # : 0.82 K/uL  Auto Eosinophil # : 0.44 K/uL  Auto Basophil # : 0.05 K/uL  Auto Neutrophil % : 69.4 %  Auto Lymphocyte % : 16.4 %  Auto Monocyte % : 8.7 %  Auto Eosinophil % : 4.6 %  Auto Basophil % : 0.5 %    12-13    138  |  104  |  17  ----------------------------<  219<H>  4.0   |  25  |  0.43<L>    Ca    8.8      13 Dec 2023 06:20  Phos  2.6     12-13  Mg     1.90     12-13 Pre-Interventional Radiology Procedure Note    73y  Female    Procedure: IR drain readjustment vs removal scheduled for 12/14    Diagnosis/Indication: Patient is a 73y old  Female who presents with a chief complaint of hemicolect (03 Dec 2023 10:53)    Interventional Radiology Attending Physician: Dr. Cobos  Ordering Attending Physician: Dr. Nichols    PAST MEDICAL & SURGICAL HISTORY:  History of hypertension  Diabetes  wears insulin pump  GERD (gastroesophageal reflux disease)  Uterine leiomyoma  Hyperlipidemia  Asthma  Obesity  Lymphadenopathy  left lower extremitiy ; 1966 - current  H/O insertion of insulin pump  OA (osteoarthritis)  Malignant neoplasm of cecum  H/O bilateral breast reduction surgery  History of appendectomy  History of cholecystectomy  H/O: hysterectomy  History of total right knee replacement  3/2016 - [facility]  H/O total knee replacement, left  S/P bunionectomy       CBC Full  -  ( 13 Dec 2023 06:20 )  WBC Count : 9.47 K/uL  RBC Count : 3.92 M/uL  Hemoglobin : 9.9 g/dL  Hematocrit : 32.4 %  Platelet Count - Automated : 367 K/uL  Mean Cell Volume : 82.7 fL  Mean Cell Hemoglobin : 25.3 pg  Mean Cell Hemoglobin Concentration : 30.6 gm/dL  Auto Neutrophil # : 6.57 K/uL  Auto Lymphocyte # : 1.55 K/uL  Auto Monocyte # : 0.82 K/uL  Auto Eosinophil # : 0.44 K/uL  Auto Basophil # : 0.05 K/uL  Auto Neutrophil % : 69.4 %  Auto Lymphocyte % : 16.4 %  Auto Monocyte % : 8.7 %  Auto Eosinophil % : 4.6 %  Auto Basophil % : 0.5 %    12-13    138  |  104  |  17  ----------------------------<  219<H>  4.0   |  25  |  0.43<L>    Ca    8.8      13 Dec 2023 06:20  Phos  2.6     12-13  Mg     1.90     12-13

## 2023-12-13 NOTE — CONSULT NOTE ADULT - SUBJECTIVE AND OBJECTIVE BOX
Interventional Radiology    Evaluate for Procedure:     HPI: 73 year old female with PMH asthma, T2DM on insulin pump, GERD, HTN, HLD, OA, obesity presents s/p robotic partial colectomy for 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 Course c/b ileus. CT on 12/4 showed: Interval increase in size of fluid anterior to the ileocolic anastomosis, with new peripheral enhancement, probably early abscess formation and decrease in small bowel dilatation, probably ileus. Patient s/p IR drainage on 12/7. Now drain output <10cc/day.       Allergies: Darvocet A500 (Other; Urticaria)  Percocet 10/325 (Other; Urticaria)  codeine (Other)  Lantus (Swelling)  PURELL.  pt said, &quot;I felt my airway closing&quot; after she smelled the Purell. The incident occured at the pulmonologist office. (Other; Short breath)    Medications (Abx/Cardiac/Anticoagulation/Blood Products)    enoxaparin Injectable: 40 milliGRAM(s) SubCutaneous (12-13 @ 05:21)  erythromycin    ethylsuccinate Suspension 40 mG/mL: 400 milliGRAM(s) Oral (12-11 @ 13:17)    Data:    T(C): 36.9  HR: 102  BP: 110/55  RR: 16  SpO2: 100%    -WBC 9.47 / HgB 9.9 / Hct 32.4 / Plt 367  -Na 138 / Cl 104 / BUN 17 / Glucose 219  -K 4.0 / CO2 25 / Cr 0.43  -ALT -- / Alk Phos -- / T.Bili --  -INR 1.05 / PTT 29.7      Radiology:     Assessment/Plan: 73F s/p robotic partial colectomy 11/22 complicated by perianastomotic collection with prior IR drain placed 12/7, now needing adjustment and tube check as putting out <10cc/day. New CTAP showing decreased size of collection compared to prior.     -- IR will plan to perform drain study with possible adjustment vs. removal 12/14/23  -- NPO at midnight the night before  -- hold a.m. anticoagulation on the day of procedure  -- please complete IR pre-procedure note  -- please place IR procedure request order under Dr. Cobos    --  Jalen Rick MD  Vascular and Interventional Radiology   Available on [employer] Teams    - Non-emergent consults: Place IR consult order in [place]  - Emergent issues (pager): [facility] [phone]; [facility] [phone]; [id number]  - Scheduling questions: [facility] [phone]; [facility] [phone]  - Clinic/outpatient booking: [facility] [phone]; [facility] [phone] Interventional Radiology    Evaluate for Procedure:     HPI: 73 year old female with PMH asthma, T2DM on insulin pump, GERD, HTN, HLD, OA, obesity presents s/p robotic partial colectomy for 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 Course c/b ileus. CT on 12/4 showed: Interval increase in size of fluid anterior to the ileocolic anastomosis, with new peripheral enhancement, probably early abscess formation and decrease in small bowel dilatation, probably ileus. Patient s/p IR drainage on 12/7. Now drain output <10cc/day.       Allergies: Darvocet A500 (Other; Urticaria)  Percocet 10/325 (Other; Urticaria)  codeine (Other)  Lantus (Swelling)  PURELL.  pt said, &quot;I felt my airway closing&quot; after she smelled the Purell. The incident occured at the pulmonologist office. (Other; Short breath)    Medications (Abx/Cardiac/Anticoagulation/Blood Products)    enoxaparin Injectable: 40 milliGRAM(s) SubCutaneous (12-13 @ 05:21)  erythromycin    ethylsuccinate Suspension 40 mG/mL: 400 milliGRAM(s) Oral (12-11 @ 13:17)    Data:    T(C): 36.9  HR: 102  BP: 110/55  RR: 16  SpO2: 100%    -WBC 9.47 / HgB 9.9 / Hct 32.4 / Plt 367  -Na 138 / Cl 104 / BUN 17 / Glucose 219  -K 4.0 / CO2 25 / Cr 0.43  -ALT -- / Alk Phos -- / T.Bili --  -INR 1.05 / PTT 29.7      Radiology:     Assessment/Plan: 73F s/p robotic partial colectomy 11/22 complicated by perianastomotic collection with prior IR drain placed 12/7, now needing adjustment and tube check as putting out <10cc/day. New CTAP showing decreased size of collection compared to prior.     -- IR will plan to perform drain study with possible adjustment vs. removal 12/14/23  -- NPO at midnight the night before  -- hold a.m. anticoagulation on the day of procedure  -- please complete IR pre-procedure note  -- please place IR procedure request order under Dr. Cobos    --  Jalen Rick MD  Vascular and Interventional Radiology   Available on [employer] Teams    - Non-emergent consults: Place IR consult order in [place]  - Emergent issues (pager): [facility] [phone]; [facility] [phone]; [phone]  - Scheduling questions: [facility] [phone]; [facility] [phone]  - Clinic/outpatient booking: [facility] [phone]; [facility] [phone] Interventional Radiology    Evaluate for Procedure:     HPI: 73 year old female with PMH asthma, T2DM on insulin pump, GERD, HTN, HLD, OA, obesity presents s/p robotic partial colectomy for 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 Course c/b ileus. CT on 12/4 showed: Interval increase in size of fluid anterior to the ileocolic anastomosis, with new peripheral enhancement, probably early abscess formation and decrease in small bowel dilatation, probably ileus. Patient s/p IR drainage on 12/7. Now drain output <10cc/day.       Allergies: Darvocet A500 (Other; Urticaria)  Percocet 10/325 (Other; Urticaria)  codeine (Other)  Lantus (Swelling)  PURELL.  pt said, &quot;I felt my airway closing&quot; after she smelled the Purell. The incident occured at the pulmonologist office. (Other; Short breath)    Medications (Abx/Cardiac/Anticoagulation/Blood Products)    enoxaparin Injectable: 40 milliGRAM(s) SubCutaneous (12-13 @ 05:21)  erythromycin    ethylsuccinate Suspension 40 mG/mL: 400 milliGRAM(s) Oral (12-11 @ 13:17)    Data:    T(C): 36.9  HR: 102  BP: 110/55  RR: 16  SpO2: 100%    -WBC 9.47 / HgB 9.9 / Hct 32.4 / Plt 367  -Na 138 / Cl 104 / BUN 17 / Glucose 219  -K 4.0 / CO2 25 / Cr 0.43  -ALT -- / Alk Phos -- / T.Bili --  -INR 1.05 / PTT 29.7      Radiology:     Assessment/Plan: 73F s/p robotic partial colectomy 11/22 complicated by perianastomotic collection with prior IR drain placed 12/7, now needing adjustment and tube check as putting out <10cc/day. New CTAP showing decreased size of collection compared to prior.     -- IR will plan to perform drain study with possible adjustment vs. removal 12/14/23  -- hold a.m. anticoagulation on the day of procedure  -- please complete IR pre-procedure note  -- please place IR procedure request order under Dr. Cobos    --  Jalen Rick MD  Vascular and Interventional Radiology   Available on [employer] Teams    - Non-emergent consults: Place IR consult order in [place]  - Emergent issues (pager): [facility] [phone]; [facility] [phone]; [id number]  - Scheduling questions: [facility] [phone]; [facility] [phone]  - Clinic/outpatient booking: [facility] [phone]; [facility] [phone] Interventional Radiology    Evaluate for Procedure:     HPI: 73 year old female with PMH asthma, T2DM on insulin pump, GERD, HTN, HLD, OA, obesity presents s/p robotic partial colectomy for 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 Course c/b ileus. CT on 12/4 showed: Interval increase in size of fluid anterior to the ileocolic anastomosis, with new peripheral enhancement, probably early abscess formation and decrease in small bowel dilatation, probably ileus. Patient s/p IR drainage on 12/7. Now drain output <10cc/day.       Allergies: Darvocet A500 (Other; Urticaria)  Percocet 10/325 (Other; Urticaria)  codeine (Other)  Lantus (Swelling)  PURELL.  pt said, &quot;I felt my airway closing&quot; after she smelled the Purell. The incident occured at the pulmonologist office. (Other; Short breath)    Medications (Abx/Cardiac/Anticoagulation/Blood Products)    enoxaparin Injectable: 40 milliGRAM(s) SubCutaneous (12-13 @ 05:21)  erythromycin    ethylsuccinate Suspension 40 mG/mL: 400 milliGRAM(s) Oral (12-11 @ 13:17)    Data:    T(C): 36.9  HR: 102  BP: 110/55  RR: 16  SpO2: 100%    -WBC 9.47 / HgB 9.9 / Hct 32.4 / Plt 367  -Na 138 / Cl 104 / BUN 17 / Glucose 219  -K 4.0 / CO2 25 / Cr 0.43  -ALT -- / Alk Phos -- / T.Bili --  -INR 1.05 / PTT 29.7      Radiology:     Assessment/Plan: 73F s/p robotic partial colectomy 11/22 complicated by perianastomotic collection with prior IR drain placed 12/7, now needing adjustment and tube check as putting out <10cc/day. New CTAP showing decreased size of collection compared to prior.     -- IR will plan to perform drain study with possible adjustment vs. removal 12/14/23  -- hold a.m. anticoagulation on the day of procedure  -- please complete IR pre-procedure note  -- please place IR procedure request order under Dr. Cobos    --  Jalen Rick MD  Vascular and Interventional Radiology   Available on [employer] Teams    - Non-emergent consults: Place IR consult order in [place]  - Emergent issues (pager): [facility] [phone]; [facility] [phone]; [id number]  - Scheduling questions: [facility] [phone]; [facility] [phone]  - Clinic/outpatient booking: [facility] [phone]; [facility] [phone]

## 2023-12-13 NOTE — PROGRESS NOTE ADULT - NS ATTEND AMEND GEN_ALL_CORE FT
I agree with the above history, physical examination, chief complaint/diagnosis, and plan, which I have reviewed and edited where appropriate.  I agree with notes/assessment and detailed interval history of health care providers on my service.  I have seen and examined the patient.  I reviewed the laboratory and available data and agree with the history, physical assessment and plan.  I reviewed and discussed with all consultants, house staff and PA's.  The Nutrition Support Team (NST) discusses on an ongoing basis with the primary team and all consultants, House staff and PA's to have a permanent risk benefit analyses on all decisions and coordinating care.  I was physically present for the key portions of the evaluation and management (E/M) service provided.  72 y/o female s/p robotic partial colectomy receiving parenteral nutrition in view of severe protein calorie malnutrition and prolonged NPO status.   PHYSICAL EXAM:  Constitutional: NAD  Respiratory: clear  Abdomen: Soft, nondistended, nontender  Extremities: warm  Pt is now tolerating PO diet; d/c parenteral nutrition.

## 2023-12-13 NOTE — PROGRESS NOTE ADULT - ASSESSMENT
This is a 74 yo F /w a PMH of DM2 on an insulin pump and colorectal cancer s/p right hemicolectomy today. Endocrinology consulted for diabetes management and DM2. Based on current insulin pump settings, suspect patient is being over-basalized given high basal rates compared to the insulin:carb ratio    Home Regimen:  Patient uses the medtronic insulin pump and Libre2  Pump settings are as follows:  TDD 52.425  12AM 1.8 units per hour  6:30AM 2 units per hour  12:30PM 2.45 units per hour  6:30PM 2.55 units per hour  8PM 2.55 units per hour    ICR:  12AM 1:15  7AM 1:12  12:30PM 1:15    ISF 1:30    #DM2 A1c 6.8%  #insulin pump at home. Off pump while in the hospital  - Glucose Target 100-180: overall improving towards goal . Patient is on TPN managed Nutrition Support Team: Decreased to 50g dextrose, Decreased Regular insulin to 20 units in TPN now to be dc'd at 6pm; now npo after midnight   - Continue Levemir 5 units sq at bedtime for NPO   - Changed to moderate Admelog correction scale TID AC and change to moderate Admelog correction scale at bedtime  - Check FS q 6 hours   - Hypoglycemia Protocol     #Discharge  -resume insulin pump on discharge, will need adjustment in settings prior to resuming pump  -Pt declined using insulin pens   -If agrees with basal/bolus please check for insurance coverage   -Review insulin pen administration prior to discharge   -Continue glucose monitoring with Free style bonnie   Please send Lantus solostar pen and humalog kwikpen as test script to check insurance coverage.  Ok to send with current doses and update prior to d/c    If Lantus not covered- can try alternating with one of following   tresiba/basaglar/toujeo/Levemir    If Humalog not covered- can try alternating with one of following  novolog/apidra/admelog/fiasp  -Will have Advanced Practice Nurse Consult Diabetes Specialist see pt closer to discharge  -Will review importance of glucose monitoring, medication adherence, following a consistent carb diet  -Please call your doctor if your fs is 70 or below and 250 and above   -Reviewed Hypoglycemia and Intervention   -Ensure patient has working glucometer, test strips, lancets, alcohol pads, and BD sofia pen needles  -Please also prescribe glucose tabs, Baqsimi nasal spray or glucagon emergency kit for hypoglycemia risk   -Please follow up with your pcp, podiatry, opthalmology, and endocrinology as an outpt   -Please follow up with Dr. Anand    #HTN  -BP target <130/80  -Consider resuming losartan 25mg daily and hctz 12.5mg daily- management as per primary team  -outpt mc/cr ratio     #HLD  -On Pravastatin 40 mg QHS at home   -Resume once able to tolerate orals if no contraindications   -LDL goal less than 70   -Management as per primary team     D/w Surgery team [id number]     Adrienne Taveras  Nurse Practitioner  Division of Endocrinology & Diabetes  In house pager #[phone]    If before 9AM or after 6PM, or on weekends/holidays, please call endocrine answering service for assistance ([phone]).For nonurgent matters email [email or web address] for assistance.    This is a 74 yo F /w a PMH of DM2 on an insulin pump and colorectal cancer s/p right hemicolectomy today. Endocrinology consulted for diabetes management and DM2. Based on current insulin pump settings, suspect patient is being over-basalized given high basal rates compared to the insulin:carb ratio    Home Regimen:  Patient uses the medtronic insulin pump and Libre2  Pump settings are as follows:  TDD 52.425  12AM 1.8 units per hour  6:30AM 2 units per hour  12:30PM 2.45 units per hour  6:30PM 2.55 units per hour  8PM 2.55 units per hour    ICR:  12AM 1:15  7AM 1:12  12:30PM 1:15    ISF 1:30    #DM2 A1c 6.8%  #insulin pump at home. Off pump while in the hospital  - Glucose Target 100-180: overall improving towards goal . Patient is on TPN managed Nutrition Support Team: Decreased to 50g dextrose, Decreased Regular insulin to 20 units in TPN now to be dc'd at 6pm; now npo after midnight   - Continue Levemir 5 units sq at bedtime for NPO   - Changed to moderate Admelog correction scale TID AC and change to moderate Admelog correction scale at bedtime  - Check FS q 6 hours   - Hypoglycemia Protocol     #Discharge  -resume insulin pump on discharge, will need adjustment in settings prior to resuming pump  -Pt declined using insulin pens   -If agrees with basal/bolus please check for insurance coverage   -Review insulin pen administration prior to discharge   -Continue glucose monitoring with Free style bonnie   Please send Lantus solostar pen and humalog kwikpen as test script to check insurance coverage.  Ok to send with current doses and update prior to d/c    If Lantus not covered- can try alternating with one of following   tresiba/basaglar/toujeo/Levemir    If Humalog not covered- can try alternating with one of following  novolog/apidra/admelog/fiasp  -Will have Advanced Practice Nurse Consult Diabetes Specialist see pt closer to discharge  -Will review importance of glucose monitoring, medication adherence, following a consistent carb diet  -Please call your doctor if your fs is 70 or below and 250 and above   -Reviewed Hypoglycemia and Intervention   -Ensure patient has working glucometer, test strips, lancets, alcohol pads, and BD sofia pen needles  -Please also prescribe glucose tabs, Baqsimi nasal spray or glucagon emergency kit for hypoglycemia risk   -Please follow up with your pcp, podiatry, opthalmology, and endocrinology as an outpt   -Please follow up with Dr. Anand    #HTN  -BP target <130/80  -Consider resuming losartan 25mg daily and hctz 12.5mg daily- management as per primary team  -outpt mc/cr ratio     #HLD  -On Pravastatin 40 mg QHS at home   -Resume once able to tolerate orals if no contraindications   -LDL goal less than 70   -Management as per primary team     D/w Surgery team [id number]     Adrienne Taveras  Nurse Practitioner  Division of Endocrinology & Diabetes  In house pager #[phone]    If before 9AM or after 6PM, or on weekends/holidays, please call endocrine answering service for assistance ([phone]).For nonurgent matters email [email or web address] for assistance.

## 2023-12-13 NOTE — CONSULT NOTE ADULT - PROVIDER SPECIALTY LIST ADULT
Cardiology
Intervent Radiology
Endocrinology
Internal Medicine
Intervent Radiology
Nutrition Support

## 2023-12-13 NOTE — CONSULT NOTE ADULT - CONSULT REASON
Patient s/p robotic partial colectomy 11/22 complicated by perianastomotic collection with prior IR drain placed 12/7, now needing adjustment and tube check as putting out <10cc/day
prolonged NPO, severe malnutrition
HTN  Well known to the office
medical management
DM2
abscess drainage

## 2023-12-13 NOTE — PROGRESS NOTE ADULT - NUTRITIONAL ASSESSMENT
Severe protein calorie malnutrition in acute illness/ injury; secondary to poor appetite, weight loss >5% in 1 month and/or >7.5% in 3 months, caloric Intake <50% of nutrition needs >= 5 days, temporal wasting, severe loss of muscle mass/atrophy and loss of body fat stores.    Diet, NPO (12-06-23 @ 14:35) [Active]    lipid, fat emulsion (Fish Oil and Plant Based) 20% Infusion 20.8 mL/Hr (20.8 mL/Hr) IV Continuous <Continuous>  Parenteral Nutrition - Adult 1 Each (63 mL/Hr) TPN Continuous <Continuous>, 12-11-23 @ 17:00, 17:00, Stop order after: 1 Days    **Greater than 50% of the encounter was spent counseling and/coordination of care on parenteral nutrition. 25 minutes were spent face to face with the patient.
Severe protein calorie malnutrition in acute illness/ injury; secondary to poor appetite, weight loss >5% in 1 month and/or >7.5% in 3 months, caloric Intake <50% of nutrition needs >= 5 days, temporal wasting, severe loss of muscle mass/atrophy and loss of body fat stores.    Diet, NPO:   With Ice Chips/Sips of Water (11-30-23 @ 08:18) [Active]    lipid, fat emulsion (Fish Oil and Plant Based) 20% Infusion 20.8 mL/Hr (20.8 mL/Hr) IV Continuous <Continuous>  Parenteral Nutrition - Adult 1 Each (63 mL/Hr) TPN Continuous <Continuous>, 12-05-23 @ 17:00, 17:00, Stop order after: 1 Days    **Greater than 50% of the encounter was spent counseling and/coordination of care on parenteral nutrition. 25 minutes were spent face to face with the patient.
Severe protein calorie malnutrition in acute illness/ injury; secondary to poor appetite, weight loss >5% in 1 month and/or >7.5% in 3 months, caloric Intake <50% of nutrition needs >= 5 days, temporal wasting, severe loss of muscle mass/atrophy and loss of body fat stores.    Diet, Regular:   Consistent Carbohydrate {Evening Snack} (CSTCHOSN)  Fiber/Residue Restricted (LOWFIBER) (12-12-23 @ 08:33) [Active]    lipid, fat emulsion (Fish Oil and Plant Based) 20% Infusion 10.4 mL/Hr (10.4 mL/Hr) IV Continuous <Continuous>  Parenteral Nutrition - Adult 1 Each (42 mL/Hr) TPN Continuous <Continuous>, 12-12-23 @ 17:00, 17:00, Stop order after: 1 Days    **Greater than 50% of the encounter was spent counseling and/coordination of care on parenteral nutrition. 25 minutes were spent face to face with the patient.
Severe protein calorie malnutrition in acute illness/ injury; secondary to poor appetite, weight loss >5% in 1 month and/or >7.5% in 3 months, caloric Intake <50% of nutrition needs >= 5 days, temporal wasting, severe loss of muscle mass/atrophy and loss of body fat stores.    Diet, NPO (12-06-23 @ 14:35) [Active]    lipid, fat emulsion (Fish Oil and Plant Based) 20% Infusion 20.8 mL/Hr (20.8 mL/Hr) IV Continuous <Continuous>  Parenteral Nutrition - Adult 1 Each (63 mL/Hr) TPN Continuous <Continuous>, 12-09-23 @ 17:00, 17:00, Stop order after: 1 Days    **Greater than 50% of the encounter was spent counseling and/coordination of care on parenteral nutrition. 25 minutes were spent face to face with the patient.
Severe protein calorie malnutrition in acute illness/ injury; secondary to poor appetite, weight loss >5% in 1 month and/or >7.5% in 3 months, caloric Intake <50% of nutrition needs >= 5 days, temporal wasting, severe loss of muscle mass/atrophy and loss of body fat stores.    Diet, NPO:   With Ice Chips/Sips of Water (11-30-23 @ 08:18) [Active]    lipid, fat emulsion (Fish Oil and Plant Based) 20% Infusion 20.8 mL/Hr (20.8 mL/Hr) IV Continuous <Continuous>  Parenteral Nutrition - Adult 1 Each (63 mL/Hr) TPN Continuous <Continuous>, 12-02-23 @ 17:00, 17:00, Stop order after: 1 Days    **Greater than 50% of the encounter was spent counseling and/coordination of care on parenteral nutrition. 25 minutes were spent face to face with the patient.
Severe protein calorie malnutrition in acute illness/ injury; secondary to poor appetite, weight loss >5% in 1 month and/or >7.5% in 3 months, caloric Intake <50% of nutrition needs >= 5 days, temporal wasting, severe loss of muscle mass/atrophy and loss of body fat stores.    Diet, NPO:   With Ice Chips/Sips of Water (11-30-23 @ 08:18) [Active]    lipid, fat emulsion (Fish Oil and Plant Based) 20% Infusion 20.8 mL/Hr (20.8 mL/Hr) IV Continuous <Continuous>  Parenteral Nutrition - Adult 1 Each (83 mL/Hr) TPN Continuous <Continuous>, 12-01-23 @ 17:00, 17:00, Stop order after: 1 Days    **Greater than 50% of the encounter was spent counseling and/coordination of care on parenteral nutrition. 25 minutes were spent face to face with the patient.
Severe protein calorie malnutrition in acute illness/ injury; secondary to poor appetite, weight loss >5% in 1 month and/or >7.5% in 3 months, caloric Intake <50% of nutrition needs >= 5 days, temporal wasting, severe loss of muscle mass/atrophy and loss of body fat stores.    Diet, NPO after Midnight:      NPO Start Date: 13-Dec-2023,   NPO Start Time: 23:59  Except Medications (12-13-23 @ 10:40) [Active]  Diet, Regular:   Consistent Carbohydrate {Evening Snack} (CSTCHOSN)  Fiber/Residue Restricted (LOWFIBER) (12-12-23 @ 08:33) [Active]    **Greater than 50% of the encounter was spent counseling and/coordination of care on parenteral nutrition. 25 minutes were spent face to face with the patient.
Severe protein calorie malnutrition in acute illness/ injury; secondary to poor appetite, weight loss >5% in 1 month and/or >7.5% in 3 months, caloric Intake <50% of nutrition needs >= 5 days, temporal wasting, severe loss of muscle mass/atrophy and loss of body fat stores.    Diet, NPO:   With Ice Chips/Sips of Water (11-30-23 @ 08:18) [Active]    lipid, fat emulsion (Fish Oil and Plant Based) 20% Infusion 20.8 mL/Hr (20.8 mL/Hr) IV Continuous <Continuous>  Parenteral Nutrition - Adult 1 Each (63 mL/Hr) TPN Continuous <Continuous>, 12-03-23 @ 17:00, 17:00, Stop order after: 1 Days    **Greater than 50% of the encounter was spent counseling and/coordination of care on parenteral nutrition. 25 minutes were spent face to face with the patient.
Severe protein calorie malnutrition in acute illness/ injury; secondary to poor appetite, weight loss >5% in 1 month and/or >7.5% in 3 months, caloric Intake <50% of nutrition needs >= 5 days, temporal wasting, severe loss of muscle mass/atrophy and loss of body fat stores.    Diet, NPO (12-06-23 @ 14:35) [Active]    lipid, fat emulsion (Fish Oil and Plant Based) 20% Infusion 20.8 mL/Hr (20.8 mL/Hr) IV Continuous <Continuous>  Parenteral Nutrition - Adult 1 Each (63 mL/Hr) TPN Continuous <Continuous>, 12-07-23 @ 17:00, 17:00, Stop order after: 1 Days    **Greater than 50% of the encounter was spent counseling and/coordination of care on parenteral nutrition. 25 minutes were spent face to face with the patient.
Severe protein calorie malnutrition in acute illness/ injury; secondary to poor appetite, weight loss >5% in 1 month and/or >7.5% in 3 months, caloric Intake <50% of nutrition needs >= 5 days, temporal wasting, severe loss of muscle mass/atrophy and loss of body fat stores.    Diet, NPO after Midnight:      NPO Start Date: 05-Dec-2023,   NPO Start Time: 23:59 (12-05-23 @ 17:21) [Active]  Diet, NPO:   With Ice Chips/Sips of Water (11-30-23 @ 08:18) [Active]    lipid, fat emulsion (Fish Oil and Plant Based) 20% Infusion 20.8 mL/Hr (20.8 mL/Hr) IV Continuous <Continuous>  Parenteral Nutrition - Adult 1 Each (63 mL/Hr) TPN Continuous <Continuous>, 12-06-23 @ 17:00, 17:00, Stop order after: 1 Days    **Greater than 50% of the encounter was spent counseling and/coordination of care on parenteral nutrition. 25 minutes were spent face to face with the patient.
Severe protein calorie malnutrition in acute illness/ injury; secondary to poor appetite, weight loss >5% in 1 month and/or >7.5% in 3 months, caloric Intake <50% of nutrition needs >= 5 days, temporal wasting, severe loss of muscle mass/atrophy and loss of body fat stores.    Diet, NPO (12-06-23 @ 14:35) [Active]    lipid, fat emulsion (Fish Oil and Plant Based) 20% Infusion 20.8 mL/Hr (20.8 mL/Hr) IV Continuous <Continuous>  Parenteral Nutrition - Adult 1 Each (63 mL/Hr) TPN Continuous <Continuous>, 12-08-23 @ 17:00, 17:00, Stop order after: 1 Days    **Greater than 50% of the encounter was spent counseling and/coordination of care on parenteral nutrition. 25 minutes were spent face to face with the patient.
Severe protein calorie malnutrition in acute illness/ injury; secondary to poor appetite, weight loss >5% in 1 month and/or >7.5% in 3 months, caloric Intake <50% of nutrition needs >= 5 days, temporal wasting, severe loss of muscle mass/atrophy and loss of body fat stores.    Diet, NPO:   With Ice Chips/Sips of Water (11-30-23 @ 08:18) [Active]    lipid, fat emulsion (Fish Oil and Plant Based) 20% Infusion 20.8 mL/Hr (20.8 mL/Hr) IV Continuous <Continuous>  Parenteral Nutrition - Adult 1 Each (63 mL/Hr) TPN Continuous <Continuous>, 12-04-23 @ 17:00, 17:00, Stop order after: 1 Days    **Greater than 50% of the encounter was spent counseling and/coordination of care on parenteral nutrition. 25 minutes were spent face to face with the patient.

## 2023-12-13 NOTE — PROGRESS NOTE ADULT - SUBJECTIVE AND OBJECTIVE BOX
Chief Complaint: Type 2 DM     History: Pt seen at bedside. Pt tolerating oral diet. Pt denies nausea and Vomiting/any signs of hypoglycemia. TPN to come down today at 6pm.  Pt reports an improved appetite. She doesn't like the hospital food     MEDICATIONS  (STANDING):  albuterol    90 MICROgram(s) HFA Inhaler 2 Puff(s) Inhalation two times a day  budesonide 160 MICROgram(s)/formoterol 4.5 MICROgram(s) Inhaler 2 Puff(s) Inhalation two times a day  chlorhexidine 2% Cloths 1 Application(s) Topical daily  dextrose 50% Injectable 25 Gram(s) IV Push once  dextrose 50% Injectable 12.5 Gram(s) IV Push once  dextrose 50% Injectable 25 Gram(s) IV Push once  dextrose Oral Gel 15 Gram(s) Oral once  influenza  Vaccine (HIGH DOSE) 0.7 milliLiter(s) IntraMuscular once  insulin detemir injectable (LEVEMIR) 5 Unit(s) SubCutaneous at bedtime  insulin lispro (ADMELOG) corrective regimen sliding scale   SubCutaneous at bedtime  insulin lispro (ADMELOG) corrective regimen sliding scale   SubCutaneous three times a day before meals  lactated ringers. 1000 milliLiter(s) (100 mL/Hr) IV Continuous <Continuous>  pantoprazole    Tablet 40 milliGRAM(s) Oral before breakfast  Parenteral Nutrition - Adult 1 Each (42 mL/Hr) TPN Continuous <Continuous>  sodium chloride 0.9% lock flush 3 milliLiter(s) IV Push every 8 hours    MEDICATIONS  (PRN):  acetaminophen     Tablet .. 975 milliGRAM(s) Oral every 6 hours PRN Mild Pain (1 - 3)  oxyCODONE    IR 5 milliGRAM(s) Oral every 6 hours PRN Severe Pain (7 - 10)  oxyCODONE    IR 2.5 milliGRAM(s) Oral every 6 hours PRN Moderate Pain (4 - 6)      Allergies: Darvocet A500 (Other; Urticaria)  Percocet 10/325 (Other; Urticaria)  codeine (Other)  Lantus (Swelling)  PURELL.  pt said, &quot;I felt my airway closing&quot; after she smelled the Purell. The incident occured at the pulmonologist office. (Other; Short breath)      Review of Systems:  Respiratory: No SOB, no cough  GI: No nausea, vomiting, abdominal pain  Endocrine: no polyuria, polydipsia      PHYSICAL EXAM:  VITALS: T(C): 36.9 (12-13-23 @ 16:28)  T(F): 98.5 (12-13-23 @ 16:28), Max: 99.1 (12-13-23 @ 00:27)  HR: 95 (12-13-23 @ 16:28) (90 - 102)  BP: 106/59 (12-13-23 @ 16:28) (104/53 - 121/64)  RR:  (16 - 19)  SpO2:  (99% - 100%)  Wt(kg): --  GENERAL: NAD, well-groomed, well-developed  RESPIRATORY: No labored breathing   GI: Soft, nontender, non distended  PSYCH: Alert and oriented x 3, normal affect, normal mood      CAPILLARY BLOOD GLUCOSE  POCT Blood Glucose.: 235 mg/dL (13 Dec 2023 11:58)  POCT Blood Glucose.: 231 mg/dL (13 Dec 2023 08:43)  POCT Blood Glucose.: 158 mg/dL (12 Dec 2023 22:19)  POCT Blood Glucose.: 164 mg/dL (12 Dec 2023 17:31)    A1C with Estimated Average Glucose (11.16.23 @ 13:15)    A1C with Estimated Average Glucose Result: 6.8   Estimated Average Glucose: 148        Diet, NPO after Midnight:      NPO Start Date: 13-Dec-2023,   NPO Start Time: 23:59  Except Medications (12-13-23 @ 10:40) [Active]  Diet, Regular:   Consistent Carbohydrate Evening Snack (CSTCHOSN)  Fiber/Residue Restricted (LOWFIBER) (12-12-23 @ 08:33) [Active]      Parenteral Nutrition - Adult 1 Each (42 mL/Hr) TPN Continuous <Continuous>, 12-12-23 @ 17:00, 17:00, Stop order after: 1 Days

## 2023-12-13 NOTE — CONSULT NOTE ADULT - CONSULT REQUESTED DATE/TIME
30-Nov-2023 08:30
23-Nov-2023 12:25
23-Nov-2023 13:20
05-Dec-2023 08:33
22-Nov-2023 14:37
13-Dec-2023 10:11

## 2023-12-14 ENCOUNTER — TRANSCRIPTION ENCOUNTER (OUTPATIENT)
Age: 73
End: 2023-12-14

## 2023-12-14 LAB
ANION GAP SERPL CALC-SCNC: 11 MMOL/L — SIGNIFICANT CHANGE UP (ref 7–14)
ANION GAP SERPL CALC-SCNC: 11 MMOL/L — SIGNIFICANT CHANGE UP (ref 7–14)
APTT BLD: 27.9 SEC — SIGNIFICANT CHANGE UP (ref 24.5–35.6)
APTT BLD: 27.9 SEC — SIGNIFICANT CHANGE UP (ref 24.5–35.6)
BLD GP AB SCN SERPL QL: NEGATIVE — SIGNIFICANT CHANGE UP
BLD GP AB SCN SERPL QL: NEGATIVE — SIGNIFICANT CHANGE UP
BUN SERPL-MCNC: 12 MG/DL — SIGNIFICANT CHANGE UP (ref 7–23)
BUN SERPL-MCNC: 12 MG/DL — SIGNIFICANT CHANGE UP (ref 7–23)
CALCIUM SERPL-MCNC: 9.3 MG/DL — SIGNIFICANT CHANGE UP (ref 8.4–10.5)
CALCIUM SERPL-MCNC: 9.3 MG/DL — SIGNIFICANT CHANGE UP (ref 8.4–10.5)
CHLORIDE SERPL-SCNC: 103 MMOL/L — SIGNIFICANT CHANGE UP (ref 98–107)
CHLORIDE SERPL-SCNC: 103 MMOL/L — SIGNIFICANT CHANGE UP (ref 98–107)
CO2 SERPL-SCNC: 26 MMOL/L — SIGNIFICANT CHANGE UP (ref 22–31)
CO2 SERPL-SCNC: 26 MMOL/L — SIGNIFICANT CHANGE UP (ref 22–31)
CREAT SERPL-MCNC: 0.49 MG/DL — LOW (ref 0.5–1.3)
CREAT SERPL-MCNC: 0.49 MG/DL — LOW (ref 0.5–1.3)
EGFR: 100 ML/MIN/1.73M2 — SIGNIFICANT CHANGE UP
EGFR: 100 ML/MIN/1.73M2 — SIGNIFICANT CHANGE UP
GLUCOSE BLDC GLUCOMTR-MCNC: 138 MG/DL — HIGH (ref 70–99)
GLUCOSE BLDC GLUCOMTR-MCNC: 138 MG/DL — HIGH (ref 70–99)
GLUCOSE BLDC GLUCOMTR-MCNC: 162 MG/DL — HIGH (ref 70–99)
GLUCOSE BLDC GLUCOMTR-MCNC: 162 MG/DL — HIGH (ref 70–99)
GLUCOSE BLDC GLUCOMTR-MCNC: 178 MG/DL — HIGH (ref 70–99)
GLUCOSE BLDC GLUCOMTR-MCNC: 178 MG/DL — HIGH (ref 70–99)
GLUCOSE BLDC GLUCOMTR-MCNC: 183 MG/DL — HIGH (ref 70–99)
GLUCOSE BLDC GLUCOMTR-MCNC: 183 MG/DL — HIGH (ref 70–99)
GLUCOSE BLDC GLUCOMTR-MCNC: 190 MG/DL — HIGH (ref 70–99)
GLUCOSE BLDC GLUCOMTR-MCNC: 190 MG/DL — HIGH (ref 70–99)
GLUCOSE BLDC GLUCOMTR-MCNC: 192 MG/DL — HIGH (ref 70–99)
GLUCOSE BLDC GLUCOMTR-MCNC: 192 MG/DL — HIGH (ref 70–99)
GLUCOSE SERPL-MCNC: 177 MG/DL — HIGH (ref 70–99)
GLUCOSE SERPL-MCNC: 177 MG/DL — HIGH (ref 70–99)
HCT VFR BLD CALC: 32.3 % — LOW (ref 34.5–45)
HCT VFR BLD CALC: 32.3 % — LOW (ref 34.5–45)
HGB BLD-MCNC: 9.9 G/DL — LOW (ref 11.5–15.5)
HGB BLD-MCNC: 9.9 G/DL — LOW (ref 11.5–15.5)
INR BLD: 1.02 RATIO — SIGNIFICANT CHANGE UP (ref 0.85–1.18)
INR BLD: 1.02 RATIO — SIGNIFICANT CHANGE UP (ref 0.85–1.18)
MAGNESIUM SERPL-MCNC: 1.8 MG/DL — SIGNIFICANT CHANGE UP (ref 1.6–2.6)
MAGNESIUM SERPL-MCNC: 1.8 MG/DL — SIGNIFICANT CHANGE UP (ref 1.6–2.6)
MCHC RBC-ENTMCNC: 25.3 PG — LOW (ref 27–34)
MCHC RBC-ENTMCNC: 25.3 PG — LOW (ref 27–34)
MCHC RBC-ENTMCNC: 30.7 GM/DL — LOW (ref 32–36)
MCHC RBC-ENTMCNC: 30.7 GM/DL — LOW (ref 32–36)
MCV RBC AUTO: 82.4 FL — SIGNIFICANT CHANGE UP (ref 80–100)
MCV RBC AUTO: 82.4 FL — SIGNIFICANT CHANGE UP (ref 80–100)
NRBC # BLD: 0 /100 WBCS — SIGNIFICANT CHANGE UP (ref 0–0)
NRBC # BLD: 0 /100 WBCS — SIGNIFICANT CHANGE UP (ref 0–0)
NRBC # FLD: 0 K/UL — SIGNIFICANT CHANGE UP (ref 0–0)
NRBC # FLD: 0 K/UL — SIGNIFICANT CHANGE UP (ref 0–0)
PHOSPHATE SERPL-MCNC: 2.8 MG/DL — SIGNIFICANT CHANGE UP (ref 2.5–4.5)
PHOSPHATE SERPL-MCNC: 2.8 MG/DL — SIGNIFICANT CHANGE UP (ref 2.5–4.5)
PLATELET # BLD AUTO: 321 K/UL — SIGNIFICANT CHANGE UP (ref 150–400)
PLATELET # BLD AUTO: 321 K/UL — SIGNIFICANT CHANGE UP (ref 150–400)
POTASSIUM SERPL-MCNC: 4.2 MMOL/L — SIGNIFICANT CHANGE UP (ref 3.5–5.3)
POTASSIUM SERPL-MCNC: 4.2 MMOL/L — SIGNIFICANT CHANGE UP (ref 3.5–5.3)
POTASSIUM SERPL-SCNC: 4.2 MMOL/L — SIGNIFICANT CHANGE UP (ref 3.5–5.3)
POTASSIUM SERPL-SCNC: 4.2 MMOL/L — SIGNIFICANT CHANGE UP (ref 3.5–5.3)
PROTHROM AB SERPL-ACNC: 11.5 SEC — SIGNIFICANT CHANGE UP (ref 9.5–13)
PROTHROM AB SERPL-ACNC: 11.5 SEC — SIGNIFICANT CHANGE UP (ref 9.5–13)
RBC # BLD: 3.92 M/UL — SIGNIFICANT CHANGE UP (ref 3.8–5.2)
RBC # BLD: 3.92 M/UL — SIGNIFICANT CHANGE UP (ref 3.8–5.2)
RBC # FLD: 19 % — HIGH (ref 10.3–14.5)
RBC # FLD: 19 % — HIGH (ref 10.3–14.5)
RH IG SCN BLD-IMP: POSITIVE — SIGNIFICANT CHANGE UP
RH IG SCN BLD-IMP: POSITIVE — SIGNIFICANT CHANGE UP
SODIUM SERPL-SCNC: 140 MMOL/L — SIGNIFICANT CHANGE UP (ref 135–145)
SODIUM SERPL-SCNC: 140 MMOL/L — SIGNIFICANT CHANGE UP (ref 135–145)
WBC # BLD: 8.86 K/UL — SIGNIFICANT CHANGE UP (ref 3.8–10.5)
WBC # BLD: 8.86 K/UL — SIGNIFICANT CHANGE UP (ref 3.8–10.5)
WBC # FLD AUTO: 8.86 K/UL — SIGNIFICANT CHANGE UP (ref 3.8–10.5)
WBC # FLD AUTO: 8.86 K/UL — SIGNIFICANT CHANGE UP (ref 3.8–10.5)

## 2023-12-14 PROCEDURE — 99232 SBSQ HOSP IP/OBS MODERATE 35: CPT

## 2023-12-14 RX ORDER — ISOPROPYL ALCOHOL, BENZOCAINE .7; .06 ML/ML; ML/ML
0 SWAB TOPICAL
Qty: 100 | Refills: 1
Start: 2023-12-14

## 2023-12-14 RX ORDER — INSULIN ASPART 100 [IU]/ML
2 INJECTION, SOLUTION SUBCUTANEOUS
Qty: 1 | Refills: 0
Start: 2023-12-14 | End: 2024-03-12

## 2023-12-14 RX ORDER — OXYCODONE HYDROCHLORIDE 5 MG/1
1 TABLET ORAL
Qty: 16 | Refills: 0
Start: 2023-12-14 | End: 2023-12-17

## 2023-12-14 RX ORDER — MAGNESIUM SULFATE 500 MG/ML
2 VIAL (ML) INJECTION ONCE
Refills: 0 | Status: COMPLETED | OUTPATIENT
Start: 2023-12-14 | End: 2023-12-14

## 2023-12-14 RX ORDER — INSULIN DETEMIR 100/ML (3)
5 INSULIN PEN (ML) SUBCUTANEOUS
Qty: 1 | Refills: 0
Start: 2023-12-14 | End: 2024-01-12

## 2023-12-14 RX ORDER — INSULIN LISPRO 100/ML
2 VIAL (ML) SUBCUTANEOUS
Qty: 1 | Refills: 0
Start: 2023-12-14 | End: 2024-01-12

## 2023-12-14 RX ORDER — ENOXAPARIN SODIUM 100 MG/ML
40 INJECTION SUBCUTANEOUS EVERY 24 HOURS
Refills: 0 | Status: DISCONTINUED | OUTPATIENT
Start: 2023-12-14 | End: 2023-12-15

## 2023-12-14 RX ORDER — INSULIN DETEMIR 100/ML (3)
10 INSULIN PEN (ML) SUBCUTANEOUS AT BEDTIME
Refills: 0 | Status: DISCONTINUED | OUTPATIENT
Start: 2023-12-14 | End: 2023-12-15

## 2023-12-14 RX ADMIN — SODIUM CHLORIDE 3 MILLILITER(S): 9 INJECTION INTRAMUSCULAR; INTRAVENOUS; SUBCUTANEOUS at 13:16

## 2023-12-14 RX ADMIN — BUDESONIDE AND FORMOTEROL FUMARATE DIHYDRATE 2 PUFF(S): 160; 4.5 AEROSOL RESPIRATORY (INHALATION) at 09:11

## 2023-12-14 RX ADMIN — BUDESONIDE AND FORMOTEROL FUMARATE DIHYDRATE 2 PUFF(S): 160; 4.5 AEROSOL RESPIRATORY (INHALATION) at 22:19

## 2023-12-14 RX ADMIN — Medication 2: at 17:45

## 2023-12-14 RX ADMIN — Medication 10 UNIT(S): at 22:19

## 2023-12-14 RX ADMIN — ALBUTEROL 2 PUFF(S): 90 AEROSOL, METERED ORAL at 09:11

## 2023-12-14 RX ADMIN — SODIUM CHLORIDE 100 MILLILITER(S): 9 INJECTION, SOLUTION INTRAVENOUS at 09:08

## 2023-12-14 RX ADMIN — Medication 2: at 09:09

## 2023-12-14 RX ADMIN — ENOXAPARIN SODIUM 40 MILLIGRAM(S): 100 INJECTION SUBCUTANEOUS at 17:44

## 2023-12-14 RX ADMIN — Medication 2: at 12:48

## 2023-12-14 RX ADMIN — ALBUTEROL 2 PUFF(S): 90 AEROSOL, METERED ORAL at 22:19

## 2023-12-14 RX ADMIN — CHLORHEXIDINE GLUCONATE 1 APPLICATION(S): 213 SOLUTION TOPICAL at 12:49

## 2023-12-14 RX ADMIN — OXYCODONE HYDROCHLORIDE 5 MILLIGRAM(S): 5 TABLET ORAL at 01:03

## 2023-12-14 RX ADMIN — OXYCODONE HYDROCHLORIDE 5 MILLIGRAM(S): 5 TABLET ORAL at 07:04

## 2023-12-14 RX ADMIN — Medication 25 GRAM(S): at 09:39

## 2023-12-14 RX ADMIN — OXYCODONE HYDROCHLORIDE 5 MILLIGRAM(S): 5 TABLET ORAL at 08:00

## 2023-12-14 RX ADMIN — SODIUM CHLORIDE 3 MILLILITER(S): 9 INJECTION INTRAMUSCULAR; INTRAVENOUS; SUBCUTANEOUS at 04:19

## 2023-12-14 RX ADMIN — SODIUM CHLORIDE 3 MILLILITER(S): 9 INJECTION INTRAMUSCULAR; INTRAVENOUS; SUBCUTANEOUS at 22:49

## 2023-12-14 NOTE — PROGRESS NOTE ADULT - SUBJECTIVE AND OBJECTIVE BOX
DATE OF SERVICE: 12-14-23    Patient denies chest pain or shortness of breath.   Review of symptoms otherwise negative.    Review of Systems:   Constitutional: [ ] fevers, [ ] chills.   Skin: [ ] dry skin. [ ] rashes.  Psychiatric: [ ] depression, [ ] anxiety.   Gastrointestinal: [ ] BRBPR, [ ] melena.   Neurological: [ ] confusion. [ ] seizures. [ ] shuffling gait.   Ears,Nose,Mouth and Throat: [ ] ear pain [ ] sore throat.   Eyes: [ ] diplopia.   Respiratory: [ ] hemoptysis. [ ] shortness of breath  Cardiovascular: See HPI above  Hematologic/Lymphatic: [ ] anemia. [ ] painful nodes. [ ] prolonged bleeding.   Genitourinary: [ ] hematuria. [ ] flank pain.   Endocrine: [ ] significant change in weight. [ ] intolerance to heat and cold.     Review of systems [x ] otherwise negative, [ ] otherwise unable to obtain    FH: no family history of sudden cardiac death in first degree relatives    SH: [ ] tobacco, [ ] alcohol, [ ] drugs    acetaminophen     Tablet .. 975 milliGRAM(s) Oral every 6 hours PRN  albuterol    90 MICROgram(s) HFA Inhaler 2 Puff(s) Inhalation two times a day  budesonide 160 MICROgram(s)/formoterol 4.5 MICROgram(s) Inhaler 2 Puff(s) Inhalation two times a day  chlorhexidine 2% Cloths 1 Application(s) Topical daily  enoxaparin Injectable 40 milliGRAM(s) SubCutaneous every 24 hours  influenza  Vaccine (HIGH DOSE) 0.7 milliLiter(s) IntraMuscular once  insulin detemir injectable (LEVEMIR) 5 Unit(s) SubCutaneous at bedtime  insulin lispro (ADMELOG) corrective regimen sliding scale   SubCutaneous at bedtime  insulin lispro (ADMELOG) corrective regimen sliding scale   SubCutaneous three times a day before meals  lactated ringers. 1000 milliLiter(s) IV Continuous <Continuous>  oxyCODONE    IR 2.5 milliGRAM(s) Oral every 6 hours PRN  oxyCODONE    IR 5 milliGRAM(s) Oral every 6 hours PRN  pantoprazole    Tablet 40 milliGRAM(s) Oral before breakfast  sodium chloride 0.9% lock flush 3 milliLiter(s) IV Push every 8 hours                            9.9    8.86  )-----------( 321      ( 14 Dec 2023 04:40 )             32.3       12-14    140  |  103  |  12  ----------------------------<  177<H>  4.2   |  26  |  0.49<L>    Ca    9.3      14 Dec 2023 04:40  Phos  2.8     12-14  Mg     1.80     12-14      T(C): 36.8 (12-14-23 @ 11:55), Max: 37.2 (12-14-23 @ 00:41)  HR: 94 (12-14-23 @ 11:55) (94 - 103)  BP: 115/64 (12-14-23 @ 11:55) (106/59 - 127/64)  RR: 18 (12-14-23 @ 11:55) (16 - 18)  SpO2: 100% (12-14-23 @ 11:55) (100% - 100%)  Wt(kg): --    I&O's Summary    13 Dec 2023 07:01  -  14 Dec 2023 07:00  --------------------------------------------------------  IN: 2338 mL / OUT: 605 mL / NET: 1733 mL    14 Dec 2023 07:01  -  14 Dec 2023 14:48  --------------------------------------------------------  IN: 500 mL / OUT: 115 mL / NET: 385 mL    General:  Alert and Oriented * 3.   Head: Normocephalic and atraumatic.   Neck: No JVD. No bruits. Supple. Does not appear to be enlarged.   Cardiovascular: + S1,S2 ; RRR Soft systolic murmur at the left lower sternal border. No rubs noted.    Lungs: CTA b/l. No rhonchi, rales or wheezes.   Abdomen: distended NT  Extremities: No clubbing/cyanosis/edema.   Neurologic: Moves all four extremities. Full range of motion.   Skin: Warm and moist. The patient's skin has normal elasticity and good skin turgor.   Psychiatric: Appropriate mood and affect.  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      -- tolerated procedure well from CV perspective  -- pain management  -- supplement lytes per sx   -- s/p NGT and TPN, now all removed  -- eventually resume home meds: Asa 81mg, Pravastatin 40mg when ok from a surgical perspective  -- BP meds Hctz 12.5mg, Losartan 25mg on hold, BP controlled  -- s/p placement of IR drain, now removed  --working with PT, anticipating DC soon

## 2023-12-14 NOTE — CHART NOTE - NSCHARTNOTEFT_GEN_A_CORE
IR consulted for removal of lower abdominal drainage catheter.    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 IR was consulted for drainage, patient is s/p lower abdominal drainage on 12/7. Contents aspirated from collection during placement was dark red fluid. Cultures from drainage have remained negative.  CT scan demonstrating near resolution of collection with small residual collection which plan was to reposition drain into. Per discussion between Dr. Nichols and Dr. Morales, likely collection is hematoma; however given collection was instrumented and has been getting flushed daily there is small possibility of superimposed infection later on if drain were to be removed prematurely prior to tube study. Output from drain is less than 5cc, WBC downtrending and patient is afebrile. Drain removal was discussed with patient, and risk of reaccumulation of collection were discussed with patient as well. Patient was agreeable to proceed with drain removal.     The suture was cut, pigtail was freed and the drain was removed without issue. No drainage noted from insertion site s/p removal. A clean, dry dressing was applied. Patient was instructed to call MD or return to ED if any fevers, chills, abdominal pain.     [id number] IR consulted for removal of lower abdominal drainage catheter.    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 IR was consulted for drainage, patient is s/p lower abdominal drainage on 12/7. Contents aspirated from collection during placement was dark red fluid. Cultures from drainage have remained negative.  CT scan demonstrating near resolution of collection with small residual collection which plan was to reposition drain into. Per discussion between Dr. Nichols and Dr. Morales, likely collection is hematoma; however given collection was instrumented and has been getting flushed daily there is small possibility of superimposed infection later on if drain were to be removed prematurely prior to tube study. Output from drain is less than 5cc, WBC downtrending and patient is afebrile. Drain removal was discussed with patient, and risk of reaccumulation of collection were discussed with patient as well. Patient was agreeable to proceed with drain removal.     The suture was cut, pigtail was freed and the drain was removed without issue. No drainage noted from insertion site s/p removal. A clean, dry dressing was applied. Patient was instructed to call MD or return to ED if any fevers, chills, abdominal pain.     [id number]

## 2023-12-14 NOTE — PROGRESS NOTE ADULT - ASSESSMENT
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 Course c/b ileus. CT on 12/4 showed: Interval increase in size of fluid anterior to the ileocolic anastomosis, with new peripheral enhancement, probably early abscess formation and decrease in small bowel dilatation, probably ileus. Patient s/p IR drainage on 12/7.     Plan  - IR to remove drain today  - Diet: LRD  - Pain control with Tylenol, Dilaudid PRN  - Protonix 40 mg daily today  - Home meds; holding HTN medications  - Appreciate endocrinology recs; plan to restart insulin pump on discharge  - OOB/ambulate/ PT/ IS while in bed  - DVT prophylaxis: Lovenox   - Dispo: Pt recommends Home PT with walker  D Team Surgery  [id number]    73 year old female with PMH asthma, T2DM on insulin pump, GERD, HTN, HLD, OA, obesity presents s/p robotic partial colectomy for 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 Course c/b ileus. CT on 12/4 showed: Interval increase in size of fluid anterior to the ileocolic anastomosis, with new peripheral enhancement, probably early abscess formation and decrease in small bowel dilatation, probably ileus. Patient s/p IR drainage on 12/7.     Plan  - IR to remove drain today  - Diet: LRD  - Pain control with Tylenol, Dilaudid PRN  - Protonix 40 mg daily today  - Home meds; holding HTN medications  - Appreciate endocrinology recs; plan to restart insulin pump on discharge  - OOB/ambulate/ PT/ IS while in bed  - DVT prophylaxis: Lovenox   - Dispo: Pt recommends Home PT with walker  D Team Surgery  [id number]

## 2023-12-14 NOTE — PROGRESS NOTE ADULT - NS ATTEND OPT1 GEN_ALL_CORE

## 2023-12-14 NOTE — PROGRESS NOTE ADULT - ASSESSMENT
This is a 74 yo F /w a PMH of DM2 on an insulin pump and colorectal cancer s/p right hemicolectomy today. Endocrinology consulted for diabetes management and DM2. Based on current insulin pump settings, suspect patient is being over-basalized given high basal rates compared to the insulin:carb ratio    Home Regimen:  Patient uses the medtronic insulin pump (Novolog) and Freestyle Libre2  Pump settings are as follows:  TDD 52.425-->9.6   12AM 1.8 units per hour--->0.4  6:30AM 2 units per hour-->0.4  12:30PM 2.45 units per hour-->0.4  6:30PM 2.55 units per hour--->0.4  8PM 2.55 units per hour--->0.4    ICR:  12AM 1:15  7AM 1:12--->1:15  12:30PM 1:15    ISF 1:30  Glucose Target: 12a- 5a 140-140  5a-10p 110  10p-12am 140  IOB: 4 hours     #DM2 A1c 6.8%  #insulin pump at home. Off pump while in the hospital  - Glucose Target 100-180: above goal but overall improving towards goal. TPN dc'd on 12/13  - Increase Levemir to 10 units sq at bedtime for NPO   - Continue moderate Admelog correction scale TID AC and continue moderate Admelog correction scale at bedtime  - Check FS before meals and at bedtime; please check 3am FS   - Hypoglycemia Protocol     #Discharge  Please discharge pt after dinner so she is able to get correction for dinner   -resume medtronic insulin pump (Novolog) at 8pm  on day of discharge (which would be 22 hours from last basal insulin dose given in the hospital); adjustments made   Pump settings are as follows:  TDD 9.6   12AM -12AM 0.4  ICR:12AM-12AM 1:15  ISF 1:30  Glucose Target: 12a- 5a 140-140  5a-10p 110  10p-12am 140  IOB: 4 hours   -Pt declined prescription for back up insulin pens states she prefers to discuss this with private endocrinologist Dr. Anand  -Continue glucose monitoring with Free style bonnie pt states she has sensors at home  -Pt seen with Advanced Practice Nurse Consult Diabetes Specialist   -Will review importance of glucose monitoring, medication adherence, following a consistent carb diet  -Please call your doctor if your fs is 70 or below and 250 and above   -Reviewed Hypoglycemia and Intervention   -Ensure patient has working glucometer, test strips, lancets, alcohol pads, and BD sofia pen needles  -Please also prescribe glucose tabs, Baqsimi nasal spray or glucagon emergency kit for hypoglycemia risk   -Please follow up with your pcp, podiatry, opthalmology, and endocrinology as an outpt   -Please follow up with Dr. Anand: advised to make an appointment prior to discharge   -Confirmed pt has all insulin and pump supplies at home     #HTN  -BP target <130/80  -Consider resuming losartan 25mg daily and hctz 12.5mg daily- management as per primary team  -outpt mc/cr ratio     #HLD  -On Pravastatin 40 mg QHS at home   -Resume once able to tolerate orals if no contraindications   -LDL goal less than 70   -Management as per primary team     D/w Surgery team [id number]     Adrienne Taveras  Nurse Practitioner  Division of Endocrinology & Diabetes  In house pager #[phone]    If before 9AM or after 6PM, or on weekends/holidays, please call endocrine answering service for assistance ([phone]).For nonurgent matters email [email or web address] for assistance.    This is a 72 yo F /w a PMH of DM2 on an insulin pump and colorectal cancer s/p right hemicolectomy today. Endocrinology consulted for diabetes management and DM2. Based on current insulin pump settings, suspect patient is being over-basalized given high basal rates compared to the insulin:carb ratio    Home Regimen:  Patient uses the medtronic insulin pump (Novolog) and Freestyle Libre2  Pump settings are as follows:  TDD 52.425-->9.6   12AM 1.8 units per hour--->0.4  6:30AM 2 units per hour-->0.4  12:30PM 2.45 units per hour-->0.4  6:30PM 2.55 units per hour--->0.4  8PM 2.55 units per hour--->0.4    ICR:  12AM 1:15  7AM 1:12--->1:15  12:30PM 1:15    ISF 1:30  Glucose Target: 12a- 5a 140-140  5a-10p 110  10p-12am 140  IOB: 4 hours     #DM2 A1c 6.8%  #insulin pump at home. Off pump while in the hospital  - Glucose Target 100-180: above goal but overall improving towards goal. TPN dc'd on 12/13  - Increase Levemir to 10 units sq at bedtime for NPO   - Continue moderate Admelog correction scale TID AC and continue moderate Admelog correction scale at bedtime  - Check FS before meals and at bedtime; please check 3am FS   - Hypoglycemia Protocol     #Discharge  Please discharge pt after dinner so she is able to get correction for dinner   -resume medtronic insulin pump (Novolog) at 8pm  on day of discharge (which would be 22 hours from last basal insulin dose given in the hospital); adjustments made   Pump settings are as follows:  TDD 9.6   12AM -12AM 0.4  ICR:12AM-12AM 1:15  ISF 1:30  Glucose Target: 12a- 5a 140-140  5a-10p 110  10p-12am 140  IOB: 4 hours   -Pt declined prescription for back up insulin pens states she prefers to discuss this with private endocrinologist Dr. Anand  -Continue glucose monitoring with Free style bonnie pt states she has sensors at home  -Pt seen with Advanced Practice Nurse Consult Diabetes Specialist   -Will review importance of glucose monitoring, medication adherence, following a consistent carb diet  -Please call your doctor if your fs is 70 or below and 250 and above   -Reviewed Hypoglycemia and Intervention   -Ensure patient has working glucometer, test strips, lancets, alcohol pads, and BD sofia pen needles  -Please also prescribe glucose tabs, Baqsimi nasal spray or glucagon emergency kit for hypoglycemia risk   -Please follow up with your pcp, podiatry, opthalmology, and endocrinology as an outpt   -Please follow up with Dr. Anand: advised to make an appointment prior to discharge   -Confirmed pt has all insulin and pump supplies at home     #HTN  -BP target <130/80  -Consider resuming losartan 25mg daily and hctz 12.5mg daily- management as per primary team  -outpt mc/cr ratio     #HLD  -On Pravastatin 40 mg QHS at home   -Resume once able to tolerate orals if no contraindications   -LDL goal less than 70   -Management as per primary team     D/w Surgery team [id number]     Adrienne Taveras  Nurse Practitioner  Division of Endocrinology & Diabetes  In house pager #[phone]    If before 9AM or after 6PM, or on weekends/holidays, please call endocrine answering service for assistance ([phone]).For nonurgent matters email [email or web address] for assistance.    This is a 74 yo F /w a PMH of DM2 on an insulin pump and colorectal cancer s/p right hemicolectomy today. Endocrinology consulted for diabetes management and DM2. Based on current insulin pump settings, suspect patient is being over-basalized given high basal rates compared to the insulin:carb ratio    Home Regimen:  Patient uses the medtronic insulin pump (Novolog) and Freestyle Libre2  Pump settings are as follows:  TDD 52.425-->9.6   12AM 1.8 units per hour--->0.4  6:30AM 2 units per hour-->0.4  12:30PM 2.45 units per hour-->0.4  6:30PM 2.55 units per hour--->0.4  8PM 2.55 units per hour--->0.4    ICR:  12AM 1:15  7AM 1:12--->1:15  12:30PM 1:15    ISF 1:30  Glucose Target: 12a- 5a 140-140  5a-10p 110  10p-12am 140  IOB: 4 hours     #DM2 A1c 6.8%  #insulin pump at home. Off pump while in the hospital  - Glucose Target 100-180: above goal but overall improving towards goal. TPN dc'd on 12/13  - Increase Levemir to 10 units sq at bedtime for NPO   - Continue moderate Admelog correction scale TID AC and continue moderate Admelog correction scale at bedtime  - Check FS before meals and at bedtime; please check 3am FS   - Hypoglycemia Protocol     #Discharge  Please discharge pt after dinner so she is able to get correction for dinner   -resume medtronic insulin pump (Novolog) at 8pm  on day of discharge (which would be 22 hours from last basal insulin dose given in the hospital); adjustments made   Pump settings are as follows: Adjustment to insulin pump settings made with DM educator   TDD 9.6   12AM -12AM 0.4  ICR:12AM-12AM 1:15  ISF 1:30  Glucose Target: 12a- 5a 140-140  5a-10p 110  10p-12am 140  IOB: 4 hours   -Pt declined prescription for back up insulin pens states she prefers to discuss this with private endocrinologist Dr. Anand  -Continue glucose monitoring with Free style bonnie pt states she has sensors at home  -Pt seen with Advanced Practice Nurse Consult Diabetes Specialist   -Will review importance of glucose monitoring, medication adherence, following a consistent carb diet  -Please call your doctor if your fs is 70 or below and 250 and above   -Reviewed Hypoglycemia and Intervention   -Ensure patient has working glucometer, test strips, lancets, alcohol pads, and BD sofia pen needles  -Please also prescribe glucose tabs, Baqsimi nasal spray or glucagon emergency kit for hypoglycemia risk   -Please follow up with your pcp, podiatry, opthalmology, and endocrinology as an outpt   -Please follow up with Dr. Anand: advised to make an appointment prior to discharge   -Confirmed pt has all insulin and pump supplies at home     #HTN  -BP target <130/80  -Consider resuming losartan 25mg daily and hctz 12.5mg daily- management as per primary team  -outpt mc/cr ratio     #HLD  -On Pravastatin 40 mg QHS at home   -Resume once able to tolerate orals if no contraindications   -LDL goal less than 70   -Management as per primary team     D/w Surgery team [id number]     Adrienne Taveras  Nurse Practitioner  Division of Endocrinology & Diabetes  In house pager #[phone]    If before 9AM or after 6PM, or on weekends/holidays, please call endocrine answering service for assistance ([phone]).For nonurgent matters email [email or web address] for assistance.    This is a 74 yo F /w a PMH of DM2 on an insulin pump and colorectal cancer s/p right hemicolectomy today. Endocrinology consulted for diabetes management and DM2. Based on current insulin pump settings, suspect patient is being over-basalized given high basal rates compared to the insulin:carb ratio    Home Regimen:  Patient uses the medtronic insulin pump (Novolog) and Freestyle Libre2  Pump settings are as follows:  TDD 52.425-->9.6   12AM 1.8 units per hour--->0.4  6:30AM 2 units per hour-->0.4  12:30PM 2.45 units per hour-->0.4  6:30PM 2.55 units per hour--->0.4  8PM 2.55 units per hour--->0.4    ICR:  12AM 1:15  7AM 1:12--->1:15  12:30PM 1:15    ISF 1:30  Glucose Target: 12a- 5a 140-140  5a-10p 110  10p-12am 140  IOB: 4 hours     #DM2 A1c 6.8%  #insulin pump at home. Off pump while in the hospital  - Glucose Target 100-180: above goal but overall improving towards goal. TPN dc'd on 12/13  - Increase Levemir to 10 units sq at bedtime for NPO   - Continue moderate Admelog correction scale TID AC and continue moderate Admelog correction scale at bedtime  - Check FS before meals and at bedtime; please check 3am FS   - Hypoglycemia Protocol     #Discharge  Please discharge pt after dinner so she is able to get correction for dinner   -resume medtronic insulin pump (Novolog) at 8pm  on day of discharge (which would be 22 hours from last basal insulin dose given in the hospital); adjustments made   Pump settings are as follows: Adjustment to insulin pump settings made with DM educator   TDD 9.6   12AM -12AM 0.4  ICR:12AM-12AM 1:15  ISF 1:30  Glucose Target: 12a- 5a 140-140  5a-10p 110  10p-12am 140  IOB: 4 hours   -Pt declined prescription for back up insulin pens states she prefers to discuss this with private endocrinologist Dr. Anand  -Continue glucose monitoring with Free style bonnie pt states she has sensors at home  -Pt seen with Advanced Practice Nurse Consult Diabetes Specialist   -Will review importance of glucose monitoring, medication adherence, following a consistent carb diet  -Please call your doctor if your fs is 70 or below and 250 and above   -Reviewed Hypoglycemia and Intervention   -Ensure patient has working glucometer, test strips, lancets, alcohol pads, and BD sofia pen needles  -Please also prescribe glucose tabs, Baqsimi nasal spray or glucagon emergency kit for hypoglycemia risk   -Please follow up with your pcp, podiatry, opthalmology, and endocrinology as an outpt   -Please follow up with Dr. Anand: advised to make an appointment prior to discharge   -Confirmed pt has all insulin and pump supplies at home     #HTN  -BP target <130/80  -Consider resuming losartan 25mg daily and hctz 12.5mg daily- management as per primary team  -outpt mc/cr ratio     #HLD  -On Pravastatin 40 mg QHS at home   -Resume once able to tolerate orals if no contraindications   -LDL goal less than 70   -Management as per primary team     D/w Surgery team [id number]     Adrienne Taveras  Nurse Practitioner  Division of Endocrinology & Diabetes  In house pager #[phone]    If before 9AM or after 6PM, or on weekends/holidays, please call endocrine answering service for assistance ([phone]).For nonurgent matters email [email or web address] for assistance.

## 2023-12-14 NOTE — CHART NOTE - NSCHARTNOTESELECT_GEN_ALL_CORE
Blood Transfusion
Nutrition Follow Up/Nutrition Services
Nutrition Services
Postop Check
Pre-IR
pre-ir
Endocrine
Pre-IR
IR- recs/Event Note
IR/Event Note
Interventional Radiology

## 2023-12-14 NOTE — DISCHARGE NOTE NURSING/CASE MANAGEMENT/SOCIAL WORK - PATIENT PORTAL LINK FT
You can access the FollowMyHealth Patient Portal offered by [facility] by registering at the following website: [email or web address]. By joining [email or web address]’s FollowMyHealth portal, you will also be able to view your health information using other applications (apps) compatible with our system. You can access the FollowMyHealth Patient Portal offered by [facility] by registering at the following website: [email or web address]. By joining [employer]’s FollowMyHealth portal, you will also be able to view your health information using other applications (apps) compatible with our system.

## 2023-12-14 NOTE — PROGRESS NOTE ADULT - NS ATTEND AMEND GEN_ALL_CORE FT
Patient seen and examined. Agree with plan as detailed in PA/NP Note.     Bella Aguilera MD  Pager: [phone] Patient seen and examined. Agree with plan as detailed in PA/NP Note.     Bella Aguilera MD  Pager: [phone]

## 2023-12-14 NOTE — DISCHARGE NOTE NURSING/CASE MANAGEMENT/SOCIAL WORK - NSDCPEFALRISK_GEN_ALL_CORE
For information on Fall & Injury Prevention, visit: [email or web address] OR  [email or web address] OR  https://www.cdc.gov/steadi/patient.html For information on Fall & Injury Prevention, visit: [email or web address] OR  [email or web address] OR  https://www.cdc.gov/steadi/patient.html

## 2023-12-14 NOTE — PROGRESS NOTE ADULT - SUBJECTIVE AND OBJECTIVE BOX
TEAM [ D ] Surgery Daily Progress Note  =====================================================    SUBJECTIVE: Patient seen and examined at bedside on AM rounds. Patient reports that they're feeling well. She is tolerating a regular diet. She is passing gas with 3 episodes of diarrhea yesterday. Denies fever, chills, N/V, chest pain, SOB    ALLERGIES:  Darvocet A500 (Other; Urticaria)  Percocet 10/325 (Other; Urticaria)  codeine (Other)  Lantus (Swelling)  PURELL.  pt said, &quot;I felt my airway closing&quot; after she smelled the Purell. The incident occured at the pulmonologist office. (Other; Short breath)      --------------------------------------------------------------------------------------    MEDICATIONS:    Neurologic Medications  acetaminophen     Tablet .. 975 milliGRAM(s) Oral every 6 hours PRN Mild Pain (1 - 3)  oxyCODONE    IR 5 milliGRAM(s) Oral every 6 hours PRN Severe Pain (7 - 10)  oxyCODONE    IR 2.5 milliGRAM(s) Oral every 6 hours PRN Moderate Pain (4 - 6)    Respiratory Medications  albuterol    90 MICROgram(s) HFA Inhaler 2 Puff(s) Inhalation two times a day  budesonide 160 MICROgram(s)/formoterol 4.5 MICROgram(s) Inhaler 2 Puff(s) Inhalation two times a day    Cardiovascular Medications    Gastrointestinal Medications  lactated ringers. 1000 milliLiter(s) IV Continuous <Continuous>  pantoprazole    Tablet 40 milliGRAM(s) Oral before breakfast  sodium chloride 0.9% lock flush 3 milliLiter(s) IV Push every 8 hours    Genitourinary Medications    Hematologic/Oncologic Medications  influenza  Vaccine (HIGH DOSE) 0.7 milliLiter(s) IntraMuscular once    Antimicrobial/Immunologic Medications    Endocrine/Metabolic Medications  dextrose 50% Injectable 25 Gram(s) IV Push once  dextrose 50% Injectable 25 Gram(s) IV Push once  dextrose 50% Injectable 12.5 Gram(s) IV Push once  dextrose Oral Gel 15 Gram(s) Oral once  insulin detemir injectable (LEVEMIR) 5 Unit(s) SubCutaneous at bedtime  insulin lispro (ADMELOG) corrective regimen sliding scale   SubCutaneous three times a day before meals  insulin lispro (ADMELOG) corrective regimen sliding scale   SubCutaneous at bedtime    Topical/Other Medications  chlorhexidine 2% Cloths 1 Application(s) Topical daily    --------------------------------------------------------------------------------------    VITAL SIGNS:  T(C): 37.2 (12-14-23 @ 05:17), Max: 37.2 (12-14-23 @ 00:41)  HR: 96 (12-14-23 @ 05:17) (95 - 103)  BP: 108/68 (12-14-23 @ 05:17) (106/59 - 127/64)  RR: 18 (12-14-23 @ 05:17) (16 - 18)  SpO2: 100% (12-14-23 @ 05:17) (100% - 100%)  --------------------------------------------------------------------------------------    INS AND OUTS:    12-13-23 @ 07:01  -  12-14-23 @ 07:00  --------------------------------------------------------  IN: 2338 mL / OUT: 605 mL / NET: 1733 mL      --------------------------------------------------------------------------------------      EXAM    General: NAD, resting in bed comfortably.  Cardiac: regular rate, warm and well perfused  Respiratory: Nonlabored respirations, normal cw expansion.  Abdomen: soft, nontender, nondistended, port sites c/d/i, IR drain with SS output  Extremities: normal strength, FROM, no deformities    --------------------------------------------------------------------------------------    LABS                        9.9    8.86  )-----------( 321      ( 14 Dec 2023 04:40 )             32.3   12-14    140  |  103  |  12  ----------------------------<  177<H>  4.2   |  26  |  0.49<L>    Ca    9.3      14 Dec 2023 04:40  Phos  2.8     12-14  Mg     1.80     12-14

## 2023-12-14 NOTE — PROGRESS NOTE ADULT - SUBJECTIVE AND OBJECTIVE BOX
Chief Complaint: Type 2 DM     History: Pt seen at bedside. Was NPO aftermidnight now eating. Pt tolerating oral diet. Pt denies nausea and vomiting/any signs of hypoglycemia. Pt reports a fair appetite.     MEDICATIONS  (STANDING):  albuterol    90 MICROgram(s) HFA Inhaler 2 Puff(s) Inhalation two times a day  budesonide 160 MICROgram(s)/formoterol 4.5 MICROgram(s) Inhaler 2 Puff(s) Inhalation two times a day  chlorhexidine 2% Cloths 1 Application(s) Topical daily  enoxaparin Injectable 40 milliGRAM(s) SubCutaneous every 24 hours  influenza  Vaccine (HIGH DOSE) 0.7 milliLiter(s) IntraMuscular once  insulin detemir injectable (LEVEMIR) 5 Unit(s) SubCutaneous at bedtime  insulin lispro (ADMELOG) corrective regimen sliding scale   SubCutaneous three times a day before meals  insulin lispro (ADMELOG) corrective regimen sliding scale   SubCutaneous at bedtime  pantoprazole    Tablet 40 milliGRAM(s) Oral before breakfast  sodium chloride 0.9% lock flush 3 milliLiter(s) IV Push every 8 hours    MEDICATIONS  (PRN):  acetaminophen     Tablet .. 975 milliGRAM(s) Oral every 6 hours PRN Mild Pain (1 - 3)  oxyCODONE    IR 2.5 milliGRAM(s) Oral every 6 hours PRN Moderate Pain (4 - 6)  oxyCODONE    IR 5 milliGRAM(s) Oral every 6 hours PRN Severe Pain (7 - 10)      Allergies: Darvocet A500 (Other; Urticaria)  Percocet 10/325 (Other; Urticaria)  codeine (Other)  Lantus (Swelling)  PURELL.  pt said, &quot;I felt my airway closing&quot; after she smelled the Purell. The incident occured at the pulmonologist office. (Other; Short breath)    Review of Systems:  Respiratory: No SOB, no cough  GI: No nausea, vomiting, abdominal pain  Endocrine: no polyuria, polydipsia      PHYSICAL EXAM:  VITALS: T(C): 36.8 (12-14-23 @ 11:55)  T(F): 98.3 (12-14-23 @ 11:55), Max: 99 (12-14-23 @ 00:41)  HR: 94 (12-14-23 @ 11:55) (94 - 103)  BP: 115/64 (12-14-23 @ 11:55) (107/62 - 127/64)  RR:  (16 - 18)  SpO2:  (100% - 100%)  Wt(kg): --  GENERAL: NAD, well-groomed, well-developed  RESPIRATORY: No labored breathing  GI: Soft, nontender, non distended  PSYCH: Alert and oriented x 3, normal affect, normal mood      CAPILLARY BLOOD GLUCOSE  POCT Blood Glucose.: 190 mg/dL (14 Dec 2023 12:16)  POCT Blood Glucose.: 192 mg/dL (14 Dec 2023 08:16)  POCT Blood Glucose.: 183 mg/dL (14 Dec 2023 05:24)  POCT Blood Glucose.: 178 mg/dL (14 Dec 2023 00:51)  POCT Blood Glucose.: 152 mg/dL (13 Dec 2023 22:19)  POCT Blood Glucose.: 152 mg/dL (13 Dec 2023 17:14)    A1C with Estimated Average Glucose (11.16.23 @ 13:15)    A1C with Estimated Average Glucose Result: 6.8   Estimated Average Glucose: 148      12-14    140  |  103  |  12  ----------------------------<  177<H>  4.2   |  26  |  0.49<L>    eGFR: 100    Ca    9.3      12-14  Mg     1.80     12-14  Phos  2.8     12-14    Diet, Regular:   Consistent Carbohydrate Evening Snack (CSTCHOSN)  Fiber/Residue Restricted (LOWFIBER) (12-12-23 @ 08:33) [Active]

## 2023-12-14 NOTE — PROGRESS NOTE ADULT - SUBJECTIVE AND OBJECTIVE BOX
Name of Patient : NESTOR MABRY  MRN: [id number]  Date of visit: 12-14-23       Subjective: Patient seen and examined. No new events except as noted.     REVIEW OF SYSTEMS:    CONSTITUTIONAL: No weakness, fevers or chills  EYES/ENT: No visual changes;  No vertigo or throat pain   NECK: No pain or stiffness  RESPIRATORY: No cough, wheezing, hemoptysis; No shortness of breath  CARDIOVASCULAR: No chest pain or palpitations  GASTROINTESTINAL: No abdominal or epigastric pain. No nausea, vomiting, or hematemesis; No diarrhea or constipation. No melena or hematochezia.  GENITOURINARY: No dysuria, frequency or hematuria  NEUROLOGICAL: No numbness or weakness  SKIN: No itching, burning, rashes, or lesions   All other review of systems is negative unless indicated above.    MEDICATIONS:  MEDICATIONS  (STANDING):  albuterol    90 MICROgram(s) HFA Inhaler 2 Puff(s) Inhalation two times a day  budesonide 160 MICROgram(s)/formoterol 4.5 MICROgram(s) Inhaler 2 Puff(s) Inhalation two times a day  chlorhexidine 2% Cloths 1 Application(s) Topical daily  enoxaparin Injectable 40 milliGRAM(s) SubCutaneous every 24 hours  influenza  Vaccine (HIGH DOSE) 0.7 milliLiter(s) IntraMuscular once  insulin detemir injectable (LEVEMIR) 10 Unit(s) SubCutaneous at bedtime  insulin lispro (ADMELOG) corrective regimen sliding scale   SubCutaneous at bedtime  insulin lispro (ADMELOG) corrective regimen sliding scale   SubCutaneous three times a day before meals  pantoprazole    Tablet 40 milliGRAM(s) Oral before breakfast  sodium chloride 0.9% lock flush 3 milliLiter(s) IV Push every 8 hours      PHYSICAL EXAM:  T(C): 36.8 (12-14-23 @ 16:50), Max: 37.2 (12-14-23 @ 00:41)  HR: 96 (12-14-23 @ 16:50) (94 - 103)  BP: 110/62 (12-14-23 @ 16:50) (107/62 - 127/64)  RR: 18 (12-14-23 @ 16:50) (16 - 18)  SpO2: 98% (12-14-23 @ 16:50) (98% - 100%)  Wt(kg): --  I&O's Summary    13 Dec 2023 07:01  -  14 Dec 2023 07:00  --------------------------------------------------------  IN: 2338 mL / OUT: 605 mL / NET: 1733 mL    14 Dec 2023 07:01  -  14 Dec 2023 20:38  --------------------------------------------------------  IN: 860 mL / OUT: 465 mL / NET: 395 mL          Appearance: Normal	  HEENT:  PERRLA   Lymphatic: No lymphadenopathy   Cardiovascular: Normal S1 S2, no JVD  Respiratory: normal effort , clear  Gastrointestinal:  Soft, Non-tender  Skin: No rashes,  warm to touch  Psychiatry:  Mood & affect appropriate  Musculuskeletal: No edema    recent labs, Imaging and EKGs personally reviewed     12-13-23 @ 07:01  -  12-14-23 @ 07:00  --------------------------------------------------------  IN: 2338 mL / OUT: 605 mL / NET: 1733 mL    12-14-23 @ 07:01 - 12-14-23 @ 20:38  --------------------------------------------------------  IN: 860 mL / OUT: 465 mL / NET: 395 mL                          9.9    8.86  )-----------( 321      ( 14 Dec 2023 04:40 )             32.3               12-14    140  |  103  |  12  ----------------------------<  177<H>  4.2   |  26  |  0.49<L>    Ca    9.3      14 Dec 2023 04:40  Phos  2.8     12-14  Mg     1.80     12-14      PT/INR - ( 14 Dec 2023 04:40 )   PT: 11.5 sec;   INR: 1.02 ratio         PTT - ( 14 Dec 2023 04:40 )  PTT:27.9 sec                   Urinalysis Basic - ( 14 Dec 2023 04:40 )    Color: x / Appearance: x / SG: x / pH: x  Gluc: 177 mg/dL / Ketone: x  / Bili: x / Urobili: x   Blood: x / Protein: x / Nitrite: x   Leuk Esterase: x / RBC: x / WBC x   Sq Epi: x / Non Sq Epi: x / Bacteria: x               Name of Patient : NESTOR MABRY  MRN: [id number]  Date of visit: 12-14-23       Subjective: Patient seen and examined. No new events except as noted.     REVIEW OF SYSTEMS:    CONSTITUTIONAL: No weakness, fevers or chills  EYES/ENT: No visual changes;  No vertigo or throat pain   NECK: No pain or stiffness  RESPIRATORY: No cough, wheezing, hemoptysis; No shortness of breath  CARDIOVASCULAR: No chest pain or palpitations  GASTROINTESTINAL: No abdominal or epigastric pain. No nausea, vomiting, or hematemesis; No diarrhea or constipation. No melena or hematochezia.  GENITOURINARY: No dysuria, frequency or hematuria  NEUROLOGICAL: No numbness or weakness  SKIN: No itching, burning, rashes, or lesions   All other review of systems is negative unless indicated above.    MEDICATIONS:  MEDICATIONS  (STANDING):  albuterol    90 MICROgram(s) HFA Inhaler 2 Puff(s) Inhalation two times a day  budesonide 160 MICROgram(s)/formoterol 4.5 MICROgram(s) Inhaler 2 Puff(s) Inhalation two times a day  chlorhexidine 2% Cloths 1 Application(s) Topical daily  enoxaparin Injectable 40 milliGRAM(s) SubCutaneous every 24 hours  influenza  Vaccine (HIGH DOSE) 0.7 milliLiter(s) IntraMuscular once  insulin detemir injectable (LEVEMIR) 10 Unit(s) SubCutaneous at bedtime  insulin lispro (ADMELOG) corrective regimen sliding scale   SubCutaneous at bedtime  insulin lispro (ADMELOG) corrective regimen sliding scale   SubCutaneous three times a day before meals  pantoprazole    Tablet 40 milliGRAM(s) Oral before breakfast  sodium chloride 0.9% lock flush 3 milliLiter(s) IV Push every 8 hours      PHYSICAL EXAM:  T(C): 36.8 (12-14-23 @ 16:50), Max: 37.2 (12-14-23 @ 00:41)  HR: 96 (12-14-23 @ 16:50) (94 - 103)  BP: 110/62 (12-14-23 @ 16:50) (107/62 - 127/64)  RR: 18 (12-14-23 @ 16:50) (16 - 18)  SpO2: 98% (12-14-23 @ 16:50) (98% - 100%)  Wt(kg): --  I&O's Summary    13 Dec 2023 07:01  -  14 Dec 2023 07:00  --------------------------------------------------------  IN: 2338 mL / OUT: 605 mL / NET: 1733 mL    14 Dec 2023 07:01  -  14 Dec 2023 20:38  --------------------------------------------------------  IN: 860 mL / OUT: 465 mL / NET: 395 mL          Appearance: Normal	  HEENT:  PERRLA   Lymphatic: No lymphadenopathy   Cardiovascular: Normal S1 S2, no JVD  Respiratory: normal effort , clear  Gastrointestinal:  Soft, Non-tender  Skin: No rashes,  warm to touch  Psychiatry:  Mood & affect appropriate  Musculuskeletal: No edema    recent labs, Imaging and EKGs personally reviewed     12-13-23 @ 07:01  -  12-14-23 @ 07:00  --------------------------------------------------------  IN: 2338 mL / OUT: 605 mL / NET: 1733 mL    12-14-23 @ 07:01 - 12-14-23 @ 20:38  --------------------------------------------------------  IN: 860 mL / OUT: 465 mL / NET: 395 mL                          9.9    8.86  )-----------( 321      ( 14 Dec 2023 04:40 )             32.3               12-14    140  |  103  |  12  ----------------------------<  177<H>  4.2   |  26  |  0.49<L>    Ca    9.3      14 Dec 2023 04:40  Phos  2.8     12-14  Mg     1.80     12-14      PT/INR - ( 14 Dec 2023 04:40 )   PT: 11.5 sec;   INR: 1.02 ratio         PTT - ( 14 Dec 2023 04:40 )  PTT:27.9 sec                   Urinalysis Basic - ( 14 Dec 2023 04:40 )    Color: x / Appearance: x / SG: x / pH: x  Gluc: 177 mg/dL / Ketone: x  / Bili: x / Urobili: x   Blood: x / Protein: x / Nitrite: x   Leuk Esterase: x / RBC: x / WBC x   Sq Epi: x / Non Sq Epi: x / Bacteria: x

## 2023-12-15 VITALS
TEMPERATURE: 98 F | OXYGEN SATURATION: 100 % | HEART RATE: 90 BPM | SYSTOLIC BLOOD PRESSURE: 108 MMHG | RESPIRATION RATE: 18 BRPM | DIASTOLIC BLOOD PRESSURE: 54 MMHG

## 2023-12-15 LAB
ANION GAP SERPL CALC-SCNC: 10 MMOL/L — SIGNIFICANT CHANGE UP (ref 7–14)
ANION GAP SERPL CALC-SCNC: 10 MMOL/L — SIGNIFICANT CHANGE UP (ref 7–14)
BUN SERPL-MCNC: 10 MG/DL — SIGNIFICANT CHANGE UP (ref 7–23)
BUN SERPL-MCNC: 10 MG/DL — SIGNIFICANT CHANGE UP (ref 7–23)
CALCIUM SERPL-MCNC: 9.2 MG/DL — SIGNIFICANT CHANGE UP (ref 8.4–10.5)
CALCIUM SERPL-MCNC: 9.2 MG/DL — SIGNIFICANT CHANGE UP (ref 8.4–10.5)
CHLORIDE SERPL-SCNC: 101 MMOL/L — SIGNIFICANT CHANGE UP (ref 98–107)
CHLORIDE SERPL-SCNC: 101 MMOL/L — SIGNIFICANT CHANGE UP (ref 98–107)
CO2 SERPL-SCNC: 27 MMOL/L — SIGNIFICANT CHANGE UP (ref 22–31)
CO2 SERPL-SCNC: 27 MMOL/L — SIGNIFICANT CHANGE UP (ref 22–31)
CREAT SERPL-MCNC: 0.46 MG/DL — LOW (ref 0.5–1.3)
CREAT SERPL-MCNC: 0.46 MG/DL — LOW (ref 0.5–1.3)
EGFR: 102 ML/MIN/1.73M2 — SIGNIFICANT CHANGE UP
EGFR: 102 ML/MIN/1.73M2 — SIGNIFICANT CHANGE UP
GLUCOSE BLDC GLUCOMTR-MCNC: 143 MG/DL — HIGH (ref 70–99)
GLUCOSE BLDC GLUCOMTR-MCNC: 143 MG/DL — HIGH (ref 70–99)
GLUCOSE BLDC GLUCOMTR-MCNC: 152 MG/DL — HIGH (ref 70–99)
GLUCOSE BLDC GLUCOMTR-MCNC: 152 MG/DL — HIGH (ref 70–99)
GLUCOSE BLDC GLUCOMTR-MCNC: 156 MG/DL — HIGH (ref 70–99)
GLUCOSE BLDC GLUCOMTR-MCNC: 156 MG/DL — HIGH (ref 70–99)
GLUCOSE SERPL-MCNC: 144 MG/DL — HIGH (ref 70–99)
GLUCOSE SERPL-MCNC: 144 MG/DL — HIGH (ref 70–99)
HCT VFR BLD CALC: 31.1 % — LOW (ref 34.5–45)
HCT VFR BLD CALC: 31.1 % — LOW (ref 34.5–45)
HGB BLD-MCNC: 9.8 G/DL — LOW (ref 11.5–15.5)
HGB BLD-MCNC: 9.8 G/DL — LOW (ref 11.5–15.5)
MAGNESIUM SERPL-MCNC: 1.7 MG/DL — SIGNIFICANT CHANGE UP (ref 1.6–2.6)
MAGNESIUM SERPL-MCNC: 1.7 MG/DL — SIGNIFICANT CHANGE UP (ref 1.6–2.6)
MCHC RBC-ENTMCNC: 25.5 PG — LOW (ref 27–34)
MCHC RBC-ENTMCNC: 25.5 PG — LOW (ref 27–34)
MCHC RBC-ENTMCNC: 31.5 GM/DL — LOW (ref 32–36)
MCHC RBC-ENTMCNC: 31.5 GM/DL — LOW (ref 32–36)
MCV RBC AUTO: 80.8 FL — SIGNIFICANT CHANGE UP (ref 80–100)
MCV RBC AUTO: 80.8 FL — SIGNIFICANT CHANGE UP (ref 80–100)
NRBC # BLD: 0 /100 WBCS — SIGNIFICANT CHANGE UP (ref 0–0)
NRBC # BLD: 0 /100 WBCS — SIGNIFICANT CHANGE UP (ref 0–0)
NRBC # FLD: 0 K/UL — SIGNIFICANT CHANGE UP (ref 0–0)
NRBC # FLD: 0 K/UL — SIGNIFICANT CHANGE UP (ref 0–0)
PHOSPHATE SERPL-MCNC: 3 MG/DL — SIGNIFICANT CHANGE UP (ref 2.5–4.5)
PHOSPHATE SERPL-MCNC: 3 MG/DL — SIGNIFICANT CHANGE UP (ref 2.5–4.5)
PLATELET # BLD AUTO: 282 K/UL — SIGNIFICANT CHANGE UP (ref 150–400)
PLATELET # BLD AUTO: 282 K/UL — SIGNIFICANT CHANGE UP (ref 150–400)
POTASSIUM SERPL-MCNC: 3.7 MMOL/L — SIGNIFICANT CHANGE UP (ref 3.5–5.3)
POTASSIUM SERPL-MCNC: 3.7 MMOL/L — SIGNIFICANT CHANGE UP (ref 3.5–5.3)
POTASSIUM SERPL-SCNC: 3.7 MMOL/L — SIGNIFICANT CHANGE UP (ref 3.5–5.3)
POTASSIUM SERPL-SCNC: 3.7 MMOL/L — SIGNIFICANT CHANGE UP (ref 3.5–5.3)
RBC # BLD: 3.85 M/UL — SIGNIFICANT CHANGE UP (ref 3.8–5.2)
RBC # BLD: 3.85 M/UL — SIGNIFICANT CHANGE UP (ref 3.8–5.2)
RBC # FLD: 18.9 % — HIGH (ref 10.3–14.5)
RBC # FLD: 18.9 % — HIGH (ref 10.3–14.5)
SODIUM SERPL-SCNC: 138 MMOL/L — SIGNIFICANT CHANGE UP (ref 135–145)
SODIUM SERPL-SCNC: 138 MMOL/L — SIGNIFICANT CHANGE UP (ref 135–145)
WBC # BLD: 6.8 K/UL — SIGNIFICANT CHANGE UP (ref 3.8–10.5)
WBC # BLD: 6.8 K/UL — SIGNIFICANT CHANGE UP (ref 3.8–10.5)
WBC # FLD AUTO: 6.8 K/UL — SIGNIFICANT CHANGE UP (ref 3.8–10.5)
WBC # FLD AUTO: 6.8 K/UL — SIGNIFICANT CHANGE UP (ref 3.8–10.5)

## 2023-12-15 RX ORDER — POTASSIUM CHLORIDE 20 MEQ
20 PACKET (EA) ORAL ONCE
Refills: 0 | Status: COMPLETED | OUTPATIENT
Start: 2023-12-15 | End: 2023-12-15

## 2023-12-15 RX ORDER — LOSARTAN POTASSIUM 100 MG/1
1 TABLET, FILM COATED ORAL
Refills: 0 | DISCHARGE

## 2023-12-15 RX ORDER — MAGNESIUM SULFATE 500 MG/ML
2 VIAL (ML) INJECTION ONCE
Refills: 0 | Status: COMPLETED | OUTPATIENT
Start: 2023-12-15 | End: 2023-12-15

## 2023-12-15 RX ORDER — GLUCAGON INJECTION, SOLUTION 0.5 MG/.1ML
1 INJECTION, SOLUTION SUBCUTANEOUS
Qty: 1 | Refills: 0
Start: 2023-12-15 | End: 2023-12-15

## 2023-12-15 RX ORDER — HYDROCHLOROTHIAZIDE 25 MG
1 TABLET ORAL
Refills: 0 | DISCHARGE

## 2023-12-15 RX ADMIN — CHLORHEXIDINE GLUCONATE 1 APPLICATION(S): 213 SOLUTION TOPICAL at 11:59

## 2023-12-15 RX ADMIN — ALBUTEROL 2 PUFF(S): 90 AEROSOL, METERED ORAL at 08:46

## 2023-12-15 RX ADMIN — Medication 25 GRAM(S): at 10:05

## 2023-12-15 RX ADMIN — Medication 2: at 12:05

## 2023-12-15 RX ADMIN — SODIUM CHLORIDE 3 MILLILITER(S): 9 INJECTION INTRAMUSCULAR; INTRAVENOUS; SUBCUTANEOUS at 14:55

## 2023-12-15 RX ADMIN — SODIUM CHLORIDE 3 MILLILITER(S): 9 INJECTION INTRAMUSCULAR; INTRAVENOUS; SUBCUTANEOUS at 06:08

## 2023-12-15 RX ADMIN — Medication 2: at 08:43

## 2023-12-15 RX ADMIN — BUDESONIDE AND FORMOTEROL FUMARATE DIHYDRATE 2 PUFF(S): 160; 4.5 AEROSOL RESPIRATORY (INHALATION) at 08:43

## 2023-12-15 RX ADMIN — Medication 20 MILLIEQUIVALENT(S): at 10:06

## 2023-12-15 RX ADMIN — PANTOPRAZOLE SODIUM 40 MILLIGRAM(S): 20 TABLET, DELAYED RELEASE ORAL at 06:05

## 2023-12-15 NOTE — [facility]ED PRACTICE NURSE CONSULT - RECOMMEDATIONS
Patient to be admitted post op. Endocrine to follow patient while in the hospital and [facility] will follow up as needed.
Patient to be discharged today. Patient making a follow up appointment with Dr. Santacruz.

## 2023-12-15 NOTE — PROGRESS NOTE ADULT - SUBJECTIVE AND OBJECTIVE BOX
General:  Alert and Oriented * 3.   Head: Normocephalic and atraumatic.   Neck: No JVD. No bruits. Supple. Does not appear to be enlarged.   Cardiovascular: + S1,S2 ; RRR Soft systolic murmur at the left lower sternal border. No rubs noted.    Lungs: CTA b/l. No rhonchi, rales or wheezes.   Abdomen: distended NT  Extremities: No clubbing/cyanosis/edema.   Neurologic: Moves all four extremities. Full range of motion.   Skin: Warm and moist. The patient's skin has normal elasticity and good skin turgor.   Psychiatric: Appropriate mood and affect.  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      -- tolerated procedure well from CV perspective  -- pain management  -- supplement lytes per sx   -- s/p NGT and TPN, now all removed  -- eventually resume home meds: Asa 81mg, Pravastatin 40mg when ok from a surgical perspective  -- BP meds Hctz 12.5mg, Losartan 25mg on hold, BP controlled  -- s/p placement of IR drain, now removed  -- working with PT, anticipating DC possibly today      Bella Aguilera MD  Pager: [phone]                   General:  Alert and Oriented * 3.   Head: Normocephalic and atraumatic.   Neck: No JVD. No bruits. Supple. Does not appear to be enlarged.   Cardiovascular: + S1,S2 ; RRR Soft systolic murmur at the left lower sternal border. No rubs noted.    Lungs: CTA b/l. No rhonchi, rales or wheezes.   Abdomen: distended NT  Extremities: No clubbing/cyanosis/edema.   Neurologic: Moves all four extremities. Full range of motion.   Skin: Warm and moist. The patient's skin has normal elasticity and good skin turgor.   Psychiatric: Appropriate mood and affect.  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      -- tolerated procedure well from CV perspective  -- pain management  -- supplement lytes per sx   -- s/p NGT and TPN, now all removed  -- eventually resume home meds: Asa 81mg, Pravastatin 40mg when ok from a surgical perspective  -- BP meds Hctz 12.5mg, Losartan 25mg on hold, BP controlled  -- s/p placement of IR drain, now removed  -- working with PT, anticipating DC possibly today      Bella Aguilera MD  Pager: [phone]               DATE OF SERVICE: 12-15-23    Patient denies chest pain or shortness of breath.   Review of symptoms otherwise negative.    MEDICATIONS:  acetaminophen     Tablet .. 975 milliGRAM(s) Oral every 6 hours PRN  albuterol    90 MICROgram(s) HFA Inhaler 2 Puff(s) Inhalation two times a day  budesonide 160 MICROgram(s)/formoterol 4.5 MICROgram(s) Inhaler 2 Puff(s) Inhalation two times a day  chlorhexidine 2% Cloths 1 Application(s) Topical daily  enoxaparin Injectable 40 milliGRAM(s) SubCutaneous every 24 hours  influenza  Vaccine (HIGH DOSE) 0.7 milliLiter(s) IntraMuscular once  insulin detemir injectable (LEVEMIR) 10 Unit(s) SubCutaneous at bedtime  insulin lispro (ADMELOG) corrective regimen sliding scale   SubCutaneous three times a day before meals  insulin lispro (ADMELOG) corrective regimen sliding scale   SubCutaneous at bedtime  oxyCODONE    IR 2.5 milliGRAM(s) Oral every 6 hours PRN  oxyCODONE    IR 5 milliGRAM(s) Oral every 6 hours PRN  pantoprazole    Tablet 40 milliGRAM(s) Oral before breakfast  sodium chloride 0.9% lock flush 3 milliLiter(s) IV Push every 8 hours      LABS:                        9.8    6.80  )-----------( 282      ( 15 Dec 2023 05:54 )             31.1       Hemoglobin: 9.8 g/dL (12-15 @ 05:54)  Hemoglobin: 9.9 g/dL (12-14 @ 04:40)  Hemoglobin: 9.9 g/dL (12-13 @ 06:20)  Hemoglobin: 9.9 g/dL (12-12 @ 06:07)  Hemoglobin: 10.7 g/dL (12-11 @ 10:31)      12-15    138  |  101  |  10  ----------------------------<  144<H>  3.7   |  27  |  0.46<L>    Ca    9.2      15 Dec 2023 05:54  Phos  3.0     12-15  Mg     1.70     12-15      Creatinine Trend: 0.46<--, 0.49<--, 0.43<--, 0.46<--, 0.43<--, 0.41<--    COAGS:           PHYSICAL EXAM:  T(C): 36.8 (12-15-23 @ 12:10), Max: 37.2 (12-15-23 @ 05:40)  HR: 90 (12-15-23 @ 12:10) (80 - 98)  BP: 108/54 (12-15-23 @ 12:10) (106/69 - 114/61)  RR: 18 (12-15-23 @ 12:10) (17 - 18)  SpO2: 100% (12-15-23 @ 12:10) (98% - 100%)  Wt(kg): --    I&O's Summary    14 Dec 2023 07:01  -  15 Dec 2023 07:00  --------------------------------------------------------  IN: 1010 mL / OUT: 1065 mL / NET: -55 mL    15 Dec 2023 07:01  -  15 Dec 2023 13:33  --------------------------------------------------------  IN: 550 mL / OUT: 300 mL / NET: 250 mL        General:  Alert and Oriented * 3.   Head: Normocephalic and atraumatic.   Neck: No JVD. No bruits. Supple. Does not appear to be enlarged.   Cardiovascular: + S1,S2 ; RRR Soft systolic murmur at the left lower sternal border. No rubs noted.    Lungs: CTA b/l. No rhonchi, rales or wheezes.   Abdomen: distended NT  Extremities: No clubbing/cyanosis/edema.   Neurologic: Moves all four extremities. Full range of motion.   Skin: Warm and moist. The patient's skin has normal elasticity and good skin turgor.   Psychiatric: Appropriate mood and affect.  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      -- tolerated procedure well from CV perspective  -- pain management  -- supplement lytes per sx   -- s/p NGT and TPN, now all removed  -- eventually resume home meds: Asa 81mg, Pravastatin 40mg when ok from a surgical perspective  -- BP meds Hctz 12.5mg, Losartan 25mg on hold, BP controlled  -- s/p placement of IR drain, now removed  -- working with PT, anticipating DC possibly today      Bella Aguilera MD  Pager: [phone]               DATE OF SERVICE: 12-15-23    Patient denies chest pain or shortness of breath.   Review of symptoms otherwise negative.    MEDICATIONS:  acetaminophen     Tablet .. 975 milliGRAM(s) Oral every 6 hours PRN  albuterol    90 MICROgram(s) HFA Inhaler 2 Puff(s) Inhalation two times a day  budesonide 160 MICROgram(s)/formoterol 4.5 MICROgram(s) Inhaler 2 Puff(s) Inhalation two times a day  chlorhexidine 2% Cloths 1 Application(s) Topical daily  enoxaparin Injectable 40 milliGRAM(s) SubCutaneous every 24 hours  influenza  Vaccine (HIGH DOSE) 0.7 milliLiter(s) IntraMuscular once  insulin detemir injectable (LEVEMIR) 10 Unit(s) SubCutaneous at bedtime  insulin lispro (ADMELOG) corrective regimen sliding scale   SubCutaneous three times a day before meals  insulin lispro (ADMELOG) corrective regimen sliding scale   SubCutaneous at bedtime  oxyCODONE    IR 2.5 milliGRAM(s) Oral every 6 hours PRN  oxyCODONE    IR 5 milliGRAM(s) Oral every 6 hours PRN  pantoprazole    Tablet 40 milliGRAM(s) Oral before breakfast  sodium chloride 0.9% lock flush 3 milliLiter(s) IV Push every 8 hours      LABS:                        9.8    6.80  )-----------( 282      ( 15 Dec 2023 05:54 )             31.1       Hemoglobin: 9.8 g/dL (12-15 @ 05:54)  Hemoglobin: 9.9 g/dL (12-14 @ 04:40)  Hemoglobin: 9.9 g/dL (12-13 @ 06:20)  Hemoglobin: 9.9 g/dL (12-12 @ 06:07)  Hemoglobin: 10.7 g/dL (12-11 @ 10:31)      12-15    138  |  101  |  10  ----------------------------<  144<H>  3.7   |  27  |  0.46<L>    Ca    9.2      15 Dec 2023 05:54  Phos  3.0     12-15  Mg     1.70     12-15      Creatinine Trend: 0.46<--, 0.49<--, 0.43<--, 0.46<--, 0.43<--, 0.41<--    COAGS:           PHYSICAL EXAM:  T(C): 36.8 (12-15-23 @ 12:10), Max: 37.2 (12-15-23 @ 05:40)  HR: 90 (12-15-23 @ 12:10) (80 - 98)  BP: 108/54 (12-15-23 @ 12:10) (106/69 - 114/61)  RR: 18 (12-15-23 @ 12:10) (17 - 18)  SpO2: 100% (12-15-23 @ 12:10) (98% - 100%)  Wt(kg): --    I&O's Summary    14 Dec 2023 07:01  -  15 Dec 2023 07:00  --------------------------------------------------------  IN: 1010 mL / OUT: 1065 mL / NET: -55 mL    15 Dec 2023 07:01  -  15 Dec 2023 13:33  --------------------------------------------------------  IN: 550 mL / OUT: 300 mL / NET: 250 mL        General:  Alert and Oriented * 3.   Head: Normocephalic and atraumatic.   Neck: No JVD. No bruits. Supple. Does not appear to be enlarged.   Cardiovascular: + S1,S2 ; RRR Soft systolic murmur at the left lower sternal border. No rubs noted.    Lungs: CTA b/l. No rhonchi, rales or wheezes.   Abdomen: distended NT  Extremities: No clubbing/cyanosis/edema.   Neurologic: Moves all four extremities. Full range of motion.   Skin: Warm and moist. The patient's skin has normal elasticity and good skin turgor.   Psychiatric: Appropriate mood and affect.  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      -- tolerated procedure well from CV perspective  -- pain management  -- supplement lytes per sx   -- s/p NGT and TPN, now all removed  -- eventually resume home meds: Asa 81mg, Pravastatin 40mg when ok from a surgical perspective  -- BP meds Hctz 12.5mg, Losartan 25mg on hold, BP controlled  -- s/p placement of IR drain, now removed  -- working with PT, anticipating DC possibly today      Bella Aguilera MD  Pager: [phone]

## 2023-12-15 NOTE — PROGRESS NOTE ADULT - PROBLEM SELECTOR PROBLEM 3
Hyperlipidemia

## 2023-12-15 NOTE — PROGRESS NOTE ADULT - SUBJECTIVE AND OBJECTIVE BOX
SURGERY DAILY PROGRESS NOTE    SUBJECTIVE: Patient seen and evaluated on AM rounds. Pt is resting comfortably in bed. Passing gas and having bowel movements. Tolerating diet. Pain is adequately controlled on current regimen. Denies fevers/chills, chest pain, dyspnea,.    Overnight Events:  No acute events overnight  -----------------------------------------------------------------------------------------------------------------------------------------------------------------------------------------------------------  OBJECTIVE:  Vital Signs Last 24 Hrs  T(C): 36.8 (15 Dec 2023 08:59), Max: 37.2 (15 Dec 2023 05:40)  T(F): 98.3 (15 Dec 2023 08:59), Max: 98.9 (15 Dec 2023 05:40)  HR: 80 (15 Dec 2023 08:59) (80 - 98)  BP: 106/69 (15 Dec 2023 08:59) (106/69 - 115/64)  BP(mean): --  RR: 18 (15 Dec 2023 08:59) (17 - 18)  SpO2: 100% (15 Dec 2023 08:59) (98% - 100%)    Parameters below as of 15 Dec 2023 08:59  Patient On (Oxygen Delivery Method): room air      I&O's Detail    14 Dec 2023 07:01  -  15 Dec 2023 07:00  --------------------------------------------------------  IN:    Lactated Ringers: 500 mL    Oral Fluid: 510 mL  Total IN: 1010 mL    OUT:    Drain (mL): 15 mL    Voided (mL): 1050 mL  Total OUT: 1065 mL    Total NET: -55 mL      15 Dec 2023 07:01  -  15 Dec 2023 09:16  --------------------------------------------------------  IN:    Oral Fluid: 50 mL  Total IN: 50 mL    OUT:    Emesis (mL): 0 mL    Voided (mL): 150 mL  Total OUT: 150 mL    Total NET: -100 mL        Daily     Daily   MEDICATIONS  (STANDING):  albuterol    90 MICROgram(s) HFA Inhaler 2 Puff(s) Inhalation two times a day  budesonide 160 MICROgram(s)/formoterol 4.5 MICROgram(s) Inhaler 2 Puff(s) Inhalation two times a day  chlorhexidine 2% Cloths 1 Application(s) Topical daily  enoxaparin Injectable 40 milliGRAM(s) SubCutaneous every 24 hours  influenza  Vaccine (HIGH DOSE) 0.7 milliLiter(s) IntraMuscular once  insulin detemir injectable (LEVEMIR) 10 Unit(s) SubCutaneous at bedtime  insulin lispro (ADMELOG) corrective regimen sliding scale   SubCutaneous three times a day before meals  insulin lispro (ADMELOG) corrective regimen sliding scale   SubCutaneous at bedtime  magnesium sulfate  IVPB 2 Gram(s) IV Intermittent once  pantoprazole    Tablet 40 milliGRAM(s) Oral before breakfast  potassium chloride    Tablet ER 20 milliEquivalent(s) Oral once  sodium chloride 0.9% lock flush 3 milliLiter(s) IV Push every 8 hours    MEDICATIONS  (PRN):  acetaminophen     Tablet .. 975 milliGRAM(s) Oral every 6 hours PRN Mild Pain (1 - 3)  oxyCODONE    IR 5 milliGRAM(s) Oral every 6 hours PRN Severe Pain (7 - 10)  oxyCODONE    IR 2.5 milliGRAM(s) Oral every 6 hours PRN Moderate Pain (4 - 6)      LABS:                        9.8    6.80  )-----------( 282      ( 15 Dec 2023 05:54 )             31.1     12-15    138  |  101  |  10  ----------------------------<  144<H>  3.7   |  27  |  0.46<L>    Ca    9.2      15 Dec 2023 05:54  Phos  3.0     12-15  Mg     1.70     12-15      PT/INR - ( 14 Dec 2023 04:40 )   PT: 11.5 sec;   INR: 1.02 ratio         PTT - ( 14 Dec 2023 04:40 )  PTT:27.9 sec  Urinalysis Basic - ( 15 Dec 2023 05:54 )    Color: x / Appearance: x / SG: x / pH: x  Gluc: 144 mg/dL / Ketone: x  / Bili: x / Urobili: x   Blood: x / Protein: x / Nitrite: x   Leuk Esterase: x / RBC: x / WBC x   Sq Epi: x / Non Sq Epi: x / Bacteria: x      EXAM    General: NAD, resting in bed comfortably.  Cardiac: regular rate, warm and well perfused  Respiratory: Nonlabored respirations, normal cw expansion.  Abdomen: soft, nontender, nondistended, port sites c/d/i,  Extremities: normal strength, FROM, no deformities

## 2023-12-15 NOTE — PROGRESS NOTE ADULT - ASSESSMENT
This is a 74 yo F /w a PMH of DM2 on an insulin pump and colorectal cancer s/p right hemicolectomy today. Endocrinology consulted for diabetes management and DM2. Based on current insulin pump settings, suspect patient is being over-basalized given high basal rates compared to the insulin:carb ratio    Home Regimen:  Patient uses the medtronic insulin pump (Novolog) and Freestyle Libre2  Pump settings are as follows:  TDD 52.425-->9.6   12AM 1.8 units per hour--->0.4  6:30AM 2 units per hour-->0.4  12:30PM 2.45 units per hour-->0.4  6:30PM 2.55 units per hour--->0.4  8PM 2.55 units per hour--->0.4    ICR:  12AM 1:15  7AM 1:12--->1:15  12:30PM 1:15    ISF 1:30  Glucose Target: 12a- 5a 140-140  5a-10p 110  10p-12am 140  IOB: 4 hours     #DM2 A1c 6.8%  #insulin pump at home. Off pump while in the hospital  - Glucose Target 100-180: above goal but overall improving towards goal. TPN dc'd on 12/13  - Last levemir dose given last night at bedtime, contiue lantus 10 units qhs if insulin pump is not restarted  - Continue moderate Admelog correction scale TID AC and continue moderate Admelog correction scale at bedtime  - Check FS before meals and at bedtime; please check 3am FS   - Hypoglycemia Protocol     #Discharge  -resume medtronic insulin pump (Novolog) at 8pm  on day of discharge (which would be 22 hours from last basal insulin dose given in the hospital); Discussed this plan with patient   adjustments made to pump settings   Pump settings are as follows: Adjustment to insulin pump settings made with DM educator   TDD 9.6   12AM -12AM 0.4  ICR:12AM-12AM 1:15  ISF 1:30  Glucose Target: 12a- 5a 140-140  5a-10p 110  10p-12am 140  IOB: 4 hours   -Pt declined prescription for back up insulin pens states she prefers to discuss this with private endocrinologist Dr. Anand  -Continue glucose monitoring with Free style bonnie pt states she has sensors at home  -Pt seen with Advanced Practice Nurse Consult Diabetes Specialist   -Will review importance of glucose monitoring, medication adherence, following a consistent carb diet  -Please call your doctor if your fs is 70 or below and 250 and above   -Reviewed Hypoglycemia and Intervention   -Ensure patient has working glucometer, test strips, lancets, alcohol pads, and BD sofia pen needles  -Please also prescribe glucose tabs, Baqsimi nasal spray or glucagon emergency kit for hypoglycemia risk   -Please follow up with your pcp, podiatry, opthalmology, and endocrinology as an outpt   -Please follow up with Dr. Anand: advised to make an appointment prior to discharge   -Confirmed pt has all insulin and pump supplies at home     #HTN  -BP target <130/80  -Consider resuming losartan 25mg daily and hctz 12.5mg daily- management as per primary team  -outpt mc/cr ratio     #HLD  -On Pravastatin 40 mg QHS at home   -Resume once able to tolerate orals if no contraindications   -LDL goal less than 70   -Management as per primary team     D/w Surgery team [id number]     Brittni Romano  Nurse Practitioner  Division of Endocrinology & Diabetes  Available via [facility] Teams      If after 6PM or before 9AM, or on weekends/holidays, please call endocrine answering service for assistance ([phone]).  For nonurgent matters email [email or web address] for assistance.   This is a 74 yo F /w a PMH of DM2 on an insulin pump and colorectal cancer s/p right hemicolectomy today. Endocrinology consulted for diabetes management and DM2. Based on current insulin pump settings, suspect patient is being over-basalized given high basal rates compared to the insulin:carb ratio    Home Regimen:  Patient uses the medtronic insulin pump (Novolog) and Freestyle Libre2  Pump settings are as follows:  TDD 52.425-->9.6   12AM 1.8 units per hour--->0.4  6:30AM 2 units per hour-->0.4  12:30PM 2.45 units per hour-->0.4  6:30PM 2.55 units per hour--->0.4  8PM 2.55 units per hour--->0.4    ICR:  12AM 1:15  7AM 1:12--->1:15  12:30PM 1:15    ISF 1:30  Glucose Target: 12a- 5a 140-140  5a-10p 110  10p-12am 140  IOB: 4 hours     #DM2 A1c 6.8%  #insulin pump at home. Off pump while in the hospital  - Glucose Target 100-180: above goal but overall improving towards goal. TPN dc'd on 12/13  - Last levemir dose given last night at bedtime, contiue lantus 10 units qhs if insulin pump is not restarted  - Continue moderate Admelog correction scale TID AC and continue moderate Admelog correction scale at bedtime  - Check FS before meals and at bedtime; please check 3am FS   - Hypoglycemia Protocol     #Discharge  -resume medtronic insulin pump (Novolog) at 8pm  on day of discharge (which would be 22 hours from last basal insulin dose given in the hospital); Discussed this plan with patient   adjustments made to pump settings   Pump settings are as follows: Adjustment to insulin pump settings made with DM educator   TDD 9.6   12AM -12AM 0.4  ICR:12AM-12AM 1:15  ISF 1:30  Glucose Target: 12a- 5a 140-140  5a-10p 110  10p-12am 140  IOB: 4 hours   -Pt declined prescription for back up insulin pens states she prefers to discuss this with private endocrinologist Dr. Anand  -Continue glucose monitoring with Free style bonnie pt states she has sensors at home  -Pt seen with Advanced Practice Nurse Consult Diabetes Specialist   -Will review importance of glucose monitoring, medication adherence, following a consistent carb diet  -Please call your doctor if your fs is 70 or below and 250 and above   -Reviewed Hypoglycemia and Intervention   -Ensure patient has working glucometer, test strips, lancets, alcohol pads, and BD sofia pen needles  -Please also prescribe glucose tabs, Baqsimi nasal spray or glucagon emergency kit for hypoglycemia risk   -Please follow up with your pcp, podiatry, opthalmology, and endocrinology as an outpt   -Please follow up with Dr. Anand: advised to make an appointment prior to discharge   -Confirmed pt has all insulin and pump supplies at home     #HTN  -BP target <130/80  -Consider resuming losartan 25mg daily and hctz 12.5mg daily- management as per primary team  -outpt mc/cr ratio     #HLD  -On Pravastatin 40 mg QHS at home   -Resume once able to tolerate orals if no contraindications   -LDL goal less than 70   -Management as per primary team     D/w Surgery team [id number]     Brittni Romano  Nurse Practitioner  Division of Endocrinology & Diabetes  Available via [facility] Teams      If after 6PM or before 9AM, or on weekends/holidays, please call endocrine answering service for assistance ([phone]).  For nonurgent matters email [email or web address] for assistance.

## 2023-12-15 NOTE — ADVANCED PRACTICE NURSE CONSULT - REASON FOR CONSULT
This is a 74 yo F /w a PMH of DM2 on an insulin pump and colorectal cancer s/p right hemicolectomy today. Endocrinology consulted for diabetes management and DM2. Based on current insulin pump settings, suspect patient is being over-basalized given high basal rates compared to the insulin:carb ratio. Certified diabetes care and  consulted as pt preparing for discharge and will be discharged back on her insulin pump. 
73 yr old female underwent screening colonoscopy 10/24/2023 demonstrated a 3 cm non-obstructing, partially circumferential mass in the cecum and a 5 mm polyp in the ascending colon, Pathology: cecal mass showed moderately differentiated adenocarcinoma, with extensive high grade dysplasia. Scheduled for robotic partial colectomy, possible open. Diabetes Care and  ([facility]) consulted because patient uses an insulin pump at home.

## 2023-12-15 NOTE — PROGRESS NOTE ADULT - SUBJECTIVE AND OBJECTIVE BOX
Chief Complaint: Type 2 DM     History: Pt seen at bedside.  Pt tolerating oral diet. Pt denies nausea and vomiting/any signs of hypoglycemia. Pt reports a fair appetite. likely dc home today    MEDICATIONS  (STANDING):  albuterol    90 MICROgram(s) HFA Inhaler 2 Puff(s) Inhalation two times a day  budesonide 160 MICROgram(s)/formoterol 4.5 MICROgram(s) Inhaler 2 Puff(s) Inhalation two times a day  chlorhexidine 2% Cloths 1 Application(s) Topical daily  enoxaparin Injectable 40 milliGRAM(s) SubCutaneous every 24 hours  influenza  Vaccine (HIGH DOSE) 0.7 milliLiter(s) IntraMuscular once  insulin detemir injectable (LEVEMIR) 5 Unit(s) SubCutaneous at bedtime  insulin lispro (ADMELOG) corrective regimen sliding scale   SubCutaneous three times a day before meals  insulin lispro (ADMELOG) corrective regimen sliding scale   SubCutaneous at bedtime  pantoprazole    Tablet 40 milliGRAM(s) Oral before breakfast  sodium chloride 0.9% lock flush 3 milliLiter(s) IV Push every 8 hours    MEDICATIONS  (PRN):  acetaminophen     Tablet .. 975 milliGRAM(s) Oral every 6 hours PRN Mild Pain (1 - 3)  oxyCODONE    IR 2.5 milliGRAM(s) Oral every 6 hours PRN Moderate Pain (4 - 6)  oxyCODONE    IR 5 milliGRAM(s) Oral every 6 hours PRN Severe Pain (7 - 10)      Allergies: Darvocet A500 (Other; Urticaria)  Percocet 10/325 (Other; Urticaria)  codeine (Other)  Lantus (Swelling)  PURELL.  pt said, &quot;I felt my airway closing&quot; after she smelled the Purell. The incident occured at the pulmonologist office. (Other; Short breath)    Review of Systems:  Respiratory: No SOB, no cough  GI: No nausea, vomiting, abdominal pain  Endocrine: no polyuria, polydipsia      Vital Signs Last 24 Hrs  T(C): 36.8 (15 Dec 2023 12:10), Max: 37.2 (15 Dec 2023 05:40)  T(F): 98.2 (15 Dec 2023 12:10), Max: 98.9 (15 Dec 2023 05:40)  HR: 90 (15 Dec 2023 12:10) (80 - 98)  BP: 108/54 (15 Dec 2023 12:10) (106/69 - 114/61)  BP(mean): --  RR: 18 (15 Dec 2023 12:10) (17 - 18)  SpO2: 100% (15 Dec 2023 12:10) (99% - 100%)    Parameters below as of 15 Dec 2023 12:10  Patient On (Oxygen Delivery Method): room air      GENERAL: NAD, well-groomed, well-developed  RESPIRATORY: No labored breathing  GI: Soft, nontender, non distended  PSYCH: Alert and oriented x 3, normal affect, normal mood      CAPILLARY BLOOD GLUCOSE    POCT Blood Glucose.: 156 mg/dL (15 Dec 2023 12:02)  POCT Blood Glucose.: 152 mg/dL (15 Dec 2023 07:49)  POCT Blood Glucose.: 143 mg/dL (15 Dec 2023 03:10)  POCT Blood Glucose.: 138 mg/dL (14 Dec 2023 21:54)  POCT Blood Glucose.: 190 mg/dL (14 Dec 2023 12:16)  POCT Blood Glucose.: 192 mg/dL (14 Dec 2023 08:16)  POCT Blood Glucose.: 183 mg/dL (14 Dec 2023 05:24)  POCT Blood Glucose.: 178 mg/dL (14 Dec 2023 00:51)  POCT Blood Glucose.: 152 mg/dL (13 Dec 2023 22:19)  POCT Blood Glucose.: 152 mg/dL (13 Dec 2023 17:14)    A1C with Estimated Average Glucose (11.16.23 @ 13:15)    A1C with Estimated Average Glucose Result: 6.8   Estimated Average Glucose: 148      12-14    140  |  103  |  12  ----------------------------<  177<H>  4.2   |  26  |  0.49<L>    eGFR: 100    Ca    9.3      12-14  Mg     1.80     12-14  Phos  2.8     12-14    Diet, Regular:   Consistent Carbohydrate Evening Snack (CSTCHOSN)  Fiber/Residue Restricted (LOWFIBER) (12-12-23 @ 08:33) [Active]

## 2023-12-15 NOTE — PROGRESS NOTE ADULT - ASSESSMENT
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 Course c/b ileus. CT on 12/4 showed: Interval increase in size of fluid anterior to the ileocolic anastomosis, with new peripheral enhancement, probably early abscess formation and decrease in small bowel dilatation, probably ileus. Patient s/p IR drainage on 12/7.     Plan  - Diet: LRD  - Pain control with Tylenol, Dilaudid PRN  - Protonix 40 mg daily today  - Home meds; holding HTN medications  - Appreciate endocrinology recs; plan to restart insulin pump on discharge  - OOB/ambulate/ PT/ IS while in bed  - DVT prophylaxis: Lovenox   - Dispo: Pt recommends Home PT with walker  - Discharge today pending Endo recs     D Team Surgery  [id number]    73 year old female with PMH asthma, T2DM on insulin pump, GERD, HTN, HLD, OA, obesity presents s/p robotic partial colectomy for 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 Course c/b ileus. CT on 12/4 showed: Interval increase in size of fluid anterior to the ileocolic anastomosis, with new peripheral enhancement, probably early abscess formation and decrease in small bowel dilatation, probably ileus. Patient s/p IR drainage on 12/7.     Plan  - Diet: LRD  - Pain control with Tylenol, Dilaudid PRN  - Protonix 40 mg daily today  - Home meds; holding HTN medications  - Appreciate endocrinology recs; plan to restart insulin pump on discharge  - OOB/ambulate/ PT/ IS while in bed  - DVT prophylaxis: Lovenox   - Dispo: Pt recommends Home PT with walker  - Discharge today pending Endo recs     D Team Surgery  [id number]

## 2023-12-15 NOTE — PROGRESS NOTE ADULT - PROVIDER SPECIALTY LIST ADULT
Anesthesia
Anesthesia
Cardiology
Endocrinology
Internal Medicine
Nutrition Support
Pain Medicine
Pain Medicine
Surgery
Cardiology
Endocrinology
Endocrinology
Internal Medicine
Pain Medicine
Surgery
Cardiology
Internal Medicine
Nutrition Support
Surgery
Endocrinology
Endocrinology
Intervent Radiology
Nutrition Support
Surgery
Endocrinology
Internal Medicine
Nutrition Support
Surgery
Endocrinology

## 2023-12-15 NOTE — ADVANCED PRACTICE NURSE CONSULT - ASSESSMENT
Patient seen in [facility] pre-op. Patient has type 2 diabetes on a Medtronic insulin pump with Angely CGM. Patient does not check her blood sugar with a glucometer and does not like the feeling of her fingers being pricked. She would rather prick her finger herself. Patient was instructed on why we have to monitor her blood sugar here in the hospital. That she can continue wearing her CGM. Patient also instructed to notify he nursing staff if CGM is alarming. ANGELY CGM in left arm site is intact. Angely CGM changed on 11/21 as per pt Patient states she suspended her insulin pump at 10pm last evening(history in insulin pump reviewed and it was confirmed that pt suspended her insulin pump at 22:30) data and time were not correct and were fixed for the patient. Patient disconnected her insulin pump at 1pm. Patient BG in ASU was 124mg/dl and CGM reading was 137 and staying stable. Patient reports frequent hypoglycemia at various times of the day. Patient states it happens when she misses a meal. Patient has hypoglycemia unawareness and has had BG in 40'sbefore getting any symptoms. Patient's A1C 6.8% which support patient probably having frequent hypoglycemia at home. Patient able to hear CGM alarms. Patient has bolus wizard on but does not bolus for meals. This was confirmed when looking at bolus history in insulin pump. Therefore, basal settings are covering some food.     Basal rates are ( total basal 52.425)  Mn-6am-1.8 units/hr  6:30a-12:30p- 2 units/hr  12:30p6:30p 2.45 unts/hr  6:30p-MN- 2.55 units/hr.  I:C   MN-7am-15  7a-1230p 12  12:30p-Mn- 15    ISF  30  Target  Mn-5am-140  5am-10p-110  10p-MN-140  Due to patient being off insulin pump since 10:30pm last evening, BG in good range,  frequent hypoglycemia at home( especially when pt skips meals), and NPO status, insulin pump settings should be assessed before insulin pump is resumed. Spoke with Anesthesia about these concerns and recommended that BG be monitored in OR and they can manage BG accordingly in OR if needed. Instructed that endocrine should be consulted post -op for diabetes management. Spoke the endocrine team who agreed with plan and will see pt post -op. 
Patient seen at beside. Due to patient hx of hypoglycemia, insulin pump settings were changed. Patient previously did not bolus herself for her meals, her basal rates were covering food as well. Hypoglycemia would then occur when pt did not eat. Patient instructed on s/s and proper treatment of hypoglycemia, insulin pump settings, and how to give herself a correction bolus. Patient will give a correction bolus before each meal. Patient tolerating food better today.    Patient uses the Medtronic insulin pump (Novolog) and Freestyle Libre2  Pump settings are as follows:  TDD 52.425-->9.6   12AM 1.8 units per hour--->0.4  6:30AM 2 units per hour-->0.4  12:30PM 2.45 units per hour-->0.4  6:30PM 2.55 units per hour--->0.4  8PM 2.55 units per hour--->0.4    ICR:  12AM 1:15  7AM 1:12--->1:15  12:30PM 1:15    ISF 1:30  Glucose Target: 12a- 5a 140-140  5a-10p 110  10p-12am 140  IOB: 4 hours   Patient demonstrated understanding of information taught. Case discussed with patient, primary RN, and endocrine team.

## 2023-12-18 PROBLEM — Z92.89 PERSONAL HISTORY OF OTHER MEDICAL TREATMENT: Chronic | Status: ACTIVE | Noted: 2023-11-16

## 2023-12-18 PROBLEM — C18.0 MALIGNANT NEOPLASM OF CECUM: Chronic | Status: ACTIVE | Noted: 2023-11-16

## 2023-12-18 PROBLEM — M19.90 UNSPECIFIED OSTEOARTHRITIS, UNSPECIFIED SITE: Chronic | Status: ACTIVE | Noted: 2023-11-16

## 2023-12-27 ENCOUNTER — APPOINTMENT (OUTPATIENT)
Dept: SURGICAL ONCOLOGY | Facility: CLINIC | Age: 73
End: 2023-12-27
Payer: MEDICARE

## 2023-12-27 VITALS
BODY MASS INDEX: 25.86 KG/M2 | HEIGHT: 61 IN | OXYGEN SATURATION: 99 % | WEIGHT: 137 LBS | HEART RATE: 100 BPM | SYSTOLIC BLOOD PRESSURE: 119 MMHG | DIASTOLIC BLOOD PRESSURE: 77 MMHG

## 2023-12-27 PROCEDURE — 99024 POSTOP FOLLOW-UP VISIT: CPT

## 2023-12-30 NOTE — ASSESSMENT
[FreeTextEntry1] : Diet and activity as tolerated. Pathology discused with patient. Follow up with medical oncology. Follow up in office in 2 weeks.

## 2023-12-30 NOTE — HISTORY OF PRESENT ILLNESS
[de-identified] : Ms. Morales is a 71 y/o female who recently underwent screening colonoscopy 10/24/2023 by Dr. Cheikh Piper Anne. Pertinent findings demonstrated a 3 cm non-obstructing, partially circumferential mass in the cecum and a 5 mm polyp in the ascending colon. Previous colonoscopy was 2018 with benign findings. Pathology: cecal mass showed moderately differentiated adenocarcinoma arising in a background of TVA with extensive high grade dysplasia.  The ascending colon polyp demonstrated minute fragments of fecaloid matter without colonic tissue or polyp. She was referred to medical oncology, Dr. Wilmar Childers, for evaluation. Initial staging CT chest/abdomen/pelvis 10/27/2023 showed a soft tissue mass in the ascending colon, but no evidence of distant/regional metastasis. Surgical history is significant for open cholecystectomy, open appendectomy during pregnancy. She endorses crampy abdominal pain but denies nausea/emesis and reports regular bowel movements.  12/27/2023: Patient is s/p robotic right colectomy 11/22/2023.  She had a prolonged post-operative ileus. She reports residual abdominal pain but denies nausea/emesis.  Her appetite is low, but she is having regular bowel movements. Pathology: pT3N0(0/25) moderately differentiated adenocarcinoma of cecum, margin negative, small/large vessel invasion.

## 2024-01-06 LAB
CULTURE RESULTS: SIGNIFICANT CHANGE UP
CULTURE RESULTS: SIGNIFICANT CHANGE UP
SPECIMEN SOURCE: SIGNIFICANT CHANGE UP
SPECIMEN SOURCE: SIGNIFICANT CHANGE UP

## 2024-01-11 ENCOUNTER — APPOINTMENT (OUTPATIENT)
Dept: SURGICAL ONCOLOGY | Facility: CLINIC | Age: 74
End: 2024-01-11
Payer: MEDICARE

## 2024-01-11 VITALS
SYSTOLIC BLOOD PRESSURE: 120 MMHG | DIASTOLIC BLOOD PRESSURE: 80 MMHG | HEIGHT: 61 IN | HEART RATE: 103 BPM | BODY MASS INDEX: 25.86 KG/M2 | WEIGHT: 137 LBS

## 2024-01-11 PROCEDURE — 99024 POSTOP FOLLOW-UP VISIT: CPT

## 2024-01-16 NOTE — ASSESSMENT
[FreeTextEntry1] : Diet as tolerated. Will obtain re-staging CT chest/abdomen/pelvis. Follow up in 3 months.

## 2024-01-16 NOTE — HISTORY OF PRESENT ILLNESS
[de-identified] : Ms. Morales is a 73 y/o female who recently underwent screening colonoscopy 10/24/2023 by Dr. Cheikh Piper Anne. Pertinent findings demonstrated a 3 cm non-obstructing, partially circumferential mass in the cecum and a 5 mm polyp in the ascending colon. Previous colonoscopy was 2018 with benign findings. Pathology: cecal mass showed moderately differentiated adenocarcinoma arising in a background of TVA with extensive high grade dysplasia.  The ascending colon polyp demonstrated minute fragments of fecaloid matter without colonic tissue or polyp. She was referred to medical oncology, Dr. Wilmar Childers, for evaluation. Initial staging CT chest/abdomen/pelvis 10/27/2023 showed a soft tissue mass in the ascending colon, but no evidence of distant/regional metastasis. Surgical history is significant for open cholecystectomy, open appendectomy during pregnancy. She endorses crampy abdominal pain but denies nausea/emesis and reports regular bowel movements.  12/27/2023: Patient is s/p robotic right colectomy 11/22/2023.  She had a prolonged post-operative ileus. She reports residual abdominal pain but denies nausea/emesis.  Her appetite is low, but she is having regular bowel movements. Pathology: pT3N0(0/25) moderately differentiated adenocarcinoma of cecum, margin negative, small/large vessel invasion.  01/11/2024: Slowly improving with slightly increased oral intake and less pain.  She denies nausea/emesis.  She has seen Dr. Childers in follow up who has recommended adjuvant Xeloda for high risk stage 2 colon cancer.

## 2024-01-18 ENCOUNTER — RESULT REVIEW (OUTPATIENT)
Age: 74
End: 2024-01-18

## 2024-01-20 ENCOUNTER — APPOINTMENT (OUTPATIENT)
Dept: CT IMAGING | Facility: CLINIC | Age: 74
End: 2024-01-20
Payer: MEDICARE

## 2024-01-20 PROCEDURE — 71260 CT THORAX DX C+: CPT

## 2024-01-20 PROCEDURE — 74177 CT ABD & PELVIS W/CONTRAST: CPT

## 2024-04-11 ENCOUNTER — APPOINTMENT (OUTPATIENT)
Dept: SURGICAL ONCOLOGY | Facility: CLINIC | Age: 74
End: 2024-04-11
Payer: MEDICARE

## 2024-04-11 VITALS
RESPIRATION RATE: 17 BRPM | DIASTOLIC BLOOD PRESSURE: 66 MMHG | HEART RATE: 97 BPM | OXYGEN SATURATION: 99 % | WEIGHT: 156.5 LBS | SYSTOLIC BLOOD PRESSURE: 109 MMHG | HEIGHT: 61 IN | BODY MASS INDEX: 29.55 KG/M2

## 2024-04-11 DIAGNOSIS — C18.0 MALIGNANT NEOPLASM OF CECUM: ICD-10-CM

## 2024-04-11 PROCEDURE — 99213 OFFICE O/P EST LOW 20 MIN: CPT

## 2024-04-13 PROBLEM — C18.0 PRIMARY CANCER OF CECUM: Status: ACTIVE | Noted: 2023-11-02

## 2024-04-13 RX ORDER — KETOROLAC TROMETHAMINE 5 MG/ML
0.5 SOLUTION OPHTHALMIC
Qty: 10 | Refills: 0 | Status: ACTIVE | COMMUNITY
Start: 2024-03-13

## 2024-04-13 RX ORDER — GABAPENTIN 100 MG/1
100 CAPSULE ORAL
Qty: 90 | Refills: 0 | Status: ACTIVE | COMMUNITY
Start: 2024-03-08

## 2024-04-13 RX ORDER — LANCETS 30 GAUGE
EACH MISCELLANEOUS
Qty: 200 | Refills: 0 | Status: ACTIVE | COMMUNITY
Start: 2024-03-13

## 2024-04-13 RX ORDER — FUROSEMIDE 20 MG/1
20 TABLET ORAL
Qty: 30 | Refills: 0 | Status: ACTIVE | COMMUNITY
Start: 2024-03-20

## 2024-04-13 RX ORDER — APIXABAN 5 MG (74)
5 KIT ORAL
Qty: 74 | Refills: 0 | Status: ACTIVE | COMMUNITY
Start: 2024-01-20

## 2024-04-13 RX ORDER — LOSARTAN POTASSIUM 25 MG/1
25 TABLET, FILM COATED ORAL
Qty: 90 | Refills: 0 | Status: ACTIVE | COMMUNITY
Start: 2024-01-20

## 2024-04-13 RX ORDER — MIRTAZAPINE 15 MG/1
15 TABLET, FILM COATED ORAL
Qty: 30 | Refills: 0 | Status: ACTIVE | COMMUNITY
Start: 2024-04-08

## 2024-04-13 RX ORDER — NAPROXEN 500 MG/1
500 TABLET ORAL
Qty: 60 | Refills: 0 | Status: ACTIVE | COMMUNITY
Start: 2024-01-10

## 2024-04-13 RX ORDER — OMEPRAZOLE 20 MG/1
20 CAPSULE, DELAYED RELEASE ORAL
Qty: 90 | Refills: 0 | Status: ACTIVE | COMMUNITY
Start: 2024-02-29

## 2024-04-13 RX ORDER — OXYCODONE 5 MG/1
5 TABLET ORAL
Qty: 45 | Refills: 0 | Status: ACTIVE | COMMUNITY
Start: 2024-01-30

## 2024-04-13 RX ORDER — CAPECITABINE 500 MG/1
500 TABLET ORAL
Qty: 112 | Refills: 0 | Status: ACTIVE | COMMUNITY
Start: 2024-01-10

## 2024-04-13 RX ORDER — APIXABAN 5 MG/1
5 TABLET, FILM COATED ORAL
Qty: 60 | Refills: 0 | Status: ACTIVE | COMMUNITY
Start: 2024-03-31

## 2024-04-13 RX ORDER — BLOOD-GLUCOSE METER
W/DEVICE EACH MISCELLANEOUS
Qty: 1 | Refills: 0 | Status: ACTIVE | COMMUNITY
Start: 2024-03-13

## 2024-04-13 RX ORDER — FOSNETUPITANT AND PALONOSETRON 260; .28 MG/20ML; MG/20ML
235-0.25 INJECTION INTRAVENOUS
Qty: 40 | Refills: 0 | Status: ACTIVE | COMMUNITY
Start: 2024-01-17

## 2024-04-13 RX ORDER — PROCHLORPERAZINE MALEATE 10 MG/1
10 TABLET ORAL
Qty: 60 | Refills: 0 | Status: ACTIVE | COMMUNITY
Start: 2024-01-30

## 2024-04-13 RX ORDER — BLOOD-GLUCOSE CONTROL, HIGH
HIGH EACH MISCELLANEOUS
Qty: 1 | Refills: 0 | Status: ACTIVE | COMMUNITY
Start: 2024-03-13

## 2024-04-13 RX ORDER — BLOOD SUGAR DIAGNOSTIC
STRIP MISCELLANEOUS
Qty: 200 | Refills: 0 | Status: ACTIVE | COMMUNITY
Start: 2024-03-13

## 2024-04-13 NOTE — PHYSICAL EXAM
[FreeTextEntry1] : Abdomen: soft, nontender/nondistended.  Incisions healed well. [Normal] : supple, no neck mass and thyroid not enlarged [Normal Neck Lymph Nodes] : normal neck lymph nodes  [Normal Supraclavicular Lymph Nodes] : normal supraclavicular lymph nodes [Normal Groin Lymph Nodes] : normal groin lymph nodes [Normal Axillary Lymph Nodes] : normal axillary lymph nodes [Normal] : oriented to person, place and time, with appropriate affect [de-identified] : well healed abdominal incisions.

## 2024-04-13 NOTE — ASSESSMENT
[FreeTextEntry1] : Recovered well from surgery. Complete adjuvant Xeloda. Follow up in 6 months. GI follow up for colonoscopy Fall, 2024.

## 2024-04-13 NOTE — HISTORY OF PRESENT ILLNESS
[de-identified] : Ms. Morales is a 73 y/o female who recently underwent screening colonoscopy 10/24/2023 by Dr. Cheikh Piper Anne. Pertinent findings demonstrated a 3 cm non-obstructing, partially circumferential mass in the cecum and a 5 mm polyp in the ascending colon. Previous colonoscopy was 2018 with benign findings. Pathology: cecal mass showed moderately differentiated adenocarcinoma arising in a background of TVA with extensive high grade dysplasia.  The ascending colon polyp demonstrated minute fragments of fecaloid matter without colonic tissue or polyp. She was referred to medical oncology, Dr. Wilmar Childers, for evaluation. Initial staging CT chest/abdomen/pelvis 10/27/2023 showed a soft tissue mass in the ascending colon, but no evidence of distant/regional metastasis. Surgical history is significant for open cholecystectomy, open appendectomy during pregnancy. She endorses crampy abdominal pain but denies nausea/emesis and reports regular bowel movements.  12/27/2023: Patient is s/p robotic right colectomy 11/22/2023.  She had a prolonged post-operative ileus. She reports residual abdominal pain but denies nausea/emesis.  Her appetite is low, but she is having regular bowel movements. Pathology: pT3N0(0/25) moderately differentiated adenocarcinoma of cecum, margin negative, small/large vessel invasion.  01/11/2024: Slowly improving with slightly increased oral intake and less pain.  She denies nausea/emesis.  She has seen Dr. Childers in follow up who has recommended adjuvant Xeloda for high risk stage 2 colon cancer.  04/11/2024:  Patient is on adjuvant Xeloda, tolerating well.  She is eating well and denies nausea/emesis.  Her bowel movements are formed and normal.

## 2024-04-13 NOTE — REASON FOR VISIT
[Follow-Up Visit] : a follow-up visit for [Colon Cancer] : colon cancer [Family Member] : family member

## 2024-06-26 ENCOUNTER — EMERGENCY (EMERGENCY)
Facility: HOSPITAL | Age: 74
LOS: 1 days | Discharge: HOME | End: 2024-06-26
Attending: STUDENT IN AN ORGANIZED HEALTH CARE EDUCATION/TRAINING PROGRAM | Admitting: STUDENT IN AN ORGANIZED HEALTH CARE EDUCATION/TRAINING PROGRAM
Payer: MEDICARE

## 2024-06-26 VITALS
OXYGEN SATURATION: 100 % | HEART RATE: 73 BPM | SYSTOLIC BLOOD PRESSURE: 92 MMHG | TEMPERATURE: 98 F | DIASTOLIC BLOOD PRESSURE: 52 MMHG | RESPIRATION RATE: 17 BRPM

## 2024-06-26 VITALS
TEMPERATURE: 98 F | RESPIRATION RATE: 18 BRPM | SYSTOLIC BLOOD PRESSURE: 99 MMHG | HEART RATE: 93 BPM | WEIGHT: 158.07 LBS | DIASTOLIC BLOOD PRESSURE: 63 MMHG | OXYGEN SATURATION: 100 %

## 2024-06-26 DIAGNOSIS — Z96.652 PRESENCE OF LEFT ARTIFICIAL KNEE JOINT: Chronic | ICD-10-CM

## 2024-06-26 DIAGNOSIS — Z90.710 ACQUIRED ABSENCE OF BOTH CERVIX AND UTERUS: Chronic | ICD-10-CM

## 2024-06-26 DIAGNOSIS — Z98.89 OTHER SPECIFIED POSTPROCEDURAL STATES: Chronic | ICD-10-CM

## 2024-06-26 DIAGNOSIS — Z90.49 ACQUIRED ABSENCE OF OTHER SPECIFIED PARTS OF DIGESTIVE TRACT: Chronic | ICD-10-CM

## 2024-06-26 DIAGNOSIS — Z98.890 OTHER SPECIFIED POSTPROCEDURAL STATES: Chronic | ICD-10-CM

## 2024-06-26 DIAGNOSIS — Z96.651 PRESENCE OF RIGHT ARTIFICIAL KNEE JOINT: Chronic | ICD-10-CM

## 2024-06-26 LAB
ALBUMIN SERPL ELPH-MCNC: 3.6 G/DL — SIGNIFICANT CHANGE UP (ref 3.3–5)
ALP SERPL-CCNC: 109 U/L — SIGNIFICANT CHANGE UP (ref 40–120)
ALT FLD-CCNC: 18 U/L — SIGNIFICANT CHANGE UP (ref 4–33)
ANION GAP SERPL CALC-SCNC: 13 MMOL/L — SIGNIFICANT CHANGE UP (ref 7–14)
AST SERPL-CCNC: 21 U/L — SIGNIFICANT CHANGE UP (ref 4–32)
BASOPHILS # BLD AUTO: 0.02 K/UL — SIGNIFICANT CHANGE UP (ref 0–0.2)
BASOPHILS NFR BLD AUTO: 0.2 % — SIGNIFICANT CHANGE UP (ref 0–2)
BILIRUB SERPL-MCNC: 0.8 MG/DL — SIGNIFICANT CHANGE UP (ref 0.2–1.2)
BUN SERPL-MCNC: 10 MG/DL — SIGNIFICANT CHANGE UP (ref 7–23)
CALCIUM SERPL-MCNC: 8.7 MG/DL — SIGNIFICANT CHANGE UP (ref 8.4–10.5)
CHLORIDE SERPL-SCNC: 97 MMOL/L — LOW (ref 98–107)
CO2 SERPL-SCNC: 28 MMOL/L — SIGNIFICANT CHANGE UP (ref 22–31)
CREAT SERPL-MCNC: 0.55 MG/DL — SIGNIFICANT CHANGE UP (ref 0.5–1.3)
EGFR: 97 ML/MIN/1.73M2 — SIGNIFICANT CHANGE UP
EGFR: 97 ML/MIN/1.73M2 — SIGNIFICANT CHANGE UP
EOSINOPHIL # BLD AUTO: 0.02 K/UL — SIGNIFICANT CHANGE UP (ref 0–0.5)
EOSINOPHIL NFR BLD AUTO: 0.2 % — SIGNIFICANT CHANGE UP (ref 0–6)
GLUCOSE SERPL-MCNC: 289 MG/DL — HIGH (ref 70–99)
HCT VFR BLD CALC: 34 % — LOW (ref 34.5–45)
HGB BLD-MCNC: 11.4 G/DL — LOW (ref 11.5–15.5)
IANC: 5.83 K/UL — SIGNIFICANT CHANGE UP (ref 1.8–7.4)
IMM GRANULOCYTES NFR BLD AUTO: 0.4 % — SIGNIFICANT CHANGE UP (ref 0–0.9)
LYMPHOCYTES # BLD AUTO: 1.32 K/UL — SIGNIFICANT CHANGE UP (ref 1–3.3)
LYMPHOCYTES # BLD AUTO: 16.3 % — SIGNIFICANT CHANGE UP (ref 13–44)
MAGNESIUM SERPL-MCNC: 2 MG/DL — SIGNIFICANT CHANGE UP (ref 1.6–2.6)
MCHC RBC-ENTMCNC: 27.9 PG — SIGNIFICANT CHANGE UP (ref 27–34)
MCHC RBC-ENTMCNC: 33.5 GM/DL — SIGNIFICANT CHANGE UP (ref 32–36)
MCV RBC AUTO: 83.3 FL — SIGNIFICANT CHANGE UP (ref 80–100)
MONOCYTES # BLD AUTO: 0.86 K/UL — SIGNIFICANT CHANGE UP (ref 0–0.9)
MONOCYTES NFR BLD AUTO: 10.6 % — SIGNIFICANT CHANGE UP (ref 2–14)
NEUTROPHILS # BLD AUTO: 5.83 K/UL — SIGNIFICANT CHANGE UP (ref 1.8–7.4)
NEUTROPHILS NFR BLD AUTO: 72.3 % — SIGNIFICANT CHANGE UP (ref 43–77)
NRBC # BLD AUTO: 0 K/UL — SIGNIFICANT CHANGE UP (ref 0–0)
NRBC # BLD: 0 /100 WBCS — SIGNIFICANT CHANGE UP (ref 0–0)
NRBC # FLD: 0 K/UL — SIGNIFICANT CHANGE UP (ref 0–0)
NRBC BLD-RTO: 0 /100 WBCS — SIGNIFICANT CHANGE UP (ref 0–0)
PLATELET # BLD AUTO: 136 K/UL — LOW (ref 150–400)
POTASSIUM SERPL-MCNC: 3 MMOL/L — LOW (ref 3.5–5.3)
POTASSIUM SERPL-SCNC: 3 MMOL/L — LOW (ref 3.5–5.3)
PROT SERPL-MCNC: 6.5 G/DL — SIGNIFICANT CHANGE UP (ref 6–8.3)
RBC # BLD: 4.08 M/UL — SIGNIFICANT CHANGE UP (ref 3.8–5.2)
RBC # FLD: 19.9 % — HIGH (ref 10.3–14.5)
SODIUM SERPL-SCNC: 138 MMOL/L — SIGNIFICANT CHANGE UP (ref 135–145)
WBC # BLD: 8.08 K/UL — SIGNIFICANT CHANGE UP (ref 3.8–10.5)
WBC # FLD AUTO: 8.08 K/UL — SIGNIFICANT CHANGE UP (ref 3.8–10.5)

## 2024-06-26 PROCEDURE — 99284 EMERGENCY DEPT VISIT MOD MDM: CPT

## 2024-06-26 PROCEDURE — 93010 ELECTROCARDIOGRAM REPORT: CPT

## 2024-06-26 RX ADMIN — Medication 40 MILLIEQUIVALENT(S): at 19:46

## 2024-10-15 ENCOUNTER — APPOINTMENT (OUTPATIENT)
Dept: SURGICAL ONCOLOGY | Facility: CLINIC | Age: 74
End: 2024-10-15
Payer: MEDICARE

## 2024-10-15 VITALS
HEIGHT: 61 IN | WEIGHT: 156 LBS | OXYGEN SATURATION: 99 % | SYSTOLIC BLOOD PRESSURE: 120 MMHG | BODY MASS INDEX: 29.45 KG/M2 | HEART RATE: 105 BPM | DIASTOLIC BLOOD PRESSURE: 80 MMHG

## 2024-10-15 DIAGNOSIS — C18.0 MALIGNANT NEOPLASM OF CECUM: ICD-10-CM

## 2024-10-15 PROCEDURE — 99213 OFFICE O/P EST LOW 20 MIN: CPT

## 2025-01-14 ENCOUNTER — INPATIENT (INPATIENT)
Facility: HOSPITAL | Age: 75
LOS: 9 days | Discharge: NURSING, REHAB OR LONG-TERM CARE | End: 2025-01-24
Attending: INTERNAL MEDICINE | Admitting: INTERNAL MEDICINE
Payer: MEDICARE

## 2025-01-14 VITALS
SYSTOLIC BLOOD PRESSURE: 100 MMHG | RESPIRATION RATE: 18 BRPM | OXYGEN SATURATION: 98 % | WEIGHT: 175.05 LBS | TEMPERATURE: 99 F | DIASTOLIC BLOOD PRESSURE: 63 MMHG | HEART RATE: 127 BPM

## 2025-01-14 DIAGNOSIS — Z90.49 ACQUIRED ABSENCE OF OTHER SPECIFIED PARTS OF DIGESTIVE TRACT: Chronic | ICD-10-CM

## 2025-01-14 DIAGNOSIS — Z96.652 PRESENCE OF LEFT ARTIFICIAL KNEE JOINT: Chronic | ICD-10-CM

## 2025-01-14 DIAGNOSIS — Z98.890 OTHER SPECIFIED POSTPROCEDURAL STATES: Chronic | ICD-10-CM

## 2025-01-14 DIAGNOSIS — Z96.651 PRESENCE OF RIGHT ARTIFICIAL KNEE JOINT: Chronic | ICD-10-CM

## 2025-01-14 DIAGNOSIS — Z98.89 OTHER SPECIFIED POSTPROCEDURAL STATES: Chronic | ICD-10-CM

## 2025-01-14 DIAGNOSIS — Z90.710 ACQUIRED ABSENCE OF BOTH CERVIX AND UTERUS: Chronic | ICD-10-CM

## 2025-01-14 LAB
A1C WITH ESTIMATED AVERAGE GLUCOSE RESULT: 8.5 % — HIGH (ref 4–5.6)
ALBUMIN SERPL ELPH-MCNC: 4 G/DL — SIGNIFICANT CHANGE UP (ref 3.3–5)
ALP SERPL-CCNC: 130 U/L — HIGH (ref 40–120)
ALT FLD-CCNC: 18 U/L — SIGNIFICANT CHANGE UP (ref 4–33)
ANION GAP SERPL CALC-SCNC: 18 MMOL/L — HIGH (ref 7–14)
APPEARANCE UR: ABNORMAL
AST SERPL-CCNC: 40 U/L — HIGH (ref 4–32)
B-OH-BUTYR SERPL-SCNC: 0.4 MMOL/L — SIGNIFICANT CHANGE UP (ref 0–0.4)
BACTERIA # UR AUTO: ABNORMAL /HPF
BASOPHILS # BLD AUTO: 0.04 K/UL — SIGNIFICANT CHANGE UP (ref 0–0.2)
BASOPHILS NFR BLD AUTO: 0.2 % — SIGNIFICANT CHANGE UP (ref 0–2)
BILIRUB SERPL-MCNC: 0.6 MG/DL — SIGNIFICANT CHANGE UP (ref 0.2–1.2)
BILIRUB UR-MCNC: NEGATIVE — SIGNIFICANT CHANGE UP
BLOOD GAS VENOUS COMPREHENSIVE RESULT: SIGNIFICANT CHANGE UP
BUN SERPL-MCNC: 30 MG/DL — HIGH (ref 7–23)
CALCIUM SERPL-MCNC: 10.3 MG/DL — SIGNIFICANT CHANGE UP (ref 8.4–10.5)
CAST: 29 /LPF — HIGH (ref 0–4)
CHLORIDE SERPL-SCNC: 90 MMOL/L — LOW (ref 98–107)
CO2 SERPL-SCNC: 22 MMOL/L — SIGNIFICANT CHANGE UP (ref 22–31)
COLOR SPEC: YELLOW — SIGNIFICANT CHANGE UP
CREAT SERPL-MCNC: 1.13 MG/DL — SIGNIFICANT CHANGE UP (ref 0.5–1.3)
DIFF PNL FLD: ABNORMAL
EGFR: 51 ML/MIN/1.73M2 — LOW
EOSINOPHIL # BLD AUTO: 0.02 K/UL — SIGNIFICANT CHANGE UP (ref 0–0.5)
EOSINOPHIL NFR BLD AUTO: 0.1 % — SIGNIFICANT CHANGE UP (ref 0–6)
ESTIMATED AVERAGE GLUCOSE: 197 — SIGNIFICANT CHANGE UP
GLUCOSE SERPL-MCNC: 371 MG/DL — HIGH (ref 70–99)
GLUCOSE UR QL: >=1000 MG/DL
HCT VFR BLD CALC: 44.7 % — SIGNIFICANT CHANGE UP (ref 34.5–45)
HEMOLYSIS INDEX: 64 — SIGNIFICANT CHANGE UP
HGB BLD-MCNC: 14.4 G/DL — SIGNIFICANT CHANGE UP (ref 11.5–15.5)
IANC: 13.84 K/UL — HIGH (ref 1.8–7.4)
IMM GRANULOCYTES NFR BLD AUTO: 0.6 % — SIGNIFICANT CHANGE UP (ref 0–0.9)
KETONES UR-MCNC: 15 MG/DL
LEUKOCYTE ESTERASE UR-ACNC: ABNORMAL
LYMPHOCYTES # BLD AUTO: 1.2 K/UL — SIGNIFICANT CHANGE UP (ref 1–3.3)
LYMPHOCYTES # BLD AUTO: 7.3 % — LOW (ref 13–44)
MAGNESIUM SERPL-MCNC: 2 MG/DL — SIGNIFICANT CHANGE UP (ref 1.6–2.6)
MCHC RBC-ENTMCNC: 26.5 PG — LOW (ref 27–34)
MCHC RBC-ENTMCNC: 32.2 G/DL — SIGNIFICANT CHANGE UP (ref 32–36)
MCV RBC AUTO: 82.3 FL — SIGNIFICANT CHANGE UP (ref 80–100)
MONOCYTES # BLD AUTO: 1.35 K/UL — HIGH (ref 0–0.9)
MONOCYTES NFR BLD AUTO: 8.2 % — SIGNIFICANT CHANGE UP (ref 2–14)
NEUTROPHILS # BLD AUTO: 13.84 K/UL — HIGH (ref 1.8–7.4)
NEUTROPHILS NFR BLD AUTO: 83.6 % — HIGH (ref 43–77)
NITRITE UR-MCNC: NEGATIVE — SIGNIFICANT CHANGE UP
NRBC # BLD AUTO: 0 K/UL — SIGNIFICANT CHANGE UP (ref 0–0)
NRBC # BLD: 0 /100 WBCS — SIGNIFICANT CHANGE UP (ref 0–0)
NRBC # FLD: 0 K/UL — SIGNIFICANT CHANGE UP (ref 0–0)
NRBC BLD-RTO: 0 /100 WBCS — SIGNIFICANT CHANGE UP (ref 0–0)
PH UR: 5.5 — SIGNIFICANT CHANGE UP (ref 5–8)
PHOSPHATE SERPL-MCNC: 2.6 MG/DL — SIGNIFICANT CHANGE UP (ref 2.5–4.5)
PLATELET # BLD AUTO: 230 K/UL — SIGNIFICANT CHANGE UP (ref 150–400)
POTASSIUM SERPL-MCNC: 4 MMOL/L — SIGNIFICANT CHANGE UP (ref 3.5–5.3)
POTASSIUM SERPL-MCNC: 4.4 MMOL/L — SIGNIFICANT CHANGE UP (ref 3.5–5.3)
POTASSIUM SERPL-SCNC: 4 MMOL/L — SIGNIFICANT CHANGE UP (ref 3.5–5.3)
POTASSIUM SERPL-SCNC: 4.4 MMOL/L — SIGNIFICANT CHANGE UP (ref 3.5–5.3)
PROT SERPL-MCNC: 9 G/DL — HIGH (ref 6–8.3)
PROT UR-MCNC: 30 MG/DL
RBC # BLD: 5.43 M/UL — HIGH (ref 3.8–5.2)
RBC # FLD: 12.9 % — SIGNIFICANT CHANGE UP (ref 10.3–14.5)
RBC CASTS # UR COMP ASSIST: 10 /HPF — HIGH (ref 0–4)
REVIEW: SIGNIFICANT CHANGE UP
SODIUM SERPL-SCNC: 130 MMOL/L — LOW (ref 135–145)
SP GR SPEC: 1.03 — HIGH (ref 1–1.03)
SQUAMOUS # UR AUTO: 11 /HPF — HIGH (ref 0–5)
UROBILINOGEN FLD QL: 0.2 MG/DL — SIGNIFICANT CHANGE UP (ref 0.2–1)
WBC # BLD: 16.55 K/UL — HIGH (ref 3.8–10.5)
WBC # FLD AUTO: 16.55 K/UL — HIGH (ref 3.8–10.5)
WBC UR QL: 27 /HPF — HIGH (ref 0–5)

## 2025-01-14 PROCEDURE — 99285 EMERGENCY DEPT VISIT HI MDM: CPT

## 2025-01-14 PROCEDURE — 71045 X-RAY EXAM CHEST 1 VIEW: CPT | Mod: 26

## 2025-01-14 RX ORDER — SODIUM CHLORIDE 9 G/ML
1000 INJECTION, SOLUTION INTRAVENOUS ONCE
Refills: 0 | Status: COMPLETED | OUTPATIENT
Start: 2025-01-14 | End: 2025-01-14

## 2025-01-14 RX ORDER — CEFTRIAXONE 250 MG/1
1000 INJECTION, POWDER, FOR SOLUTION INTRAMUSCULAR; INTRAVENOUS ONCE
Refills: 0 | Status: COMPLETED | OUTPATIENT
Start: 2025-01-14 | End: 2025-01-14

## 2025-01-14 RX ORDER — BACTERIOSTATIC SODIUM CHLORIDE 0.9 %
1000 VIAL (ML) INJECTION ONCE
Refills: 0 | Status: COMPLETED | OUTPATIENT
Start: 2025-01-14 | End: 2025-01-14

## 2025-01-14 RX ADMIN — SODIUM CHLORIDE 1000 MILLILITER(S): 9 INJECTION, SOLUTION INTRAVENOUS at 20:30

## 2025-01-14 RX ADMIN — SODIUM CHLORIDE 1000 MILLILITER(S): 9 INJECTION, SOLUTION INTRAVENOUS at 19:05

## 2025-01-14 RX ADMIN — CEFTRIAXONE 100 MILLIGRAM(S): 250 INJECTION, POWDER, FOR SOLUTION INTRAMUSCULAR; INTRAVENOUS at 22:54

## 2025-01-14 NOTE — ED PROVIDER NOTE - CLINICAL SUMMARY MEDICAL DECISION MAKING FREE TEXT BOX
Patient presents emergency department complaining of hyperglycemia.  Patient is tachycardic on presentation.  But otherwise hemodynamically stable.  Abdomen soft nontender on exam.  Heart sounds normal.  Tachycardic.  Lungs sound clear.  Mucous membranes dry mucous membranes are dry.  Given patient presentation and history obtain lab work and imaging to evaluate for acute pathologies.  Patient hyperglycemic on presentation.  Will treat symptomatically and reassess.  Patient is on insulin pump currently. 74-year-old female history of type 1 diabetes poorly controlled on insulin pump, colorectal cancer in remission presenting to ED sent by her endocrinology office for concern for hyperglycemia.  Patient reports some generalized fatigue malaise in the last few days.  No infectious signs or symptoms acutely.  She got 8 units of Lantus in office with unchanged BGL.  She is only endorsing some mild nausea.  Reports pump is working.  She is afebrile, hemodynamically stable.  On exam she is well-appearing in no acute distress.  Mucous membranes are dry.  Abdomen soft nontender.  Lungs are clear to auscultation bilaterally.  CVs exam unremarkable.  Neuroexam nonfocal.  DDx includes is not limited to symptomatic hyperglycemia versus DKA/HHS.  Will get screening labs, VBG, serum ketones, EKG, CXR and urine to rule out precipitating infectious pathology.  1L LR pending metabolic panel and VBG. Likely needs endocrine consult, likely TBA. Patient refusing to shut insulin pump off but will rediscuss importance while taking care of her in ED / inpatient.

## 2025-01-14 NOTE — ED PROVIDER NOTE - PROGRESS NOTE DETAILS
On labs patient has a leukocytosis of 16 on room UTI.  CMP demonstrates anion gap of 18 and an hyperglycemic to 371.  VBG remarkable for pH of 7.31 and lactate of 4.8 which down trended to 3.7.  Beta hydroxybutyrate 0.4.  Picture consistent with persistent hyperglycemia likely due to UTI, without evidence of DKA.  Will admit for IV antibiotics and endocrinology consult for persistent hyperglycemia.

## 2025-01-14 NOTE — ED ADULT TRIAGE NOTE - CHIEF COMPLAINT QUOTE
Patient sent from her endocrinologist office for hyperglycemia of over 450. Type 1 DM: FS in triage: 460. Given 8 units over at office for . Has a right anterior chest wall port in place where she was getting chemo (July was last chemo). Patient sent from her endocrinologist office for hyperglycemia of over 450. Type 1 DM: FS in triage: 460. Given 8 units over at office for . Has a right anterior chest wall port in place where she was getting chemo (July was last chemo).Patient also has an insulin pump.

## 2025-01-14 NOTE — ED PROVIDER NOTE - OBJECTIVE STATEMENT
Patient is a 74-year-old female with past medical history of type 1 diabetes with insulin pump, colorectal cancer on remission who presents emergency department complaining of hyperglycemia.  Patient states that she was sent in by her endocrinologist for hyperglycemia.  Patient complaining of weakness and malaise over the last few days.  Denies any fevers, chills, chest pain, shortness of breath, palpitations.  Patient did receive 8 units in the endocrine office however there is no change in her hyperglycemia.  Patient was sent into the emergency department for further evaluation.  No recent viral syndromes.  No sick contacts. 74-year-old female history of type 1 diabetes poorly controlled on insulin pump, colorectal cancer in remission presented to the emergency, presented by endocrinology office for concerns for hyperglycemia.  Patient reports she has been experiencing generalized fatigue/malaise for the last few days.  However she is denying fevers, chills, chest pain, shortness of breath, palpitations.  She got 8 units of Lantus in the endocrinologist with unchanged BGL.  Patient denies any significant volume loss recently.  Reports her pump is still working. Denies abdominal pain but does report some mild nausea. Denies any focal weakness. Of note patient reports allergy to Lantus.

## 2025-01-14 NOTE — ED ADULT NURSE NOTE - OBJECTIVE STATEMENT
Cross covering RN: pt received rm 12, a&ox4, ambulatory. pt sent by endocrinologist for elevated blood sugar in the 400s. pt endorses polyuria, polydipsia x 3 days. hx DM (insulin pump documented), HTN, asthma, colon ca (right chest wall port, un-accessed, last chemo July).  pt denies chest pain, sob, n+v, headache, dizziness, fever, chills, tremors. RR even, unlabored. 20g IV placed to left ac, labs collected and sent. Safety maintained.

## 2025-01-14 NOTE — ED ADULT NURSE NOTE - CHIEF COMPLAINT QUOTE
Patient sent from her endocrinologist office for hyperglycemia of over 450. Type 1 DM: FS in triage: 460. Given 8 units over at office for . Has a right anterior chest wall port in place where she was getting chemo (July was last chemo).Patient also has an insulin pump.

## 2025-01-14 NOTE — ED PROVIDER NOTE - PHYSICAL EXAMINATION
see mdm GEN: Patient awake and alert. No acute distress, non-toxic.   Head: Normocephalic, atraumatic.  Neck: Nontender, full ROM.   Eyes: PERRLA b/l. EOMI, no scleral icterus, no conjunctival injection. Moist mucous membranes.  CARDIAC: RRR. Normal S1, S2. No murmur, rubs, or gallops. No peripheral edema noted.  PULM: Speaking in full sentences. CTA B/L no wheeze, rales or rhonchi. No signs of respiratory distress, no accessory muscle usage or nasal flaring.  ABD: Soft, nontender, nondistended. No rebound, no involuntary guarding.   NEURO: A&Ox3, no focal neurological deficits  SKIN: Rt anterior chest port site unremarkable. Warm, dry, no rash, no lesions, no open wounds. No urticaria. No jaundice.

## 2025-01-14 NOTE — ED PROVIDER NOTE - ATTENDING CONTRIBUTION TO CARE
74-year-old female past with a type 1 diabetes on insulin pump, remote history of colon cancer in remission since July.  Presents to the ED for endocrinologist office due to hyperglycemia to the 450s and 460s.  At endocrinologist office she was given 8 units of insulin without improvement in the blood glucose.  Patient endorses generalized fatigue but she denies any polyuria, polydipsia, chest pain, shortness of breath, vomiting, fever chills diaphoresis, headache.  She does endorse blurry vision but she states she supposed to wear glasses to read.  She denies any diplopia.    Physical exam:  Overall well-appearing female in no acute distress, appears stated age  Heart is tachycardic but regular rhythm without murmurs or gallops  Lungs are clear to auscultation without wheezes rales or rhonchi  Abdomen is soft nontender nondistended  Mentating appropriately 74-year-old female past with a type 1 diabetes on insulin pump, remote history of colon cancer in remission since July.  Presents to the ED for endocrinologist office due to hyperglycemia to the 450s and 460s.  At endocrinologist office she was given 8 units of insulin without improvement in the blood glucose.  Patient endorses generalized fatigue but she denies any polyuria, polydipsia, chest pain, shortness of breath, vomiting, fever chills diaphoresis, headache.  She does endorse blurry vision but she states she supposed to wear glasses to read.  She denies any diplopia.    Physical exam:  Overall well-appearing female in no acute distress, appears stated age  Heart is tachycardic but regular rhythm without murmurs or gallops  Lungs are clear to auscultation without wheezes rales or rhonchi  Abdomen is soft nontender nondistended  Mentating appropriately    -----    74-year-old female presents to the ED due to hyperglycemia.  Vital signs show that she is tachycardic otherwise vital signs are within acceptable range.  She is not complaining of any symptoms aside from fatigue.    EKG shows sinus tachycardia, see results section.  She has no respiratory symptoms or chest pain low concern for PE or ACS.    Blood work reviewed.  She has leukocytosis of 16.55, no anemia, hyponatremia, hyperglycemia to 460, lactate is 4.8, hyponatremia to 131, anion gap of 16, pH of 7.31, UA consistent with a UTI.    Patient is not in DKA.  Her blood glucose improved after 2 L of IV fluids.  Potassium within acceptable range.  Lactate was downtrending after IV fluids, patient is not in septic shock.    Although patient has no CVA tenderness, given her leukocytosis I do suspect pyelonephritis.  Blood culture sent.    Patient admitted to medicine for IV antibiotics and close glycemic control.    Given that her abdominal exam is benign I have a low suspicion from acute intra-abdominal etiology therefore CT not indicated at this time. 74-year-old female past with a type 1 diabetes on insulin pump, remote history of colon cancer in remission since July.  Presents to the ED for endocrinologist office due to hyperglycemia to the 450s and 460s.  At endocrinologist office she was given 8 units of insulin without improvement in the blood glucose.  Patient endorses generalized fatigue but she denies any polyuria, polydipsia, chest pain, shortness of breath, vomiting, fever chills diaphoresis, headache.  She does endorse blurry vision but she states she supposed to wear glasses to read.  She denies any diplopia.    Physical exam:  Overall well-appearing female in no acute distress, appears stated age  Heart is tachycardic but regular rhythm without murmurs or gallops  Lungs are clear to auscultation without wheezes rales or rhonchi  Abdomen is soft nontender nondistended  Mentating appropriately    -----    74-year-old female presents to the ED due to hyperglycemia.  Vital signs show that she is tachycardic otherwise vital signs are within acceptable range.  She is not complaining of any symptoms aside from fatigue.    EKG shows sinus tachycardia, see results section.  She has no respiratory symptoms or chest pain low concern for PE or ACS.    My independent chest x-ray shows no infiltrate consistent with pneumonia    Blood work reviewed.  She has leukocytosis of 16.55, no anemia, hyponatremia, hyperglycemia to 460, lactate is 4.8, hyponatremia to 131, anion gap of 16, pH of 7.31, UA consistent with a UTI.    Patient is not in DKA.  Her blood glucose improved after 2 L of IV fluids.  Potassium within acceptable range.  Lactate was downtrending after IV fluids, patient is not in septic shock.    Although patient has no CVA tenderness, given her leukocytosis I do suspect pyelonephritis.  Blood culture sent.    Patient admitted to medicine for IV antibiotics and close glycemic control.    Given that her abdominal exam is benign I have a low suspicion from acute intra-abdominal etiology therefore CT not indicated at this time.

## 2025-01-15 DIAGNOSIS — Z96.41 PRESENCE OF INSULIN PUMP (EXTERNAL) (INTERNAL): ICD-10-CM

## 2025-01-15 DIAGNOSIS — I10 ESSENTIAL (PRIMARY) HYPERTENSION: ICD-10-CM

## 2025-01-15 DIAGNOSIS — Z95.828 PRESENCE OF OTHER VASCULAR IMPLANTS AND GRAFTS: Chronic | ICD-10-CM

## 2025-01-15 DIAGNOSIS — E78.5 HYPERLIPIDEMIA, UNSPECIFIED: ICD-10-CM

## 2025-01-15 DIAGNOSIS — N39.0 URINARY TRACT INFECTION, SITE NOT SPECIFIED: ICD-10-CM

## 2025-01-15 DIAGNOSIS — R73.9 HYPERGLYCEMIA, UNSPECIFIED: ICD-10-CM

## 2025-01-15 DIAGNOSIS — E10.65 TYPE 1 DIABETES MELLITUS WITH HYPERGLYCEMIA: ICD-10-CM

## 2025-01-15 DIAGNOSIS — Z29.9 ENCOUNTER FOR PROPHYLACTIC MEASURES, UNSPECIFIED: ICD-10-CM

## 2025-01-15 DIAGNOSIS — Z85.038 PERSONAL HISTORY OF OTHER MALIGNANT NEOPLASM OF LARGE INTESTINE: ICD-10-CM

## 2025-01-15 LAB
ADD ON TEST-SPECIMEN IN LAB: SIGNIFICANT CHANGE UP
ALBUMIN SERPL ELPH-MCNC: 3.2 G/DL — LOW (ref 3.3–5)
ALP SERPL-CCNC: 102 U/L — SIGNIFICANT CHANGE UP (ref 40–120)
ALT FLD-CCNC: 13 U/L — SIGNIFICANT CHANGE UP (ref 4–33)
ANION GAP SERPL CALC-SCNC: 14 MMOL/L — SIGNIFICANT CHANGE UP (ref 7–14)
ANION GAP SERPL CALC-SCNC: 16 MMOL/L — HIGH (ref 7–14)
AST SERPL-CCNC: 24 U/L — SIGNIFICANT CHANGE UP (ref 4–32)
BILIRUB SERPL-MCNC: 0.7 MG/DL — SIGNIFICANT CHANGE UP (ref 0.2–1.2)
BUN SERPL-MCNC: 16 MG/DL — SIGNIFICANT CHANGE UP (ref 7–23)
BUN SERPL-MCNC: 23 MG/DL — SIGNIFICANT CHANGE UP (ref 7–23)
CALCIUM SERPL-MCNC: 9.6 MG/DL — SIGNIFICANT CHANGE UP (ref 8.4–10.5)
CALCIUM SERPL-MCNC: 9.7 MG/DL — SIGNIFICANT CHANGE UP (ref 8.4–10.5)
CHLORIDE SERPL-SCNC: 93 MMOL/L — LOW (ref 98–107)
CHLORIDE SERPL-SCNC: 95 MMOL/L — LOW (ref 98–107)
CO2 SERPL-SCNC: 22 MMOL/L — SIGNIFICANT CHANGE UP (ref 22–31)
CO2 SERPL-SCNC: 26 MMOL/L — SIGNIFICANT CHANGE UP (ref 22–31)
CREAT SERPL-MCNC: 0.65 MG/DL — SIGNIFICANT CHANGE UP (ref 0.5–1.3)
CREAT SERPL-MCNC: 0.75 MG/DL — SIGNIFICANT CHANGE UP (ref 0.5–1.3)
EGFR: 84 ML/MIN/1.73M2 — SIGNIFICANT CHANGE UP
EGFR: 93 ML/MIN/1.73M2 — SIGNIFICANT CHANGE UP
GLUCOSE BLDC GLUCOMTR-MCNC: 276 MG/DL — HIGH (ref 70–99)
GLUCOSE BLDC GLUCOMTR-MCNC: 285 MG/DL — HIGH (ref 70–99)
GLUCOSE BLDC GLUCOMTR-MCNC: 288 MG/DL — HIGH (ref 70–99)
GLUCOSE BLDC GLUCOMTR-MCNC: 289 MG/DL — HIGH (ref 70–99)
GLUCOSE BLDC GLUCOMTR-MCNC: 300 MG/DL — HIGH (ref 70–99)
GLUCOSE BLDC GLUCOMTR-MCNC: 364 MG/DL — HIGH (ref 70–99)
GLUCOSE SERPL-MCNC: 309 MG/DL — HIGH (ref 70–99)
GLUCOSE SERPL-MCNC: 321 MG/DL — HIGH (ref 70–99)
HCT VFR BLD CALC: 37.7 % — SIGNIFICANT CHANGE UP (ref 34.5–45)
HGB BLD-MCNC: 12.5 G/DL — SIGNIFICANT CHANGE UP (ref 11.5–15.5)
MAGNESIUM SERPL-MCNC: 1.8 MG/DL — SIGNIFICANT CHANGE UP (ref 1.6–2.6)
MAGNESIUM SERPL-MCNC: 1.8 MG/DL — SIGNIFICANT CHANGE UP (ref 1.6–2.6)
MCHC RBC-ENTMCNC: 27.2 PG — SIGNIFICANT CHANGE UP (ref 27–34)
MCHC RBC-ENTMCNC: 33.2 G/DL — SIGNIFICANT CHANGE UP (ref 32–36)
MCV RBC AUTO: 82.1 FL — SIGNIFICANT CHANGE UP (ref 80–100)
NRBC # BLD AUTO: 0 K/UL — SIGNIFICANT CHANGE UP (ref 0–0)
NRBC # BLD: 0 /100 WBCS — SIGNIFICANT CHANGE UP (ref 0–0)
NRBC # FLD: 0 K/UL — SIGNIFICANT CHANGE UP (ref 0–0)
NRBC BLD-RTO: 0 /100 WBCS — SIGNIFICANT CHANGE UP (ref 0–0)
PHOSPHATE SERPL-MCNC: 2 MG/DL — LOW (ref 2.5–4.5)
PHOSPHATE SERPL-MCNC: 2 MG/DL — LOW (ref 2.5–4.5)
PLATELET # BLD AUTO: 187 K/UL — SIGNIFICANT CHANGE UP (ref 150–400)
POTASSIUM SERPL-MCNC: 3.7 MMOL/L — SIGNIFICANT CHANGE UP (ref 3.5–5.3)
POTASSIUM SERPL-MCNC: 4.3 MMOL/L — SIGNIFICANT CHANGE UP (ref 3.5–5.3)
POTASSIUM SERPL-SCNC: 3.7 MMOL/L — SIGNIFICANT CHANGE UP (ref 3.5–5.3)
POTASSIUM SERPL-SCNC: 4.3 MMOL/L — SIGNIFICANT CHANGE UP (ref 3.5–5.3)
PROT SERPL-MCNC: 7.2 G/DL — SIGNIFICANT CHANGE UP (ref 6–8.3)
RBC # BLD: 4.59 M/UL — SIGNIFICANT CHANGE UP (ref 3.8–5.2)
RBC # FLD: 12.6 % — SIGNIFICANT CHANGE UP (ref 10.3–14.5)
SODIUM SERPL-SCNC: 131 MMOL/L — LOW (ref 135–145)
SODIUM SERPL-SCNC: 135 MMOL/L — SIGNIFICANT CHANGE UP (ref 135–145)
WBC # BLD: 13.39 K/UL — HIGH (ref 3.8–10.5)
WBC # FLD AUTO: 13.39 K/UL — HIGH (ref 3.8–10.5)

## 2025-01-15 PROCEDURE — 99222 1ST HOSP IP/OBS MODERATE 55: CPT

## 2025-01-15 RX ORDER — CEFTRIAXONE 250 MG/1
1000 INJECTION, POWDER, FOR SOLUTION INTRAMUSCULAR; INTRAVENOUS EVERY 24 HOURS
Refills: 0 | Status: DISCONTINUED | OUTPATIENT
Start: 2025-01-15 | End: 2025-01-15

## 2025-01-15 RX ORDER — SODIUM CHLORIDE 9 G/ML
1000 INJECTION, SOLUTION INTRAVENOUS
Refills: 0 | Status: COMPLETED | OUTPATIENT
Start: 2025-01-15 | End: 2025-01-15

## 2025-01-15 RX ORDER — ONDANSETRON 4 MG/1
4 TABLET, ORALLY DISINTEGRATING ORAL EVERY 8 HOURS
Refills: 0 | Status: DISCONTINUED | OUTPATIENT
Start: 2025-01-15 | End: 2025-01-24

## 2025-01-15 RX ORDER — DM/PSEUDOEPHED/ACETAMINOPHEN 10-30-250
25 CAPSULE ORAL ONCE
Refills: 0 | Status: DISCONTINUED | OUTPATIENT
Start: 2025-01-15 | End: 2025-01-24

## 2025-01-15 RX ORDER — ENOXAPARIN SODIUM 100 MG/ML
40 INJECTION SUBCUTANEOUS EVERY 24 HOURS
Refills: 0 | Status: DISCONTINUED | OUTPATIENT
Start: 2025-01-15 | End: 2025-01-24

## 2025-01-15 RX ORDER — MIRTAZAPINE 30 MG/1
30 TABLET, FILM COATED ORAL AT BEDTIME
Refills: 0 | Status: DISCONTINUED | OUTPATIENT
Start: 2025-01-15 | End: 2025-01-24

## 2025-01-15 RX ORDER — LOSARTAN POTASSIUM 100 MG
25 TABLET ORAL DAILY
Refills: 0 | Status: DISCONTINUED | OUTPATIENT
Start: 2025-01-15 | End: 2025-01-24

## 2025-01-15 RX ORDER — INSULIN DETEMIR 100 [IU]/ML
20 INJECTION, SOLUTION SUBCUTANEOUS ONCE
Refills: 0 | Status: COMPLETED | OUTPATIENT
Start: 2025-01-15 | End: 2025-01-15

## 2025-01-15 RX ORDER — INSULIN LISPRO 100/ML
VIAL (ML) SUBCUTANEOUS
Refills: 0 | Status: DISCONTINUED | OUTPATIENT
Start: 2025-01-15 | End: 2025-01-17

## 2025-01-15 RX ORDER — INSULIN LISPRO 100/ML
VIAL (ML) SUBCUTANEOUS EVERY 6 HOURS
Refills: 0 | Status: DISCONTINUED | OUTPATIENT
Start: 2025-01-15 | End: 2025-01-15

## 2025-01-15 RX ORDER — DM/PSEUDOEPHED/ACETAMINOPHEN 10-30-250
15 CAPSULE ORAL ONCE
Refills: 0 | Status: DISCONTINUED | OUTPATIENT
Start: 2025-01-15 | End: 2025-01-24

## 2025-01-15 RX ORDER — INSULIN DETEMIR 100 [IU]/ML
12 INJECTION, SOLUTION SUBCUTANEOUS AT BEDTIME
Refills: 0 | Status: DISCONTINUED | OUTPATIENT
Start: 2025-01-15 | End: 2025-01-15

## 2025-01-15 RX ORDER — GABAPENTIN 800 MG/1
0 TABLET ORAL
Refills: 0 | DISCHARGE

## 2025-01-15 RX ORDER — LOSARTAN POTASSIUM 100 MG
1 TABLET ORAL
Refills: 0 | DISCHARGE

## 2025-01-15 RX ORDER — PRAVASTATIN SODIUM 40 MG
1 TABLET ORAL
Refills: 0 | DISCHARGE

## 2025-01-15 RX ORDER — INSULIN LISPRO 100/ML
6 VIAL (ML) SUBCUTANEOUS ONCE
Refills: 0 | Status: COMPLETED | OUTPATIENT
Start: 2025-01-15 | End: 2025-01-15

## 2025-01-15 RX ORDER — GLUCAGON 3 MG/1
1 POWDER NASAL ONCE
Refills: 0 | Status: DISCONTINUED | OUTPATIENT
Start: 2025-01-15 | End: 2025-01-24

## 2025-01-15 RX ORDER — SODIUM CHLORIDE 9 G/ML
1000 INJECTION, SOLUTION INTRAVENOUS
Refills: 0 | Status: DISCONTINUED | OUTPATIENT
Start: 2025-01-15 | End: 2025-01-24

## 2025-01-15 RX ORDER — GABAPENTIN 800 MG/1
100 TABLET ORAL DAILY
Refills: 0 | Status: DISCONTINUED | OUTPATIENT
Start: 2025-01-15 | End: 2025-01-24

## 2025-01-15 RX ORDER — ACETAMINOPHEN 160 MG/5ML
650 SUSPENSION ORAL EVERY 6 HOURS
Refills: 0 | Status: DISCONTINUED | OUTPATIENT
Start: 2025-01-15 | End: 2025-01-24

## 2025-01-15 RX ORDER — ATORVASTATIN CALCIUM 80 MG/1
10 TABLET, FILM COATED ORAL AT BEDTIME
Refills: 0 | Status: DISCONTINUED | OUTPATIENT
Start: 2025-01-15 | End: 2025-01-24

## 2025-01-15 RX ORDER — PANTOPRAZOLE 20 MG/1
40 TABLET, DELAYED RELEASE ORAL
Refills: 0 | Status: DISCONTINUED | OUTPATIENT
Start: 2025-01-15 | End: 2025-01-24

## 2025-01-15 RX ORDER — ACETAMINOPHEN, DIPHENHYDRAMINE HCL, PHENYLEPHRINE HCL 325; 25; 5 MG/1; MG/1; MG/1
3 TABLET ORAL AT BEDTIME
Refills: 0 | Status: DISCONTINUED | OUTPATIENT
Start: 2025-01-15 | End: 2025-01-24

## 2025-01-15 RX ORDER — INSULIN LISPRO 100/ML
VIAL (ML) SUBCUTANEOUS AT BEDTIME
Refills: 0 | Status: DISCONTINUED | OUTPATIENT
Start: 2025-01-15 | End: 2025-01-17

## 2025-01-15 RX ORDER — INSULIN LISPRO 100/ML
6 VIAL (ML) SUBCUTANEOUS
Refills: 0 | Status: DISCONTINUED | OUTPATIENT
Start: 2025-01-15 | End: 2025-01-16

## 2025-01-15 RX ORDER — MAGNESIUM, ALUMINUM HYDROXIDE 200-225/5
30 SUSPENSION, ORAL (FINAL DOSE FORM) ORAL EVERY 4 HOURS
Refills: 0 | Status: DISCONTINUED | OUTPATIENT
Start: 2025-01-15 | End: 2025-01-24

## 2025-01-15 RX ORDER — HYDROCHLOROTHIAZIDE 50 MG
1 TABLET ORAL
Refills: 0 | DISCHARGE

## 2025-01-15 RX ORDER — INSULIN DETEMIR 100 [IU]/ML
20 INJECTION, SOLUTION SUBCUTANEOUS EVERY 24 HOURS
Refills: 0 | Status: DISCONTINUED | OUTPATIENT
Start: 2025-01-16 | End: 2025-01-16

## 2025-01-15 RX ORDER — MIRTAZAPINE 30 MG/1
1 TABLET, FILM COATED ORAL
Refills: 0 | DISCHARGE

## 2025-01-15 RX ORDER — ASPIRIN 81 MG/1
81 TABLET, COATED ORAL DAILY
Refills: 0 | Status: DISCONTINUED | OUTPATIENT
Start: 2025-01-15 | End: 2025-01-24

## 2025-01-15 RX ORDER — CEFTRIAXONE 250 MG/1
1000 INJECTION, POWDER, FOR SOLUTION INTRAMUSCULAR; INTRAVENOUS EVERY 24 HOURS
Refills: 0 | Status: COMPLETED | OUTPATIENT
Start: 2025-01-15 | End: 2025-01-17

## 2025-01-15 RX ADMIN — Medication 1: at 22:02

## 2025-01-15 RX ADMIN — Medication 3: at 06:38

## 2025-01-15 RX ADMIN — SODIUM CHLORIDE 100 MILLILITER(S): 9 INJECTION, SOLUTION INTRAVENOUS at 10:21

## 2025-01-15 RX ADMIN — ACETAMINOPHEN 650 MILLIGRAM(S): 160 SUSPENSION ORAL at 13:16

## 2025-01-15 RX ADMIN — ACETAMINOPHEN 650 MILLIGRAM(S): 160 SUSPENSION ORAL at 14:26

## 2025-01-15 RX ADMIN — ATORVASTATIN CALCIUM 10 MILLIGRAM(S): 80 TABLET, FILM COATED ORAL at 22:02

## 2025-01-15 RX ADMIN — CEFTRIAXONE 100 MILLIGRAM(S): 250 INJECTION, POWDER, FOR SOLUTION INTRAMUSCULAR; INTRAVENOUS at 22:38

## 2025-01-15 RX ADMIN — INSULIN DETEMIR 20 UNIT(S): 100 INJECTION, SOLUTION SUBCUTANEOUS at 12:05

## 2025-01-15 RX ADMIN — GABAPENTIN 100 MILLIGRAM(S): 800 TABLET ORAL at 12:59

## 2025-01-15 RX ADMIN — Medication 6 UNIT(S): at 17:17

## 2025-01-15 RX ADMIN — PANTOPRAZOLE 40 MILLIGRAM(S): 20 TABLET, DELAYED RELEASE ORAL at 06:38

## 2025-01-15 RX ADMIN — Medication 3: at 12:59

## 2025-01-15 RX ADMIN — MIRTAZAPINE 30 MILLIGRAM(S): 30 TABLET, FILM COATED ORAL at 22:02

## 2025-01-15 RX ADMIN — SODIUM CHLORIDE 100 MILLILITER(S): 9 INJECTION, SOLUTION INTRAVENOUS at 12:19

## 2025-01-15 RX ADMIN — SODIUM CHLORIDE 100 MILLILITER(S): 9 INJECTION, SOLUTION INTRAVENOUS at 01:29

## 2025-01-15 RX ADMIN — Medication 25 MILLIGRAM(S): at 06:38

## 2025-01-15 RX ADMIN — ASPIRIN 81 MILLIGRAM(S): 81 TABLET, COATED ORAL at 12:59

## 2025-01-15 RX ADMIN — ACETAMINOPHEN 650 MILLIGRAM(S): 160 SUSPENSION ORAL at 20:45

## 2025-01-15 RX ADMIN — ACETAMINOPHEN 650 MILLIGRAM(S): 160 SUSPENSION ORAL at 19:45

## 2025-01-15 NOTE — CONSULT NOTE ADULT - NS ATTEND AMEND GEN_ALL_CORE FT
Patient seen and examined. Agree with plan as detailed in PA/NP Note.     Sinus tach in setting of hyperglycemia  Last office TTE with normal LV function  Agree with IVF    Bella Aguilera MD  Pager: [phone]

## 2025-01-15 NOTE — CONSULT NOTE ADULT - SUBJECTIVE AND OBJECTIVE BOX
date of consult 1/15/25    HISTORY OF PRESENT ILLNESS: HPI:  74F with PMH HTN, HLD, type 1 DM on insulin pump, Asthma, colon cad s/p hemicolectomy and chemo, in remission, with historically normal LV function and No ischemia on last NST 11/1023, presents to ER with hyperglycemia.    History obtained from patient and daughter at bedside, who report that prior to admission daughter went to visit her mother to bring her to physical therapy however she noticed patient was noticeably weaker than she was the previous day and appeared to be slightly confused. She was taken to her outpatient endocrinology office for evaluation where she was found to be hyperglycemic to 400's and was given novolog bolus through her pump, however her blood glucose did not improve and she was recommended to come to the ED. Reports pump is still functional and was adjusted several months ago as she was hypoglycemic at that time.    In ED patient found to have glucose in 400's that improved to 276. BHB negative, blood gas showing lactate 4.8 downtrending to 3.7 after fluids and pH 7.31. UA positive, CTX administered in ED. (15 Jw 2025 02:46)    Reports polyuria, denies dysuria.  Denies palps, dizziness, syncope, chest pain or SOB.    PAST MEDICAL & SURGICAL HISTORY:  History of hypertension      Diabetes  wears insulin pump      GERD (gastroesophageal reflux disease)      Uterine leiomyoma      Hyperlipidemia      Asthma      Obesity      Lymphadenopathy  left lower extremitiy ; 1966 - current      H/O insertion of insulin pump      OA (osteoarthritis)      Malignant neoplasm of cecum      H/O bilateral breast reduction surgery      History of appendectomy      History of cholecystectomy      H/O: hysterectomy      History of total right knee replacement  3/2016 - [facility]      H/O total knee replacement, left      S/P bunionectomy      Port-A-Cath in place    MEDICATIONS  (STANDING):  aspirin  chewable 81 milliGRAM(s) Oral daily  atorvastatin 10 milliGRAM(s) Oral at bedtime  cefTRIAXone   IVPB 1000 milliGRAM(s) IV Intermittent every 24 hours  dextrose 5%. 1000 milliLiter(s) (50 mL/Hr) IV Continuous <Continuous>  dextrose 50% Injectable 25 Gram(s) IV Push once  enoxaparin Injectable 40 milliGRAM(s) SubCutaneous every 24 hours  gabapentin 100 milliGRAM(s) Oral daily  glucagon  Injectable 1 milliGRAM(s) IntraMuscular once  hydrochlorothiazide 12.5 milliGRAM(s) Oral daily  insulin lispro (ADMELOG) corrective regimen sliding scale   SubCutaneous every 6 hours  losartan 25 milliGRAM(s) Oral daily  mirtazapine 30 milliGRAM(s) Oral at bedtime  pantoprazole    Tablet 40 milliGRAM(s) Oral before breakfast      Allergies  Percocet 10/325 (Other; Urticaria)  Lantus (Swelling)  codeine (Other)  Darvocet A500 (Other; Urticaria)  PURELL.    FAMILY HISTORY:  Diabetes mellitus (Father)    FH: breast cancer (Aunt)    Noncontributory for premature coronary disease or sudden cardiac death    SOCIAL HISTORY:    [x ] Non-smoker  [ ] Smoker  [ ] Alcohol    FLU VACCINE THIS YEAR STARTS IN AUGUST:  [ ] Yes    [ ] No    IF OVER 65 HAVE YOU EVER HAD A PNA VACCINE:  [ ] Yes    [ ] No       [ ] N/A      REVIEW OF SYSTEMS:  [ ]chest pain  [  ]shortness of breath  [  ]palpitations  [  ]syncope  [ ]near syncope [ ]upper extremity weakness   [ ] lower extremity weakness  [  ]diplopia  [  ]altered mental status   [  ]fevers  [ ]chills [ ]nausea  [ ]vomiting  [  ]dysphagia    [ ]abdominal pain  [ ]melena  [ ]BRBPR    [  ]epistaxis  [  ]rash    [ ]lower extremity edema        [ x] All others negative	  [ ] Unable to obtain      LABS:	 	                        12.5   13.39 )-----------( 187      ( 15 Jw 2025 06:47 )             37.7     Hb Trend: 12.5<--, 14.4<--    01-15    135  |  95[L]  |  16  ----------------------------<  309[H]  3.7   |  26  |  0.65    Ca    9.7      15 Jw 2025 06:47  Phos  2.0     01-15  Mg     1.80     01-15    TPro  7.2  /  Alb  3.2[L]  /  TBili  0.7  /  DBili  x   /  AST  24  /  ALT  13  /  AlkPhos  102  01-15    Creatinine Trend: 0.65<--, 0.75<--, 1.13<--    PHYSICAL EXAM:  T(C): 36.7 (01-15-25 @ 11:45), Max: 37.2 (01-15-25 @ 04:30)  HR: 107 (01-15-25 @ 11:45) (106 - 127)  BP: 103/58 (01-15-25 @ 11:45) (100/63 - 130/72)  RR: 18 (01-15-25 @ 11:45) (14 - 20)  SpO2: 100% (01-15-25 @ 11:45) (97% - 100%)      Gen: Appears well in NAD  HEENT:  (-)icterus (-)pallor  CV: N S1 S2 1/6 RACHELL (+)2 Pulses B/l  Resp:  Clear to ausculatation B/L, normal effort  GI: (+) BS Soft, NT, ND  Lymph:  (-)Edema, (-)obvious lymphadenopathy  Skin: Warm to touch, Normal turgor  Psych: Appropriate mood and affect        ECG:  	Sinus tachycardia 115bpm      < from: Xray Chest 1 View AP/PA (01.14.25 @ 20:13) >  IMPRESSION:  Clear lungs.    < end of copied text >    ASSESSMENT/PLAN: 	74F with PMH HTN, HLD, type 1 DM on insulin pump, Asthma, colon cad s/p hemicolectomy and chemo, in remission, with historically normal LV function and No ischemia on last NST 11/1023, presents to ER with hyperglycemia.    --insulin pump removed F/U Endo plan  --Abnormal UA, F/U Culture  --Sinus tachycardia in the setting of hyperglycemia, resolving  --cont Losartan and HCTZ for HTN if tolerating    Thank you for allowing us to participate in the care of our mutual patient.  Please do not hesitate to call with any questions.     Magdalena WALTERS  [phone]

## 2025-01-15 NOTE — H&P ADULT - NSHPPHYSICALEXAM_GEN_ALL_CORE
VITALS:   T(C): 36.9 (01-15-25 @ 02:41), Max: 37.1 (01-14-25 @ 16:26)  HR: 112 (01-15-25 @ 02:41) (106 - 127)  BP: 130/72 (01-15-25 @ 02:41) (100/63 - 130/72)  RR: 18 (01-15-25 @ 02:41) (18 - 20)  SpO2: 99% (01-15-25 @ 02:41) (98% - 100%)    GENERAL: NAD, lying in bed comfortably  HEAD:  Atraumatic, normocephalic  EYES: EOMI, PERRLA, conjunctiva and sclera clear  ENT: Moist mucous membranes  NECK: Supple, no JVD  HEART: Regular rate and rhythm, no murmurs, rubs, or gallops  LUNGS: Unlabored respirations.  Clear to auscultation bilaterally, no crackles, wheezing, or rhonchi  ABDOMEN: Soft, nontender, nondistended, +BS  EXTREMITIES: 2+ peripheral pulses bilaterally. No clubbing, cyanosis, or edema  NERVOUS SYSTEM:  A&Ox3, no focal deficits   SKIN: No rashes or lesions

## 2025-01-15 NOTE — H&P ADULT - NSICDXPASTSURGICALHX_GEN_ALL_CORE_FT
PAST SURGICAL HISTORY:  H/O bilateral breast reduction surgery     H/O total knee replacement, left     H/O: hysterectomy     History of appendectomy     History of cholecystectomy     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 - [facility]    S/P bunionectomy      PAST SURGICAL HISTORY:  H/O bilateral breast reduction surgery     H/O total knee replacement, left     H/O: hysterectomy     History of appendectomy     History of cholecystectomy     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 - [facility]    Port-A-Cath in place     S/P bunionectomy

## 2025-01-15 NOTE — H&P ADULT - PROBLEM SELECTOR PLAN 2
-pt with positive UA, UCx pending  -f/u UCx results and speciation  -if febrile give tylenol and obtain BCx  -c/w ceftriaxone daily.

## 2025-01-15 NOTE — ED ADULT NURSE REASSESSMENT NOTE - NS ED NURSE REASSESS COMMENT FT1
Pt is resting comfortably in the stretcher. No complaints made at this time. Respiration even and non-labored.   Pt made comfortable. All safety precautions maintained.  Pending bed placement at this time.
Received pt after handoff in stable condition.  No complaints made at this time. Pt is alert and oriented x4,  daughter at bedside.  No complaints made at this time.  Respiration even and non-labored. Pt made comfortable. All safety precautions maintained.
Report given to MARLA Moreau in [facility] 2
cultures were ordered after the abx, I spoke to MD Wan and he said to still draw them. plan of care continued.
received report from Olvin Green, pt remains A&Ox4. resting in stretcher. respirations even and unlabored. pending chest Xray. plan of care continued.
Break Coverage RN: Pt resting comfortably in stretcher, vitals as charted, respirations are even and unlabored. Pt endorsing no complaints at this time. Pt sinus tach on cardiac monitor. Pending bed assignment

## 2025-01-15 NOTE — PROGRESS NOTE ADULT - SUBJECTIVE AND OBJECTIVE BOX
Name of Patient : NESTOR MABRY  MRN: [id number]  Date of visit: 01-15-25       Subjective: Patient seen and examined. No new events except as noted.     REVIEW OF SYSTEMS:    CONSTITUTIONAL: No weakness, fevers or chills  EYES/ENT: No visual changes;  No vertigo or throat pain   NECK: No pain or stiffness  RESPIRATORY: No cough, wheezing, hemoptysis; No shortness of breath  CARDIOVASCULAR: No chest pain or palpitations  GASTROINTESTINAL: No abdominal or epigastric pain. No nausea, vomiting, or hematemesis; No diarrhea or constipation. No melena or hematochezia.  GENITOURINARY: No dysuria, frequency or hematuria  NEUROLOGICAL: No numbness or weakness  SKIN: No itching, burning, rashes, or lesions   All other review of systems is negative unless indicated above.    MEDICATIONS:  MEDICATIONS  (STANDING):  aspirin  chewable 81 milliGRAM(s) Oral daily  atorvastatin 10 milliGRAM(s) Oral at bedtime  cefTRIAXone   IVPB 1000 milliGRAM(s) IV Intermittent every 24 hours  dextrose 5%. 1000 milliLiter(s) (50 mL/Hr) IV Continuous <Continuous>  dextrose 50% Injectable 25 Gram(s) IV Push once  enoxaparin Injectable 40 milliGRAM(s) SubCutaneous every 24 hours  gabapentin 100 milliGRAM(s) Oral daily  glucagon  Injectable 1 milliGRAM(s) IntraMuscular once  hydrochlorothiazide 12.5 milliGRAM(s) Oral daily  insulin lispro (ADMELOG) corrective regimen sliding scale   SubCutaneous three times a day before meals  insulin lispro (ADMELOG) corrective regimen sliding scale   SubCutaneous at bedtime  insulin lispro Injectable (ADMELOG) 6 Unit(s) SubCutaneous three times a day before meals  losartan 25 milliGRAM(s) Oral daily  mirtazapine 30 milliGRAM(s) Oral at bedtime  pantoprazole    Tablet 40 milliGRAM(s) Oral before breakfast      PHYSICAL EXAM:  T(C): 37 (01-15-25 @ 22:02), Max: 37.2 (01-15-25 @ 04:30)  HR: 100 (01-15-25 @ 22:02) (100 - 113)  BP: 97/60 (01-15-25 @ 22:02) (91/56 - 130/72)  RR: 18 (01-15-25 @ 22:02) (14 - 19)  SpO2: 100% (01-15-25 @ 22:02) (97% - 100%)  Wt(kg): --  I&O's Summary        Appearance: Normal	  HEENT:  PERRLA   Lymphatic: No lymphadenopathy   Cardiovascular: Normal S1 S2, no JVD  Respiratory: normal effort , clear  Gastrointestinal:  Soft, Non-tender  Skin: No rashes,  warm to touch  Psychiatry:  Mood & affect appropriate  Musculuskeletal: No edema    recent labs, Imaging and EKGs personally reviewed   CODE status discussed with the patient in detail                          12.5   13.39 )-----------( 187      ( 15 Jw 2025 06:47 )             37.7               01-15    135  |  95[L]  |  16  ----------------------------<  309[H]  3.7   |  26  |  0.65    Ca    9.7      15 Jw 2025 06:47  Phos  2.0     01-15  Mg     1.80     01-15    TPro  7.2  /  Alb  3.2[L]  /  TBili  0.7  /  DBili  x   /  AST  24  /  ALT  13  /  AlkPhos  102  01-15                       Urinalysis Basic - ( 15 Jw 2025 06:47 )    Color: x / Appearance: x / SG: x / pH: x  Gluc: 309 mg/dL / Ketone: x  / Bili: x / Urobili: x   Blood: x / Protein: x / Nitrite: x   Leuk Esterase: x / RBC: x / WBC x   Sq Epi: x / Non Sq Epi: x / Bacteria: x

## 2025-01-15 NOTE — H&P ADULT - HISTORY OF PRESENT ILLNESS
74F with history of type 1 diabetes poorly controlled on insulin pump, colorectal cancer in remission presented to the emergency, presented by endocrinology office for concerns for hyperglycemia.  Patient reports she has been experiencing generalized fatigue/malaise for the last few days but denies fevers, chills, chest pain, shortness of breath, palpitations.  She got 8 units of Lantus in the endocrinologist with unchanged BGL. Reports pump is still functional. 74F with history of type 1 diabetes poorly controlled on insulin pump, colorectal cancer in remission presents to ED for hyperglycemia. History obtained from patient and daughter at bedside, who report that prior to admission daughter went to visit her mother to bring her to physical therapy however she noticed patient was noticeably weaker than she was the previous day and appeared to be slightly confused. She was taken to her outpatient endocrinology office for evaluation where she was found to be hyperglycemic to 400's and was given 8 units of lantus, however her blood glucose did not improve and she was recommended to come to the ED. Reports pump is still functional and was adjusted several months ago as she was hypoglycemic at that time.    In ED patient found to have glucose in 400's that improved to 276. BHB negative, blood gas showing lactate 4.8 downtrending to 3.7 after fluids and pH 7.31. UA positive, CTX administered in ED. 74F with history of type 1 diabetes poorly controlled on insulin pump, colorectal cancer in remission presents to ED for hyperglycemia. History obtained from patient and daughter at bedside, who report that prior to admission daughter went to visit her mother to bring her to physical therapy however she noticed patient was noticeably weaker than she was the previous day and appeared to be slightly confused. She was taken to her outpatient endocrinology office for evaluation where she was found to be hyperglycemic to 400's and was given novolog bolus through her pump, however her blood glucose did not improve and she was recommended to come to the ED. Reports pump is still functional and was adjusted several months ago as she was hypoglycemic at that time.    In ED patient found to have glucose in 400's that improved to 276. BHB negative, blood gas showing lactate 4.8 downtrending to 3.7 after fluids and pH 7.31. UA positive, CTX administered in ED.

## 2025-01-15 NOTE — CONSULT NOTE ADULT - ATTENDING COMMENTS
The patient is a 74y Female with PMH of T1DM on an insulin pump, colon CA s/p surgery/chemo p/w hyperglycemia. Endocrinology consulted for type 1 diabetes on an insulin pump.  pt reports hx of type 1DM   pt seen with daughter at bedside     upon review pt uses pump for basal only and not bolus  pt has extensive period post pump removal and did not have DKA   suspicion is therefore raised for type 2 DM rather than type 1 given clinical phenotype as well    will put pt on basal bolus as outlined in fellow note.  monitor FSBG if glucose >300s check labs for DKA   check cpeptide     will determine if and when pt should resume insulin pump and setting changes that will be needed  pt prefers insulin pump but given that she only uses basal function may benefit from basal bolus outpt or basal +orals         Valeria Cosby MD  Attending Physician   Department of Endocrinology, Diabetes and Metabolism     weekdays: 9am to 5pm: [facility] or [facility] and or TEAMS     Nights and weekends: [phone]  Please note that this patient may be followed by a different provider tomorrow.   If no answer or after hours, please contact [phone].  For final dc reccomendations, please call [phone] or page the endocrine fellow on call.

## 2025-01-15 NOTE — H&P ADULT - NSHPLABSRESULTS_GEN_ALL_CORE
LABS:                          14.4   16.55 )-----------( 230      ( 14 Jan 2025 19:06 )             44.7     01-14    131[L]  |  93[L]  |  23  ----------------------------<  321[H]  4.3   |  22  |  0.75    Ca    9.6      14 Jan 2025 23:15  Phos  2.0     01-14  Mg     1.80     01-14    TPro  9.0[H]  /  Alb  4.0  /  TBili  0.6  /  DBili  x   /  AST  40[H]  /  ALT  18  /  AlkPhos  130[H]  01-14

## 2025-01-15 NOTE — H&P ADULT - ASSESSMENT
74F with history of type 1 diabetes poorly controlled on insulin pump, colorectal cancer in remission presented to the emergency, presented by endocrinology office for concerns for hyperglycemia.

## 2025-01-15 NOTE — H&P ADULT - PROBLEM SELECTOR PLAN 5
-previous hx colon cancer s/p resection and chemotherapy completed in July 2024  -pt still with port in place, needs port to be flushed.

## 2025-01-15 NOTE — CONSULT NOTE ADULT - SUBJECTIVE AND OBJECTIVE BOX
HPI:  74F with history of type 1 diabetes poorly controlled on insulin pump, colorectal cancer in remission presents to ED for hyperglycemia. History obtained from patient and daughter at bedside, who report that prior to admission daughter went to visit her mother to bring her to physical therapy however she noticed patient was noticeably weaker than she was the previous day and appeared to be slightly confused. She was taken to her outpatient endocrinology office for evaluation where she was found to be hyperglycemic to 400's and was given novolog bolus through her pump, however her blood glucose did not improve and she was recommended to come to the ED. Reports pump is still functional and was adjusted several months ago as she was hypoglycemic at that time.    In ED patient found to have glucose in 400's that improved to 276. BHB negative, blood gas showing lactate 4.8 downtrending to 3.7 after fluids and pH 7.31. UA positive, CTX administered in ED. (15 Jw 2025 02:46)      DIABETES HX:  History/diagnosis: T1DM diagnosed at age 29  Follows with: Dr Ledezma  Microvascular complications: Patient denies hx nephropathy, retinopathy, or neuropathy.  Macrovascular complications: No history of CAD or CVA.  Last hemoglobin A1c: 8.5%    Insulin pump type: Medtronic   Insulin type: Novolog  Last site change: pump removed 1/14  Supplies available: no  Settings:  Basal:  12a 1.75  6:30a 1.95  12:30p 2.45  6:30p 2.5  Patient does not bolus at all, only uses the basal function. Prior recommended bolus settings from 8/2023:  ICR:  12AM 1:15  7AM 1:15  12:30PM 1:15  ISF 1:30  Glucose Target: 12a- 5a 140-140  5a-10p 110  10p-12am 140  IOB: 4 hours         PAST MEDICAL & SURGICAL HISTORY:  History of hypertension      Diabetes  wears insulin pump      GERD (gastroesophageal reflux disease)      Uterine leiomyoma      Hyperlipidemia      Asthma      Obesity      Lymphadenopathy  left lower extremitiy ; 1966 - current      H/O insertion of insulin pump      OA (osteoarthritis)      Malignant neoplasm of cecum      H/O bilateral breast reduction surgery      History of appendectomy      History of cholecystectomy      H/O: hysterectomy      History of total right knee [facility]  [facility]      H/O total knee replacement, left      S/P bunionectomy      Port-A-Cath in place          FAMILY HISTORY:  Diabetes mellitus (Father)    FH: breast cancer (Aunt)        HOME MEDS:  Albuterol (Eqv-ProAir HFA) 90 mcg/inh inhalation aerosol: 2 puff(s) inhaled 2 times a day  alcohol swabs : Apply topically to affected area 4 times a day  aspirin 81 mg oral tablet: 1 tab(s) orally once a day  gabapentin 100 mg oral tablet: orally  hydroCHLOROthiazide 12.5 mg oral tablet: 1 tab(s) orally once a day  Insulin pump novolog:   lancets: 1 application subcutaneously 4 times a day  losartan 25 mg oral tablet: 1 tab(s) orally once a day  mirtazapine 30 mg oral tablet: 1 tab(s) orally once a day (at bedtime)  omeprazole 20 mg oral delayed release capsule: 1 cap(s) orally once a day  pravastatin 40 mg oral tablet: 1 tab(s) orally once a day (at bedtime)      MEDICATIONS  (STANDING):  aspirin  chewable 81 milliGRAM(s) Oral daily  atorvastatin 10 milliGRAM(s) Oral at bedtime  cefTRIAXone   IVPB 1000 milliGRAM(s) IV Intermittent every 24 hours  dextrose 5%. 1000 milliLiter(s) (50 mL/Hr) IV Continuous <Continuous>  dextrose 50% Injectable 25 Gram(s) IV Push once  enoxaparin Injectable 40 milliGRAM(s) SubCutaneous every 24 hours  gabapentin 100 milliGRAM(s) Oral daily  glucagon  Injectable 1 milliGRAM(s) IntraMuscular once  hydrochlorothiazide 12.5 milliGRAM(s) Oral daily  insulin lispro (ADMELOG) corrective regimen sliding scale   SubCutaneous every 6 hours  losartan 25 milliGRAM(s) Oral daily  mirtazapine 30 milliGRAM(s) Oral at bedtime  pantoprazole    Tablet 40 milliGRAM(s) Oral before breakfast    MEDICATIONS  (PRN):  acetaminophen     Tablet .. 650 milliGRAM(s) Oral every 6 hours PRN Mild Pain (1 - 3), Moderate Pain (4 - 6), Severe Pain (7 - 10)  aluminum hydroxide/magnesium hydroxide/simethicone Suspension 30 milliLiter(s) Oral every 4 hours PRN Dyspepsia  dextrose Oral Gel 15 Gram(s) Oral once PRN Blood Glucose LESS THAN 70 milliGRAM(s)/deciliter  melatonin 3 milliGRAM(s) Oral at bedtime PRN Insomnia  ondansetron Injectable 4 milliGRAM(s) IV Push every 8 hours PRN Nausea and/or Vomiting      Allergies    Percocet 10/325 (Other; Urticaria)  Lantus (Swelling)  codeine (Other)  Darvocet A500 (Other; Urticaria)  PURELL.  pt said, &quot;I felt my airway closing&quot; after she smelled the Purell. The incident occured at the pulmonologist office. (Other; Short breath)    Intolerances      Review of Systems:  Constitutional: No fever  Eyes: No blurry vision  Neuro: No tremors  HEENT: No pain  Cardiovascular: No chest pain, palpitations  Respiratory: No SOB, no cough  GI: No nausea, vomiting, abdominal pain  : No dysuria  Skin: no rash  Psych: no depression  Endocrine: no polyuria, polydipsia  Hem/lymph: no swelling  Osteoporosis: no fractures    ALL OTHER SYSTEMS REVIEWED AND NEGATIVE    PHYSICAL EXAM:  VITALS: T(C): 36.7 (01-15-25 @ 11:45)  T(F): 98 (01-15-25 @ 11:45), Max: 98.9 (01-15-25 @ 04:30)  HR: 107 (01-15-25 @ 11:45) (106 - 127)  BP: 103/58 (01-15-25 @ 11:45) (100/63 - 130/72)  RR:  (14 - 20)  SpO2:  (97% - 100%)  Wt(kg): --  GENERAL: NAD, well-groomed, well-developed  EYES: No proptosis, no lid lag, anicteric  HEENT:  Atraumatic, Normocephalic, moist mucous membranes  RESPIRATORY: Normal respiratory effort; no audible wheezing  SKIN: Dry, intact, No rashes or lesions  MUSCULOSKELETAL: Full range of motion, normal strength  NEURO: sensation intact, extraocular movements intact, no tremor  PSYCH: Alert and oriented x 3, normal affect, normal mood  CUSHING'S SIGNS: no striae                          12.5   13.39 )-----------( 187      ( 15 Jw 2025 06:47 )             37.7       01-15    135  |  95[L]  |  16  ----------------------------<  309[H]  3.7   |  26  |  0.65    eGFR: 93    Ca    9.7      01-15  Mg     1.80     01-15  Phos  2.0     01-15    TPro  7.2  /  Alb  3.2[L]  /  TBili  0.7  /  DBili  x   /  AST  24  /  ALT  13  /  AlkPhos  102  01-15      A1C with Estimated Average Glucose Result: 8.5 % (01-14-25 @ 19:06)      CAPILLARY BLOOD GLUCOSE      POCT Blood Glucose.: 289 mg/dL (15 Jw 2025 12:03)  POCT Blood Glucose.: 288 mg/dL (15 Jw 2025 06:31)  POCT Blood Glucose.: 285 mg/dL (15 Jw 2025 04:20)  POCT Blood Glucose.: 276 mg/dL (15 Jw 2025 00:34)  POCT Blood Glucose.: 314 mg/dL (14 Jan 2025 23:13)  POCT Blood Glucose.: 326 mg/dL (14 Jan 2025 21:01)  POCT Blood Glucose.: 352 mg/dL (14 Jan 2025 19:46)  POCT Blood Glucose.: 399 mg/dL (14 Jan 2025 19:05)  POCT Blood Glucose.: 460 mg/dL (14 Jan 2025 16:26)      Thyroid Function Tests:  acetaminophen     Tablet .. 650 milliGRAM(s) Oral every 6 hours PRN  aluminum hydroxide/magnesium hydroxide/simethicone Suspension 30 milliLiter(s) Oral every 4 hours PRN  aspirin  chewable 81 milliGRAM(s) Oral daily  atorvastatin 10 milliGRAM(s) Oral at bedtime  cefTRIAXone   IVPB 1000 milliGRAM(s) IV Intermittent every 24 hours  dextrose 5%. 1000 milliLiter(s) IV Continuous <Continuous>  dextrose 50% Injectable 25 Gram(s) IV Push once  dextrose Oral Gel 15 Gram(s) Oral once PRN  enoxaparin Injectable 40 milliGRAM(s) SubCutaneous every 24 hours  gabapentin 100 milliGRAM(s) Oral daily  glucagon  Injectable 1 milliGRAM(s) IntraMuscular once  hydrochlorothiazide 12.5 milliGRAM(s) Oral daily  insulin lispro (ADMELOG) corrective regimen sliding scale   SubCutaneous every 6 hours  losartan 25 milliGRAM(s) Oral daily  melatonin 3 milliGRAM(s) Oral at bedtime PRN  mirtazapine 30 milliGRAM(s) Oral at bedtime  ondansetron Injectable 4 milliGRAM(s) IV Push every 8 hours PRN  pantoprazole    Tablet 40 milliGRAM(s) Oral before breakfast          Radiology:          HPI:  74F with history of type 1 diabetes poorly controlled on insulin pump, colorectal cancer in remission presents to ED for hyperglycemia. History obtained from patient and daughter at bedside, who report that prior to admission daughter went to visit her mother to bring her to physical therapy however she noticed patient was noticeably weaker than she was the previous day and appeared to be slightly confused. She was taken to her outpatient endocrinology office for evaluation where she was found to be hyperglycemic to 400's and was given novolog bolus through her pump, however her blood glucose did not improve and she was recommended to come to the ED. Reports pump is still functional and was adjusted several months ago as she was hypoglycemic at that time.    In ED patient found to have glucose in 400's that improved to 276. BHB negative, blood gas showing lactate 4.8 downtrending to 3.7 after fluids and pH 7.31. UA positive, CTX administered in ED. (15 Jw 2025 02:46)      DIABETES HX:  History/diagnosis: T1DM?? diagnosed at age 29  Follows with: Dr Ledezma  Microvascular complications: Patient denies hx nephropathy, retinopathy, or neuropathy.  Macrovascular complications: No history of CAD or CVA.  Last hemoglobin A1c: 8.5%    Insulin pump type: Medtronic   Insulin type: Novolog  Last site change: pump removed 1/14  Supplies available: no  Settings:  Basal:  12a 1.75  6:30a 1.95  12:30p 2.45  6:30p 2.5  Patient does not bolus at all, only uses the basal function. Prior recommended bolus settings from 8/2023:  ICR:  12AM 1:15  7AM 1:15  12:30PM 1:15  ISF 1:30  Glucose Target: 12a- 5a 140-140  5a-10p 110  10p-12am 140  IOB: 4 hours         PAST MEDICAL & SURGICAL HISTORY:  History of hypertension      Diabetes  wears insulin pump      GERD (gastroesophageal reflux disease)      Uterine leiomyoma      Hyperlipidemia      Asthma      Obesity      Lymphadenopathy  left lower extremitiy ; 1966 - current      H/O insertion of insulin pump      OA (osteoarthritis)      Malignant neoplasm of cecum      H/O bilateral breast reduction surgery      History of appendectomy      History of cholecystectomy      H/O: hysterectomy      History of total right knee replacement  3/2016 - [facility]      H/O total knee replacement, left      S/P bunionectomy      Port-A-Cath in place          FAMILY HISTORY:  Diabetes mellitus (Father)    FH: breast cancer (Aunt)        HOME MEDS:  Albuterol (Eqv-ProAir HFA) 90 mcg/inh inhalation aerosol: 2 puff(s) inhaled 2 times a day  alcohol swabs : Apply topically to affected area 4 times a day  aspirin 81 mg oral tablet: 1 tab(s) orally once a day  gabapentin 100 mg oral tablet: orally  hydroCHLOROthiazide 12.5 mg oral tablet: 1 tab(s) orally once a day  Insulin pump novolog:   lancets: 1 application subcutaneously 4 times a day  losartan 25 mg oral tablet: 1 tab(s) orally once a day  mirtazapine 30 mg oral tablet: 1 tab(s) orally once a day (at bedtime)  omeprazole 20 mg oral delayed release capsule: 1 cap(s) orally once a day  pravastatin 40 mg oral tablet: 1 tab(s) orally once a day (at bedtime)      MEDICATIONS  (STANDING):  aspirin  chewable 81 milliGRAM(s) Oral daily  atorvastatin 10 milliGRAM(s) Oral at bedtime  cefTRIAXone   IVPB 1000 milliGRAM(s) IV Intermittent every 24 hours  dextrose 5%. 1000 milliLiter(s) (50 mL/Hr) IV Continuous <Continuous>  dextrose 50% Injectable 25 Gram(s) IV Push once  enoxaparin Injectable 40 milliGRAM(s) SubCutaneous every 24 hours  gabapentin 100 milliGRAM(s) Oral daily  glucagon  Injectable 1 milliGRAM(s) IntraMuscular once  hydrochlorothiazide 12.5 milliGRAM(s) Oral daily  insulin lispro (ADMELOG) corrective regimen sliding scale   SubCutaneous every 6 hours  losartan 25 milliGRAM(s) Oral daily  mirtazapine 30 milliGRAM(s) Oral at bedtime  pantoprazole    Tablet 40 milliGRAM(s) Oral before breakfast    MEDICATIONS  (PRN):  acetaminophen     Tablet .. 650 milliGRAM(s) Oral every 6 hours PRN Mild Pain (1 - 3), Moderate Pain (4 - 6), Severe Pain (7 - 10)  aluminum hydroxide/magnesium hydroxide/simethicone Suspension 30 milliLiter(s) Oral every 4 hours PRN Dyspepsia  dextrose Oral Gel 15 Gram(s) Oral once PRN Blood Glucose LESS THAN 70 milliGRAM(s)/deciliter  melatonin 3 milliGRAM(s) Oral at bedtime PRN Insomnia  ondansetron Injectable 4 milliGRAM(s) IV Push every 8 hours PRN Nausea and/or Vomiting      Allergies    Percocet 10/325 (Other; Urticaria)  Lantus (Swelling)  codeine (Other)  Darvocet A500 (Other; Urticaria)  PURELL.  pt said, &quot;I felt my airway closing&quot; after she smelled the Purell. The incident occured at the pulmonologist office. (Other; Short breath)    Intolerances      Review of Systems:  Constitutional: No fever  Eyes: No blurry vision  Neuro: No tremors  HEENT: No pain  Cardiovascular: No chest pain, palpitations  Respiratory: No SOB, no cough  GI: No nausea, vomiting, abdominal pain  : No dysuria  Skin: no rash  Psych: no depression  Endocrine: no polyuria, polydipsia  Hem/lymph: no swelling  Osteoporosis: no fractures    ALL OTHER SYSTEMS REVIEWED AND NEGATIVE    PHYSICAL EXAM:  VITALS: T(C): 36.7 (01-15-25 @ 11:45)  T(F): 98 (01-15-25 @ 11:45), Max: 98.9 (01-15-25 @ 04:30)  HR: 107 (01-15-25 @ 11:45) (106 - 127)  BP: 103/58 (01-15-25 @ 11:45) (100/63 - 130/72)  RR:  (14 - 20)  SpO2:  (97% - 100%)  Wt(kg): --  GENERAL: NAD, well-groomed, well-developed  EYES: No proptosis, no lid lag, anicteric  HEENT:  Atraumatic, Normocephalic, moist mucous membranes  RESPIRATORY: Normal respiratory effort; no audible wheezing  SKIN: Dry, intact, No rashes or lesions  MUSCULOSKELETAL: Full range of motion, normal strength  NEURO: sensation intact, extraocular movements intact, no tremor  PSYCH: Alert and oriented x 3, normal affect, normal mood  CUSHING'S SIGNS: no striae                          12.5   13.39 )-----------( 187      ( 15 Jw 2025 06:47 )             37.7       01-15    135  |  95[L]  |  16  ----------------------------<  309[H]  3.7   |  26  |  0.65    eGFR: 93    Ca    9.7      01-15  Mg     1.80     01-15  Phos  2.0     01-15    TPro  7.2  /  Alb  3.2[L]  /  TBili  0.7  /  DBili  x   /  AST  24  /  ALT  13  /  AlkPhos  102  01-15      A1C with Estimated Average Glucose Result: 8.5 % (01-14-25 @ 19:06)      CAPILLARY BLOOD GLUCOSE      POCT Blood Glucose.: 289 mg/dL (15 Jw 2025 12:03)  POCT Blood Glucose.: 288 mg/dL (15 Jw 2025 06:31)  POCT Blood Glucose.: 285 mg/dL (15 Jw 2025 04:20)  POCT Blood Glucose.: 276 mg/dL (15 Jw 2025 00:34)  POCT Blood Glucose.: 314 mg/dL (14 Jan 2025 23:13)  POCT Blood Glucose.: 326 mg/dL (14 Jan 2025 21:01)  POCT Blood Glucose.: 352 mg/dL (14 Jan 2025 19:46)  POCT Blood Glucose.: 399 mg/dL (14 Jan 2025 19:05)  POCT Blood Glucose.: 460 mg/dL (14 Jan 2025 16:26)      Thyroid Function Tests:  acetaminophen     Tablet .. 650 milliGRAM(s) Oral every 6 hours PRN  aluminum hydroxide/magnesium hydroxide/simethicone Suspension 30 milliLiter(s) Oral every 4 hours PRN  aspirin  chewable 81 milliGRAM(s) Oral daily  atorvastatin 10 milliGRAM(s) Oral at bedtime  cefTRIAXone   IVPB 1000 milliGRAM(s) IV Intermittent every 24 hours  dextrose 5%. 1000 milliLiter(s) IV Continuous <Continuous>  dextrose 50% Injectable 25 Gram(s) IV Push once  dextrose Oral Gel 15 Gram(s) Oral once PRN  enoxaparin Injectable 40 milliGRAM(s) SubCutaneous every 24 hours  gabapentin 100 milliGRAM(s) Oral daily  glucagon  Injectable 1 milliGRAM(s) IntraMuscular once  hydrochlorothiazide 12.5 milliGRAM(s) Oral daily  insulin lispro (ADMELOG) corrective regimen sliding scale   SubCutaneous every 6 hours  losartan 25 milliGRAM(s) Oral daily  melatonin 3 milliGRAM(s) Oral at bedtime PRN  mirtazapine 30 milliGRAM(s) Oral at bedtime  ondansetron Injectable 4 milliGRAM(s) IV Push every 8 hours PRN  pantoprazole    Tablet 40 milliGRAM(s) Oral before breakfast          Radiology:

## 2025-01-15 NOTE — ADVANCED PRACTICE NURSE CONSULT - ASSESSMENT
Patient seen at bedside with dtr in ED. Patient stated her Bg was running high and when her blood sugars run high after a bolus of insulin she know to change her insulin pump site. Daughter confirmed that patients’ insulin pump settings were recently changed because of hypoglycemia. Patient uses an Medtronic insulin pump with Angely CGM. It was confirmed with dtr that NovoLog was given in endocrine office for hyperglycemia. Patient and dtr stated that insulin pump was removed in ED. EMAR reviewed no basal insulin given. Dtr states patient has an allergy to Lantus. Case discussed with endocrine team and endocrine fellow to see patient. S/s and proper treatment for hypoglycemia reviewed with patient.  Insulin pump settings are as follows:  Basal rate ( TBD 51.525)  12a-6;30a 1.75 nit/hr  6;30a-12:30p 1.95 units/hr  12;30p-6;30p 2.45 units/hr  6;30p-12a 2.5 units/hr  ISf  12a-12a 50  I:C ratio  12a-12a 1:12  Target   Active insulin time 4 hrs  Patient NPO currently. Patient demonstrated understanding of information taught. Case discussed with primary RN and endocrine team.

## 2025-01-15 NOTE — CONSULT NOTE ADULT - ASSESSMENT
The patient is a 74y Female with PMH of T1DM on an insulin pump, colon CA s/p surgery/chemo p/w hyperglycemia. Endocrinology consulted for type 1 diabetes on an insulin pump.    #Type 1 Diabetes Mellitus   #Use of insulin pump  - Follows with: Dr. Ledezma  - A1C with Estimated Average Glucose Result: 8.5 % (01-14-25)  - eGFR: 93 mL/min/1.73m2 (01-15-25)  - Weight (kg): 79.4 (01-14-25)  - home regimen:  Insulin pump type: Medtronic   Insulin type: Novolog  Last site change: pump removed 1/14  Supplies available: no  Settings:  Basal:  12a 1.75  6:30a 1.95  12:30p 2.45  6:30p 2.5  Patient does not bolus at all, only uses the basal function. Prior recommended bolus settings from 8/2023:  ICR:  12AM 1:15  7AM 1:15  12:30PM 1:15  ISF 1:30  Glucose Target: 12a- 5a 140-140  5a-10p 110  10p-12am 140  IOB: 4 hours       INPATIENT PLAN:  - insulin pump was removed yesterday  - now s/p levemir 20 units at 12PM today (1/15) - note patient has an allergy to Lantus  - Recommend c/w levemir *** units ***  - Recommend admelog *** units TID before meals (HOLD if NPO or not eating)  - Recommend low dose admelog correction scale TIDQAC and separate low dose scale QHS  - Please check FSG before meals and QHS, or q6h while NPO  - Inpatient glucose goals: 100-180  - consistent carb diet when eating  - patient desires to transition back to insulin pump but currently has no supplies. If daughter brings in supplies can plan to transition back to insulin pump prior to discharge      DISCHARGE PLANNING:  - Discharge recs pending clinical course: likely will discharge on insulin pump but will need to adjust settings since patient does not bolus at all, only uses the basal function. She states she never has hyperglycemia but that is not reflected by her A1c. She also likely is having hypoglycemia if she does not eat. Will need teaching on how to use the insulin pump correctly.  - patient should followup with their endocrinologist, Dr. Ledezma    #Hypertension  - Goal BP <130/80  - Management as per primary team  - check urine microalbumin level as outpatient    #Hyperlipidemia  - LDL goal <70  - Last LDL: 20 mg/dL (11-29-23)  - check lipid panel as outpatient on a yearly basis    Jayson Krishnan MD  Endocrine Fellow  For nonurgent matters (including consults or followup questions), please email [email or web address] or [email or web address]. For urgent matters, please call answering service at [phone] (weekdays), [phone] (nights/weekends).    The patient is a 74y Female with PMH of T1DM on an insulin pump, colon CA s/p surgery/chemo p/w hyperglycemia. Endocrinology consulted for type 1 diabetes on an insulin pump.    #Type 1 Diabetes Mellitus   #Use of insulin pump  - Follows with: Dr. Ledezma  - A1C with Estimated Average Glucose Result: 8.5 % (01-14-25)  - eGFR: 93 mL/min/1.73m2 (01-15-25)  - Weight (kg): 79.4 (01-14-25)  - home regimen:  Insulin pump type: Medtronic   Insulin type: Novolog  Last site change: pump removed 1/14  Supplies available: no  Settings:  Basal:  12a 1.75  6:30a 1.95  12:30p 2.45  6:30p 2.5  Patient does not bolus at all, only uses the basal function. Prior recommended bolus settings from 8/2023:  ICR:  12AM 1:15  7AM 1:15  12:30PM 1:15  ISF 1:30  Glucose Target: 12a- 5a 140-140  5a-10p 110  10p-12am 140  IOB: 4 hours       INPATIENT PLAN:  - insulin pump was removed yesterday  - now s/p levemir 20 units at 12PM today (1/15)  - Recommend c/w levemir 20 units daily (next dose 1/16 at 10AM) - note patient has an allergy to lantus  - Recommend admelog 6 units TID before meals (HOLD if NPO or not eating)  - Recommend low dose admelog correction scale TIDQAC and separate low dose scale QHS  - Please check FSG before meals and QHS, or q6h while NPO  - Inpatient glucose goals: 100-180  - consistent carb diet when eating  - patient desires to transition back to insulin pump but currently has no supplies. If daughter brings in supplies can plan to transition back to insulin pump prior to discharge but will need to reevaluate settings and educate patient on proper use of an insulin pump, since patient does not use the bolus function  - unclear if T1 vs T2 DM given no DKA despite lack of basal insulin x24 hours and patient habitus   - can check c-peptide (send in AM with BMP) to assess insulin production      DISCHARGE PLANNING:  - Discharge recs pending clinical course: likely will discharge on insulin pump but will need to adjust settings since patient does not bolus at all, only uses the basal function. She states she never has hyperglycemia but that is not reflected by her A1c. She also likely is having hypoglycemia if she does not eat. Will need extensive education on how to use the insulin pump correctly.  - patient should followup with their endocrinologist, Dr. Ledezma    #Hypertension  - Goal BP <130/80  - Management as per primary team  - check urine microalbumin level as outpatient    #Hyperlipidemia  - LDL goal <70  - Last LDL: 20 mg/dL (11-29-23)  - check lipid panel as outpatient on a yearly basis    Jayson Krishnan MD  Endocrine Fellow  For nonurgent matters (including consults or followup questions), please email [email or web address] or [email or web address]. For urgent matters, please call answering service at [phone] (weekdays), [phone] (nights/weekends).    The patient is a 74y Female with PMH of T1DM on an insulin pump, colon CA s/p surgery/chemo p/w hyperglycemia. Endocrinology consulted for type 1 diabetes on an insulin pump.    #Type 1 Diabetes Mellitus   #Use of insulin pump  - Follows with: Dr. Ledezma  - A1C with Estimated Average Glucose Result: 8.5 % (01-14-25)  - eGFR: 93 mL/min/1.73m2 (01-15-25)  - Weight (kg): 79.4 (01-14-25)  - home regimen:  Insulin pump type: Medtronic   Insulin type: Novolog  Last site change: pump removed 1/14  Supplies available: no  Settings:  Basal:  12a 1.75  6:30a 1.95  12:30p 2.45  6:30p 2.5  Patient does not bolus at all, only uses the basal function. Prior recommended bolus settings from 8/2023:  ICR:  12AM 1:15  7AM 1:15  12:30PM 1:15  ISF 1:30  Glucose Target: 12a- 5a 140-140  5a-10p 110  10p-12am 140  IOB: 4 hours       INPATIENT PLAN:  - insulin pump was removed yesterday  - now s/p levemir 20 units at 12PM today (1/15)  - Recommend c/w levemir 20 units daily (next dose 1/16 at 10AM) - note patient has an allergy to lantus. OKAY for now not to do BID dosing as suspicion is she may be type 2 DM  - Recommend admelog 6 units TID before meals (HOLD if NPO or not eating)  - Recommend low dose admelog correction scale TIDQAC and separate low dose scale QHS  - Please check FSG before meals and QHS, or q6h while NPO  - Inpatient glucose goals: 100-180  - consistent carb diet when eating  - patient desires to transition back to insulin pump but currently has no supplies. If daughter brings in supplies can plan to transition back to insulin pump prior to discharge but will need to reevaluate settings and educate patient on proper use of an insulin pump, since patient does not use the bolus function  - unclear if T1 vs T2 DM given no DKA despite lack of basal insulin x24 hours and patient habitus   - can check c-peptide (send in AM with BMP) to assess insulin production      inform endocrine of hypoglycemia or persistent hyperglycemia episodes as changes in pts insulin regimen will need to be made.   notify endocrine if any plans to be NPO/diet changes as this will also affect insulin regimen.      DISCHARGE PLANNING:  - Discharge recs pending clinical course: likely will discharge on insulin pump but will need to adjust settings since patient does not bolus at all, only uses the basal function. She states she never has hyperglycemia but that is not reflected by her A1c. She also likely is having hypoglycemia if she does not eat. Will need extensive education on how to use the insulin pump correctly.  - patient should followup with their endocrinologist, Dr. Ledezma    #Hypertension  - Goal BP <130/80  - Management as per primary team  - check urine microalbumin level as outpatient    #Hyperlipidemia  - LDL goal <70  - Last LDL: 20 mg/dL (11-29-23)  - check lipid panel as outpatient on a yearly basis    Jayson Krishnan MD  Endocrine Fellow  For nonurgent matters (including consults or followup questions), please email [email or web address] or [email or web address]. For urgent matters, please call answering service at [phone] (weekdays), [phone] (nights/weekends).

## 2025-01-16 LAB
ANION GAP SERPL CALC-SCNC: 11 MMOL/L — SIGNIFICANT CHANGE UP (ref 7–14)
BASOPHILS # BLD AUTO: 0.04 K/UL — SIGNIFICANT CHANGE UP (ref 0–0.2)
BASOPHILS NFR BLD AUTO: 0.5 % — SIGNIFICANT CHANGE UP (ref 0–2)
BUN SERPL-MCNC: 11 MG/DL — SIGNIFICANT CHANGE UP (ref 7–23)
C PEPTIDE SERPL-MCNC: 1.9 NG/ML — SIGNIFICANT CHANGE UP (ref 1.1–4.4)
CALCIUM SERPL-MCNC: 9.5 MG/DL — SIGNIFICANT CHANGE UP (ref 8.4–10.5)
CHLORIDE SERPL-SCNC: 94 MMOL/L — LOW (ref 98–107)
CO2 SERPL-SCNC: 29 MMOL/L — SIGNIFICANT CHANGE UP (ref 22–31)
CREAT SERPL-MCNC: 0.58 MG/DL — SIGNIFICANT CHANGE UP (ref 0.5–1.3)
CULTURE RESULTS: SIGNIFICANT CHANGE UP
EGFR: 95 ML/MIN/1.73M2 — SIGNIFICANT CHANGE UP
EOSINOPHIL # BLD AUTO: 0.13 K/UL — SIGNIFICANT CHANGE UP (ref 0–0.5)
EOSINOPHIL NFR BLD AUTO: 1.5 % — SIGNIFICANT CHANGE UP (ref 0–6)
GLUCOSE BLDC GLUCOMTR-MCNC: 196 MG/DL — HIGH (ref 70–99)
GLUCOSE BLDC GLUCOMTR-MCNC: 218 MG/DL — HIGH (ref 70–99)
GLUCOSE BLDC GLUCOMTR-MCNC: 272 MG/DL — HIGH (ref 70–99)
GLUCOSE BLDC GLUCOMTR-MCNC: 277 MG/DL — HIGH (ref 70–99)
GLUCOSE BLDC GLUCOMTR-MCNC: 301 MG/DL — HIGH (ref 70–99)
GLUCOSE BLDC GLUCOMTR-MCNC: 343 MG/DL — HIGH (ref 70–99)
GLUCOSE SERPL-MCNC: 313 MG/DL — HIGH (ref 70–99)
HCT VFR BLD CALC: 37.6 % — SIGNIFICANT CHANGE UP (ref 34.5–45)
HGB BLD-MCNC: 12.6 G/DL — SIGNIFICANT CHANGE UP (ref 11.5–15.5)
IANC: 5.53 K/UL — SIGNIFICANT CHANGE UP (ref 1.8–7.4)
IMM GRANULOCYTES NFR BLD AUTO: 0.4 % — SIGNIFICANT CHANGE UP (ref 0–0.9)
LYMPHOCYTES # BLD AUTO: 1.76 K/UL — SIGNIFICANT CHANGE UP (ref 1–3.3)
LYMPHOCYTES # BLD AUTO: 20.6 % — SIGNIFICANT CHANGE UP (ref 13–44)
MAGNESIUM SERPL-MCNC: 1.6 MG/DL — SIGNIFICANT CHANGE UP (ref 1.6–2.6)
MCHC RBC-ENTMCNC: 27.2 PG — SIGNIFICANT CHANGE UP (ref 27–34)
MCHC RBC-ENTMCNC: 33.5 G/DL — SIGNIFICANT CHANGE UP (ref 32–36)
MCV RBC AUTO: 81.2 FL — SIGNIFICANT CHANGE UP (ref 80–100)
MONOCYTES # BLD AUTO: 1.04 K/UL — HIGH (ref 0–0.9)
MONOCYTES NFR BLD AUTO: 12.2 % — SIGNIFICANT CHANGE UP (ref 2–14)
NEUTROPHILS # BLD AUTO: 5.53 K/UL — SIGNIFICANT CHANGE UP (ref 1.8–7.4)
NEUTROPHILS NFR BLD AUTO: 64.8 % — SIGNIFICANT CHANGE UP (ref 43–77)
NRBC # BLD AUTO: 0 K/UL — SIGNIFICANT CHANGE UP (ref 0–0)
NRBC # BLD: 0 /100 WBCS — SIGNIFICANT CHANGE UP (ref 0–0)
NRBC # FLD: 0 K/UL — SIGNIFICANT CHANGE UP (ref 0–0)
NRBC BLD-RTO: 0 /100 WBCS — SIGNIFICANT CHANGE UP (ref 0–0)
PHOSPHATE SERPL-MCNC: 2.5 MG/DL — SIGNIFICANT CHANGE UP (ref 2.5–4.5)
PLATELET # BLD AUTO: 180 K/UL — SIGNIFICANT CHANGE UP (ref 150–400)
POTASSIUM SERPL-MCNC: 3.5 MMOL/L — SIGNIFICANT CHANGE UP (ref 3.5–5.3)
POTASSIUM SERPL-SCNC: 3.5 MMOL/L — SIGNIFICANT CHANGE UP (ref 3.5–5.3)
RBC # BLD: 4.63 M/UL — SIGNIFICANT CHANGE UP (ref 3.8–5.2)
RBC # FLD: 12.5 % — SIGNIFICANT CHANGE UP (ref 10.3–14.5)
SODIUM SERPL-SCNC: 134 MMOL/L — LOW (ref 135–145)
SPECIMEN SOURCE: SIGNIFICANT CHANGE UP
WBC # BLD: 8.53 K/UL — SIGNIFICANT CHANGE UP (ref 3.8–10.5)
WBC # FLD AUTO: 8.53 K/UL — SIGNIFICANT CHANGE UP (ref 3.8–10.5)

## 2025-01-16 PROCEDURE — 99232 SBSQ HOSP IP/OBS MODERATE 35: CPT

## 2025-01-16 RX ORDER — INSULIN LISPRO 100/ML
6 VIAL (ML) SUBCUTANEOUS
Refills: 0 | Status: DISCONTINUED | OUTPATIENT
Start: 2025-01-16 | End: 2025-01-16

## 2025-01-16 RX ORDER — INSULIN DETEMIR 100 [IU]/ML
30 INJECTION, SOLUTION SUBCUTANEOUS
Refills: 0 | Status: DISCONTINUED | OUTPATIENT
Start: 2025-01-17 | End: 2025-01-17

## 2025-01-16 RX ORDER — INSULIN DETEMIR 100 [IU]/ML
30 INJECTION, SOLUTION SUBCUTANEOUS
Refills: 0 | Status: DISCONTINUED | OUTPATIENT
Start: 2025-01-16 | End: 2025-01-16

## 2025-01-16 RX ORDER — KETOROLAC TROMETHAMINE 10 MG
15 TABLET ORAL ONCE
Refills: 0 | Status: DISCONTINUED | OUTPATIENT
Start: 2025-01-16 | End: 2025-01-16

## 2025-01-16 RX ORDER — INSULIN LISPRO 100/ML
8 VIAL (ML) SUBCUTANEOUS
Refills: 0 | Status: DISCONTINUED | OUTPATIENT
Start: 2025-01-16 | End: 2025-01-17

## 2025-01-16 RX ORDER — INSULIN DETEMIR 100 [IU]/ML
10 INJECTION, SOLUTION SUBCUTANEOUS ONCE
Refills: 0 | Status: COMPLETED | OUTPATIENT
Start: 2025-01-16 | End: 2025-01-16

## 2025-01-16 RX ADMIN — Medication 6 UNIT(S): at 13:23

## 2025-01-16 RX ADMIN — Medication 6 UNIT(S): at 10:03

## 2025-01-16 RX ADMIN — INSULIN DETEMIR 10 UNIT(S): 100 INJECTION, SOLUTION SUBCUTANEOUS at 13:21

## 2025-01-16 RX ADMIN — ACETAMINOPHEN 650 MILLIGRAM(S): 160 SUSPENSION ORAL at 00:44

## 2025-01-16 RX ADMIN — Medication 4: at 10:02

## 2025-01-16 RX ADMIN — CEFTRIAXONE 100 MILLIGRAM(S): 250 INJECTION, POWDER, FOR SOLUTION INTRAMUSCULAR; INTRAVENOUS at 22:59

## 2025-01-16 RX ADMIN — ASPIRIN 81 MILLIGRAM(S): 81 TABLET, COATED ORAL at 18:00

## 2025-01-16 RX ADMIN — PANTOPRAZOLE 40 MILLIGRAM(S): 20 TABLET, DELAYED RELEASE ORAL at 05:58

## 2025-01-16 RX ADMIN — ATORVASTATIN CALCIUM 10 MILLIGRAM(S): 80 TABLET, FILM COATED ORAL at 22:01

## 2025-01-16 RX ADMIN — Medication 25 MILLIGRAM(S): at 05:58

## 2025-01-16 RX ADMIN — Medication 3: at 13:22

## 2025-01-16 RX ADMIN — INSULIN DETEMIR 20 UNIT(S): 100 INJECTION, SOLUTION SUBCUTANEOUS at 10:04

## 2025-01-16 RX ADMIN — GABAPENTIN 100 MILLIGRAM(S): 800 TABLET ORAL at 18:01

## 2025-01-16 RX ADMIN — ACETAMINOPHEN, DIPHENHYDRAMINE HCL, PHENYLEPHRINE HCL 3 MILLIGRAM(S): 325; 25; 5 TABLET ORAL at 00:43

## 2025-01-16 RX ADMIN — Medication 15 MILLIGRAM(S): at 16:15

## 2025-01-16 RX ADMIN — Medication 8 UNIT(S): at 17:34

## 2025-01-16 RX ADMIN — MIRTAZAPINE 30 MILLIGRAM(S): 30 TABLET, FILM COATED ORAL at 22:01

## 2025-01-16 NOTE — PROGRESS NOTE ADULT - SUBJECTIVE AND OBJECTIVE BOX
Date of service 1/16/25    no chest pain or SOB, ROS otherwise negative    MEDS  acetaminophen     Tablet .. 650 milliGRAM(s) Oral every 6 hours PRN  aluminum hydroxide/magnesium hydroxide/simethicone Suspension 30 milliLiter(s) Oral every 4 hours PRN  aspirin  chewable 81 milliGRAM(s) Oral daily  atorvastatin 10 milliGRAM(s) Oral at bedtime  cefTRIAXone   IVPB 1000 milliGRAM(s) IV Intermittent every 24 hours  dextrose 5%. 1000 milliLiter(s) IV Continuous <Continuous>  dextrose 50% Injectable 25 Gram(s) IV Push once  dextrose Oral Gel 15 Gram(s) Oral once PRN  enoxaparin Injectable 40 milliGRAM(s) SubCutaneous every 24 hours  gabapentin 100 milliGRAM(s) Oral daily  glucagon  Injectable 1 milliGRAM(s) IntraMuscular once  hydrochlorothiazide 12.5 milliGRAM(s) Oral daily  insulin detemir injectable (LEVEMIR) 10 Unit(s) SubCutaneous once  insulin lispro (ADMELOG) corrective regimen sliding scale   SubCutaneous three times a day before meals  insulin lispro (ADMELOG) corrective regimen sliding scale   SubCutaneous at bedtime  insulin lispro Injectable (ADMELOG) 6 Unit(s) SubCutaneous three times a day before meals  losartan 25 milliGRAM(s) Oral daily  melatonin 3 milliGRAM(s) Oral at bedtime PRN  mirtazapine 30 milliGRAM(s) Oral at bedtime  ondansetron Injectable 4 milliGRAM(s) IV Push every 8 hours PRN  pantoprazole    Tablet 40 milliGRAM(s) Oral before breakfast                            12.6   8.53  )-----------( 180      ( 16 Jan 2025 06:00 )             37.6       01-16    134[L]  |  94[L]  |  11  ----------------------------<  313[H]  3.5   |  29  |  0.58    Ca    9.5      16 Jan 2025 06:00  Phos  2.5     01-16  Mg     1.60     01-16    TPro  7.2  /  Alb  3.2[L]  /  TBili  0.7  /  DBili  x   /  AST  24  /  ALT  13  /  AlkPhos  102  01-15      T(C): 36.7 (01-16-25 @ 05:55), Max: 37 (01-15-25 @ 22:02)  HR: 95 (01-16-25 @ 05:55) (95 - 107)  BP: 116/57 (01-16-25 @ 05:55) (91/56 - 122/70)  RR: 14 (01-16-25 @ 05:55) (14 - 18)  SpO2: 99% (01-16-25 @ 05:55) (97% - 100%)  Wt(kg): --    I&O's Summary      General: Well nourished in no acute distress. Alert and Oriented * 3.   Head: Normocephalic and atraumatic.   Neck: No JVD. No bruits. Supple. Does not appear to be enlarged.   Cardiovascular: + S1,S2 ; RRR Soft systolic murmur at the left lower sternal border. No rubs noted.    Lungs: CTA b/l. No rhonchi, rales or wheezes.   Abdomen: + BS, soft. Non tender. Non distended. No rebound. No guarding.   Extremities: No clubbing/cyanosis/edema.   Neurologic: Moves all four extremities. Full range of motion.   Skin: Warm and moist. The patient's skin has normal elasticity and good skin turgor.   Psychiatric: Appropriate mood and affect.  Musculoskeletal: Normal range of motion, normal strength    DATA    no tele    ECG:  	Sinus tachycardia 115bpm      < from: Xray Chest 1 View AP/PA (01.14.25 @ 20:13) >  IMPRESSION:  Clear lungs.    < end of copied text >    ASSESSMENT/PLAN: 	74F with PMH HTN, HLD, type 1 DM on insulin pump, Asthma, colon cad s/p hemicolectomy and chemo, in remission, with historically normal LV function and No ischemia on last NST 11/1023, presents to ER with hyperglycemia.    --insulin pump removed,  Endo f/u noted  --Abnormal UA, F/U Culture  --Sinus tachycardia in the setting of hyperglycemia, resolving  --cont Losartan and HCTZ for HTN if tolerating    Thank you for allowing us to participate in the care of our mutual patient.  Please do not hesitate to call with any questions.     Magdalena WALTERS  [phone]

## 2025-01-16 NOTE — ADVANCED PRACTICE NURSE CONSULT - ASSESSMENT
In chart it states type 1 diabetes. C-peptide drawn today is pending. Patient seen at bedside with endocrine team. Patient confirmed that she does not bolus for meals and that she will cover a BG that is above 155mg/dl. Patient wants to go back on her insulin pump. Patient unsure if insulin pump is with her and will have daughter look or bring in pump and pump supplies, including insulin, when her daughter comes back to the hospital. Endocrine team increasing Levemir dose today and plan to restart patient back on insulin pump tomorrow morning at around 8am. Patient is agreeable once back on insulin pump to cover BG. It was also explained to the patient that she may need to have a set amount of bolus insulin for small, medium, and large meals if correcting BG along with the current basal rate does not control BG. Patient seems to have eaten better yesterday. So far today patient didn’t eat breakfast just had some coffee. Home meal regimen reviewed. Patient mostly eats 3 meals a day. Patient does admit that at times she may not want to eat a meal and will snack on chips. Patient instructed on alternate snack suggestions if she is not up to eating a meal, like peanut butter and crackers. Patient has 770G insulin pump. Patient was instructed on the difference with the 780G and that it would be beneficial to have the Medtronic CGM so her pump can be a closed loop. A “closed loop insulin pump” was also explained to the patient. Patient currently uses a Angely 2 CGM, and it was explained to patient that sometimes she can miss information if she goes longer than 8 hrs without scanning the device. If patient does not want to get the Medtronic CGM, then at least get a Angely 3 CGM so the information will go right to her reader. Patient was also instructed when to check a BG with a BGM. Patient does not like to prick her fingers to check her blood sugar. It was explained to patient that there are different lancet devices available that are less painful it wouldn’t be frequently just when her BG is not matching how she is feeling or if she sees a magnifying glass with a drop on blood by the BG reading on her reader. S/s and proper treatment for hypoglycemia also reviewed with patient. Patient demonstrated understanding of information taught.

## 2025-01-16 NOTE — PROGRESS NOTE ADULT - SUBJECTIVE AND OBJECTIVE BOX
Name of Patient : NESTOR MABRY  MRN: [id number]  Date of visit: 01-16-25       Subjective: Patient seen and examined. No new events except as noted.   BS not well controlled       REVIEW OF SYSTEMS:    CONSTITUTIONAL: No weakness, fevers or chills  EYES/ENT: No visual changes;  No vertigo or throat pain   NECK: No pain or stiffness  RESPIRATORY: No cough, wheezing, hemoptysis; No shortness of breath  CARDIOVASCULAR: No chest pain or palpitations  GASTROINTESTINAL: No abdominal or epigastric pain. No nausea, vomiting, or hematemesis; No diarrhea or constipation. No melena or hematochezia.  GENITOURINARY: No dysuria, frequency or hematuria  NEUROLOGICAL: No numbness or weakness  SKIN: No itching, burning, rashes, or lesions   All other review of systems is negative unless indicated above.    MEDICATIONS:  MEDICATIONS  (STANDING):  aspirin  chewable 81 milliGRAM(s) Oral daily  atorvastatin 10 milliGRAM(s) Oral at bedtime  cefTRIAXone   IVPB 1000 milliGRAM(s) IV Intermittent every 24 hours  dextrose 5%. 1000 milliLiter(s) (50 mL/Hr) IV Continuous <Continuous>  dextrose 50% Injectable 25 Gram(s) IV Push once  enoxaparin Injectable 40 milliGRAM(s) SubCutaneous every 24 hours  gabapentin 100 milliGRAM(s) Oral daily  glucagon  Injectable 1 milliGRAM(s) IntraMuscular once  hydrochlorothiazide 12.5 milliGRAM(s) Oral daily  insulin lispro (ADMELOG) corrective regimen sliding scale   SubCutaneous three times a day before meals  insulin lispro (ADMELOG) corrective regimen sliding scale   SubCutaneous at bedtime  insulin lispro Injectable (ADMELOG) 8 Unit(s) SubCutaneous three times a day before meals  losartan 25 milliGRAM(s) Oral daily  mirtazapine 30 milliGRAM(s) Oral at bedtime  pantoprazole    Tablet 40 milliGRAM(s) Oral before breakfast      PHYSICAL EXAM:  T(C): 37.1 (01-16-25 @ 22:00), Max: 37.1 (01-16-25 @ 22:00)  HR: 100 (01-16-25 @ 22:00) (92 - 100)  BP: 105/59 (01-16-25 @ 22:00) (101/58 - 122/70)  RR: 18 (01-16-25 @ 22:00) (14 - 18)  SpO2: 98% (01-16-25 @ 22:00) (96% - 100%)  Wt(kg): --  I&O's Summary        Appearance: Normal	  HEENT:  PERRLA   Lymphatic: No lymphadenopathy   Cardiovascular: Normal S1 S2, no JVD  Respiratory: normal effort , clear  Gastrointestinal:  Soft, Non-tender  Skin: No rashes,  warm to touch  Psychiatry:  Mood & affect appropriate  Musculuskeletal: No edema    recent labs, Imaging and EKGs personally reviewed   CODE status discussed with the patient in detail                          12.6   8.53  )-----------( 180      ( 16 Jan 2025 06:00 )             37.6               01-16    134[L]  |  94[L]  |  11  ----------------------------<  313[H]  3.5   |  29  |  0.58    Ca    9.5      16 Jan 2025 06:00  Phos  2.5     01-16  Mg     1.60     01-16    TPro  7.2  /  Alb  3.2[L]  /  TBili  0.7  /  DBili  x   /  AST  24  /  ALT  13  /  AlkPhos  102  01-15                       Urinalysis Basic - ( 16 Jan 2025 06:00 )    Color: x / Appearance: x / SG: x / pH: x  Gluc: 313 mg/dL / Ketone: x  / Bili: x / Urobili: x   Blood: x / Protein: x / Nitrite: x   Leuk Esterase: x / RBC: x / WBC x   Sq Epi: x / Non Sq Epi: x / Bacteria: x

## 2025-01-16 NOTE — PROGRESS NOTE ADULT - SUBJECTIVE AND OBJECTIVE BOX
Chief Complaint: Type 1 DM     History: Pt seen at bedside. Pt tolerating oral diet. Pt denies nausea and vomiting/any signs of hypoglycemia. Pt reports an adequate appetite.     MEDICATIONS  (STANDING):  aspirin  chewable 81 milliGRAM(s) Oral daily  atorvastatin 10 milliGRAM(s) Oral at bedtime  cefTRIAXone   IVPB 1000 milliGRAM(s) IV Intermittent every 24 hours  dextrose 5%. 1000 milliLiter(s) (50 mL/Hr) IV Continuous <Continuous>  dextrose 50% Injectable 25 Gram(s) IV Push once  enoxaparin Injectable 40 milliGRAM(s) SubCutaneous every 24 hours  gabapentin 100 milliGRAM(s) Oral daily  glucagon  Injectable 1 milliGRAM(s) IntraMuscular once  hydrochlorothiazide 12.5 milliGRAM(s) Oral daily  insulin lispro (ADMELOG) corrective regimen sliding scale   SubCutaneous three times a day before meals  insulin lispro (ADMELOG) corrective regimen sliding scale   SubCutaneous at bedtime  insulin lispro Injectable (ADMELOG) 6 Unit(s) SubCutaneous three times a day before meals  losartan 25 milliGRAM(s) Oral daily  mirtazapine 30 milliGRAM(s) Oral at bedtime  pantoprazole    Tablet 40 milliGRAM(s) Oral before breakfast    MEDICATIONS  (PRN): acetaminophen     Tablet .. 650 milliGRAM(s) Oral every 6 hours PRN Mild Pain (1 - 3), Moderate Pain (4 - 6), Severe Pain (7 - 10)  aluminum hydroxide/magnesium hydroxide/simethicone Suspension 30 milliLiter(s) Oral every 4 hours PRN Dyspepsia  dextrose Oral Gel 15 Gram(s) Oral once PRN Blood Glucose LESS THAN 70 milliGRAM(s)/deciliter  melatonin 3 milliGRAM(s) Oral at bedtime PRN Insomnia  ondansetron Injectable 4 milliGRAM(s) IV Push every 8 hours PRN Nausea and/or Vomiting      Allergies: Percocet 10/325 (Other; Urticaria)  Lantus (Swelling)  codeine (Other)  Darvocet A500 (Other; Urticaria)  PURELL.  pt said, &quot;I felt my airway closing&quot; after she smelled the Purell. The incident occured at the pulmonologist office. (Other; Short breath)    Review of Systems:  Respiratory: No SOB, no cough  GI: No nausea, vomiting, abdominal pain  Endocrine: no polyuria, polydipsia      PHYSICAL EXAM:  VITALS: T(C): 36.7 (01-16-25 @ 05:55)  T(F): 98.1 (01-16-25 @ 05:55), Max: 98.6 (01-15-25 @ 22:02)  HR: 95 (01-16-25 @ 05:55) (95 - 105)  BP: 116/57 (01-16-25 @ 05:55) (91/56 - 122/70)  RR:  (14 - 18)  SpO2:  (97% - 100%)  Wt(kg): --  GENERAL: NAD, well-groomed, well-developed  RESPIRATORY: No labored breathing   GI: Soft, nontender, non distended  PSYCH: Alert and oriented x 3, normal affect, normal mood      CAPILLARY BLOOD GLUCOSE  POCT Blood Glucose.: 277 mg/dL (16 Jan 2025 13:09)  POCT Blood Glucose.: 301 mg/dL (16 Jan 2025 10:00)  POCT Blood Glucose.: 343 mg/dL (16 Jan 2025 06:03)  POCT Blood Glucose.: 272 mg/dL (16 Jan 2025 01:52)  POCT Blood Glucose.: 300 mg/dL (15 Jw 2025 21:32)  POCT Blood Glucose.: 364 mg/dL (15 Jw 2025 16:51)    A1C with Estimated Average Glucose (01.14.25 @ 19:06)    A1C with Estimated Average Glucose Result: 8.5   Estimated Average Glucose: 197      01-16    134[L]  |  94[L]  |  11  ----------------------------<  313[H]  3.5   |  29  |  0.58    eGFR: 95    Ca    9.5      01-16  Mg     1.60     01-16  Phos  2.5     01-16    TPro  7.2  /  Alb  3.2[L]  /  TBili  0.7  /  DBili  x   /  AST  24  /  ALT  13  /  AlkPhos  102  01-15    Diet, Consistent Carbohydrate/No Snacks (01-15-25 @ 12:01) [Active]                         Chief Complaint: Type 1 DM vs [place] 2 DM     History: Pt seen at bedside. Pt tolerating oral diet. Pt denies nausea and vomiting/any signs of hypoglycemia. Pt reports an adequate appetite.     MEDICATIONS  (STANDING):  aspirin  chewable 81 milliGRAM(s) Oral daily  atorvastatin 10 milliGRAM(s) Oral at bedtime  cefTRIAXone   IVPB 1000 milliGRAM(s) IV Intermittent every 24 hours  dextrose 5%. 1000 milliLiter(s) (50 mL/Hr) IV Continuous <Continuous>  dextrose 50% Injectable 25 Gram(s) IV Push once  enoxaparin Injectable 40 milliGRAM(s) SubCutaneous every 24 hours  gabapentin 100 milliGRAM(s) Oral daily  glucagon  Injectable 1 milliGRAM(s) IntraMuscular once  hydrochlorothiazide 12.5 milliGRAM(s) Oral daily  insulin lispro (ADMELOG) corrective regimen sliding scale   SubCutaneous three times a day before meals  insulin lispro (ADMELOG) corrective regimen sliding scale   SubCutaneous at bedtime  insulin lispro Injectable (ADMELOG) 6 Unit(s) SubCutaneous three times a day before meals  losartan 25 milliGRAM(s) Oral daily  mirtazapine 30 milliGRAM(s) Oral at bedtime  pantoprazole    Tablet 40 milliGRAM(s) Oral before breakfast    MEDICATIONS  (PRN): acetaminophen     Tablet .. 650 milliGRAM(s) Oral every 6 hours PRN Mild Pain (1 - 3), Moderate Pain (4 - 6), Severe Pain (7 - 10)  aluminum hydroxide/magnesium hydroxide/simethicone Suspension 30 milliLiter(s) Oral every 4 hours PRN Dyspepsia  dextrose Oral Gel 15 Gram(s) Oral once PRN Blood Glucose LESS THAN 70 milliGRAM(s)/deciliter  melatonin 3 milliGRAM(s) Oral at bedtime PRN Insomnia  ondansetron Injectable 4 milliGRAM(s) IV Push every 8 hours PRN Nausea and/or Vomiting      Allergies: Percocet 10/325 (Other; Urticaria)  Lantus (Swelling)  codeine (Other)  Darvocet A500 (Other; Urticaria)  PURELL.  pt said, &quot;I felt my airway closing&quot; after she smelled the Purell. The incident occured at the pulmonologist office. (Other; Short breath)    Review of Systems:  Respiratory: No SOB, no cough  GI: No nausea, vomiting, abdominal pain  Endocrine: no polyuria, polydipsia      PHYSICAL EXAM:  VITALS: T(C): 36.7 (01-16-25 @ 05:55)  T(F): 98.1 (01-16-25 @ 05:55), Max: 98.6 (01-15-25 @ 22:02)  HR: 95 (01-16-25 @ 05:55) (95 - 105)  BP: 116/57 (01-16-25 @ 05:55) (91/56 - 122/70)  RR:  (14 - 18)  SpO2:  (97% - 100%)  Wt(kg): --  GENERAL: NAD, well-groomed, well-developed  RESPIRATORY: No labored breathing   GI: Soft, nontender, non distended  PSYCH: Alert and oriented x 3, normal affect, normal mood      CAPILLARY BLOOD GLUCOSE  POCT Blood Glucose.: 277 mg/dL (16 Jan 2025 13:09)  POCT Blood Glucose.: 301 mg/dL (16 Jan 2025 10:00)  POCT Blood Glucose.: 343 mg/dL (16 Jan 2025 06:03)  POCT Blood Glucose.: 272 mg/dL (16 Jan 2025 01:52)  POCT Blood Glucose.: 300 mg/dL (15 Jw 2025 21:32)  POCT Blood Glucose.: 364 mg/dL (15 Jw 2025 16:51)    A1C with Estimated Average Glucose (01.14.25 @ 19:06)    A1C with Estimated Average Glucose Result: 8.5   Estimated Average Glucose: 197      01-16    134[L]  |  94[L]  |  11  ----------------------------<  313[H]  3.5   |  29  |  0.58    eGFR: 95    Ca    9.5      01-16  Mg     1.60     01-16  Phos  2.5     01-16    TPro  7.2  /  Alb  3.2[L]  /  TBili  0.7  /  DBili  x   /  AST  24  /  ALT  13  /  AlkPhos  102  01-15    Diet, Consistent Carbohydrate/No Snacks (01-15-25 @ 12:01) [Active]

## 2025-01-16 NOTE — PROGRESS NOTE ADULT - ASSESSMENT
The patient is a 74y Female with PMH of T1DM on an insulin pump, colon CA s/p surgery/chemo p/w hyperglycemia. Endocrinology consulted for type 1 diabetes on an insulin pump.    #Type 1 Diabetes Mellitus   #Use of insulin pump  - Follows with: Dr. Ledezma  - A1C with Estimated Average Glucose Result: 8.5 % (01-14-25)  - eGFR: 93 mL/min/1.73m2 (01-15-25)  - Weight (kg): 79.4 (01-14-25)  - home regimen:  Insulin pump type: Medtronic   Insulin type: Novolog  Last site change: pump removed 1/14  Supplies available: no  Settings:  Basal:  12a 1.75  6:30a 1.95  12:30p 2.45  6:30p 2.5  Patient does not bolus at all, only uses the basal function. Prior recommended bolus settings from 8/2023:  ICR:  12AM 1:15  7AM 1:15  12:30PM 1:15  ISF 1:30  Glucose Target: 12a- 5a 140-140  5a-10p 110  10p-12am 140  IOB: 4 hours       INPATIENT PLAN:  - Received levemir 20 units at 10am + Levemir 10 units at 1pm today; start Levemir 30 units sq at 12pm - note patient has an allergy to lantus.   - IIncrease Admelog to 8 units TID before meals (HOLD if NPO or not eating)  - Continue low dose Admelog correction scale TIDQAC and separate low dose Admelog correction scale QHS  - Please check FSG before meals and QHS, or q6h while NPO  - Inpatient glucose goals: 100-180: above goal   - consistent carb diet when eating  - FS before meals and at bedtime   - RD consult   - Hypoglycemia Protocol   - Plan is to transition to insulin pump tomorrow morning if daughter bring insulin pump and insulin pump supplies, pt alos only want use her Novolog insulin from home for insulin pump; (daughter to bring it in): once insulin pump is started will dc standing insulin.  Insulin pump must be placed 22 hours post levemir dose.   - unclear if T1 vs T2 DM given no DKA despite lack of basal insulin x24 hours and patient habitus   - follow up c-peptide (send in AM with BMP) to assess insulin production specimen received awaiting results   - DM edcuator following---> please refer to note       DISCHARGE PLANNING:  - Discharge recs pending clinical course: likely will discharge on insulin pump but will need to adjust settings since patient does not bolus at all, only uses the basal function. She states she never has hyperglycemia but that is not reflected by her A1c. She also likely is having hypoglycemia if she does not eat. Will need extensive education on how to use the insulin pump correctly.    - Pt refusing to bolus with insulin pump at this time despite education.  Would like to continue using basal insulin and just correction. Discussed obtaing medtronic sensor as an outpt     - For now continue Freestyle bonnie 2 sensor and reader     - Pt needs insulin pen both basal at home incase of insulin pump failure; please check if below insulin is covered by pts insurance     - Please send Lantus solostar pen and humalog kwikpen as test script to check insurance coverage.  Ok to send with current doses and update prior to d/c    If Lantus not covered- can try alternating with one of following   tresiba/basaglar/toujeo/Levemir    If Humalog not covered- can try alternating with one of following  novolog/apidra/admelog/fiasp    -Ensure patient has working glucometer, test strips, lancets, alcohol pads, and BD sofia pen needles    - For severe hypoglycemia: Please prescribe Glucagon Emergency Kit for Low Blood Sugar 1 mG injection: 1 mG intramuscularly as needed for severe hypoglycemia. Glucose tablets 4G (take 4 tablets) or 15G tablets for blood sugar less than 70 mG/dL repeat fingerstick in 15 minutes.   - patient should followup with their endocrinologist, Dr. Ledezma    - Please make sure pt has insulin, cgm, and insulin pump supplies prior to discharge    #Hypertension  - Goal BP <130/80  - Management as per primary team  - check urine microalbumin level as outpatient    #Hyperlipidemia  - LDL goal <70  - Last LDL: 20 mg/dL (11-29-23)  - check lipid panel as outpatient on a yearly basis        Adrienne Taveras  Nurse Practitioner  Division of Endocrinology & Diabetes  In house pager #[phone]    If before 9AM or after 6PM, or on weekends/holidays, please call endocrine answering service for assistance ([phone]).For nonurgent matters email [email or web address] for assistance.    The patient is a 74y Female with PMH of T1DM on an insulin pump, colon CA s/p surgery/chemo p/w hyperglycemia. Endocrinology consulted for type 1 diabetes on an insulin pump.    #Type 1 Diabetes Mellitus   #Use of insulin pump  - Follows with: Dr. Ledezma  - A1C with Estimated Average Glucose Result: 8.5 % (01-14-25)  - eGFR: 93 mL/min/1.73m2 (01-15-25)  - Weight (kg): 79.4 (01-14-25)  - home regimen:  Insulin pump type: Medtronic   Insulin type: Novolog  Last site change: pump removed 1/14  Supplies available: no  Settings:  Basal:  12a 1.75  6:30a 1.95  12:30p 2.45  6:30p 2.5  Patient does not bolus at all, only uses the basal function. Prior recommended bolus settings from 8/2023:  ICR:  12AM 1:15  7AM 1:15  12:30PM 1:15  ISF 1:30  Glucose Target: 12a- 5a 140-140  5a-10p 110  10p-12am 140  IOB: 4 hours       INPATIENT PLAN:  - Received levemir 20 units at 10am + Levemir 10 units at 1pm today; start Levemir 30 units sq at 12pm - note patient has an allergy to lantus.   - IIncrease Admelog to 8 units TID before meals (HOLD if NPO or not eating)  - Continue low dose Admelog correction scale TIDQAC and separate low dose Admelog correction scale QHS  - Please check FSG before meals and QHS, or q6h while NPO  - Inpatient glucose goals: 100-180: above goal   - consistent carb diet when eating  - FS before meals and at bedtime   - RD consult   - Hypoglycemia Protocol   - Plan is to transition to insulin pump tomorrow morning if daughter bring insulin pump and insulin pump supplies, pt alos only want use her Novolog insulin from home for insulin pump; (daughter to bring it in): once insulin pump is started will dc standing insulin.  Insulin pump must be placed 22 hours post levemir dose.   - unclear if T1 vs T2 DM given no DKA despite lack of basal insulin x24 hours and patient habitus   - follow up c-peptide (send in AM with BMP) to assess insulin production specimen received awaiting results   - DM edcuator following---> please refer to note   - Consider changing abx solution from dextrose to sodium chloride if no contraindications       DISCHARGE PLANNING:  - Discharge recs pending clinical course: likely will discharge on insulin pump but will need to adjust settings since patient does not bolus at all, only uses the basal function. She states she never has hyperglycemia but that is not reflected by her A1c. She also likely is having hypoglycemia if she does not eat. Will need extensive education on how to use the insulin pump correctly.    - Pt refusing to bolus with insulin pump at this time despite education.  Would like to continue using basal insulin and just correction. Discussed obtaing medtronic sensor as an outpt     - For now continue Freestyle bonnie 2 sensor and reader     - Pt needs insulin pen both basal at home incase of insulin pump failure; please check if below insulin is covered by pts insurance     - Please send Lantus solostar pen and humalog kwikpen as test script to check insurance coverage.  Ok to send with current doses and update prior to d/c    If Lantus not covered- can try alternating with one of following   tresiba/basaglar/toujeo/Levemir    If Humalog not covered- can try alternating with one of following  novolog/apidra/admelog/fiasp    -Ensure patient has working glucometer, test strips, lancets, alcohol pads, and BD sofia pen needles    - For severe hypoglycemia: Please prescribe Glucagon Emergency Kit for Low Blood Sugar 1 mG injection: 1 mG intramuscularly as needed for severe hypoglycemia. Glucose tablets 4G (take 4 tablets) or 15G tablets for blood sugar less than 70 mG/dL repeat fingerstick in 15 minutes.   - patient should followup with their endocrinologist, Dr. Ledezma    - Please make sure pt has insulin, cgm, and insulin pump supplies prior to discharge    #Hypertension  - Goal BP <130/80  - Management as per primary team  - check urine microalbumin level as outpatient    #Hyperlipidemia  - LDL goal <70  - Last LDL: 20 mg/dL (11-29-23)  - check lipid panel as outpatient on a yearly basis        Adrienne Taveras  Nurse Practitioner  Division of Endocrinology & Diabetes  In house pager #[phone]    If before 9AM or after 6PM, or on weekends/holidays, please call endocrine answering service for assistance ([phone]).For nonurgent matters email [email or web address] for assistance.    The patient is a 74y Female with PMH of T1DM on an insulin pump, colon CA s/p surgery/chemo p/w hyperglycemia. Endocrinology consulted for type 1 diabetes on an insulin pump.    #Type 1 Diabetes Mellitus   #Use of insulin pump  - Follows with: Dr. Ledezma  - A1C with Estimated Average Glucose Result: 8.5 % (01-14-25)  - eGFR: 93 mL/min/1.73m2 (01-15-25)  - Weight (kg): 79.4 (01-14-25)  - home regimen:  Insulin pump type: Medtronic   Insulin type: Novolog  Last site change: pump removed 1/14  Supplies available: no  Settings:  Basal:  12a 1.75  6:30a 1.95  12:30p 2.45  6:30p 2.5  Patient does not bolus at all, only uses the basal function. Prior recommended bolus settings from 8/2023:  ICR:  12AM 1:15  7AM 1:15  12:30PM 1:15  ISF 1:30  Glucose Target: 12a- 5a 140-140  5a-10p 110  10p-12am 140  IOB: 4 hours       INPATIENT PLAN:  - Received levemir 20 units at 10am + Levemir 10 units at 1pm today; start Levemir 30 units sq at 12pm - note patient has an allergy to lantus.   - IIncrease Admelog to 8 units TID before meals (HOLD if NPO or not eating)  - Continue low dose Admelog correction scale TIDQAC and separate low dose Admelog correction scale QHS  - Please check FSG before meals and QHS, or q6h while NPO  - Inpatient glucose goals: 100-180: above goal   - consistent carb diet when eating  - FS before meals and at bedtime   - RD consult   - Hypoglycemia Protocol   - Plan is to transition to insulin pump tomorrow morning if daughter bring insulin pump and insulin pump supplies, pt alos only want use her Novolog insulin from home for insulin pump; (daughter to bring it in): once insulin pump is started will dc standing insulin.  Insulin pump must be placed 22 hours post levemir dose.   - unclear if T1 vs T2 DM given no DKA despite lack of basal insulin x24 hours and patient habitus   - follow up c-peptide (send in AM with BMP) to assess insulin production specimen received awaiting results   - DM edcuator following---> please refer to note   - Consider changing abx solution from dextrose to sodium chloride if no contraindications       DISCHARGE PLANNING:  - Discharge recs pending clinical course: likely will discharge on insulin pump but will need to adjust settings since patient does not bolus at all, only uses the basal function. She states she never has hyperglycemia but that is not reflected by her A1c. She also likely is having hypoglycemia if she does not eat. Will need extensive education on how to use the insulin pump correctly.    - Pt refusing to bolus with insulin pump at this time despite education.  Would like to continue using basal insulin and just correction. Discussed obtaing medtronic sensor as an outpt     - For now continue Freestyle bonnie 2 sensor and reader     - Pt needs insulin pen both basal at home incase of insulin pump failure; please check if below insulin is covered by pts insurance     - Please send Lantus solostar pen and humalog kwikpen as test script to check insurance coverage.  Ok to send with current doses and update prior to d/c    If Lantus not covered- can try alternating with one of following   tresiba/basaglar/toujeo/Levemir    If Humalog not covered- can try alternating with one of following  novolog/apidra/admelog/fiasp    -Ensure patient has working glucometer, test strips, lancets, alcohol pads, and BD sofia pen needles    - For severe hypoglycemia: Please prescribe Glucagon Emergency Kit for Low Blood Sugar 1 mG injection: 1 mG intramuscularly as needed for severe hypoglycemia. Glucose tablets 4G (take 4 tablets) or 15G tablets for blood sugar less than 70 mG/dL repeat fingerstick in 15 minutes.   - patient should followup with their endocrinologist, Dr. Ledezma    - Please make sure pt has insulin, cgm, and insulin pump supplies prior to discharge    #Hypertension  - Goal BP <130/80  - Management as per primary team  - check urine microalbumin level as outpatient    #Hyperlipidemia  - LDL goal <70  - Last LDL: 20 mg/dL (11-29-23)  - check lipid panel as outpatient on a yearly basis    d/w Rd Taveras  Nurse Practitioner  Division of Endocrinology & Diabetes  In house pager #[phone]    If before 9AM or after 6PM, or on weekends/holidays, please call endocrine answering service for assistance ([phone]).For nonurgent matters email [email or web address] for assistance.  The patient is a 74y Female with PMH of T1DM on an insulin pump, colon CA s/p surgery/chemo p/w hyperglycemia. Endocrinology consulted for type 1 diabetes on an insulin pump.    #Type 1 Diabetes Mellitus vs type 2 DM   #Use of insulin pump  - Follows with: Dr. Ledezma  - A1C with Estimated Average Glucose Result: 8.5 % (01-14-25)  - eGFR: 93 mL/min/1.73m2 (01-15-25)  - Weight (kg): 79.4 (01-14-25)  - home regimen:  Insulin pump type: Medtronic   Insulin type: Novolog  Last site change: pump removed 1/14  Supplies available: no  Settings:  Basal rate ( TBD 51.525)  12a-6: 30a 1.75 unit/hr  6:30a-12:30p 1.95 units/hr  12:30p-6:30p 2.45 units/hr  6:30p-12a 2.5 units/hr  ISF  12a-12a 50---->25  I:C ratio  12a-12a 1:12  Target   Active insulin time 4 hrs      INPATIENT PLAN:  - Stop standing insulin   - Pt was placed back on insulin pump this am with Novolog insulin   - All insulin should be via insulin pump   - Incase of pump failure would give Lantus 42 units stat and Add Admelog 8 unit TID AC (please hold if npo/not eating)   - Please check FSG before meals and QHS, or q6h while NPO  - Inpatient glucose goals: 100-180: above goal. pt denies eating in between meals   - consistent carb diet when eating  - FS before meals and at bedtime   - RD consult   - Hypoglycemia Protocol   - Plan is to transition to insulin pump tomorrow morning if daughter bring insulin pump and insulin pump supplies, pt alos only want use her Novolog insulin from home for insulin pump; (daughter to bring it in): once insulin pump is started will dc standing insulin.  Insulin pump must be placed 22 hours post levemir dose.   - unclear if T1 vs T2 DM given no DKA despite lack of basal insulin x24 hours and patient habitus   - c-peptide 1.9 with serum glucose 313. If pt stays inpt would recommend repeating; if pt is discharged today please repeat cpeptide with serum glucose as an outpt   - DM educator following---> please refer to note   - Consider changing abx solution from dextrose to sodium chloride if no contraindications       DISCHARGE PLANNING:    - Please discharge on insulin pump with settings as above    - She states she never has hyperglycemia but that is not reflected by her A1c. Pt denies having hypoglycemia at home     - Pt refusing to bolus with insulin pump at this time despite education.  Would like to continue using basal insulin and just bolus for correction. Pt was taught how to correct her glucose with correction and correction settings were adjusted. Would recommend pt to learn how to carb count as an outpt.  If unable to do carb counting atleast at a minimum insulin pump should be programmed for pt to receive bolus insulin based on the amount she eats (for example have a bolus amount for small, medium, and large meals).  This is suggestion to have better control of glucose levels at this time other pump settings may need to be adjusted.      - Patient has 770G insulin pump. Patient was instructed on the difference with the 780G and that it would be beneficial to have the Medtronic CGM so her pump can be a closed loop. A “closed loop insulin pump” was also explained to the patient. Patient currently uses a Angely 2 CGM, and it was explained to patient that sometimes she can miss information if she goes longer than 8 hrs without scanning the device. If patient does not want to get the Medtronic CGM, then at least get a Angely 3 CGM so the information will go right to her reader. Pt also instructed at her next endocrine follow up with insulin pump representative    - For now continue Freestyle angely 2 sensor and reader     - c-peptide 1.9 with serum glucose 313 maybe falsely elevated. If pt stays inpt would recommend repeating; if pt is discharged today please repeat cpeptide with serum glucose as an outpt     - Pt needs insulin pen both basal at home incase of insulin pump failure; please check if below insulin is covered by pts insurance     - Please send Lantus solostar pen and humalog kwikpen as test script to check insurance coverage.  Ok to send with current doses and update prior to d/c    If Lantus not covered- can try alternating with one of following   tresiba/basaglar/toujeo/Levemir    If Humalog not covered- can try alternating with one of following  novolog/apidra/admelog/fiasp    - Ensure patient has working glucometer, test strips, lancets, alcohol pads, and BD sofia pen needles    - For severe hypoglycemia: Please prescribe Glucagon Emergency Kit for Low Blood Sugar 1 mG injection: 1 mG intramuscularly as needed for severe hypoglycemia. Glucose tablets 4G (take 4 tablets) or 15G tablets for blood sugar less than 70 mG/dL repeat fingerstick in 15 minutes.     - patient should followup with their endocrinologist, Dr. Ledezma recently saw Dr. Ledezma last week; advised pt to make an appointment upon discharge     - Please make sure pt has insulin, cgm, and insulin pen, and insulin pump supplies prior to discharge    #Hypertension  - Goal BP <130/80  - Management as per primary team  - check urine microalbumin level as outpatient    #Hyperlipidemia  - LDL goal <70  - Last LDL: 20 mg/dL (11-29-23)  - check lipid panel as outpatient on a yearly basis    d/w James Taveras  Nurse Practitioner  Division of Endocrinology & Diabetes  In house pager #[phone]    If before 9AM or after 6PM, or on weekends/holidays, please call endocrine answering service for assistance ([phone]).For nonurgent matters email [email or web address] for assistance.  The patient is a 74y Female with PMH of T1DM on an insulin pump, colon CA s/p surgery/chemo p/w hyperglycemia. Endocrinology consulted for type 1 diabetes on an insulin pump.    #Type 1 Diabetes Mellitus vs type 2 DM   #Use of insulin pump  - Follows with: Dr. Ledezma  - A1C with Estimated Average Glucose Result: 8.5 % (01-14-25)  - eGFR: 93 mL/min/1.73m2 (01-15-25)  - Weight (kg): 79.4 (01-14-25)  - home regimen:  Insulin pump type: Medtronic   Insulin type: Novolog  Last site change: pump removed 1/14  Supplies available: no  Settings:  Basal rate ( TBD 51.525)  12a-6: 30a 1.75 unit/hr  6:30a-12:30p 1.95 units/hr  12:30p-6:30p 2.45 units/hr  6:30p-12a 2.5 units/hr  ISF  12a-12a 50---->25  I:C ratio  12a-12a 1:12  Target   Active insulin time 4 hrs      INPATIENT PLAN:  - Glucose target 100-180mg/dl: above goal   - Received Levemir 20 units + Levemir 10units; change to Levemir 30 units in am   - Increase Admelog to 8 units TID AC (please hold if npo/not eating   - Continue low Admelog correction scale TID AC and low Admelog correction scale at bedtime   - Please check FSG before meals and QHS, or q6h while NPO  - Inpatient glucose goals: 100-180: above goal. pt denies eating in between meals   - consistent carb diet when eating  - FS before meals and at bedtime   - RD consult   - Hypoglycemia Protocol   - Plan is to transition to insulin pump tomorrow morning if daughter bring insulin pump and insulin pump supplies, pt alos only want use her Novolog insulin from home for insulin pump; (daughter to bring it in): once insulin pump is started will dc standing insulin.  Insulin pump must be placed 22 hours post levemir dose.   - unclear if T1 vs T2 DM given no DKA despite lack of basal insulin x24 hours and patient habitus   - c-peptide 1.9 with serum glucose 313. If pt stays inpt would recommend repeating; if pt is discharged today please repeat cpeptide with serum glucose as an outpt   - DM educator following---> please refer to note   - Consider changing abx solution from dextrose to sodium chloride if no contraindications       DISCHARGE PLANNING:    - Please discharge on insulin pump with settings as above    - She states she never has hyperglycemia but that is not reflected by her A1c. Pt denies having hypoglycemia at home     - Pt refusing to bolus with insulin pump at this time despite education.  Would like to continue using basal insulin and just bolus for correction. Pt was taught how to correct her glucose with correction and correction settings were adjusted. Would recommend pt to learn how to carb count as an outpt.  If unable to do carb counting atleast at a minimum insulin pump should be programmed for pt to receive bolus insulin based on the amount she eats (for example have a bolus amount for small, medium, and large meals).  This is suggestion to have better control of glucose levels at this time other pump settings may need to be adjusted.      - Patient has 770G insulin pump. Patient was instructed on the difference with the 780G and that it would be beneficial to have the [employer] CGM so her pump can be a closed loop. A “closed loop insulin pump” was also explained to the patient. Patient currently uses a Angely 2 CGM, and it was explained to patient that sometimes she can miss information if she goes longer than 8 hrs without scanning the device. If patient does not want to get the Medtronic CGM, then at least get a Angely 3 CGM so the information will go right to her reader. Pt also instructed at her next endocrine follow up with insulin pump representative    - For now continue Freestyle angely 2 sensor and reader     - Pt needs insulin pen both basal at home incase of insulin pump failure; please check if below insulin is covered by pts insurance     - Please send tresiba/basaglar/toujeo/Levemirsolostar pen and humalog kwikpen as test script to check insurance coverage.  Ok to send with current doses and update prior to d/c    If Humalog not covered- can try alternating with one of following  novolog/apidra/admelog/fiasp    - Ensure patient has working glucometer, test strips, lancets, alcohol pads, and BD sofia pen needles    - For severe hypoglycemia: Please prescribe Glucagon Emergency Kit for Low Blood Sugar 1 mG injection: 1 mG intramuscularly as needed for severe hypoglycemia. Glucose tablets 4G (take 4 tablets) or 15G tablets for blood sugar less than 70 mG/dL repeat fingerstick in 15 minutes.     - patient should followup with their endocrinologist, Dr. Ledezma recently saw Dr. Ledezma last week; advised pt to make an appointment upon discharge     - Please make sure pt has insulin, cgm, and insulin pen, and insulin pump supplies prior to discharge    #Hypertension  - Goal BP <130/80  - Management as per primary team  - check urine microalbumin level as outpatient    #Hyperlipidemia  - LDL goal <70  - Last LDL: 20 mg/dL (11-29-23)  - check lipid panel as outpatient on a yearly basis      Adrienne Taveras  Nurse Practitioner  Division of Endocrinology & Diabetes  In house pager #[phone]    If before 9AM or after 6PM, or on weekends/holidays, please call endocrine answering service for assistance ([phone]).For nonurgent matters email [email or web address] for assistance.

## 2025-01-17 LAB
ANION GAP SERPL CALC-SCNC: 10 MMOL/L — SIGNIFICANT CHANGE UP (ref 7–14)
BASOPHILS # BLD AUTO: 0.04 K/UL — SIGNIFICANT CHANGE UP (ref 0–0.2)
BASOPHILS NFR BLD AUTO: 0.5 % — SIGNIFICANT CHANGE UP (ref 0–2)
BUN SERPL-MCNC: 14 MG/DL — SIGNIFICANT CHANGE UP (ref 7–23)
CALCIUM SERPL-MCNC: 9 MG/DL — SIGNIFICANT CHANGE UP (ref 8.4–10.5)
CHLORIDE SERPL-SCNC: 96 MMOL/L — LOW (ref 98–107)
CO2 SERPL-SCNC: 30 MMOL/L — SIGNIFICANT CHANGE UP (ref 22–31)
CREAT SERPL-MCNC: 0.61 MG/DL — SIGNIFICANT CHANGE UP (ref 0.5–1.3)
EGFR: 94 ML/MIN/1.73M2 — SIGNIFICANT CHANGE UP
EOSINOPHIL # BLD AUTO: 0.13 K/UL — SIGNIFICANT CHANGE UP (ref 0–0.5)
EOSINOPHIL NFR BLD AUTO: 1.6 % — SIGNIFICANT CHANGE UP (ref 0–6)
GLUCOSE BLDC GLUCOMTR-MCNC: 277 MG/DL — HIGH (ref 70–99)
GLUCOSE BLDC GLUCOMTR-MCNC: 298 MG/DL — HIGH (ref 70–99)
GLUCOSE BLDC GLUCOMTR-MCNC: 301 MG/DL — HIGH (ref 70–99)
GLUCOSE BLDC GLUCOMTR-MCNC: 307 MG/DL — HIGH (ref 70–99)
GLUCOSE BLDC GLUCOMTR-MCNC: 324 MG/DL — HIGH (ref 70–99)
GLUCOSE BLDC GLUCOMTR-MCNC: 360 MG/DL — HIGH (ref 70–99)
GLUCOSE BLDC GLUCOMTR-MCNC: 90 MG/DL — SIGNIFICANT CHANGE UP (ref 70–99)
GLUCOSE SERPL-MCNC: 343 MG/DL — HIGH (ref 70–99)
HCT VFR BLD CALC: 38.7 % — SIGNIFICANT CHANGE UP (ref 34.5–45)
HGB BLD-MCNC: 12.6 G/DL — SIGNIFICANT CHANGE UP (ref 11.5–15.5)
IANC: 4.71 K/UL — SIGNIFICANT CHANGE UP (ref 1.8–7.4)
IMM GRANULOCYTES NFR BLD AUTO: 0.4 % — SIGNIFICANT CHANGE UP (ref 0–0.9)
LYMPHOCYTES # BLD AUTO: 2.02 K/UL — SIGNIFICANT CHANGE UP (ref 1–3.3)
LYMPHOCYTES # BLD AUTO: 25.5 % — SIGNIFICANT CHANGE UP (ref 13–44)
MAGNESIUM SERPL-MCNC: 1.6 MG/DL — SIGNIFICANT CHANGE UP (ref 1.6–2.6)
MCHC RBC-ENTMCNC: 26.9 PG — LOW (ref 27–34)
MCHC RBC-ENTMCNC: 32.6 G/DL — SIGNIFICANT CHANGE UP (ref 32–36)
MCV RBC AUTO: 82.5 FL — SIGNIFICANT CHANGE UP (ref 80–100)
MONOCYTES # BLD AUTO: 0.98 K/UL — HIGH (ref 0–0.9)
MONOCYTES NFR BLD AUTO: 12.4 % — SIGNIFICANT CHANGE UP (ref 2–14)
NEUTROPHILS # BLD AUTO: 4.71 K/UL — SIGNIFICANT CHANGE UP (ref 1.8–7.4)
NEUTROPHILS NFR BLD AUTO: 59.6 % — SIGNIFICANT CHANGE UP (ref 43–77)
NRBC # BLD AUTO: 0 K/UL — SIGNIFICANT CHANGE UP (ref 0–0)
NRBC # BLD: 0 /100 WBCS — SIGNIFICANT CHANGE UP (ref 0–0)
NRBC # FLD: 0 K/UL — SIGNIFICANT CHANGE UP (ref 0–0)
NRBC BLD-RTO: 0 /100 WBCS — SIGNIFICANT CHANGE UP (ref 0–0)
PHOSPHATE SERPL-MCNC: 2.3 MG/DL — LOW (ref 2.5–4.5)
PLATELET # BLD AUTO: 187 K/UL — SIGNIFICANT CHANGE UP (ref 150–400)
POTASSIUM SERPL-MCNC: 3.4 MMOL/L — LOW (ref 3.5–5.3)
POTASSIUM SERPL-SCNC: 3.4 MMOL/L — LOW (ref 3.5–5.3)
RBC # BLD: 4.69 M/UL — SIGNIFICANT CHANGE UP (ref 3.8–5.2)
RBC # FLD: 12.5 % — SIGNIFICANT CHANGE UP (ref 10.3–14.5)
SODIUM SERPL-SCNC: 136 MMOL/L — SIGNIFICANT CHANGE UP (ref 135–145)
WBC # BLD: 7.91 K/UL — SIGNIFICANT CHANGE UP (ref 3.8–10.5)
WBC # FLD AUTO: 7.91 K/UL — SIGNIFICANT CHANGE UP (ref 3.8–10.5)

## 2025-01-17 PROCEDURE — 99232 SBSQ HOSP IP/OBS MODERATE 35: CPT

## 2025-01-17 RX ORDER — INSULIN ASPART 100 [IU]/ML
1 INJECTION, SOLUTION INTRAVENOUS; SUBCUTANEOUS
Refills: 0 | Status: DISCONTINUED | OUTPATIENT
Start: 2025-01-17 | End: 2025-01-18

## 2025-01-17 RX ORDER — POTASSIUM CHLORIDE 750 MG/1
40 TABLET, EXTENDED RELEASE ORAL ONCE
Refills: 0 | Status: COMPLETED | OUTPATIENT
Start: 2025-01-17 | End: 2025-01-17

## 2025-01-17 RX ORDER — SODIUM PHOSPHATE, DIBASIC, ANHYDROUS, POTASSIUM PHOSPHATE, MONOBASIC, AND SODIUM PHOSPHATE, MONOBASIC, MONOHYDRATE 852; 155; 130 MG/1; MG/1; MG/1
1 TABLET, COATED ORAL THREE TIMES A DAY
Refills: 0 | Status: COMPLETED | OUTPATIENT
Start: 2025-01-17 | End: 2025-01-18

## 2025-01-17 RX ADMIN — POTASSIUM CHLORIDE 40 MILLIEQUIVALENT(S): 750 TABLET, EXTENDED RELEASE ORAL at 13:49

## 2025-01-17 RX ADMIN — Medication 25 MILLIGRAM(S): at 06:06

## 2025-01-17 RX ADMIN — CEFTRIAXONE 100 MILLIGRAM(S): 250 INJECTION, POWDER, FOR SOLUTION INTRAMUSCULAR; INTRAVENOUS at 22:35

## 2025-01-17 RX ADMIN — MIRTAZAPINE 30 MILLIGRAM(S): 30 TABLET, FILM COATED ORAL at 22:35

## 2025-01-17 RX ADMIN — PANTOPRAZOLE 40 MILLIGRAM(S): 20 TABLET, DELAYED RELEASE ORAL at 06:06

## 2025-01-17 RX ADMIN — ENOXAPARIN SODIUM 40 MILLIGRAM(S): 100 INJECTION SUBCUTANEOUS at 06:07

## 2025-01-17 RX ADMIN — ATORVASTATIN CALCIUM 10 MILLIGRAM(S): 80 TABLET, FILM COATED ORAL at 22:35

## 2025-01-17 RX ADMIN — ASPIRIN 81 MILLIGRAM(S): 81 TABLET, COATED ORAL at 12:23

## 2025-01-17 RX ADMIN — SODIUM PHOSPHATE, DIBASIC, ANHYDROUS, POTASSIUM PHOSPHATE, MONOBASIC, AND SODIUM PHOSPHATE, MONOBASIC, MONOHYDRATE 1 PACKET(S): 852; 155; 130 TABLET, COATED ORAL at 22:35

## 2025-01-17 RX ADMIN — GABAPENTIN 100 MILLIGRAM(S): 800 TABLET ORAL at 12:23

## 2025-01-17 NOTE — PHYSICAL THERAPY INITIAL EVALUATION ADULT - PERTINENT HX OF CURRENT PROBLEM, REHAB EVAL
74F with history of type 1 diabetes poorly controlled on insulin pump, colorectal cancer in remission presents to ED for hyperglycemia. 74F with history of type 1 diabetes poorly controlled on insulin pump, colorectal cancer in remission presents to emergency department for hyperglycemia.

## 2025-01-17 NOTE — PROGRESS NOTE ADULT - SUBJECTIVE AND OBJECTIVE BOX
DATE OF SERVICE: 01-17-25    Patient denies chest pain or shortness of breath.   Review of symptoms otherwise negative.    MEDICATIONS:  acetaminophen     Tablet .. 650 milliGRAM(s) Oral every 6 hours PRN  aluminum hydroxide/magnesium hydroxide/simethicone Suspension 30 milliLiter(s) Oral every 4 hours PRN  aspirin  chewable 81 milliGRAM(s) Oral daily  atorvastatin 10 milliGRAM(s) Oral at bedtime  cefTRIAXone   IVPB 1000 milliGRAM(s) IV Intermittent every 24 hours  dextrose 5%. 1000 milliLiter(s) IV Continuous <Continuous>  dextrose 50% Injectable 25 Gram(s) IV Push once  dextrose Oral Gel 15 Gram(s) Oral once PRN  enoxaparin Injectable 40 milliGRAM(s) SubCutaneous every 24 hours  gabapentin 100 milliGRAM(s) Oral daily  glucagon  Injectable 1 milliGRAM(s) IntraMuscular once  hydrochlorothiazide 12.5 milliGRAM(s) Oral daily  losartan 25 milliGRAM(s) Oral daily  melatonin 3 milliGRAM(s) Oral at bedtime PRN  mirtazapine 30 milliGRAM(s) Oral at bedtime  ondansetron Injectable 4 milliGRAM(s) IV Push every 8 hours PRN  pantoprazole    Tablet 40 milliGRAM(s) Oral before breakfast      LABS:                        12.6   7.91  )-----------( 187      ( 17 Jan 2025 06:30 )             38.7       Hemoglobin: 12.6 g/dL (01-17 @ 06:30)  Hemoglobin: 12.6 g/dL (01-16 @ 06:00)  Hemoglobin: 12.5 g/dL (01-15 @ 06:47)  Hemoglobin: 14.4 g/dL (01-14 @ 19:06)      01-17    136  |  96[L]  |  14  ----------------------------<  343[H]  3.4[L]   |  30  |  0.61    Ca    9.0      17 Jan 2025 06:30  Phos  2.3     01-17  Mg     1.60     01-17      Creatinine Trend: 0.61<--, 0.58<--, 0.65<--, 0.75<--, 1.13<--    COAGS:           PHYSICAL EXAM:  T(C): 36.9 (01-17-25 @ 06:00), Max: 37.1 (01-16-25 @ 22:00)  HR: 99 (01-17-25 @ 06:00) (92 - 100)  BP: 120/68 (01-17-25 @ 06:00) (101/58 - 120/68)  RR: 18 (01-17-25 @ 06:00) (17 - 18)  SpO2: 98% (01-17-25 @ 06:00) (96% - 98%)  Wt(kg): --    I&O's Summary    16 Jan 2025 07:01  -  17 Jan 2025 07:00  --------------------------------------------------------  IN: 0 mL / OUT: 300 mL / NET: -300 mL            General: Well nourished in no acute distress.   Head: Normocephalic and atraumatic.   Neck: No JVD. No bruits. Supple. Does not appear to be enlarged.   Cardiovascular: + S1,S2 ; RRR Soft systolic murmur at the left lower sternal border. No rubs noted.    Lungs: CTA b/l. No rhonchi, rales or wheezes.   Abdomen: + BS, soft. Non tender. Non distended. No rebound. No guarding.   Extremities: No clubbing/cyanosis/edema.   Neurologic: Moves all four extremities. Full range of motion.   Skin: Warm and moist. The patient's skin has normal elasticity and good skin turgor.   Psychiatric: Appropriate mood and affect.  Musculoskeletal: Normal range of motion, normal strength    DATA    no tele    ECG:  	Sinus tachycardia 115bpm      < from: Xray Chest 1 View AP/PA (01.14.25 @ 20:13) >  IMPRESSION:  Clear lungs.    < end of copied text >    ASSESSMENT/PLAN: 	74F with PMH HTN, HLD, type 1 DM on insulin pump, Asthma, colon cad s/p hemicolectomy and chemo, in remission, with historically normal LV function and No ischemia on last NST 11/1023, presents to ER with hyperglycemia.      Sinus Tach  --Abnormal UA, on CTX  --Sinus tachycardia in the setting of hyperglycemia, resolving  --cont Losartan and HCTZ for HTN if tolerating    Suchet Willy GO  Pager: [phone]

## 2025-01-17 NOTE — ADVANCED PRACTICE NURSE CONSULT - ASSESSMENT
Unsure pt with Type 1,  GEOVANY or type 2. C-peptide being repeated with fasting glucose in am.   Plan for patient to restart insulin pump at 8am. Patient had everything at bedside. [facility] as she restarted insulin pump and restarted Angely 2 CGM. Insulin pump inserted in rt abdomen. Site intact. Angely CGM inserted in left arm. Patient instructed on the proper was to insert Angely 2 CGM. Fingerstick done while [facility] at bedside. Patient instructed on how to correct a BG. With direction patient able to give correction bolus.   At between 11-12 pm patient called [facility] and said pump pulled out accidently as patient was washing up. Patient did not think she had an extra insertion set. [facility] called endocrine team and when endocrine NP saw patient found an extra insertion set and was able to restart insulin pump. Patient called [facility] at 12pm because she was having difficulty bolusing insulin. [facility] when to see patient and walked patient through how to give a correction bolus. Case discussed with endocrine NP and ISF changed to 25 as patient’s BG remain elevated. Patient made aware of the setting change.  [facility] returned at 2pm to have patient teach back and practice how to use give a correction bolus. Patient was able to do it a little easier with less direction that previously.   BG remain elevated and [facility] was called by endocrine NP regarding plan. It was decided that correcting BG was not controlling patients BG enough. Patient was taught how to either add 6 units for a meal( if eating a meal) to the correction bolus or if in the hospital and correcting a BG and then later the meal comes how to give a manual bolus of 6 units. patient able teach back procedure well with little to no input from [facility].   Patient instructed that she would benefit from iLet insulin pump and just have to do meal announcements. Patient was also instructed on the benefit of being an insulin pump with a CGM that is closed loop. Patient stated she will speak to endocrinologist.   Patient demonstrated understanding of information taught.

## 2025-01-17 NOTE — PROGRESS NOTE ADULT - SUBJECTIVE AND OBJECTIVE BOX
Chief Complaint: Type 1 dm vs Type 2 DM     History: Pt seen at bedside. Pt tolerating oral diet. Pt denies nausea and vomiting/any signs of hypoglycemia. Pt reports an adequate appetite.     MEDICATIONS  (STANDING):  aspirin  chewable 81 milliGRAM(s) Oral daily  atorvastatin 10 milliGRAM(s) Oral at bedtime  cefTRIAXone   IVPB 1000 milliGRAM(s) IV Intermittent every 24 hours  dextrose 5%. 1000 milliLiter(s) (50 mL/Hr) IV Continuous <Continuous>  dextrose 50% Injectable 25 Gram(s) IV Push once  enoxaparin Injectable 40 milliGRAM(s) SubCutaneous every 24 hours  gabapentin 100 milliGRAM(s) Oral daily  glucagon  Injectable 1 milliGRAM(s) IntraMuscular once  hydrochlorothiazide 12.5 milliGRAM(s) Oral daily  insulin aspart (NovoLOG) Pump 1 Each SubCutaneous Continuous Pump  losartan 25 milliGRAM(s) Oral daily  mirtazapine 30 milliGRAM(s) Oral at bedtime  pantoprazole    Tablet 40 milliGRAM(s) Oral before breakfast  potassium phosphate / sodium phosphate Powder (PHOS-NaK) 1 Packet(s) Oral three times a day    MEDICATIONS  (PRN):  acetaminophen     Tablet .. 650 milliGRAM(s) Oral every 6 hours PRN Mild Pain (1 - 3), Moderate Pain (4 - 6), Severe Pain (7 - 10)  aluminum hydroxide/magnesium hydroxide/simethicone Suspension 30 milliLiter(s) Oral every 4 hours PRN Dyspepsia  dextrose Oral Gel 15 Gram(s) Oral once PRN Blood Glucose LESS THAN 70 milliGRAM(s)/deciliter  melatonin 3 milliGRAM(s) Oral at bedtime PRN Insomnia  ondansetron Injectable 4 milliGRAM(s) IV Push every 8 hours PRN Nausea and/or Vomiting      Allergies: Percocet 10/325 (Other; Urticaria)  Lantus (Swelling)  codeine (Other)  Darvocet A500 (Other; Urticaria)  PURELL.  pt said, &quot;I felt my airway closing&quot; after she smelled the Purell. The incident occured at the pulmonologist office. (Other; Short breath)    Intolerances      Review of Systems:  Respiratory: No SOB, no cough  GI: No nausea, vomiting, abdominal pain  Endocrine: no polyuria, polydipsia      PHYSICAL EXAM:  VITALS: T(C): 36.9 (01-17-25 @ 16:15)  T(F): 98.4 (01-17-25 @ 16:15), Max: 98.8 (01-16-25 @ 22:00)  HR: 89 (01-17-25 @ 16:15) (89 - 100)  BP: 100/60 (01-17-25 @ 16:15) (100/60 - 120/68)  RR:  (18 - 18)  SpO2:  (98% - 100%)  Wt(kg): --  GENERAL: NAD, well-groomed, well-developed  RESPIRATORY: No labored breathing   GI: Soft, nontender, non distended  PSYCH: Alert and oriented x 3, normal affect, normal mood      CAPILLARY BLOOD GLUCOSE  POCT Blood Glucose.: 324 mg/dL (17 Jan 2025 16:29)  POCT Blood Glucose.: 277 mg/dL (17 Jan 2025 11:57)  POCT Blood Glucose.: 301 mg/dL (17 Jan 2025 08:06)  POCT Blood Glucose.: 360 mg/dL (17 Jan 2025 06:11)  POCT Blood Glucose.: 307 mg/dL (17 Jan 2025 01:23)  POCT Blood Glucose.: 196 mg/dL (16 Jan 2025 21:23)    A1C with Estimated Average Glucose (01.14.25 @ 19:06)    A1C with Estimated Average Glucose Result: 8.5   Estimated Average Glucose: 197      01-17    136  |  96[L]  |  14  ----------------------------<  343[H]  3.4[L]   |  30  |  0.61    eGFR: 94    Ca    9.0      01-17  Mg     1.60     01-17  Phos  2.3     01-17    TPro  7.2  /  Alb  3.2[L]  /  TBili  0.7  /  DBili  x   /  AST  24  /  ALT  13  /  AlkPhos  102  01-15    Diet, Consistent Carbohydrate/No Snacks (01-15-25 @ 12:01) [Active]

## 2025-01-17 NOTE — PHYSICAL THERAPY INITIAL EVALUATION ADULT - PATIENT PROFILE REVIEW, REHAB EVAL
Activity Order - Ambulate with Assistance. Spoke with MARLA Little prior to the start of the session -> Patient is OK to be seen for Physical Therapy Initial Evaluation and Out of Bed activities./yes

## 2025-01-17 NOTE — ADVANCED PRACTICE NURSE CONSULT - ASSESSMENT
General: A&Ox4, turns independently, continent of urine and stool. Skin warm, dry with increased moisture in intertriginous folds, adequate skin turgor. BL LE with +2 edema, warm to touch, dry intact skin.     Sacrum to bilateral buttocks with moisture associated dermatitis as evidenced by erythema and increased moisture, complicated by friction. No evidence of candidiasis.  General: A&Ox4, turns independently, continent of urine and stool. Skin warm, dry with increased moisture in intertriginous folds, adequate skin turgor. BL LE with +2 edema, warm to touch, dry intact skin.     Sacrum to bilateral buttocks with moisture associated dermatitis as evidenced by erythema and increased moisture, scattered denudations, complicated by friction. No evidence of candidiasis.

## 2025-01-17 NOTE — PHYSICAL THERAPY INITIAL EVALUATION ADULT - GENERAL OBSERVATIONS, REHAB EVAL
Patient was seen for Physical Therapy Initial Evaluation in the semisupine position, in NAD, A&O x4, + bed alarm, and + primafit. Seated Blood pressure: 112/71 mmHG.

## 2025-01-17 NOTE — PHYSICAL THERAPY INITIAL EVALUATION ADULT - MANUAL MUSCLE TESTING RESULTS, REHAB EVAL
Left Lower Extremity grossly 3+/5. Bilateral Upper Extremities and Right lower extremity 5/5 grossly.

## 2025-01-17 NOTE — PHYSICAL THERAPY INITIAL EVALUATION ADULT - ADDITIONAL COMMENTS
Patient reports she lives on the third floor of an apartment complex, and has 3 steps to enter with bilateral handrails, but are too wide apart to use at the same time. Patient states there is an elevator to take to the third floor, but if it is out of order, she needs to navigate 14 steps with to her apartment. As per patient she owns a rolling walker and a rollator, and denies any falls within the last 6 months. Patient states she goes to outpatient physical therapy for the right leg weakness. Patient was left in the semisupine position, in NAD, +primafit, bed alarm turned on, and call sauceda within reach. MARLA Little made aware at the end of session. Patient reports she lives on the third floor of an apartment complex, and has 3 steps to enter with bilateral handrails, but are too wide apart to use at the same time. Patient states there is an elevator to take to the third floor, but if it is out of order, she needs to navigate 14 steps to her apartment. As per patient she owns a rolling walker and a rollator, and denies any falls within the last 6 months. Patient states she goes to outpatient physical therapy for the right leg weakness. Patient was left in the semisupine position, in NAD, +primafit, bed alarm turned on, and call sauceda within reach. MARLA Little made aware at the end of session.

## 2025-01-17 NOTE — PROGRESS NOTE ADULT - SUBJECTIVE AND OBJECTIVE BOX
SUBJECTIVE/ OVERNIGHT EVENTS:  seen earlier today  awake alert  FS reviewed.  no cp, no sob, no n/v/d. no abdominal pain.  no headache, no dizziness.     --------------------------------------------------------------------------------------------  LABS:                        12.6   7.91  )-----------( 187      ( 17 Jan 2025 06:30 )             38.7     01-17    136  |  96[L]  |  14  ----------------------------<  343[H]  3.4[L]   |  30  |  0.61    Ca    9.0      17 Jan 2025 06:30  Phos  2.3     01-17  Mg     1.60     01-17        CAPILLARY BLOOD GLUCOSE      POCT Blood Glucose.: 324 mg/dL (17 Jan 2025 16:29)  POCT Blood Glucose.: 277 mg/dL (17 Jan 2025 11:57)  POCT Blood Glucose.: 301 mg/dL (17 Jan 2025 08:06)  POCT Blood Glucose.: 360 mg/dL (17 Jan 2025 06:11)  POCT Blood Glucose.: 307 mg/dL (17 Jan 2025 01:23)  POCT Blood Glucose.: 196 mg/dL (16 Jan 2025 21:23)        Urinalysis Basic - ( 17 Jan 2025 06:30 )    Color: x / Appearance: x / SG: x / pH: x  Gluc: 343 mg/dL / Ketone: x  / Bili: x / Urobili: x   Blood: x / Protein: x / Nitrite: x   Leuk Esterase: x / RBC: x / WBC x   Sq Epi: x / Non Sq Epi: x / Bacteria: x        RADIOLOGY & ADDITIONAL TESTS:    Imaging Personally Reviewed:  [x] YES  [ ] NO    Consultant(s) Notes Reviewed:  [x] YES  [ ] NO    MEDICATIONS  (STANDING):  aspirin  chewable 81 milliGRAM(s) Oral daily  atorvastatin 10 milliGRAM(s) Oral at bedtime  cefTRIAXone   IVPB 1000 milliGRAM(s) IV Intermittent every 24 hours  dextrose 5%. 1000 milliLiter(s) (50 mL/Hr) IV Continuous <Continuous>  dextrose 50% Injectable 25 Gram(s) IV Push once  enoxaparin Injectable 40 milliGRAM(s) SubCutaneous every 24 hours  gabapentin 100 milliGRAM(s) Oral daily  glucagon  Injectable 1 milliGRAM(s) IntraMuscular once  hydrochlorothiazide 12.5 milliGRAM(s) Oral daily  insulin aspart (NovoLOG) Pump 1 Each SubCutaneous Continuous Pump  losartan 25 milliGRAM(s) Oral daily  mirtazapine 30 milliGRAM(s) Oral at bedtime  pantoprazole    Tablet 40 milliGRAM(s) Oral before breakfast  potassium phosphate / sodium phosphate Powder (PHOS-NaK) 1 Packet(s) Oral three times a day    MEDICATIONS  (PRN):  acetaminophen     Tablet .. 650 milliGRAM(s) Oral every 6 hours PRN Mild Pain (1 - 3), Moderate Pain (4 - 6), Severe Pain (7 - 10)  aluminum hydroxide/magnesium hydroxide/simethicone Suspension 30 milliLiter(s) Oral every 4 hours PRN Dyspepsia  dextrose Oral Gel 15 Gram(s) Oral once PRN Blood Glucose LESS THAN 70 milliGRAM(s)/deciliter  melatonin 3 milliGRAM(s) Oral at bedtime PRN Insomnia  ondansetron Injectable 4 milliGRAM(s) IV Push every 8 hours PRN Nausea and/or Vomiting      Care Discussed with Consultants/Other Providers [x] YES  [ ] NO    Vital Signs Last 24 Hrs  T(C): 36.9 (17 Jan 2025 16:15), Max: 37.1 (16 Jan 2025 22:00)  T(F): 98.4 (17 Jan 2025 16:15), Max: 98.8 (16 Jan 2025 22:00)  HR: 89 (17 Jan 2025 16:15) (89 - 100)  BP: 100/60 (17 Jan 2025 16:15) (100/60 - 120/68)  BP(mean): --  RR: 18 (17 Jan 2025 16:15) (18 - 18)  SpO2: 100% (17 Jan 2025 16:15) (98% - 100%)    Parameters below as of 17 Jan 2025 16:15  Patient On (Oxygen Delivery Method): room air      I&O's Summary    16 Jan 2025 07:01  -  17 Jan 2025 07:00  --------------------------------------------------------  IN: 0 mL / OUT: 300 mL / NET: -300 mL      PHYSICAL EXAM:  GENERAL: NAD, well-developed, comfortable  HEAD:  Atraumatic, Normocephalic  EYES: EOMI, PERRLA, conjunctiva and sclera clear  NECK: Supple, No JVD  CHEST/LUNG: Clear to auscultation bilaterally; No wheeze  HEART: Regular rate and rhythm; No murmurs, rubs, or gallops  ABDOMEN: Soft, Nontender, Nondistended; Bowel sounds present  Neuro: AAOx3, no focal weakness, 5/5 b/l extremity strength  EXTREMITIES:  2+ Peripheral Pulses, No clubbing, cyanosis, or edema  SKIN: No rashes or lesions

## 2025-01-17 NOTE — PROGRESS NOTE ADULT - ASSESSMENT
The patient is a 74y Female with PMH of T1DM on an insulin pump, colon CA s/p surgery/chemo p/w hyperglycemia. Endocrinology consulted for type 1 diabetes on an insulin pump.    #Type 1 Diabetes Mellitus vs type 2 DM   #Use of insulin pump  - Follows with: Dr. Ledezma  - A1C with Estimated Average Glucose Result: 8.5 % (01-14-25)  - eGFR: 93 mL/min/1.73m2 (01-15-25)  - Weight (kg): 79.4 (01-14-25)  - home regimen:  Insulin pump type: Medtronic   Insulin type: Novolog  Last site change: pump removed 1/14  Supplies available: no  Settings:  Basal rate ( TBD 51.525)  12a-6: 30a 1.75 unit/hr  6:30a-12:30p 1.95 units/hr  12:30p-6:30p 2.45 units/hr  6:30p-12a 2.5 units/hr  ISF  12a-12a 50---->25  I:C ratio  12a-12a 1:12  Target   Active insulin time 4 hrs      INPATIENT PLAN:  - Stop standing insulin   - Pt was placed back on insulin pump this am with Novolog insulin   - All insulin should be via insulin pump   - Incase of pump failure would give Levemir or Tresiba (both available inpt) (pt has lantus allergy)  42 units stat and Add Admelog 8 unit TID AC (please hold if npo/not eating)   - Please check FSG before meals and QHS, or q6h while NPO  - Inpatient glucose goals: 100-180: above goal. pt denies eating in between meals   - consistent carb diet when eating  - FS before meals and at bedtime   - RD consult   - Hypoglycemia Protocol   - unclear if T1 vs T2 DM given no DKA despite lack of basal insulin x24 hours and patient habitus   - c-peptide 1.9 with serum glucose 313. If pt stays inpt would recommend repeating in am ; if pt is discharged today please repeat cpeptide with serum glucose as an outpt   - DM educator following---> please refer to note   - Consider changing abx solution from dextrose to sodium chloride if no contraindications   - Attestation form to be filled out. Rn to place correction given by pt at each meal.  Pt doesn't count carbs.        DISCHARGE PLANNING:    - Please discharge on insulin pump with settings as above    - She states she never has hyperglycemia but that is not reflected by her A1c. Pt denies having hypoglycemia at home     - Pt refusing to bolus with insulin pump at this time despite education.  Would like to continue using basal insulin and just bolus for correction. Pt was taught how to correct her glucose with correction and correction settings were adjusted. Would recommend pt to learn how to carb count as an outpt.  If unable to do carb counting atleast at a minimum insulin pump should be programmed for pt to receive bolus insulin based on the amount she eats (for example have a bolus amount for small, medium, and large meals).  This is suggestion to have better control of glucose levels at this time other pump settings may need to be adjusted.      - Patient has 770G insulin pump. Patient was instructed on the difference with the 780G and that it would be beneficial to have the Medtronic CGM so her pump can be a closed loop. A “closed loop insulin pump” was also explained to the patient. Patient currently uses a Angely 2 CGM, and it was explained to patient that sometimes she can miss information if she goes longer than 8 hrs without scanning the device. If patient does not want to get the Medtronic CGM, then at least get a Angely 3 CGM so the information will go right to her reader. Pt also instructed at her next endocrine follow up with insulin pump representative    - For now continue Freestyle angely 2 sensor and reader     - c-peptide 1.9 with serum glucose 313 maybe falsely elevated. If pt stays inpt would recommend repeating; if pt is discharged today please repeat cpeptide with serum glucose as an outpt     - Pt needs insulin pen both basal at home incase of insulin pump failure; please check if below insulin is covered by pts insurance     - Please send tresiba/basaglar/toujeo/Levemir pen and humalog kwikpen as test script to check insurance coverage.  Ok to send with current doses and update prior to d/c    If Humalog not covered- can try alternating with one of following  novolog/apidra/admelog/fiasp    - Ensure patient has working glucometer, test strips, lancets, alcohol pads, and BD sofia pen needles    - For severe hypoglycemia: Please prescribe Glucagon Emergency Kit for Low Blood Sugar 1 mG injection: 1 mG intramuscularly as needed for severe hypoglycemia. Glucose tablets 4G (take 4 tablets) or 15G tablets for blood sugar less than 70 mG/dL repeat fingerstick in 15 minutes.     - patient should followup with their endocrinologist, Dr. Ledezma recently saw Dr. Ledezma last week; advised pt to make an appointment upon discharge     - Please make sure pt has insulin, cgm, and insulin pen, and insulin pump supplies prior to discharge    #Hypertension  - Goal BP <130/80  - Management as per primary team  - check urine microalbumin level as outpatient    #Hyperlipidemia  - LDL goal <70  - Last LDL: 20 mg/dL (11-29-23)  - check lipid panel as outpatient on a yearly basis    D/w James Taveras  Nurse Practitioner  Division of Endocrinology & Diabetes  In house pager #[phone]    If before 9AM or after 6PM, or on weekends/holidays, please call endocrine answering service for assistance ([phone]).For nonurgent matters email [email or web address] for assistance.  The patient is a 74y Female with PMH of T1DM on an insulin pump, colon CA s/p surgery/chemo p/w hyperglycemia. Endocrinology consulted for type 1 diabetes on an insulin pump.    #Type 1 Diabetes Mellitus vs type 2 DM   #Use of insulin pump  - Follows with: Dr. Ledezma  - A1C with Estimated Average Glucose Result: 8.5 % (01-14-25)  - eGFR: 93 mL/min/1.73m2 (01-15-25)  - Weight (kg): 79.4 (01-14-25)  - home regimen:  Insulin pump type: Medtronic   Insulin type: Novolog  Last site change: pump removed 1/14  Supplies available: no  Settings:  Basal rate ( TBD 51.525)  12a-6: 30a 1.75 unit/hr  6:30a-12:30p 1.95 units/hr  12:30p-6:30p 2.45 units/hr  6:30p-12a 2.5 units/hr  ISF  12a-12a 50---->25  I:C ratio  12a-12a 1:12  Target   Active insulin time 4 hrs      INPATIENT PLAN:  - Stop standing insulin   - Pt was placed back on insulin pump this am with Novolog insulin   - All insulin should be via insulin pump   - Since pt doesn;t count carbs pt will manually bolus 6 units TID AC plus correction bolus depending on glucose levels   - Incase of pump failure would give Levemir or Tresiba (both available inpt) (pt has lantus allergy)  42 units stat and Add Admelog 8 unit TID AC (please hold if npo/not eating)   - Please check FSG before meals and QHS, or q6h while NPO  - Inpatient glucose goals: 100-180: above goal. pt denies eating in between meals   - consistent carb diet when eating  - FS before meals and at bedtime   - RD consult   - Hypoglycemia Protocol   - unclear if T1 vs T2 DM given no DKA despite lack of basal insulin x24 hours and patient habitus   - c-peptide 1.9 with serum glucose 313. If pt stays inpt would recommend repeating in am ; if pt is discharged today please repeat cpeptide with serum glucose as an outpt   - DM educator following---> please refer to note   - Consider changing abx solution from dextrose to sodium chloride if no contraindications   - Attestation form to be filled out. Rn to place correction given by pt at each meal.  Pt doesn't count carbs.        DISCHARGE PLANNING:    - Please discharge on insulin pump with settings as above    - She states she never has hyperglycemia but that is not reflected by her A1c. Pt denies having hypoglycemia at home     - Pt will continue using basal insulin and  bolus for correction and set amount of bolus for meals for dc this dose may need to adjusted based on glucose levels inpts    - Pt was taught how to correct her glucose with correction and correction settings were adjusted. Would recommend pt to learn how to carb count as an outpt.  If unable to do carb counting atleast at a minimum insulin pump should be programmed for pt to receive bolus insulin based on the amount she eats (for example have a bolus amount for small, medium, and large meals).  This is suggestion to have better control of glucose levels at this time other pump settings may need to be adjusted.      - Patient has 770G insulin pump. Patient was instructed on the difference with the 780G and that it would be beneficial to have the Manifesttronic CGM so her pump can be a closed loop. A “closed loop insulin pump” was also explained to the patient. Patient currently uses a Angely 2 CGM, and it was explained to patient that sometimes she can miss information if she goes longer than 8 hrs without scanning the device. If patient does not want to get the Medtronic CGM, then at least get a Angely 3 CGM so the information will go right to her reader. Pt also instructed at her next endocrine follow up with insulin pump representative; pt can also consider changing to islet insulin pump; pt will speak to her endocrinologist post discharge    - For now continue Freestyle angely 2 sensor and reader     - c-peptide 1.9 with serum glucose 313 maybe falsely elevated. If pt stays inpt would recommend repeating; if pt is discharged today please repeat cpeptide with serum glucose as an outpt     - Pt needs insulin pen both basal at home incase of insulin pump failure; please check if below insulin is covered by pts insurance     - Please send tresiba/basaglar/toujeo/Levemir pen and humalog kwikpen as test script to check insurance coverage.  Ok to send with current doses and update prior to d/c    If Humalog not covered- can try alternating with one of following  novolog/apidra/admelog/fiasp    - Ensure patient has working glucometer, test strips, lancets, alcohol pads, and BD sofai pen needles    - For severe hypoglycemia: Please prescribe Glucagon Emergency Kit for Low Blood Sugar 1 mG injection: 1 mG intramuscularly as needed for severe hypoglycemia. Glucose tablets 4G (take 4 tablets) or 15G tablets for blood sugar less than 70 mG/dL repeat fingerstick in 15 minutes.     - patient should followup with their endocrinologist, Dr. Ledezma recently saw Dr. Ledezma last week; advised pt to make an appointment upon discharge     - Please make sure pt has insulin, cgm, and insulin pen, and insulin pump supplies prior to discharge    #Hypertension  - Goal BP <130/80  - Management as per primary team  - check urine microalbumin level as outpatient    #Hyperlipidemia  - LDL goal <70  - Last LDL: 20 mg/dL (11-29-23)  - check lipid panel as outpatient on a yearly basis    D/w James Taveras  Nurse Practitioner  Division of Endocrinology & Diabetes  In house pager #[phone]    If before 9AM or after 6PM, or on weekends/holidays, please call endocrine answering service for assistance ([phone]).For nonurgent matters email [email or web address] for assistance.  The patient is a 74y Female with PMH of T1DM on an insulin pump, colon CA s/p surgery/chemo p/w hyperglycemia. Endocrinology consulted for type 1 diabetes on an insulin pump.    #Type 1 Diabetes Mellitus vs type 2 DM   #Use of insulin pump  - Follows with: Dr. Ledezma  - A1C with Estimated Average Glucose Result: 8.5 % (01-14-25)  - eGFR: 93 mL/min/1.73m2 (01-15-25)  - Weight (kg): 79.4 (01-14-25)  - home regimen:  Insulin pump type: Medtronic   Insulin type: Novolog  Last site change: pump removed 1/14  Supplies available: no  Settings:  Basal rate ( TBD 51.525)  12a-6: 30a 1.75 unit/hr  6:30a-12:30p 1.95 units/hr  12:30p-6:30p 2.45 units/hr  6:30p-12a 2.5 units/hr  ISF  12a-12a 50---->25  I:C ratio  12a-12a 1:12  Target   Active insulin time 4 hrs      INPATIENT PLAN:  - Stop standing insulin   - Pt was placed back on insulin pump this am with Novolog insulin   - All insulin should be via insulin pump   - Since pt doesn;t count carbs pt will manually bolus 6 units TID AC plus correction bolus depending on glucose levels   - Incase of pump failure would give Levemir or Tresiba (both available inpt) (pt has lantus allergy)  42 units stat and Add Admelog 8 unit TID AC (please hold if npo/not eating)   - Please check FSG before meals and QHS, or q6h while NPO  - Inpatient glucose goals: 100-180: above goal. pt denies eating in between meals   - consistent carb diet when eating  - FS before meals and at bedtime   - RD consult   - Hypoglycemia Protocol   - unclear if T1 vs T2 DM given no DKA despite lack of basal insulin x24 hours and patient habitus   - c-peptide 1.9 with serum glucose 313. If pt stays inpt would recommend repeating c-peptide level in am wiht serum glucose (ordered)  - DM educator following---> please refer to note   - Consider changing abx solution from dextrose to sodium chloride if no contraindications   - Attestation form be filleded out. Rn to place correction and manual bolus 6 units in flowsheet.  Pt doesn't count carbs.        DISCHARGE PLANNING:  - As per primary team pt is going to rehab     - dc on insulin pump; settings may need to be adjusted based on glucose trend     - She states she never has hyperglycemia but that is not reflected by her A1c. Pt denies having hypoglycemia at home     - Pt will continue using basal insulin and  bolus for correction and set amount of bolus for meals for dc (bolus dose may need to adjusted based on glucose levels inpts)    - Pt was taught how to correct her glucose with correction and correction settings were adjusted. Would recommend pt to learn how to carb count as an outpt.  If unable to do carb counting atleast at a minimum insulin pump should be programmed for pt to receive bolus insulin based on the amount she eats (for example have a bolus amount for small, medium, and large meals).  This is suggestion to have better control of glucose levels at this time other pump settings may need to be adjusted.      - Patient has 770G insulin pump. Patient was instructed on the difference with the 780G and that it would be beneficial to have the [employer] CGM so her pump can be a closed loop. A “closed loop insulin pump” was also explained to the patient. Patient currently uses a Angely 2 CGM, and it was explained to patient that sometimes she can miss information if she goes longer than 8 hrs without scanning the device. If patient does not want to get the Medtronic CGM, then at least get a Angely 3 CGM so the information will go right to her reader. Pt also instructed at her next endocrine follow up with insulin pump representative; pt can also consider changing to islet insulin pump; pt will speak to her endocrinologist post discharge    - For now continue Freestyle angely 2 sensor and reader     - c-peptide 1.9 with serum glucose 313 maybe falsely elevated. If pt stays inpt would recommend repeating; if pt is discharged today please repeat cpeptide with serum glucose as an outpt     - Pt needs insulin pen both basal at home incase of insulin pump failure; please check if below insulin is covered by pts insurance     - Please send tresiba/basaglar/toujeo/Levemir pen and humalog kwikpen as test script to check insurance coverage.  Ok to send with current doses and update prior to d/c    If Humalog not covered- can try alternating with one of following  novolog/apidra/admelog/fiasp    - Ensure patient has working glucometer, test strips, lancets, alcohol pads, and BD sofia pen needles    - For severe hypoglycemia: Please prescribe Glucagon Emergency Kit for Low Blood Sugar 1 mG injection: 1 mG intramuscularly as needed for severe hypoglycemia. Glucose tablets 4G (take 4 tablets) or 15G tablets for blood sugar less than 70 mG/dL repeat fingerstick in 15 minutes.     - patient should followup with their endocrinologist, Dr. Ledezma recently saw Dr. Ledezma last week; advised pt to make an appointment upon discharge     - Please make sure pt has insulin, cgm, and insulin pen, and insulin pump supplies prior to discharge    #Hypertension  - Goal BP <130/80  - Management as per primary team  - check urine microalbumin level as outpatient    #Hyperlipidemia  - LDL goal <70  - Last LDL: 20 mg/dL (11-29-23)  - check lipid panel as outpatient on a yearly basis    D/w James Taveras  Nurse Practitioner  Division of Endocrinology & Diabetes  In house pager #[phone]    If before 9AM or after 6PM, or on weekends/holidays, please call endocrine answering service for assistance ([phone]).For nonurgent matters email [email or web address] for assistance.  The patient is a 74y Female with PMH of T1DM on an insulin pump, colon CA s/p surgery/chemo p/w hyperglycemia. Endocrinology consulted for type 1 diabetes on an insulin pump.    #Type 1 Diabetes Mellitus vs type 2 DM   #Use of insulin pump  - Follows with: Dr. Ledezma  - A1C with Estimated Average Glucose Result: 8.5 % (01-14-25)  - eGFR: 93 mL/min/1.73m2 (01-15-25)  - Weight (kg): 79.4 (01-14-25)  - home regimen:  Insulin pump type: Medtronic   Insulin type: Novolog  Last site change: pump removed 1/14  Supplies available: no  Settings:  Basal rate ( TBD 51.525)  12a-6: 30a 1.75 unit/hr  6:30a-12:30p 1.95 units/hr  12:30p-6:30p 2.45 units/hr  6:30p-12a 2.5 units/hr  ISF  12a-12a 50---->25  I:C ratio  12a-12a 1:12  Target   Active insulin time 4 hrs      INPATIENT PLAN:  - Stop standing insulin   - Pt was placed back on insulin pump this am with Novolog insulin   - All insulin should be via insulin pump   - Since pt doesn;t count carbs pt will manually bolus 6 units TID AC plus correction bolus depending on glucose levels   - Incase of pump failure would give Levemir or Tresiba (both available inpt) (pt has lantus allergy)  42 units stat and Add Admelog 8 unit TID AC (please hold if npo/not eating)   - Please check FSG before meals and QHS, or q6h while NPO  - Inpatient glucose goals: 100-180: above goal. pt denies eating in between meals   - consistent carb diet when eating  - FS before meals and at bedtime   - RD consult   - Hypoglycemia Protocol   - unclear if T1 vs T2 DM given no DKA despite lack of basal insulin x24 hours and patient habitus   - c-peptide 1.9 with serum glucose 313. If pt stays inpt would recommend repeating c-peptide level in am wiht serum glucose (ordered)  - DM educator following---> please refer to note   - Consider changing abx solution from dextrose to sodium chloride if no contraindications   - Attestation form be filleded out. Rn to place correction and manual bolus 6 units in flowsheet.  Pt doesn't count carbs.    - Insulin pump changed on 1/17/2025 due to be changed on 1/20/2025; Freestyle angely changed on 1/17/2025 due to be changed in 14 days      DISCHARGE PLANNING:  - As per primary team pt is going to rehab     - dc on insulin pump; settings may need to be adjusted based on glucose trend     - She states she never has hyperglycemia but that is not reflected by her A1c. Pt denies having hypoglycemia at home     - Pt will continue using basal insulin and  bolus for correction and set amount of bolus for meals for dc (bolus dose may need to adjusted based on glucose levels inpts)    - Pt was taught how to correct her glucose with correction and correction settings were adjusted. Would recommend pt to learn how to carb count as an outpt.  If unable to do carb counting atleast at a minimum insulin pump should be programmed for pt to receive bolus insulin based on the amount she eats (for example have a bolus amount for small, medium, and large meals).  This is suggestion to have better control of glucose levels at this time other pump settings may need to be adjusted.      - Patient has 770G insulin pump. Patient was instructed on the difference with the 780G and that it would be beneficial to have the Medtronic CGM so her pump can be a closed loop. A “closed loop insulin pump” was also explained to the patient. Patient currently uses a Angely 2 CGM, and it was explained to patient that sometimes she can miss information if she goes longer than 8 hrs without scanning the device. If patient does not want to get the Medtronic CGM, then at least get a Angely 3 CGM so the information will go right to her reader. Pt also instructed at her next endocrine follow up with insulin pump representative; pt can also consider changing to islet insulin pump; pt will speak to her endocrinologist post discharge    - For now continue Freestyle angely 2 sensor and reader     - c-peptide 1.9 with serum glucose 313 maybe falsely elevated. If pt stays inpt would recommend repeating; if pt is discharged today please repeat cpeptide with serum glucose as an outpt     - Pt needs insulin pen both basal at home incase of insulin pump failure; please check if below insulin is covered by pts insurance     - Please send tresiba/basaglar/toujeo/Levemir pen and humalog kwikpen as test script to check insurance coverage.  Ok to send with current doses and update prior to d/c    If Humalog not covered- can try alternating with one of following  novolog/apidra/admelog/fiasp    - Ensure patient has working glucometer, test strips, lancets, alcohol pads, and BD sofia pen needles    - For severe hypoglycemia: Please prescribe Glucagon Emergency Kit for Low Blood Sugar 1 mG injection: 1 mG intramuscularly as needed for severe hypoglycemia. Glucose tablets 4G (take 4 tablets) or 15G tablets for blood sugar less than 70 mG/dL repeat fingerstick in 15 minutes.     - patient should followup with their endocrinologist, Dr. Ledezma recently saw Dr. Ledezma last week; advised pt to make an appointment upon discharge     - Please make sure pt has insulin, cgm, and insulin pen, and insulin pump supplies prior to discharge    #Hypertension  - Goal BP <130/80  - Management as per primary team  - check urine microalbumin level as outpatient    #Hyperlipidemia  - LDL goal <70  - Last LDL: 20 mg/dL (11-29-23)  - check lipid panel as outpatient on a yearly basis    D/w James Taveras  Nurse Practitioner  Division of Endocrinology & Diabetes  In house pager #[phone]    If before 9AM or after 6PM, or on weekends/holidays, please call endocrine answering service for assistance ([phone]).For nonurgent matters email [email or web address] for assistance.  The patient is a 74y Female with PMH of T1DM on an insulin pump, colon CA s/p surgery/chemo p/w hyperglycemia. Endocrinology consulted for type 1 diabetes on an insulin pump.    #Type 1 Diabetes Mellitus vs type 2 DM   #Use of insulin pump  - Follows with: Dr. Ledezma  - A1C with Estimated Average Glucose Result: 8.5 % (01-14-25)  - eGFR: 93 mL/min/1.73m2 (01-15-25)  - Weight (kg): 79.4 (01-14-25)  - home regimen:  Insulin pump type: Medtronic   Insulin type: Novolog  Last site change: pump removed 1/14  Supplies available: no  Settings:  Basal rate ( TBD 51.525)  12a-6: 30a 1.75 unit/hr  6:30a-12:30p 1.95 units/hr  12:30p-6:30p 2.45 units/hr  6:30p-12a 2.5 units/hr  ISF  12a-12a 50---->25  I:C ratio  12a-12a 1:12  Target   Active insulin time 4 hrs      INPATIENT PLAN:  - Stop standing insulin   - Pt was placed back on insulin pump this am with Novolog insulin   - All insulin should be via insulin pump   - Since pt doesn;t count carbs pt will manually bolus 6 units TID AC plus correction bolus depending on glucose levels   - Incase of pump failure would give Levemir or Tresiba (both available inpt) (pt has lantus allergy)  42 units stat and Add Admelog 8 unit TID AC (please hold if npo/not eating)   - Please check FSG before meals and QHS, or q6h while NPO  - Inpatient glucose goals: 100-180: above goal. pt denies eating in between meals   - consistent carb diet when eating  - FS before meals and at bedtime   - RD consult   - Hypoglycemia Protocol   - unclear if T1 vs T2 DM given no DKA despite lack of basal insulin x24 hours and patient habitus   - c-peptide 1.9 with serum glucose 313. If pt stays inpt would recommend repeating c-peptide level in am wiht serum glucose (ordered)  - DM educator following---> please refer to note   - Consider changing abx solution from dextrose to sodium chloride if no contraindications   - Attestation form be filleded out. Rn to place correction and manual bolus 6 units in flowsheet.  Pt doesn't count carbs.    - Insulin pump site changed on 1/17/2025 due to be changed on 1/20/2025; Freestyle angely changed on 1/17/2025 due to be changed in 14 days      DISCHARGE PLANNING:  - As per primary team pt is going to rehab     - dc on insulin pump; settings may need to be adjusted based on glucose trend     - She states she never has hyperglycemia but that is not reflected by her A1c. Pt denies having hypoglycemia at home     - Pt will continue using basal insulin and  bolus for correction and set amount of bolus for meals for dc (bolus dose may need to adjusted based on glucose levels inpts)    - Pt was taught how to correct her glucose with correction and correction settings were adjusted. Would recommend pt to learn how to carb count as an outpt.  If unable to do carb counting atleast at a minimum insulin pump should be programmed for pt to receive bolus insulin based on the amount she eats (for example have a bolus amount for small, medium, and large meals).  This is suggestion to have better control of glucose levels at this time other pump settings may need to be adjusted.      - Patient has 770G insulin pump. Patient was instructed on the difference with the 780G and that it would be beneficial to have the Medtronic CGM so her pump can be a closed loop. A “closed loop insulin pump” was also explained to the patient. Patient currently uses a Angely 2 CGM, and it was explained to patient that sometimes she can miss information if she goes longer than 8 hrs without scanning the device. If patient does not want to get the Medtronic CGM, then at least get a Angely 3 CGM so the information will go right to her reader. Pt also instructed at her next endocrine follow up with insulin pump representative; pt can also consider changing to islet insulin pump; pt will speak to her endocrinologist post discharge    - For now continue Freestyle angely 2 sensor and reader     - c-peptide 1.9 with serum glucose 313 maybe falsely elevated. If pt stays inpt would recommend repeating; if pt is discharged today please repeat cpeptide with serum glucose as an outpt     - Pt needs insulin pen both basal at home incase of insulin pump failure; please check if below insulin is covered by pts insurance     - Please send tresiba/basaglar/toujeo/Levemir pen and humalog kwikpen as test script to check insurance coverage.  Ok to send with current doses and update prior to d/c    If Humalog not covered- can try alternating with one of following  novolog/apidra/admelog/fiasp    - Ensure patient has working glucometer, test strips, lancets, alcohol pads, and BD sofia pen needles    - For severe hypoglycemia: Please prescribe Glucagon Emergency Kit for Low Blood Sugar 1 mG injection: 1 mG intramuscularly as needed for severe hypoglycemia. Glucose tablets 4G (take 4 tablets) or 15G tablets for blood sugar less than 70 mG/dL repeat fingerstick in 15 minutes.     - patient should followup with their endocrinologist, Dr. Ledezma recently saw Dr. Ledezma last week; advised pt to make an appointment upon discharge     - Please make sure pt has insulin, cgm, and insulin pen, and insulin pump supplies prior to discharge    #Hypertension  - Goal BP <130/80  - Management as per primary team  - check urine microalbumin level as outpatient    #Hyperlipidemia  - LDL goal <70  - Last LDL: 20 mg/dL (11-29-23)  - check lipid panel as outpatient on a yearly basis    D/w James Taveras  Nurse Practitioner  Division of Endocrinology & Diabetes  In house pager #[phone]    If before 9AM or after 6PM, or on weekends/holidays, please call endocrine answering service for assistance ([phone]).For nonurgent matters email [email or web address] for assistance.  The patient is a 74y Female with PMH of T1DM on an insulin pump, colon CA s/p surgery/chemo p/w hyperglycemia. Endocrinology consulted for type 1 diabetes on an insulin pump.    #Type 1 Diabetes Mellitus vs type 2 DM    #Use of insulin pump  - Follows with: Dr. Ledezma  - A1C with Estimated Average Glucose Result: 8.5 % (01-14-25)  - eGFR: 93 mL/min/1.73m2 (01-15-25)  - Weight (kg): 79.4 (01-14-25)  - home regimen: These settings were recently changed by Endocrinologist Last week  Insulin pump type: Medtronic   Insulin type: Novolog  Last site change: pump removed 1/14  Supplies available: no  Settings:  Basal rate ( TBD 51.525)  12a-6: 30a 1.75 unit/hr  6:30a-12:30p 1.95 units/hr  12:30p-6:30p 2.45 units/hr  6:30p-12a 2.5 units/hr  ISF  12a-12a 50---->25 on 1/17  I:C ratio  12a-12a 1:12  Target   Active insulin time 4 hrs      INPATIENT PLAN:  - Stop standing insulin   - Pt was placed back on insulin pump this am with Novolog insulin   - All insulin should be via insulin pump   - Since pt doesn;t count carbs pt will manually bolus 6 units TID AC (please hold if npo/not eating) plus correction bolus depending on glucose levels   - Incase of pump failure would give Levemir or Tresiba (both available inpt) (pt has lantus allergy)  42 units stat and Add Admelog 8 unit TID AC (please hold if npo/not eating)   - Please check FSG before meals and QHS, or q6h while NPO  - Inpatient glucose goals: 100-180: above goal. pt denies eating in between meals   - consistent carb diet when eating  - FS before meals and at bedtime   - RD consult   - Hypoglycemia Protocol   - unclear if T1 vs T2 DM given no DKA despite lack of basal insulin x24 hours and patient habitus   - c-peptide 1.9 with serum glucose 313. If pt stays inpt would recommend repeating c-peptide level in am wiht serum glucose (ordered)  - DM educator following---> please refer to note   - Consider changing abx solution from dextrose to sodium chloride if no contraindications   - Attestation form be filled out and signed.  Rn to place correction and manual bolus 6 units in flowsheet.  Pt doesn't count carbs.    - Insulin pump site changed on 1/17/2025 due to be changed on 1/20/2025; Freestyle angely changed on 1/17/2025 due to be changed in 14 days      DISCHARGE PLANNING:  - As per primary team pt is going to rehab     - Pt wants to continue using her insulin pump; will continue to assess pts ability to use insulin pump while inpatient    - dc on insulin pump; settings may need to be adjusted based on glucose trend; Pt will continue using basal insulin and  bolus for correction and set amount of bolus for meals for dc (bolus dose may need to beadjusted based on glucose levels inpts)    - She states she never has hyperglycemia but that is not reflected by her A1c.     - Pt was taught how to correct her glucose with correction and correction settings were adjusted. Would recommend pt to learn how to carb count as an outpt.  If unable to do carb counting atleast at a minimum insulin pump should be programmed for pt to receive bolus insulin based on the amount she eats (for example have a bolus amount for small, medium, and large meals).  This is suggestion to have better control of glucose levels at this time other pump settings may need to be adjusted.      - Patient has 770G insulin pump. Patient was instructed on the difference with the 780G and that it would be beneficial to have the Medtronic CGM so her pump can be a closed loop. A “closed loop insulin pump” was also explained to the patient. Patient currently uses a Angely 2 CGM, and it was explained to patient that sometimes she can miss information if she goes longer than 8 hrs without scanning the device. If patient does not want to get the Medtronic CGM, then at least get a Angely 3 CGM so the information will go right to her reader. Pt also instructed at her next endocrine follow up with insulin pump representative; pt can also consider changing to islet insulin pump; pt will speak to her endocrinologist post discharge    - For now continue Freestyle angely 2 sensor and reader     - c-peptide 1.9 with serum glucose 313 maybe falsely elevated. If pt stays inpt would recommend repeating; if pt is discharged today please repeat cpeptide with serum glucose as an outpt     - Pt needs insulin pen both basal at home incase of insulin pump failure; please check if below insulin is covered by pts insurance     - Please send tresiba/basaglar/toujeo/Levemir pen and humalog kwikpen as test script to check insurance coverage.  Ok to send with current doses and update prior to d/c    If Humalog not covered- can try alternating with one of following  novolog/apidra/admelog/fiasp    - Ensure patient has working glucometer, test strips, lancets, alcohol pads, and BD sofia pen needles    - For severe hypoglycemia: Please prescribe Glucagon Emergency Kit for Low Blood Sugar 1 mG injection: 1 mG intramuscularly as needed for severe hypoglycemia. Glucose tablets 4G (take 4 tablets) or 15G tablets for blood sugar less than 70 mG/dL repeat fingerstick in 15 minutes.     - patient should followup with their endocrinologist, Dr. Ledezma recently saw Dr. Ledzema last week; advised pt to make an appointment upon discharge     - Please make sure pt has insulin, cgm, and insulin pen, and insulin pump supplies prior to discharge    #Hypertension  - Goal BP <130/80  - Management as per primary team  - check urine microalbumin level as outpatient    #Hyperlipidemia  - LDL goal <70  - Last LDL: 20 mg/dL (11-29-23)  - check lipid panel as outpatient on a yearly basis    D/w James Taveras  Nurse Practitioner  Division of Endocrinology & Diabetes  In house pager #[phone]    If before 9AM or after 6PM, or on weekends/holidays, please call endocrine answering service for assistance ([phone]).For nonurgent matters email [email or web address] for assistance.

## 2025-01-18 LAB
GLUCOSE BLDC GLUCOMTR-MCNC: 144 MG/DL — HIGH (ref 70–99)
GLUCOSE BLDC GLUCOMTR-MCNC: 197 MG/DL — HIGH (ref 70–99)
GLUCOSE BLDC GLUCOMTR-MCNC: 206 MG/DL — HIGH (ref 70–99)
GLUCOSE BLDC GLUCOMTR-MCNC: 94 MG/DL — SIGNIFICANT CHANGE UP (ref 70–99)

## 2025-01-18 PROCEDURE — 99232 SBSQ HOSP IP/OBS MODERATE 35: CPT

## 2025-01-18 RX ORDER — ACETAMINOPHEN 160 MG/5ML
1000 SUSPENSION ORAL ONCE
Refills: 0 | Status: COMPLETED | OUTPATIENT
Start: 2025-01-18 | End: 2025-01-21

## 2025-01-18 RX ORDER — PANTOPRAZOLE 20 MG/1
40 TABLET, DELAYED RELEASE ORAL ONCE
Refills: 0 | Status: COMPLETED | OUTPATIENT
Start: 2025-01-18 | End: 2025-01-18

## 2025-01-18 RX ORDER — INSULIN ASPART 100 [IU]/ML
1 INJECTION, SOLUTION INTRAVENOUS; SUBCUTANEOUS
Refills: 0 | Status: DISCONTINUED | OUTPATIENT
Start: 2025-01-18 | End: 2025-01-24

## 2025-01-18 RX ADMIN — ENOXAPARIN SODIUM 40 MILLIGRAM(S): 100 INJECTION SUBCUTANEOUS at 05:17

## 2025-01-18 RX ADMIN — PANTOPRAZOLE 40 MILLIGRAM(S): 20 TABLET, DELAYED RELEASE ORAL at 16:24

## 2025-01-18 RX ADMIN — ASPIRIN 81 MILLIGRAM(S): 81 TABLET, COATED ORAL at 15:11

## 2025-01-18 RX ADMIN — MIRTAZAPINE 30 MILLIGRAM(S): 30 TABLET, FILM COATED ORAL at 22:10

## 2025-01-18 RX ADMIN — ATORVASTATIN CALCIUM 10 MILLIGRAM(S): 80 TABLET, FILM COATED ORAL at 22:11

## 2025-01-18 RX ADMIN — GABAPENTIN 100 MILLIGRAM(S): 800 TABLET ORAL at 15:11

## 2025-01-18 RX ADMIN — SODIUM PHOSPHATE, DIBASIC, ANHYDROUS, POTASSIUM PHOSPHATE, MONOBASIC, AND SODIUM PHOSPHATE, MONOBASIC, MONOHYDRATE 1 PACKET(S): 852; 155; 130 TABLET, COATED ORAL at 05:17

## 2025-01-18 RX ADMIN — Medication 25 MILLIGRAM(S): at 05:17

## 2025-01-18 RX ADMIN — PANTOPRAZOLE 40 MILLIGRAM(S): 20 TABLET, DELAYED RELEASE ORAL at 05:17

## 2025-01-18 NOTE — PROGRESS NOTE ADULT - ASSESSMENT
The patient is a 74y Female with PMH of T1DM on an insulin pump, colon CA s/p surgery/chemo p/w hyperglycemia. Endocrinology consulted for type 1 diabetes on an insulin pump.    #Type 1 Diabetes Mellitus vs type 2 DM    #Use of insulin pump  - Follows with: Dr. Ledezma  - A1C with Estimated Average Glucose Result: 8.5 % (01-14-25)  - eGFR: 93 mL/min/1.73m2 (01-15-25)  - Weight (kg): 79.4 (01-14-25)  - home regimen: These settings were recently changed by Endocrinologist Last week  Insulin pump type: Medtronic   Insulin type: Novolog  Last site change: pump removed 1/17  Supplies available: no  Settings:  Basal rate ( TBD 51.525)  12a-6: 30a 1.75 unit/hr  6:30a-12:30p 1.95 units/hr  12:30p-6:30p 2.45 units/hr  6:30p-12a 2.5 units/hr  ISF  12a-12a 50---->25 on 1/17----> 50  I:C ratio  12a-12a 1:12  Target   Active insulin time 4 hrs      INPATIENT PLAN:  - Stop standing insulin   - Pt on insulin pump with Novolog insulin   - All insulin should be via insulin pump   - Since pt doesn't count carbs ----> pt will manually bolus TID AC (please hold if npo/not eating) plus correction bolus depending on glucose levels.   - Today advised she lower her bolus to 4 units TIDAC due to FS 90s at bedtime and this am.   - ISF changed to 25 yesterday. Changed back to 50 in due to  FS 90s at bedtime and this am.   - Incase of pump failure would give Levemir or Tresiba (both available inpt) (pt has lantus allergy)  42 units stat and Add Admelog 8 unit TID AC (please hold if npo/not eating)   - Please check FSG before meals and QHS, or q6h while NPO  - Inpatient glucose goals: 100-180: above goal. pt denies eating in between meals   - consistent carb diet when eating  - FS before meals and at bedtime   - RD consult   - Hypoglycemia Protocol   - unclear if T1 vs T2 DM given no DKA despite lack of basal insulin x24 hours and patient habitus   - c-peptide 1.9 with serum glucose 313. If pt stays inpt would recommend repeating c-peptide level in am wiht serum glucose (ordered)   - DM educator following---> please refer to note   - Consider changing abx solution from dextrose to sodium chloride if no contraindications   - Attestation form be filled out and signed.  Rn to place correction and manual bolus 6 units in flowsheet.  Pt doesn't count carbs.    - Insulin pump site changed on 1/17/2025 due to be changed on 1/20/2025; Freestyle angely changed on 1/17/2025 due to be changed in 14 days      DISCHARGE PLANNING:  - As per primary team pt is going to rehab     - Pt wants to continue using her insulin pump; will continue to assess pts ability to use insulin pump while inpatient    - dc on insulin pump; settings may need to be adjusted based on glucose trend; Pt will continue using basal insulin and  bolus for correction and set amount of bolus for meals for dc (bolus dose may need to beadjusted based on glucose levels inpts)    - She states she never has hyperglycemia but that is not reflected by her A1c.     - Pt was taught how to correct her glucose with correction and correction settings were adjusted. Would recommend pt to learn how to carb count as an outpt.  If unable to do carb counting atleast at a minimum insulin pump should be programmed for pt to receive bolus insulin based on the amount she eats (for example have a bolus amount for small, medium, and large meals).  This is suggestion to have better control of glucose levels at this time other pump settings may need to be adjusted.      - Patient has 770G insulin pump. Patient was instructed on the difference with the 780G and that it would be beneficial to have the [employer] CGM so her pump can be a closed loop. A “closed loop insulin pump” was also explained to the patient. Patient currently uses a Angely 2 CGM, and it was explained to patient that sometimes she can miss information if she goes longer than 8 hrs without scanning the device. If patient does not want to get the Medtronic CGM, then at least get a Angely 3 CGM so the information will go right to her reader. Pt also instructed at her next endocrine follow up with insulin pump representative; pt can also consider changing to islet insulin pump; pt will speak to her endocrinologist post discharge    - For now continue Freestyle angely 2 sensor and reader     - c-peptide 1.9 with serum glucose 313 maybe falsely elevated. If pt stays inpt would recommend repeating; if pt is discharged today please repeat cpeptide with serum glucose as an outpt     - Pt needs insulin pen both basal at home incase of insulin pump failure; please check if below insulin is covered by pts insurance     - Please send tresiba/basaglar/toujeo/Levemir pen and humalog kwikpen as test script to check insurance coverage.  Ok to send with current doses and update prior to d/c    If Humalog not covered- can try alternating with one of following  novolog/apidra/admelog/fiasp    - Ensure patient has working glucometer, test strips, lancets, alcohol pads, and BD sofia pen needles    - For severe hypoglycemia: Please prescribe Glucagon Emergency Kit for Low Blood Sugar 1 mG injection: 1 mG intramuscularly as needed for severe hypoglycemia. Glucose tablets 4G (take 4 tablets) or 15G tablets for blood sugar less than 70 mG/dL repeat fingerstick in 15 minutes.     - patient should followup with their endocrinologist, Dr. Ledezma recently saw Dr. Ledezma last week; advised pt to make an appointment upon discharge     - Please make sure pt has insulin, cgm, and insulin pen, and insulin pump supplies prior to discharge    #Hypertension  - Goal BP <130/80  - Management as per primary team  - check urine microalbumin level as outpatient    #Hyperlipidemia  - LDL goal <70  - Last LDL: 20 mg/dL (11-29-23)  - check lipid panel as outpatient on a yearly basis    ESSENCE Colindres-BC  Nurse Practitioner  Division of Endocrinology  Contact on TEAMS    If out of hospital/unavailable when paged, please note: patient will be cared for by another provider on the endocrine service.  For urgent concerns: call the endocrine answering service for assistance to reach covering provider ([phone]). For non-urgent matters: please email [email or web address] for assistance.

## 2025-01-18 NOTE — PROGRESS NOTE ADULT - SUBJECTIVE AND OBJECTIVE BOX
SUBJECTIVE/ OVERNIGHT EVENTS:  --- Coverage for Dr. Salomon ---  seen this am  report right lower abd discomfort  IV Tylenol ordered.  not associated with food.   already have hx of cholecystectomy and appendectomy in the past.  contemplating rehab vs. home. said she may be agreeable.   sugars better      --------------------------------------------------------------------------------------------  LABS:                        12.6   7.91  )-----------( 187      ( 17 Jan 2025 06:30 )             38.7     01-17    136  |  96[L]  |  14  ----------------------------<  343[H]  3.4[L]   |  30  |  0.61    Ca    9.0      17 Jan 2025 06:30  Phos  2.3     01-17  Mg     1.60     01-17        CAPILLARY BLOOD GLUCOSE      POCT Blood Glucose.: 206 mg/dL (18 Jan 2025 12:04)  POCT Blood Glucose.: 94 mg/dL (18 Jan 2025 07:51)  POCT Blood Glucose.: 90 mg/dL (17 Jan 2025 22:49)  POCT Blood Glucose.: 298 mg/dL (17 Jan 2025 18:08)  POCT Blood Glucose.: 324 mg/dL (17 Jan 2025 16:29)        Urinalysis Basic - ( 17 Jan 2025 06:30 )    Color: x / Appearance: x / SG: x / pH: x  Gluc: 343 mg/dL / Ketone: x  / Bili: x / Urobili: x   Blood: x / Protein: x / Nitrite: x   Leuk Esterase: x / RBC: x / WBC x   Sq Epi: x / Non Sq Epi: x / Bacteria: x        RADIOLOGY & ADDITIONAL TESTS:    Imaging Personally Reviewed:  [x] YES  [ ] NO    Consultant(s) Notes Reviewed:  [x] YES  [ ] NO    MEDICATIONS  (STANDING):  acetaminophen   IVPB .. 1000 milliGRAM(s) IV Intermittent once  aspirin  chewable 81 milliGRAM(s) Oral daily  atorvastatin 10 milliGRAM(s) Oral at bedtime  dextrose 5%. 1000 milliLiter(s) (50 mL/Hr) IV Continuous <Continuous>  dextrose 50% Injectable 25 Gram(s) IV Push once  enoxaparin Injectable 40 milliGRAM(s) SubCutaneous every 24 hours  gabapentin 100 milliGRAM(s) Oral daily  glucagon  Injectable 1 milliGRAM(s) IntraMuscular once  hydrochlorothiazide 12.5 milliGRAM(s) Oral daily  insulin aspart (NovoLOG) Pump 1 Each SubCutaneous Continuous Pump  losartan 25 milliGRAM(s) Oral daily  mirtazapine 30 milliGRAM(s) Oral at bedtime  pantoprazole    Tablet 40 milliGRAM(s) Oral before breakfast    MEDICATIONS  (PRN):  acetaminophen     Tablet .. 650 milliGRAM(s) Oral every 6 hours PRN Mild Pain (1 - 3), Moderate Pain (4 - 6), Severe Pain (7 - 10)  aluminum hydroxide/magnesium hydroxide/simethicone Suspension 30 milliLiter(s) Oral every 4 hours PRN Dyspepsia  dextrose Oral Gel 15 Gram(s) Oral once PRN Blood Glucose LESS THAN 70 milliGRAM(s)/deciliter  melatonin 3 milliGRAM(s) Oral at bedtime PRN Insomnia  ondansetron Injectable 4 milliGRAM(s) IV Push every 8 hours PRN Nausea and/or Vomiting      Care Discussed with Consultants/Other Providers [x] YES  [ ] NO    Vital Signs Last 24 Hrs  T(C): 36.9 (18 Jan 2025 03:30), Max: 37 (17 Jan 2025 22:49)  T(F): 98.4 (18 Jan 2025 03:30), Max: 98.6 (17 Jan 2025 22:49)  HR: 78 (18 Jan 2025 05:11) (78 - 96)  BP: 125/74 (18 Jan 2025 05:11) (100/60 - 125/74)  BP(mean): --  RR: 17 (18 Jan 2025 05:11) (17 - 18)  SpO2: 99% (18 Jan 2025 05:11) (98% - 100%)    Parameters below as of 18 Jan 2025 05:11  Patient On (Oxygen Delivery Method): room air      I&O's Summary      PHYSICAL EXAM:  GENERAL: NAD, well-developed, comfortable  HEAD:  Atraumatic, Normocephalic  EYES: EOMI, PERRLA, conjunctiva and sclera clear  NECK: Supple, No JVD  CHEST/LUNG: Clear to auscultation bilaterally; No wheeze  HEART: Regular rate and rhythm; No murmurs, rubs, or gallops  ABDOMEN: Soft, Nontender, Nondistended; Bowel sounds present  Neuro: AAOx3, no focal weakness, 5/5 b/l extremity strength  EXTREMITIES:  2+ Peripheral Pulses, No clubbing, cyanosis, or edema  SKIN: No rashes or lesions

## 2025-01-18 NOTE — PROGRESS NOTE ADULT - SUBJECTIVE AND OBJECTIVE BOX
DATE OF SERVICE: 01-18-25      pt seen and examined, no complaints, ROS - .        acetaminophen     Tablet .. 650 milliGRAM(s) Oral every 6 hours PRN  acetaminophen   IVPB .. 1000 milliGRAM(s) IV Intermittent once  aluminum hydroxide/magnesium hydroxide/simethicone Suspension 30 milliLiter(s) Oral every 4 hours PRN  aspirin  chewable 81 milliGRAM(s) Oral daily  atorvastatin 10 milliGRAM(s) Oral at bedtime  dextrose 5%. 1000 milliLiter(s) IV Continuous <Continuous>  dextrose 50% Injectable 25 Gram(s) IV Push once  dextrose Oral Gel 15 Gram(s) Oral once PRN  enoxaparin Injectable 40 milliGRAM(s) SubCutaneous every 24 hours  gabapentin 100 milliGRAM(s) Oral daily  glucagon  Injectable 1 milliGRAM(s) IntraMuscular once  hydrochlorothiazide 12.5 milliGRAM(s) Oral daily  insulin aspart (NovoLOG) Pump 1 Each SubCutaneous Continuous Pump  losartan 25 milliGRAM(s) Oral daily  melatonin 3 milliGRAM(s) Oral at bedtime PRN  mirtazapine 30 milliGRAM(s) Oral at bedtime  ondansetron Injectable 4 milliGRAM(s) IV Push every 8 hours PRN  pantoprazole    Tablet 40 milliGRAM(s) Oral before breakfast                            12.6   7.91  )-----------( 187      ( 17 Jan 2025 06:30 )             38.7       Hemoglobin: 12.6 g/dL (01-17 @ 06:30)  Hemoglobin: 12.6 g/dL (01-16 @ 06:00)  Hemoglobin: 12.5 g/dL (01-15 @ 06:47)  Hemoglobin: 14.4 g/dL (01-14 @ 19:06)      01-17    136  |  96[L]  |  14  ----------------------------<  343[H]  3.4[L]   |  30  |  0.61    Ca    9.0      17 Jan 2025 06:30  Phos  2.3     01-17  Mg     1.60     01-17      Creatinine Trend: 0.61<--, 0.58<--, 0.65<--, 0.75<--, 1.13<--    COAGS:           T(C): 36.9 (01-18-25 @ 03:30), Max: 37 (01-17-25 @ 22:49)  HR: 78 (01-18-25 @ 05:11) (78 - 96)  BP: 125/74 (01-18-25 @ 05:11) (100/60 - 125/74)  RR: 17 (01-18-25 @ 05:11) (17 - 18)  SpO2: 99% (01-18-25 @ 05:11) (98% - 100%)  Wt(kg): --    I&O's Summary    General: Well nourished in no acute distress.   Head: Normocephalic and atraumatic.   Neck: No JVD. No bruits. Supple. Does not appear to be enlarged.   Cardiovascular: + S1,S2 ; RRR Soft systolic murmur at the left lower sternal border. No rubs noted.    Lungs: CTA b/l. No rhonchi, rales or wheezes.   Abdomen: + BS, soft. Non tender. Non distended. No rebound. No guarding.   Extremities: No clubbing/cyanosis/edema.   Neurologic: Moves all four extremities. Full range of motion.   Skin: Warm and moist. The patient's skin has normal elasticity and good skin turgor.   Psychiatric: Appropriate mood and affect.  Musculoskeletal: Normal range of motion, normal strength    DATA    no tele    ECG:  	Sinus tachycardia 115bpm      < from: Xray Chest 1 View AP/PA (01.14.25 @ 20:13) >  IMPRESSION:  Clear lungs.    < end of copied text >    ASSESSMENT/PLAN: 	74F with PMH HTN, HLD, type 1 DM on insulin pump, Asthma, colon cad s/p hemicolectomy and chemo, in remission, with historically normal LV function and No ischemia on last NST 11/1023, presents to ER with hyperglycemia.      Sinus Tach  --Abnormal UA, on CTX  --Sinus tachycardia in the setting of hyperglycemia, resolving  --cont Losartan and HCTZ for HTN if tolerating

## 2025-01-18 NOTE — PROGRESS NOTE ADULT - SUBJECTIVE AND OBJECTIVE BOX
Chief Complaint: GEOVANY (Treated as T1DM)    Interval Events: Pt seen and examined at bedside. Pt currently eating lunch. She forgot to add her bolus insulin for lunch. Added at that time. Pt able to demonstrate.     MEDICATIONS  (STANDING):  acetaminophen   IVPB .. 1000 milliGRAM(s) IV Intermittent once  aspirin  chewable 81 milliGRAM(s) Oral daily  atorvastatin 10 milliGRAM(s) Oral at bedtime  dextrose 5%. 1000 milliLiter(s) (50 mL/Hr) IV Continuous <Continuous>  dextrose 50% Injectable 25 Gram(s) IV Push once  enoxaparin Injectable 40 milliGRAM(s) SubCutaneous every 24 hours  gabapentin 100 milliGRAM(s) Oral daily  glucagon  Injectable 1 milliGRAM(s) IntraMuscular once  hydrochlorothiazide 12.5 milliGRAM(s) Oral daily  insulin aspart (NovoLOG) Pump 1 Each SubCutaneous Continuous Pump  losartan 25 milliGRAM(s) Oral daily  mirtazapine 30 milliGRAM(s) Oral at bedtime  pantoprazole    Tablet 40 milliGRAM(s) Oral before breakfast    MEDICATIONS  (PRN):  acetaminophen     Tablet .. 650 milliGRAM(s) Oral every 6 hours PRN Mild Pain (1 - 3), Moderate Pain (4 - 6), Severe Pain (7 - 10)  aluminum hydroxide/magnesium hydroxide/simethicone Suspension 30 milliLiter(s) Oral every 4 hours PRN Dyspepsia  dextrose Oral Gel 15 Gram(s) Oral once PRN Blood Glucose LESS THAN 70 milliGRAM(s)/deciliter  melatonin 3 milliGRAM(s) Oral at bedtime PRN Insomnia  ondansetron Injectable 4 milliGRAM(s) IV Push every 8 hours PRN Nausea and/or Vomiting      Allergies    Percocet 10/325 (Other; Urticaria)  Lantus (Swelling)  codeine (Other)  Darvocet A500 (Other; Urticaria)  PURELL.  pt said, &quot;I felt my airway closing&quot; after she smelled the Purell. The incident occured at the pulmonologist office. (Other; Short breath)    Intolerances      Review of Systems:  Eyes: No blurry vision  Cardiovascular: No chest pain, palpitations  Respiratory: No SOB, no cough  GI: No nausea, vomiting, abdominal pain  : No dysuria    ALL OTHER SYSTEMS REVIEWED AND NEGATIVE      VITALS: T(C): 36.7 (01-18-25 @ 15:00)  T(F): 98.1 (01-18-25 @ 15:00), Max: 98.6 (01-17-25 @ 22:49)  HR: 89 (01-18-25 @ 15:00) (78 - 96)  BP: 101/47 (01-18-25 @ 15:00) (101/47 - 125/74)  RR:  (17 - 18)  SpO2:  (98% - 100%)  Wt(kg): --      Physical Exam:   GENERAL: NAD, well-developed  EYES: No proptosis  HEENT:  Atraumatic, Normocephalic  RESPIRATORY: non labored breathing   GI: Non distended  PSYCH: Alert, normal affect, normal mood    CAPILLARY BLOOD GLUCOSE    POCT Blood Glucose.: 206 mg/dL (18 Jan 2025 12:04)  POCT Blood Glucose.: 94 mg/dL (18 Jan 2025 07:51)  POCT Blood Glucose.: 90 mg/dL (17 Jan 2025 22:49)  POCT Blood Glucose.: 298 mg/dL (17 Jan 2025 18:08)      01-17    136  |  96[L]  |  14  ----------------------------<  343[H]  3.4[L]   |  30  |  0.61    eGFR: 94    Ca    9.0      01-17  Mg     1.60     01-17  Phos  2.3     01-17        A1C with Estimated Average Glucose Result: 8.5 % (01-14-25 @ 19:06)      Thyroid Function Tests:

## 2025-01-19 LAB
ANION GAP SERPL CALC-SCNC: 10 MMOL/L — SIGNIFICANT CHANGE UP (ref 7–14)
BUN SERPL-MCNC: 9 MG/DL — SIGNIFICANT CHANGE UP (ref 7–23)
CALCIUM SERPL-MCNC: 9.2 MG/DL — SIGNIFICANT CHANGE UP (ref 8.4–10.5)
CHLORIDE SERPL-SCNC: 98 MMOL/L — SIGNIFICANT CHANGE UP (ref 98–107)
CO2 SERPL-SCNC: 29 MMOL/L — SIGNIFICANT CHANGE UP (ref 22–31)
CREAT SERPL-MCNC: 0.65 MG/DL — SIGNIFICANT CHANGE UP (ref 0.5–1.3)
EGFR: 93 ML/MIN/1.73M2 — SIGNIFICANT CHANGE UP
GLUCOSE BLDC GLUCOMTR-MCNC: 116 MG/DL — HIGH (ref 70–99)
GLUCOSE BLDC GLUCOMTR-MCNC: 126 MG/DL — HIGH (ref 70–99)
GLUCOSE BLDC GLUCOMTR-MCNC: 138 MG/DL — HIGH (ref 70–99)
GLUCOSE BLDC GLUCOMTR-MCNC: 184 MG/DL — HIGH (ref 70–99)
GLUCOSE BLDC GLUCOMTR-MCNC: 69 MG/DL — LOW (ref 70–99)
GLUCOSE BLDC GLUCOMTR-MCNC: 70 MG/DL — SIGNIFICANT CHANGE UP (ref 70–99)
GLUCOSE BLDC GLUCOMTR-MCNC: 90 MG/DL — SIGNIFICANT CHANGE UP (ref 70–99)
GLUCOSE SERPL-MCNC: 117 MG/DL — HIGH (ref 70–99)
HCT VFR BLD CALC: 40 % — SIGNIFICANT CHANGE UP (ref 34.5–45)
HGB BLD-MCNC: 12.9 G/DL — SIGNIFICANT CHANGE UP (ref 11.5–15.5)
MAGNESIUM SERPL-MCNC: 1.7 MG/DL — SIGNIFICANT CHANGE UP (ref 1.6–2.6)
MCHC RBC-ENTMCNC: 26.3 PG — LOW (ref 27–34)
MCHC RBC-ENTMCNC: 32.3 G/DL — SIGNIFICANT CHANGE UP (ref 32–36)
MCV RBC AUTO: 81.6 FL — SIGNIFICANT CHANGE UP (ref 80–100)
NRBC # BLD AUTO: 0 K/UL — SIGNIFICANT CHANGE UP (ref 0–0)
NRBC # BLD: 0 /100 WBCS — SIGNIFICANT CHANGE UP (ref 0–0)
NRBC # FLD: 0 K/UL — SIGNIFICANT CHANGE UP (ref 0–0)
NRBC BLD-RTO: 0 /100 WBCS — SIGNIFICANT CHANGE UP (ref 0–0)
PHOSPHATE SERPL-MCNC: 3 MG/DL — SIGNIFICANT CHANGE UP (ref 2.5–4.5)
PLATELET # BLD AUTO: 245 K/UL — SIGNIFICANT CHANGE UP (ref 150–400)
POTASSIUM SERPL-MCNC: 3.6 MMOL/L — SIGNIFICANT CHANGE UP (ref 3.5–5.3)
POTASSIUM SERPL-SCNC: 3.6 MMOL/L — SIGNIFICANT CHANGE UP (ref 3.5–5.3)
RBC # BLD: 4.9 M/UL — SIGNIFICANT CHANGE UP (ref 3.8–5.2)
RBC # FLD: 12.6 % — SIGNIFICANT CHANGE UP (ref 10.3–14.5)
SODIUM SERPL-SCNC: 137 MMOL/L — SIGNIFICANT CHANGE UP (ref 135–145)
WBC # BLD: 8.36 K/UL — SIGNIFICANT CHANGE UP (ref 3.8–10.5)
WBC # FLD AUTO: 8.36 K/UL — SIGNIFICANT CHANGE UP (ref 3.8–10.5)

## 2025-01-19 PROCEDURE — 99232 SBSQ HOSP IP/OBS MODERATE 35: CPT

## 2025-01-19 RX ADMIN — ASPIRIN 81 MILLIGRAM(S): 81 TABLET, COATED ORAL at 12:10

## 2025-01-19 RX ADMIN — Medication 25 MILLIGRAM(S): at 05:39

## 2025-01-19 RX ADMIN — GABAPENTIN 100 MILLIGRAM(S): 800 TABLET ORAL at 12:10

## 2025-01-19 RX ADMIN — ATORVASTATIN CALCIUM 10 MILLIGRAM(S): 80 TABLET, FILM COATED ORAL at 21:23

## 2025-01-19 RX ADMIN — PANTOPRAZOLE 40 MILLIGRAM(S): 20 TABLET, DELAYED RELEASE ORAL at 05:39

## 2025-01-19 RX ADMIN — ENOXAPARIN SODIUM 40 MILLIGRAM(S): 100 INJECTION SUBCUTANEOUS at 05:40

## 2025-01-19 RX ADMIN — MIRTAZAPINE 30 MILLIGRAM(S): 30 TABLET, FILM COATED ORAL at 21:23

## 2025-01-19 NOTE — PROGRESS NOTE ADULT - SUBJECTIVE AND OBJECTIVE BOX
SUBJECTIVE/ OVERNIGHT EVENTS:  --- Coverage for Dr. Salomon ---  feels better  resolved abd discomfort  no cp, no sob, no n/v/d. no abdominal pain.  no headache, no dizziness.  she is agreeable to rehab now.         --------------------------------------------------------------------------------------------  LABS:                        12.9   8.36  )-----------( 245      ( 19 Jan 2025 06:15 )             40.0     01-19    137  |  98  |  9   ----------------------------<  117[H]  3.6   |  29  |  0.65    Ca    9.2      19 Jan 2025 06:15  Phos  3.0     01-19  Mg     1.70     01-19        CAPILLARY BLOOD GLUCOSE      POCT Blood Glucose.: 116 mg/dL (19 Jan 2025 07:37)  POCT Blood Glucose.: 126 mg/dL (19 Jan 2025 03:17)  POCT Blood Glucose.: 70 mg/dL (19 Jan 2025 02:20)  POCT Blood Glucose.: 69 mg/dL (19 Jan 2025 02:01)  POCT Blood Glucose.: 144 mg/dL (18 Jan 2025 22:04)  POCT Blood Glucose.: 197 mg/dL (18 Jan 2025 17:32)  POCT Blood Glucose.: 206 mg/dL (18 Jan 2025 12:04)        Urinalysis Basic - ( 19 Jan 2025 06:15 )    Color: x / Appearance: x / SG: x / pH: x  Gluc: 117 mg/dL / Ketone: x  / Bili: x / Urobili: x   Blood: x / Protein: x / Nitrite: x   Leuk Esterase: x / RBC: x / WBC x   Sq Epi: x / Non Sq Epi: x / Bacteria: x        RADIOLOGY & ADDITIONAL TESTS:    Imaging Personally Reviewed:  [x] YES  [ ] NO    Consultant(s) Notes Reviewed:  [x] YES  [ ] NO    MEDICATIONS  (STANDING):  acetaminophen   IVPB .. 1000 milliGRAM(s) IV Intermittent once  aspirin  chewable 81 milliGRAM(s) Oral daily  atorvastatin 10 milliGRAM(s) Oral at bedtime  dextrose 5%. 1000 milliLiter(s) (50 mL/Hr) IV Continuous <Continuous>  dextrose 50% Injectable 25 Gram(s) IV Push once  enoxaparin Injectable 40 milliGRAM(s) SubCutaneous every 24 hours  gabapentin 100 milliGRAM(s) Oral daily  glucagon  Injectable 1 milliGRAM(s) IntraMuscular once  insulin aspart (NovoLOG) Pump 1 Each SubCutaneous Continuous Pump  losartan 25 milliGRAM(s) Oral daily  mirtazapine 30 milliGRAM(s) Oral at bedtime  pantoprazole    Tablet 40 milliGRAM(s) Oral before breakfast    MEDICATIONS  (PRN):  acetaminophen     Tablet .. 650 milliGRAM(s) Oral every 6 hours PRN Mild Pain (1 - 3), Moderate Pain (4 - 6), Severe Pain (7 - 10)  aluminum hydroxide/magnesium hydroxide/simethicone Suspension 30 milliLiter(s) Oral every 4 hours PRN Dyspepsia  dextrose Oral Gel 15 Gram(s) Oral once PRN Blood Glucose LESS THAN 70 milliGRAM(s)/deciliter  melatonin 3 milliGRAM(s) Oral at bedtime PRN Insomnia  ondansetron Injectable 4 milliGRAM(s) IV Push every 8 hours PRN Nausea and/or Vomiting      Care Discussed with Consultants/Other Providers [x] YES  [ ] NO    Vital Signs Last 24 Hrs  T(C): 36.8 (19 Jan 2025 05:48), Max: 37.1 (18 Jan 2025 20:12)  T(F): 98.3 (19 Jan 2025 05:48), Max: 98.8 (18 Jan 2025 20:12)  HR: 83 (19 Jan 2025 05:48) (83 - 99)  BP: 116/60 (19 Jan 2025 05:48) (97/59 - 116/60)  BP(mean): --  RR: 18 (19 Jan 2025 05:48) (18 - 18)  SpO2: 100% (19 Jan 2025 05:48) (100% - 100%)    Parameters below as of 19 Jan 2025 05:48  Patient On (Oxygen Delivery Method): room air      I&O's Summary    18 Jan 2025 07:01  -  19 Jan 2025 07:00  --------------------------------------------------------  IN: 0 mL / OUT: 800 mL / NET: -800 mL      PHYSICAL EXAM:  GENERAL: NAD, well-developed, comfortable on room air  HEAD:  Atraumatic, Normocephalic  EYES: EOMI, PERRLA, conjunctiva and sclera clear  NECK: Supple, No JVD  CHEST/LUNG: Clear to auscultation bilaterally; No wheeze  HEART: Regular rate and rhythm; No murmurs, rubs, or gallops  ABDOMEN: Soft, Nontender, Nondistended; Bowel sounds present  Neuro: AAOx3, no focal weakness, 5/5 b/l extremity strength  EXTREMITIES:  2+ Peripheral Pulses, No clubbing, cyanosis, or edema  SKIN: No rashes or lesions

## 2025-01-19 NOTE — PROVIDER CONTACT NOTE (HYPOGLYCEMIA EVENT) - NS PROVIDER CONTACT ASSESS-HYPO
pt has a continuous glucose monitor. pt has a BG of 69 and then 70 after 4 oz of juice. pt is asympotmatic. gave repeat juice and will check BG in 30 minutes to an hour. 
pts asympotmatic and BG back in normal desirable range .

## 2025-01-19 NOTE — PROGRESS NOTE ADULT - SUBJECTIVE AND OBJECTIVE BOX
DATE OF SERVICE: 01-19-25    no events overnight        acetaminophen     Tablet .. 650 milliGRAM(s) Oral every 6 hours PRN  acetaminophen   IVPB .. 1000 milliGRAM(s) IV Intermittent once  aluminum hydroxide/magnesium hydroxide/simethicone Suspension 30 milliLiter(s) Oral every 4 hours PRN  aspirin  chewable 81 milliGRAM(s) Oral daily  atorvastatin 10 milliGRAM(s) Oral at bedtime  dextrose 5%. 1000 milliLiter(s) IV Continuous <Continuous>  dextrose 50% Injectable 25 Gram(s) IV Push once  dextrose Oral Gel 15 Gram(s) Oral once PRN  enoxaparin Injectable 40 milliGRAM(s) SubCutaneous every 24 hours  gabapentin 100 milliGRAM(s) Oral daily  glucagon  Injectable 1 milliGRAM(s) IntraMuscular once  insulin aspart (NovoLOG) Pump 1 Each SubCutaneous Continuous Pump  losartan 25 milliGRAM(s) Oral daily  melatonin 3 milliGRAM(s) Oral at bedtime PRN  mirtazapine 30 milliGRAM(s) Oral at bedtime  ondansetron Injectable 4 milliGRAM(s) IV Push every 8 hours PRN  pantoprazole    Tablet 40 milliGRAM(s) Oral before breakfast                            12.9   8.36  )-----------( 245      ( 19 Jan 2025 06:15 )             40.0       Hemoglobin: 12.9 g/dL (01-19 @ 06:15)  Hemoglobin: 12.6 g/dL (01-17 @ 06:30)  Hemoglobin: 12.6 g/dL (01-16 @ 06:00)  Hemoglobin: 12.5 g/dL (01-15 @ 06:47)  Hemoglobin: 14.4 g/dL (01-14 @ 19:06)      01-19    137  |  98  |  9   ----------------------------<  117[H]  3.6   |  29  |  0.65    Ca    9.2      19 Jan 2025 06:15  Phos  3.0     01-19  Mg     1.70     01-19      Creatinine Trend: 0.65<--, 0.61<--, 0.58<--, 0.65<--, 0.75<--, 1.13<--    COAGS:           T(C): 36.8 (01-19-25 @ 05:48), Max: 37.1 (01-18-25 @ 20:12)  HR: 83 (01-19-25 @ 05:48) (83 - 99)  BP: 116/60 (01-19-25 @ 05:48) (97/59 - 116/60)  RR: 18 (01-19-25 @ 05:48) (18 - 18)  SpO2: 100% (01-19-25 @ 05:48) (100% - 100%)  Wt(kg): --    I&O's Summary    18 Jan 2025 07:01  -  19 Jan 2025 07:00  --------------------------------------------------------  IN: 0 mL / OUT: 800 mL / NET: -800 mL          General: Well nourished in no acute distress.   Head: Normocephalic and atraumatic.   Neck: No JVD. No bruits. Supple. Does not appear to be enlarged.   Cardiovascular: + S1,S2 ; RRR Soft systolic murmur at the left lower sternal border. No rubs noted.    Lungs: CTA b/l. No rhonchi, rales or wheezes.   Abdomen: + BS, soft. Non tender. Non distended. No rebound. No guarding.   Extremities: No clubbing/cyanosis/edema.   Neurologic: Moves all four extremities. Full range of motion.   Skin: Warm and moist. The patient's skin has normal elasticity and good skin turgor.   Psychiatric: Appropriate mood and affect.  Musculoskeletal: Normal range of motion, normal strength    DATA    no tele    ECG:  	Sinus tachycardia 115bpm      < from: Xray Chest 1 View AP/PA (01.14.25 @ 20:13) >  IMPRESSION:  Clear lungs.    < end of copied text >    ASSESSMENT/PLAN: 	74F with PMH HTN, HLD, type 1 DM on insulin pump, Asthma, colon cad s/p hemicolectomy and chemo, in remission, with historically normal LV function and No ischemia on last NST 11/1023, presents to ER with hyperglycemia.      Sinus Tach  --Abnormal UA, on CTX  --Sinus tachycardia in the setting of hyperglycemia, resolving  --cont Losartan and HCTZ for HTN if tolerating                  DATE OF SERVICE: 01-19-25    no events overnight        acetaminophen     Tablet .. 650 milliGRAM(s) Oral every 6 hours PRN  acetaminophen   IVPB .. 1000 milliGRAM(s) IV Intermittent once  aluminum hydroxide/magnesium hydroxide/simethicone Suspension 30 milliLiter(s) Oral every 4 hours PRN  aspirin  chewable 81 milliGRAM(s) Oral daily  atorvastatin 10 milliGRAM(s) Oral at bedtime  dextrose 5%. 1000 milliLiter(s) IV Continuous <Continuous>  dextrose 50% Injectable 25 Gram(s) IV Push once  dextrose Oral Gel 15 Gram(s) Oral once PRN  enoxaparin Injectable 40 milliGRAM(s) SubCutaneous every 24 hours  gabapentin 100 milliGRAM(s) Oral daily  glucagon  Injectable 1 milliGRAM(s) IntraMuscular once  insulin aspart (NovoLOG) Pump 1 Each SubCutaneous Continuous Pump  losartan 25 milliGRAM(s) Oral daily  melatonin 3 milliGRAM(s) Oral at bedtime PRN  mirtazapine 30 milliGRAM(s) Oral at bedtime  ondansetron Injectable 4 milliGRAM(s) IV Push every 8 hours PRN  pantoprazole    Tablet 40 milliGRAM(s) Oral before breakfast                            12.9   8.36  )-----------( 245      ( 19 Jan 2025 06:15 )             40.0       Hemoglobin: 12.9 g/dL (01-19 @ 06:15)  Hemoglobin: 12.6 g/dL (01-17 @ 06:30)  Hemoglobin: 12.6 g/dL (01-16 @ 06:00)  Hemoglobin: 12.5 g/dL (01-15 @ 06:47)  Hemoglobin: 14.4 g/dL (01-14 @ 19:06)      01-19    137  |  98  |  9   ----------------------------<  117[H]  3.6   |  29  |  0.65    Ca    9.2      19 Jan 2025 06:15  Phos  3.0     01-19  Mg     1.70     01-19      Creatinine Trend: 0.65<--, 0.61<--, 0.58<--, 0.65<--, 0.75<--, 1.13<--    COAGS:           T(C): 36.8 (01-19-25 @ 05:48), Max: 37.1 (01-18-25 @ 20:12)  HR: 83 (01-19-25 @ 05:48) (83 - 99)  BP: 116/60 (01-19-25 @ 05:48) (97/59 - 116/60)  RR: 18 (01-19-25 @ 05:48) (18 - 18)  SpO2: 100% (01-19-25 @ 05:48) (100% - 100%)  Wt(kg): --    I&O's Summary    18 Jan 2025 07:01  -  19 Jan 2025 07:00  --------------------------------------------------------  IN: 0 mL / OUT: 800 mL / NET: -800 mL          General: Well nourished in no acute distress.   Head: Normocephalic and atraumatic.   Neck: No JVD. No bruits. Supple. Does not appear to be enlarged.   Cardiovascular: + S1,S2 ; RRR Soft systolic murmur at the left lower sternal border. No rubs noted.    Lungs: CTA b/l. No rhonchi, rales or wheezes.   Abdomen: + BS, soft. Non tender. Non distended. No rebound. No guarding.   Extremities: No clubbing/cyanosis/edema.   Neurologic: Moves all four extremities. Full range of motion.   Skin: Warm and moist. The patient's skin has normal elasticity and good skin turgor.   Psychiatric: Appropriate mood and affect.  Musculoskeletal: Normal range of motion, normal strength    DATA    no tele    ECG:  	Sinus tachycardia 115bpm      < from: Xray Chest 1 View AP/PA (01.14.25 @ 20:13) >  IMPRESSION:  Clear lungs.    < end of copied text >    ASSESSMENT/PLAN: 	74F with PMH HTN, HLD, type 1 DM on insulin pump, Asthma, colon cad s/p hemicolectomy and chemo, in remission, with historically normal LV function and No ischemia on last NST 11/1023, presents to ER with hyperglycemia.      Sinus Tach/HTN  --Abnormal UA, on CTX  --Sinus tachycardia in the setting of hyperglycemia, resolving  --cont Losartan and HCTZ for HTN if tolerating

## 2025-01-19 NOTE — PROVIDER CONTACT NOTE (HYPOGLYCEMIA EVENT) - NS PROVIDER CONTACT BACKGROUND-HYPO
Age: 74y    Gender: Female    POCT Blood Glucose:  70 mg/dL (01-19-25 @ 02:20)  69 mg/dL (01-19-25 @ 02:01)  144 mg/dL (01-18-25 @ 22:04)  197 mg/dL (01-18-25 @ 17:32)  206 mg/dL (01-18-25 @ 12:04)  94 mg/dL (01-18-25 @ 07:51)      eMAR:atorvastatin   10 milliGRAM(s) Oral (01-18-25 @ 22:11)

## 2025-01-19 NOTE — PROGRESS NOTE ADULT - ASSESSMENT
The patient is a 74y Female with PMH of T1DM on an insulin pump, colon CA s/p surgery/chemo p/w hyperglycemia. Endocrinology consulted for type 1 diabetes on an insulin pump.    #Type 1 Diabetes Mellitus vs type 2 DM    #Use of insulin pump  - Follows with: Dr. Ledezma  - A1C with Estimated Average Glucose Result: 8.5 % (01-14-25)  - eGFR: 93 mL/min/1.73m2 (01-15-25)  - Weight (kg): 79.4 (01-14-25)  - home regimen: These settings were recently changed by Endocrinologist Last week  Insulin pump type: Medtronic   Insulin type: Novolog  Last site change: pump removed 1/17  Supplies available: no  Settings:  Basal rate ( TBD 51.525)  12a-6: 30a 1.75 unit/hr  6:30a-12:30p 1.95 units/hr  12:30p-6:30p 2.45 units/hr  6:30p-12a 2.5 units/hr  ISF  12a-12a 50---->25 on 1/17----> 50  I:C ratio  12a-12a 1:12  Target   Active insulin time 4 hrs      INPATIENT PLAN:  - On Sunday 1/19 patient experienced a hypoglycemia event at approx 2am. Advised setting changes be made but patient does not want her settings changed. Pt requests peanut butter snack at bedtime. Call made to dietary by unit receptionist and peanut butter to be supplied on dinner tray tonight. RN also made aware.   - Stop standing insulin   - Pt on insulin pump with Novolog insulin   - All insulin should be via insulin pump   - Since pt doesn't count carbs ----> pt will manually bolus TID AC (please hold if npo/not eating) plus correction bolus depending on glucose levels.   - Patient is bolusing 4 units for meals  - Incase of pump failure would give Levemir or Tresiba (both available inpt) (pt has lantus allergy)  42 units stat and Add Admelog 8 unit TID AC (please hold if npo/not eating)   - Please check FSG before meals and QHS, or q6h while NPO  - Inpatient glucose goals: 100-180: above goal. pt denies eating in between meals   - consistent carb diet when eating  - FS before meals and at bedtime   - RD consult   - Hypoglycemia Protocol   - unclear if T1 vs T2 DM given no DKA despite lack of basal insulin x24 hours and patient habitus   - c-peptide 1.9 with serum glucose 313. If pt stays inpt would recommend repeating c-peptide level in am with serum glucose (ordered)   - DM educator following---> please refer to note   - Consider changing abx solution from dextrose to sodium chloride if no contraindications   - Attestation form be filled out and signed.  Rn to place correction and manual bolus 6 units in flowsheet.  Pt doesn't count carbs.    - Insulin pump site changed on 1/17/2025 due to be changed on 1/20/2025; Freestyle angely changed on 1/17/2025 due to be changed in 14 days      DISCHARGE PLANNING:  - As per primary team pt is going to rehab     - Pt wants to continue using her insulin pump; will continue to assess pts ability to use insulin pump while inpatient    - dc on insulin pump; settings may need to be adjusted based on glucose trend; Pt will continue using basal insulin and  bolus for correction and set amount of bolus for meals for dc (bolus dose may need to beadjusted based on glucose levels inpts)    - She states she never has hyperglycemia but that is not reflected by her A1c.     - Pt was taught how to correct her glucose with correction and correction settings were adjusted. Would recommend pt to learn how to carb count as an outpt.  If unable to do carb counting atleast at a minimum insulin pump should be programmed for pt to receive bolus insulin based on the amount she eats (for example have a bolus amount for small, medium, and large meals).  This is suggestion to have better control of glucose levels at this time other pump settings may need to be adjusted.      - Patient has 770G insulin pump. Patient was instructed on the difference with the 780G and that it would be beneficial to have the sevenloadtronic CGM so her pump can be a closed loop. A “closed loop insulin pump” was also explained to the patient. Patient currently uses a Angely 2 CGM, and it was explained to patient that sometimes she can miss information if she goes longer than 8 hrs without scanning the device. If patient does not want to get the Medtronic CGM, then at least get a Angely 3 CGM so the information will go right to her reader. Pt also instructed at her next endocrine follow up with insulin pump representative; pt can also consider changing to islet insulin pump; pt will speak to her endocrinologist post discharge    - For now continue Freestyle angely 2 sensor and reader     - c-peptide 1.9 with serum glucose 313 maybe falsely elevated. If pt stays inpt would recommend repeating; if pt is discharged today please repeat cpeptide with serum glucose as an outpt     - Pt needs insulin pen both basal at home incase of insulin pump failure; please check if below insulin is covered by pts insurance     - Please send tresiba/basaglar/toujeo/Levemir pen and humalog kwikpen as test script to check insurance coverage.  Ok to send with current doses and update prior to d/c    If Humalog not covered- can try alternating with one of following  novolog/apidra/admelog/fiasp    - Ensure patient has working glucometer, test strips, lancets, alcohol pads, and BD sofia pen needles    - For severe hypoglycemia: Please prescribe Glucagon Emergency Kit for Low Blood Sugar 1 mG injection: 1 mG intramuscularly as needed for severe hypoglycemia. Glucose tablets 4G (take 4 tablets) or 15G tablets for blood sugar less than 70 mG/dL repeat fingerstick in 15 minutes.     - patient should followup with their endocrinologist, Dr. Ledezma recently saw Dr. Ledezma last week; advised pt to make an appointment upon discharge     - Please make sure pt has insulin, cgm, and insulin pen, and insulin pump supplies prior to discharge    #Hypertension  - Goal BP <130/80  - Management as per primary team  - check urine microalbumin level as outpatient    #Hyperlipidemia  - LDL goal <70  - Last LDL: 20 mg/dL (11-29-23)  - check lipid panel as outpatient on a yearly basis    ESSENCE Colindres-BC  Nurse Practitioner  Division of Endocrinology  Contact on TEAMS    If out of hospital/unavailable when paged, please note: patient will be cared for by another provider on the endocrine service.  For urgent concerns: call the endocrine answering service for assistance to reach covering provider ([phone]). For non-urgent matters: please email [email or web address] for [email or web address].

## 2025-01-19 NOTE — PROGRESS NOTE ADULT - SUBJECTIVE AND OBJECTIVE BOX
Chief Complaint: T1DM on insulin pump    Interval Events: Pt seen and examined at bedside. Hypoglycemia event overnight. Pt mitigates overnight hypoglycemia at home by eating peanut butter and crackers before bed which she has not had access to while hospitalized.     MEDICATIONS  (STANDING):  acetaminophen   IVPB .. 1000 milliGRAM(s) IV Intermittent once  aspirin  chewable 81 milliGRAM(s) Oral daily  atorvastatin 10 milliGRAM(s) Oral at bedtime  dextrose 5%. 1000 milliLiter(s) (50 mL/Hr) IV Continuous <Continuous>  dextrose 50% Injectable 25 Gram(s) IV Push once  enoxaparin Injectable 40 milliGRAM(s) SubCutaneous every 24 hours  gabapentin 100 milliGRAM(s) Oral daily  glucagon  Injectable 1 milliGRAM(s) IntraMuscular once  insulin aspart (NovoLOG) Pump 1 Each SubCutaneous Continuous Pump  losartan 25 milliGRAM(s) Oral daily  mirtazapine 30 milliGRAM(s) Oral at bedtime  pantoprazole    Tablet 40 milliGRAM(s) Oral before breakfast    MEDICATIONS  (PRN):  acetaminophen     Tablet .. 650 milliGRAM(s) Oral every 6 hours PRN Mild Pain (1 - 3), Moderate Pain (4 - 6), Severe Pain (7 - 10)  aluminum hydroxide/magnesium hydroxide/simethicone Suspension 30 milliLiter(s) Oral every 4 hours PRN Dyspepsia  dextrose Oral Gel 15 Gram(s) Oral once PRN Blood Glucose LESS THAN 70 milliGRAM(s)/deciliter  melatonin 3 milliGRAM(s) Oral at bedtime PRN Insomnia  ondansetron Injectable 4 milliGRAM(s) IV Push every 8 hours PRN Nausea and/or Vomiting      Allergies    Percocet 10/325 (Other; Urticaria)  Lantus (Swelling)  codeine (Other)  Darvocet A500 (Other; Urticaria)  PURELL.  pt said, &quot;I felt my airway closing&quot; after she smelled the Purell. The incident occured at the pulmonologist office. (Other; Short breath)    Intolerances      Review of Systems:  Eyes: No blurry vision  Cardiovascular: No chest pain, palpitations  Respiratory: No SOB, no cough  GI: No nausea, vomiting, abdominal pain  : No dysuria    ALL OTHER SYSTEMS REVIEWED AND NEGATIVE    VITALS: T(C): 36.6 (01-19-25 @ 12:01)  T(F): 97.9 (01-19-25 @ 12:01), Max: 98.8 (01-18-25 @ 20:12)  HR: 100 (01-19-25 @ 12:01) (83 - 100)  BP: 97/60 (01-19-25 @ 12:01) (97/59 - 116/60)  RR:  (18 - 18)  SpO2:  (100% - 100%)  Wt(kg): --      Physical Exam:   GENERAL: NAD, well-developed  EYES: No proptosis  HEENT:  Atraumatic, Normocephalic  RESPIRATORY: non labored breathing   GI: Non distended  PSYCH: Alert, normal affect, normal mood    CAPILLARY BLOOD GLUCOSE    POCT Blood Glucose.: 184 mg/dL (19 Jan 2025 12:36)  POCT Blood Glucose.: 116 mg/dL (19 Jan 2025 07:37)  POCT Blood Glucose.: 126 mg/dL (19 Jan 2025 03:17)  POCT Blood Glucose.: 70 mg/dL (19 Jan 2025 02:20)  POCT Blood Glucose.: 69 mg/dL (19 Jan 2025 02:01)  POCT Blood Glucose.: 144 mg/dL (18 Jan 2025 22:04)  POCT Blood Glucose.: 197 mg/dL (18 Jan 2025 17:32)      01-19    137  |  98  |  9   ----------------------------<  117[H]  3.6   |  29  |  0.65    eGFR: 93    Ca    9.2      01-19  Mg     1.70     01-19  Phos  3.0     01-19        A1C with Estimated Average Glucose Result: 8.5 % (01-14-25 @ 19:06)      Thyroid Function Tests:

## 2025-01-19 NOTE — PROVIDER CONTACT NOTE (HYPOGLYCEMIA EVENT) - NS PROVIDER CONTACT BACKGROUND-HYPO
Age: 74y    Gender: Female    POCT Blood Glucose:  126 mg/dL (01-19-25 @ 03:17)  70 mg/dL (01-19-25 @ 02:20)  69 mg/dL (01-19-25 @ 02:01)  144 mg/dL (01-18-25 @ 22:04)  197 mg/dL (01-18-25 @ 17:32)  206 mg/dL (01-18-25 @ 12:04)  94 mg/dL (01-18-25 @ 07:51)      eMAR:atorvastatin   10 milliGRAM(s) Oral (01-18-25 @ 22:11)

## 2025-01-20 LAB
ANION GAP SERPL CALC-SCNC: 13 MMOL/L — SIGNIFICANT CHANGE UP (ref 7–14)
BUN SERPL-MCNC: 10 MG/DL — SIGNIFICANT CHANGE UP (ref 7–23)
CALCIUM SERPL-MCNC: 8.8 MG/DL — SIGNIFICANT CHANGE UP (ref 8.4–10.5)
CHLORIDE SERPL-SCNC: 100 MMOL/L — SIGNIFICANT CHANGE UP (ref 98–107)
CO2 SERPL-SCNC: 24 MMOL/L — SIGNIFICANT CHANGE UP (ref 22–31)
CREAT SERPL-MCNC: 0.61 MG/DL — SIGNIFICANT CHANGE UP (ref 0.5–1.3)
CULTURE RESULTS: SIGNIFICANT CHANGE UP
CULTURE RESULTS: SIGNIFICANT CHANGE UP
EGFR: 94 ML/MIN/1.73M2 — SIGNIFICANT CHANGE UP
GLUCOSE BLDC GLUCOMTR-MCNC: 178 MG/DL — HIGH (ref 70–99)
GLUCOSE BLDC GLUCOMTR-MCNC: 196 MG/DL — HIGH (ref 70–99)
GLUCOSE BLDC GLUCOMTR-MCNC: 206 MG/DL — HIGH (ref 70–99)
GLUCOSE BLDC GLUCOMTR-MCNC: 239 MG/DL — HIGH (ref 70–99)
GLUCOSE SERPL-MCNC: 214 MG/DL — HIGH (ref 70–99)
HCT VFR BLD CALC: 41.1 % — SIGNIFICANT CHANGE UP (ref 34.5–45)
HGB BLD-MCNC: 13.1 G/DL — SIGNIFICANT CHANGE UP (ref 11.5–15.5)
MAGNESIUM SERPL-MCNC: 1.7 MG/DL — SIGNIFICANT CHANGE UP (ref 1.6–2.6)
MCHC RBC-ENTMCNC: 26.7 PG — LOW (ref 27–34)
MCHC RBC-ENTMCNC: 31.9 G/DL — LOW (ref 32–36)
MCV RBC AUTO: 83.9 FL — SIGNIFICANT CHANGE UP (ref 80–100)
NRBC # BLD AUTO: 0 K/UL — SIGNIFICANT CHANGE UP (ref 0–0)
NRBC # BLD: 0 /100 WBCS — SIGNIFICANT CHANGE UP (ref 0–0)
NRBC # FLD: 0 K/UL — SIGNIFICANT CHANGE UP (ref 0–0)
NRBC BLD-RTO: 0 /100 WBCS — SIGNIFICANT CHANGE UP (ref 0–0)
PHOSPHATE SERPL-MCNC: 2.6 MG/DL — SIGNIFICANT CHANGE UP (ref 2.5–4.5)
PLATELET # BLD AUTO: 255 K/UL — SIGNIFICANT CHANGE UP (ref 150–400)
POTASSIUM SERPL-MCNC: 4 MMOL/L — SIGNIFICANT CHANGE UP (ref 3.5–5.3)
POTASSIUM SERPL-SCNC: 4 MMOL/L — SIGNIFICANT CHANGE UP (ref 3.5–5.3)
RBC # BLD: 4.9 M/UL — SIGNIFICANT CHANGE UP (ref 3.8–5.2)
RBC # FLD: 13 % — SIGNIFICANT CHANGE UP (ref 10.3–14.5)
SODIUM SERPL-SCNC: 137 MMOL/L — SIGNIFICANT CHANGE UP (ref 135–145)
SPECIMEN SOURCE: SIGNIFICANT CHANGE UP
SPECIMEN SOURCE: SIGNIFICANT CHANGE UP
WBC # BLD: 8.52 K/UL — SIGNIFICANT CHANGE UP (ref 3.8–10.5)
WBC # FLD AUTO: 8.52 K/UL — SIGNIFICANT CHANGE UP (ref 3.8–10.5)

## 2025-01-20 PROCEDURE — 99232 SBSQ HOSP IP/OBS MODERATE 35: CPT

## 2025-01-20 RX ORDER — OXYCODONE HYDROCHLORIDE 30 MG/1
2.5 TABLET ORAL ONCE
Refills: 0 | Status: DISCONTINUED | OUTPATIENT
Start: 2025-01-20 | End: 2025-01-20

## 2025-01-20 RX ORDER — BACTERIOSTATIC SODIUM CHLORIDE 0.9 %
1000 VIAL (ML) INJECTION
Refills: 0 | Status: DISCONTINUED | OUTPATIENT
Start: 2025-01-20 | End: 2025-01-24

## 2025-01-20 RX ADMIN — OXYCODONE HYDROCHLORIDE 2.5 MILLIGRAM(S): 30 TABLET ORAL at 14:47

## 2025-01-20 RX ADMIN — Medication 25 MILLIGRAM(S): at 05:55

## 2025-01-20 RX ADMIN — Medication 75 MILLILITER(S): at 21:00

## 2025-01-20 RX ADMIN — ENOXAPARIN SODIUM 40 MILLIGRAM(S): 100 INJECTION SUBCUTANEOUS at 05:55

## 2025-01-20 RX ADMIN — MIRTAZAPINE 30 MILLIGRAM(S): 30 TABLET, FILM COATED ORAL at 22:09

## 2025-01-20 RX ADMIN — ACETAMINOPHEN 650 MILLIGRAM(S): 160 SUSPENSION ORAL at 11:35

## 2025-01-20 RX ADMIN — ASPIRIN 81 MILLIGRAM(S): 81 TABLET, COATED ORAL at 11:35

## 2025-01-20 RX ADMIN — OXYCODONE HYDROCHLORIDE 2.5 MILLIGRAM(S): 30 TABLET ORAL at 15:47

## 2025-01-20 RX ADMIN — ATORVASTATIN CALCIUM 10 MILLIGRAM(S): 80 TABLET, FILM COATED ORAL at 22:09

## 2025-01-20 RX ADMIN — ACETAMINOPHEN 650 MILLIGRAM(S): 160 SUSPENSION ORAL at 12:35

## 2025-01-20 RX ADMIN — GABAPENTIN 100 MILLIGRAM(S): 800 TABLET ORAL at 11:35

## 2025-01-20 RX ADMIN — PANTOPRAZOLE 40 MILLIGRAM(S): 20 TABLET, DELAYED RELEASE ORAL at 05:56

## 2025-01-20 NOTE — PROGRESS NOTE ADULT - ASSESSMENT
The patient is a 74y Female with PMH of T1DM on an insulin pump, colon CA s/p surgery/chemo p/w hyperglycemia. Endocrinology consulted for type 1 diabetes on an insulin pump.    #Type 1 Diabetes Mellitus vs type 2 DM    #Use of insulin pump  - Follows with: Dr. Ledezma  - A1C with Estimated Average Glucose Result: 8.5 % (01-14-25)  - eGFR: 93 mL/min/1.73m2 (01-15-25)  - Weight (kg): 79.4 (01-14-25)  - home regimen: These settings were recently changed by Endocrinologist Last week  Insulin pump type: Medtronic   Insulin type: Novolog  Last site change: pump removed 1/17  Supplies available: no  Settings:  Basal rate ( TBD 51.525)  12a-6: 30a 1.75 unit/hr  6:30a-12:30p 1.95 units/hr  12:30p-6:30p 2.45 units/hr  6:30p-12a 2.5 units/hr  ISF  12a-12a 50---->25 on 1/17----> 50  I:C ratio  12a-12a 1:12  Target   Active insulin time 4 hrs      INPATIENT PLAN:  - Inpatient glucose goals: 100-180: During rounds pt was eating peanut butter prior to lunch time FS. FS this morning at goal and FS this afternoon was 196. Pt on insulin pump at home and inpt.   - On Sunday 1/19 patient experienced a hypoglycemia event at approx 2am. Advised setting changes be made but patient does not want her settings changed. Pt requests peanut butter snack at bedtime. Call made to dietary by unit receptionist and peanut butter to be supplied on dinner tray tonight. RN also made aware.   - Pt on insulin pump with Novolog insulin   - All insulin should be via insulin pump   - Since pt doesn't count carbs ----> pt will manually bolus TID AC (please hold if npo/not eating) plus correction bolus depending on glucose levels.   - Patient is bolusing 4 units for meals  - Incase of pump failure would give Levemir or Tresiba (both available inpt) (pt has lantus allergy)  42 units stat and Add Admelog 8 unit TID AC (please hold if npo/not eating)   - Please check FSG before meals and QHS, or q6h while NPO  - consistent carb diet when eating  - FS before meals and at bedtime   - RD consult   - Hypoglycemia Protocol   - unclear if T1 vs T2 DM given no DKA despite lack of basal insulin x24 hours and patient habitus   - c-peptide 1.9 with serum glucose 313. If pt stays inpt would recommend repeating c-peptide level in am with serum glucose (ordered with am labs tomorrow- was ordered on 1/18 but drawn)  - DM educator following---> please refer to note   - Attestation form be filled out and signed.  Rn to place correction and manual bolus 6 units in flowsheet.  Pt doesn't count carbs.    - Insulin pump site changed on 1/17/2025 due to be changed today 1/20/2025; Freestyle angely changed on 1/17/2025 due to be changed in 14 days      DISCHARGE PLANNING:  - As per primary team pt is going to rehab     - Pt wants to continue using her insulin pump; will continue to assess pts ability to use insulin pump while inpatient    - dc on insulin pump; settings may need to be adjusted based on glucose trend; Pt will continue using basal insulin and  bolus for correction and set amount of bolus for meals for dc (bolus dose may need to beadjusted based on glucose levels inpts)    - She states she never has hyperglycemia but that is not reflected by her A1c.     - Pt was taught how to correct her glucose with correction and correction settings were adjusted. Would recommend pt to learn how to carb count as an outpt.  If unable to do carb counting atleast at a minimum insulin pump should be programmed for pt to receive bolus insulin based on the amount she eats (for example have a bolus amount for small, medium, and large meals).  This is suggestion to have better control of glucose levels at this time other pump settings may need to be adjusted.      - Patient has 770G insulin pump. Patient was instructed on the difference with the 780G and that it would be beneficial to have the Octoshapetronic CGM so her pump can be a closed loop. A “closed loop insulin pump” was also explained to the patient. Patient currently uses a Angely 2 CGM, and it was explained to patient that sometimes she can miss information if she goes longer than 8 hrs without scanning the device. If patient does not want to get the Medtronic CGM, then at least get a Angely 3 CGM so the information will go right to her reader. Pt also instructed at her next endocrine follow up with insulin pump representative; pt can also consider changing to islet insulin pump; pt will speak to her endocrinologist post discharge    - For now continue Freestyle angely 2 sensor and reader     - c-peptide 1.9 with serum glucose 313 maybe falsely elevated. If pt stays inpt would recommend repeating; if pt is discharged today please repeat cpeptide with serum glucose as an outpt     - Pt needs insulin pen both basal at home incase of insulin pump failure; please check if below insulin is covered by pts insurance     - Please send tresiba/basaglar/toujeo/Levemir pen and humalog kwikpen as test script to check insurance coverage.  Ok to send with current doses and update prior to d/c    If Humalog not covered- can try alternating with one of following  novolog/apidra/admelog/fiasp    - Ensure patient has working glucometer, test strips, lancets, alcohol pads, and BD sofia pen needles    - For severe hypoglycemia: Please prescribe Glucagon Emergency Kit for Low Blood Sugar 1 mG injection: 1 mG intramuscularly as needed for severe hypoglycemia. Glucose tablets 4G (take 4 tablets) or 15G tablets for blood sugar less than 70 mG/dL repeat fingerstick in 15 minutes.     - patient should followup with their endocrinologist, Dr. Ledezma recently saw Dr. Ledezma last week; advised pt to make an appointment upon discharge     - Please make sure pt has insulin, cgm, and insulin pen, and insulin pump supplies prior to discharge    #Hypertension  - Goal BP <130/80  - Management as per primary team  - check urine microalbumin level as outpatient    #Hyperlipidemia  - LDL goal <70  - Last LDL: 20 mg/dL (11-29-23)  - check lipid panel as outpatient on a yearly basis    ESSENCE Batres-BC  Nurse Practitioner  Division of Endocrinology & Diabetes  pager [phone]   If out of hospital/unavailable when paged, please note: patient will be cared for by another provider on the endocrine service.  For urgent concerns: call the endocrine answering service for assistance to reach covering provider ([phone]). For non-urgent matters: please email [email or web address] for assistance.

## 2025-01-20 NOTE — PROGRESS NOTE ADULT - SUBJECTIVE AND OBJECTIVE BOX
SUBJECTIVE/ OVERNIGHT EVENTS:  --- Coverage for Dr. Salomon ---  stable overall.  awake alert. comfortable.  No cp, no sob. No abd pain.  denied pain.  no n/v/d.   tolerating diet.     --------------------------------------------------------------------------------------------  LABS:                        13.1   8.52  )-----------( 255      ( 20 Jan 2025 06:58 )             41.1     01-20    137  |  100  |  10  ----------------------------<  214[H]  4.0   |  24  |  0.61    Ca    8.8      20 Jan 2025 06:58  Phos  2.6     01-20  Mg     1.70     01-20        CAPILLARY BLOOD GLUCOSE      POCT Blood Glucose.: 206 mg/dL (20 Jan 2025 17:19)  POCT Blood Glucose.: 196 mg/dL (20 Jan 2025 11:47)  POCT Blood Glucose.: 178 mg/dL (20 Jan 2025 07:59)  POCT Blood Glucose.: 90 mg/dL (19 Jan 2025 22:00)        Urinalysis Basic - ( 20 Jan 2025 06:58 )    Color: x / Appearance: x / SG: x / pH: x  Gluc: 214 mg/dL / Ketone: x  / Bili: x / Urobili: x   Blood: x / Protein: x / Nitrite: x   Leuk Esterase: x / RBC: x / WBC x   Sq Epi: x / Non Sq Epi: x / Bacteria: x        RADIOLOGY & ADDITIONAL TESTS:    Imaging Personally Reviewed:  [x] YES  [ ] NO    Consultant(s) Notes Reviewed:  [x] YES  [ ] NO    MEDICATIONS  (STANDING):  acetaminophen   IVPB .. 1000 milliGRAM(s) IV Intermittent once  aspirin  chewable 81 milliGRAM(s) Oral daily  atorvastatin 10 milliGRAM(s) Oral at bedtime  dextrose 5%. 1000 milliLiter(s) (50 mL/Hr) IV Continuous <Continuous>  dextrose 50% Injectable 25 Gram(s) IV Push once  enoxaparin Injectable 40 milliGRAM(s) SubCutaneous every 24 hours  gabapentin 100 milliGRAM(s) Oral daily  glucagon  Injectable 1 milliGRAM(s) IntraMuscular once  insulin aspart (NovoLOG) Pump 1 Each SubCutaneous Continuous Pump  losartan 25 milliGRAM(s) Oral daily  mirtazapine 30 milliGRAM(s) Oral at bedtime  pantoprazole    Tablet 40 milliGRAM(s) Oral before breakfast  sodium chloride 0.9%. 1000 milliLiter(s) (75 mL/Hr) IV Continuous <Continuous>    MEDICATIONS  (PRN):  acetaminophen     Tablet .. 650 milliGRAM(s) Oral every 6 hours PRN Mild Pain (1 - 3), Moderate Pain (4 - 6), Severe Pain (7 - 10)  aluminum hydroxide/magnesium hydroxide/simethicone Suspension 30 milliLiter(s) Oral every 4 hours PRN Dyspepsia  dextrose Oral Gel 15 Gram(s) Oral once PRN Blood Glucose LESS THAN 70 milliGRAM(s)/deciliter  melatonin 3 milliGRAM(s) Oral at bedtime PRN Insomnia  ondansetron Injectable 4 milliGRAM(s) IV Push every 8 hours PRN Nausea and/or Vomiting      Care Discussed with Consultants/Other Providers [x] YES  [ ] NO    Vital Signs Last 24 Hrs  T(C): 36.6 (20 Jan 2025 13:50), Max: 36.9 (20 Jan 2025 10:02)  T(F): 97.8 (20 Jan 2025 13:50), Max: 98.5 (20 Jan 2025 10:02)  HR: 94 (20 Jan 2025 13:50) (94 - 98)  BP: 111/66 (20 Jan 2025 13:50) (96/61 - 111/66)  BP(mean): 84 (20 Jan 2025 05:00) (84 - 84)  RR: 17 (20 Jan 2025 13:50) (17 - 18)  SpO2: 99% (20 Jan 2025 13:50) (99% - 100%)    Parameters below as of 20 Jan 2025 13:50  Patient On (Oxygen Delivery Method): room air      I&O's Summary    19 Jan 2025 07:01  -  20 Jan 2025 07:00  --------------------------------------------------------  IN: 0 mL / OUT: 650 mL / NET: -650 mL            PHYSICAL EXAM:  GENERAL: NAD, well-developed, comfortable on room air  HEAD:  Atraumatic, Normocephalic  EYES: EOMI, PERRLA, conjunctiva and sclera clear  NECK: Supple, No JVD  CHEST/LUNG: Clear to auscultation bilaterally; No wheeze  HEART: Regular rate and rhythm; No murmurs, rubs, or gallops  ABDOMEN: Soft, Nontender, Nondistended; Bowel sounds present  Neuro: AAOx3, no focal weakness, 5/5 b/l extremity strength  EXTREMITIES:  2+ Peripheral Pulses, No clubbing, cyanosis, or edema  SKIN: No rashes or lesions

## 2025-01-20 NOTE — PROGRESS NOTE ADULT - SUBJECTIVE AND OBJECTIVE BOX
Chief Complaint: type 1 DM on insulin pump    History: saw pt at bedside. pt tolerating PO diet. During rounds pt was eating peanut butter prior to lunch time FS. FS this morning at goal and FS this afternoon was 196. Pt on insulin pump at home and inpt.     MEDICATIONS  (STANDING):  acetaminophen   IVPB .. 1000 milliGRAM(s) IV Intermittent once  aspirin  chewable 81 milliGRAM(s) Oral daily  atorvastatin 10 milliGRAM(s) Oral at bedtime  dextrose 5%. 1000 milliLiter(s) (50 mL/Hr) IV Continuous <Continuous>  dextrose 50% Injectable 25 Gram(s) IV Push once  enoxaparin Injectable 40 milliGRAM(s) SubCutaneous every 24 hours  gabapentin 100 milliGRAM(s) Oral daily  glucagon  Injectable 1 milliGRAM(s) IntraMuscular once  insulin aspart (NovoLOG) Pump 1 Each SubCutaneous Continuous Pump  losartan 25 milliGRAM(s) Oral daily  mirtazapine 30 milliGRAM(s) Oral at bedtime  pantoprazole    Tablet 40 milliGRAM(s) Oral before breakfast    MEDICATIONS  (PRN):  acetaminophen     Tablet .. 650 milliGRAM(s) Oral every 6 hours PRN Mild Pain (1 - 3), Moderate Pain (4 - 6), Severe Pain (7 - 10)  aluminum hydroxide/magnesium hydroxide/simethicone Suspension 30 milliLiter(s) Oral every 4 hours PRN Dyspepsia  dextrose Oral Gel 15 Gram(s) Oral once PRN Blood Glucose LESS THAN 70 milliGRAM(s)/deciliter  melatonin 3 milliGRAM(s) Oral at bedtime PRN Insomnia  ondansetron Injectable 4 milliGRAM(s) IV Push every 8 hours PRN Nausea and/or Vomiting      Allergies    Percocet 10/325 (Other; Urticaria)  Lantus (Swelling)  codeine (Other)  Darvocet A500 (Other; Urticaria)  PURELL.  pt said, &quot;I felt my airway closing&quot; after she smelled the Purell. The incident occured at the pulmonologist office. (Other; Short breath)    Intolerances      Review of Systems:  Cardiovascular: No chest pain, palpitations  Respiratory: No SOB, no cough  GI: No nausea, vomiting, abdominal pain  : No dysuria  Endocrine: no polyuria, polydipsia      PHYSICAL EXAM:  VITALS: T(C): 36.9 (01-20-25 @ 10:02)  T(F): 98.5 (01-20-25 @ 10:02), Max: 98.6 (01-19-25 @ 19:31)  HR: 95 (01-20-25 @ 10:02) (95 - 99)  BP: 105/54 (01-20-25 @ 10:02) (96/61 - 109/66)  RR:  (18 - 18)  SpO2:  (99% - 100%)  Wt(kg): --  GENERAL: NAD, well-groomed, well-developed  EYES: No proptosis  HEENT:  Atraumatic, Normocephalic  RESPIRATORY: non labored breathing   CARDIOVASCULAR: Regular rate and rhythm  GI: Soft, nontender, non distended  PSYCH: Alert and oriented x 3, normal affect, normal mood       CAPILLARY BLOOD GLUCOSE      POCT Blood Glucose.: 196 mg/dL (20 Jan 2025 11:47)  POCT Blood Glucose.: 178 mg/dL (20 Jan 2025 07:59)  POCT Blood Glucose.: 90 mg/dL (19 Jan 2025 22:00)  POCT Blood Glucose.: 138 mg/dL (19 Jan 2025 17:29)  POCT Blood Glucose.: 184 mg/dL (19 Jan 2025 12:36)      01-20    137  |  100  |  10  ----------------------------<  214[H]  4.0   |  24  |  0.61    eGFR: 94    Ca    8.8      01-20  Mg     1.70     01-20  Phos  2.6     01-20            Thyroid Function Tests:        A1C with Estimated Average Glucose Result: 8.5 % (01-14-25 @ 19:06)          Diet, Consistent Carbohydrate w/Evening Snack (01-19-25 @ 16:59)

## 2025-01-20 NOTE — PROGRESS NOTE ADULT - SUBJECTIVE AND OBJECTIVE BOX
DATE OF SERVICE: 01-20-25    Patient denies chest pain or shortness of breath.   Review of symptoms otherwise negative.    MEDICATIONS:  acetaminophen     Tablet .. 650 milliGRAM(s) Oral every 6 hours PRN  acetaminophen   IVPB .. 1000 milliGRAM(s) IV Intermittent once  aluminum hydroxide/magnesium hydroxide/simethicone Suspension 30 milliLiter(s) Oral every 4 hours PRN  aspirin  chewable 81 milliGRAM(s) Oral daily  atorvastatin 10 milliGRAM(s) Oral at bedtime  dextrose 5%. 1000 milliLiter(s) IV Continuous <Continuous>  dextrose 50% Injectable 25 Gram(s) IV Push once  dextrose Oral Gel 15 Gram(s) Oral once PRN  enoxaparin Injectable 40 milliGRAM(s) SubCutaneous every 24 hours  gabapentin 100 milliGRAM(s) Oral daily  glucagon  Injectable 1 milliGRAM(s) IntraMuscular once  insulin aspart (NovoLOG) Pump 1 Each SubCutaneous Continuous Pump  losartan 25 milliGRAM(s) Oral daily  melatonin 3 milliGRAM(s) Oral at bedtime PRN  mirtazapine 30 milliGRAM(s) Oral at bedtime  ondansetron Injectable 4 milliGRAM(s) IV Push every 8 hours PRN  pantoprazole    Tablet 40 milliGRAM(s) Oral before breakfast      LABS:                        13.1   8.52  )-----------( 255      ( 20 Jan 2025 06:58 )             41.1       Hemoglobin: 13.1 g/dL (01-20 @ 06:58)  Hemoglobin: 12.9 g/dL (01-19 @ 06:15)  Hemoglobin: 12.6 g/dL (01-17 @ 06:30)  Hemoglobin: 12.6 g/dL (01-16 @ 06:00)      01-20    137  |  100  |  10  ----------------------------<  214[H]  4.0   |  24  |  0.61    Ca    8.8      20 Jan 2025 06:58  Phos  2.6     01-20  Mg     1.70     01-20      Creatinine Trend: 0.61<--, 0.65<--, 0.61<--, 0.58<--, 0.65<--, 0.75<--    COAGS:           PHYSICAL EXAM:  T(C): 36.9 (01-20-25 @ 10:02), Max: 37 (01-19-25 @ 19:31)  HR: 95 (01-20-25 @ 10:02) (95 - 100)  BP: 105/54 (01-20-25 @ 10:02) (96/61 - 109/66)  RR: 18 (01-20-25 @ 10:02) (18 - 18)  SpO2: 99% (01-20-25 @ 10:02) (99% - 100%)  Wt(kg): --    I&O's Summary    19 Jan 2025 07:01  -  20 Jan 2025 07:00  --------------------------------------------------------  IN: 0 mL / OUT: 650 mL / NET: -650 mL          General: Well nourished in no acute distress.   Head: Normocephalic and atraumatic.   Neck: No JVD. No bruits. Supple. Does not appear to be enlarged.   Cardiovascular: + S1,S2 ; RRR Soft systolic murmur at the left lower sternal border. No rubs noted.    Lungs: CTA b/l. No rhonchi, rales or wheezes.   Abdomen: + BS, soft. Non tender. Non distended. No rebound. No guarding.   Extremities: No clubbing/cyanosis/edema.   Neurologic: Moves all four extremities. Full range of motion.   Skin: Warm and moist. The patient's skin has normal elasticity and good skin turgor.   Psychiatric: Appropriate mood and affect.  Musculoskeletal: Normal range of motion, normal strength    DATA    no tele    ECG:  	Sinus tachycardia 115bpm      < from: Xray Chest 1 View AP/PA (01.14.25 @ 20:13) >  IMPRESSION:  Clear lungs.    < end of copied text >    ASSESSMENT/PLAN: 	74F with PMH HTN, HLD, type 1 DM on insulin pump, Asthma, colon cad s/p hemicolectomy and chemo, in remission, with historically normal LV function and No ischemia on last NST 11/1023, presents to ER with hyperglycemia.      Sinus Tach/HTN  --Abnormal UA, on CTX  --Sinus tachycardia in the setting of hyperglycemia, resolving  --cont Losartan and HCTZ for HTN if tolerating         Cindyet Willy GO  Pager: [phone]

## 2025-01-21 LAB
ANION GAP SERPL CALC-SCNC: 13 MMOL/L — SIGNIFICANT CHANGE UP (ref 7–14)
BUN SERPL-MCNC: 10 MG/DL — SIGNIFICANT CHANGE UP (ref 7–23)
C PEPTIDE SERPL-MCNC: 2.3 NG/ML — SIGNIFICANT CHANGE UP (ref 1.1–4.4)
CALCIUM SERPL-MCNC: 8.6 MG/DL — SIGNIFICANT CHANGE UP (ref 8.4–10.5)
CHLORIDE SERPL-SCNC: 101 MMOL/L — SIGNIFICANT CHANGE UP (ref 98–107)
CO2 SERPL-SCNC: 22 MMOL/L — SIGNIFICANT CHANGE UP (ref 22–31)
CREAT SERPL-MCNC: 0.5 MG/DL — SIGNIFICANT CHANGE UP (ref 0.5–1.3)
EGFR: 99 ML/MIN/1.73M2 — SIGNIFICANT CHANGE UP
GLUCOSE BLDC GLUCOMTR-MCNC: 164 MG/DL — HIGH (ref 70–99)
GLUCOSE BLDC GLUCOMTR-MCNC: 173 MG/DL — HIGH (ref 70–99)
GLUCOSE BLDC GLUCOMTR-MCNC: 194 MG/DL — HIGH (ref 70–99)
GLUCOSE BLDC GLUCOMTR-MCNC: 227 MG/DL — HIGH (ref 70–99)
GLUCOSE SERPL-MCNC: 253 MG/DL — HIGH (ref 70–99)
POTASSIUM SERPL-MCNC: 4.1 MMOL/L — SIGNIFICANT CHANGE UP (ref 3.5–5.3)
POTASSIUM SERPL-SCNC: 4.1 MMOL/L — SIGNIFICANT CHANGE UP (ref 3.5–5.3)
SODIUM SERPL-SCNC: 136 MMOL/L — SIGNIFICANT CHANGE UP (ref 135–145)

## 2025-01-21 PROCEDURE — 99232 SBSQ HOSP IP/OBS MODERATE 35: CPT

## 2025-01-21 RX ORDER — POLYETHYLENE GLYCOL 3350 17 G/17G
17 POWDER, FOR SOLUTION ORAL ONCE
Refills: 0 | Status: COMPLETED | OUTPATIENT
Start: 2025-01-21 | End: 2025-01-21

## 2025-01-21 RX ORDER — BACTERIOSTATIC SODIUM CHLORIDE 0.9 %
1000 VIAL (ML) INJECTION
Refills: 0 | Status: DISCONTINUED | OUTPATIENT
Start: 2025-01-21 | End: 2025-01-24

## 2025-01-21 RX ADMIN — ACETAMINOPHEN 400 MILLIGRAM(S): 160 SUSPENSION ORAL at 11:51

## 2025-01-21 RX ADMIN — PANTOPRAZOLE 40 MILLIGRAM(S): 20 TABLET, DELAYED RELEASE ORAL at 06:38

## 2025-01-21 RX ADMIN — ASPIRIN 81 MILLIGRAM(S): 81 TABLET, COATED ORAL at 11:46

## 2025-01-21 RX ADMIN — MIRTAZAPINE 30 MILLIGRAM(S): 30 TABLET, FILM COATED ORAL at 22:16

## 2025-01-21 RX ADMIN — ATORVASTATIN CALCIUM 10 MILLIGRAM(S): 80 TABLET, FILM COATED ORAL at 22:16

## 2025-01-21 RX ADMIN — POLYETHYLENE GLYCOL 3350 17 GRAM(S): 17 POWDER, FOR SOLUTION ORAL at 23:24

## 2025-01-21 RX ADMIN — ACETAMINOPHEN 1000 MILLIGRAM(S): 160 SUSPENSION ORAL at 12:51

## 2025-01-21 RX ADMIN — GABAPENTIN 100 MILLIGRAM(S): 800 TABLET ORAL at 11:46

## 2025-01-21 RX ADMIN — ENOXAPARIN SODIUM 40 MILLIGRAM(S): 100 INJECTION SUBCUTANEOUS at 06:38

## 2025-01-21 NOTE — PROGRESS NOTE ADULT - SUBJECTIVE AND OBJECTIVE BOX
SUBJECTIVE/ OVERNIGHT EVENTS:  --- Coverage for Dr. Salomon ---  No events.  Feel well.  no complaints.   no cp, no sob, no n/v/d.  no abd pain.     --------------------------------------------------------------------------------------------  LABS:                        13.1   8.52  )-----------( 255      ( 20 Jan 2025 06:58 )             41.1     01-21    136  |  101  |  10  ----------------------------<  253[H]  4.1   |  22  |  0.50    Ca    8.6      21 Jan 2025 05:46  Phos  2.6     01-20  Mg     1.70     01-20        CAPILLARY BLOOD GLUCOSE      POCT Blood Glucose.: 164 mg/dL (21 Jan 2025 17:32)  POCT Blood Glucose.: 173 mg/dL (21 Jan 2025 12:25)  POCT Blood Glucose.: 227 mg/dL (21 Jan 2025 07:37)  POCT Blood Glucose.: 239 mg/dL (20 Jan 2025 21:27)        Urinalysis Basic - ( 21 Jan 2025 05:46 )    Color: x / Appearance: x / SG: x / pH: x  Gluc: 253 mg/dL / Ketone: x  / Bili: x / Urobili: x   Blood: x / Protein: x / Nitrite: x   Leuk Esterase: x / RBC: x / WBC x   Sq Epi: x / Non Sq Epi: x / Bacteria: x        RADIOLOGY & ADDITIONAL TESTS:    Imaging Personally Reviewed:  [x] YES  [ ] NO    Consultant(s) Notes Reviewed:  [x] YES  [ ] NO    MEDICATIONS  (STANDING):  aspirin  chewable 81 milliGRAM(s) Oral daily  atorvastatin 10 milliGRAM(s) Oral at bedtime  dextrose 5%. 1000 milliLiter(s) (50 mL/Hr) IV Continuous <Continuous>  dextrose 50% Injectable 25 Gram(s) IV Push once  enoxaparin Injectable 40 milliGRAM(s) SubCutaneous every 24 hours  gabapentin 100 milliGRAM(s) Oral daily  glucagon  Injectable 1 milliGRAM(s) IntraMuscular once  insulin aspart (NovoLOG) Pump 1 Each SubCutaneous Continuous Pump  losartan 25 milliGRAM(s) Oral daily  mirtazapine 30 milliGRAM(s) Oral at bedtime  pantoprazole    Tablet 40 milliGRAM(s) Oral before breakfast  sodium chloride 0.9%. 1000 milliLiter(s) (75 mL/Hr) IV Continuous <Continuous>    MEDICATIONS  (PRN):  acetaminophen     Tablet .. 650 milliGRAM(s) Oral every 6 hours PRN Mild Pain (1 - 3), Moderate Pain (4 - 6), Severe Pain (7 - 10)  aluminum hydroxide/magnesium hydroxide/simethicone Suspension 30 milliLiter(s) Oral every 4 hours PRN Dyspepsia  dextrose Oral Gel 15 Gram(s) Oral once PRN Blood Glucose LESS THAN 70 milliGRAM(s)/deciliter  melatonin 3 milliGRAM(s) Oral at bedtime PRN Insomnia  ondansetron Injectable 4 milliGRAM(s) IV Push every 8 hours PRN Nausea and/or Vomiting      Care Discussed with Consultants/Other Providers [x] YES  [ ] NO    Vital Signs Last 24 Hrs  T(C): 36.6 (21 Jan 2025 13:24), Max: 36.9 (21 Jan 2025 00:29)  T(F): 97.8 (21 Jan 2025 13:24), Max: 98.4 (21 Jan 2025 00:29)  HR: 89 (21 Jan 2025 13:24) (89 - 94)  BP: 96/59 (21 Jan 2025 13:24) (96/59 - 114/68)  BP(mean): 79 (21 Jan 2025 05:36) (74 - 79)  RR: 17 (21 Jan 2025 13:24) (17 - 18)  SpO2: 97% (21 Jan 2025 13:24) (97% - 100%)    Parameters below as of 21 Jan 2025 13:24  Patient On (Oxygen Delivery Method): room air      I&O's Summary    20 Jan 2025 07:01  -  21 Jan 2025 07:00  --------------------------------------------------------  IN: 0 mL / OUT: 900 mL / NET: -900 mL        PHYSICAL EXAM:  GENERAL: NAD, well-developed, comfortable on room air  HEAD:  Atraumatic, Normocephalic  EYES: EOMI, PERRLA, conjunctiva and sclera clear  NECK: Supple, No JVD  CHEST/LUNG: Clear to auscultation bilaterally; No wheeze  HEART: Regular rate and rhythm; No murmurs, rubs, or gallops  ABDOMEN: Soft, Nontender, Nondistended; Bowel sounds present  Neuro: AAOx3, no focal weakness, 5/5 b/l extremity strength  EXTREMITIES:  2+ Peripheral Pulses, No clubbing, cyanosis, or edema  SKIN: No rashes or lesions

## 2025-01-21 NOTE — PROGRESS NOTE ADULT - SUBJECTIVE AND OBJECTIVE BOX
DATE OF SERVICE: 01-21-25    Patient denies chest pain or shortness of breath.   Review of symptoms otherwise negative.    MEDICATIONS:  acetaminophen     Tablet .. 650 milliGRAM(s) Oral every 6 hours PRN  aluminum hydroxide/magnesium hydroxide/simethicone Suspension 30 milliLiter(s) Oral every 4 hours PRN  aspirin  chewable 81 milliGRAM(s) Oral daily  atorvastatin 10 milliGRAM(s) Oral at bedtime  dextrose 5%. 1000 milliLiter(s) IV Continuous <Continuous>  dextrose 50% Injectable 25 Gram(s) IV Push once  dextrose Oral Gel 15 Gram(s) Oral once PRN  enoxaparin Injectable 40 milliGRAM(s) SubCutaneous every 24 hours  gabapentin 100 milliGRAM(s) Oral daily  glucagon  Injectable 1 milliGRAM(s) IntraMuscular once  insulin aspart (NovoLOG) Pump 1 Each SubCutaneous Continuous Pump  losartan 25 milliGRAM(s) Oral daily  melatonin 3 milliGRAM(s) Oral at bedtime PRN  mirtazapine 30 milliGRAM(s) Oral at bedtime  ondansetron Injectable 4 milliGRAM(s) IV Push every 8 hours PRN  pantoprazole    Tablet 40 milliGRAM(s) Oral before breakfast  sodium chloride 0.9%. 1000 milliLiter(s) IV Continuous <Continuous>                            13.1   8.52  )-----------( 255      ( 20 Jan 2025 06:58 )             41.1       136  |  101  |  10  ----------------------------<  253[H]  4.1   |  22  |  0.50    Ca    8.6      21 Jan 2025 05:46  Phos  2.6     01-20  Mg     1.70     01-20      Creatinine Trend: 0.50<--, 0.61<--, 0.65<--, 0.61<--, 0.58<--, 0.65<--    T(C): 36.7 (01-21-25 @ 06:00), Max: 36.9 (01-21-25 @ 00:29)  HR: 90 (01-21-25 @ 06:00) (89 - 94)  BP: 106/60 (01-21-25 @ 06:00) (99/67 - 114/68)  RR: 18 (01-21-25 @ 06:00) (17 - 18)  SpO2: 98% (01-21-25 @ 06:00) (98% - 100%)  Wt(kg): --    I&O's Summary    20 Jan 2025 07:01  -  21 Jan 2025 07:00  --------------------------------------------------------  IN: 0 mL / OUT: 900 mL / NET: -900 mL              General: Well nourished in no acute distress.   Head: Normocephalic and atraumatic.   Neck: No JVD. No bruits. Supple. Does not appear to be enlarged.   Cardiovascular: + S1,S2 ; RRR Soft systolic murmur at the left lower sternal border. No rubs noted.    Lungs: CTA b/l. No rhonchi, rales or wheezes.   Abdomen: + BS, soft. Non tender. Non distended. No rebound. No guarding.   Extremities: No clubbing/cyanosis/edema.   Neurologic: Moves all four extremities. Full range of motion.   Skin: Warm and moist. The patient's skin has normal elasticity and good skin turgor.   Psychiatric: Appropriate mood and affect.  Musculoskeletal: Normal range of motion, normal strength    DATA    no tele    ECG:  	Sinus tachycardia 115bpm    < from: Xray Chest 1 View AP/PA (01.14.25 @ 20:13) >  IMPRESSION:  Clear lungs.    < end of copied text >    ASSESSMENT/PLAN: 	74F with PMH HTN, HLD, type 1 DM on insulin pump, Asthma, colon cad s/p hemicolectomy and chemo, in remission, with historically normal LV function and No ischemia on last NST 11/1023, presents to ER with hyperglycemia.    Sinus Tach/HTN  --Abnormal UA, on CTX  --Sinus tachycardia in the setting of hyperglycemia, resolving  --cont Losartan and HCTZ for HTN if tolerating    DC planning to [facility], F/U with Dr Santiago as scheduled after rehab, [phone]     Magdalena WALTERS  [phone]

## 2025-01-21 NOTE — PROGRESS NOTE ADULT - SUBJECTIVE AND OBJECTIVE BOX
Chief Complaint: Type 2 DM     History: Pt seen at bedside. Pt tolerating oral diet. Pt denies nausea and vomiting/any signs of hypoglycemia. Pt reports an adequate appetite.     MEDICATIONS  (STANDING):  aspirin  chewable 81 milliGRAM(s) Oral daily  atorvastatin 10 milliGRAM(s) Oral at bedtime  dextrose 5%. 1000 milliLiter(s) (50 mL/Hr) IV Continuous <Continuous>  dextrose 50% Injectable 25 Gram(s) IV Push once  enoxaparin Injectable 40 milliGRAM(s) SubCutaneous every 24 hours  gabapentin 100 milliGRAM(s) Oral daily  glucagon  Injectable 1 milliGRAM(s) IntraMuscular once  insulin aspart (NovoLOG) Pump 1 Each SubCutaneous Continuous Pump  losartan 25 milliGRAM(s) Oral daily  mirtazapine 30 milliGRAM(s) Oral at bedtime  pantoprazole    Tablet 40 milliGRAM(s) Oral before breakfast  sodium chloride 0.9%. 1000 milliLiter(s) (75 mL/Hr) IV Continuous <Continuous>    MEDICATIONS  (PRN):  acetaminophen     Tablet .. 650 milliGRAM(s) Oral every 6 hours PRN Mild Pain (1 - 3), Moderate Pain (4 - 6), Severe Pain (7 - 10)  aluminum hydroxide/magnesium hydroxide/simethicone Suspension 30 milliLiter(s) Oral every 4 hours PRN Dyspepsia  dextrose Oral Gel 15 Gram(s) Oral once PRN Blood Glucose LESS THAN 70 milliGRAM(s)/deciliter  melatonin 3 milliGRAM(s) Oral at bedtime PRN Insomnia  ondansetron Injectable 4 milliGRAM(s) IV Push every 8 hours PRN Nausea and/or Vomiting      Allergies: Percocet 10/325 (Other; Urticaria)  Lantus (Swelling)  codeine (Other)  Darvocet A500 (Other; Urticaria)  PURELL.  pt said, &quot;I felt my airway closing&quot; after she smelled the Purell. The incident occured at the pulmonologist office. (Other; Short breath)      Review of Systems:  Respiratory: No SOB, no cough  GI: No nausea, vomiting, abdominal pain  Endocrine: no polyuria, polydipsia      PHYSICAL EXAM:  VITALS: T(C): 36.6 (01-21-25 @ 13:24)  T(F): 97.8 (01-21-25 @ 13:24), Max: 98.4 (01-21-25 @ 00:29)  HR: 89 (01-21-25 @ 13:24) (89 - 94)  BP: 96/59 (01-21-25 @ 13:24) (96/59 - 114/68)  RR:  (17 - 18)  SpO2:  (97% - 100%)  Wt(kg): --  GENERAL: NAD, well-groomed, well-developed  RESPIRATORY: No labored breathing   GI: Soft, nontender, non distended  PSYCH: Alert and oriented x 3, normal affect, normal mood      CAPILLARY BLOOD GLUCOSE  POCT Blood Glucose.: 173 mg/dL (21 Jan 2025 12:25)  POCT Blood Glucose.: 227 mg/dL (21 Jan 2025 07:37)  POCT Blood Glucose.: 239 mg/dL (20 Jan 2025 21:27)  POCT Blood Glucose.: 206 mg/dL (20 Jan 2025 17:19)    A1C with Estimated Average Glucose (01.14.25 @ 19:06)    A1C with Estimated Average Glucose Result: 8.5   Estimated Average Glucose: 197      01-21    136  |  101  |  10  ----------------------------<  253[H]  4.1   |  22  |  0.50    eGFR: 99    Ca    8.6      01-21  Mg     1.70     01-20  Phos  2.6     01-20      Diet, Consistent Carbohydrate w/Evening Snack (01-19-25 @ 16:59) [Active]

## 2025-01-21 NOTE — ADVANCED PRACTICE NURSE CONSULT - ASSESSMENT
Patient seen at bedside with endocrine team.  Since instruction given last week on how to bolus. Patient has been using menu to assist in count carbs instead of giving a set amount for each meal. Unsure if patient is counting carbohydrates correctly. When pump is reviewed patient is taking up to 90g of carbs. Patient will keep menu slip so accuracy of counting carbs can be assessed. Patient has been putting in BG as well however sometimes the pump times out and patient loses the screen. CDCES checked insulin pump and time out for the screen is on the max amount of time. Patient was instructed on how to get back into the screen. Patient also instructed that if BG checked and food not there she can put in BG and keep carbs as 0 to correct BG number and when meal tray comes, she can keep Bg as 0 and put in amount of carbs. Patient was also instructed on how to read food labels to help cunt carbs after she is discharged from rehab. Patient did change insulin pump site on 1/20 and will be due to change pump site on 1/23 if still in the hospital. Insulin pump site on rt abdomen site is intact. Angely 2 CGM located on left arm, site also intact. Some of patients hypo and hyperglycemia maybe due to over correction for food and not correcting BG. Patient demonstrated understanding of information given. Case discussed with primary RN and endocrine team.

## 2025-01-21 NOTE — PROGRESS NOTE ADULT - ASSESSMENT
The patient is a 74y Female with PMH of T1DM on an insulin pump, colon CA s/p surgery/chemo p/w hyperglycemia. Endocrinology consulted for type 1 diabetes on an insulin pump.    #Type 1 vs Type 2 DM   #Use of insulin pump  - Follows with: Dr. Ledezma  - A1C with Estimated Average Glucose Result: 8.5 % (01-14-25)  - eGFR: 93 mL/min/1.73m2 (01-15-25)  - Weight (kg): 79.4 (01-14-25)  - home regimen: These settings were recently changed by Endocrinologist Last week  Insulin pump type: Medtronic   Insulin type: Novolog  Last site change: pump removed 1/17  Supplies available: no  Settings:  Basal rate ( TBD 51.525)  12a-6: 30a 1.75 unit/hr  6:30a-12:30p 1.95 units/hr  12:30p-6:30p 2.45 units/hr  6:30p-12a 2.5 units/hr  ISF  12a-12a 50---->25 on 1/17----> 50  I:C ratio  12a-12a 1:12  Target   Active insulin time 4 hrs      INPATIENT PLAN:  - Inpatient glucose goals: 100-180: pt eating adequately.   - Pt on insulin pump with Novolog insulin   - All insulin should be via insulin pump   - Pt now counting carbs but not necessarily correctly; evaluating and having pt save her menus so then can be better assessed. Pt is not always correctly a blood sugar endocrine team reminding pt to do so   - Incase of pump failure would give Levemir or Tresiba (both available inpt) (pt has lantus allergy)  42 units stat and Add Admelog 8 unit TID AC (please hold if npo/not eating)   - Please check FSG before meals and QHS, or q6h while NPO  - consistent carb diet when eating  - FS before meals and at bedtime   - RD consult   - Hypoglycemia Protocol   - unclear if T1 vs T2 DM given no DKA despite lack of basal insulin x24 hours and patient habitus   - c-peptide 1.9 with serum glucose 313; repeat c-peptide 2.3 and serum glucose 253 (pt producing endogenous insulin   - DM educator following---> please refer to note   - Attestation form be filled out and signed.  Rn to place correction and manual bolus 6 units in flowsheet.  Pt doesn't count carbs.    - Insulin pump site changed on 1/21/2025 due to be changed today 1/20/2025; Freestyle angely changed on 1/17/2025 due to be changed in 14 days      DISCHARGE PLANNING:  - As per primary team pt is going to rehab     - Pt wants to continue using her insulin pump; will continue to assess pts ability to use insulin pump while inpatient    - dc on insulin pump; settings may need to be adjusted based on glucose trend; Pt will continue using basal insulin and  bolus for correction and set amount of bolus for meals for dc (bolus dose may need to beadjusted based on glucose levels inpts)    - She states she never has hyperglycemia but that is not reflected by her A1c.     - Pt was taught how to correct her glucose with correction and correction settings were adjusted. Would recommend pt to learn how to carb count as an outpt.  If unable to do carb counting atleast at a minimum insulin pump should be programmed for pt to receive bolus insulin based on the amount she eats (for example have a bolus amount for small, medium, and large meals).  This is suggestion to have better control of glucose levels at this time other pump settings may need to be adjusted.      - Patient has 770G insulin pump. Patient was instructed on the difference with the 780G and that it would be beneficial to have the Medtronic CGM so her pump can be a closed loop. A “closed loop insulin pump” was also explained to the patient. Patient currently uses a Angely 2 CGM, and it was explained to patient that sometimes she can miss information if she goes longer than 8 hrs without scanning the device. If patient does not want to get the Medtronic CGM, then at least get a Angely 3 CGM so the information will go right to her reader. Pt also instructed at her next endocrine follow up with insulin pump representative; pt can also consider changing to islet insulin pump; pt will speak to her endocrinologist post discharge    - For now continue Freestyle angely 2 sensor and reader     - c-peptide 1.9 with serum glucose 313 maybe falsely elevated. If pt stays inpt would recommend repeating; if pt is discharged today please repeat cpeptide with serum glucose as an outpt     - Pt needs insulin pen both basal at home incase of insulin pump failure; please check if below insulin is covered by pts insurance     - Please send tresiba/basaglar/toujeo/Levemir pen and humalog kwikpen as test script to check insurance coverage.  Ok to send with current doses and update prior to d/c    If Humalog not covered- can try alternating with one of following  novolog/apidra/admelog/fiasp    - Ensure patient has working glucometer, test strips, lancets, alcohol pads, and BD sofia pen needles    - For severe hypoglycemia: Please prescribe Glucagon Emergency Kit for Low Blood Sugar 1 mG injection: 1 mG intramuscularly as needed for severe hypoglycemia. Glucose tablets 4G (take 4 tablets) or 15G tablets for blood sugar less than 70 mG/dL repeat fingerstick in 15 minutes.     - patient should followup with their endocrinologist, Dr. Ledezma recently saw Dr. Ledezma last week; advised pt to make an appointment upon discharge     - Please make sure pt has insulin, cgm, and insulin pen, and insulin pump supplies prior to discharge    #Hypertension  - Goal BP <130/80  - Management as per primary team  - check urine microalbumin level as outpatient    #Hyperlipidemia  - LDL goal <70  - Last LDL: 20 mg/dL (11-29-23)  - check lipid panel as outpatient on a yearly basis    Adrienne Taveras  Nurse Practitioner  Division of Endocrinology & Diabetes  In house pager #[phone]    If before 9AM or after 6PM, or on weekends/holidays, please call endocrine answering service for assistance ([phone]).For nonurgent matters email [email or web address] for assistance.

## 2025-01-22 LAB
ANION GAP SERPL CALC-SCNC: 10 MMOL/L — SIGNIFICANT CHANGE UP (ref 7–14)
ANION GAP SERPL CALC-SCNC: 7 MMOL/L — SIGNIFICANT CHANGE UP (ref 7–14)
B-OH-BUTYR SERPL-SCNC: <0 MMOL/L — SIGNIFICANT CHANGE UP (ref 0–0.4)
BLOOD GAS ARTERIAL COMPREHENSIVE RESULT: SIGNIFICANT CHANGE UP
BUN SERPL-MCNC: 7 MG/DL — SIGNIFICANT CHANGE UP (ref 7–23)
BUN SERPL-MCNC: 7 MG/DL — SIGNIFICANT CHANGE UP (ref 7–23)
CALCIUM SERPL-MCNC: 9.2 MG/DL — SIGNIFICANT CHANGE UP (ref 8.4–10.5)
CALCIUM SERPL-MCNC: 9.2 MG/DL — SIGNIFICANT CHANGE UP (ref 8.4–10.5)
CHLORIDE SERPL-SCNC: 101 MMOL/L — SIGNIFICANT CHANGE UP (ref 98–107)
CHLORIDE SERPL-SCNC: 103 MMOL/L — SIGNIFICANT CHANGE UP (ref 98–107)
CO2 SERPL-SCNC: 26 MMOL/L — SIGNIFICANT CHANGE UP (ref 22–31)
CO2 SERPL-SCNC: 27 MMOL/L — SIGNIFICANT CHANGE UP (ref 22–31)
CREAT SERPL-MCNC: 0.46 MG/DL — LOW (ref 0.5–1.3)
CREAT SERPL-MCNC: 0.54 MG/DL — SIGNIFICANT CHANGE UP (ref 0.5–1.3)
EGFR: 101 ML/MIN/1.73M2 — SIGNIFICANT CHANGE UP
EGFR: 97 ML/MIN/1.73M2 — SIGNIFICANT CHANGE UP
GLUCOSE BLDC GLUCOMTR-MCNC: 157 MG/DL — HIGH (ref 70–99)
GLUCOSE BLDC GLUCOMTR-MCNC: 163 MG/DL — HIGH (ref 70–99)
GLUCOSE BLDC GLUCOMTR-MCNC: 212 MG/DL — HIGH (ref 70–99)
GLUCOSE BLDC GLUCOMTR-MCNC: 217 MG/DL — HIGH (ref 70–99)
GLUCOSE BLDC GLUCOMTR-MCNC: 231 MG/DL — HIGH (ref 70–99)
GLUCOSE SERPL-MCNC: 163 MG/DL — HIGH (ref 70–99)
GLUCOSE SERPL-MCNC: 249 MG/DL — HIGH (ref 70–99)
HCT VFR BLD CALC: 38.6 % — SIGNIFICANT CHANGE UP (ref 34.5–45)
HGB BLD-MCNC: 12.4 G/DL — SIGNIFICANT CHANGE UP (ref 11.5–15.5)
MAGNESIUM SERPL-MCNC: 1.7 MG/DL — SIGNIFICANT CHANGE UP (ref 1.6–2.6)
MCHC RBC-ENTMCNC: 26.8 PG — LOW (ref 27–34)
MCHC RBC-ENTMCNC: 32.1 G/DL — SIGNIFICANT CHANGE UP (ref 32–36)
MCV RBC AUTO: 83.4 FL — SIGNIFICANT CHANGE UP (ref 80–100)
NRBC # BLD AUTO: 0 K/UL — SIGNIFICANT CHANGE UP (ref 0–0)
NRBC # BLD: 0 /100 WBCS — SIGNIFICANT CHANGE UP (ref 0–0)
NRBC # FLD: 0 K/UL — SIGNIFICANT CHANGE UP (ref 0–0)
NRBC BLD-RTO: 0 /100 WBCS — SIGNIFICANT CHANGE UP (ref 0–0)
PHOSPHATE SERPL-MCNC: 2.5 MG/DL — SIGNIFICANT CHANGE UP (ref 2.5–4.5)
PLATELET # BLD AUTO: 261 K/UL — SIGNIFICANT CHANGE UP (ref 150–400)
POTASSIUM SERPL-MCNC: 3.9 MMOL/L — SIGNIFICANT CHANGE UP (ref 3.5–5.3)
POTASSIUM SERPL-MCNC: 4.7 MMOL/L — SIGNIFICANT CHANGE UP (ref 3.5–5.3)
POTASSIUM SERPL-SCNC: 3.9 MMOL/L — SIGNIFICANT CHANGE UP (ref 3.5–5.3)
POTASSIUM SERPL-SCNC: 4.7 MMOL/L — SIGNIFICANT CHANGE UP (ref 3.5–5.3)
RBC # BLD: 4.63 M/UL — SIGNIFICANT CHANGE UP (ref 3.8–5.2)
RBC # FLD: 12.9 % — SIGNIFICANT CHANGE UP (ref 10.3–14.5)
SODIUM SERPL-SCNC: 137 MMOL/L — SIGNIFICANT CHANGE UP (ref 135–145)
SODIUM SERPL-SCNC: 137 MMOL/L — SIGNIFICANT CHANGE UP (ref 135–145)
WBC # BLD: 9.34 K/UL — SIGNIFICANT CHANGE UP (ref 3.8–10.5)
WBC # FLD AUTO: 9.34 K/UL — SIGNIFICANT CHANGE UP (ref 3.8–10.5)

## 2025-01-22 PROCEDURE — 99232 SBSQ HOSP IP/OBS MODERATE 35: CPT

## 2025-01-22 RX ADMIN — PANTOPRAZOLE 40 MILLIGRAM(S): 20 TABLET, DELAYED RELEASE ORAL at 06:19

## 2025-01-22 RX ADMIN — Medication 25 MILLIGRAM(S): at 06:19

## 2025-01-22 RX ADMIN — ASPIRIN 81 MILLIGRAM(S): 81 TABLET, COATED ORAL at 12:33

## 2025-01-22 RX ADMIN — ONDANSETRON 4 MILLIGRAM(S): 4 TABLET, ORALLY DISINTEGRATING ORAL at 15:44

## 2025-01-22 RX ADMIN — ATORVASTATIN CALCIUM 10 MILLIGRAM(S): 80 TABLET, FILM COATED ORAL at 22:58

## 2025-01-22 RX ADMIN — GABAPENTIN 100 MILLIGRAM(S): 800 TABLET ORAL at 12:33

## 2025-01-22 RX ADMIN — MIRTAZAPINE 30 MILLIGRAM(S): 30 TABLET, FILM COATED ORAL at 22:58

## 2025-01-22 RX ADMIN — ENOXAPARIN SODIUM 40 MILLIGRAM(S): 100 INJECTION SUBCUTANEOUS at 06:19

## 2025-01-22 NOTE — DIETITIAN INITIAL EVALUATION ADULT - PROBLEM SELECTOR PLAN 1
-pt with history of diabetes type 1, on insulin pump  -saw outpt endocrinologist today as pt. felt weak, found to have hyperglycemia to 400's  -received novolog bolus at appt and was advised to come to ED for eval as glucose did not improve  -insulin pump now removed in ED, glucose improving to 200's.  -endocrine emailed for consult, f/u recs  -NPO  -pt also ?allergic to lantus, unclear if lantus was administered at outpt office or other insulin formulation  -12U levemir ordered qHS in interim

## 2025-01-22 NOTE — PROGRESS NOTE ADULT - SUBJECTIVE AND OBJECTIVE BOX
DATE OF SERVICE: 01-22-25    Patient denies chest pain or shortness of breath.   Review of symptoms otherwise negative.    MEDICATIONS:  acetaminophen     Tablet .. 650 milliGRAM(s) Oral every 6 hours PRN  aluminum hydroxide/magnesium hydroxide/simethicone Suspension 30 milliLiter(s) Oral every 4 hours PRN  aspirin  chewable 81 milliGRAM(s) Oral daily  atorvastatin 10 milliGRAM(s) Oral at bedtime  dextrose 5%. 1000 milliLiter(s) IV Continuous <Continuous>  dextrose 50% Injectable 25 Gram(s) IV Push once  dextrose Oral Gel 15 Gram(s) Oral once PRN  enoxaparin Injectable 40 milliGRAM(s) SubCutaneous every 24 hours  gabapentin 100 milliGRAM(s) Oral daily  glucagon  Injectable 1 milliGRAM(s) IntraMuscular once  insulin aspart (NovoLOG) Pump 1 Each SubCutaneous Continuous Pump  losartan 25 milliGRAM(s) Oral daily  melatonin 3 milliGRAM(s) Oral at bedtime PRN  mirtazapine 30 milliGRAM(s) Oral at bedtime  ondansetron Injectable 4 milliGRAM(s) IV Push every 8 hours PRN  pantoprazole    Tablet 40 milliGRAM(s) Oral before breakfast  sodium chloride 0.9%. 1000 milliLiter(s) IV Continuous <Continuous>  sodium chloride 0.9%. 1000 milliLiter(s) IV Continuous <Continuous>                            12.4   9.34  )-----------( 261      ( 22 Jan 2025 05:09 )             38.6     137  |  103  |  7   ----------------------------<  163[H]  3.9   |  27  |  0.54    Ca    9.2      22 Jan 2025 05:09  Phos  2.5     01-22  Mg     1.70     01-22      Creatinine Trend: 0.54<--, 0.50<--, 0.61<--, 0.65<--, 0.61<--, 0.58<--      T(C): 36.7 (01-22-25 @ 10:24), Max: 36.9 (01-21-25 @ 22:22)  HR: 92 (01-22-25 @ 10:24) (80 - 92)  BP: 134/73 (01-22-25 @ 10:24) (96/59 - 134/73)  RR: 18 (01-22-25 @ 10:24) (17 - 18)  SpO2: 100% (01-22-25 @ 10:24) (97% - 100%)  Wt(kg): --    I&O's Summary          General: Well nourished in no acute distress.   Head: Normocephalic and atraumatic.   Neck: No JVD. No bruits. Supple. Does not appear to be enlarged.   Cardiovascular: + S1,S2 ; RRR Soft systolic murmur at the left lower sternal border. No rubs noted.    Lungs: CTA b/l. No rhonchi, rales or wheezes.   Abdomen: + BS, soft. Non tender. Non distended. No rebound. No guarding.   Extremities: No clubbing/cyanosis/edema.   Neurologic: Moves all four extremities. Full range of motion.   Skin: Warm and moist. The patient's skin has normal elasticity and good skin turgor.   Psychiatric: Appropriate mood and affect.  Musculoskeletal: Normal range of motion, normal strength    DATA    no tele    ECG:  	Sinus tachycardia 115bpm    < from: Xray Chest 1 View AP/PA (01.14.25 @ 20:13) >  IMPRESSION:  Clear lungs.    < end of copied text >    ASSESSMENT/PLAN: 	74F with PMH HTN, HLD, type 1 DM on insulin pump, Asthma, colon cad s/p hemicolectomy and chemo, in remission, with historically normal LV function and No ischemia on last NST 11/1023, presents to ER with hyperglycemia.    Sinus Tach/HTN  --Abnormal UA, on CTX  --Sinus tachycardia in the setting of hyperglycemia, resolving  --cont Losartan and HCTZ for HTN if tolerating    DC planning to [facility], F/U with Dr Santiago as scheduled after rehab, [phone]     Magdalena WALTERS  [phone]

## 2025-01-22 NOTE — ADVANCED PRACTICE NURSE CONSULT - ASSESSMENT
Patient seen at bedside with endocrine team.  Patient insulin pump battery  at around 418am. Patient called family but did not tell staff. Patient couldn’t administer a bolus for breakfast. Patient was instructed on the importance of telling staff if her insulin pump is not working . Battery was replaced. BG was 212. Patient gave a bolus of 0.6 units. patient complaining of not feeling well and c/o dizziness and nauseousness. Endocrine NP Jaylene Aguirre at the bedside. Labs ordered.  Reviewed patient insulin bolus for meals. Patient is over count carbohydrates. Patient was instructed only to bolus for what she is eating not for everything she ordered on the tray. Patient demonstrates understanding and wants to keep practicing counting carbohydrates instead of having a flat insulin bolus for meals. Patient demonstrated understanding of information given. Case discussed with primary RN and endocrine team.

## 2025-01-22 NOTE — PROGRESS NOTE ADULT - SUBJECTIVE AND OBJECTIVE BOX
SUBJECTIVE/ OVERNIGHT EVENTS:  --- Coverage for Dr. Salomon ---  awaiting insurance auth  no cp, no sob, no n/v/d. no abdominal pain.  no headache, no dizziness.   said her BP is always borderline systolic 100s    --------------------------------------------------------------------------------------------  LABS:                        12.4   9.34  )-----------( 261      ( 22 Jan 2025 05:09 )             38.6     01-22    137  |  101  |  7   ----------------------------<  249[H]  4.7   |  26  |  0.46[L]    Ca    9.2      22 Jan 2025 13:07  Phos  2.5     01-22  Mg     1.70     01-22        CAPILLARY BLOOD GLUCOSE      POCT Blood Glucose.: 231 mg/dL (22 Jan 2025 16:51)  POCT Blood Glucose.: 217 mg/dL (22 Jan 2025 15:32)  POCT Blood Glucose.: 212 mg/dL (22 Jan 2025 11:53)  POCT Blood Glucose.: 157 mg/dL (22 Jan 2025 07:16)  POCT Blood Glucose.: 194 mg/dL (21 Jan 2025 22:03)        Urinalysis Basic - ( 22 Jan 2025 13:07 )    Color: x / Appearance: x / SG: x / pH: x  Gluc: 249 mg/dL / Ketone: x  / Bili: x / Urobili: x   Blood: x / Protein: x / Nitrite: x   Leuk Esterase: x / RBC: x / WBC x   Sq Epi: x / Non Sq Epi: x / Bacteria: x        RADIOLOGY & ADDITIONAL TESTS:    Imaging Personally Reviewed:  [x] YES  [ ] NO    Consultant(s) Notes Reviewed:  [x] YES  [ ] NO    MEDICATIONS  (STANDING):  aspirin  chewable 81 milliGRAM(s) Oral daily  atorvastatin 10 milliGRAM(s) Oral at bedtime  dextrose 5%. 1000 milliLiter(s) (50 mL/Hr) IV Continuous <Continuous>  dextrose 50% Injectable 25 Gram(s) IV Push once  enoxaparin Injectable 40 milliGRAM(s) SubCutaneous every 24 hours  gabapentin 100 milliGRAM(s) Oral daily  glucagon  Injectable 1 milliGRAM(s) IntraMuscular once  insulin aspart (NovoLOG) Pump 1 Each SubCutaneous Continuous Pump  losartan 25 milliGRAM(s) Oral daily  mirtazapine 30 milliGRAM(s) Oral at bedtime  pantoprazole    Tablet 40 milliGRAM(s) Oral before breakfast  sodium chloride 0.9%. 1000 milliLiter(s) (75 mL/Hr) IV Continuous <Continuous>  sodium chloride 0.9%. 1000 milliLiter(s) (75 mL/Hr) IV Continuous <Continuous>    MEDICATIONS  (PRN):  acetaminophen     Tablet .. 650 milliGRAM(s) Oral every 6 hours PRN Mild Pain (1 - 3), Moderate Pain (4 - 6), Severe Pain (7 - 10)  aluminum hydroxide/magnesium hydroxide/simethicone Suspension 30 milliLiter(s) Oral every 4 hours PRN Dyspepsia  dextrose Oral Gel 15 Gram(s) Oral once PRN Blood Glucose LESS THAN 70 milliGRAM(s)/deciliter  melatonin 3 milliGRAM(s) Oral at bedtime PRN Insomnia  ondansetron Injectable 4 milliGRAM(s) IV Push every 8 hours PRN Nausea and/or Vomiting      Care Discussed with Consultants/Other Providers [x] YES  [ ] NO    Vital Signs Last 24 Hrs  T(C): 36.7 (22 Jan 2025 10:24), Max: 36.9 (21 Jan 2025 22:22)  T(F): 98 (22 Jan 2025 10:24), Max: 98.5 (21 Jan 2025 22:22)  HR: 92 (22 Jan 2025 10:24) (80 - 92)  BP: 134/73 (22 Jan 2025 10:24) (105/58 - 134/73)  BP(mean): --  RR: 18 (22 Jan 2025 10:24) (18 - 18)  SpO2: 100% (22 Jan 2025 10:24) (98% - 100%)    Parameters below as of 22 Jan 2025 10:24  Patient On (Oxygen Delivery Method): room air      I&O's Summary        PHYSICAL EXAM:  GENERAL: NAD, well-developed, comfortable on room air  HEAD:  Atraumatic, Normocephalic  EYES: EOMI, PERRLA, conjunctiva and sclera clear  NECK: Supple, No JVD  CHEST/LUNG: Clear to auscultation bilaterally; No wheeze  HEART: Regular rate and rhythm; No murmurs, rubs, or gallops  ABDOMEN: Soft, Nontender, Nondistended; Bowel sounds present  Neuro: AAOx3, no focal weakness, 5/5 b/l extremity strength  EXTREMITIES:  2+ Peripheral Pulses, No clubbing, cyanosis, or edema  SKIN: No rashes or lesions

## 2025-01-22 NOTE — PROVIDER CONTACT NOTE (CRITICAL VALUE NOTIFICATION) - BACKGROUND
(Admit Diagnosis) Hyperglycemia  (PMH) Malignant neoplasm of cecum  (PMH) OA (osteoarthritis)  (PMH) H/O insertion of insulin pump

## 2025-01-22 NOTE — PROGRESS NOTE ADULT - SUBJECTIVE AND OBJECTIVE BOX
Chief Complaint: Type 2 DM     History: Pt seen at bedside. Pt tolerating oral diet. Pt nauseous this afternoon (primary team made aware).  Pt without signs of hypoglycemia.     MEDICATIONS  (STANDING):  aspirin  chewable 81 milliGRAM(s) Oral daily  atorvastatin 10 milliGRAM(s) Oral at bedtime  dextrose 5%. 1000 milliLiter(s) (50 mL/Hr) IV Continuous <Continuous>  dextrose 50% Injectable 25 Gram(s) IV Push once  enoxaparin Injectable 40 milliGRAM(s) SubCutaneous every 24 hours  gabapentin 100 milliGRAM(s) Oral daily  glucagon  Injectable 1 milliGRAM(s) IntraMuscular once  insulin aspart (NovoLOG) Pump 1 Each SubCutaneous Continuous Pump  losartan 25 milliGRAM(s) Oral daily  mirtazapine 30 milliGRAM(s) Oral at bedtime  pantoprazole    Tablet 40 milliGRAM(s) Oral before breakfast  sodium chloride 0.9%. 1000 milliLiter(s) (75 mL/Hr) IV Continuous <Continuous>  sodium chloride 0.9%. 1000 milliLiter(s) (75 mL/Hr) IV Continuous <Continuous>    MEDICATIONS  (PRN):  acetaminophen     Tablet .. 650 milliGRAM(s) Oral every 6 hours PRN Mild Pain (1 - 3), Moderate Pain (4 - 6), Severe Pain (7 - 10)  aluminum hydroxide/magnesium hydroxide/simethicone Suspension 30 milliLiter(s) Oral every 4 hours PRN Dyspepsia  dextrose Oral Gel 15 Gram(s) Oral once PRN Blood Glucose LESS THAN 70 milliGRAM(s)/deciliter  melatonin 3 milliGRAM(s) Oral at bedtime PRN Insomnia  ondansetron Injectable 4 milliGRAM(s) IV Push every 8 hours PRN Nausea and/or Vomiting      Allergies: Percocet 10/325 (Other; Urticaria)  Lantus (Swelling)  codeine (Other)  Darvocet A500 (Other; Urticaria)  PURELL.  pt said, &quot;I felt my airway closing&quot; after she smelled the Purell. The incident occured at the pulmonologist office. (Other; Short breath)        Review of Systems:  Respiratory: No SOB, no cough  GI: No nausea, vomiting, abdominal pain  Endocrine: no polyuria, polydipsia    PHYSICAL EXAM:  VITALS: T(C): 36.7 (01-22-25 @ 10:24)  T(F): 98 (01-22-25 @ 10:24), Max: 98.5 (01-21-25 @ 22:22)  HR: 92 (01-22-25 @ 10:24) (80 - 92)  BP: 134/73 (01-22-25 @ 10:24) (105/58 - 134/73)  RR:  (18 - 18)  SpO2:  (98% - 100%)  Wt(kg): --  GENERAL: NAD, well-groomed, well-developed  RESPIRATORY: No labored breathing   GI: Soft, nontender, non distended  PSYCH: Alert and oriented x 3, normal affect, normal mood      CAPILLARY BLOOD GLUCOSE  POCT Blood Glucose.: 212 mg/dL (22 Jan 2025 11:53)  POCT Blood Glucose.: 157 mg/dL (22 Jan 2025 07:16)  POCT Blood Glucose.: 194 mg/dL (21 Jan 2025 22:03)  POCT Blood Glucose.: 164 mg/dL (21 Jan 2025 17:32)    A1C with Estimated Average Glucose (01.14.25 @ 19:06)    A1C with Estimated Average Glucose Result: 8.5   Estimated Average Glucose: 197      01-22    137  |  101  |  7   ----------------------------<  249[H]  4.7   |  26  |  0.46[L]    eGFR: 101    Ca    9.2      01-22  Mg     1.70     01-22  Phos  2.5     01-22    Diet, Consistent Carbohydrate w/Evening Snack (01-19-25 @ 16:59) [Active]                   Chief Complaint: Type 1 vs Type 2 DM     History: Pt seen at bedside. Pt tolerating oral diet. Pt nauseous this afternoon (primary team made aware).  Pt without signs of hypoglycemia.     MEDICATIONS  (STANDING):  aspirin  chewable 81 milliGRAM(s) Oral daily  atorvastatin 10 milliGRAM(s) Oral at bedtime  dextrose 5%. 1000 milliLiter(s) (50 mL/Hr) IV Continuous <Continuous>  dextrose 50% Injectable 25 Gram(s) IV Push once  enoxaparin Injectable 40 milliGRAM(s) SubCutaneous every 24 hours  gabapentin 100 milliGRAM(s) Oral daily  glucagon  Injectable 1 milliGRAM(s) IntraMuscular once  insulin aspart (NovoLOG) Pump 1 Each SubCutaneous Continuous Pump  losartan 25 milliGRAM(s) Oral daily  mirtazapine 30 milliGRAM(s) Oral at bedtime  pantoprazole    Tablet 40 milliGRAM(s) Oral before breakfast  sodium chloride 0.9%. 1000 milliLiter(s) (75 mL/Hr) IV Continuous <Continuous>  sodium chloride 0.9%. 1000 milliLiter(s) (75 mL/Hr) IV Continuous <Continuous>    MEDICATIONS  (PRN):  acetaminophen     Tablet .. 650 milliGRAM(s) Oral every 6 hours PRN Mild Pain (1 - 3), Moderate Pain (4 - 6), Severe Pain (7 - 10)  aluminum hydroxide/magnesium hydroxide/simethicone Suspension 30 milliLiter(s) Oral every 4 hours PRN Dyspepsia  dextrose Oral Gel 15 Gram(s) Oral once PRN Blood Glucose LESS THAN 70 milliGRAM(s)/deciliter  melatonin 3 milliGRAM(s) Oral at bedtime PRN Insomnia  ondansetron Injectable 4 milliGRAM(s) IV Push every 8 hours PRN Nausea and/or Vomiting      Allergies: Percocet 10/325 (Other; Urticaria)  Lantus (Swelling)  codeine (Other)  Darvocet A500 (Other; Urticaria)  PURELL.  pt said, &quot;I felt my airway closing&quot; after she smelled the Purell. The incident occured at the pulmonologist office. (Other; Short breath)        Review of Systems:  Respiratory: No SOB, no cough  GI: No nausea, vomiting, abdominal pain  Endocrine: no polyuria, polydipsia    PHYSICAL EXAM:  VITALS: T(C): 36.7 (01-22-25 @ 10:24)  T(F): 98 (01-22-25 @ 10:24), Max: 98.5 (01-21-25 @ 22:22)  HR: 92 (01-22-25 @ 10:24) (80 - 92)  BP: 134/73 (01-22-25 @ 10:24) (105/58 - 134/73)  RR:  (18 - 18)  SpO2:  (98% - 100%)  Wt(kg): --  GENERAL: NAD, well-groomed, well-developed  RESPIRATORY: No labored breathing   GI: Soft, nontender, non distended  PSYCH: Alert and oriented x 3, normal affect, normal mood      CAPILLARY BLOOD GLUCOSE  POCT Blood Glucose.: 212 mg/dL (22 Jan 2025 11:53)  POCT Blood Glucose.: 157 mg/dL (22 Jan 2025 07:16)  POCT Blood Glucose.: 194 mg/dL (21 Jan 2025 22:03)  POCT Blood Glucose.: 164 mg/dL (21 Jan 2025 17:32)    A1C with Estimated Average Glucose (01.14.25 @ 19:06)    A1C with Estimated Average Glucose Result: 8.5   Estimated Average Glucose: 197      01-22    137  |  101  |  7   ----------------------------<  249[H]  4.7   |  26  |  0.46[L]    eGFR: 101    Ca    9.2      01-22  Mg     1.70     01-22  Phos  2.5     01-22    Diet, Consistent Carbohydrate w/Evening Snack (01-19-25 @ 16:59) [Active]

## 2025-01-22 NOTE — DIETITIAN INITIAL EVALUATION ADULT - ADD RECOMMEND
1) Recommend continue with current diet, which remains appropriate at this time.   2) Monitor PO intake, Labs, weights, BMs, and skin integrity.   3) Cont. to follow up with Endo.   4) Recommend add ascorbic acid and MVI to promote wound healing.   5) RD to remain available for further nutritional interventions as indicated.

## 2025-01-22 NOTE — PROVIDER CONTACT NOTE (OTHER) - SITUATION
Pt's insulin pump  and did not notify any covering provider for replacement batteries. She slept most of the morning and did not mention to incoming nurse, BGL were elevated.

## 2025-01-22 NOTE — DIETITIAN INITIAL EVALUATION ADULT - ORAL INTAKE PTA/DIET HISTORY
Patient reports her glucose has been elevating lately which she's unable to control it. Pt endorses consumes 2 meals daily, she follows low salt and uses sugar substitute. Patient has insulin pump, and CGM. Pt reports her A1C 6.1% last time she checked at Endocrinologist office few months ago.

## 2025-01-22 NOTE — DIETITIAN INITIAL EVALUATION ADULT - NAME AND PHONE
Medicare Care Choice program    Palliative Care Encounter:      - Goals of care: live longer, improve or maintain function/quality of life, remain at home, preserve independence/autonomy/control and continue current management     - Code Status: limited code- no CPR, No Intubation, No Medications, No Cardioversion    Prognosis: Guarded      -We will make referral to the Medicare care choice program.    Follow Up:  4 weeks. They were encouraged to call with any questions, concerns, needs, or changes in symptoms. Subjective:     HPI:  Erica Chiu is a 81 y.o. 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 Divina Stapleton, on herceptin, which was recently started, also with history of chronic respiratory failure, pulmonary fibrosis/COPD, followed by Dr. Wil Green. She was referred to [address] [place] [address] to assist with goals of care and symptom management. Subjective/Events/Discussions:  6/27/19  Mrs. Tere Lin is seen in her home today, no family present. I am accompanied by the palliative medicine license . Introduced palliative medicine, discussed the role of medicine could play in her care. This includes discussion regarding her life limiting illness, psychosocial support, goals of care, and symptom management. Mrs. Tere Lin is alert, oriented, and able to voice her needs and concerns well. Currently she complains of shortness of breath with exertion, which improves with rest.  This is associated with a dry nonproductive cough, and has been present for the past 2 months. Today she also complains of bilateral lower extremity swelling, which began approximately 1 month ago, and she was prescribed Lasix by her PCP.   She was recently seen by her oncology team, who stopped her to Herceptin, Eulalia Salazar, GREG #[id number], also available on [employer] Teams.

## 2025-01-22 NOTE — DIETITIAN INITIAL EVALUATION ADULT - PERTINENT MEDS FT
MEDICATIONS  (STANDING):  aspirin  chewable 81 milliGRAM(s) Oral daily  atorvastatin 10 milliGRAM(s) Oral at bedtime  dextrose 5%. 1000 milliLiter(s) (50 mL/Hr) IV Continuous <Continuous>  dextrose 50% Injectable 25 Gram(s) IV Push once  enoxaparin Injectable 40 milliGRAM(s) SubCutaneous every 24 hours  gabapentin 100 milliGRAM(s) Oral daily  glucagon  Injectable 1 milliGRAM(s) IntraMuscular once  insulin aspart (NovoLOG) Pump 1 Each SubCutaneous Continuous Pump  losartan 25 milliGRAM(s) Oral daily  mirtazapine 30 milliGRAM(s) Oral at bedtime  pantoprazole    Tablet 40 milliGRAM(s) Oral before breakfast  sodium chloride 0.9%. 1000 milliLiter(s) (75 mL/Hr) IV Continuous <Continuous>  sodium chloride 0.9%. 1000 milliLiter(s) (75 mL/Hr) IV Continuous <Continuous>    MEDICATIONS  (PRN):  acetaminophen     Tablet .. 650 milliGRAM(s) Oral every 6 hours PRN Mild Pain (1 - 3), Moderate Pain (4 - 6), Severe Pain (7 - 10)  aluminum hydroxide/magnesium hydroxide/simethicone Suspension 30 milliLiter(s) Oral every 4 hours PRN Dyspepsia  dextrose Oral Gel 15 Gram(s) Oral once PRN Blood Glucose LESS THAN 70 milliGRAM(s)/deciliter  melatonin 3 milliGRAM(s) Oral at bedtime PRN Insomnia  ondansetron Injectable 4 milliGRAM(s) IV Push every 8 hours PRN Nausea and/or Vomiting

## 2025-01-22 NOTE — PROGRESS NOTE ADULT - ASSESSMENT
The patient is a 74y Female with PMH of T1DM on an insulin pump, colon CA s/p surgery/chemo p/w hyperglycemia. Endocrinology consulted for type 1 diabetes on an insulin pump.    #Type 2 DM   #Use of insulin pump  - Follows with: Dr. Ledezma  - A1C with Estimated Average Glucose Result: 8.5 % (25)  - eGFR: 93 mL/min/1.73m2 (01-15-25)  - Weight (kg): 79.4 (25)  - home regimen: These settings were recently changed by Endocrinologist Last week  Insulin pump type: Medtronic   Insulin type: Novolog  Last site change: pump removed   Supplies available: no  Settings:  Basal rate ( TBD 51.525)  12a-6: 30a 1.75 unit/hr  6:30a-12:30p 1.95 units/hr  12:30p-6:30p 2.45 units/hr  6:30p-12a 2.5 units/hr  ISF  12a-12a 50---->25 on ----> 50  I:C ratio  12a-12a 1:12  Target   Active insulin time 4 hrs      INPATIENT PLAN:  - Inpatient glucose goals: 100-180: pt eating adequately.   - Pt on insulin pump with Novolog insulin   - All insulin should be via insulin pump   - Pt now counting carbs but not necessarily correctly; evaluating and having pt save her menus so then can be better assessed. Pt is not always correctling a blood sugar; endocrine team reminding pt to do so   - Incase of pump failure would give Levemir or Tresiba (both available inpt) (pt has lantus allergy)  42 units stat and Add Admelog 8 unit TID AC (please hold if npo/not eating)   - Please check FSG before meals and QHS, or q6h while NPO  - consistent carb diet when eating  - FS before meals and at bedtime   - RD consult   - Hypoglycemia Protocol   - unclear if T1 vs T2 DM given no DKA despite lack of basal insulin x24 hours and patient habitus   - c-peptide 1.9 with serum glucose 313; repeat c-peptide 2.3 and serum glucose 253 (pt producing endogenous insulin   - DM educator following---> please refer to note   - Attestation form be filled out and signed.  Rn to place correction and manual bolus 6 units in flowsheet.  Pt doesn't count carbs.    - Insulin pump site changed on 2025 due to be changed today 2025; Freestyle angely changed on 2025 due to be changed in 14 days  - FY on  pts insulin pump battery . Pt didn't make staff aware. Pt  feeling nauseous recommended to send DKA labs BMP, BHB, and VBG stat. Labs without signs of DKA. Insulin pump battery replaced.       DISCHARGE PLANNING:  - As per primary team pt is going to rehab     - Pt wants to continue using her insulin pump; will continue to assess pts ability to use insulin pump while inpatient    - dc on insulin pump; settings may need to be adjusted based on glucose trend; Pt will continue using basal insulin and  bolus for correction and set amount of bolus for meals for dc (bolus dose may need to beadjusted based on glucose levels inpts)    - She states she never has hyperglycemia but that is not reflected by her A1c.     - Pt was taught how to correct her glucose with correction and correction settings were adjusted. Would recommend pt to learn how to carb count as an outpt.  If unable to do carb counting atleast at a minimum insulin pump should be programmed for pt to receive bolus insulin based on the amount she eats (for example have a bolus amount for small, medium, and large meals).  This is suggestion to have better control of glucose levels at this time other pump settings may need to be adjusted.      - Patient has 770G insulin pump. Patient was instructed on the difference with the 780G and that it would be beneficial to have the [employer] CGM so her pump can be a closed loop. A “closed loop insulin pump” was also explained to the patient. Patient currently uses a Angely 2 CGM, and it was explained to patient that sometimes she can miss information if she goes longer than 8 hrs without scanning the device. If patient does not want to get the Medtronic CGM, then at least get a Angely 3 CGM so the information will go right to her reader. Pt also instructed at her next endocrine follow up with insulin pump representative; pt can also consider changing to islet insulin pump; pt will speak to her endocrinologist post discharge    - For now continue Freestyle angely 2 sensor and reader     - c-peptide 1.9 with serum glucose 313 maybe falsely elevated. If pt stays inpt would recommend repeating; if pt is discharged today please repeat cpeptide with serum glucose as an outpt     - Pt needs insulin pen both basal at home incase of insulin pump failure; please check if below insulin is covered by pts insurance     - Please send tresiba/basaglar/toujeo/Levemir pen and humalog kwikpen as test script to check insurance coverage.  Ok to send with current doses and update prior to d/c    If Humalog not covered- can try alternating with one of following  novolog/apidra/admelog/fiasp    - Ensure patient has working glucometer, test strips, lancets, alcohol pads, and BD sofia pen needles    - For severe hypoglycemia: Please prescribe Glucagon Emergency Kit for Low Blood Sugar 1 mG injection: 1 mG intramuscularly as needed for severe hypoglycemia. Glucose tablets 4G (take 4 tablets) or 15G tablets for blood sugar less than 70 mG/dL repeat fingerstick in 15 minutes.     - patient should followup with their endocrinologist, Dr. Ledezma recently saw Dr. Ledezma last week; advised pt to make an appointment upon discharge     - Please make sure pt has insulin, cgm, and insulin pen, and insulin pump supplies prior to discharge    #Hypertension  - Goal BP <130/80  - Management as per primary team  - check urine microalbumin level as outpatient    #Hyperlipidemia  - LDL goal <70  - Last LDL: 20 mg/dL (23)  - check lipid panel as outpatient on a yearly basis    D/w Mathew Taveras  Nurse Practitioner  Division of Endocrinology & Diabetes  In house pager #[phone]    If before 9AM or after 6PM, or on weekends/holidays, please call endocrine answering service for assistance ([phone]).For nonurgent matters email [email or web address] for assistance.  The patient is a 74y Female with PMH of T1DM on an insulin pump, colon CA s/p surgery/chemo p/w hyperglycemia. Endocrinology consulted for type 1 diabetes on an insulin pump.    #Type 1 vs Type 2 DM   #Use of insulin pump  - Follows with: Dr. Ledezma  - A1C with Estimated Average Glucose Result: 8.5 % (25)  - eGFR: 93 mL/min/1.73m2 (01-15-25)  - Weight (kg): 79.4 (25)  - home regimen: These settings were recently changed by Endocrinologist Last week  Insulin pump type: Medtronic   Insulin type: Novolog  Last site change: pump removed   Supplies available: no  Settings:  Basal rate ( TBD 51.525)  12a-6: 30a 1.75 unit/hr  6:30a-12:30p 1.95 units/hr  12:30p-6:30p 2.45 units/hr  6:30p-12a 2.5 units/hr  ISF  12a-12a 50---->25 on ----> 50  I:C ratio  12a-12a 1:12  Target   Active insulin time 4 hrs      INPATIENT PLAN:  - Inpatient glucose goals: 100-180: pt eating adequately.   - Pt on insulin pump with Novolog insulin   - All insulin should be via insulin pump   - Pt now counting carbs but not necessarily correctly; evaluating and having pt save her menus so then can be better assessed. Pt is not always correctling a blood sugar; endocrine team reminding pt to do so   - Incase of pump failure would give Levemir or Tresiba (both available inpt) (pt has lantus allergy)  42 units stat and Add Admelog 8 unit TID AC (please hold if npo/not eating)   - Please check FSG before meals and QHS, or q6h while NPO  - consistent carb diet when eating  - FS before meals and at bedtime   - RD consult   - Hypoglycemia Protocol   - unclear if T1 vs T2 DM given no DKA despite lack of basal insulin x24 hours and patient habitus   - c-peptide 1.9 with serum glucose 313; repeat c-peptide 2.3 and serum glucose 253 (pt producing endogenous insulin   - DM educator following---> please refer to note   - Attestation form be filled out and signed.  Rn to place correction and manual bolus 6 units in flowsheet.  Pt doesn't count carbs.    - Insulin pump site changed on 2025 due to be changed today 2025; Freestyle angely changed on 2025 due to be changed in 14 days  - FYI on  pts insulin pump battery . Pt didn't make staff aware. Pt  feeling nauseous recommended to send DKA labs BMP, BHB, and VBG stat. Labs without signs of DKA. Insulin pump battery replaced.       DISCHARGE PLANNING:  - As per primary team pt is going to rehab     - Pt wants to continue using her insulin pump; will continue to assess pts ability to use insulin pump while inpatient    - dc on insulin pump; settings may need to be adjusted based on glucose trend; Pt will continue using basal insulin and  bolus for correction and set amount of bolus for meals for dc (bolus dose may need to beadjusted based on glucose levels inpts)    - She states she never has hyperglycemia but that is not reflected by her A1c.     - Pt was taught how to correct her glucose with correction and correction settings were adjusted. Would recommend pt to learn how to carb count as an outpt.  If unable to do carb counting atleast at a minimum insulin pump should be programmed for pt to receive bolus insulin based on the amount she eats (for example have a bolus amount for small, medium, and large meals).  This is suggestion to have better control of glucose levels at this time other pump settings may need to be adjusted.      - Patient has 770G insulin pump. Patient was instructed on the difference with the 780G and that it would be beneficial to have the [employer] CGM so her pump can be a closed loop. A “closed loop insulin pump” was also explained to the patient. Patient currently uses a Angely 2 CGM, and it was explained to patient that sometimes she can miss information if she goes longer than 8 hrs without scanning the device. If patient does not want to get the Medtronic CGM, then at least get a Angely 3 CGM so the information will go right to her reader. Pt also instructed at her next endocrine follow up with insulin pump representative; pt can also consider changing to islet insulin pump; pt will speak to her endocrinologist post discharge    - For now continue Freestyle angely 2 sensor and reader     - c-peptide 1.9 with serum glucose 313 maybe falsely elevated. If pt stays inpt would recommend repeating; if pt is discharged today please repeat cpeptide with serum glucose as an outpt     - Pt needs insulin pen both basal at home incase of insulin pump failure; please check if below insulin is covered by pts insurance     - Please send tresiba/basaglar/toujeo/Levemir pen and humalog kwikpen as test script to check insurance coverage.  Ok to send with current doses and update prior to d/c    If Humalog not covered- can try alternating with one of following  novolog/apidra/admelog/fiasp    - Ensure patient has working glucometer, test strips, lancets, alcohol pads, and BD sofia pen needles    - For severe hypoglycemia: Please prescribe Glucagon Emergency Kit for Low Blood Sugar 1 mG injection: 1 mG intramuscularly as needed for severe hypoglycemia. Glucose tablets 4G (take 4 tablets) or 15G tablets for blood sugar less than 70 mG/dL repeat fingerstick in 15 minutes.     - patient should followup with their endocrinologist, Dr. Ledezma recently saw Dr. Ledezma last week; advised pt to make an appointment upon discharge     - Please make sure pt has insulin, cgm, and insulin pen, and insulin pump supplies prior to discharge    #Hypertension  - Goal BP <130/80  - Management as per primary team  - check urine microalbumin level as outpatient    #Hyperlipidemia  - LDL goal <70  - Last LDL: 20 mg/dL (23)  - check lipid panel as outpatient on a yearly basis    D/w Mathew Taveras  Nurse Practitioner  Division of Endocrinology & Diabetes  In house pager #[phone]    If before 9AM or after 6PM, or on weekends/holidays, please call endocrine answering service for assistance ([phone]).For nonurgent matters email [email or web address] for assistance.

## 2025-01-22 NOTE — DIETITIAN INITIAL EVALUATION ADULT - NSICDXPASTSURGICALHX_GEN_ALL_CORE_FT
PAST SURGICAL HISTORY:  H/O bilateral breast reduction surgery     H/O total knee replacement, left     H/O: hysterectomy     History of appendectomy     History of cholecystectomy     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 - [facility]    Port-A-Cath in place     S/P bunionectomy

## 2025-01-22 NOTE — DIETITIAN INITIAL EVALUATION ADULT - PROBLEM/PLAN-3
Problem: Falls - Risk of 
Goal: *Absence of Falls Description Document Shana President Fall Risk and appropriate interventions in the flowsheet. Outcome: Progressing Towards Goal 
Note: Fall Risk Interventions: 
Mobility Interventions: Assess mobility with egress test, Bed/chair exit alarm, PT Consult for mobility concerns, OT consult for ADLs Mentation Interventions: Bed/chair exit alarm, Door open when patient unattended Medication Interventions: Bed/chair exit alarm Elimination Interventions: Call light in reach, Toileting schedule/hourly rounds, Toilet paper/wipes in reach, Urinal in reach DISPLAY PLAN FREE TEXT

## 2025-01-22 NOTE — PROVIDER CONTACT NOTE (OTHER) - BACKGROUND
(Admit Diagnosis) Hyperglycemia  (PMH) Malignant neoplasm of cecum  (PMH) OA (osteoarthritis)  (PMH) H/O insertion of insulin pump  (PMH) Lymphadenopathy

## 2025-01-22 NOTE — DIETITIAN INITIAL EVALUATION ADULT - PERTINENT LABORATORY DATA
01-22    137  |  103  |  7   ----------------------------<  163[H]  3.9   |  27  |  0.54    Ca    9.2      22 Jan 2025 05:09  Phos  2.5     01-22  Mg     1.70     01-22    POCT Blood Glucose.: 212 mg/dL (01-22-25 @ 11:53)  A1C with Estimated Average Glucose Result: 8.5 % (01-14-25 @ 19:06)

## 2025-01-22 NOTE — DIETITIAN INITIAL EVALUATION ADULT - OTHER INFO
Per chart review, The patient is a 74y Female with PMH of T1DM on an insulin pump, colon CA s/p surgery/chemo p/w hyperglycemia. Endocrinology consulted for type 1 diabetes on an insulin pump.    Patient seen at bedside. Reports her appetite continues to be good in hospital, PO intake noted with 51-75% intake as per RN flow sheet. Pt currently following Consistent Carbohydrate diet. As per labs pt's A1C 8.5%, and POCT ~. Provided pt with handout Carbohydrate Counting for People with Diabetes. Discussed carbohydrate sources, carbohydrate portions, protein sources, mixed meals, and nutrition label reading. Stressed importance of balanced meals with adequate protein and fiber. Pt was informed of current A1c, goal A1c, and goal fingerstick range. Pt verbalized understanding. RD to remain available for further nutritional interventions as indicated.  Per chart review, The patient is a 74y Female with PMH of T1DM on an insulin pump, colon CA s/p surgery/chemo p/w hyperglycemia. Endocrinology consulted for type 1 diabetes on an insulin pump.    Patient seen at bedside. Reports her appetite continues to be good in hospital, PO intake noted with 51-75% intake as per RN flow sheet. Pt currently following Consistent Carbohydrate diet. As per labs pt's A1C 8.5%, and POCT ~. Provided pt with handout Carbohydrate Counting for People with Diabetes. Discussed carbohydrate sources, carbohydrate portions, protein sources, mixed meals, and nutrition label reading. Stressed importance of balanced meals with adequate protein and fiber. Pt was informed of current A1c, goal A1c, and goal fingerstick range. Pt verbalized understanding. Pt's skin notable with stage II sacrum pressure injury, recommend consider adding ascorbic acid, and MVI to promote wound healing. Recommend add Esnure Max 2x daily (300 alejandra, 60gm pro) to provide optimal nutrition. RD to remain available for further nutritional interventions as indicated.

## 2025-01-23 LAB
GLUCOSE BLDC GLUCOMTR-MCNC: 137 MG/DL — HIGH (ref 70–99)
GLUCOSE BLDC GLUCOMTR-MCNC: 155 MG/DL — HIGH (ref 70–99)
GLUCOSE BLDC GLUCOMTR-MCNC: 172 MG/DL — HIGH (ref 70–99)
GLUCOSE BLDC GLUCOMTR-MCNC: 214 MG/DL — HIGH (ref 70–99)

## 2025-01-23 PROCEDURE — 99232 SBSQ HOSP IP/OBS MODERATE 35: CPT

## 2025-01-23 RX ADMIN — ATORVASTATIN CALCIUM 10 MILLIGRAM(S): 80 TABLET, FILM COATED ORAL at 21:05

## 2025-01-23 RX ADMIN — GABAPENTIN 100 MILLIGRAM(S): 800 TABLET ORAL at 11:37

## 2025-01-23 RX ADMIN — ONDANSETRON 4 MILLIGRAM(S): 4 TABLET, ORALLY DISINTEGRATING ORAL at 12:16

## 2025-01-23 RX ADMIN — Medication 25 MILLIGRAM(S): at 06:19

## 2025-01-23 RX ADMIN — PANTOPRAZOLE 40 MILLIGRAM(S): 20 TABLET, DELAYED RELEASE ORAL at 06:19

## 2025-01-23 RX ADMIN — ASPIRIN 81 MILLIGRAM(S): 81 TABLET, COATED ORAL at 11:37

## 2025-01-23 RX ADMIN — ENOXAPARIN SODIUM 40 MILLIGRAM(S): 100 INJECTION SUBCUTANEOUS at 06:19

## 2025-01-23 RX ADMIN — MIRTAZAPINE 30 MILLIGRAM(S): 30 TABLET, FILM COATED ORAL at 21:04

## 2025-01-23 NOTE — PROGRESS NOTE ADULT - ASSESSMENT
The patient is a 74y Female with PMH of T1DM on an insulin pump, colon CA s/p surgery/chemo p/w hyperglycemia. Endocrinology consulted for type 1 diabetes on an insulin pump.    #Type 1 vs Type 2 DM   #Use of insulin pump  - Follows with: Dr. Ledezma  - A1C with Estimated Average Glucose Result: 8.5 % (01-14-25)  - eGFR: 93 mL/min/1.73m2 (01-15-25)  - Weight (kg): 79.4 (01-14-25)  - home regimen: These settings were recently changed by Endocrinologist Last week  Insulin pump type: Medtronic   Insulin type: Novolog  Last site change: pump removed 1/17  Supplies available: no  Settings:  Basal rate ( TBD 51.525)  12a-6: 30a 1.75 unit/hr  6:30a-12:30p 1.95 units/hr  12:30p-6:30p 2.45 units/hr  6:30p-12a 2.5 units/hr  ISF  12a-12a 50---->25 on 1/17----> 50  I:C ratio  12a-12a 1:12  Target   Active insulin time 4 hrs      INPATIENT PLAN:  - Inpatient glucose goals: 100-180: above goal at times. Pt just learning how to count carbs and sometimes is not correction blood glucose  - Pt on insulin pump with Novolog insulin   - All insulin should be via insulin pump   - Pt now counting carbs.  Pt is not always correcting a blood sugar; endocrine team reminding pt to do so   - Incase of pump failure would give Levemir or Tresiba (both available inpt) (pt has lantus allergy)  42 units stat and Add Admelog 8 unit TID AC (please hold if npo/not eating)   - Please check FSG before meals and QHS, or q6h while NPO  - consistent carb diet when eating  - FS before meals and at bedtime   - RD consult   - Hypoglycemia Protocol   - unclear if T1 vs T2 DM given no DKA despite lack of basal insulin x24 hours on admission and patient habitus   - c-peptide 1.9 with serum glucose 313; repeat c-peptide 2.3 and serum glucose 253 (pt producing endogenous insulin   - DM educator following---> please refer to note   - Attestation form be filled out and signed.  Rn to place correction and manual bolus 6 units in flowsheet.  Pt doesn't count carbs.    - Insulin pump site changed on 1/21/2025 due to be changed today 1/20/2025; Freestyle angely changed on 1/17/2025 due to be changed in 14 days  - Pt changed insulin pump site on left side abdomen on 1/23 should be changed q 72 hours       DISCHARGE PLANNING:  - As per primary team pt is going to rehab     - unclear if T1 vs T2 DM: would recommend pt check GEOVANY ab as an outpt MELISSA, [employer] transporter ab, IA2-2 with outpt endocrinologist     - Pt wants to continue using her insulin pump; will continue to assess pts ability to use insulin pump while inpatient    - dc on insulin pump; settings may need to be adjusted based on glucose trend    - She states she never has hyperglycemia but that is not reflected by her A1c.     - Patient has 770G insulin pump. Patient was instructed on the difference with the 780G and that it would be beneficial to have the Medtronic CGM so her pump can be a closed loop. A “closed loop insulin pump” was also explained to the patient. Patient currently uses a Angely 2 CGM, and it was explained to patient that sometimes she can miss information if she goes longer than 8 hrs without scanning the device. If patient does not want to get the Medtronic CGM, then at least get a Angely 3 CGM so the information will go right to her reader. Pt also instructed at her next endocrine follow up with insulin pump representative; pt can also consider changing to islet insulin pump; pt will speak to her endocrinologist post discharge    - For now continue Freestyle angely 2 sensor and reader    - Pt needs insulin pen both basal at home incase of insulin pump failure; please check if below insulin is covered by pts insurance     - Please send tresiba/basaglar/toujeo/Levemir pen and humalog kwikpen as test script to check insurance coverage.  Ok to send with current doses and update prior to d/c    If Humalog not covered- can try alternating with one of following  novolog/apidra/admelog/fiasp    - Ensure patient has working glucometer, test strips, lancets, alcohol pads, and BD sofia pen needles    - For severe hypoglycemia: Please prescribe Glucagon Emergency Kit for Low Blood Sugar 1 mG injection: 1 mG intramuscularly as needed for severe hypoglycemia. Glucose tablets 4G (take 4 tablets) or 15G tablets for blood sugar less than 70 mG/dL repeat fingerstick in 15 minutes.     - patient should followup with their endocrinologist, Dr. Ledezma recently saw Dr. Ledezma last week; advised pt to make an appointment upon discharge     - Please make sure pt has insulin, cgm, and insulin pen, and insulin pump supplies prior to discharge    #Hypertension  - Goal BP <130/80  - Management as per primary team  - check urine microalbumin level as outpatient    #Hyperlipidemia  - LDL goal <70  - Last LDL: 20 mg/dL (11-29-23)  - check lipid panel as outpatient on a yearly basis      Adrienne Taveras  Nurse Practitioner  Division of Endocrinology & Diabetes  In house pager #[phone]    If before 9AM or after 6PM, or on weekends/holidays, please call endocrine answering service for assistance ([phone]).For nonurgent matters email [email or web address] for assistance.  The patient is a 74y Female with PMH of T1DM on an insulin pump, colon CA s/p surgery/chemo p/w hyperglycemia. Endocrinology consulted for type 1 diabetes on an insulin pump.    #Type 1 vs Type 2 DM   #Use of insulin pump  - Follows with: Dr. Ledezma  - A1C with Estimated Average Glucose Result: 8.5 % (01-14-25)  - eGFR: 93 mL/min/1.73m2 (01-15-25)  - Weight (kg): 79.4 (01-14-25)  - home regimen: These settings were recently changed by Endocrinologist Last week  Insulin pump type: Medtronic   Insulin type: Novolog  Last site change: pump removed 1/17  Supplies available: no  Settings:  Basal rate ( TBD 51.525)  12a-6: 30a 1.75 unit/hr  6:30a-12:30p 1.95 units/hr  12:30p-6:30p 2.45 units/hr  6:30p-12a 2.5 units/hr  ISF  12a-12a 50---->25 on 1/17----> 50  I:C ratio  12a-12a 1:12  Target   Active insulin time 4 hrs      INPATIENT PLAN:  - Inpatient glucose goals: 100-180: above goal at times. Pt just learning how to count carbs and sometimes is not correction blood glucose  - Pt on insulin pump with Novolog insulin   - All insulin should be via insulin pump   - Pt now counting carbs.  Pt is not always correcting a blood sugar; endocrine team reminding pt to do so   - Incase of pump failure would give Levemir or Tresiba (both available inpt) (pt has lantus allergy)  42 units stat and Add Admelog 8 unit TID AC (please hold if npo/not eating)   - Please check FSG before meals and QHS, or q6h while NPO  - consistent carb diet when eating  - FS before meals and at bedtime   - RD consult   - Hypoglycemia Protocol   - unclear if T1 vs T2 DM given no DKA despite lack of basal insulin x24 hours on admission and patient habitus   - c-peptide 1.9 with serum glucose 313; repeat c-peptide 2.3 and serum glucose 253 (pt producing endogenous insulin   - DM educator following---> please refer to note   - Attestation form be filled out and signed.  Rn to place correction and manual bolus 6 units in flowsheet.  Pt doesn't count carbs.    - Insulin pump site changed on 1/21/2025 due to be changed today 1/20/2025; Freestyle angely changed on 1/17/2025 due to be changed in 14 days  - Pt changed insulin pump site on left side abdomen on 1/23 should be changed q 72 hours       DISCHARGE PLANNING:  - As per primary team pt is going to rehab     - unclear if T1 vs T2 DM: would recommend pt check type 1 DM ab as an outpt MELISSA, [employer] transporter ab, IA2-2 with outpt endocrinologist     - Pt wants to continue using her insulin pump; will continue to assess pts ability to use insulin pump while inpatient    - dc on insulin pump; settings may need to be adjusted based on glucose trend    - She states she never has hyperglycemia but that is not reflected by her A1c.     - Patient has 770G insulin pump. Patient was instructed on the difference with the 780G and that it would be beneficial to have the Medtronic CGM so her pump can be a closed loop. A “closed loop insulin pump” was also explained to the patient. Patient currently uses a Angely 2 CGM, and it was explained to patient that sometimes she can miss information if she goes longer than 8 hrs without scanning the device. If patient does not want to get the Medtronic CGM, then at least get a Angely 3 CGM so the information will go right to her reader. Pt also instructed at her next endocrine follow up with insulin pump representative; pt can also consider changing to islet insulin pump; pt will speak to her endocrinologist post discharge    - For now continue Freestyle angely 2 sensor and reader    - Pt needs insulin pen both basal at home incase of insulin pump failure; please check if below insulin is covered by pts insurance     - Please send tresiba/basaglar/toujeo/Levemir pen and humalog kwikpen as test script to check insurance coverage.  Ok to send with current doses and update prior to d/c    If Humalog not covered- can try alternating with one of following  novolog/apidra/admelog/fiasp    - Ensure patient has working glucometer, test strips, lancets, alcohol pads, and BD sofia pen needles    - For severe hypoglycemia: Please prescribe Glucagon Emergency Kit for Low Blood Sugar 1 mG injection: 1 mG intramuscularly as needed for severe hypoglycemia. Glucose tablets 4G (take 4 tablets) or 15G tablets for blood sugar less than 70 mG/dL repeat fingerstick in 15 minutes.     - patient should followup with their endocrinologist, Dr. Ledezma recently saw Dr. Ledezma last week; advised pt to make an appointment upon discharge     - Please make sure pt has insulin, cgm, and insulin pen, and insulin pump supplies prior to discharge    #Hypertension  - Goal BP <130/80  - Management as per primary team  - check urine microalbumin level as outpatient    #Hyperlipidemia  - LDL goal <70  - Last LDL: 20 mg/dL (11-29-23)  - check lipid panel as outpatient on a yearly basis      Adrienne Taveras  Nurse Practitioner  Division of Endocrinology & Diabetes  In house pager #[phone]    If before 9AM or after 6PM, or on weekends/holidays, please call endocrine answering service for assistance ([phone]).For nonurgent matters email [email or web address] for assistance.

## 2025-01-23 NOTE — PROGRESS NOTE ADULT - SUBJECTIVE AND OBJECTIVE BOX
SUBJECTIVE/ OVERNIGHT EVENTS:  --- Coverage for Dr. Salomon ---  awaiting insurance auth  she feels well  no cp, no sob, no n/v/d. no abdominal pain.  no headache, no dizziness.     --------------------------------------------------------------------------------------------  LABS:                        12.4   9.34  )-----------( 261      ( 22 Jan 2025 05:09 )             38.6     01-22    137  |  101  |  7   ----------------------------<  249[H]  4.7   |  26  |  0.46[L]    Ca    9.2      22 Jan 2025 13:07  Phos  2.5     01-22  Mg     1.70     01-22        CAPILLARY BLOOD GLUCOSE      POCT Blood Glucose.: 214 mg/dL (23 Jan 2025 17:05)  POCT Blood Glucose.: 172 mg/dL (23 Jan 2025 12:26)  POCT Blood Glucose.: 155 mg/dL (23 Jan 2025 07:17)  POCT Blood Glucose.: 163 mg/dL (22 Jan 2025 21:08)        Urinalysis Basic - ( 22 Jan 2025 13:07 )    Color: x / Appearance: x / SG: x / pH: x  Gluc: 249 mg/dL / Ketone: x  / Bili: x / Urobili: x   Blood: x / Protein: x / Nitrite: x   Leuk Esterase: x / RBC: x / WBC x   Sq Epi: x / Non Sq Epi: x / Bacteria: x        RADIOLOGY & ADDITIONAL TESTS:    Imaging Personally Reviewed:  [x] YES  [ ] NO    Consultant(s) Notes Reviewed:  [x] YES  [ ] NO    MEDICATIONS  (STANDING):  aspirin  chewable 81 milliGRAM(s) Oral daily  atorvastatin 10 milliGRAM(s) Oral at bedtime  dextrose 5%. 1000 milliLiter(s) (50 mL/Hr) IV Continuous <Continuous>  dextrose 50% Injectable 25 Gram(s) IV Push once  enoxaparin Injectable 40 milliGRAM(s) SubCutaneous every 24 hours  gabapentin 100 milliGRAM(s) Oral daily  glucagon  Injectable 1 milliGRAM(s) IntraMuscular once  insulin aspart (NovoLOG) Pump 1 Each SubCutaneous Continuous Pump  losartan 25 milliGRAM(s) Oral daily  mirtazapine 30 milliGRAM(s) Oral at bedtime  pantoprazole    Tablet 40 milliGRAM(s) Oral before breakfast  sodium chloride 0.9%. 1000 milliLiter(s) (75 mL/Hr) IV Continuous <Continuous>  sodium chloride 0.9%. 1000 milliLiter(s) (75 mL/Hr) IV Continuous <Continuous>    MEDICATIONS  (PRN):  acetaminophen     Tablet .. 650 milliGRAM(s) Oral every 6 hours PRN Mild Pain (1 - 3), Moderate Pain (4 - 6), Severe Pain (7 - 10)  aluminum hydroxide/magnesium hydroxide/simethicone Suspension 30 milliLiter(s) Oral every 4 hours PRN Dyspepsia  dextrose Oral Gel 15 Gram(s) Oral once PRN Blood Glucose LESS THAN 70 milliGRAM(s)/deciliter  melatonin 3 milliGRAM(s) Oral at bedtime PRN Insomnia  ondansetron Injectable 4 milliGRAM(s) IV Push every 8 hours PRN Nausea and/or Vomiting      Care Discussed with Consultants/Other Providers [x] YES  [ ] NO    Vital Signs Last 24 Hrs  T(C): 36.6 (23 Jan 2025 12:10), Max: 36.6 (22 Jan 2025 23:01)  T(F): 97.9 (23 Jan 2025 12:10), Max: 97.9 (22 Jan 2025 23:01)  HR: 85 (23 Jan 2025 12:10) (76 - 89)  BP: 101/61 (23 Jan 2025 12:10) (101/61 - 118/68)  BP(mean): --  RR: 17 (23 Jan 2025 12:10) (17 - 18)  SpO2: 98% (23 Jan 2025 12:10) (98% - 100%)    Parameters below as of 23 Jan 2025 12:10  Patient On (Oxygen Delivery Method): room air      I&O's Summary    23 Jan 2025 07:01  -  23 Jan 2025 17:56  --------------------------------------------------------  IN: 200 mL / OUT: 900 mL / NET: -700 mL    PHYSICAL EXAM:  GENERAL: NAD, well-developed, comfortable on room air  HEAD:  Atraumatic, Normocephalic  EYES: EOMI, PERRLA, conjunctiva and sclera clear  NECK: Supple, No JVD  CHEST/LUNG: Clear to auscultation bilaterally; No wheeze  HEART: Regular rate and rhythm; No murmurs, rubs, or gallops  ABDOMEN: Soft, Nontender, Nondistended; Bowel sounds present  Neuro: AAOx3, no focal weakness, 5/5 b/l extremity strength  EXTREMITIES:  2+ Peripheral Pulses, No clubbing, cyanosis, or edema  SKIN: No rashes or lesions

## 2025-01-23 NOTE — PROGRESS NOTE ADULT - NS ATTEND AMEND GEN_ALL_CORE FT
Patient seen and examined. Agree with plan as detailed in PA/NP Note.     -cont Losartan and HCTZ for HTN if tolerating    Suchet Willy GO  Pager: [phone]
Patient seen and examined. Agree with plan as detailed in PA/NP Note.     Bella Aguilera MD  Pager: [phone]
Patient seen and examined. Agree with plan as detailed in PA/NP Note.     cont Losartan and HCTZ for HTN if tolerating      Suchet Willy GO  Pager: [phone]
Patient seen and examined. Agree with plan as detailed in PA/NP Note.     cont Losartan and HCTZ for HTN    Bella Aguilera MD  Pager: [phone]
Patient seen and examined. Agree with plan as detailed in PA/NP Note.    cont Losartan and HCTZ for HTN     Bella Aguilera MD  Pager: [phone]
Patient seen and examined. Agree with plan as detailed in PA/NP Note.     cont Losartan and HCTZ for HTN if tolerating    Suchet Willy GO  Pager: [phone]

## 2025-01-23 NOTE — ADVANCED PRACTICE NURSE CONSULT - APN SPECIALTY LIST
Diabetes Specialist
Diabetes Specialist
Wound Ostomy Care
Diabetes Specialist

## 2025-01-23 NOTE — PROGRESS NOTE ADULT - SUBJECTIVE AND OBJECTIVE BOX
Chief Complaint: Type 1 VS Type 2 DM    History: Pt seen at bedside. Pt tolerating oral diet. Pt with pos nausea today.  Denies vomiting/any signs of hypoglycemia. Pt eating varies.     MEDICATIONS  (STANDING):  aspirin  chewable 81 milliGRAM(s) Oral daily  atorvastatin 10 milliGRAM(s) Oral at bedtime  dextrose 5%. 1000 milliLiter(s) (50 mL/Hr) IV Continuous <Continuous>  dextrose 50% Injectable 25 Gram(s) IV Push once  enoxaparin Injectable 40 milliGRAM(s) SubCutaneous every 24 hours  gabapentin 100 milliGRAM(s) Oral daily  glucagon  Injectable 1 milliGRAM(s) IntraMuscular once  insulin aspart (NovoLOG) Pump 1 Each SubCutaneous Continuous Pump  losartan 25 milliGRAM(s) Oral daily  mirtazapine 30 milliGRAM(s) Oral at bedtime  pantoprazole    Tablet 40 milliGRAM(s) Oral before breakfast  sodium chloride 0.9%. 1000 milliLiter(s) (75 mL/Hr) IV Continuous <Continuous>  sodium chloride 0.9%. 1000 milliLiter(s) (75 mL/Hr) IV Continuous <Continuous>    MEDICATIONS  (PRN):  acetaminophen     Tablet .. 650 milliGRAM(s) Oral every 6 hours PRN Mild Pain (1 - 3), Moderate Pain (4 - 6), Severe Pain (7 - 10)  aluminum hydroxide/magnesium hydroxide/simethicone Suspension 30 milliLiter(s) Oral every 4 hours PRN Dyspepsia  dextrose Oral Gel 15 Gram(s) Oral once PRN Blood Glucose LESS THAN 70 milliGRAM(s)/deciliter  melatonin 3 milliGRAM(s) Oral at bedtime PRN Insomnia  ondansetron Injectable 4 milliGRAM(s) IV Push every 8 hours PRN Nausea and/or Vomiting      Allergies: Percocet 10/325 (Other; Urticaria)  Lantus (Swelling)  codeine (Other)  Darvocet A500 (Other; Urticaria)  PURELL.  pt said, &quot;I felt my airway closing&quot; after she smelled the Purell. The incident occured at the pulmonologist office. (Other; Short breath)          Review of Systems:  Respiratory: No SOB, no cough  GI: No nausea, vomiting, abdominal pain  Endocrine: no polyuria, polydipsia    PHYSICAL EXAM:  VITALS: T(C): 36.6 (01-23-25 @ 12:10)  T(F): 97.9 (01-23-25 @ 12:10), Max: 97.9 (01-22-25 @ 23:01)  HR: 85 (01-23-25 @ 12:10) (77 - 89)  BP: 101/61 (01-23-25 @ 12:10) (101/61 - 118/68)  RR:  (17 - 18)  SpO2:  (98% - 100%)  Wt(kg): --  GENERAL: NAD, well-groomed, well-developed  RESPIRATORY: No labored breathing   GI: Soft, nontender, non distended  PSYCH: Alert and oriented x 3, normal affect, normal mood      CAPILLARY BLOOD GLUCOSE  POCT Blood Glucose.: 214 mg/dL (23 Jan 2025 17:05)  POCT Blood Glucose.: 172 mg/dL (23 Jan 2025 12:26)  POCT Blood Glucose.: 155 mg/dL (23 Jan 2025 07:17)  POCT Blood Glucose.: 163 mg/dL (22 Jan 2025 21:08)    A1C with Estimated Average Glucose (01.14.25 @ 19:06)    A1C with Estimated Average Glucose Result: 8.5   Estimated Average Glucose: 197        01-22    137  |  101  |  7   ----------------------------<  249[H]  4.7   |  26  |  0.46[L]    eGFR: 101    Ca    9.2      01-22  Mg     1.70     01-22  Phos  2.5     01-22      Diet, Consistent Carbohydrate w/Evening Snack (01-19-25 @ 16:59) [Active]

## 2025-01-23 NOTE — ADVANCED PRACTICE NURSE CONSULT - RECOMMEDATIONS
Discharge date unknown. Endocrine team to follow patient while in the hospital. [facility] will follow up as needed. 
Topical recommendations:   ---Cleanse with skin cleanser, pat dry. Apply Malina moisture barrier cream twice a day and PRN.  ---Continue low air loss bed therapy, continue heel elevation, continue to turn & reposition per protocol, soft pillow between bony prominences, continue moisture management with barrier creams & single breathable pad, continue measures to decrease friction/shear/pressure. Continue with nutritional support as per dietary/orders.     Plan discussed with patient, all questions answered.   Please contact Wound/Ostomy Care Service Line if we can be of further assistance (ext [phone]). Please reconsult if changes to tissue type noted. 
Discharge date unknown. Awaiting placement to rehab. Endocrine team to follow patient while in the hospital. [facility] will follow up as needed. 
Discharge date unknown. Awaiting placement to rehab. Endocrine team to follow patient while in the hospital. [facility] will follow up as needed. 
Discharge date unknown. Awaiting placement to rehab. Endocrine team to follow patiet while in the hospital. [facility] will follow up as needed. 
Patent being discharged to rehab unsure when.  Endocrine team to follow patient while in the hospital. [facility] will follow up as needed. 
Discharge date unknown. Endocrine team to follow patient while in the hospital. [facility] will follow up as needed.

## 2025-01-23 NOTE — ADVANCED PRACTICE NURSE CONSULT - REASON FOR CONSULT
74F with history of type 1 diabetes poorly controlled on insulin pump, colorectal cancer in remission presents to ED for hyperglycemia. History obtained from patient and daughter at bedside, who report that prior to admission daughter went to visit her mother to bring her to physical therapy however she noticed patient was noticeably weaker than she was the previous day and appeared to be slightly confused. She was taken to her outpatient endocrinology office for evaluation where she was found to be hyperglycemic to 400's and was given novolog bolus through her pump, however her blood glucose did not improve and she was recommended to come to the ED. Reports pump is still functional and was adjusted several months ago as she was hypoglycemic at that time. Endocrine consulted for diabetes management of type 1 diabetes on an insulin pump. CDCES consulted because patient manages type 1 diabetes with an insulin pump at home. 
74F with history of type 1 diabetes poorly controlled on insulin pump, colorectal cancer in remission presents to ED for hyperglycemia. History obtained from patient and daughter at bedside, who report that prior to admission daughter went to visit her mother to bring her to physical therapy however she noticed patient was noticeably weaker than she was the previous day and appeared to be slightly confused. She was taken to her outpatient endocrinology office for evaluation where she was found to be hyperglycemic to 400's and was given novolog bolus through her pump, however her blood glucose did not improve and she was recommended to come to the ED. Reports pump is still functional and was adjusted several months ago as she was hypoglycemic at that time. Endocrine consulted for diabetes management of type 2 diabetes on an insulin pump. CDCES consulted because patient manages type 2 diabetes with an insulin pump at home. 
74F with history of type 1 diabetes poorly controlled on insulin pump, colorectal cancer in remission presents to ED for hyperglycemia. History obtained from patient and daughter at bedside, who report that prior to admission daughter went to visit her mother to bring her to physical therapy however she noticed patient was noticeably weaker than she was the previous day and appeared to be slightly confused. She was taken to her outpatient endocrinology office for evaluation where she was found to be hyperglycemic to 400's and was given novolog bolus through her pump, however her blood glucose did not improve and she was recommended to come to the ED. Reports pump is still functional and was adjusted several months ago as she was hypoglycemic at that time. Endocrine consulted for diabetes management of type 1 diabetes on an insulin pump. CDCES consulted because patient manages type 1 diabetes with an insulin pump at home. 
74F with history of type 1 diabetes poorly controlled on insulin pump, colorectal cancer in remission presents to ED for hyperglycemia. History obtained from patient and daughter at bedside, who report that prior to admission daughter went to visit her mother to bring her to physical therapy however she noticed patient was noticeably weaker than she was the previous day and appeared to be slightly confused. She was taken to her outpatient endocrinology office for evaluation where she was found to be hyperglycemic to 400's and was given novolog bolus through her pump, however her blood glucose did not improve and she was recommended to come to the ED. Reports pump is still functional and was adjusted several months ago as she was hypoglycemic at that time. Endocrine consulted for diabetes management of type 1 diabetes on an insulin pump. CDCES consulted because patient manages type 1 diabetes with an insulin pump at home. 
Patient seen on skin care rounds after wound care referral received for assessment of skin impairment and recommendations of topical management. Patient is a 73 y/o female, with a PmHx of Type 1 DM (poorly controlled on insulin pump), Colorectal Cancer (in remission), sent in by outpatint endocrinology office for concerns for hyperglycemia.

## 2025-01-23 NOTE — ADVANCED PRACTICE NURSE CONSULT - ASSESSMENT
It was first unclear if patient had type 1 or type 2 diabetes. On 1/16 c-peptide 1.9 with serum glucose 313; on 1/21 repeat c-peptide 2.3 and serum glucose 253 (pt producing endogenous insulin). Patient seen at beside. Patient is feeling better today, and appetite is back. Patient on Medtronic 770G insulin pump with Novolog insulin and uses a Freestyle bonnie CGM. Patient counting carbohydrates appropriately for breakfast and gave a manual bolus of 5 units for wheat she at 1933 last evening. Patient is also giving correction bolus for BG.   Patient only has 18 units of insulin in pump. Patient needs to change sites today. Daughter took supplies home. RN requested Novolog insulin from pharmacy. Patient called daughter to bring insulin pump supplies back into hospital. Insulin pump in right abdomen and CGM on left arm. Both sites are intact. Patient does not have to change CGM until 1/31. RN to call CDCES when daughter brings in supplies. Patient demonstrated understanding of information given. Case discussed with primary RN and endocrine team.

## 2025-01-23 NOTE — PROGRESS NOTE ADULT - SUBJECTIVE AND OBJECTIVE BOX
DATE OF SERVICE: 01-23-25    Patient denies chest pain or shortness of breath.   Review of symptoms otherwise negative.    MEDICATIONS:  acetaminophen     Tablet .. 650 milliGRAM(s) Oral every 6 hours PRN  aluminum hydroxide/magnesium hydroxide/simethicone Suspension 30 milliLiter(s) Oral every 4 hours PRN  aspirin  chewable 81 milliGRAM(s) Oral daily  atorvastatin 10 milliGRAM(s) Oral at bedtime  dextrose 5%. 1000 milliLiter(s) IV Continuous <Continuous>  dextrose 50% Injectable 25 Gram(s) IV Push once  dextrose Oral Gel 15 Gram(s) Oral once PRN  enoxaparin Injectable 40 milliGRAM(s) SubCutaneous every 24 hours  gabapentin 100 milliGRAM(s) Oral daily  glucagon  Injectable 1 milliGRAM(s) IntraMuscular once  insulin aspart (NovoLOG) Pump 1 Each SubCutaneous Continuous Pump  losartan 25 milliGRAM(s) Oral daily  melatonin 3 milliGRAM(s) Oral at bedtime PRN  mirtazapine 30 milliGRAM(s) Oral at bedtime  ondansetron Injectable 4 milliGRAM(s) IV Push every 8 hours PRN  pantoprazole    Tablet 40 milliGRAM(s) Oral before breakfast  sodium chloride 0.9%. 1000 milliLiter(s) IV Continuous <Continuous>  sodium chloride 0.9%. 1000 milliLiter(s) IV Continuous <Continuous>                            12.4   9.34  )-----------( 261      ( 22 Jan 2025 05:09 )             38.6     137  |  101  |  7   ----------------------------<  249[H]  4.7   |  26  |  0.46[L]    Ca    9.2      22 Jan 2025 13:07  Phos  2.5     01-22  Mg     1.70     01-22  Creatinine Trend: 0.46<--, 0.54<--, 0.50<--, 0.61<--, 0.65<--, 0.61<--      T(C): 36.6 (01-23-25 @ 12:10), Max: 36.6 (01-22-25 @ 23:01)  HR: 85 (01-23-25 @ 12:10) (76 - 89)  BP: 101/61 (01-23-25 @ 12:10) (101/61 - 118/68)  RR: 17 (01-23-25 @ 12:10) (17 - 18)  SpO2: 98% (01-23-25 @ 12:10) (98% - 100%)  Wt(kg): --    I&O's Summary    23 Jan 2025 07:01  -  23 Jan 2025 14:12  --------------------------------------------------------  IN: 200 mL / OUT: 300 mL / NET: -100 mL        General: Well nourished in no acute distress.   Head: Normocephalic and atraumatic.   Neck: No JVD. No bruits. Supple. Does not appear to be enlarged.   Cardiovascular: + S1,S2 ; RRR Soft systolic murmur at the left lower sternal border. No rubs noted.    Lungs: CTA b/l. No rhonchi, rales or wheezes.   Abdomen: + BS, soft. Non tender. Non distended. No rebound. No guarding.   Extremities: No clubbing/cyanosis/edema.   Neurologic: Moves all four extremities. Full range of motion.   Skin: Warm and moist. The patient's skin has normal elasticity and good skin turgor.   Psychiatric: Appropriate mood and affect.  Musculoskeletal: Normal range of motion, normal strength    DATA    no tele    ECG:  	Sinus tachycardia 115bpm    < from: Xray Chest 1 View AP/PA (01.14.25 @ 20:13) >  IMPRESSION:  Clear lungs.    < end of copied text >    ASSESSMENT/PLAN: 	74F with PMH HTN, HLD, type 1 DM on insulin pump, Asthma, colon cad s/p hemicolectomy and chemo, in remission, with historically normal LV function and No ischemia on last NST 11/1023, presents to ER with hyperglycemia.    Sinus Tach/HTN  --Abnormal UA, on CTX  --Sinus tachycardia in the setting of hyperglycemia, resolved  --cont Losartan and HCTZ for HTN if tolerating    DC planning to [facility], F/U with Dr Santiago as scheduled after rehab, [phone]     Magdalena WALTERS  [phone]

## 2025-01-24 ENCOUNTER — TRANSCRIPTION ENCOUNTER (OUTPATIENT)
Age: 75
End: 2025-01-24

## 2025-01-24 VITALS
DIASTOLIC BLOOD PRESSURE: 60 MMHG | RESPIRATION RATE: 17 BRPM | HEART RATE: 74 BPM | SYSTOLIC BLOOD PRESSURE: 115 MMHG | OXYGEN SATURATION: 97 % | TEMPERATURE: 98 F

## 2025-01-24 LAB
ANION GAP SERPL CALC-SCNC: 8 MMOL/L — SIGNIFICANT CHANGE UP (ref 7–14)
BUN SERPL-MCNC: 9 MG/DL — SIGNIFICANT CHANGE UP (ref 7–23)
CALCIUM SERPL-MCNC: 9.1 MG/DL — SIGNIFICANT CHANGE UP (ref 8.4–10.5)
CHLORIDE SERPL-SCNC: 106 MMOL/L — SIGNIFICANT CHANGE UP (ref 98–107)
CO2 SERPL-SCNC: 27 MMOL/L — SIGNIFICANT CHANGE UP (ref 22–31)
CREAT SERPL-MCNC: 0.66 MG/DL — SIGNIFICANT CHANGE UP (ref 0.5–1.3)
EGFR: 93 ML/MIN/1.73M2 — SIGNIFICANT CHANGE UP
GLUCOSE BLDC GLUCOMTR-MCNC: 113 MG/DL — HIGH (ref 70–99)
GLUCOSE BLDC GLUCOMTR-MCNC: 128 MG/DL — HIGH (ref 70–99)
GLUCOSE BLDC GLUCOMTR-MCNC: 152 MG/DL — HIGH (ref 70–99)
GLUCOSE BLDC GLUCOMTR-MCNC: 72 MG/DL — SIGNIFICANT CHANGE UP (ref 70–99)
GLUCOSE SERPL-MCNC: 128 MG/DL — HIGH (ref 70–99)
HCT VFR BLD CALC: 38.4 % — SIGNIFICANT CHANGE UP (ref 34.5–45)
HGB BLD-MCNC: 12 G/DL — SIGNIFICANT CHANGE UP (ref 11.5–15.5)
MAGNESIUM SERPL-MCNC: 1.8 MG/DL — SIGNIFICANT CHANGE UP (ref 1.6–2.6)
MCHC RBC-ENTMCNC: 26.6 PG — LOW (ref 27–34)
MCHC RBC-ENTMCNC: 31.3 G/DL — LOW (ref 32–36)
MCV RBC AUTO: 85.1 FL — SIGNIFICANT CHANGE UP (ref 80–100)
NRBC # BLD AUTO: 0 K/UL — SIGNIFICANT CHANGE UP (ref 0–0)
NRBC # BLD: 0 /100 WBCS — SIGNIFICANT CHANGE UP (ref 0–0)
NRBC # FLD: 0 K/UL — SIGNIFICANT CHANGE UP (ref 0–0)
NRBC BLD-RTO: 0 /100 WBCS — SIGNIFICANT CHANGE UP (ref 0–0)
PHOSPHATE SERPL-MCNC: 3.4 MG/DL — SIGNIFICANT CHANGE UP (ref 2.5–4.5)
PLATELET # BLD AUTO: 276 K/UL — SIGNIFICANT CHANGE UP (ref 150–400)
POTASSIUM SERPL-MCNC: 3.9 MMOL/L — SIGNIFICANT CHANGE UP (ref 3.5–5.3)
POTASSIUM SERPL-SCNC: 3.9 MMOL/L — SIGNIFICANT CHANGE UP (ref 3.5–5.3)
RBC # BLD: 4.51 M/UL — SIGNIFICANT CHANGE UP (ref 3.8–5.2)
RBC # FLD: 13.3 % — SIGNIFICANT CHANGE UP (ref 10.3–14.5)
SODIUM SERPL-SCNC: 141 MMOL/L — SIGNIFICANT CHANGE UP (ref 135–145)
WBC # BLD: 9.07 K/UL — SIGNIFICANT CHANGE UP (ref 3.8–10.5)
WBC # FLD AUTO: 9.07 K/UL — SIGNIFICANT CHANGE UP (ref 3.8–10.5)

## 2025-01-24 PROCEDURE — 99232 SBSQ HOSP IP/OBS MODERATE 35: CPT

## 2025-01-24 RX ORDER — INSULIN ASPART 100 [IU]/ML
1 INJECTION, SOLUTION INTRAVENOUS; SUBCUTANEOUS
Qty: 0 | Refills: 0 | DISCHARGE

## 2025-01-24 RX ADMIN — PANTOPRAZOLE 40 MILLIGRAM(S): 20 TABLET, DELAYED RELEASE ORAL at 06:45

## 2025-01-24 RX ADMIN — Medication 25 MILLIGRAM(S): at 06:45

## 2025-01-24 RX ADMIN — ASPIRIN 81 MILLIGRAM(S): 81 TABLET, COATED ORAL at 12:23

## 2025-01-24 RX ADMIN — ENOXAPARIN SODIUM 40 MILLIGRAM(S): 100 INJECTION SUBCUTANEOUS at 06:45

## 2025-01-24 RX ADMIN — GABAPENTIN 100 MILLIGRAM(S): 800 TABLET ORAL at 12:23

## 2025-01-24 NOTE — PROGRESS NOTE ADULT - SUBJECTIVE AND OBJECTIVE BOX
DATE OF SERVICE: 01-24-25    Patient denies chest pain or shortness of breath.   Review of symptoms otherwise negative.    MEDICATIONS:  acetaminophen     Tablet .. 650 milliGRAM(s) Oral every 6 hours PRN  aluminum hydroxide/magnesium hydroxide/simethicone Suspension 30 milliLiter(s) Oral every 4 hours PRN  aspirin  chewable 81 milliGRAM(s) Oral daily  atorvastatin 10 milliGRAM(s) Oral at bedtime  dextrose 5%. 1000 milliLiter(s) IV Continuous <Continuous>  dextrose 50% Injectable 25 Gram(s) IV Push once  dextrose Oral Gel 15 Gram(s) Oral once PRN  enoxaparin Injectable 40 milliGRAM(s) SubCutaneous every 24 hours  gabapentin 100 milliGRAM(s) Oral daily  glucagon  Injectable 1 milliGRAM(s) IntraMuscular once  insulin aspart (NovoLOG) Pump 1 Each SubCutaneous Continuous Pump  losartan 25 milliGRAM(s) Oral daily  melatonin 3 milliGRAM(s) Oral at bedtime PRN  mirtazapine 30 milliGRAM(s) Oral at bedtime  ondansetron Injectable 4 milliGRAM(s) IV Push every 8 hours PRN  pantoprazole    Tablet 40 milliGRAM(s) Oral before breakfast  sodium chloride 0.9%. 1000 milliLiter(s) IV Continuous <Continuous>  sodium chloride 0.9%. 1000 milliLiter(s) IV Continuous <Continuous>      LABS:                        12.0   9.07  )-----------( 276      ( 24 Jan 2025 07:05 )             38.4       Hemoglobin: 12.0 g/dL (01-24 @ 07:05)  Hemoglobin: 12.4 g/dL (01-22 @ 05:09)  Hemoglobin: 13.1 g/dL (01-20 @ 06:58)      01-24    141  |  106  |  9   ----------------------------<  128[H]  3.9   |  27  |  0.66    Ca    9.1      24 Jan 2025 07:05  Phos  3.4     01-24  Mg     1.80     01-24      Creatinine Trend: 0.66<--, 0.46<--, 0.54<--, 0.50<--, 0.61<--, 0.65<--    COAGS:           PHYSICAL EXAM:  T(C): 36.6 (01-24-25 @ 06:40), Max: 36.6 (01-23-25 @ 12:10)  HR: 74 (01-24-25 @ 06:40) (74 - 88)  BP: 115/60 (01-24-25 @ 06:40) (101/61 - 130/60)  RR: 17 (01-24-25 @ 06:40) (17 - 18)  SpO2: 97% (01-24-25 @ 06:40) (97% - 98%)  Wt(kg): --    I&O's Summary    23 Jan 2025 07:01  -  24 Jan 2025 07:00  --------------------------------------------------------  IN: 200 mL / OUT: 900 mL / NET: -700 mL      General: Well nourished in no acute distress.   Head: Normocephalic and atraumatic.   Neck: No JVD. No bruits. Supple. Does not appear to be enlarged.   Cardiovascular: + S1,S2 ; RRR Soft systolic murmur at the left lower sternal border. No rubs noted.    Lungs: CTA b/l. No rhonchi, rales or wheezes.   Abdomen: + BS, soft. Non tender. Non distended. No rebound. No guarding.   Extremities: No clubbing/cyanosis/edema.   Neurologic: Moves all four extremities. Full range of motion.   Skin: Warm and moist. The patient's skin has normal elasticity and good skin turgor.   Psychiatric: Appropriate mood and affect.  Musculoskeletal: Normal range of motion, normal strength    DATA    no tele    ECG:  	Sinus tachycardia 115bpm    < from: Xray Chest 1 View AP/PA (01.14.25 @ 20:13) >  IMPRESSION:  Clear lungs.    < end of copied text >    ASSESSMENT/PLAN: 	74F with PMH HTN, HLD, type 1 DM on insulin pump, Asthma, colon cad s/p hemicolectomy and chemo, in remission, with historically normal LV function and No ischemia on last NST 11/1023, presents to ER with hyperglycemia.    Sinus Tach/HTN  --Abnormal UA, on CTX  --Sinus tachycardia in the setting of hyperglycemia, resolved  --cont Losartan and restart HCTZ for HTN if tolerating    DC planning to [facility], F/U with Dr Santiago as scheduled after rehab, [phone]     Bella Aguilera MD  Pager: [phone]

## 2025-01-24 NOTE — PROGRESS NOTE ADULT - PROBLEM SELECTOR PLAN 1
-pt with history of diabetes type 1, on insulin pump  -saw outpt endocrinologist today as pt. felt weak, found to have hyperglycemia to 400's  -received novolog bolus at appt and was advised to come to ED for eval as glucose did not improve  -insulin pump now removed in ED, glucose improving to 200's.  -endocrine called, follow up recs   -pt also ?allergic to lantus, unclear if lantus was administered at outpt office or other insulin formulation  -12U levemir ordered qHS in interim
-pt with history of diabetes type 1, on insulin pump  -saw outpt endocrinologist today as pt. felt weak, found to have hyperglycemia to 400's  -[facility] and was advised to come to ED for eval as glucose did not improve  -insulin pump now removed in ED, glucose improving to 200's.  -endocrine called, follow up recs   -pt also ?allergic to lantus, unclear if lantus was administered at outpt office or other insulin formulation  -12U levemir ordered qHS in interim, meds adjusted by endo.   -final insulin regimen/pump per endo.
-pt with history of diabetes type 1, on insulin pump  -saw outpt endocrinologist today as pt. felt weak, found to have hyperglycemia to 400's  -received novolog bolus at appt and was advised to come to ED for eval as glucose did not improve  -insulin pump now removed in ED, glucose improving to 200's.  -endo consulted.   -pt also ?allergic to lantus, unclear if lantus was administered at outpt office or other insulin formulation  -12U levemir ordered qHS in interim, meds adjusted by endo.   -final insulin regimen/pump per endo.
-pt with history of diabetes type 1, on insulin pump  -saw outpt endocrinologist today as pt. felt weak, found to have hyperglycemia to 400's  -received novolog bolus at appt and was advised to come to ED for eval as glucose did not improve  -insulin pump now removed in ED, glucose improving to 200's.  -endocrine called, follow up recs   -pt also ?allergic to lantus, unclear if lantus was administered at outpt office or other insulin formulation  -12U levemir ordered qHS in interim
-pt with history of diabetes type 1, on insulin pump  -saw outpt endocrinologist today as pt. felt weak, found to have hyperglycemia to 400's  -received novolog bolus at appt and was advised to come to ED for eval as glucose did not improve  -insulin pump now removed in ED, glucose improving to 200's.  -endocrine called, follow up recs   -pt also ?allergic to lantus, unclear if lantus was administered at outpt office or other insulin formulation  -12U levemir ordered qHS in interim  -final insulin regimen/pump per endo.
-pt with history of diabetes type 1, on insulin pump  -saw outpt endocrinologist today as pt. felt weak, found to have hyperglycemia to 400's  -[facility] and was advised to come to ED for eval as glucose did not improve  -insulin pump now removed in ED, glucose improving to 200's.  -endocrine called, follow up recs   -pt also ?allergic to lantus, unclear if lantus was administered at outpt office or other insulin formulation  -12U levemir ordered qHS in interim, meds adjusted by endo.   -final insulin regimen/pump per endo.
-pt with history of diabetes type 1, on insulin pump  -saw outpt endocrinologist today as pt. felt weak, found to have hyperglycemia to 400's  -[facility] and was advised to come to ED for eval as glucose did not improve  -insulin pump now removed in ED, glucose improving to 200's.  -endocrine called, follow up recs   -pt also ?allergic to lantus, unclear if lantus was administered at outpt office or other insulin formulation  -12U levemir ordered qHS in interim, meds adjusted by endo.   -final insulin regimen/pump per endo.
-pt with history of diabetes type 1, on insulin pump  -saw outpt endocrinologist today as pt. felt weak, found to have hyperglycemia to 400's  -received novolog bolus at appt and was advised to come to ED for eval as glucose did not improve  -insulin pump now removed in ED, glucose improving to 200's.  -endocrine called, follow up recs   -pt also ?allergic to lantus, unclear if lantus was administered at outpt office or other insulin formulation  -12U levemir ordered qHS in interim  -final insulin regimen/pump per endo.
-pt with history of diabetes type 1, on insulin pump  -saw outpt endocrinologist today as pt. felt weak, found to have hyperglycemia to 400's  -[facility] and was advised to come to ED for eval as glucose did not improve  -insulin pump now removed in ED, glucose improving to 200's.  -endocrine called, follow up recs   -pt also ?allergic to lantus, unclear if lantus was administered at outpt office or other insulin formulation  -12U levemir ordered qHS in interim, meds adjusted by endo.   -final insulin regimen/pump per endo.
-pt with history of diabetes type 1, on insulin pump  -saw outpt endocrinologist today as pt. felt weak, found to have hyperglycemia to 400's  [facility] and was advised to come to ED for eval as glucose did not improve  -insulin pump now removed in ED, glucose improving to 200's.  -endocrine called, follow up recs   -pt also ?allergic to lantus, unclear if lantus was administered at outpt office or other insulin formulation  -12U levemir ordered qHS in interim, meds adjusted by endo.   -final insulin regimen/pump per endo.

## 2025-01-24 NOTE — PROGRESS NOTE ADULT - NSPROGADDITIONALINFOA_GEN_ALL_CORE
d/w ROMEO Ramirez.  PT: rehab.
PT: rehab.  initially refusing but now agree to go to rehab.  CM/SW follow up for rehab placement.   dc planning.   pending insurance auth for rehab approval, entire week.     d/w NP.     --- Coverage for Dr. Salomon ---  - Dr. HUMERA Haji
Dr Haji will be covering me from Friday 01/17 till Friday 01/24
d/w ROMEO Dewitt.  PT: rehab.  initially refusing but now agree to go to rehab.  CM/SW follow up for rehab placement.     --- Coverage for Dr. Salomon ---  - Dr. HUMERA Haji
PT: rehab.  initially refusing but now agree to go to rehab.  CM/SW follow up for rehab placement.     soft BP, gentle IVF   on Losartan.     --- Coverage for Dr. Salomon ---  - Dr. HUMERA Haji
PT: rehab.  initially refusing but now agree to go to rehab.  CM/SW follow up for rehab placement.   dc planning.   pending insurance auth.     --- Coverage for Dr. Salomon ---  - Dr. HUMERA Haji
d/w ROMEO Christensen.   PT: rehab.    report right lower abd discomfort  IV Tylenol ordered.  not associated with food.   already have hx of cholecystectomy and appendectomy in the past.  contemplating rehab vs. home. said she may be agreeable.   if persistent abd pain, CT abd.     --- Coverage for Dr. Salomon ---  - Dr. HUMERA Haji
Dr. Salomon will be back on 1/25/25.     d/w NP.     --- Coverage for Dr. Salomon ---  - Dr. HUMERA Haji
PT: rehab.  initially refusing but now agree to go to rehab.  CM/SW follow up for rehab placement.   dc planning.   pending insurance auth.     --- Coverage for Dr. Salomon ---  - Dr. HUMERA Haji

## 2025-01-24 NOTE — DISCHARGE NOTE PROVIDER - CARE PROVIDER_API CALL
Gabby Daniels  Internal Medicine  [address] [place] [address] [place] [address]  Phone: [phone]  Fax: [phone]  Follow Up Time:

## 2025-01-24 NOTE — PROGRESS NOTE ADULT - PROBLEM SELECTOR PLAN 4
-c/w aspirin, statin.

## 2025-01-24 NOTE — DISCHARGE NOTE PROVIDER - NSDCQMAMI_CARD_ALL_CORE
[facility] office notified of dog bite. Given patients phone number and step ciro Tracy's phone number for contact number.   
No

## 2025-01-24 NOTE — PROGRESS NOTE ADULT - PROBLEM SELECTOR PLAN 6
DVT and Gi PPX
DVT and Gi PPX    right lower abd discomfort/ midepi discomfort  likely due to gas. resolved today.   already have hx of cholecystectomy and appendectomy in the past.  conservative management.
DVT and Gi PPX    Dispo: PT: rehab. initially refusing but now agree to go to rehab.  CM/SW follow up for rehab placement.    pending insurance auth entire this week.
DVT and Gi PPX    right lower abd discomfort/ midepi discomfort  likely due to gas. resolved today.   already have hx of cholecystectomy and appendectomy in the past.  conservative management.
DVT and Gi PPX

## 2025-01-24 NOTE — DISCHARGE NOTE NURSING/CASE MANAGEMENT/SOCIAL WORK - PATIENT PORTAL LINK FT
You can access the FollowMyHealth Patient Portal offered by [facility] by registering at the following website: [email or web address]. By joining [facility] FollowMyHealth portal, you will also be able to view your health information using other applications (apps) compatible with our system.

## 2025-01-24 NOTE — DISCHARGE NOTE PROVIDER - NSFOLLOWUPCLINICS_GEN_ALL_ED_FT
[facility] Endocrinology  Endocrinology  [address] [place] [address]  Phone: [phone]  Fax:

## 2025-01-24 NOTE — DISCHARGE NOTE NURSING/CASE MANAGEMENT/SOCIAL WORK - NSDCPEFALRISK_GEN_ALL_CORE
For information on Fall & Injury Prevention, visit: [email or web address] OR  [email or web address] OR  https://www.cdc.gov/steadi/patient.html

## 2025-01-24 NOTE — DISCHARGE NOTE PROVIDER - NSDCCPCAREPLAN_GEN_ALL_CORE_FT
PRINCIPAL DISCHARGE DIAGNOSIS  Diagnosis: History of recent hospitalization  Assessment and Plan of Treatment: Urinary tract infection.   -completed a course of ceftriaxone IV.   -blood cultures negative    Hypertension.    -c/w home HCTZ and losartan.  Hyperlipidemia.   ·-c/w aspirin, statin.  H/O colon cancer, stage II.   -previous hx colon cancer s/p resection and chemotherapy completed in July 2024  -pt still with port in place, needs port to be flushed.  follow up with your PCP call for appointment   follow up cardiology at your scheduled appointment         SECONDARY DISCHARGE DIAGNOSES  Diagnosis: Type 1 diabetes mellitus with hyperglycemia  Assessment and Plan of Treatment: - Patient has 770G insulin pump. Patient was instructed on the difference with the 780G and that it would be beneficial to have the [employer] CGM so her pump can be a closed loop. A “closed loop insulin pump” was also explained to the patient. Patient currently uses a Angely 2 CGM, and it was explained to patient that sometimes she can miss information if she goes longer than 8 hrs without scanning the device. If patient does not want to get the Medtronic CGM, then at least get a Angely 3 CGM so the information will go right to her reader. Pt also instructed at her next endocrine follow up with insulin pump representative; pt can also consider changing to islet insulin pump; pt will speak to her endocrinologist post discharge  -endocrine - - Incase of pump failure would give Levemir or Tresiba (pt has lantus allergy)  42 units stat and Add Admelog 8 unit TID AC (please hold if npo/not eating)   -- unclear if T1 vs T2 DM: would recommend pt check type 1 DM ab as an outpt MELISSA, [employer] transporter ab, IA2-2 with outpt endocrinologist   follow up with your endocrinologist please call for appointment

## 2025-01-24 NOTE — PROGRESS NOTE ADULT - PROBLEM SELECTOR PROBLEM 3
Hyperlipidemia
Hypertension
Hyperlipidemia
Hypertension
Hyperlipidemia
Hypertension
Hyperlipidemia
Hyperlipidemia
Hypertension
Hypertension
Hyperlipidemia
Hyperlipidemia
Hypertension

## 2025-01-24 NOTE — PROGRESS NOTE ADULT - SUBJECTIVE AND OBJECTIVE BOX
Chief Complaint: Type 1 VS Type 2 DM    History: Saw patient at bedside, denies complaints. Changed pump site yesterday 1/24, awaiting transfer to rehab.     MEDICATIONS  (STANDING):  aspirin  chewable 81 milliGRAM(s) Oral daily  atorvastatin 10 milliGRAM(s) Oral at bedtime  dextrose 5%. 1000 milliLiter(s) (50 mL/Hr) IV Continuous <Continuous>  dextrose 50% Injectable 25 Gram(s) IV Push once  enoxaparin Injectable 40 milliGRAM(s) SubCutaneous every 24 hours  gabapentin 100 milliGRAM(s) Oral daily  glucagon  Injectable 1 milliGRAM(s) IntraMuscular once  insulin aspart (NovoLOG) Pump 1 Each SubCutaneous Continuous Pump  losartan 25 milliGRAM(s) Oral daily  mirtazapine 30 milliGRAM(s) Oral at bedtime  pantoprazole    Tablet 40 milliGRAM(s) Oral before breakfast  sodium chloride 0.9%. 1000 milliLiter(s) (75 mL/Hr) IV Continuous <Continuous>  sodium chloride 0.9%. 1000 milliLiter(s) (75 mL/Hr) IV Continuous <Continuous>        Allergies: Percocet 10/325 (Other; Urticaria)  Lantus (Swelling)  codeine (Other)  Darvocet A500 (Other; Urticaria)  PURELL.  pt said, " felt my airway closing" after she smelled the Purell. The incident occurred at the pulmonologist office. (Other; Short breath)        Review of Systems:  Respiratory: No SOB, no cough  GI: No nausea, vomiting, abdominal pain  Endocrine: no polyuria, polydipsia    PHYSICAL EXAM:  Vital Signs Last 24 Hrs  T(C): 36.6 (24 Jan 2025 06:40), Max: 36.6 (23 Jan 2025 21:00)  T(F): 97.9 (24 Jan 2025 06:40), Max: 97.9 (24 Jan 2025 06:40)  HR: 74 (24 Jan 2025 06:40) (74 - 88)  BP: 115/60 (24 Jan 2025 06:40) (115/60 - 130/60)  BP(mean): --  RR: 17 (24 Jan 2025 06:40) (17 - 18)  SpO2: 97% (24 Jan 2025 06:40) (97% - 98%)    Parameters below as of 24 Jan 2025 06:40  Patient On (Oxygen Delivery Method): room air      GENERAL: NAD, well-groomed  RESPIRATORY: No labored breathing   GI: Soft, nontender, non distended  SKIN: Pump site, ABD, intact, no erythema   PSYCH: Alert and oriented x 3, normal affect, normal mood    CAPILLARY BLOOD GLUCOSE    POCT Blood Glucose.: 113 mg/dL (24 Jan 2025 11:49)  POCT Blood Glucose.: 128 mg/dL (24 Jan 2025 08:07)  POCT Blood Glucose.: 137 mg/dL (23 Jan 2025 22:12)  POCT Blood Glucose.: 214 mg/dL (23 Jan 2025 17:05)      A1C with Estimated Average Glucose (01.14.25 @ 19:06)    A1C with Estimated Average Glucose Result: 8.5   Estimated Average Glucose: 197    01-24    141  |  106  |  9   ----------------------------<  128[H]  3.9   |  27  |  0.66    Ca    9.1      24 Jan 2025 07:05  Phos  3.4     01-24  Mg     1.80     01-24      Diet, Consistent Carbohydrate w/Evening Snack (01-19-25 @ 16:59)

## 2025-01-24 NOTE — PROGRESS NOTE ADULT - PROVIDER SPECIALTY LIST ADULT
Cardiology
Endocrinology
Internal Medicine
Cardiology
Endocrinology
Internal Medicine
Endocrinology
Internal Medicine
Endocrinology
Endocrinology
Internal Medicine

## 2025-01-24 NOTE — PROGRESS NOTE ADULT - PROBLEM SELECTOR PLAN 2
-pt with positive UA, UCx contaminant.   -completed a course of ceftriaxone IV.   -no dysuria, no urinary urgency/frequency. no bowel/bladder incontinence.   -blood cultures neg
-pt with positive UA, UCx pending  -f/u UCx results and speciation  -if febrile give tylenol and obtain BCx  -c/w ceftriaxone daily.
-pt with positive UA, UCx contaminant.   -c/w ceftriaxone daily.  -no dysuria, no urinary urgency/frequency. no bowel/bladder incontinence.   -blood cultures neg
-pt with positive UA, UCx contaminant.   -completed a course of ceftriaxone IV.   -no dysuria, no urinary urgency/frequency. no bowel/bladder incontinence.   -blood cultures neg
-pt with positive UA, UCx pending  -f/u UCx results and speciation  -if febrile give tylenol and obtain BCx  -c/w ceftriaxone daily.
-pt with positive UA, UCx contaminant.   -completed a course of ceftriaxone IV.   -no dysuria, no urinary urgency/frequency. no bowel/bladder incontinence.   -blood cultures neg
-pt with positive UA, UCx contaminant.   -c/w ceftriaxone daily.  -no dysuria, no urinary urgency/frequency. no bowel/bladder incontinence.   -blood cultures neg
-pt with positive UA, UCx contaminant.   -completed a course of ceftriaxone IV.   -no dysuria, no urinary urgency/frequency. no bowel/bladder incontinence.   -blood cultures neg

## 2025-01-24 NOTE — PROGRESS NOTE ADULT - PROBLEM SELECTOR PROBLEM 4
Hyperlipidemia
Insulin pump in place
Hyperlipidemia
Insulin pump in place
Hyperlipidemia
Insulin pump in place
Hyperlipidemia

## 2025-01-24 NOTE — PROGRESS NOTE ADULT - REASON FOR ADMISSION
Hyperglycemia

## 2025-01-24 NOTE — PROGRESS NOTE ADULT - PROBLEM SELECTOR PROBLEM 1
Type 1 diabetes mellitus with hyperglycemia

## 2025-01-24 NOTE — DISCHARGE NOTE PROVIDER - HOSPITAL COURSE
74F with history of type 1 diabetes poorly controlled on insulin pump, colorectal cancer in remission presented to the emergency, presented by endocrinology office for concerns for hyperglycemia.        Problem/Plan - 1:  ·  Problem: Type 1 diabetes mellitus with hyperglycemia.   ·  Plan: -pt with history of diabetes type 1, on insulin pump  -saw outpt endocrinologist today as pt. felt weak, found to have hyperglycemia to 400's  -received novolog bolus at [facility] and was advised to come to ED for eval as glucose did not improve  -insulin pump now removed in ED, glucose improving to 200's.  -endo consulted.   -pt also ?allergic to lantus, unclear if lantus was administered at outpt office or other insulin formulation  -12U levemir ordered qHS in interim, meds adjusted by endo.   -final insulin regimen/pump per endo.     Problem/Plan - 2:  ·  Problem: Urinary tract infection.   ·  Plan: -pt with positive UA, UCx contaminant.   -completed a course of ceftriaxone IV.   -no dysuria, no urinary urgency/frequency. no bowel/bladder incontinence.   -blood cultures neg.     Problem/Plan - 3:  ·  Problem: Hypertension.   ·  Plan: -c/w home HCTZ and losartan.     Problem/Plan - 4:  ·  Problem: Hyperlipidemia.   ·  Plan: -c/w aspirin, statin.     Problem/Plan - 5:  ·  Problem: H/O colon cancer, stage II.   ·  Plan: -previous hx colon cancer s/p resection and chemotherapy completed in July 2024  -pt still with port in place, needs port to be flushed.     Problem/Plan - 6:  ·  Problem: Preventive measure.   ·  Plan: DVT and Gi PPX    Dispo: PT: rehab. initially refusing but now agree to go to rehab.  CM/SW follow up for rehab placement.    pending insurance auth entire this week.            74F with history of type 1 diabetes poorly controlled on insulin pump, colorectal cancer in remission presented to the emergency, presented by endocrinology office for concerns for hyperglycemia.        Problem/Plan - 1:  ·  Problem: Type 1 diabetes mellitus with hyperglycemia.   ·  Plan: -pt with history of diabetes type 1, on insulin pump  -saw outpt endocrinologist today as pt. felt weak, found to have hyperglycemia to 400's  -received novolog bolus at [facility] and was advised to come to ED for eval as glucose did not improve  -insulin pump now removed in ED, glucose improving to 200's.  -endo consulted.   -pt also ?allergic to lantus, unclear if lantus was administered at outpt office or other insulin formulation  -12U levemir ordered qHS in interim, meds adjusted by endo.   now back  on insulin pump     Problem/Plan - 2:  ·  Problem: Urinary tract infection.   ·  Plan: -pt with positive UA, UCx contaminant.   -completed a course of ceftriaxone IV.   -no dysuria, no urinary urgency/frequency. no bowel/bladder incontinence.   -blood cultures neg.     Problem/Plan - 3:  ·  Problem: Hypertension.   ·  Plan: -c/w home HCTZ and losartan.     Problem/Plan - 4:  ·  Problem: Hyperlipidemia.   ·  Plan: -c/w aspirin, statin.     Problem/Plan - 5:  ·  Problem: H/O colon cancer, stage II.   ·  Plan: -previous hx colon cancer s/p resection and chemotherapy completed in July 2024  -pt still with port in place, needs port to be flushed.     Problem/Plan - 6:  ·  Problem: Preventive measure.   ·  Plan: DVT and Gi PPX    Dispo: PT: rehab.

## 2025-01-24 NOTE — PROGRESS NOTE ADULT - SUBJECTIVE AND OBJECTIVE BOX
SUBJECTIVE/ OVERNIGHT EVENTS:  --- Coverage for Dr. Salomon ---  awaiting insurance auth still pending  no cp, no sob, no n/v/d. no abdominal pain.  no headache, no dizziness.   BP stable.     --------------------------------------------------------------------------------------------  LABS:                        12.0   9.07  )-----------( 276      ( 24 Jan 2025 07:05 )             38.4     01-24    141  |  106  |  9   ----------------------------<  128[H]  3.9   |  27  |  0.66    Ca    9.1      24 Jan 2025 07:05  Phos  3.4     01-24  Mg     1.80     01-24        CAPILLARY BLOOD GLUCOSE      POCT Blood Glucose.: 128 mg/dL (24 Jan 2025 08:07)  POCT Blood Glucose.: 137 mg/dL (23 Jan 2025 22:12)  POCT Blood Glucose.: 214 mg/dL (23 Jan 2025 17:05)  POCT Blood Glucose.: 172 mg/dL (23 Jan 2025 12:26)        Urinalysis Basic - ( 24 Jan 2025 07:05 )    Color: x / Appearance: x / SG: x / pH: x  Gluc: 128 mg/dL / Ketone: x  / Bili: x / Urobili: x   Blood: x / Protein: x / Nitrite: x   Leuk Esterase: x / RBC: x / WBC x   Sq Epi: x / Non Sq Epi: x / Bacteria: x        RADIOLOGY & ADDITIONAL TESTS:    Imaging Personally Reviewed:  [x] YES  [ ] NO    Consultant(s) Notes Reviewed:  [x] YES  [ ] NO    MEDICATIONS  (STANDING):  aspirin  chewable 81 milliGRAM(s) Oral daily  atorvastatin 10 milliGRAM(s) Oral at bedtime  dextrose 5%. 1000 milliLiter(s) (50 mL/Hr) IV Continuous <Continuous>  dextrose 50% Injectable 25 Gram(s) IV Push once  enoxaparin Injectable 40 milliGRAM(s) SubCutaneous every 24 hours  gabapentin 100 milliGRAM(s) Oral daily  glucagon  Injectable 1 milliGRAM(s) IntraMuscular once  insulin aspart (NovoLOG) Pump 1 Each SubCutaneous Continuous Pump  losartan 25 milliGRAM(s) Oral daily  mirtazapine 30 milliGRAM(s) Oral at bedtime  pantoprazole    Tablet 40 milliGRAM(s) Oral before breakfast  sodium chloride 0.9%. 1000 milliLiter(s) (75 mL/Hr) IV Continuous <Continuous>  sodium chloride 0.9%. 1000 milliLiter(s) (75 mL/Hr) IV Continuous <Continuous>    MEDICATIONS  (PRN):  acetaminophen     Tablet .. 650 milliGRAM(s) Oral every 6 hours PRN Mild Pain (1 - 3), Moderate Pain (4 - 6), Severe Pain (7 - 10)  aluminum hydroxide/magnesium hydroxide/simethicone Suspension 30 milliLiter(s) Oral every 4 hours PRN Dyspepsia  dextrose Oral Gel 15 Gram(s) Oral once PRN Blood Glucose LESS THAN 70 milliGRAM(s)/deciliter  melatonin 3 milliGRAM(s) Oral at bedtime PRN Insomnia  ondansetron Injectable 4 milliGRAM(s) IV Push every 8 hours PRN Nausea and/or Vomiting      Care Discussed with Consultants/Other Providers [x] YES  [ ] NO    Vital Signs Last 24 Hrs  T(C): 36.6 (24 Jan 2025 06:40), Max: 36.6 (23 Jan 2025 12:10)  T(F): 97.9 (24 Jan 2025 06:40), Max: 97.9 (23 Jan 2025 12:10)  HR: 74 (24 Jan 2025 06:40) (74 - 88)  BP: 115/60 (24 Jan 2025 06:40) (101/61 - 130/60)  BP(mean): --  RR: 17 (24 Jan 2025 06:40) (17 - 18)  SpO2: 97% (24 Jan 2025 06:40) (97% - 98%)    Parameters below as of 24 Jan 2025 06:40  Patient On (Oxygen Delivery Method): room air      I&O's Summary    23 Jan 2025 07:01  -  24 Jan 2025 07:00  --------------------------------------------------------  IN: 200 mL / OUT: 900 mL / NET: -700 mL      PHYSICAL EXAM:  GENERAL: NAD, well-developed, comfortable on room air  HEAD:  Atraumatic, Normocephalic  EYES: EOMI, PERRLA, conjunctiva and sclera clear  NECK: Supple, No JVD  CHEST/LUNG: Clear to auscultation bilaterally; No wheeze  HEART: Regular rate and rhythm; No murmurs, rubs, or gallops  ABDOMEN: Soft, Nontender, Nondistended; Bowel sounds present  Neuro: AAOx3, no focal weakness, 5/5 b/l extremity strength  EXTREMITIES:  2+ Peripheral Pulses, No clubbing, cyanosis, or edema  SKIN: No rashes or lesions

## 2025-01-24 NOTE — PROGRESS NOTE ADULT - ASSESSMENT
The patient is a 74y Female with PMH of T1DM on an insulin pump, colon CA s/p surgery/chemo p/w hyperglycemia. Endocrinology consulted for type 1 diabetes on an insulin pump.    #Type 1 vs Type 2 DM   #Use of insulin pump  - Follows with: Dr. Ledezma  - A1C with Estimated Average Glucose Result: 8.5 % (01-14-25)  -eGFR: 93 mL/min/1.73m2 (01-24-25 @ 07:05)  - Weight (kg): 79.4 (01-14-25)  - home regimen: These settings were recently changed by Endocrinologist Last week  Insulin pump type: Medtronic   Insulin type: Novolog  Last site change: 1/24  Settings:  Basal rate ( TBD 51.525)  12a-6: 30a 1.75 unit/hr  6:30a-12:30p 1.95 units/hr  12:30p-6:30p 2.45 units/hr  6:30p-12a 2.5 units/hr  ISF  12a-12a 50---->25 on 1/17----> 50  I:C ratio  12a-12a 1:12  Target   Active insulin time 4 hrs      INPATIENT PLAN:  - Inpatient glucose goals: 100-180: at goal   - Continue with insulin pump as currently ordered   - Pt on insulin pump with Novolog insulin --> settings above   - All insulin should be via insulin pump   - Incase of pump failure would give Levemir or Tresiba (both available inpt) (pt has lantus allergy)  42 units stat and Add Admelog 8 unit TID AC (please hold if npo/not eating)   - Please check FSG before meals and QHS, or q6h while NPO  - consistent carb diet when eating  - FS before meals and at bedtime   - RD consult in able --> ordered  - Hypoglycemia Protocol   - unclear if T1 vs T2 DM given no DKA despite lack of basal insulin x24 hours on admission and patient habitus   - c-peptide 1.9 with serum glucose 313; repeat c-peptide 2.3 and serum glucose 253 (pt producing endogenous insulin   - DM educator following---> please refer to note       DISCHARGE PLANNING:  - As per primary team pt is going to rehab     - unclear if T1 vs T2 DM: would recommend pt check type 1 DM ab as an outpt MELISSA, [employer] transporter ab, IA2-2 with outpt endocrinologist     - Pt wants to continue using her insulin pump; will continue to assess pts ability to use insulin pump while inpatient    - dc on insulin pump; settings may need to be adjusted based on glucose trend    - She states she never has hyperglycemia but that is not reflected by her A1c.     - Patient has 770G insulin pump. Patient was instructed on the difference with the 780G and that it would be beneficial to have the Medtronic CGM so her pump can be a closed loop. A “closed loop insulin pump” was also explained to the patient. Patient currently uses a Angely 2 CGM, and it was explained to patient that sometimes she can miss information if she goes longer than 8 hrs without scanning the device. If patient does not want to get the Medtronic CGM, then at least get a Angely 3 CGM so the information will go right to her reader. Pt also instructed at her next endocrine follow up with insulin pump representative; pt can also consider changing to islet insulin pump; pt will speak to her endocrinologist post discharge    - For now continue Freestyle angely 2 sensor and reader    - Pt needs insulin pen both basal at home incase of insulin pump failure; please check if below insulin is covered by pts insurance     - Please send tresiba/basaglar/toujeo/Levemir pen and humalog kwikpen as test script to check insurance coverage.  Ok to send with current doses and update prior to d/c    If Humalog not covered- can try alternating with one of following  novolog/apidra/admelog/fiasp    - Ensure patient has working glucometer, test strips, lancets, alcohol pads, and BD sofia pen needles    - For severe hypoglycemia: Please prescribe Glucagon Emergency Kit for Low Blood Sugar 1 mG injection: 1 mG intramuscularly as needed for severe hypoglycemia. Glucose tablets 4G (take 4 tablets) or 15G tablets for blood sugar less than 70 mG/dL repeat fingerstick in 15 minutes.     - patient should followup with their endocrinologist, Dr. Ledezma recently saw Dr. Ledezma last week; advised pt to make an appointment upon discharge     - Please make sure pt has insulin, cgm, and insulin pen, and insulin pump supplies prior to discharge    #Hypertension  - Goal BP <130/80  - Management as per primary team  - check urine microalbumin level as outpatient    #Hyperlipidemia  - LDL goal <70  - Last LDL: 20 mg/dL (11-29-23)  - check lipid panel as outpatient on a yearly basis    GERBER Alvares-BC  Nurse Practitioner   Division of Endocrinology  Pager: ANGELA pager [phone]  Teams: Tking     If out of hospital/unavailable when paged/messaged, please note: patient will be cared for by another provider on the endocrine service.  For urgent concerns: call the endocrine answering service for assistance to reach covering provider ([phone]). For non-urgent matters: please email [email or web address] for assistance.

## 2025-01-24 NOTE — PROGRESS NOTE ADULT - PROBLEM SELECTOR PROBLEM 5
H/O colon cancer, stage II

## 2025-01-24 NOTE — PROGRESS NOTE ADULT - PROBLEM SELECTOR PROBLEM 2
Hypertension
Urinary tract infection
Hypertension
Urinary tract infection
Urinary tract infection
Hypertension
Urinary tract infection
Hypertension
Hypertension
Urinary tract infection
Hypertension
Urinary tract infection

## 2025-01-24 NOTE — DISCHARGE NOTE PROVIDER - NSDCFUADDAPPT_GEN_ALL_CORE_FT
F/U with Dr Santiago as scheduled after rehab, [phone]      follow up with your endocrinologist call for appointment   F/U with Dr Santiago as scheduled after rehab, [phone]      follow up with your endocrinologist call for appointment   - Follows with: Dr. Ledezma  - A1C with Estimated Average Glucose Result: 8.5 % (01-14-25)  -eGFR: 93 mL/min/1.73m2 (01-24-25 @ 07:05)  - Weight (kg): 79.4 (01-14-25)  - home regimen: These settings were recently changed by Endocrinologist Last week  Insulin pump type: Medtronic   Insulin type: Novolog  Last site change: 1/24  Settings:  Basal rate ( TBD 51.525)  12a-6: 30a 1.75 unit/hr  6:30a-12:30p 1.95 units/hr  12:30p-6:30p 2.45 units/hr  6:30p-12a 2.5 units/hr  ISF  12a-12a 50---->25 on 1/17----> 50  I:C ratio  12a-12a 1:12  Target   Active insulin time 4 hrs

## 2025-01-24 NOTE — PROGRESS NOTE ADULT - PROBLEM SELECTOR PLAN 3
-c/w home HCTZ and losartan

## 2025-01-24 NOTE — DISCHARGE NOTE PROVIDER - NSDCMRMEDTOKEN_GEN_ALL_CORE_FT
Albuterol (Eqv-ProAir HFA) 90 mcg/inh inhalation aerosol: 2 puff(s) inhaled 2 times a day  alcohol swabs : Apply topically to affected area 4 times a day  aspirin 81 mg oral tablet: 1 tab(s) orally once a day  gabapentin 100 mg oral tablet: orally  hydroCHLOROthiazide 12.5 mg oral tablet: 1 tab(s) orally once a day  Insulin pump novolog:   lancets: 1 application subcutaneously 4 times a day  losartan 25 mg oral tablet: 1 tab(s) orally once a day  mirtazapine 30 mg oral tablet: 1 tab(s) orally once a day (at bedtime)  omeprazole 20 mg oral delayed release capsule: 1 cap(s) orally once a day  pravastatin 40 mg oral tablet: 1 tab(s) orally once a day (at bedtime)   Albuterol (Eqv-ProAir HFA) 90 mcg/inh inhalation aerosol: 2 puff(s) inhaled 2 times a day  aspirin 81 mg oral tablet: 1 tab(s) orally once a day  gabapentin 100 mg oral tablet: orally  hydroCHLOROthiazide 12.5 mg oral tablet: 1 tab(s) orally once a day  Insulin Aspart: 1 unit(s) subcutaneous home pump  losartan 25 mg oral tablet: 1 tab(s) orally once a day  mirtazapine 30 mg oral tablet: 1 tab(s) orally once a day (at bedtime)  omeprazole 20 mg oral delayed release capsule: 1 cap(s) orally once a day  pravastatin 40 mg oral tablet: 1 tab(s) orally once a day (at bedtime)   2020

## 2025-01-24 NOTE — DISCHARGE NOTE NURSING/CASE MANAGEMENT/SOCIAL WORK - FINANCIAL ASSISTANCE
[facility] provides services at a reduced cost to those who are determined to be eligible through [facility]’s financial assistance program. Information regarding [facility]’s financial assistance program can be found by going to [email or web address] or by calling [phone].

## 2025-01-24 NOTE — DISCHARGE NOTE PROVIDER - NSDCFUSCHEDAPPT_GEN_ALL_CORE_FT
Adam Nichols  [facility]  S[facility] [address]  Scheduled Appointment: 04/08/2025

## 2025-02-11 NOTE — DISCHARGE NOTE PROVIDER - NSDCFUADDAPPT_GEN_ALL_CORE_FT
Trulicity Pending  Insurance response  Prescription Drug Insurance: [employer]  Notes: Prior authorization submitted - will update provider when decision has been made by insurance.   Please follow up with your primary care provider 1-2 weeks after discharge regarding recent hospitalization.  Please follow up with your primary care provider within 1 week of discharge to restart your blood pressure medications which was held.

## 2025-02-28 NOTE — ED PROVIDER NOTE - ATTENDING WITH...
Resident Cellcept Pregnancy And Lactation Text: This medication is Pregnancy Category D and isn't considered safe during pregnancy. It is unknown if this medication is excreted in breast milk. Carac Pregnancy And Lactation Text: This medication is Pregnancy Category X and contraindicated in pregnancy and in women who may become pregnant. It is unknown if this medication is excreted in breast milk. Opzelura Counseling:  I discussed with the patient the risks of Opzelura including but not limited to nasopharngitis, bronchitis, ear infection, eosinophila, hives, diarrhea, folliculitis, tonsillitis, and rhinorrhea.  Taken orally, this medication has been linked to serious infections; higher rate of mortality; malignancy and lymphoproliferative disorders; major adverse cardiovascular events; thrombosis; thrombocytopenia, anemia, and neutropenia; and lipid elevations. Hydroxychloroquine Pregnancy And Lactation Text: This medication has been shown to cause fetal harm but it isn't assigned a Pregnancy Risk Category. There are small amounts excreted in breast milk. Azithromycin Counseling:  I discussed with the patient the risks of azithromycin including but not limited to GI upset, allergic reaction, drug rash, diarrhea, and yeast infections. Finasteride Female Counseling: Finasteride Counseling:  I discussed with the patient the risks of use of finasteride including but not limited to decreased libido and sexual dysfunction. Explained the teratogenic nature of the medication and stressed the importance of not getting pregnant during treatment. All of the patient's questions and concerns were addressed. Dapsone Counseling: I discussed with the patient the risks of dapsone including but not limited to hemolytic anemia, agranulocytosis, rashes, methemoglobinemia, kidney failure, peripheral neuropathy, headaches, GI upset, and liver toxicity.  Patients who start dapsone require monitoring including baseline LFTs and weekly CBCs for the first month, then every month thereafter.  The patient verbalized understanding of the proper use and possible adverse effects of dapsone.  All of the patient's questions and concerns were addressed. Sotyktu Pregnancy And Lactation Text: There is insufficient data to evaluate whether or not Sotyktu is safe to use during pregnancy.? ?It is not known if Sotyktu passes into breast milk and whether or not it is safe to use when breastfeeding.?? Sarecycline Pregnancy And Lactation Text: This medication is Pregnancy Category D and not consider safe during pregnancy. It is also excreted in breast milk. Topical Clindamycin Pregnancy And Lactation Text: This medication is Pregnancy Category B and is considered safe during pregnancy. It is unknown if it is excreted in breast milk. Ketoconazole Counseling:   Patient counseled regarding improving absorption with orange juice.  Adverse effects include but are not limited to breast enlargement, headache, diarrhea, nausea, upset stomach, liver function test abnormalities, taste disturbance, and stomach pain.  There is a rare possibility of liver failure that can occur when taking ketoconazole. The patient understands that monitoring of LFTs may be required, especially at baseline. The patient verbalized understanding of the proper use and possible adverse effects of ketoconazole.  All of the patient's questions and concerns were addressed. High Dose Vitamin A Counseling: Side effects reviewed, pt to contact office should one occur. Siliq Counseling:  I discussed with the patient the risks of Siliq including but not limited to new or worsening depression, suicidal thoughts and behavior, immunosuppression, malignancy, posterior leukoencephalopathy syndrome, and serious infections.  The patient understands that monitoring is required including a PPD at baseline and must alert us or the primary physician if symptoms of infection or other concerning signs are noted. There is also a special program designed to monitor depression which is required with Siliq. Hydroxyzine Pregnancy And Lactation Text: This medication is not safe during pregnancy and should not be taken. It is also excreted in breast milk and breast feeding isn't recommended. Hydroquinone Pregnancy And Lactation Text: This medication has not been assigned a Pregnancy Risk Category but animal studies failed to show danger with the topical medication. It is unknown if the medication is excreted in breast milk. Thalidomide Counseling: I discussed with the patient the risks of thalidomide including but not limited to birth defects, anxiety, weakness, chest pain, dizziness, cough and severe allergy. Cibinqo Counseling: I discussed with the patient the risks of Cibinqo therapy including but not limited to common cold, nausea, headache, cold sores, increased blood CPK levels, dizziness, UTIs, fatigue, acne, and vomitting. Live vaccines should be avoided.  This medication has been linked to serious infections; higher rate of mortality; malignancy and lymphoproliferative disorders; major adverse cardiovascular events; thrombosis; thrombocytopenia and lymphopenia; lipid elevations; and retinal detachment. Solaraze Counseling:  I discussed with the patient the risks of Solaraze including but not limited to erythema, scaling, itching, weeping, crusting, and pain. Humira Pregnancy And Lactation Text: This medication is Pregnancy Category B and is considered safe during pregnancy. It is unknown if this medication is excreted in breast milk. Oral Minoxidil Pregnancy And Lactation Text: This medication should only be used when clearly needed if you are pregnant, attempting to become pregnant or breast feeding. Rifampin Pregnancy And Lactation Text: This medication is Pregnancy Category C and it isn't know if it is safe during pregnancy. It is also excreted in breast milk and should not be used if you are breast feeding. Tremfya Counseling: I discussed with the patient the risks of guselkumab including but not limited to immunosuppression, serious infections, and drug reactions.  The patient understands that monitoring is required including a PPD at baseline and must alert us or the primary physician if symptoms of infection or other concerning signs are noted. Finasteride Pregnancy And Lactation Text: This medication is absolutely contraindicated during pregnancy. It is unknown if it is excreted in breast milk. High Dose Vitamin A Pregnancy And Lactation Text: High dose vitamin A therapy is contraindicated during pregnancy and breast feeding. Calcipotriene Counseling:  I discussed with the patient the risks of calcipotriene including but not limited to erythema, scaling, itching, and irritation. Opioid Counseling: I discussed with the patient the potential side effects of opioids including but not limited to addiction, altered mental status, and depression. I stressed avoiding alcohol, benzodiazepines, muscle relaxants and sleep aids unless specifically okayed by a physician. The patient verbalized understanding of the proper use and possible adverse effects of opioids. All of the patient's questions and concerns were addressed. They were instructed to flush the remaining pills down the toilet if they did not need them for pain. Opzelura Pregnancy And Lactation Text: There is insufficient data to evaluate drug-associated risk for major birth defects, miscarriage, or other adverse maternal or fetal outcomes.  There is a pregnancy registry that monitors pregnancy outcomes in pregnant persons exposed to the medication during pregnancy.  It is unknown if this medication is excreted in breast milk.  Do not breastfeed during treatment and for about 4 weeks after the last dose. Cimzia Counseling:  I discussed with the patient the risks of Cimzia including but not limited to immunosuppression, allergic reactions and infections.  The patient understands that monitoring is required including a PPD at baseline and must alert us or the primary physician if symptoms of infection or other concerning signs are noted. Cyclophosphamide Counseling:  I discussed with the patient the risks of cyclophosphamide including but not limited to hair loss, hormonal abnormalities, decreased fertility, abdominal pain, diarrhea, nausea and vomiting, bone marrow suppression and infection. The patient understands that monitoring is required while taking this medication. Xeljanz Counseling: I discussed with the patient the risks of Xeljanz therapy including increased risk of infection, liver issues, headache, diarrhea, or cold symptoms. Live vaccines should be avoided. They were instructed to call if they have any problems. Erythromycin Counseling:  I discussed with the patient the risks of erythromycin including but not limited to GI upset, allergic reaction, drug rash, diarrhea, increase in liver enzymes, and yeast infections. Low Dose Naltrexone Counseling- I discussed with the patient the potential risks and side effects of low dose naltrexone including but not limited to: more vivid dreams, headaches, nausea, vomiting, abdominal pain, fatigue, dizziness, and anxiety. Ketoconazole Pregnancy And Lactation Text: This medication is Pregnancy Category C and it isn't know if it is safe during pregnancy. It is also excreted in breast milk and breast feeding isn't recommended. Topical Ketoconazole Counseling: Patient counseled that this medication may cause skin irritation or allergic reactions.  In the event of skin irritation, the patient was advised to reduce the amount of the drug applied or use it less frequently.   The patient verbalized understanding of the proper use and possible adverse effects of ketoconazole.  All of the patient's questions and concerns were addressed. Tetracycline Counseling: Patient counseled regarding possible photosensitivity and increased risk for sunburn.  Patient instructed to avoid sunlight, if possible.  When exposed to sunlight, patients should wear protective clothing, sunglasses, and sunscreen.  The patient was instructed to call the office immediately if the following severe adverse effects occur:  hearing changes, easy bruising/bleeding, severe headache, or vision changes.  The patient verbalized understanding of the proper use and possible adverse effects of tetracycline.  All of the patient's questions and concerns were addressed. Patient understands to avoid pregnancy while on therapy due to potential birth defects. Siliq Pregnancy And Lactation Text: The risk during pregnancy and breastfeeding is uncertain with this medication. Cibinqo Pregnancy And Lactation Text: It is unknown if this medication will adversely affect pregnancy or breast feeding.  You should not take this medication if you are currently pregnant or planning a pregnancy or while breastfeeding. Dapsone Pregnancy And Lactation Text: This medication is Pregnancy Category C and is not considered safe during pregnancy or breast feeding. Azithromycin Pregnancy And Lactation Text: This medication is considered safe during pregnancy and is also secreted in breast milk. Libtayo Pregnancy And Lactation Text: This medication is contraindicated in pregnancy and when breast feeding. Thalidomide Pregnancy And Lactation Text: This medication is Pregnancy Category X and is absolutely contraindicated during pregnancy. It is unknown if it is excreted in breast milk. Calcipotriene Pregnancy And Lactation Text: The use of this medication during pregnancy or lactation is not recommended as there is insufficient data. Imiquimod Counseling:  I discussed with the patient the risks of imiquimod including but not limited to erythema, scaling, itching, weeping, crusting, and pain.  Patient understands that the inflammatory response to imiquimod is variable from person to person and was educated regarded proper titration schedule.  If flu-like symptoms develop, patient knows to discontinue the medication and contact us. Hyrimoz Counseling:  I discussed with the patient the risks of adalimumab including but not limited to myelosuppression, immunosuppression, autoimmune hepatitis, demyelinating diseases, lymphoma, and serious infections.  The patient understands that monitoring is required including a PPD at baseline and must alert us or the primary physician if symptoms of infection or other concerning signs are noted. Solaraze Pregnancy And Lactation Text: This medication is Pregnancy Category B and is considered safe. There is some data to suggest avoiding during the third trimester. It is unknown if this medication is excreted in breast milk. Sarecycline Counseling: Patient advised regarding possible photosensitivity and discoloration of the teeth, skin, lips, tongue and gums.  Patient instructed to avoid sunlight, if possible.  When exposed to sunlight, patients should wear protective clothing, sunglasses, and sunscreen.  The patient was instructed to call the office immediately if the following severe adverse effects occur:  hearing changes, easy bruising/bleeding, severe headache, or vision changes.  The patient verbalized understanding of the proper use and possible adverse effects of sarecycline.  All of the patient's questions and concerns were addressed. Opioid Pregnancy And Lactation Text: These medications can lead to premature delivery and should be avoided during pregnancy. These medications are also present in breast milk in small amounts. Xelgabinoz Pregnancy And Lactation Text: This medication is Pregnancy Category D and is not considered safe during pregnancy.  The risk during breast feeding is also uncertain. Otezla Counseling: The side effects of Otezla were discussed with the patient, including but not limited to worsening or new depression, weight loss, diarrhea, nausea, upper respiratory tract infection, and headache. Patient instructed to call the office should any adverse effect occur.  The patient verbalized understanding of the proper use and possible adverse effects of Otezla.  All the patient's questions and concerns were addressed. Erythromycin Pregnancy And Lactation Text: This medication is Pregnancy Category B and is considered safe during pregnancy. It is also excreted in breast milk. Albendazole Counseling:  I discussed with the patient the risks of albendazole including but not limited to cytopenia, kidney damage, nausea/vomiting and severe allergy.  The patient understands that this medication is being used in an off-label manner. Winlevi Counseling:  I discussed with the patient the risks of topical clascoterone including but not limited to erythema, scaling, itching, and stinging. Patient voiced their understanding. Birth Control Pills Counseling: Birth Control Pill Counseling: I discussed with the patient the potential side effects of OCPs including but not limited to increased risk of stroke, heart attack, thrombophlebitis, deep venous thrombosis, hepatic adenomas, breast changes, GI upset, headaches, and depression.  The patient verbalized understanding of the proper use and possible adverse effects of OCPs. All of the patient's questions and concerns were addressed. Gabapentin Counseling: I discussed with the patient the risks of gabapentin including but not limited to dizziness, somnolence, fatigue and ataxia. Picato Counseling:  I discussed with the patient the risks of Picato including but not limited to erythema, scaling, itching, weeping, crusting, and pain. Cimzia Pregnancy And Lactation Text: This medication crosses the placenta but can be considered safe in certain situations. Cimzia may be excreted in breast milk. Cyclophosphamide Pregnancy And Lactation Text: This medication is Pregnancy Category D and it isn't considered safe during pregnancy. This medication is excreted in breast milk. Cantharidin Counseling:  I discussed with the patient the risks of Cantharidin including but not limited to pain, redness, burning, itching, and blistering. Low Dose Naltrexone Pregnancy And Lactation Text: Naltrexone is pregnancy category C.  There have been no adequate and well-controlled studies in pregnant women.  It should be used in pregnancy only if the potential benefit justifies the potential risk to the fetus.   Limited data indicates that naltrexone is minimally excreted into breastmilk. Terbinafine Counseling: Patient counseling regarding adverse effects of terbinafine including but not limited to headache, diarrhea, rash, upset stomach, liver function test abnormalities, itching, taste/smell disturbance, nausea, abdominal pain, and flatulence.  There is a rare possibility of liver failure that can occur when taking terbinafine.  The patient understands that a baseline LFT and kidney function test may be required. The patient verbalized understanding of the proper use and possible adverse effects of terbinafine.  All of the patient's questions and concerns were addressed. Simponi Counseling:  I discussed with the patient the risks of golimumab including but not limited to myelosuppression, immunosuppression, autoimmune hepatitis, demyelinating diseases, lymphoma, and serious infections.  The patient understands that monitoring is required including a PPD at baseline and must alert us or the primary physician if symptoms of infection or other concerning signs are noted. Aklief counseling:  Patient advised to apply a pea-sized amount only at bedtime and wait 30 minutes after washing their face before applying.  If too drying, patient may add a non-comedogenic moisturizer.  The most commonly reported side effects including irritation, redness, scaling, dryness, stinging, burning, itching, and increased risk of sunburn.  The patient verbalized understanding of the proper use and possible adverse effects of retinoids.  All of the patient's questions and concerns were addressed. Imiquimod Pregnancy And Lactation Text: This medication is Pregnancy Category C. It is unknown if this medication is excreted in breast milk. Cantharidin Pregnancy And Lactation Text: This medication has not been proven safe during pregnancy. It is unknown if this medication is excreted in breast milk. Odomzo Counseling- I discussed with the patient the risks of Odomzo including but not limited to nausea, vomiting, diarrhea, constipation, weight loss, changes in the sense of taste, decreased appetite, muscle spasms, and hair loss.  The patient verbalized understanding of the proper use and possible adverse effects of Odomzo.  All of the patient's questions and concerns were addressed. Litfulo Counseling: I discussed with the patient the risks of Litfulo therapy including but not limited to upper respiratory tract infections, shingles, cold sores, and nausea. Live vaccines should be avoided.  This medication has been linked to serious infections; higher rate of mortality; malignancy and lymphoproliferative disorders; major adverse cardiovascular events; thrombosis; gastrointestinal perforations; neutropenia; lymphopenia; anemia; liver enzyme elevations; and lipid elevations. Soolantra Counseling: I discussed with the patients the risks of topial Soolantra. This is a medicine which decreases the number of mites and inflammation in the skin. You experience burning, stinging, eye irritation or allergic reactions.  Please call our office if you develop any problems from using this medication. Tranexamic Acid Counseling:  Patient advised of the small risk of bleeding problems with tranexamic acid. They were also instructed to call if they developed any nausea, vomiting or diarrhea. All of the patient's questions and concerns were addressed. Bactrim Counseling:  I discussed with the patient the risks of sulfa antibiotics including but not limited to GI upset, allergic reaction, drug rash, diarrhea, dizziness, photosensitivity, and yeast infections.  Rarely, more serious reactions can occur including but not limited to aplastic anemia, agranulocytosis, methemoglobinemia, blood dyscrasias, liver or kidney failure, lung infiltrates or desquamative/blistering drug rashes. Winlevi Pregnancy And Lactation Text: This medication is considered safe during pregnancy and breastfeeding. Otezla Pregnancy And Lactation Text: This medication is Pregnancy Category C and it isn't known if it is safe during pregnancy. It is unknown if it is excreted in breast milk. Xolair Counseling:  Patient informed of potential adverse effects including but not limited to fever, muscle aches, rash and allergic reactions.  The patient verbalized understanding of the proper use and possible adverse effects of Xolair.  All of the patient's questions and concerns were addressed. Albendazole Pregnancy And Lactation Text: This medication is Pregnancy Category C and it isn't known if it is safe during pregnancy. It is also excreted in breast milk. Birth Control Pills Pregnancy And Lactation Text: This medication should be avoided if pregnant and for the first 30 days post-partum. Metronidazole Counseling:  I discussed with the patient the risks of metronidazole including but not limited to seizures, nausea/vomiting, a metallic taste in the mouth, nausea/vomiting and severe allergy. Terbinafine Pregnancy And Lactation Text: This medication is Pregnancy Category B and is considered safe during pregnancy. It is also excreted in breast milk and breast feeding isn't recommended. Gabapentin Pregnancy And Lactation Text: This medication is Pregnancy Category C and isn't considered safe during pregnancy. It is excreted in breast milk. Topical Metronidazole Counseling: Metronidazole is a topical antibiotic medication. You may experience burning, stinging, redness, or allergic reactions.  Please call our office if you develop any problems from using this medication. Cosentyx Counseling:  I discussed with the patient the risks of Cosentyx including but not limited to worsening of Crohn's disease, immunosuppression, allergic reactions and infections.  The patient understands that monitoring is required including a PPD at baseline and must alert us or the primary physician if symptoms of infection or other concerning signs are noted. 5-Fu Counseling: 5-Fluorouracil Counseling:  I discussed with the patient the risks of 5-fluorouracil including but not limited to erythema, scaling, itching, weeping, crusting, and pain. Tranexamic Acid Pregnancy And Lactation Text: It is unknown if this medication is safe during pregnancy or breast feeding. Bactrim Pregnancy And Lactation Text: This medication is Pregnancy Category D and is known to cause fetal risk.  It is also excreted in breast milk. Acitretin Counseling:  I discussed with the patient the risks of acitretin including but not limited to hair loss, dry lips/skin/eyes, liver damage, hyperlipidemia, depression/suicidal ideation, photosensitivity.  Serious rare side effects can include but are not limited to pancreatitis, pseudotumor cerebri, bony changes, clot formation/stroke/heart attack.  Patient understands that alcohol is contraindicated since it can result in liver toxicity and significantly prolong the elimination of the drug by many years. Cyclosporine Counseling:  I discussed with the patient the risks of cyclosporine including but not limited to hypertension, gingival hyperplasia,myelosuppression, immunosuppression, liver damage, kidney damage, neurotoxicity, lymphoma, and serious infections. The patient understands that monitoring is required including baseline blood pressure, CBC, CMP, lipid panel and uric acid, and then 1-2 times monthly CMP and blood pressure. Aklief Pregnancy And Lactation Text: It is unknown if this medication is safe to use during pregnancy.  It is unknown if this medication is excreted in breast milk.  Breastfeeding women should use the topical cream on the smallest area of the skin for the shortest time needed while breastfeeding.  Do not apply to nipple and areola. Niacinamide Counseling: I recommended taking niacin or niacinamide, also know as vitamin B3, twice daily. Recent evidence suggests that taking vitamin B3 (500 mg twice daily) can reduce the risk of actinic keratoses and non-melanoma skin cancers. Side effects of vitamin B3 include flushing and headache. Klisyri Counseling:  I discussed with the patient the risks of Klisyri including but not limited to erythema, scaling, itching, weeping, crusting, and pain. Litfulo Pregnancy And Lactation Text: Based on animal studies, Lifulo may cause embryo-fetal harm when administered to pregnant women.  The medication should not be used in pregnancy.  Breastfeeding is not recommended during treatment. Soolantra Pregnancy And Lactation Text: This medication is Pregnancy Category C. This medication is considered safe during breast feeding. Ilumya Counseling: I discussed with the patient the risks of tildrakizumab including but not limited to immunosuppression, malignancy, posterior leukoencephalopathy syndrome, and serious infections.  The patient understands that monitoring is required including a PPD at baseline and must alert us or the primary physician if symptoms of infection or other concerning signs are noted. Arava Counseling:  Patient counseled regarding adverse effects of Arava including but not limited to nausea, vomiting, abnormalities in liver function tests. Patients may develop mouth sores, rash, diarrhea, and abnormalities in blood counts. The patient understands that monitoring is required including LFTs and blood counts.  There is a rare possibility of scarring of the liver and lung problems that can occur when taking methotrexate. Persistent nausea, loss of appetite, pale stools, dark urine, cough, and shortness of breath should be reported immediately. Patient advised to discontinue Arava treatment and consult with a physician prior to attempting conception. The patient will have to undergo a treatment to eliminate Arava from the body prior to conception. Fluconazole Counseling:  Patient counseled regarding adverse effects of fluconazole including but not limited to headache, diarrhea, nausea, upset stomach, liver function test abnormalities, taste disturbance, and stomach pain.  There is a rare possibility of liver failure that can occur when taking fluconazole.  The patient understands that monitoring of LFTs and kidney function test may be required, especially at baseline. The patient verbalized understanding of the proper use and possible adverse effects of fluconazole.  All of the patient's questions and concerns were addressed. Xolair Pregnancy And Lactation Text: This medication is Pregnancy Category B and is considered safe during pregnancy. This medication is excreted in breast milk. Oxybutynin Counseling:  I discussed with the patient the risks of oxybutynin including but not limited to skin rash, drowsiness, dry mouth, difficulty urinating, and blurred vision. VTAMA Counseling: I discussed with the patient that VTAMA is not for use in the eyes, mouth or mouth. They should call the office if they develop any signs of allergic reactions to VTAMA. The patient verbalized understanding of the proper use and possible adverse effects of VTAMA.  All of the patient's questions and concerns were addressed. Ivermectin Counseling:  Patient instructed to take medication on an empty stomach with a full glass of water.  Patient informed of potential adverse effects including but not limited to nausea, diarrhea, dizziness, itching, and swelling of the extremities or lymph nodes.  The patient verbalized understanding of the proper use and possible adverse effects of ivermectin.  All of the patient's questions and concerns were addressed. Metronidazole Pregnancy And Lactation Text: This medication is Pregnancy Category B and considered safe during pregnancy.  It is also excreted in breast milk. Protopic Counseling: Patient may experience a mild burning sensation during topical application. Protopic is not approved in children less than 2 years of age. There have been case reports of hematologic and skin malignancies in patients using topical calcineurin inhibitors although causality is questionable. Cyclosporine Pregnancy And Lactation Text: This medication is Pregnancy Category C and it isn't know if it is safe during pregnancy. This medication is excreted in breast milk. Niacinamide Pregnancy And Lactation Text: These medications are considered safe during pregnancy. Skyrizi Counseling: I discussed with the patient the risks of risankizumab-rzaa including but not limited to immunosuppression, and serious infections.  The patient understands that monitoring is required including a PPD at baseline and must alert us or the primary physician if symptoms of infection or other concerning signs are noted. Olumiant Counseling: I discussed with the patient the risks of Olumiant therapy including but not limited to upper respiratory tract infections, shingles, cold sores, and nausea. Live vaccines should be avoided.  This medication has been linked to serious infections; higher rate of mortality; malignancy and lymphoproliferative disorders; major adverse cardiovascular events; thrombosis; gastrointestinal perforations; neutropenia; lymphopenia; anemia; liver enzyme elevations; and lipid elevations. Spironolactone Counseling: Patient advised regarding risks of diarrhea, abdominal pain, hyperkalemia, birth defects (for female patients), liver toxicity and renal toxicity. The patient may need blood work to monitor liver and kidney function and potassium levels while on therapy. The patient verbalized understanding of the proper use and possible adverse effects of spironolactone.  All of the patient's questions and concerns were addressed. Valtrex Counseling: I discussed with the patient the risks of valacyclovir including but not limited to kidney damage, nausea, vomiting and severe allergy.  The patient understands that if the infection seems to be worsening or is not improving, they are to call. Cephalexin Counseling: I counseled the patient regarding use of cephalexin as an antibiotic for prophylactic and/or therapeutic purposes. Cephalexin (commonly prescribed under brand name Keflex) is a cephalosporin antibiotic which is active against numerous classes of bacteria, including most skin bacteria. Side effects may include nausea, diarrhea, gastrointestinal upset, rash, hives, yeast infections, and in rare cases, hepatitis, kidney disease, seizures, fever, confusion, neurologic symptoms, and others. Patients with severe allergies to penicillin medications are cautioned that there is about a 10% incidence of cross-reactivity with cephalosporins. When possible, patients with penicillin allergies should use alternatives to cephalosporins for antibiotic therapy. Topical Metronidazole Pregnancy And Lactation Text: This medication is Pregnancy Category B and considered safe during pregnancy.  It is also considered safe to use while breastfeeding. Acitretin Pregnancy And Lactation Text: This medication is Pregnancy Category X and should not be given to women who are pregnant or may become pregnant in the future. This medication is excreted in breast milk. Glycopyrrolate Counseling:  I discussed with the patient the risks of glycopyrrolate including but not limited to skin rash, drowsiness, dry mouth, difficulty urinating, and blurred vision. Klisyri Pregnancy And Lactation Text: It is unknown if this medication can harm a developing fetus or if it is excreted in breast milk. Fluconazole Pregnancy And Lactation Text: This medication is Pregnancy Category C and it isn't know if it is safe during pregnancy. It is also excreted in breast milk. Azelaic Acid Counseling: Patient counseled that medicine may cause skin irritation and to avoid applying near the eyes.  In the event of skin irritation, the patient was advised to reduce the amount of the drug applied or use it less frequently.   The patient verbalized understanding of the proper use and possible adverse effects of azelaic acid.  All of the patient's questions and concerns were addressed. Topical Retinoid counseling:  Patient advised to apply a pea-sized amount only at bedtime and wait 30 minutes after washing their face before applying.  If too drying, patient may add a non-comedogenic moisturizer. The patient verbalized understanding of the proper use and possible adverse effects of retinoids.  All of the patient's questions and concerns were addressed. Minocycline Counseling: Patient advised regarding possible photosensitivity and discoloration of the teeth, skin, lips, tongue and gums.  Patient instructed to avoid sunlight, if possible.  When exposed to sunlight, patients should wear protective clothing, sunglasses, and sunscreen.  The patient was instructed to call the office immediately if the following severe adverse effects occur:  hearing changes, easy bruising/bleeding, severe headache, or vision changes.  The patient verbalized understanding of the proper use and possible adverse effects of minocycline.  All of the patient's questions and concerns were addressed. Vtama Pregnancy And Lactation Text: It is unknown if this medication can cause problems during pregnancy and breastfeeding. Cimetidine Counseling:  I discussed with the patient the risks of Cimetidine including but not limited to gynecomastia, headache, diarrhea, nausea, drowsiness, arrhythmias, pancreatitis, skin rashes, psychosis, bone marrow suppression and kidney toxicity. Protopic Pregnancy And Lactation Text: This medication is Pregnancy Category C. It is unknown if this medication is excreted in breast milk when applied topically. Dupixent Counseling: I discussed with the patient the risks of dupilumab including but not limited to eye infection and irritation, cold sores, injection site reactions, worsening of asthma, allergic reactions and increased risk of parasitic infection.  Live vaccines should be avoided while taking dupilumab. Dupilumab will also interact with certain medications such as warfarin and cyclosporine. The patient understands that monitoring is required and they must alert us or the primary physician if symptoms of infection or other concerning signs are noted. Drysol Counseling:  I discussed with the patient the risks of drysol/aluminum chloride including but not limited to skin rash, itching, irritation, burning. Methotrexate Counseling:  Patient counseled regarding adverse effects of methotrexate including but not limited to nausea, vomiting, abnormalities in liver function tests. Patients may develop mouth sores, rash, diarrhea, and abnormalities in blood counts. The patient understands that monitoring is required including LFT's and blood counts.  There is a rare possibility of scarring of the liver and lung problems that can occur when taking methotrexate. Persistent nausea, loss of appetite, pale stools, dark urine, cough, and shortness of breath should be reported immediately. Patient advised to discontinue methotrexate treatment at least three months before attempting to become pregnant.  I discussed the need for folate supplements while taking methotrexate.  These supplements can decrease side effects during methotrexate treatment. The patient verbalized understanding of the proper use and possible adverse effects of methotrexate.  All of the patient's questions and concerns were addressed. Nsaids Counseling: NSAID Counseling: I discussed with the patient that NSAIDs should be taken with food. Prolonged use of NSAIDs can result in the development of stomach ulcers.  Patient advised to stop taking NSAIDs if abdominal pain occurs.  The patient verbalized understanding of the proper use and possible adverse effects of NSAIDs.  All of the patient's questions and concerns were addressed. Spironolactone Pregnancy And Lactation Text: This medication can cause feminization of the male fetus and should be avoided during pregnancy. The active metabolite is also found in breast milk. Azelaic Acid Pregnancy And Lactation Text: This medication is considered safe during pregnancy and breast feeding. Topical Steroids Counseling: I discussed with the patient that prolonged use of topical steroids can result in the increased appearance of superficial blood vessels (telangiectasias), lightening (hypopigmentation) and thinning of the skin (atrophy).  Patient understands to avoid using high potency steroids in skin folds, the groin or the face.  The patient verbalized understanding of the proper use and possible adverse effects of topical steroids.  All of the patient's questions and concerns were addressed. Minoxidil Counseling: Minoxidil is a topical medication which can increase blood flow where it is applied. It is uncertain how this medication increases hair growth. Side effects are uncommon and include stinging and allergic reactions. Dutasteride Male Counseling: Dustasteride Counseling:  I discussed with the patient the risks of use of dutasteride including but not limited to decreased libido, decreased ejaculate volume, and gynecomastia. Women who can become pregnant should not handle medication.  All of the patient's questions and concerns were addressed. Olumiant Pregnancy And Lactation Text: Based on animal studies, Olumiant may cause embryo-fetal harm when administered to pregnant women.  The medication should not be used in pregnancy.  Breastfeeding is not recommended during treatment. Valtrex Pregnancy And Lactation Text: this medication is Pregnancy Category B and is considered safe during pregnancy. This medication is not directly found in breast milk but it's metabolite acyclovir is present. Cephalexin Pregnancy And Lactation Text: This medication is Pregnancy Category B and considered safe during pregnancy.  It is also excreted in breast milk but can be used safely for shorter doses. Clofazimine Counseling:  I discussed with the patient the risks of clofazimine including but not limited to skin and eye pigmentation, liver damage, nausea/vomiting, gastrointestinal bleeding and allergy. Erivedge Counseling- I discussed with the patient the risks of Erivedge including but not limited to nausea, vomiting, diarrhea, constipation, weight loss, changes in the sense of taste, decreased appetite, muscle spasms, and hair loss.  The patient verbalized understanding of the proper use and possible adverse effects of Erivedge.  All of the patient's questions and concerns were addressed. Bexarotene Counseling:  I discussed with the patient the risks of bexarotene including but not limited to hair loss, dry lips/skin/eyes, liver abnormalities, hyperlipidemia, pancreatitis, depression/suicidal ideation, photosensitivity, drug rash/allergic reactions, hypothyroidism, anemia, leukopenia, infection, cataracts, and teratogenicity.  Patient understands that they will need regular blood tests to check lipid profile, liver function tests, white blood cell count, thyroid function tests and pregnancy test if applicable. Detail Level: Simple Infliximab Counseling:  I discussed with the patient the risks of infliximab including but not limited to myelosuppression, immunosuppression, autoimmune hepatitis, demyelinating diseases, lymphoma, and serious infections.  The patient understands that monitoring is required including a PPD at baseline and must alert us or the primary physician if symptoms of infection or other concerning signs are noted. Griseofulvin Counseling:  I discussed with the patient the risks of griseofulvin including but not limited to photosensitivity, cytopenia, liver damage, nausea/vomiting and severe allergy.  The patient understands that this medication is best absorbed when taken with a fatty meal (e.g., ice cream or french fries). Propranolol Counseling:  I discussed with the patient the risks of propranolol including but not limited to low heart rate, low blood pressure, low blood sugar, restlessness and increased cold sensitivity. They should call the office if they experience any of these side effects. Qbrexza Counseling:  I discussed with the patient the risks of Qbrexza including but not limited to headache, mydriasis, blurred vision, dry eyes, nasal dryness, dry mouth, dry throat, dry skin, urinary hesitation, and constipation.  Local skin reactions including erythema, burning, stinging, and itching can also occur. Dupixent Pregnancy And Lactation Text: This medication likely crosses the placenta but the risk for the fetus is uncertain. This medication is excreted in breast milk. Zoryve Counseling:  I discussed with the patient that Zoryve is not for use in the eyes, mouth or vagina. The most commonly reported side effects include diarrhea, headache, insomnia, application site pain, upper respiratory tract infections, and urinary tract infections.  All of the patient's questions and concerns were addressed. Nsaids Pregnancy And Lactation Text: These medications are considered safe up to 30 weeks gestation. It is excreted in breast milk. Stelara Counseling:  I discussed with the patient the risks of ustekinumab including but not limited to immunosuppression, malignancy, posterior leukoencephalopathy syndrome, and serious infections.  The patient understands that monitoring is required including a PPD at baseline and must alert us or the primary physician if symptoms of infection or other concerning signs are noted. Bexarotene Pregnancy And Lactation Text: This medication is Pregnancy Category X and should not be given to women who are pregnant or may become pregnant. This medication should not be used if you are breast feeding. Benzoyl Peroxide Counseling: Patient counseled that medicine may cause skin irritation and bleach clothing.  In the event of skin irritation, the patient was advised to reduce the amount of the drug applied or use it less frequently.   The patient verbalized understanding of the proper use and possible adverse effects of benzoyl peroxide.  All of the patient's questions and concerns were addressed. Methotrexate Pregnancy And Lactation Text: This medication is Pregnancy Category X and is known to cause fetal harm. This medication is excreted in breast milk. Adbry Counseling: I discussed with the patient the risks of tralokinumab including but not limited to eye infection and irritation, cold sores, injection site reactions, worsening of asthma, allergic reactions and increased risk of parasitic infection.  Live vaccines should be avoided while taking tralokinumab. The patient understands that monitoring is required and they must alert us or the primary physician if symptoms of infection or other concerning signs are noted. Topical Steroids Applications Pregnancy And Lactation Text: Most topical steroids are considered safe to use during pregnancy and lactation.  Any topical steroid applied to the breast or nipple should be washed off before breastfeeding. Rinvoq Counseling: I discussed with the patient the risks of Rinvoq therapy including but not limited to upper respiratory tract infections, shingles, cold sores, bronchitis, nausea, cough, fever, acne, and headache. Live vaccines should be avoided.  This medication has been linked to serious infections; higher rate of mortality; malignancy and lymphoproliferative disorders; major adverse cardiovascular events; thrombosis; thrombocytopenia, anemia, and neutropenia; lipid elevations; liver enzyme elevations; and gastrointestinal perforations. Use Enhanced Medication Counseling?: No Clindamycin Counseling: I counseled the patient regarding use of clindamycin as an antibiotic for prophylactic and/or therapeutic purposes. Clindamycin is active against numerous classes of bacteria, including skin bacteria. Side effects may include nausea, diarrhea, gastrointestinal upset, rash, hives, yeast infections, and in rare cases, colitis. Azathioprine Counseling:  I discussed with the patient the risks of azathioprine including but not limited to myelosuppression, immunosuppression, hepatotoxicity, lymphoma, and infections.  The patient understands that monitoring is required including baseline LFTs, Creatinine, possible TPMP genotyping and weekly CBCs for the first month and then every 2 weeks thereafter.  The patient verbalized understanding of the proper use and possible adverse effects of azathioprine.  All of the patient's questions and concerns were addressed. Dutasteride Female Counseling: Dutasteride Counseling:  I discussed with the patient the risks of use of dutasteride including but not limited to decreased libido and sexual dysfunction. Explained the teratogenic nature of the medication and stressed the importance of not getting pregnant during treatment. All of the patient's questions and concerns were addressed. Propranolol Pregnancy And Lactation Text: This medication is Pregnancy Category C and it isn't known if it is safe during pregnancy. It is excreted in breast milk. Doxepin Counseling:  Patient advised that the medication is sedating and not to drive a car after taking this medication. Patient informed of potential adverse effects including but not limited to dry mouth, urinary retention, and blurry vision.  The patient verbalized understanding of the proper use and possible adverse effects of doxepin.  All of the patient's questions and concerns were addressed. Eucrisa Counseling: Patient may experience a mild burning sensation during topical application. Eucrisa is not approved in children less than 3 months of age. Griseofulvin Pregnancy And Lactation Text: This medication is Pregnancy Category X and is known to cause serious birth defects. It is unknown if this medication is excreted in breast milk but breast feeding should be avoided. Tazorac Counseling:  Patient advised that medication is irritating and drying.  Patient may need to apply sparingly and wash off after an hour before eventually leaving it on overnight.  The patient verbalized understanding of the proper use and possible adverse effects of tazorac.  All of the patient's questions and concerns were addressed. Enbrel Counseling:  I discussed with the patient the risks of etanercept including but not limited to myelosuppression, immunosuppression, autoimmune hepatitis, demyelinating diseases, lymphoma, and infections.  The patient understands that monitoring is required including a PPD at baseline and must alert us or the primary physician if symptoms of infection or other concerning signs are noted. Quinolones Counseling:  I discussed with the patient the risks of fluoroquinolones including but not limited to GI upset, allergic reaction, drug rash, diarrhea, dizziness, photosensitivity, yeast infections, liver function test abnormalities, tendonitis/tendon rupture. Prednisone Counseling:  I discussed with the patient the risks of prolonged use of prednisone including but not limited to weight gain, insomnia, osteoporosis, mood changes, diabetes, susceptibility to infection, glaucoma and high blood pressure.  In cases where prednisone use is prolonged, patients should be monitored with blood pressure checks, serum glucose levels and an eye exam.  Additionally, the patient may need to be placed on GI prophylaxis, PCP prophylaxis, and calcium and vitamin D supplementation and/or a bisphosphonate.  The patient verbalized understanding of the proper use and the possible adverse effects of prednisone.  All of the patient's questions and concerns were addressed. Elidel Counseling: Patient may experience a mild burning sensation during topical application. Elidel is not approved in children less than 2 years of age. There have been case reports of hematologic and skin malignancies in patients using topical calcineurin inhibitors although causality is questionable. Rinvoq Pregnancy And Lactation Text: Based on animal studies, Rinvoq may cause embryo-fetal harm when administered to pregnant women.  The medication should not be used in pregnancy.  Breastfeeding is not recommended during treatment and for 6 days after the last dose. Qbrexza Pregnancy And Lactation Text: There is no available data on Qbrexza use in pregnant women.  There is no available data on Qbrexza use in lactation. Olanzapine Counseling- I discussed with the patient the common side effects of olanzapine including but are not limited to: lack of energy, dry mouth, increased appetite, sleepiness, tremor, constipation, dizziness, changes in behavior, or restlessness.  Explained that teenagers are more likely to experience headaches, abdominal pain, pain in the arms or legs, tiredness, and sleepiness.  Serious side effects include but are not limited: increased risk of death in elderly patients who are confused, have memory loss, or dementia-related psychosis; hyperglycemia; increased cholesterol and triglycerides; and weight gain. Clindamycin Pregnancy And Lactation Text: This medication can be used in pregnancy if certain situations. Clindamycin is also present in breast milk. Isotretinoin Counseling: Patient should get monthly blood tests, not donate blood, not drive at night if vision affected, not share medication, and not undergo elective surgery for 6 months after tx completed. Side effects reviewed, pt to contact office should one occur. Topical Sulfur Applications Counseling: Topical Sulfur Counseling: Patient counseled that this medication may cause skin irritation or allergic reactions.  In the event of skin irritation, the patient was advised to reduce the amount of the drug applied or use it less frequently.   The patient verbalized understanding of the proper use and possible adverse effects of topical sulfur application.  All of the patient's questions and concerns were addressed. Colchicine Counseling:  Patient counseled regarding adverse effects including but not limited to stomach upset (nausea, vomiting, stomach pain, or diarrhea).  Patient instructed to limit alcohol consumption while taking this medication.  Colchicine may reduce blood counts especially with prolonged use.  The patient understands that monitoring of kidney function and blood counts may be required, especially at baseline. The patient verbalized understanding of the proper use and possible adverse effects of colchicine.  All of the patient's questions and concerns were addressed. Libtayo Counseling- I discussed with the patient the risks of Libtayo including but not limited to nausea, vomiting, diarrhea, and bone or muscle pain.  The patient verbalized understanding of the proper use and possible adverse effects of Libtayo.  All of the patient's questions and concerns were addressed. Mirvaso Counseling: Mirvaso is a topical medication which can decrease superficial blood flow where applied. Side effects are uncommon and include stinging, redness and allergic reactions. Adbry Pregnancy And Lactation Text: It is unknown if this medication will adversely affect pregnancy or breast feeding. Benzoyl Peroxide Pregnancy And Lactation Text: This medication is Pregnancy Category C. It is unknown if benzoyl peroxide is excreted in breast milk. Glycopyrrolate Pregnancy And Lactation Text: This medication is Pregnancy Category B and is considered safe during pregnancy. It is unknown if it is excreted breast milk. Dutasteride Pregnancy And Lactation Text: This medication is absolutely contraindicated in women, especially during pregnancy and breast feeding. Feminization of male fetuses is possible if taking while pregnant. Tazorac Pregnancy And Lactation Text: This medication is not safe during pregnancy. It is unknown if this medication is excreted in breast milk. Rituxan Counseling:  I discussed with the patient the risks of Rituxan infusions. Side effects can include infusion reactions, severe drug rashes including mucocutaneous reactions, reactivation of latent hepatitis and other infections and rarely progressive multifocal leukoencephalopathy.  All of the patient's questions and concerns were addressed. SSKI Counseling:  I discussed with the patient the risks of SSKI including but not limited to thyroid abnormalities, metallic taste, GI upset, fever, headache, acne, arthralgias, paraesthesias, lymphadenopathy, easy bleeding, arrhythmias, and allergic reaction. Itraconazole Counseling:  I discussed with the patient the risks of itraconazole including but not limited to liver damage, nausea/vomiting, neuropathy, and severe allergy.  The patient understands that this medication is best absorbed when taken with acidic beverages such as non-diet cola or ginger ale.  The patient understands that monitoring is required including baseline LFTs and repeat LFTs at intervals.  The patient understands that they are to contact us or the primary physician if concerning signs are noted. Zyclara Counseling:  I discussed with the patient the risks of imiquimod including but not limited to erythema, scaling, itching, weeping, crusting, and pain.  Patient understands that the inflammatory response to imiquimod is variable from person to person and was educated regarded proper titration schedule.  If flu-like symptoms develop, patient knows to discontinue the medication and contact us. Doxepin Pregnancy And Lactation Text: This medication is Pregnancy Category C and it isn't known if it is safe during pregnancy. It is also excreted in breast milk and breast feeding isn't recommended. Sotyktu Counseling:  I discussed the most common side effects of Sotyktu including: common cold, sore throat, sinus infections, cold sores, canker sores, folliculitis, and acne.? I also discussed more serious side effects of Sotyktu including but not limited to: serious allergic reactions; increased risk for infections such as TB; cancers such as lymphomas; rhabdomyolysis and elevated CPK; and elevated triglycerides and liver enzymes.? Olanzapine Pregnancy And Lactation Text: This medication is pregnancy category C.   There are no adequate and well controlled trials with olanzapine in pregnant females.  Olanzapine should be used during pregnancy only if the potential benefit justifies the potential risk to the fetus.   In a study in lactating healthy women, olanzapine was excreted in breast milk.  It is recommended that women taking olanzapine should not breast feed. Rhofade Counseling: Rhofade is a topical medication which can decrease superficial blood flow where applied. Side effects are uncommon and include stinging, redness and allergic reactions. Bimzelx Counseling:  I discussed with the patient the risks of Bimzelx including but not limited to depression, immunosuppression, allergic reactions and infections.  The patient understands that monitoring is required including a PPD at baseline and must alert us or the primary physician if symptoms of infection or other concerning signs are noted. Taltz Counseling: I discussed with the patient the risks of ixekizumab including but not limited to immunosuppression, serious infections, worsening of inflammatory bowel disease and drug reactions.  The patient understands that monitoring is required including a PPD at baseline and must alert us or the primary physician if symptoms of infection or other concerning signs are noted. Topical Sulfur Applications Pregnancy And Lactation Text: This medication is considered safe during pregnancy and breast feeding secondary to limited systemic absorption. Isotretinoin Pregnancy And Lactation Text: This medication is Pregnancy Category X and is considered extremely dangerous during pregnancy. It is unknown if it is excreted in breast milk. Doxycycline Counseling:  Patient counseled regarding possible photosensitivity and increased risk for sunburn.  Patient instructed to avoid sunlight, if possible.  When exposed to sunlight, patients should wear protective clothing, sunglasses, and sunscreen.  The patient was instructed to call the office immediately if the following severe adverse effects occur:  hearing changes, easy bruising/bleeding, severe headache, or vision changes.  The patient verbalized understanding of the proper use and possible adverse effects of doxycycline.  All of the patient's questions and concerns were addressed. Hydroxychloroquine Counseling:  I discussed with the patient that a baseline ophthalmologic exam is needed at the start of therapy and every year thereafter while on therapy. A CBC may also be warranted for monitoring.  The side effects of this medication were discussed with the patient, including but not limited to agranulocytosis, aplastic anemia, seizures, rashes, retinopathy, and liver toxicity. Patient instructed to call the office should any adverse effect occur.  The patient verbalized understanding of the proper use and possible adverse effects of Plaquenil.  All the patient's questions and concerns were addressed. Finasteride Male Counseling: Finasteride Counseling:  I discussed with the patient the risks of use of finasteride including but not limited to decreased libido, decreased ejaculate volume, gynecomastia, and depression. Women should not handle medication.  All of the patient's questions and concerns were addressed. Sski Pregnancy And Lactation Text: This medication is Pregnancy Category D and isn't considered safe during pregnancy. It is excreted in breast milk. Cellcept Counseling:  I discussed with the patient the risks of mycophenolate mofetil including but not limited to infection/immunosuppression, GI upset, hypokalemia, hypercholesterolemia, bone marrow suppression, lymphoproliferative disorders, malignancy, GI ulceration/bleed/perforation, colitis, interstitial lung disease, kidney failure, progressive multifocal leukoencephalopathy, and birth defects.  The patient understands that monitoring is required including a baseline creatinine and regular CBC testing. In addition, patient must alert us immediately if symptoms of infection or other concerning signs are noted. Mirvaso Pregnancy And Lactation Text: This medication has not been assigned a Pregnancy Risk Category. It is unknown if the medication is excreted in breast milk. Rituxan Pregnancy And Lactation Text: This medication is Pregnancy Category C and it isn't know if it is safe during pregnancy. It is unknown if this medication is excreted in breast milk but similar antibodies are known to be excreted. Hydroxyzine Counseling: Patient advised that the medication is sedating and not to drive a car after taking this medication.  Patient informed of potential adverse effects including but not limited to dry mouth, urinary retention, and blurry vision.  The patient verbalized understanding of the proper use and possible adverse effects of hydroxyzine.  All of the patient's questions and concerns were addressed. Carac Counseling:  I discussed with the patient the risks of Carac including but not limited to erythema, scaling, itching, weeping, crusting, and pain. Topical Clindamycin Counseling: Patient counseled that this medication may cause skin irritation or allergic reactions.  In the event of skin irritation, the patient was advised to reduce the amount of the drug applied or use it less frequently.   The patient verbalized understanding of the proper use and possible adverse effects of clindamycin.  All of the patient's questions and concerns were addressed. Rifampin Counseling: I discussed with the patient the risks of rifampin including but not limited to liver damage, kidney damage, red-orange body fluids, nausea/vomiting and severe allergy. Oral Minoxidil Counseling- I discussed with the patient the risks of oral minoxidil including but not limited to shortness of breath, swelling of the feet or ankles, dizziness, lightheadedness, unwanted hair growth and allergic reaction.  The patient verbalized understanding of the proper use and possible adverse effects of oral minoxidil.  All of the patient's questions and concerns were addressed. Hydroquinone Counseling:  Patient advised that medication may result in skin irritation, lightening (hypopigmentation), dryness, and burning.  In the event of skin irritation, the patient was advised to reduce the amount of the drug applied or use it less frequently.  Rarely, spots that are treated with hydroquinone can become darker (pseudoochronosis).  Should this occur, patient instructed to stop medication and call the office. The patient verbalized understanding of the proper use and possible adverse effects of hydroquinone.  All of the patient's questions and concerns were addressed. Humira Counseling:  I discussed with the patient the risks of adalimumab including but not limited to myelosuppression, immunosuppression, autoimmune hepatitis, demyelinating diseases, lymphoma, and serious infections.  The patient understands that monitoring is required including a PPD at baseline and must alert us or the primary physician if symptoms of infection or other concerning signs are noted. Bimzelx Pregnancy And Lactation Text: This medication crosses the placenta and the safety is uncertain during pregnancy. It is unknown if this medication is present in breast milk. Wartpeel Counseling:  I discussed with the patient the risks of Wartpeel including but not limited to erythema, scaling, itching, weeping, crusting, and pain. Doxycycline Pregnancy And Lactation Text: This medication is Pregnancy Category D and not consider safe during pregnancy. It is also excreted in breast milk but is considered safe for shorter treatment courses. Spevigo Pregnancy And Lactation Text: The risk during pregnancy and breastfeeding is uncertain with this medication. This medication does cross the placenta. It is unknown if this medication is found in breast milk. Spevigo Counseling: I discussed with the patient the risks of Spevigo including but not limited to fatigue, nasuea, vomiting, headache, pruritus, urinary tract infection, an infusion related reactions.  The patient understands that monitoring is required including screening for tuberculosis at baseline and yearly screening thereafter while continuing Spevigo therapy. They should contact us if symptoms of infection or other concerning signs are noted.

## 2025-04-08 ENCOUNTER — APPOINTMENT (OUTPATIENT)
Dept: SURGICAL ONCOLOGY | Facility: CLINIC | Age: 75
End: 2025-04-08
Payer: MEDICARE

## 2025-04-08 VITALS
DIASTOLIC BLOOD PRESSURE: 80 MMHG | HEART RATE: 111 BPM | HEIGHT: 61 IN | BODY MASS INDEX: 30.4 KG/M2 | WEIGHT: 161 LBS | OXYGEN SATURATION: 99 % | SYSTOLIC BLOOD PRESSURE: 130 MMHG

## 2025-04-08 DIAGNOSIS — C18.0 MALIGNANT NEOPLASM OF CECUM: ICD-10-CM

## 2025-04-08 PROCEDURE — 99213 OFFICE O/P EST LOW 20 MIN: CPT

## 2025-05-14 ENCOUNTER — EMERGENCY (EMERGENCY)
Facility: HOSPITAL | Age: 75
LOS: 1 days | End: 2025-05-14
Attending: EMERGENCY MEDICINE | Admitting: EMERGENCY MEDICINE
Payer: MEDICARE

## 2025-05-14 VITALS
HEIGHT: 61 IN | RESPIRATION RATE: 17 BRPM | HEART RATE: 92 BPM | WEIGHT: 169.98 LBS | TEMPERATURE: 98 F | SYSTOLIC BLOOD PRESSURE: 127 MMHG | DIASTOLIC BLOOD PRESSURE: 83 MMHG | OXYGEN SATURATION: 100 %

## 2025-05-14 VITALS
TEMPERATURE: 98 F | RESPIRATION RATE: 16 BRPM | HEART RATE: 97 BPM | OXYGEN SATURATION: 100 % | SYSTOLIC BLOOD PRESSURE: 100 MMHG | DIASTOLIC BLOOD PRESSURE: 65 MMHG

## 2025-05-14 DIAGNOSIS — Z96.651 PRESENCE OF RIGHT ARTIFICIAL KNEE JOINT: Chronic | ICD-10-CM

## 2025-05-14 DIAGNOSIS — Z90.710 ACQUIRED ABSENCE OF BOTH CERVIX AND UTERUS: Chronic | ICD-10-CM

## 2025-05-14 DIAGNOSIS — Z90.49 ACQUIRED ABSENCE OF OTHER SPECIFIED PARTS OF DIGESTIVE TRACT: Chronic | ICD-10-CM

## 2025-05-14 DIAGNOSIS — Z95.828 PRESENCE OF OTHER VASCULAR IMPLANTS AND GRAFTS: Chronic | ICD-10-CM

## 2025-05-14 DIAGNOSIS — Z96.652 PRESENCE OF LEFT ARTIFICIAL KNEE JOINT: Chronic | ICD-10-CM

## 2025-05-14 DIAGNOSIS — Z98.89 OTHER SPECIFIED POSTPROCEDURAL STATES: Chronic | ICD-10-CM

## 2025-05-14 DIAGNOSIS — Z98.890 OTHER SPECIFIED POSTPROCEDURAL STATES: Chronic | ICD-10-CM

## 2025-05-14 LAB
ALBUMIN SERPL ELPH-MCNC: 3.9 G/DL — SIGNIFICANT CHANGE UP (ref 3.3–5)
ALP SERPL-CCNC: 113 U/L — SIGNIFICANT CHANGE UP (ref 40–120)
ALT FLD-CCNC: 13 U/L — SIGNIFICANT CHANGE UP (ref 4–33)
ANION GAP SERPL CALC-SCNC: 20 MMOL/L — HIGH (ref 7–14)
APPEARANCE UR: ABNORMAL
AST SERPL-CCNC: 16 U/L — SIGNIFICANT CHANGE UP (ref 4–32)
BASOPHILS # BLD AUTO: 0.02 K/UL — SIGNIFICANT CHANGE UP (ref 0–0.2)
BASOPHILS NFR BLD AUTO: 0.1 % — SIGNIFICANT CHANGE UP (ref 0–2)
BILIRUB SERPL-MCNC: 0.8 MG/DL — SIGNIFICANT CHANGE UP (ref 0.2–1.2)
BILIRUB UR-MCNC: NEGATIVE — SIGNIFICANT CHANGE UP
BUN SERPL-MCNC: 9 MG/DL — SIGNIFICANT CHANGE UP (ref 7–23)
CALCIUM SERPL-MCNC: 9.6 MG/DL — SIGNIFICANT CHANGE UP (ref 8.4–10.5)
CHLORIDE SERPL-SCNC: 95 MMOL/L — LOW (ref 98–107)
CO2 SERPL-SCNC: 24 MMOL/L — SIGNIFICANT CHANGE UP (ref 22–31)
COLOR SPEC: YELLOW — SIGNIFICANT CHANGE UP
CREAT SERPL-MCNC: 0.53 MG/DL — SIGNIFICANT CHANGE UP (ref 0.5–1.3)
DIFF PNL FLD: NEGATIVE — SIGNIFICANT CHANGE UP
EGFR: 98 ML/MIN/1.73M2 — SIGNIFICANT CHANGE UP
EGFR: 98 ML/MIN/1.73M2 — SIGNIFICANT CHANGE UP
EOSINOPHIL # BLD AUTO: 0 K/UL — SIGNIFICANT CHANGE UP (ref 0–0.5)
EOSINOPHIL NFR BLD AUTO: 0 % — SIGNIFICANT CHANGE UP (ref 0–6)
GAS PNL BLDV: SIGNIFICANT CHANGE UP
GLUCOSE SERPL-MCNC: 315 MG/DL — HIGH (ref 70–99)
GLUCOSE UR QL: >=1000 MG/DL
HCT VFR BLD CALC: 41.8 % — SIGNIFICANT CHANGE UP (ref 34.5–45)
HGB BLD-MCNC: 13.4 G/DL — SIGNIFICANT CHANGE UP (ref 11.5–15.5)
IANC: 12.84 K/UL — HIGH (ref 1.8–7.4)
IMM GRANULOCYTES NFR BLD AUTO: 0.3 % — SIGNIFICANT CHANGE UP (ref 0–0.9)
KETONES UR QL: >=160 MG/DL
LEUKOCYTE ESTERASE UR-ACNC: NEGATIVE — SIGNIFICANT CHANGE UP
LIDOCAIN IGE QN: 11 U/L — SIGNIFICANT CHANGE UP (ref 7–60)
LYMPHOCYTES # BLD AUTO: 0.61 K/UL — LOW (ref 1–3.3)
LYMPHOCYTES # BLD AUTO: 4.3 % — LOW (ref 13–44)
MCHC RBC-ENTMCNC: 26.4 PG — LOW (ref 27–34)
MCHC RBC-ENTMCNC: 32.1 G/DL — SIGNIFICANT CHANGE UP (ref 32–36)
MCV RBC AUTO: 82.3 FL — SIGNIFICANT CHANGE UP (ref 80–100)
MONOCYTES # BLD AUTO: 0.64 K/UL — SIGNIFICANT CHANGE UP (ref 0–0.9)
MONOCYTES NFR BLD AUTO: 4.5 % — SIGNIFICANT CHANGE UP (ref 2–14)
NEUTROPHILS # BLD AUTO: 12.84 K/UL — HIGH (ref 1.8–7.4)
NEUTROPHILS NFR BLD AUTO: 90.8 % — HIGH (ref 43–77)
NITRITE UR-MCNC: NEGATIVE — SIGNIFICANT CHANGE UP
NRBC # BLD AUTO: 0 K/UL — SIGNIFICANT CHANGE UP (ref 0–0)
NRBC # FLD: 0 K/UL — SIGNIFICANT CHANGE UP (ref 0–0)
NRBC BLD AUTO-RTO: 0 /100 WBCS — SIGNIFICANT CHANGE UP (ref 0–0)
PH UR: 5.5 — SIGNIFICANT CHANGE UP (ref 5–8)
PLATELET # BLD AUTO: 192 K/UL — SIGNIFICANT CHANGE UP (ref 150–400)
POTASSIUM SERPL-MCNC: 3.1 MMOL/L — LOW (ref 3.5–5.3)
POTASSIUM SERPL-SCNC: 3.1 MMOL/L — LOW (ref 3.5–5.3)
PROT SERPL-MCNC: 7.9 G/DL — SIGNIFICANT CHANGE UP (ref 6–8.3)
PROT UR-MCNC: 30 MG/DL
RBC # BLD: 5.08 M/UL — SIGNIFICANT CHANGE UP (ref 3.8–5.2)
RBC # FLD: 13.5 % — SIGNIFICANT CHANGE UP (ref 10.3–14.5)
SODIUM SERPL-SCNC: 139 MMOL/L — SIGNIFICANT CHANGE UP (ref 135–145)
SP GR SPEC: 1.03 — HIGH (ref 1–1.03)
TROPONIN T, HIGH SENSITIVITY RESULT: 9 NG/L — SIGNIFICANT CHANGE UP
UROBILINOGEN FLD QL: 0.2 MG/DL — SIGNIFICANT CHANGE UP (ref 0.2–1)
WBC # BLD: 14.15 K/UL — HIGH (ref 3.8–10.5)
WBC # FLD AUTO: 14.15 K/UL — HIGH (ref 3.8–10.5)

## 2025-05-14 PROCEDURE — 99285 EMERGENCY DEPT VISIT HI MDM: CPT

## 2025-05-14 PROCEDURE — 74177 CT ABD & PELVIS W/CONTRAST: CPT | Mod: 26

## 2025-05-14 PROCEDURE — 71045 X-RAY EXAM CHEST 1 VIEW: CPT | Mod: 26

## 2025-05-14 RX ORDER — CEFPODOXIME PROXETIL 200 MG/1
1 TABLET, FILM COATED ORAL
Qty: 14 | Refills: 0
Start: 2025-05-14 | End: 2025-05-20

## 2025-05-14 RX ORDER — HEPARIN SODIUM,PORCINE/NS/PF 20/20 ML
100 SYRINGE (ML) INTRAVENOUS ONCE
Refills: 0 | Status: COMPLETED | OUTPATIENT
Start: 2025-05-14 | End: 2025-05-14

## 2025-05-14 RX ORDER — CEFTRIAXONE 500 MG/1
1000 INJECTION, POWDER, FOR SOLUTION INTRAMUSCULAR; INTRAVENOUS ONCE
Refills: 0 | Status: COMPLETED | OUTPATIENT
Start: 2025-05-14 | End: 2025-05-14

## 2025-05-14 RX ORDER — ONDANSETRON HCL/PF 4 MG/2 ML
1 VIAL (ML) INJECTION
Qty: 15 | Refills: 0
Start: 2025-05-14

## 2025-05-14 RX ORDER — ONDANSETRON HCL/PF 4 MG/2 ML
4 VIAL (ML) INJECTION ONCE
Refills: 0 | Status: COMPLETED | OUTPATIENT
Start: 2025-05-14 | End: 2025-05-14

## 2025-05-14 RX ADMIN — Medication 1000 MILLILITER(S): at 03:31

## 2025-05-14 RX ADMIN — CEFTRIAXONE 100 MILLIGRAM(S): 500 INJECTION, POWDER, FOR SOLUTION INTRAMUSCULAR; INTRAVENOUS at 07:24

## 2025-05-14 RX ADMIN — Medication 4 MILLIGRAM(S): at 03:31

## 2025-05-14 RX ADMIN — Medication 100 UNIT(S): at 09:00

## 2025-05-14 NOTE — ED ADULT NURSE NOTE - BP NONINVASIVE SYSTOLIC (MM HG)
Patients GI provider:  Dr Marie Banuelos    Number to return call: [phone]    Reason for call: Pt calling to speak with MILA Oates to discuss cdiff test      Scheduled procedure/appointment date if applicable: Apt/procedure 121

## 2025-05-14 NOTE — ED ADULT TRIAGE NOTE - RESPIRATORY RATE (BREATHS/MIN)
----- Message from Мария Benítez MD sent at 9/10/2019  5:24 PM CDT -----  Reviewed, nothing to do; repeat per routine    
Dr. Benítez reviewed your labs.  Continue routine labs no changes needed.  Letter sent for next lab appointment 12/10/19  
17

## 2025-05-14 NOTE — ED ADULT NURSE NOTE - OBJECTIVE STATEMENT
Patient is a 75 y/o female A&O x 4 presenting to the ED with generalized cramping abdominal pain that has been increasing over the past 4 days, nata states that she has had this pain for around a year intermittently but it has gotten worse and since the past 4 says she has also been  having nausea, over the past 24 hours she reports 5 episodes of non bloody vomiting. Patient also reports chills and body aches. denies diarrhea, changes in urinary or bowel habits, dizziness, numbness/tingling, or SOB. patient has a history of bladder cancer which she has not received treatment for in the past 6 months, a right upper chest wall port is in place and is flushed every 6 weeks.  Accessed today (5/14) with permission from MD smith, sterility maintained. DM and presents with a dexicomp to her right upper arm, it was removed on assessment because the adhesive had worn off, also has a history of HTN, asthma, GERD. Appears to be uncomfortably and reports chills and feeling cold, afebrile on assessment. utilizes walker for mobility at home. breathing is even and unlabored, abdomen is soft and tender, pulses are +2 in all extremties.  lab work sent, medicated as per MD order. plan of care ongoing

## 2025-05-14 NOTE — ED ADULT NURSE NOTE - NSFALLRISKINTERV_ED_ALL_ED

## 2025-05-14 NOTE — ED PROVIDER NOTE - CLINICAL SUMMARY MEDICAL DECISION MAKING FREE TEXT BOX
75 yo with colon cancer s/p resection and type 1 diabetes on insulin pump, a/w abd pain nausea and vomiting.  Concern for possible sbo vs possible dka, r/o pancreatitis.  Pt will have ct ap, check cbc, cmp, ua and urine culture, lipase, follow up bhb.  Reassess.

## 2025-05-14 NOTE — ED PROVIDER NOTE - NSICDXPASTSURGICALHX_GEN_ALL_CORE_FT
PAST SURGICAL HISTORY:  H/O bilateral breast reduction surgery     H/O total knee replacement, left     H/O: hysterectomy     History of appendectomy     History of cholecystectomy     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 - [facility]    Port-A-Cath in place     S/P bunionectomy

## 2025-05-14 NOTE — ED ADULT NURSE REASSESSMENT NOTE - NS ED NURSE REASSESS COMMENT FT1
Break RN: Pt received from MARLA Arana. Pt mental status remains at baseline. Resting comfortably in bed. Pt on cardiac monitor and continuos pulse ox. Fluids running through R chest port.  R 20g US guided IV placed by MD. Urine collected and sent. Pt safety precautions maintained. Pt care ongoing. Pt awaiting CT.

## 2025-05-14 NOTE — ED PROVIDER NOTE - OBJECTIVE STATEMENT
73 yo F with type 1 diabetes poorly controlled on insulin pump, colorectal cancer in remission a/w abdominal pain, nausea and vomiting.  Pt reports FS have been fluctuating between low 100s and 200s.  Noted to have slght dysuria.  Pt denies fevers, chills, headache, chest pain, shortness of breath, dizziness, lightheadedness, falls or recent trauma.

## 2025-05-14 NOTE — ED PROVIDER NOTE - PATIENT PORTAL LINK FT
You can access the FollowMyHealth Patient Portal offered by [facility] by registering at the following website: [email or web address]. By joining [facility]’s FollowMyHealth portal, you will also be able to view your health information using other applications (apps) compatible with our system.

## 2025-05-14 NOTE — ED PROVIDER NOTE - PHYSICAL EXAMINATION
GEN - NAD; well appearing; A+O x3; non-toxic appearing  CARD -s1s2, RRR, no M,G,R;   PULM - CTA b/l, symmetric breath sounds;   ABD -  +BS, TTP in mid-epigastric and lower abd suprapubically  BACK - no CVA tenderness, Normal  spine;   EXT - symmetric pulses, 2+ dp, capillary refill < 2 seconds, no cyanosis, no edema;   NEURO - no focal neuro deficits, no slurred speech

## 2025-05-14 NOTE — ED PROCEDURE NOTE - ATTENDING CONTRIBUTION TO CARE
Gonvick: I have seen and examined the patient face to face.  I was present during the above procedure and  have reviewed and addended the procedure note.

## 2025-05-14 NOTE — ED PROVIDER NOTE - PROGRESS NOTE DETAILS
CT Abdomen and Pelvis w/ IV Cont (05.14.25 @ 05:58) IMPRESSION: Mild circumferential wall thickening of the bladder, which may be secondary to underdistention versus acute cystitis. Recommend correlation with urinalysis.     UA noted to have mod LE.  Will treat given patient is symptomatic. CT Abdomen and Pelvis w/ IV Cont (05.14.25 @ 05:58) IMPRESSION: Mild circumferential wall thickening of the bladder, which may be secondary to underdistention versus acute cystitis. Recommend correlation with urinalysis.     UA noted to have mod LE.  Will treat given patient is symptomatic.    Discussed findings of breast nodule with daughter.  Pt reports patient recently had mammogram and ct of breast to further evaluate and is currently under work up with her physician.

## 2025-05-14 NOTE — ED PROVIDER NOTE - NSFOLLOWUPINSTRUCTIONS_ED_ALL_ED_FT
Urinary Tract Infection    A urinary tract infection (UTI) is an infection of any part of the urinary tract, which includes the kidneys, ureters, bladder, and urethra. Risk factors include ignoring your need to urinate, wiping back to front if female, being an uncircumcised male, and having diabetes or a weak immune system. Symptoms include frequent urination, pain or burning with urination, foul smelling urine, cloudy urine, pain in the lower abdomen, blood in the urine, and fever. If you were prescribed an antibiotic medicine, take it as told by your health care provider. Do not stop taking the antibiotic even if you start to feel better.    Take Cefpodoxime 200mg every 12 hours for 7 days.     SEEK IMMEDIATE MEDICAL CARE IF YOU HAVE ANY OF THE FOLLOWING SYMPTOMS: severe back or abdominal pain, fever, inability to keep fluids or medicine down, dizziness/lightheadedness, or a change in mental status.

## 2025-05-14 NOTE — ED ADULT TRIAGE NOTE - CHIEF COMPLAINT QUOTE
c/o n/v, abd discomfort x2 days. as well states feels like has UTI.  past med hx T1DM has insulin pump, HTN, asthma, GERD. pt denies h/a, cp, sob, dizziness/lightheadedness, diarrhea, fevers/chills, falls/trauma.

## (undated) DEVICE — DRAIN RESERVOIR FOR JACKSON PRATT 100CC CARDINAL

## (undated) DEVICE — TROCAR COVIDIEN BLUNT TIP HASSAN 10MM

## (undated) DEVICE — TUBING SMARTCAP WITH CO2 ENDO 140/160/180/190 16"

## (undated) DEVICE — XI TIP COVER

## (undated) DEVICE — SUT POLYSORB 0 60" TIES UNDYED

## (undated) DEVICE — SPECIMEN CONTAINER 100ML

## (undated) DEVICE — SOL IRR POUR NS 0.9% 500ML

## (undated) DEVICE — ELCTR BOVIE PENCIL SMOKE EVACUATION

## (undated) DEVICE — PACK ADVANCED LAPAROSCOPIC NS

## (undated) DEVICE — XI ARM NEEDLE DRIVER SUTURECUT MEGA 8MM

## (undated) DEVICE — SUT SOFSILK 2-0 18" C-23

## (undated) DEVICE — PACK ROBOTIC LIJ

## (undated) DEVICE — SUT POLYSORB 3-0 30" V-20 UNDYED

## (undated) DEVICE — XI VESSEL SEALER

## (undated) DEVICE — DRAPE IOBAN 23" X 23"

## (undated) DEVICE — XI ARM DISSECTOR CURVED BIPOLAR 8MM

## (undated) DEVICE — XI ARM GRASPER TIP UP FENESTRATED

## (undated) DEVICE — XI ARM PERMANENT CAUTERY HOOK

## (undated) DEVICE — SCOPE WARMER SEAL DISP

## (undated) DEVICE — XI ARM CLIP APPLIER LARGE

## (undated) DEVICE — VENODYNE/SCD SLEEVE CALF MEDIUM

## (undated) DEVICE — SUT BIOSYN 4-0 18" P-12

## (undated) DEVICE — BLADE SCALPEL SAFETYLOCK #10

## (undated) DEVICE — XI ARM NEEDLE DRIVER LARGE

## (undated) DEVICE — LIGASURE IMPACT

## (undated) DEVICE — SUT SOFSILK 3-0 18" V-20 (POP-OFF)

## (undated) DEVICE — DRSG MASTISOL

## (undated) DEVICE — XI DRAPE ARM

## (undated) DEVICE — POSITIONER FOAM EGG CRATE ULNAR 2PCS (PINK)

## (undated) DEVICE — GLV 6.5 PROTEXIS (WHITE)

## (undated) DEVICE — TUBING IV SET MICROCLAVE ADAPTER

## (undated) DEVICE — XI ARM PERMANENT CAUTERY SPATULA

## (undated) DEVICE — SUT SILK 3-0 18" SH (POP-OFF)

## (undated) DEVICE — XI ARM FORCEP MARYLAND BIPOLAR

## (undated) DEVICE — DRAPE MAYO STAND 30"

## (undated) DEVICE — MARKER ENDO SPOT EX

## (undated) DEVICE — MARKING PEN W RULER / LABELS

## (undated) DEVICE — TUBING SUCTION 20FT

## (undated) DEVICE — XI ARM FORCEP FENESTRATED BIPOLAR 8MM

## (undated) DEVICE — GLV 7.5 PROTEXIS (WHITE)

## (undated) DEVICE — DRAPE INSTRUMENT POUCH 6.75" X 11"

## (undated) DEVICE — XI SHEARS HARMONIC CVD 8MM

## (undated) DEVICE — TUBING SMARTCAP ENDO OLYMPUS 140/160/180/190 2"

## (undated) DEVICE — GLV 7 PROTEXIS (WHITE)

## (undated) DEVICE — GLV 8.5 PROTEXIS (WHITE)

## (undated) DEVICE — ENDOCATCH II 15MM

## (undated) DEVICE — PACK BASIN SPECIAL PROCEDURE

## (undated) DEVICE — PREP CHLORAPREP HI-LITE ORANGE 26ML

## (undated) DEVICE — SUT SOFSILK 2-0 30" V-20

## (undated) DEVICE — ENDOCATCH 10MM SPECIMEN POUCH

## (undated) DEVICE — STAPLER COVIDIEN ENDO GIA STANDARD HANDLE

## (undated) DEVICE — XI ARM FORCEP TENACULUM

## (undated) DEVICE — XI ARM SCISSOR MONO CURVED

## (undated) DEVICE — BLADE SCALPEL SAFETYLOCK #15

## (undated) DEVICE — DRAIN PENROSE .25" X 18" LATEX

## (undated) DEVICE — TROCAR SURGIQUEST AIRSEAL 12MMX100MM

## (undated) DEVICE — NDL COUNTER FOAM AND MAGNET 40-70

## (undated) DEVICE — MEDICATION LABELS W MARKER

## (undated) DEVICE — SUT POLYSORB 0 36" GU-46

## (undated) DEVICE — DRAIN JACKSON PRATT 10MM FLAT FULL NO TROCAR

## (undated) DEVICE — BASIN SET SINGLE

## (undated) DEVICE — Device

## (undated) DEVICE — GLV 8 PROTEXIS (WHITE)

## (undated) DEVICE — FOLEY TRAY 16FR 5CC LF LUBRISIL ADVANCE TEMP CLOSED

## (undated) DEVICE — SOL IRR POUR H2O 250ML

## (undated) DEVICE — TUBING AIRSEAL TRI-LUMEN FILTERED

## (undated) DEVICE — WARMING BLANKET LOWER ADULT

## (undated) DEVICE — XI ARM CLIP APPLIER MEDIUM-LARGE

## (undated) DEVICE — DRSG STERISTRIPS 0.5 X 4"

## (undated) DEVICE — XI SEAL UNIVERSIAL 5-12MM

## (undated) DEVICE — DRAPE LIGHT HANDLE COVER (BLUE)

## (undated) DEVICE — XI DRAPE COLUMN

## (undated) DEVICE — NDL INJ SCLERO INTERJECT 23G

## (undated) DEVICE — XI OBTURATOR OPTICAL BLADELESS 8MM

## (undated) DEVICE — POSITIONER PINK PAD PIGAZZI SYSTEM

## (undated) DEVICE — WARMING BLANKET UPPER ADULT

## (undated) DEVICE — DRSG PREVENA PEEL & PLACE KIT 20CM

## (undated) DEVICE — SUT SOFSILK 2-0 18" V-20 (POP-OFF)

## (undated) DEVICE — TUBING INSUFFLATION LAP FILTER 10FT

## (undated) DEVICE — D HELP - CLEARVIEW CLEARIFY SYSTEM

## (undated) DEVICE — NDL HYPO SAFE 22G X 1.5" (BLACK)

## (undated) DEVICE — SUT SOFSILK 3-0 30" V-20

## (undated) DEVICE — XI ARM FORCEP PROGRASP 8MM

## (undated) DEVICE — POSITIONER FOAM HEAD CRADLE (PINK)

## (undated) DEVICE — TROCAR COVIDIEN VERSASTEP PLUS 15MM STANDARD

## (undated) DEVICE — POSITIONER PURPLE ARM ONE STEP (LARGE)

## (undated) DEVICE — DRAPE BACK TABLE COVER HEAVY DUTY 60"

## (undated) DEVICE — SUT MAXON 1 96" T-60

## (undated) DEVICE — LIGASURE MARYLAND 37CM